# Patient Record
Sex: FEMALE | Race: WHITE | Employment: OTHER | URBAN - METROPOLITAN AREA
[De-identification: names, ages, dates, MRNs, and addresses within clinical notes are randomized per-mention and may not be internally consistent; named-entity substitution may affect disease eponyms.]

---

## 2017-01-01 ENCOUNTER — INFUSION THERAPY VISIT (OUTPATIENT)
Dept: ONCOLOGY | Facility: CLINIC | Age: 77
End: 2017-01-01
Attending: INTERNAL MEDICINE
Payer: MEDICARE

## 2017-01-01 ENCOUNTER — OFFICE VISIT (OUTPATIENT)
Dept: INTERNAL MEDICINE | Facility: CLINIC | Age: 77
End: 2017-01-01
Payer: COMMERCIAL

## 2017-01-01 ENCOUNTER — TRANSFERRED RECORDS (OUTPATIENT)
Dept: HEALTH INFORMATION MANAGEMENT | Facility: CLINIC | Age: 77
End: 2017-01-01

## 2017-01-01 ENCOUNTER — CARE COORDINATION (OUTPATIENT)
Dept: ONCOLOGY | Facility: CLINIC | Age: 77
End: 2017-01-01

## 2017-01-01 ENCOUNTER — APPOINTMENT (OUTPATIENT)
Dept: LAB | Facility: CLINIC | Age: 77
End: 2017-01-01
Attending: INTERNAL MEDICINE
Payer: MEDICARE

## 2017-01-01 ENCOUNTER — TELEPHONE (OUTPATIENT)
Dept: GASTROENTEROLOGY | Facility: CLINIC | Age: 77
End: 2017-01-01

## 2017-01-01 VITALS
HEART RATE: 69 BPM | BODY MASS INDEX: 20.06 KG/M2 | DIASTOLIC BLOOD PRESSURE: 73 MMHG | WEIGHT: 109.69 LBS | SYSTOLIC BLOOD PRESSURE: 116 MMHG

## 2017-01-01 VITALS
WEIGHT: 109.3 LBS | TEMPERATURE: 98 F | RESPIRATION RATE: 16 BRPM | BODY MASS INDEX: 19.99 KG/M2 | DIASTOLIC BLOOD PRESSURE: 72 MMHG | SYSTOLIC BLOOD PRESSURE: 136 MMHG | OXYGEN SATURATION: 97 % | HEART RATE: 80 BPM

## 2017-01-01 VITALS
TEMPERATURE: 97.5 F | HEART RATE: 69 BPM | DIASTOLIC BLOOD PRESSURE: 73 MMHG | BODY MASS INDEX: 20.06 KG/M2 | RESPIRATION RATE: 18 BRPM | WEIGHT: 109.7 LBS | OXYGEN SATURATION: 96 % | SYSTOLIC BLOOD PRESSURE: 116 MMHG

## 2017-01-01 DIAGNOSIS — T45.1X5A ALOPECIA DUE TO CYTOTOXIC DRUG: ICD-10-CM

## 2017-01-01 DIAGNOSIS — K86.89 PANCREATIC MASS: ICD-10-CM

## 2017-01-01 DIAGNOSIS — I26.99 OTHER ACUTE PULMONARY EMBOLISM WITHOUT ACUTE COR PULMONALE (H): ICD-10-CM

## 2017-01-01 DIAGNOSIS — K26.9 DUODENAL ULCERATION: ICD-10-CM

## 2017-01-01 DIAGNOSIS — C25.0 MALIGNANT NEOPLASM OF HEAD OF PANCREAS (H): Primary | ICD-10-CM

## 2017-01-01 DIAGNOSIS — E03.9 HYPOTHYROIDISM, UNSPECIFIED TYPE: ICD-10-CM

## 2017-01-01 DIAGNOSIS — R94.6 ABNORMAL FINDING ON THYROID FUNCTION TEST: Primary | ICD-10-CM

## 2017-01-01 DIAGNOSIS — C25.0 MALIGNANT NEOPLASM OF HEAD OF PANCREAS (H): ICD-10-CM

## 2017-01-01 DIAGNOSIS — R10.84 ABDOMINAL PAIN, GENERALIZED: ICD-10-CM

## 2017-01-01 DIAGNOSIS — L65.8 ALOPECIA DUE TO CYTOTOXIC DRUG: ICD-10-CM

## 2017-01-01 LAB
ALBUMIN SERPL-MCNC: 2.9 G/DL (ref 3.4–5)
ALP SERPL-CCNC: 137 U/L (ref 40–150)
ALT SERPL W P-5'-P-CCNC: 28 U/L (ref 0–50)
ANION GAP SERPL CALCULATED.3IONS-SCNC: 8 MMOL/L (ref 3–14)
AST SERPL W P-5'-P-CCNC: 44 U/L (ref 0–45)
BASOPHILS # BLD AUTO: 0 10E9/L (ref 0–0.2)
BASOPHILS # BLD AUTO: 0.1 10E9/L (ref 0–0.2)
BASOPHILS NFR BLD AUTO: 0.3 %
BASOPHILS NFR BLD AUTO: 1.1 %
BILIRUB SERPL-MCNC: 0.4 MG/DL (ref 0.2–1.3)
BUN SERPL-MCNC: 14 MG/DL (ref 7–30)
CALCIUM SERPL-MCNC: 8.6 MG/DL (ref 8.5–10.1)
CHLORIDE SERPL-SCNC: 102 MMOL/L (ref 94–109)
CO2 SERPL-SCNC: 28 MMOL/L (ref 20–32)
CREAT SERPL-MCNC: 0.64 MG/DL (ref 0.52–1.04)
DIFFERENTIAL METHOD BLD: ABNORMAL
DIFFERENTIAL METHOD BLD: ABNORMAL
EOSINOPHIL # BLD AUTO: 0.1 10E9/L (ref 0–0.7)
EOSINOPHIL # BLD AUTO: 0.3 10E9/L (ref 0–0.7)
EOSINOPHIL NFR BLD AUTO: 1.5 %
EOSINOPHIL NFR BLD AUTO: 3.1 %
ERYTHROCYTE [DISTWIDTH] IN BLOOD BY AUTOMATED COUNT: 12.6 % (ref 10–15)
ERYTHROCYTE [DISTWIDTH] IN BLOOD BY AUTOMATED COUNT: 14.2 % (ref 10–15)
GFR SERPL CREATININE-BSD FRML MDRD: 90 ML/MIN/1.7M2
GLUCOSE SERPL-MCNC: 97 MG/DL (ref 70–99)
HCT VFR BLD AUTO: 33 % (ref 35–47)
HCT VFR BLD AUTO: 33.7 % (ref 35–47)
HGB BLD-MCNC: 10.5 G/DL (ref 11.7–15.7)
HGB BLD-MCNC: 11 G/DL (ref 11.7–15.7)
IMM GRANULOCYTES # BLD: 0 10E9/L (ref 0–0.4)
IMM GRANULOCYTES # BLD: 0.3 10E9/L (ref 0–0.4)
IMM GRANULOCYTES NFR BLD: 0.3 %
IMM GRANULOCYTES NFR BLD: 3.2 %
LYMPHOCYTES # BLD AUTO: 1.1 10E9/L (ref 0.8–5.3)
LYMPHOCYTES # BLD AUTO: 2.9 10E9/L (ref 0.8–5.3)
LYMPHOCYTES NFR BLD AUTO: 27.3 %
LYMPHOCYTES NFR BLD AUTO: 33.2 %
MCH RBC QN AUTO: 30.3 PG (ref 26.5–33)
MCH RBC QN AUTO: 31.2 PG (ref 26.5–33)
MCHC RBC AUTO-ENTMCNC: 31.8 G/DL (ref 31.5–36.5)
MCHC RBC AUTO-ENTMCNC: 32.6 G/DL (ref 31.5–36.5)
MCV RBC AUTO: 95 FL (ref 78–100)
MCV RBC AUTO: 96 FL (ref 78–100)
MONOCYTES # BLD AUTO: 0.3 10E9/L (ref 0–1.3)
MONOCYTES # BLD AUTO: 2.8 10E9/L (ref 0–1.3)
MONOCYTES NFR BLD AUTO: 26.3 %
MONOCYTES NFR BLD AUTO: 9.8 %
NEUTROPHILS # BLD AUTO: 1.9 10E9/L (ref 1.6–8.3)
NEUTROPHILS # BLD AUTO: 4.1 10E9/L (ref 1.6–8.3)
NEUTROPHILS NFR BLD AUTO: 39 %
NEUTROPHILS NFR BLD AUTO: 54.9 %
NRBC # BLD AUTO: 0 10*3/UL
NRBC # BLD AUTO: 0 10*3/UL
NRBC BLD AUTO-RTO: 0 /100
NRBC BLD AUTO-RTO: 0 /100
PLATELET # BLD AUTO: 119 10E9/L (ref 150–450)
PLATELET # BLD AUTO: 705 10E9/L (ref 150–450)
PLATELET # BLD EST: ABNORMAL 10*3/UL
PLATELET # BLD EST: ABNORMAL 10*3/UL
POTASSIUM SERPL-SCNC: 4.5 MMOL/L (ref 3.4–5.3)
PROT SERPL-MCNC: 7.3 G/DL (ref 6.8–8.8)
RBC # BLD AUTO: 3.46 10E12/L (ref 3.8–5.2)
RBC # BLD AUTO: 3.53 10E12/L (ref 3.8–5.2)
SODIUM SERPL-SCNC: 138 MMOL/L (ref 133–144)
WBC # BLD AUTO: 10.5 10E9/L (ref 4–11)
WBC # BLD AUTO: 3.4 10E9/L (ref 4–11)

## 2017-01-01 PROCEDURE — 96417 CHEMO IV INFUS EACH ADDL SEQ: CPT

## 2017-01-01 PROCEDURE — 25000128 H RX IP 250 OP 636: Mod: ZF | Performed by: NURSE PRACTITIONER

## 2017-01-01 PROCEDURE — 96413 CHEMO IV INFUSION 1 HR: CPT

## 2017-01-01 PROCEDURE — 99213 OFFICE O/P EST LOW 20 MIN: CPT | Mod: ZF

## 2017-01-01 PROCEDURE — 80053 COMPREHEN METABOLIC PANEL: CPT | Performed by: INTERNAL MEDICINE

## 2017-01-01 PROCEDURE — 99212 OFFICE O/P EST SF 10 MIN: CPT | Mod: 25

## 2017-01-01 PROCEDURE — 85025 COMPLETE CBC W/AUTO DIFF WBC: CPT | Performed by: INTERNAL MEDICINE

## 2017-01-01 PROCEDURE — 99215 OFFICE O/P EST HI 40 MIN: CPT | Mod: ZP | Performed by: NURSE PRACTITIONER

## 2017-01-01 PROCEDURE — 96375 TX/PRO/DX INJ NEW DRUG ADDON: CPT

## 2017-01-01 PROCEDURE — 25000128 H RX IP 250 OP 636: Mod: ZF | Performed by: INTERNAL MEDICINE

## 2017-01-01 RX ORDER — EPINEPHRINE 1 MG/ML
0.3 INJECTION, SOLUTION, CONCENTRATE INTRAVENOUS EVERY 5 MIN PRN
Status: CANCELLED | OUTPATIENT
Start: 2018-01-01

## 2017-01-01 RX ORDER — ALBUTEROL SULFATE 90 UG/1
1-2 AEROSOL, METERED RESPIRATORY (INHALATION)
Status: CANCELLED
Start: 2017-01-01

## 2017-01-01 RX ORDER — METHYLPREDNISOLONE SODIUM SUCCINATE 125 MG/2ML
125 INJECTION, POWDER, LYOPHILIZED, FOR SOLUTION INTRAMUSCULAR; INTRAVENOUS
Status: CANCELLED
Start: 2018-01-01

## 2017-01-01 RX ORDER — PROCHLORPERAZINE MALEATE 10 MG
5 TABLET ORAL EVERY 6 HOURS PRN
Qty: 180 TABLET | Refills: 0 | Status: ON HOLD | OUTPATIENT
Start: 2017-01-01 | End: 2018-01-01

## 2017-01-01 RX ORDER — MEPERIDINE HYDROCHLORIDE 25 MG/ML
25 INJECTION INTRAMUSCULAR; INTRAVENOUS; SUBCUTANEOUS EVERY 30 MIN PRN
Status: CANCELLED | OUTPATIENT
Start: 2018-01-01

## 2017-01-01 RX ORDER — SODIUM CHLORIDE 9 MG/ML
1000 INJECTION, SOLUTION INTRAVENOUS CONTINUOUS PRN
Status: CANCELLED
Start: 2018-01-01

## 2017-01-01 RX ORDER — EPINEPHRINE 0.3 MG/.3ML
0.3 INJECTION SUBCUTANEOUS EVERY 5 MIN PRN
Status: CANCELLED | OUTPATIENT
Start: 2018-01-01

## 2017-01-01 RX ORDER — PACLITAXEL 100 MG/20ML
125 INJECTION, POWDER, LYOPHILIZED, FOR SUSPENSION INTRAVENOUS ONCE
Status: COMPLETED | OUTPATIENT
Start: 2017-01-01 | End: 2017-01-01

## 2017-01-01 RX ORDER — PACLITAXEL 100 MG/20ML
125 INJECTION, POWDER, LYOPHILIZED, FOR SUSPENSION INTRAVENOUS ONCE
Status: CANCELLED | OUTPATIENT
Start: 2018-01-01

## 2017-01-01 RX ORDER — TRAMADOL HYDROCHLORIDE 50 MG/1
25-50 TABLET ORAL EVERY 6 HOURS PRN
Qty: 100 TABLET | Refills: 1 | Status: SHIPPED | OUTPATIENT
Start: 2017-01-01 | End: 2018-01-01

## 2017-01-01 RX ORDER — ALBUTEROL SULFATE 0.83 MG/ML
2.5 SOLUTION RESPIRATORY (INHALATION)
Status: CANCELLED | OUTPATIENT
Start: 2018-01-01

## 2017-01-01 RX ORDER — PACLITAXEL 100 MG/20ML
125 INJECTION, POWDER, LYOPHILIZED, FOR SUSPENSION INTRAVENOUS ONCE
Status: CANCELLED | OUTPATIENT
Start: 2017-01-01

## 2017-01-01 RX ORDER — MEPERIDINE HYDROCHLORIDE 25 MG/ML
25 INJECTION INTRAMUSCULAR; INTRAVENOUS; SUBCUTANEOUS EVERY 30 MIN PRN
Status: CANCELLED | OUTPATIENT
Start: 2017-01-01

## 2017-01-01 RX ORDER — ALBUTEROL SULFATE 90 UG/1
1-2 AEROSOL, METERED RESPIRATORY (INHALATION)
Status: CANCELLED
Start: 2018-01-01

## 2017-01-01 RX ORDER — OMEPRAZOLE 40 MG/1
40 CAPSULE, DELAYED RELEASE ORAL 2 TIMES DAILY
Qty: 180 CAPSULE | Refills: 0 | Status: ON HOLD | OUTPATIENT
Start: 2017-01-01 | End: 2018-01-01

## 2017-01-01 RX ORDER — EPINEPHRINE 1 MG/ML
0.3 INJECTION, SOLUTION, CONCENTRATE INTRAVENOUS EVERY 5 MIN PRN
Status: CANCELLED | OUTPATIENT
Start: 2017-01-01

## 2017-01-01 RX ORDER — SODIUM CHLORIDE 9 MG/ML
1000 INJECTION, SOLUTION INTRAVENOUS CONTINUOUS PRN
Status: CANCELLED
Start: 2017-01-01

## 2017-01-01 RX ORDER — ONDANSETRON 4 MG/1
4 TABLET, FILM COATED ORAL EVERY 6 HOURS PRN
Qty: 60 TABLET | Refills: 0 | Status: ON HOLD | OUTPATIENT
Start: 2017-01-01 | End: 2018-01-01

## 2017-01-01 RX ORDER — LORAZEPAM 2 MG/ML
0.5 INJECTION INTRAMUSCULAR EVERY 4 HOURS PRN
Status: CANCELLED
Start: 2017-01-01

## 2017-01-01 RX ORDER — DIPHENHYDRAMINE HYDROCHLORIDE 50 MG/ML
50 INJECTION INTRAMUSCULAR; INTRAVENOUS
Status: CANCELLED
Start: 2018-01-01

## 2017-01-01 RX ORDER — DIPHENHYDRAMINE HYDROCHLORIDE 50 MG/ML
50 INJECTION INTRAMUSCULAR; INTRAVENOUS
Status: CANCELLED
Start: 2017-01-01

## 2017-01-01 RX ORDER — ALBUTEROL SULFATE 0.83 MG/ML
2.5 SOLUTION RESPIRATORY (INHALATION)
Status: CANCELLED | OUTPATIENT
Start: 2017-01-01

## 2017-01-01 RX ORDER — METHYLPREDNISOLONE SODIUM SUCCINATE 125 MG/2ML
125 INJECTION, POWDER, LYOPHILIZED, FOR SOLUTION INTRAMUSCULAR; INTRAVENOUS
Status: CANCELLED
Start: 2017-01-01

## 2017-01-01 RX ORDER — EPINEPHRINE 0.3 MG/.3ML
0.3 INJECTION SUBCUTANEOUS EVERY 5 MIN PRN
Status: CANCELLED | OUTPATIENT
Start: 2017-01-01

## 2017-01-01 RX ORDER — LORAZEPAM 2 MG/ML
0.5 INJECTION INTRAMUSCULAR EVERY 4 HOURS PRN
Status: CANCELLED
Start: 2018-01-01

## 2017-01-01 RX ADMIN — SODIUM CHLORIDE 250 ML: 9 INJECTION, SOLUTION INTRAVENOUS at 09:50

## 2017-01-01 RX ADMIN — DEXAMETHASONE SODIUM PHOSPHATE 12 MG: 10 INJECTION, SOLUTION INTRAMUSCULAR; INTRAVENOUS at 09:50

## 2017-01-01 RX ADMIN — PACLITAXEL 185 MG: 100 INJECTION, POWDER, LYOPHILIZED, FOR SUSPENSION INTRAVENOUS at 10:25

## 2017-01-01 RX ADMIN — DEXAMETHASONE SODIUM PHOSPHATE 12 MG: 10 INJECTION, SOLUTION INTRAMUSCULAR; INTRAVENOUS at 10:13

## 2017-01-01 RX ADMIN — SODIUM CHLORIDE 250 ML: 9 INJECTION, SOLUTION INTRAVENOUS at 10:13

## 2017-01-01 RX ADMIN — GEMCITABINE 1480 MG: 38 INJECTION, SOLUTION INTRAVENOUS at 11:14

## 2017-01-01 RX ADMIN — GEMCITABINE 1400 MG: 38 INJECTION, SOLUTION INTRAVENOUS at 11:24

## 2017-01-01 RX ADMIN — PACLITAXEL 185 MG: 100 INJECTION, POWDER, LYOPHILIZED, FOR SUSPENSION INTRAVENOUS at 10:36

## 2017-01-01 ASSESSMENT — PAIN SCALES - GENERAL
PAINLEVEL: NO PAIN (0)

## 2017-01-05 ENCOUNTER — DOCUMENTATION ONLY (OUTPATIENT)
Dept: OTHER | Facility: CLINIC | Age: 77
End: 2017-01-05

## 2017-01-05 ENCOUNTER — THERAPY VISIT (OUTPATIENT)
Dept: PHYSICAL THERAPY | Facility: CLINIC | Age: 77
End: 2017-01-05
Payer: MEDICARE

## 2017-01-05 DIAGNOSIS — M79.674 PAIN OF TOE OF RIGHT FOOT: Primary | ICD-10-CM

## 2017-01-05 DIAGNOSIS — Z71.89 ADVANCE CARE PLANNING: Primary | Chronic | ICD-10-CM

## 2017-01-05 PROCEDURE — 97110 THERAPEUTIC EXERCISES: CPT | Mod: GP | Performed by: PHYSICAL THERAPIST

## 2017-01-13 ENCOUNTER — THERAPY VISIT (OUTPATIENT)
Dept: PHYSICAL THERAPY | Facility: CLINIC | Age: 77
End: 2017-01-13
Payer: MEDICARE

## 2017-01-13 DIAGNOSIS — M79.674 PAIN OF TOE OF RIGHT FOOT: Primary | ICD-10-CM

## 2017-01-13 PROCEDURE — 97110 THERAPEUTIC EXERCISES: CPT | Mod: GP | Performed by: PHYSICAL THERAPIST

## 2017-01-13 PROCEDURE — 97140 MANUAL THERAPY 1/> REGIONS: CPT | Mod: GP | Performed by: PHYSICAL THERAPIST

## 2017-01-20 ENCOUNTER — THERAPY VISIT (OUTPATIENT)
Dept: PHYSICAL THERAPY | Facility: CLINIC | Age: 77
End: 2017-01-20
Payer: MEDICARE

## 2017-01-20 DIAGNOSIS — M79.674 PAIN OF TOE OF RIGHT FOOT: Primary | ICD-10-CM

## 2017-01-20 PROCEDURE — 97110 THERAPEUTIC EXERCISES: CPT | Mod: GP | Performed by: PHYSICAL THERAPIST

## 2017-01-20 PROCEDURE — 97530 THERAPEUTIC ACTIVITIES: CPT | Mod: GP | Performed by: PHYSICAL THERAPIST

## 2017-01-20 PROCEDURE — 97140 MANUAL THERAPY 1/> REGIONS: CPT | Mod: GP | Performed by: PHYSICAL THERAPIST

## 2017-01-20 NOTE — PROGRESS NOTES
"PROGRESS REPORT  1/20/17    Iris has been in therapy from 12/21/16 to Jan 20, 2017 for treatment of The encounter diagnosis was Pain of toe of right foot..    Procedure:  Status post right first MTP joint arthrodesis with pinning of the second toe for hammertoe deformity performed on 11/09/2016.    Therapist Impression:   Since starting physical therapy, Iris has demonstrated self-report and objective measure findings of improved 2nd MTP ROM and motor control of foot intrinsics. Her FAAM score is 70.6% subjective report of 85% function. Her pain levels continue to reduce, but she does have some symptoms within the plantar plate region of her 2nd MTP joint. Her 2nd toe ROM is WFL, but presents with a dorsal dislocation of the phalanx, which causes a toe extension bias during static posture and she is unable to achieve flexion ROM due to this. Her gait is abnormal at this point as she is not engaging the forefoot during mid to terminal stance phase on the L.  Based of these finding, PT feels it is appropriate to continue PT with a focus on restoring gait/functional strength of the foot complex per Dr. Mcelroy's direction.    Subjective:  Subjective changes noted by patient: Pt reports being in her CAM boot until follow-up with Dr. Mcelroy, but she has been taking it off for short walks within the home and had it off while standing at her work. She feels her R foot gets \"tired\" throughout the day and her hip/back have been sore from walking within the boot. She reports having some pain within the posterior surface of her 2nd MTP, but this goes away throughout the day. She has been taping her 2nd toe into PF at night and putting a spacer b/w her toes.   OUTCOME MEASURE:  FAAM:  65/92 (70.6%) Subjective report on FAAM at 85%  Objective:    L 2nd MTP ROM: 45* DF, 0* PF    Ankle mobility: FROM BILAT    Gait: antalgic L stance phase with forefoot load avoidance     Foot intrinsic: 4-/5  (4-/5 toes 2-4 " gross)       ASSESSMENT/PLAN  Updated problem list and treatment plan: The encounter diagnosis was Pain of toe of right foot. Pain - HEP  Decreased ROM/flexibility - HEP  Decreased function - HEP  Decreased strength - HEP  Inflammation - HEP  Impaired muscle performance - HEP  Impaired gait - HEP  STG/LTGs have been met or progress has been made towards goals:  Yes (See Goal flow sheet completed today.)  Assessment of Progress: The patient's condition is improving.  The patient's condition has potential to improve.  Patient is meeting short term goals and is progressing towards long term goals.  Self Management Plans:  Patient has been instructed in a home treatment program.  Patient is independent in a home treatment program.  Patient  has been instructed in self management of symptoms.  Patient is independent in self management of symptoms.  I have re-evaluated this patient and find that the nature, scope, duration and intensity of the therapy is appropriate for the medical condition of the patient.  Iris continues to require the following intervention to meet STG and LTG's:  PT    Recommendations:  This patient would benefit from continued therapy.     Frequency:  1 X week, once daily Per Dr. Mcelroy's orders going forth   Duration:  As requested per. Follow-up with Dr. Mcelroy    Please refer to the daily flowsheet for treatment today, total treatment time and time spent performing 1:1 timed codes.

## 2017-01-23 DIAGNOSIS — M25.571 PAIN IN JOINT, ANKLE AND FOOT, RIGHT: Primary | ICD-10-CM

## 2017-01-24 ENCOUNTER — OFFICE VISIT (OUTPATIENT)
Dept: ORTHOPEDICS | Facility: CLINIC | Age: 77
End: 2017-01-24

## 2017-01-24 DIAGNOSIS — M25.571 PAIN IN JOINT, ANKLE AND FOOT, RIGHT: Primary | ICD-10-CM

## 2017-01-24 NOTE — NURSING NOTE
Reason For Visit:   Chief Complaint   Patient presents with     RECHECK     Right 1st Metatarsal phalangel Joint fusion and hammer toe correction. DOS: 11/09/2016.           Pain Assessment  Patient Currently in Pain: No

## 2017-01-24 NOTE — Clinical Note
1/24/2017       RE: Iris Lewis  [address]  [place] [address]     Dear Colleague,    Thank you for referring your patient, Iris Lewis, to the [facility] at [facility]. Please see a copy of my visit note below.    CHIEF COMPLAINT:  Status post right first metatarsophalangeal joint fusion with hammertoe corrective surgery performed on 11/09/2016.      HISTORY OF PRESENT ILLNESS:  Ms. Lewis presents today for further followup.  Denies to have any problems or complaints.  Reports to be compliant.  Continues using the CAM Walker per our recommendations.      PHYSICAL EXAMINATION:  On today's visit, she presents with excellent alignment of the right first MTP joint.  There is no gross motion across the joint itself.  The second toe presents a slight high riding position as is quite difficult and stiff to bring it down into plantar flexion secondary to the postoperative scar tissue.  Overall alignment of the second toe is excellent with still some residual swelling.      RADIOGRAPHIC EVALUATION:  Three views of the right foot were reviewed today which were significant for showing what seems to be an excellent solid fusion across the first MTP joint with hardware intact and in place.  Alignment is excellent.      ASSESSMENT:  Status post right first MTP joint arthrodesis with second hammertoe corrective surgery.      PLAN:  I discussed with the patient she can proceed with activities as tolerated.  I emphasized the importance of working with physical therapy on scar tissue in order to plantar flex the second toe.      The patient will follow up on a p.r.n. basis or in 2-3 months from today if she is not happy with the function of her right foot.      All questions were answered.      TT 15 minutes, CT 10 minutes.           Again, thank you for allowing me to participate in the care of your patient.      Sincerely,    Janes Mcelroy,  MD

## 2017-01-24 NOTE — PROGRESS NOTES
CHIEF COMPLAINT:  Status post right first metatarsophalangeal joint fusion with hammertoe corrective surgery performed on 11/09/2016.      HISTORY OF PRESENT ILLNESS:  Ms. Lewis presents today for further followup.  Denies to have any problems or complaints.  Reports to be compliant.  Continues using the CAM Walker per our recommendations.      PHYSICAL EXAMINATION:  On today's visit, she presents with excellent alignment of the right first MTP joint.  There is no gross motion across the joint itself.  The second toe presents a slight high riding position as is quite difficult and stiff to bring it down into plantar flexion secondary to the postoperative scar tissue.  Overall alignment of the second toe is excellent with still some residual swelling.      RADIOGRAPHIC EVALUATION:  Three views of the right foot were reviewed today which were significant for showing what seems to be an excellent solid fusion across the first MTP joint with hardware intact and in place.  Alignment is excellent.      ASSESSMENT:  Status post right first MTP joint arthrodesis with second hammertoe corrective surgery.      PLAN:  I discussed with the patient she can proceed with activities as tolerated.  I emphasized the importance of working with physical therapy on scar tissue in order to plantar flex the second toe.      The patient will follow up on a p.r.n. basis or in 2-3 months from today if she is not happy with the function of her right foot.      All questions were answered.      TT 15 minutes, CT 10 minutes.

## 2017-01-30 ENCOUNTER — THERAPY VISIT (OUTPATIENT)
Dept: PHYSICAL THERAPY | Facility: CLINIC | Age: 77
End: 2017-01-30
Payer: MEDICARE

## 2017-01-30 DIAGNOSIS — M79.674 PAIN OF TOE OF RIGHT FOOT: Primary | ICD-10-CM

## 2017-01-30 PROCEDURE — 97110 THERAPEUTIC EXERCISES: CPT | Mod: GP | Performed by: PHYSICAL THERAPIST

## 2017-01-30 PROCEDURE — 97112 NEUROMUSCULAR REEDUCATION: CPT | Mod: GP | Performed by: PHYSICAL THERAPIST

## 2017-02-08 ENCOUNTER — THERAPY VISIT (OUTPATIENT)
Dept: PHYSICAL THERAPY | Facility: CLINIC | Age: 77
End: 2017-02-08
Payer: MEDICARE

## 2017-02-08 DIAGNOSIS — M79.674 PAIN OF TOE OF RIGHT FOOT: Primary | ICD-10-CM

## 2017-02-08 PROCEDURE — 97140 MANUAL THERAPY 1/> REGIONS: CPT | Mod: GP | Performed by: PHYSICAL THERAPIST

## 2017-02-08 PROCEDURE — 97110 THERAPEUTIC EXERCISES: CPT | Mod: GP | Performed by: PHYSICAL THERAPIST

## 2017-03-15 ENCOUNTER — RADIANT APPOINTMENT (OUTPATIENT)
Dept: MAMMOGRAPHY | Facility: CLINIC | Age: 77
End: 2017-03-15
Attending: FAMILY MEDICINE
Payer: MEDICARE

## 2017-03-15 DIAGNOSIS — Z12.31 VISIT FOR SCREENING MAMMOGRAM: ICD-10-CM

## 2017-03-15 PROCEDURE — G0202 SCR MAMMO BI INCL CAD: HCPCS

## 2017-05-15 DIAGNOSIS — I10 ESSENTIAL HYPERTENSION, BENIGN: ICD-10-CM

## 2017-05-15 NOTE — LETTER
May 17, 2017      TO: Iris VALLADARES Joshua  [address]  [place] [address]         Dear MsMikaela Iris VALLADARES Joshua,    This letter is a reminder that you are overdue to see your Primary Care Provider for an Annual Visit and needed labs. You must be seen by your Primary Care Provider on a yearly basis and have appropriate labs drawn for continued care and prescription refills. Please call [phone] to schedule an appointment for an Annual Visit with VEDA STEELE MD.   LAST VISIT 4/13/16    You have been given a 30 day supply/refill of your  RIAMTERENE/HYDROCHLOROTHIAZIDE 37.5-25 MG Cap while you get your clinic visit/labs completed.    Regards,  [facility]

## 2017-05-17 RX ORDER — TRIAMTERENE AND HYDROCHLOROTHIAZIDE 37.5; 25 MG/1; MG/1
1 CAPSULE ORAL EVERY MORNING
Qty: 30 CAPSULE | Refills: 0 | Status: SHIPPED | OUTPATIENT
Start: 2017-05-17 | End: 2017-06-21

## 2017-05-18 NOTE — TELEPHONE ENCOUNTER
DYAZIDE   Last Written Prescription Date: 11/29/16  Last Fill Quantity: 90, # refills: 1  Last Office Visit : 4/13/16  Next Clinic Visit: NONE     Results for JONA SHETTY (MRN [id number]) as of 5/17/2017 21:57   Ref. Range 10/24/2016 16:04   Sodium Latest Ref Range: 133 - 144 mmol/L 141       Potassium   Date Value Ref Range Status   11/09/2016 3.5 3.4 - 5.3 mmol/L Final     Creatinine   Date Value Ref Range Status   10/24/2016 0.73 0.52 - 1.04 mg/dL Final     BP Readings from Last 3 Encounters:   11/09/16 131/68   10/24/16 143/83   08/17/16 149/72     OVER DUE MD APPT.  LETTER SENT + 30 DAY RF

## 2017-05-26 ENCOUNTER — TELEPHONE (OUTPATIENT)
Dept: INTERNAL MEDICINE | Facility: CLINIC | Age: 77
End: 2017-05-26

## 2017-05-26 DIAGNOSIS — E78.00 HIGH BLOOD CHOLESTEROL: ICD-10-CM

## 2017-05-26 DIAGNOSIS — I10 BENIGN ESSENTIAL HYPERTENSION: Primary | ICD-10-CM

## 2017-06-19 ENCOUNTER — OFFICE VISIT (OUTPATIENT)
Dept: INTERNAL MEDICINE | Facility: CLINIC | Age: 77
End: 2017-06-19

## 2017-06-19 VITALS
SYSTOLIC BLOOD PRESSURE: 120 MMHG | OXYGEN SATURATION: 98 % | WEIGHT: 120.5 LBS | HEART RATE: 95 BPM | DIASTOLIC BLOOD PRESSURE: 81 MMHG | BODY MASS INDEX: 22.04 KG/M2 | RESPIRATION RATE: 16 BRPM

## 2017-06-19 DIAGNOSIS — R94.6 ABNORMAL FINDING ON THYROID FUNCTION TEST: ICD-10-CM

## 2017-06-19 DIAGNOSIS — E78.00 HIGH BLOOD CHOLESTEROL: ICD-10-CM

## 2017-06-19 DIAGNOSIS — R10.84 ABDOMINAL PAIN, GENERALIZED: ICD-10-CM

## 2017-06-19 DIAGNOSIS — I10 BENIGN ESSENTIAL HYPERTENSION: Primary | ICD-10-CM

## 2017-06-19 DIAGNOSIS — I10 BENIGN ESSENTIAL HYPERTENSION: ICD-10-CM

## 2017-06-19 LAB
ALBUMIN SERPL-MCNC: 3.9 G/DL (ref 3.4–5)
ALP SERPL-CCNC: 71 U/L (ref 40–150)
ALT SERPL W P-5'-P-CCNC: 18 U/L (ref 0–50)
ANION GAP SERPL CALCULATED.3IONS-SCNC: 7 MMOL/L (ref 3–14)
AST SERPL W P-5'-P-CCNC: 18 U/L (ref 0–45)
BASOPHILS # BLD AUTO: 0.1 10E9/L (ref 0–0.2)
BASOPHILS NFR BLD AUTO: 0.7 %
BILIRUB SERPL-MCNC: 1 MG/DL (ref 0.2–1.3)
BUN SERPL-MCNC: 16 MG/DL (ref 7–30)
CALCIUM SERPL-MCNC: 9.1 MG/DL (ref 8.5–10.1)
CHLORIDE SERPL-SCNC: 105 MMOL/L (ref 94–109)
CHOLEST SERPL-MCNC: 250 MG/DL
CO2 SERPL-SCNC: 29 MMOL/L (ref 20–32)
CREAT SERPL-MCNC: 0.68 MG/DL (ref 0.52–1.04)
DIFFERENTIAL METHOD BLD: NORMAL
EOSINOPHIL # BLD AUTO: 0.7 10E9/L (ref 0–0.7)
EOSINOPHIL NFR BLD AUTO: 7.7 %
ERYTHROCYTE [DISTWIDTH] IN BLOOD BY AUTOMATED COUNT: 13.5 % (ref 10–15)
GFR SERPL CREATININE-BSD FRML MDRD: 84 ML/MIN/1.7M2
GLUCOSE SERPL-MCNC: 79 MG/DL (ref 70–99)
HCT VFR BLD AUTO: 43.6 % (ref 35–47)
HDLC SERPL-MCNC: 168 MG/DL
HGB BLD-MCNC: 13.9 G/DL (ref 11.7–15.7)
IMM GRANULOCYTES # BLD: 0 10E9/L (ref 0–0.4)
IMM GRANULOCYTES NFR BLD: 0.2 %
LDLC SERPL CALC-MCNC: 69 MG/DL
LYMPHOCYTES # BLD AUTO: 1.5 10E9/L (ref 0.8–5.3)
LYMPHOCYTES NFR BLD AUTO: 15.6 %
MCH RBC QN AUTO: 31.3 PG (ref 26.5–33)
MCHC RBC AUTO-ENTMCNC: 31.9 G/DL (ref 31.5–36.5)
MCV RBC AUTO: 98 FL (ref 78–100)
MONOCYTES # BLD AUTO: 1.1 10E9/L (ref 0–1.3)
MONOCYTES NFR BLD AUTO: 11.9 %
NEUTROPHILS # BLD AUTO: 6.1 10E9/L (ref 1.6–8.3)
NEUTROPHILS NFR BLD AUTO: 63.9 %
NONHDLC SERPL-MCNC: 82 MG/DL
NRBC # BLD AUTO: 0 10*3/UL
NRBC BLD AUTO-RTO: 0 /100
PLATELET # BLD AUTO: 239 10E9/L (ref 150–450)
POTASSIUM SERPL-SCNC: 3.6 MMOL/L (ref 3.4–5.3)
PROT SERPL-MCNC: 7.8 G/DL (ref 6.8–8.8)
RBC # BLD AUTO: 4.44 10E12/L (ref 3.8–5.2)
SODIUM SERPL-SCNC: 141 MMOL/L (ref 133–144)
T4 FREE SERPL-MCNC: 1.61 NG/DL (ref 0.76–1.46)
TRIGL SERPL-MCNC: 68 MG/DL
TSH SERPL DL<=0.005 MIU/L-ACNC: 0.04 MU/L (ref 0.4–4)
WBC # BLD AUTO: 9.5 10E9/L (ref 4–11)

## 2017-06-19 ASSESSMENT — PAIN SCALES - GENERAL: PAINLEVEL: NO PAIN (0)

## 2017-06-19 NOTE — PROGRESS NOTES
HPI:    Pt. Comes in follow up today.   She o/w denies additional HEENT, cardiopulmonary, abdominal, , GYN, neurological, systemic complaints. She had colonoscopy 3/15. She had mammogram 3/17 and saw Dr. Orourke for annual exam 4/4/16. She was seen by Edwin Lauren 4/15 for thyroid CA.  She was seen in Dermatology  12/16.  She denies excessive ASA/NSAID use. No other similar skin complaints elsewhere. She follows with Dr. Mcelroy for surgery and last seen 1/17.          PE:    Vitals noted gen nad cooperative alert, HEENT, ears normal oropharynx clear no exudate, neck supple nl rom no adenopathy, surgical scar at thyroid, LCTA, B, RRR, S1, S2, no MRG, no paraspinous tenderness to palpation. Abdomen is non tender    Neurological exam B UE/LE/proximal/distal is normal and symmetric for sensation, reflexes, and strength.Normal gait. L knee without tenderness, no joint effusion.           A/P:    1. Mammogram 3/17; colonoscopy  3/15 repeat in 10 years. She is to see Dr. Orourke 6/17 for annual exam  2. See in Dermatology 12/16  3. She saw Dr. Orourke for low bone density 6/16  4. Check labs today for cholesterol  5. Check labs for HTN stable  6. Check labs for thyroid medication.  7. Immunizations up to date  8. Refer back to Dr. Washburn, CVTS h/o Saul (around 2006) for abdominal complaints. If worse repeat CT abdomen/pelvis (last 2014). She will also take Probiotics    I will see her back 8/2017        Total time spent 25 minutes.  More than 50% of the time spent with Ms. Lewis on counseling / coordinating her care

## 2017-06-19 NOTE — NURSING NOTE
Chief Complaint   Patient presents with     Physical     Patient is here for annual physical.      Tanna Pena LPN at 10:29 AM on 6/19/2017.

## 2017-06-19 NOTE — MR AVS SNAPSHOT
After Visit Summary   6/19/2017    Iris Lewis    MRN: [id number]           Patient Information     Date Of Birth          1940        Visit Information        Provider Department      6/19/2017 10:35 AM Neri Gipson MD [facility]        Today's Diagnoses     Benign essential hypertension    -  1    Abnormal finding on thyroid function test        Abdominal pain, generalized          Care Instructions    [facility] Medication Refill Request Information:  * Please contact your pharmacy regarding ANY request for medication refills.  ** [facility] Prescription Fax = [phone]  * Please allow 3 business days for routine medication refills.  * Please allow 5 business days for controlled substance medication refills.     [facility] Test Result notification information:  *You will be notified with in 7-10 days of your appointment day regarding the results of your test.  If you are on MyChart you will be notified as soon as the provider has reviewed the results and signed off on them.    [facility] [phone]             Follow-ups after your visit        Additional Services     GENERAL SURG ADULT REFERRAL       Dr. Washburn, Rafat Saul                  Your next 10 appointments already scheduled     Jun 26, 2017  3:30 PM CDT   Return Visit with Neha Orourke MD PhD   [facility] ([facility])    [facility]  [address]  [address]  [address] [address]   [phone]            Aug 08, 2017 10:35 AM CDT   (Arrive by 10:20 AM)   Return Visit with Neri Gipson MD   [facility] ([facility])    [address]  4th Floor  [facility] [address] [phone]              Future tests that were ordered for you today     Open Future Orders        Priority Expected Expires Ordered    TSH with free T4 reflex Routine 6/19/2017 6/19/2018  6/19/2017    Lipid panel reflex to direct LDL Routine  6/19/2018 6/19/2017    Comprehensive metabolic panel Routine 6/19/2017 6/19/2018 6/19/2017    CBC with platelets differential Routine 6/19/2017 7/3/2017 6/19/2017            Who to contact     Please call your clinic at [phone] to:    Ask questions about your health    Make or cancel appointments    Discuss your medicines    Learn about your test results    Speak to your doctor   If you have compliments or concerns about an experience at your clinic, or if you wish to file a complaint, please contact [facility] Patient Relations at [phone] or email us at [email or web address]         Additional Information About Your Visit        [facility] Touch Information     [employer] gives you secure access to your electronic health record. If you see a primary care provider, you can also send messages to your care team and make appointments. If you have questions, please call your primary care clinic.  If you do not have a primary care provider, please call [phone] and they will assist you.      [employer] is an electronic gateway that provides easy, online access to your medical records. With [employer], you can request a clinic appointment, read your test results, renew a prescription or communicate with your care team.     To access your existing account, please contact your [facility] or call [phone] for assistance.        Care EveryWhere ID     This is your Care EveryWhere ID. This could be used by other organizations to access your [place] medical records  [id number]        Your Vitals Were     Pulse Respirations Pulse Oximetry Breastfeeding? BMI (Body Mass Index)       95 16 98% No 22.04 kg/m2        Blood Pressure from Last 3 Encounters:   06/19/17 120/81   11/09/16 131/68   10/24/16 143/83    Weight from Last 3 Encounters:   06/19/17 54.7 kg (120 lb 8 oz)   12/20/16 52.4 kg (115 lb 8 oz)    11/09/16 53 kg (116 lb 12.8 oz)              We Performed the Following     GENERAL SURG ADULT REFERRAL        Primary Care Provider Office Phone # Fax #    Neri Gipson [phone]       [facility] [address] [address]        Thank you!     Thank you for choosing [facility]  for your care. Our goal is always to provide you with excellent care. Hearing back from our patients is one way we can continue to improve our services. Please take a few minutes to complete the written survey that you may receive in the mail after your visit with us. Thank you!             Your Updated Medication List - Protect others around you: Learn how to safely use, store and throw away your medicines at www.disposemymeds.org.          This list is accurate as of: 6/19/17 11:24 AM.  Always use your most recent med list.                   Brand Name Dispense Instructions for use    BIFIDO-GENIC GROWTH FACTORS PO          CALCIUM 500 PO      Take 700 mg by mouth       cholecalciferol 1000 UNIT tablet    vitamin D     Take 1 tablet by mouth daily       cyanocolbalamin 500 MCG tablet    vitamin  B-12     Take 500 mcg by mouth daily.       denosumab 60 MG/ML Soln injection    PROLIA    1 mL    Inject 1 mL (60 mg) Subcutaneous every 6 months Inject subcutaneously in upper arm, upper thigh or abdomen       folic acid 400 MCG tablet    FOLVITE     Take 1 tablet by mouth daily.       lactobacillus acidophilus Tabs      Take 1 tablet by mouth 3 times daily (before meals)       levothyroxine 137 MCG tablet    SYNTHROID/LEVOTHROID    90 tablet    Take 1 tablet (137 mcg) by mouth daily       magnesium 250 MG tablet      Take 1 tablet by mouth daily.       triamterene-hydrochlorothiazide 37.5-25 MG per capsule    DYAZIDE    30 capsule    Take 1 capsule by mouth every morning LETTER SENT/ MD APPT.FOR REFILLS

## 2017-06-19 NOTE — PATIENT INSTRUCTIONS
[facility] Medication Refill Request Information:  * Please contact your pharmacy regarding ANY request for medication refills.  ** [facility] Prescription Fax = [phone]  * Please allow 3 business days for routine medication refills.  * Please allow 5 business days for controlled substance medication refills.     [facility] Test Result notification information:  *You will be notified with in 7-10 days of your appointment day regarding the results of your test.  If you are on MyChart you will be notified as soon as the provider has reviewed the results and signed off on them.    [facility] [phone]

## 2017-06-20 DIAGNOSIS — I10 ESSENTIAL HYPERTENSION, BENIGN: ICD-10-CM

## 2017-06-20 DIAGNOSIS — K21.9 GASTROESOPHAGEAL REFLUX DISEASE WITHOUT ESOPHAGITIS: Primary | ICD-10-CM

## 2017-06-20 NOTE — TELEPHONE ENCOUNTER
1.  Date/reason for appt: 7/19/17 - GERD   2.  Referring provider: Dr. Gipson   3.  Call to patient (Yes / No - short description): Yes  4.  Previous care at / records requested from:  - records in Epic  5.  Recent Imaging: 10/08/14 NM Gastric Emptying, 10/7/14 XR Esophagram - in Epic    Last seen by Dr Washburn in 2014    Previous Dr Ogden patient - Nissen without Luis E for small hiatal hernia ([place], 2/2006), EGD dilation 16 mm (Alin, 2/2012) for dysphagia    *Pt scheduled for UGI on 7/19/17 @ 1030, prior to seeing Dr Washburn*

## 2017-06-20 NOTE — TELEPHONE ENCOUNTER
Please call her she forgot to ask Brandan Leach to refill her meds yesterday   Left message for pt to call back.  Lexi Lea RN 4:59 PM on 6/20/2017.

## 2017-06-21 RX ORDER — TRIAMTERENE AND HYDROCHLOROTHIAZIDE 37.5; 25 MG/1; MG/1
CAPSULE ORAL
Qty: 30 CAPSULE | Refills: 1 | Status: SHIPPED | OUTPATIENT
Start: 2017-06-21 | End: 2017-08-08

## 2017-06-23 ENCOUNTER — TELEPHONE (OUTPATIENT)
Dept: INTERNAL MEDICINE | Facility: CLINIC | Age: 77
End: 2017-06-23

## 2017-06-23 DIAGNOSIS — R94.6 ABNORMAL FINDING ON THYROID FUNCTION TEST: Primary | ICD-10-CM

## 2017-06-23 RX ORDER — LEVOTHYROXINE SODIUM 125 UG/1
125 TABLET ORAL DAILY
Qty: 90 TABLET | Refills: 0 | Status: SHIPPED | OUTPATIENT
Start: 2017-06-23 | End: 2017-06-26

## 2017-06-23 RX ORDER — LEVOTHYROXINE SODIUM 125 UG/1
125 TABLET ORAL DAILY
Qty: 30 TABLET | Refills: 3 | COMMUNITY
Start: 2017-06-23 | End: 2017-06-23

## 2017-06-23 NOTE — TELEPHONE ENCOUNTER
Dear Lexi;    See results note I sent to Iris and please give her a call    (1) placed historical reduced Rx. For synthroid to 125 mcg this encounter    (2) recheck thyroid labs in a few months and future order placed historical    MONAE Wong        Dear Iris;    Your labs are stable, except for over-replacement of your thyroid medication. I recommend we reduce your dose down to 125 micrograms and then recheck in about 3 months. I will have my nurse Lexi give you a follow up call.     MONAE Gipson MD      06/23/2017 2:25 PM  Patient informed of PCP's note as above. Rx sent to [facility].  Jeremy GRIDER

## 2017-06-26 ENCOUNTER — OFFICE VISIT (OUTPATIENT)
Dept: FAMILY MEDICINE | Facility: CLINIC | Age: 77
End: 2017-06-26
Attending: FAMILY MEDICINE
Payer: MEDICARE

## 2017-06-26 ENCOUNTER — TELEPHONE (OUTPATIENT)
Dept: INTERNAL MEDICINE | Facility: CLINIC | Age: 77
End: 2017-06-26

## 2017-06-26 VITALS
WEIGHT: 118.9 LBS | BODY MASS INDEX: 21.88 KG/M2 | HEIGHT: 62 IN | HEART RATE: 76 BPM | DIASTOLIC BLOOD PRESSURE: 80 MMHG | SYSTOLIC BLOOD PRESSURE: 131 MMHG

## 2017-06-26 DIAGNOSIS — R94.6 ABNORMAL FINDING ON THYROID FUNCTION TEST: ICD-10-CM

## 2017-06-26 DIAGNOSIS — M81.0 SENILE OSTEOPOROSIS: Primary | ICD-10-CM

## 2017-06-26 PROCEDURE — 96372 THER/PROPH/DIAG INJ SC/IM: CPT | Mod: ZF

## 2017-06-26 PROCEDURE — 25000128 H RX IP 250 OP 636: Mod: ZF

## 2017-06-26 PROCEDURE — 99213 OFFICE O/P EST LOW 20 MIN: CPT | Mod: 25

## 2017-06-26 RX ORDER — GINSENG 100 MG
1 CAPSULE ORAL EVERY MORNING
Status: ON HOLD | COMMUNITY
Start: 2017-01-01 | End: 2018-01-01

## 2017-06-26 RX ORDER — LEVOTHYROXINE SODIUM 125 UG/1
125 TABLET ORAL DAILY
Qty: 30 TABLET | Refills: 0 | Status: SHIPPED | OUTPATIENT
Start: 2017-06-26 | End: 2017-07-22

## 2017-06-26 RX ORDER — TIMOLOL MALEATE 6.8 MG/ML
1 SOLUTION/ DROPS OPHTHALMIC EVERY MORNING
Status: ON HOLD | COMMUNITY
Start: 2014-04-01 | End: 2018-01-01

## 2017-06-26 ASSESSMENT — PAIN SCALES - GENERAL: PAINLEVEL: NO PAIN (0)

## 2017-06-26 NOTE — LETTER
"6/26/2017       RE: Iris Lewis  [address]  [place] [address]     Dear Colleague,    Thank you for referring your patient, Iris Lewis, to the [facility] at [facility]. Please see a copy of my visit note below.    Iris is a  77 year old female post menopausal] [GR5, ] that presents today for osteoporosis follow up, patient was last seen 6/2016.  On prolia for about 3 yrs - no problems - due today . Has compression fx T8 and T10  - last DEXA 6/2016 showed bone loss at the hip - and  Dr Wu reviewed the imaging. The \"bone loss\" at the hip very likely could be related to the difference in positioning, so I won't consider that to be significant. The \"bone loss\" at the wrist is not significant given the LSC.  preference would be for her to continue with the Prolia.  Referring Physician: Referred Neri Gipson MD     HPI     Have you ever had a bone density test? Yes  Where = [facility]  When = 6/2016  Spine Tscore = -1.5 not reliable - has curvature and DDD  Left neck Tscore = -2.1  Total left hip Tscore = -2.4  Right neck Tscore = -2.0  Total Right hip Tscore = -2.2  Loss of -2.4%    Radius 33% loss of -4.06%    Have you received any x-ray dye or contrast in the last ten days? No  How many servings of dairy products do you consume per day? 1-2 Type: milk, cottage cheese, yogurt   Do you take a multi-vitamin daily? No  Do you take a vitamin D supplement? Yes 1000/day  Do you take a calcium supplement daily?  Hair skin and nails - unsure if has calcium)  - tried upcal D and helped (500mg/packet)   Do you take a supplement containing strontium? No  Are you exposed to natural sunlight at least 20 minutes three times a week? Yes    Social History   reports that she quit smoking about 34 years ago. Her smoking use included Cigarettes. She started smoking about 57 years ago. She quit after 5.00 years of use. She has never used " smokeless tobacco. She reports that she drinks alcohol. She reports that she does not use illicit drugs.  Do you smoke cigarettes? Reformed smoker  Do you exercise? Yes. Details: video tapes - couple times/wk   Do you drink alcohol? Yes. Details: few glasses wine/day     Medication History  Have you used any of the following medications?   Actonel (Risedronate): No   Aredia (Pamidronate): No   Boniva (Ibindronate): No   Didronil (Etidronate): No   Evista (Raloxifene): No   Fosamax (Alendronate): No   Forteo (Parathyroid hormone) injections: has been on this in the past   HCTZ (Thiazide): No   Calcitonin nasal spray: No   Reclast or Zometa (Zolendronate): No   Prolia (Denosumab): since 12/2015              HT has been on this in the past for 10 yrs but not since 2010    Current Outpatient Prescriptions   Medication Sig Dispense Refill     levothyroxine (SYNTHROID/LEVOTHROID) 125 MCG tablet Take 1 tablet (125 mcg) by mouth daily 30 tablet 0     Multiple Vitamins-Minerals (HAIR/SKIN/NAILS/BIOTIN PO)        potassium 99 MG TABS        timolol maleate (ISTALOL) 0.5 % opthalmic solution        zinc 50 MG TABS        triamterene-hydrochlorothiazide (DYAZIDE) 37.5-25 MG per capsule Take one tablet a day. 30 capsule 1     Calcium-Magnesium-Vitamin D (CALCIUM 500 PO) Take 700 mg by mouth       denosumab (PROLIA) 60 MG/ML SOLN Inject 1 mL (60 mg) Subcutaneous every 6 months Inject subcutaneously in upper arm, upper thigh or abdomen 1 mL 1     Nutritional Supplements (BIFIDO-GENIC GROWTH FACTORS PO)        Lactobacillus Acid-Pectin (LACTOBACILLUS ACIDOPHILUS) TABS Take 1 tablet by mouth 3 times daily (before meals)       cyanocolbalamin (VITAMIN B-12) 500 MCG tablet Take 500 mcg by mouth daily.       folic acid (FOLVITE) 400 MCG tablet Take 1 tablet by mouth daily.       Magnesium 250 MG tablet Take 1 tablet by mouth daily.       cholecalciferol (VITAMIN D) 1000 UNIT tablet Take 1 tablet by mouth daily        [DISCONTINUED]  "denosumab (PROLIA) 60 MG/ML SOLN injection Inject 1 mL (60 mg) Subcutaneous every 6 months Inject subcutaneously in upper arm, upper thigh or abdomen 1 mL 1     [DISCONTINUED] levothyroxine (SYNTHROID/LEVOTHROID) 125 MCG tablet Take 1 tablet (125 mcg) by mouth daily 90 tablet 0          Allergies   Allergen Reactions     Nkda [No Known Drug Allergies]        Past Medical History    Family History   Problem Relation Age of Onset     C.A.D. Father      MI at age 65     Alzheimer Disease Mother      Depression Sister 80     Skin Cancer No family hx of      no skin cancer     Prostate Cancer Brother      Asthma Father      Asthma Sister      Depression Son      Depression Sister      OSTEOPOROSIS Mother      Coronary Artery Disease Father      Coronary Artery Disease Brother 67     Depression Son      Depression Sister        ROS:  General: none  Head/Eyes: none  Ears/Nose/Throat: none  Cardiovascular: none  Respiratory: none  Gastrointestinal: swallowing problems   Breast: none  Genitourinary: none  Sexual Function: none  Musculoskeletal: back pain  Skin: none  Neurological: none  Mental Health: none  Endocrine: none    Clinic Measurements  Vitals: /80  Pulse 76  Ht 1.575 m (5' 2\")  Wt 53.9 kg (118 lb 14.4 oz)  BMI 21.75 kg/m2  BMI= Body mass index is 21.75 kg/(m^2).    Physical exam  Constitutional: Well appearing woman in no acute distress.   Psychological: appropriate .  Neck: No thyroidmegaly.  no carotid bruits.  Cardiovascular: regular rate and rhythm, normal S1 and S2, no murmurs, rubs or gallops, peripheral pulses full and symmetric   Respiratory: clear to auscultation, no wheezes or crackles, normal breath sounds.  Musculoskeletal: limited  range of motion, no edema and motor strength is equal in the upper and lower extremities    Spine: [curvature with right thoracic elevation, mildly tender and very limited range of motion (Flexion, extension,lateral movement, rotational movement].   Skin: no " concerning lesions, no jaundice.  Neurological: cranial nerves intact, normal strength, reflexes at patella and biceps normal, normal gait, no tremor.     LAB  Vertebra; Fracture Assessment: has compression fractures T8 and T10   Dexa Scan: 6/2016    FRAX Assessment Tool: [N/A,on meds  Risk Factors: age PM T scores, has compression fractures, +FHx, Hx of thyroid cancer     ASSESSMENT:  PM female with OP by T scores and hx of compression fractures who has been on prolia for a few years and  Last DEXA showed bone loss at the hip but when reviewed it was positional and loss at wrist was felt to be nonsignificant and recommendation was to stay on prolia -- will continue with this.  Discussed calcium, vit D and exercise  - suggesting the powdered calcium as she can't take pills.      PLAN:  Dexa next summer  Prolia today and in 6 months  Get blood work 2 wks AFTER the prolia shot  Continue to exercise  Patient should take 1200mg of calcium/day in divided doses supplements and diet  and vitamin D3 2000IU/day.     I spent  25 minutes with this patient.  > 50% of time spent in counseling and coordination of care    Thank you for allowing me to participate in the care of your patient.  Please do not hesitate to call with questions or concerns.    Sincerely,    Neha Orourke MD, PhD  Neri Gipson MD

## 2017-06-26 NOTE — PROGRESS NOTES
"Iris is a  77 year old female post menopausal] [GR5, ] that presents today for osteoporosis follow up, patient was last seen 6/2016.  On prolia for about 3 yrs - no problems - due today . Has compression fx T8 and T10  - last DEXA 6/2016 showed bone loss at the hip - and  Dr Wu reviewed the imaging. The \"bone loss\" at the hip very likely could be related to the difference in positioning, so I won't consider that to be significant. The \"bone loss\" at the wrist is not significant given the LSC.  preference would be for her to continue with the Prolia.  Referring Physician: Referred Neri Gipson MD     HPI     Have you ever had a bone density test? Yes  Where = [facility]  When = 6/2016  Spine Tscore = -1.5 not reliable - has curvature and DDD  Left neck Tscore = -2.1  Total left hip Tscore = -2.4  Right neck Tscore = -2.0  Total Right hip Tscore = -2.2  Loss of -2.4%    Radius 33% loss of -4.06%    Have you received any x-ray dye or contrast in the last ten days? No  How many servings of dairy products do you consume per day? 1-2 Type: milk, cottage cheese, yogurt   Do you take a multi-vitamin daily? No  Do you take a vitamin D supplement? Yes 1000/day  Do you take a calcium supplement daily?  Hair skin and nails - unsure if has calcium)  - tried upcal D and helped (500mg/packet)   Do you take a supplement containing strontium? No  Are you exposed to natural sunlight at least 20 minutes three times a week? Yes    Social History   reports that she quit smoking about 34 years ago. Her smoking use included Cigarettes. She started smoking about 57 years ago. She quit after 5.00 years of use. She has never used smokeless tobacco. She reports that she drinks alcohol. She reports that she does not use illicit drugs.  Do you smoke cigarettes? Reformed smoker  Do you exercise? Yes. Details: video tapes - couple times/wk   Do you drink alcohol? Yes. Details: few glasses wine/day     Medication History  Have you used " any of the following medications?   Actonel (Risedronate): No   Aredia (Pamidronate): No   Boniva (Ibindronate): No   Didronil (Etidronate): No   Evista (Raloxifene): No   Fosamax (Alendronate): No   Forteo (Parathyroid hormone) injections: has been on this in the past   HCTZ (Thiazide): No   Calcitonin nasal spray: No   Reclast or Zometa (Zolendronate): No   Prolia (Denosumab): since 12/2015              HT has been on this in the past for 10 yrs but not since 2010    Current Outpatient Prescriptions   Medication Sig Dispense Refill     levothyroxine (SYNTHROID/LEVOTHROID) 125 MCG tablet Take 1 tablet (125 mcg) by mouth daily 30 tablet 0     Multiple Vitamins-Minerals (HAIR/SKIN/NAILS/BIOTIN PO)        potassium 99 MG TABS        timolol maleate (ISTALOL) 0.5 % opthalmic solution        zinc 50 MG TABS        triamterene-hydrochlorothiazide (DYAZIDE) 37.5-25 MG per capsule Take one tablet a day. 30 capsule 1     Calcium-Magnesium-Vitamin D (CALCIUM 500 PO) Take 700 mg by mouth       denosumab (PROLIA) 60 MG/ML SOLN Inject 1 mL (60 mg) Subcutaneous every 6 months Inject subcutaneously in upper arm, upper thigh or abdomen 1 mL 1     Nutritional Supplements (BIFIDO-GENIC GROWTH FACTORS PO)        Lactobacillus Acid-Pectin (LACTOBACILLUS ACIDOPHILUS) TABS Take 1 tablet by mouth 3 times daily (before meals)       cyanocolbalamin (VITAMIN B-12) 500 MCG tablet Take 500 mcg by mouth daily.       folic acid (FOLVITE) 400 MCG tablet Take 1 tablet by mouth daily.       Magnesium 250 MG tablet Take 1 tablet by mouth daily.       cholecalciferol (VITAMIN D) 1000 UNIT tablet Take 1 tablet by mouth daily        [DISCONTINUED] denosumab (PROLIA) 60 MG/ML SOLN injection Inject 1 mL (60 mg) Subcutaneous every 6 months Inject subcutaneously in upper arm, upper thigh or abdomen 1 mL 1     [DISCONTINUED] levothyroxine (SYNTHROID/LEVOTHROID) 125 MCG tablet Take 1 tablet (125 mcg) by mouth daily 90 tablet 0          Allergies   Allergen  "Reactions     Nkda [No Known Drug Allergies]        Past Medical History    Family History   Problem Relation Age of Onset     C.A.D. Father      MI at age 65     Alzheimer Disease Mother      Depression Sister 80     Skin Cancer No family hx of      no skin cancer     Prostate Cancer Brother      Asthma Father      Asthma Sister      Depression Son      Depression Sister      OSTEOPOROSIS Mother      Coronary Artery Disease Father      Coronary Artery Disease Brother 67     Depression Son      Depression Sister        ROS:  General: none  Head/Eyes: none  Ears/Nose/Throat: none  Cardiovascular: none  Respiratory: none  Gastrointestinal: swallowing problems   Breast: none  Genitourinary: none  Sexual Function: none  Musculoskeletal: back pain  Skin: none  Neurological: none  Mental Health: none  Endocrine: none    Clinic Measurements  Vitals: /80  Pulse 76  Ht 1.575 m (5' 2\")  Wt 53.9 kg (118 lb 14.4 oz)  BMI 21.75 kg/m2  BMI= Body mass index is 21.75 kg/(m^2).    Physical exam  Constitutional: Well appearing woman in no acute distress.   Psychological: appropriate .  Neck: No thyroidmegaly.  no carotid bruits.  Cardiovascular: regular rate and rhythm, normal S1 and S2, no murmurs, rubs or gallops, peripheral pulses full and symmetric   Respiratory: clear to auscultation, no wheezes or crackles, normal breath sounds.  Musculoskeletal: limited  range of motion, no edema and motor strength is equal in the upper and lower extremities    Spine: [curvature with right thoracic elevation, mildly tender and very limited range of motion (Flexion, extension,lateral movement, rotational movement].   Skin: no concerning lesions, no jaundice.  Neurological: cranial nerves intact, normal strength, reflexes at patella and biceps normal, normal gait, no tremor.     LAB  Vertebra; Fracture Assessment: has compression fractures T8 and T10   Dexa Scan: 6/2016    FRAX Assessment Tool: [N/A,on meds  Risk Factors: age PM T " scores, has compression fractures, +FHx, Hx of thyroid cancer     ASSESSMENT:  PM female with OP by T scores and hx of compression fractures who has been on prolia for a few years and  Last DEXA showed bone loss at the hip but when reviewed it was positional and loss at wrist was felt to be nonsignificant and recommendation was to stay on prolia -- will continue with this.  Discussed calcium, vit D and exercise  - suggesting the powdered calcium as she can't take pills.      PLAN:  Dexa next summer  Prolia today and in 6 months  Get blood work 2 wks AFTER the prolia shot  Continue to exercise  Patient should take 1200mg of calcium/day in divided doses supplements and diet  and vitamin D3 2000IU/day.     I spent  25 minutes with this patient.  > 50% of time spent in counseling and coordination of care    Thank you for allowing me to participate in the care of your patient.  Please do not hesitate to call with questions or concerns.    Sincerely,    Neha Orourke MD, PhD  Neri Gipson MD

## 2017-06-26 NOTE — PATIENT INSTRUCTIONS
Dexa next summer  Prolia today and in 6 months  Get blood work 2 wks AFTER the prolia shot  Continue to exercise  Patient should take 1200mg of calcium/day in divided doses supplements and diet  and vitamin D3 2000IU/day.

## 2017-07-17 NOTE — PROGRESS NOTES
THORACIC SURGERY - NEW PATIENT OFFICE VISIT     I saw Ms. Lewis at Dr. Gipson s request in consultation for the evaluation and treatment of dysphagia after a Nissen/Luis E procedure and occasional abdominal pain .     HPI  Ms. Iris Lewis is a 77 year old year-old female with history of GERD s/p Nissen fundoplication without Luis E gastroplasty in 2006 with Dr. Mcclain. She underwent EGD with dilatation to 16mm in February of 2012 due to dysphagia.  Patient had overall done well for 6 months and then she began experiencing recurrence of regurgitation symptoms associated with a sensation that foods and pills are getting stuck in her esophagus. Large pills are especially a problem and dinner seems to be a more problematic meal. This happens weekly and once the offending agent is regurgitated, she can go back to eating.    Previsit Tests   Gastric Emptying Study 10/8/17: normal  XR Esophagram 10/7/17  Postsurgical changes of Nissen fundoplication with tapered narrowing of the esophagus through the Nissen    PMH  Past Medical History:   Diagnosis Date     Arthritis      Colon polyp 2009,2015    no polyps - f/u in 5 yrs      Esophageal reflux      Glaucoma      Hypertension      Osteoporosis      Postsurgical hypothyroidism      Scoliosis (and kyphoscoliosis), idiopathic      Thyroid cancer (H)     papillary carcinoma age 33     Uncomplicated asthma         PSH  Past Surgical History:   Procedure Laterality Date     ARTHRODESIS FOOT Right 11/9/2016    Procedure: ARTHRODESIS FOOT;  Surgeon: Janes Mcelroy MD;  Location:  OR      STOMACH SURGERY PROCEDURE UNLISTED       COLONOSCOPY   2009, 3/2015    no polyps in 2015 told to return 10 yrs.      ESOPHAGOSCOPY, GASTROSCOPY, DUODENOSCOPY (EGD), COMBINED  2/17/2012    Procedure:COMBINED ESOPHAGOSCOPY, GASTROSCOPY, DUODENOSCOPY (EGD); COMBINED ESOPHAGOSCOPY, GASTROSCOPY, DUODENOSCOPY POSSIBLE DILATION; Surgeon:NICHOLE MCCLAIN; Location: OR      FOOT SURGERY  2008    hammertoe left     LAPAROSCOPIC CHOLECYSTECTOMY N/A 1/5/2015    Procedure: LAPAROSCOPIC CHOLECYSTECTOMY;  Surgeon: Cruz Pearson MD;  Location: [facility] OR     NISSEN FUNDOPLICATION       ORTHOPEDIC SURGERY  2009    left hammertoe      REPAIR HAMMER TOE Right 11/9/2016    Procedure: REPAIR HAMMER TOE;  Surgeon: Janes Mcelroy MD;  Location: [facility] OR     SALPINGO OOPHORECTOMY,R/L/SERENA  1974    left, for tubal pregnancy     THYROIDECTOMY      for thyroid cancer        ETOH: Drinks 1-2 drinks per day  TOB: Former smoker, quit in the 1980's    Physical examination  BMI 21  Non-contributory    From a personal perspective, she is here alone today. She does cooking demos part-time.    IMPRESSION (R13.14) Esophageal dysphagia  (primary encounter diagnosis)  (R10.84) Abdominal pain, generalized    77 year old year-old female with dysphagia 11 years post Nissen/Luis E  Occasional abdominal pain    PLAN  I spent a total of 20 minutes with Ms. Lewis, more than 50% of which were spent in counseling, coordination of care, and face-to-face time. I reviewed the plan as follows:  Procedure planned: EGD with dilation. Ms. Lewis wants to think about whether her symptoms warrant trying another dilation, and she will contact us.    All questions were answered and Iris Lewis and present family were in agreement with the plan.  I appreciate the opportunity to participate in the care of your patient and will keep you updated.  Sincerely,

## 2017-07-19 ENCOUNTER — PRE VISIT (OUTPATIENT)
Dept: SURGERY | Facility: CLINIC | Age: 77
End: 2017-07-19

## 2017-07-19 ENCOUNTER — OFFICE VISIT (OUTPATIENT)
Dept: SURGERY | Facility: CLINIC | Age: 77
End: 2017-07-19
Attending: THORACIC SURGERY (CARDIOTHORACIC VASCULAR SURGERY)
Payer: MEDICARE

## 2017-07-19 VITALS
HEART RATE: 67 BPM | OXYGEN SATURATION: 94 % | HEIGHT: 62 IN | SYSTOLIC BLOOD PRESSURE: 119 MMHG | DIASTOLIC BLOOD PRESSURE: 75 MMHG | WEIGHT: 118.2 LBS | TEMPERATURE: 97.1 F | BODY MASS INDEX: 21.75 KG/M2 | RESPIRATION RATE: 16 BRPM

## 2017-07-19 DIAGNOSIS — R13.19 ESOPHAGEAL DYSPHAGIA: Primary | ICD-10-CM

## 2017-07-19 DIAGNOSIS — R10.84 ABDOMINAL PAIN, GENERALIZED: ICD-10-CM

## 2017-07-19 PROCEDURE — 99212 OFFICE O/P EST SF 10 MIN: CPT | Mod: ZF

## 2017-07-19 PROCEDURE — 99213 OFFICE O/P EST LOW 20 MIN: CPT

## 2017-07-19 ASSESSMENT — PAIN SCALES - GENERAL: PAINLEVEL: NO PAIN (0)

## 2017-07-19 NOTE — MR AVS SNAPSHOT
After Visit Summary   7/19/2017    Iris Lewis    MRN: [id number]           Patient Information     Date Of Birth          1940        Visit Information        Provider Department      7/19/2017 12:00 PM Trent Washburn MD [facility]        Today's Diagnoses     Esophageal dysphagia    -  1    Abdominal pain, generalized           Follow-ups after your visit        Your next 10 appointments already scheduled     Aug 08, 2017 10:35 AM CDT   (Arrive by 10:20 AM)   Return Visit with Neri Gipson MD   [facility] ([facility])    [address] [place] [address]  [address] [facility] [address] [phone]              Who to contact     If you have questions or need follow up information about today's clinic visit or your schedule please contact [facility] directly at [phone].  Normal or non-critical lab and imaging results will be communicated to you by MyChart, letter or phone within 4 business days after the clinic has received the results. If you do not hear from us within 7 days, please contact the clinic through MyChart or phone. If you have a critical or abnormal lab result, we will notify you by phone as soon as possible.  Submit refill requests through [employer] or call your pharmacy and they will forward the refill request to us. Please allow 3 business days for your refill to be completed.          Additional Information About Your Visit        MyChart Information     [employer] gives you secure access to your electronic health record. If you see a primary care provider, you can also send messages to your care team and make appointments. If you have questions, please call your primary care clinic.  If you do not have a primary care provider, please call [phone] and they will assist you.        Care EveryWhere ID     This is your Care EveryWhere ID. This could be used by other organizations  "to access your [facility] medical records  [id number]        Your Vitals Were     Pulse Temperature Respirations Height Pulse Oximetry BMI (Body Mass Index)    67 97.1  F (36.2  C) (Oral) 16 1.575 m (5' 2.01\") 94% 21.61 kg/m2       Blood Pressure from Last 3 Encounters:   07/19/17 119/75   06/26/17 131/80   06/19/17 120/81    Weight from Last 3 Encounters:   07/19/17 53.6 kg (118 lb 3.2 oz)   06/26/17 53.9 kg (118 lb 14.4 oz)   06/19/17 54.7 kg (120 lb 8 oz)              Today, you had the following     No orders found for display       Primary Care Provider Office Phone # Fax #    Neri Gipson [phone]       [facility] [address] [address]        Equal Access to Services     NESSA NICHOLS : Hadii mary ku hadasho Soomaali, waaxda luqadaha, qaybta kaalmada adeegyada, waxay skylarin wilmer adams . So [facility] [phone].    ATENCIÓN: Si shahbaz alfaro, tiene a nava disposición servicios gratuitos de asistencia lingüística. Llame al [phone].    We comply with applicable federal civil rights laws and [place] laws. We do not discriminate on the basis of race, color, national origin, age, disability sex, sexual orientation or gender identity.            Thank you!     Thank you for choosing [facility]  for your care. Our goal is always to provide you with excellent care. Hearing back from our patients is one way we can continue to improve our services. Please take a few minutes to complete the written survey that you may receive in the mail after your visit with us. Thank you!             Your Updated Medication List - Protect others around you: Learn how to safely use, store and throw away your medicines at www.disposemymeds.org.          This list is accurate as of: 7/19/17  3:10 PM.  Always use your most recent med list.                   Brand Name Dispense Instructions for use Diagnosis    BIFIDO-GENIC GROWTH FACTORS PO           " cholecalciferol 1000 UNIT tablet    vitamin D     Take 1 tablet by mouth daily        cyanocolbalamin 500 MCG tablet    vitamin  B-12     Take 500 mcg by mouth daily.        * denosumab 60 MG/ML Soln injection    PROLIA    1 mL    Inject 1 mL (60 mg) Subcutaneous every 6 months Inject subcutaneously in upper arm, upper thigh or abdomen    Senile osteoporosis, Personal history of other drug therapy       * denosumab 60 MG/ML Soln injection    PROLIA    1 mL    Inject 1 mL (60 mg) Subcutaneous every 6 months Inject subcutaneously in upper arm, upper thigh or abdomen    Senile osteoporosis       folic acid 400 MCG tablet    FOLVITE     Take 1 tablet by mouth daily.        HAIR/SKIN/NAILS/BIOTIN PO           lactobacillus acidophilus Tabs      Take 1 tablet by mouth 3 times daily (before meals)        levothyroxine 125 MCG tablet    SYNTHROID/LEVOTHROID    30 tablet    Take 1 tablet (125 mcg) by mouth daily    Abnormal finding on thyroid function test       magnesium 250 MG tablet      Take 1 tablet by mouth daily.        potassium 99 MG Tabs           timolol maleate 0.5 % opthalmic solution    ISTALOL          triamterene-hydrochlorothiazide 37.5-25 MG per capsule    DYAZIDE    30 capsule    Take one tablet a day.    Essential hypertension, benign       zinc 50 MG Tabs           * Notice:  This list has 2 medication(s) that are the same as other medications prescribed for you. Read the directions carefully, and ask your doctor or other care provider to review them with you.

## 2017-07-19 NOTE — LETTER
7/19/2017      RE: Iris Lewis  [address]  [place] [address]       THORACIC SURGERY - NEW PATIENT OFFICE VISIT     I saw Ms. Lewis at Dr. Gipson s request in consultation for the evaluation and treatment of dysphagia after a Nissen/Luis E procedure and occasional abdominal pain .     HPI  Ms. Iris Lewis is a 77 year old year-old female with history of GERD s/p Nissen fundoplication without Luis E gastroplasty in 2006 with Dr. Ogden. She underwent EGD with dilatation to 16mm in February of 2012 due to dysphagia.  Patient had overall done well for 6 months and then she began experiencing recurrence of regurgitation symptoms associated with a sensation that foods and pills are getting stuck in her esophagus. Large pills are especially a problem and dinner seems to be a more problematic meal. This happens weekly and once the offending agent is regurgitated, she can go back to eating.    Previsit Tests   Gastric Emptying Study 10/8/17: normal  XR Esophagram 10/7/17  Postsurgical changes of Nissen fundoplication with tapered narrowing of the esophagus through the Nissen    PMH  Past Medical History:   Diagnosis Date     Arthritis      Colon polyp 2009,2015    no polyps - f/u in 5 yrs      Esophageal reflux      Glaucoma      Hypertension      Osteoporosis      Postsurgical hypothyroidism      Scoliosis (and kyphoscoliosis), idiopathic      Thyroid cancer (H)     papillary carcinoma age 33     Uncomplicated asthma         PSH  Past Surgical History:   Procedure Laterality Date     ARTHRODESIS FOOT Right 11/9/2016    Procedure: ARTHRODESIS FOOT;  Surgeon: Janes Mcelroy MD;  Location: [facility]     C STOMACH SURGERY PROCEDURE UNLISTED       COLONOSCOPY   2009, 3/2015    no polyps in 2015 told to return 10 yrs.      ESOPHAGOSCOPY, GASTROSCOPY, DUODENOSCOPY (EGD), COMBINED  2/17/2012    Procedure:COMBINED ESOPHAGOSCOPY, GASTROSCOPY, DUODENOSCOPY (EGD); COMBINED ESOPHAGOSCOPY,  GASTROSCOPY, DUODENOSCOPY POSSIBLE DILATION; Surgeon:NICHOLE MCCLAIN; Location:[facility] OR     FOOT SURGERY  2008    hammertoe left     LAPAROSCOPIC CHOLECYSTECTOMY N/A 1/5/2015    Procedure: LAPAROSCOPIC CHOLECYSTECTOMY;  Surgeon: Cruz Pearson MD;  Location: [facility] OR     NISSEN FUNDOPLICATION       ORTHOPEDIC SURGERY  2009    left hammertoe      REPAIR HAMMER TOE Right 11/9/2016    Procedure: REPAIR HAMMER TOE;  Surgeon: Janes Mcelroy MD;  Location: [facility] OR     SALPINGO OOPHORECTOMY,R/L/SERENA  1974    left, for tubal pregnancy     THYROIDECTOMY      for thyroid cancer        ETOH: Drinks 1-2 drinks per day  TOB: Former smoker, quit in the 1980's    Physical examination  BMI 21  Non-contributory    From a personal perspective, she is here alone today. She does cooking demos part-time.    IMPRESSION (R13.14) Esophageal dysphagia  (primary encounter diagnosis)  (R10.84) Abdominal pain, generalized    77 year old year-old female with dysphagia 11 years post Nissen/Luis E  Occasional abdominal pain    PLAN  I spent a total of 20 minutes with Ms. Lewis, more than 50% of which were spent in counseling, coordination of care, and face-to-face time. I reviewed the plan as follows:  Procedure planned: EGD with dilation. Ms. Lewis wants to think about whether her symptoms warrant trying another dilation, and she will contact us.    All questions were answered and Iris Lewis and present family were in agreement with the plan.  I appreciate the opportunity to participate in the care of your patient and will keep you updated.  Sincerely,      Trent Washburn MD

## 2017-07-19 NOTE — NURSING NOTE
"Oncology Rooming Note    July 19, 2017 11:59 AM   Iris Lewis is a 77 year old female who presents for:    Chief Complaint   Patient presents with     Oncology Clinic Visit     Return visit related to neoplasm of thyroid gland     Initial Vitals: /75 (BP Location: Left arm, Patient Position: Sitting, Cuff Size: Adult Small)  Pulse 67  Temp 97.1  F (36.2  C) (Oral)  Resp 16  Ht 1.575 m (5' 2.01\")  Wt 53.6 kg (118 lb 3.2 oz)  SpO2 94%  BMI 21.61 kg/m2 Estimated body mass index is 21.61 kg/(m^2) as calculated from the following:    Height as of this encounter: 1.575 m (5' 2.01\").    Weight as of this encounter: 53.6 kg (118 lb 3.2 oz). Body surface area is 1.53 meters squared.  No Pain (0) Comment: Data Unavailable   No LMP recorded. Patient is postmenopausal.  Allergies reviewed: Yes  Medications reviewed: Yes    Medications: MEDICATION REFILLS NEEDED TODAY. Provider was notified.  Pharmacy name entered into [employer]: [facility] - [facility] [address]    Clinical concerns: Refill needed for triamterene-hydrochlorothiazide (DYAZIDE) 37.5-25 MG Provider was notified.    10 minutes for nursing intake (face to face time)     Donna Munguia LPN            "

## 2017-07-22 DIAGNOSIS — R94.6 ABNORMAL FINDING ON THYROID FUNCTION TEST: ICD-10-CM

## 2017-07-24 RX ORDER — LEVOTHYROXINE SODIUM 125 UG/1
125 TABLET ORAL DAILY
Qty: 90 TABLET | Refills: 2 | Status: SHIPPED | OUTPATIENT
Start: 2017-07-24 | End: 2017-09-22

## 2017-08-08 ENCOUNTER — OFFICE VISIT (OUTPATIENT)
Dept: INTERNAL MEDICINE | Facility: CLINIC | Age: 77
End: 2017-08-08

## 2017-08-08 VITALS
HEART RATE: 76 BPM | DIASTOLIC BLOOD PRESSURE: 84 MMHG | SYSTOLIC BLOOD PRESSURE: 146 MMHG | WEIGHT: 120.2 LBS | BODY MASS INDEX: 21.98 KG/M2

## 2017-08-08 DIAGNOSIS — R94.6 ABNORMAL FINDING ON THYROID FUNCTION TEST: Primary | ICD-10-CM

## 2017-08-08 DIAGNOSIS — M81.0 SENILE OSTEOPOROSIS: ICD-10-CM

## 2017-08-08 DIAGNOSIS — R00.2 PALPITATIONS: ICD-10-CM

## 2017-08-08 DIAGNOSIS — I10 ESSENTIAL HYPERTENSION, BENIGN: ICD-10-CM

## 2017-08-08 DIAGNOSIS — R19.5 LOOSE STOOLS: ICD-10-CM

## 2017-08-08 DIAGNOSIS — R94.6 ABNORMAL FINDING ON THYROID FUNCTION TEST: ICD-10-CM

## 2017-08-08 LAB
ALBUMIN SERPL-MCNC: 3.7 G/DL (ref 3.4–5)
ALP SERPL-CCNC: 62 U/L (ref 40–150)
ALT SERPL W P-5'-P-CCNC: 22 U/L (ref 0–50)
ANION GAP SERPL CALCULATED.3IONS-SCNC: 8 MMOL/L (ref 3–14)
AST SERPL W P-5'-P-CCNC: 16 U/L (ref 0–45)
BASOPHILS # BLD AUTO: 0 10E9/L (ref 0–0.2)
BASOPHILS NFR BLD AUTO: 0.6 %
BILIRUB SERPL-MCNC: 1.1 MG/DL (ref 0.2–1.3)
BUN SERPL-MCNC: 11 MG/DL (ref 7–30)
CALCIUM SERPL-MCNC: 8.8 MG/DL (ref 8.5–10.1)
CHLORIDE SERPL-SCNC: 104 MMOL/L (ref 94–109)
CO2 SERPL-SCNC: 28 MMOL/L (ref 20–32)
CREAT SERPL-MCNC: 0.68 MG/DL (ref 0.52–1.04)
DIFFERENTIAL METHOD BLD: NORMAL
EOSINOPHIL # BLD AUTO: 0.3 10E9/L (ref 0–0.7)
EOSINOPHIL NFR BLD AUTO: 5 %
ERYTHROCYTE [DISTWIDTH] IN BLOOD BY AUTOMATED COUNT: 14 % (ref 10–15)
GFR SERPL CREATININE-BSD FRML MDRD: 83 ML/MIN/1.7M2
GLUCOSE SERPL-MCNC: 88 MG/DL (ref 70–99)
HCT VFR BLD AUTO: 41.7 % (ref 35–47)
HGB BLD-MCNC: 13.3 G/DL (ref 11.7–15.7)
IMM GRANULOCYTES # BLD: 0 10E9/L (ref 0–0.4)
IMM GRANULOCYTES NFR BLD: 0.2 %
INTERPRETATION ECG - MUSE: NORMAL
LYMPHOCYTES # BLD AUTO: 1.6 10E9/L (ref 0.8–5.3)
LYMPHOCYTES NFR BLD AUTO: 25.5 %
MAGNESIUM SERPL-MCNC: 2 MG/DL (ref 1.6–2.3)
MCH RBC QN AUTO: 31.2 PG (ref 26.5–33)
MCHC RBC AUTO-ENTMCNC: 31.9 G/DL (ref 31.5–36.5)
MCV RBC AUTO: 98 FL (ref 78–100)
MONOCYTES # BLD AUTO: 1.1 10E9/L (ref 0–1.3)
MONOCYTES NFR BLD AUTO: 17.4 %
NEUTROPHILS # BLD AUTO: 3.3 10E9/L (ref 1.6–8.3)
NEUTROPHILS NFR BLD AUTO: 51.3 %
NRBC # BLD AUTO: 0 10*3/UL
NRBC BLD AUTO-RTO: 0 /100
PHOSPHATE SERPL-MCNC: 2.8 MG/DL (ref 2.5–4.5)
PLATELET # BLD AUTO: 229 10E9/L (ref 150–450)
POTASSIUM SERPL-SCNC: 3.6 MMOL/L (ref 3.4–5.3)
PROT SERPL-MCNC: 7.7 G/DL (ref 6.8–8.8)
RBC # BLD AUTO: 4.26 10E12/L (ref 3.8–5.2)
SODIUM SERPL-SCNC: 140 MMOL/L (ref 133–144)
T4 FREE SERPL-MCNC: 1.38 NG/DL (ref 0.76–1.46)
TROPONIN I SERPL-MCNC: NORMAL UG/L (ref 0–0.04)
TSH SERPL DL<=0.005 MIU/L-ACNC: 0.13 MU/L (ref 0.4–4)
WBC # BLD AUTO: 6.4 10E9/L (ref 4–11)

## 2017-08-08 RX ORDER — TRIAMTERENE AND HYDROCHLOROTHIAZIDE 37.5; 25 MG/1; MG/1
CAPSULE ORAL
Qty: 90 CAPSULE | Refills: 3 | Status: SHIPPED | OUTPATIENT
Start: 2017-08-08 | End: 2017-10-31

## 2017-08-08 ASSESSMENT — PAIN SCALES - GENERAL: PAINLEVEL: NO PAIN (0)

## 2017-08-08 NOTE — PATIENT INSTRUCTIONS
Primary Care Center Medication Refill Request Information:  * Please contact your pharmacy regarding ANY request for medication refills.  ** [facility] Prescription Fax = [phone]  * Please allow 3 business days for routine medication refills.  * Please allow 5 business days for controlled substance medication refills.     [facility] Center Test Result notification information:  *You will be notified with in 7-10 days of your appointment day regarding the results of your test.  If you are on MyChart you will be notified as soon as the provider has reviewed the results and signed off on them.    ECHO DEPARTMENT  (DOBUTAMINE)    Name: Iris Lewis  YOB: 1940  MRN: [id number]  DATE:     TIME:     DOES PATIENT HAVE A DEFIBRILLATOR?  No  DOES PATIENT HAVE A PACEMAKER? No  IS PATIENT TAKING ANY BETA-BLOCKERS? Yes    PREP INFO:        NOTHING TO EAT OR DRINK 3 HOURS PRIOR.    DO NOT TAKE NITRATES ON THE DAY OF YOUR TEST. DO NOT WEAR YOUR NITRO-PATCH.    NO ALCOHOL, SMOKING, OR OTHER TOBACCO FOR 12 HOURS BEFORE THE TEST    NO CAFFEINE 24 HOURS PRIOR    WEAR COMFORTABLE CLOTHES AND COMFORTABLE SHOES    STOP BETA-BLOCKERS THE DAY BEFORE AND THE MORNING OF PROCEDURE.    LOCATION:       REPORT TO THE [facility] WAITING ROOM ON THE 2ND FLOOR, (STREET LEVEL) OF THE [facility], AT THE [facility].    QUESTIONS OR NEED TO RESCHEDULE: CALL [phone]    Primary Care Center [phone]   ECHO [phone]  Cardiovascular [phone]  Gastroenterology [phone]

## 2017-08-08 NOTE — PROGRESS NOTES
HPI:    Pt. Comes in follow up today.   She o/w denies additional HEENT, cardiopulmonary, abdominal, , GYN, neurological, systemic complaints. She had colonoscopy 3/15. She had mammogram 3/17 and saw Dr. Orourke for annual exam 4/4/16. She was seen by Edwin Lauren 4/15 for thyroid CA.  She was seen in Dermatology  12/16.  She denies excessive ASA/NSAID use. No other similar skin complaints elsewhere. She follows with Dr. Mcelroy for surgery and last seen 1/17.          PE:    Vitals noted gen nad cooperative alert, HEENT, ears normal oropharynx clear no exudate, neck supple nl rom no adenopathy, surgical scar at thyroid, LCTA, B, RRR, S1, S2, no MRG, no paraspinous tenderness to palpation. Abdomen is non tender  Neurological exam B UE/LE/proximal/distal is normal and symmetric for sensation, reflexes, and strength.Normal gait.    EKG; SR at 60; no acute finding and no significant change from 10/16/14    A/P:    1. Mammogram 3/17; colonoscopy  3/15 repeat in 10 years. She saw Dr. Orourke 6/17 for annual exam and bone density  2. See in Dermatology 12/16  3. Loose stools refer to GI  4. Check labs today for cholesterol  5. Check labs for HTN stable  6. Check labs for thyroid medication and recheck today  7. Immunizations up to date  8. Refer back to Dr. Washburn, CVEMILIANO h/o Saul (around 2006) for abdominal complaints and was seen 7/19/17  9. Palpitations; no chest pain. EKG, resting echo, and Zio patch and labs today    I will see her back in one month          Total time spent 25 minutes.  More than 50% of the time spent with Ms. Lewis on counseling / coordinating her care

## 2017-08-08 NOTE — NURSING NOTE
Chief Complaint   Patient presents with     Thyroid Problem     Patient here for thyroid check.     Felicity Aparicio LPN at 10:05 AM on 8/8/2017.

## 2017-08-11 ENCOUNTER — TELEPHONE (OUTPATIENT)
Dept: INTERNAL MEDICINE | Facility: CLINIC | Age: 77
End: 2017-08-11

## 2017-08-11 DIAGNOSIS — R94.6 ABNORMAL FINDING ON THYROID FUNCTION TEST: Primary | ICD-10-CM

## 2017-08-11 NOTE — TELEPHONE ENCOUNTER
Sent result letter, recheck thyroid in about 2 months; future orders placed this encounter    MONAE Gipson        Dear Iris;    Your thyroid tests are improving towards normal. You are still over-replaced and I recommend we recheck you thyroid in about 2 months. I have placed a future lab order for this and you can call [phone] to schedule this laboratory appointment.    MONAE Gipson MD

## 2017-08-14 ENCOUNTER — ALLIED HEALTH/NURSE VISIT (OUTPATIENT)
Dept: CARDIOLOGY | Facility: CLINIC | Age: 77
End: 2017-08-14
Attending: NURSE PRACTITIONER
Payer: MEDICARE

## 2017-08-14 ENCOUNTER — RADIANT APPOINTMENT (OUTPATIENT)
Dept: CARDIOLOGY | Facility: CLINIC | Age: 77
End: 2017-08-14
Attending: INTERNAL MEDICINE

## 2017-08-14 DIAGNOSIS — R19.5 LOOSE STOOLS: ICD-10-CM

## 2017-08-14 DIAGNOSIS — R94.6 ABNORMAL FINDING ON THYROID FUNCTION TEST: ICD-10-CM

## 2017-08-14 DIAGNOSIS — I10 ESSENTIAL HYPERTENSION, BENIGN: ICD-10-CM

## 2017-08-14 DIAGNOSIS — R00.2 PALPITATIONS: ICD-10-CM

## 2017-08-14 PROCEDURE — 0298T ZZC EXT ECG > 48HR TO 21 DAY REVIEW AND INTERPRETATN: CPT | Performed by: INTERNAL MEDICINE

## 2017-08-14 PROCEDURE — 93225 XTRNL ECG REC<48 HRS REC: CPT | Mod: ZF

## 2017-08-14 NOTE — MR AVS SNAPSHOT
After Visit Summary   8/14/2017    Iris Lewis    MRN: [id number]           Patient Information     Date Of Birth          1940        Visit Information        Provider Department      8/14/2017 11:00 AM Tech, [facility] Cvc Monitor, [facility]        Today's Diagnoses     Abnormal finding on thyroid function test        Essential hypertension, benign        Palpitations        Loose stools           Follow-ups after your visit        Your next 10 appointments already scheduled     Aug 14, 2017 11:00 AM CDT   (Arrive by 10:45 AM)   HOLTER MONITOR VISIT with Felipe Cvc Monitor Tech, [facility] ([facility])    [address] [place] [address]  [address] [facility] [address]   [phone]            Sep 12, 2017 11:05 AM CDT   (Arrive by 10:50 AM)   Return Visit with Neri Gipson MD   [facility] ([facility])    [address] [place] [address]  [address] [facility] [address] [phone]            Dec 13, 2017  2:30 PM CST   (Arrive by 2:15 PM)   New Patient Visit with Jacob Noel PA-C   [facility] Gastroenterology and IBD ([facility])    [address] [place] [address]  [address] [facility] [address] [phone]              Who to contact     If you have questions or need follow up information about today's clinic visit or your schedule please contact [facility] directly at [phone].  Normal or non-critical lab and imaging results will be communicated to you by MyChart, letter or phone within 4 business days after the clinic has received the results. If you do not hear from us within 7 days, please contact the clinic through MyChart or phone. If you have a critical or abnormal lab result, we will notify you by phone as soon as possible.  Submit refill requests through [employer] or call your pharmacy and they will forward the refill request to us. Please allow 3 business days for  your refill to be completed.          Additional Information About Your Visit        MyChart Information     [employer] gives you secure access to your electronic health record. If you see a primary care provider, you can also send messages to your care team and make appointments. If you have questions, please call your primary care clinic.  If you do not have a primary care provider, please call [phone] and they will assist you.        Care EveryWhere ID     This is your Care EveryWhere ID. This could be used by other organizations to access your [place] medical records  [id number]         Blood Pressure from Last 3 Encounters:   08/08/17 146/84   07/19/17 119/75   06/26/17 131/80    Weight from Last 3 Encounters:   08/08/17 54.5 kg (120 lb 3.2 oz)   07/19/17 53.6 kg (118 lb 3.2 oz)   06/26/17 53.9 kg (118 lb 14.4 oz)              We Performed the Following     Zio Patch 48 Hours        Primary Care Provider Office Phone # Fax #    Neri Gipson [phone] [address] [address]        Equal Access to Services     [facility]: Hadii aad ku hadasho Sothom, waaxda luqadaha, qaybta kaalmada adeegyada, lisa lopezn rosey adams . So [facility] [phone].    ATENCIÓN: Si habla español, tiene a nava disposición servicios gratuitos de asistencia lingüística. Llame al [phone].    We comply with applicable federal civil rights laws and [place] laws. We do not discriminate on the basis of race, color, national origin, age, disability sex, sexual orientation or gender identity.            Thank you!     Thank you for choosing [facility]  for your care. Our goal is always to provide you with excellent care. Hearing back from our patients is one way we can continue to improve our services. Please take a few minutes to complete the written survey that you may receive in the mail after your visit with us. Thank you!             Your Updated  Medication List - Protect others around you: Learn how to safely use, store and throw away your medicines at www.disposemymeds.org.          This list is accurate as of: 8/14/17 10:56 AM.  Always use your most recent med list.                   Brand Name Dispense Instructions for use Diagnosis    BIFIDO-GENIC GROWTH FACTORS PO           cholecalciferol 1000 UNIT tablet    vitamin D     Take 1 tablet by mouth daily        cyanocolbalamin 500 MCG tablet    vitamin  B-12     Take 500 mcg by mouth daily.        * denosumab 60 MG/ML Soln injection    PROLIA    1 mL    Inject 1 mL (60 mg) Subcutaneous every 6 months Inject subcutaneously in upper arm, upper thigh or abdomen    Senile osteoporosis, Personal history of other drug therapy       * denosumab 60 MG/ML Soln injection    PROLIA    1 mL    Inject 1 mL (60 mg) Subcutaneous every 6 months Inject subcutaneously in upper arm, upper thigh or abdomen    Senile osteoporosis       folic acid 400 MCG tablet    FOLVITE     Take 1 tablet by mouth daily.        HAIR/SKIN/NAILS/BIOTIN PO           lactobacillus acidophilus Tabs      Take 1 tablet by mouth 3 times daily (before meals)        levothyroxine 125 MCG tablet    SYNTHROID/LEVOTHROID    90 tablet    Take 1 tablet (125 mcg) by mouth daily    Abnormal finding on thyroid function test       magnesium 250 MG tablet      Take 1 tablet by mouth daily.        potassium 99 MG Tabs           timolol maleate 0.5 % opthalmic solution    ISTALOL          triamterene-hydrochlorothiazide 37.5-25 MG per capsule    DYAZIDE    90 capsule    Take one tablet a day.    Essential hypertension, benign, Abnormal finding on thyroid function test, Palpitations, Loose stools       zinc 50 MG Tabs           * Notice:  This list has 2 medication(s) that are the same as other medications prescribed for you. Read the directions carefully, and ask your doctor or other care provider to review them with you.

## 2017-08-14 NOTE — LETTER
"Patient:  Iris Lewis  :   1940  MRN:     [id number]        Ms. Iris Lewis  [address] [facility] [address]        2017    Dear Ms. Lewis,    Thank you for choosing the [facility] for your healthcare needs.  We appreciate the opportunity to serve you.    The following are your recent test results.     The results of your heart monitor is attached. As we discussed, if you continue to have palpitations or other \"cardiac\" complaints, I recommend you see the Cardiology doctors. I placed a referral for this at our last visit and you can call [phone] to schedule this appointment.     Results for orders placed or performed in visit on 17   Zio Patch 48 Hours    [address] [id number] [phone]  2017      Patient:  Iris Lewis  Chart: [id number]  :  1940  Age:  77 year old  Sex:  female       Procedure:  ZioPatch Monitor.        Technician performing hook-up:  Camille Delgado       Please contact your provider if you have any questions or concerns.  We look forward to serving your needs in the future.      Sincerely,    MONAE Gipson MD      "

## 2017-08-14 NOTE — NURSING NOTE
.  Chief Complaint   Patient presents with     Cardiac Monitor     14 days Zio Monitor     Per Neri Whitaker, patient to have 14 days Zio monitor placed.  Diagnosis: palpitation  Monitor placed: Yes  Patient Instructed: Yes  Patient verbalized understanding: Yes  Holter # [id number]    Placed by Camille Delgado CMA. 10:54 AM

## 2017-09-12 ENCOUNTER — OFFICE VISIT (OUTPATIENT)
Dept: INTERNAL MEDICINE | Facility: CLINIC | Age: 77
End: 2017-09-12

## 2017-09-12 VITALS
WEIGHT: 118.9 LBS | DIASTOLIC BLOOD PRESSURE: 71 MMHG | BODY MASS INDEX: 21.74 KG/M2 | SYSTOLIC BLOOD PRESSURE: 113 MMHG | HEART RATE: 75 BPM

## 2017-09-12 DIAGNOSIS — R00.2 PALPITATIONS: Primary | ICD-10-CM

## 2017-09-12 ASSESSMENT — PAIN SCALES - GENERAL: PAINLEVEL: NO PAIN (0)

## 2017-09-12 NOTE — PROGRESS NOTES
HPI:    Pt. Comes in follow up today.   She o/w denies additional HEENT, cardiopulmonary, abdominal, , GYN, neurological, systemic complaints. She had colonoscopy 3/15. She had mammogram 3/17 and saw Dr. Orourke for annual exam 4/4/16. She was seen by Edwin Lauren 4/15 for thyroid CA.  She was seen in Dermatology  12/16.  She denies excessive ASA/NSAID use. No other similar skin complaints elsewhere. She follows with Dr. Mcelroy for surgery and last seen 1/17.          PE:    Vitals noted gen nad cooperative alert, HEENT, ears normal oropharynx clear no exudate, neck supple nl rom no adenopathy, surgical scar at thyroid, LCTA, B, RRR, S1, S2, no MRG, no paraspinous tenderness to palpation. Abdomen is non tender  Neurological exam B UE/LE/proximal/distal is normal and symmetric for sensation, reflexes, and strength.Normal gait.        A/P:    1. Mammogram 3/17; colonoscopy  3/15 repeat in 10 years. She saw Dr. Orourke 6/17 for annual exam and bone density  2. See in Dermatology 12/16  3. Loose stools refer to GI; she has appt. 12/13/17.  4. Check labs today for cholesterol  5. Check labs for HTN stable  6. Check labs for thyroid medication and recheck last visit TSH somewhat low 0.13. I recommend she get TSH rechecked 10/17  7. Immunizations up to date  8. Refer back to ORESTES Booth h/o Saul (around 2006) for abdominal complaints and was seen 7/19/17  9. Palpitations; no chest pain. EKG, resting echo, and Zio patch done. Echo 8/14/17 unremarkable. She still has some minor sxs. Placed cardiology referral if she wants.              Total time spent 25 minutes.  More than 50% of the time spent with Ms. Lewis on counseling / coordinating her care

## 2017-09-12 NOTE — PATIENT INSTRUCTIONS
[facility] Medication Refill Request Information:  * Please contact your pharmacy regarding ANY request for medication refills.  ** [facility] Prescription Fax = [phone]  * Please allow 3 business days for routine medication refills.  * Please allow 5 business days for controlled substance medication refills.     [facility] Test Result notification information:  *You will be notified with in 7-10 days of your appointment day regarding the results of your test.  If you are on MyChart you will be notified as soon as the provider has reviewed the results and signed off on them.    [facility] [phone]   Cardiovascular [phone]

## 2017-09-12 NOTE — NURSING NOTE
Chief Complaint   Patient presents with     Results     Patient here to go over results.     Felicity Aparicio LPN at 10:17 AM on 9/12/2017.

## 2017-09-12 NOTE — MR AVS SNAPSHOT
After Visit Summary   9/12/2017    Iris Lewis    MRN: [id number]           Patient Information     Date Of Birth          1940        Visit Information        Provider Department      9/12/2017 11:05 AM Neri Gipson MD [facility]        Today's Diagnoses     Palpitations    -  1      Care Instructions    Primary Care Center Medication Refill Request Information:  * Please contact your pharmacy regarding ANY request for medication refills.  ** [facility] Prescription Fax = [phone]  * Please allow 3 business days for routine medication refills.  * Please allow 5 business days for controlled substance medication refills.     Primary Care Center Test Result notification information:  *You will be notified with in 7-10 days of your appointment day regarding the results of your test.  If you are on MyChart you will be notified as soon as the provider has reviewed the results and signed off on them.    Primary Care Center [phone]   Cardiovascular [phone]            Follow-ups after your visit        Additional Services     CARDIOLOGY EVAL ADULT REFERRAL       Palpitations                  Your next 10 appointments already scheduled     Sep 12, 2017 11:05 AM CDT   (Arrive by 10:50 AM)   Return Visit with Neri Gipson MD   [facility] ([facility])    [address] [place] [address] [phone]            Dec 13, 2017  2:30 PM CST   (Arrive by 2:15 PM)   New Patient Visit with Jacob Noel PA-C   [facility] ([facility])    [address] [place] [address] [phone]              Who to contact     Please call your clinic at [phone] to:    Ask questions about your health    Make or cancel appointments    Discuss your medicines    Learn about your test results    Speak to your doctor   If you have  compliments or concerns about an experience at your clinic, or if you wish to file a complaint, please contact [facility] Patient Relations at [phone] or email us at [email or web address]         Additional Information About Your Visit        MyChart Information     [employer] gives you secure access to your electronic health record. If you see a primary care provider, you can also send messages to your care team and make appointments. If you have questions, please call your primary care clinic.  If you do not have a primary care provider, please call [phone] and they will assist you.      [employer] is an electronic gateway that provides easy, online access to your medical records. With [employer], you can request a clinic appointment, read your test results, renew a prescription or communicate with your care team.     To access your existing account, please contact your [facility] Clinic or call [phone] for assistance.        Care EveryWhere ID     This is your Care EveryWhere ID. This could be used by other organizations to access your [place] medical records  [id number]        Your Vitals Were     Pulse Breastfeeding? BMI (Body Mass Index)             75 No 21.74 kg/m2          Blood Pressure from Last 3 Encounters:   09/12/17 113/71   08/08/17 146/84   07/19/17 119/75    Weight from Last 3 Encounters:   09/12/17 53.9 kg (118 lb 14.4 oz)   08/08/17 54.5 kg (120 lb 3.2 oz)   07/19/17 53.6 kg (118 lb 3.2 oz)              We Performed the Following     CARDIOLOGY EVAL ADULT REFERRAL        Primary Care Provider Office Phone # Fax #    Neri Gipson [phone] [address] [address]        Equal Access to Services     NESSA NICHOLS : Shaun Martinez, merle gar, lisa bradford. So [facility] [phone].    ATENCIÓN: Si reddla  español, tiene a nava disposición servicios gratuitos de asistencia lingüística. Indu humphries [phone].    We comply with applicable federal civil rights laws and [place] laws. We do not discriminate on the basis of race, color, national origin, age, disability sex, sexual orientation or gender identity.            Thank you!     Thank you for choosing [facility]  for your care. Our goal is always to provide you with excellent care. Hearing back from our patients is one way we can continue to improve our services. Please take a few minutes to complete the written survey that you may receive in the mail after your visit with us. Thank you!             Your Updated Medication List - Protect others around you: Learn how to safely use, store and throw away your medicines at www.disposemymeds.org.          This list is accurate as of: 9/12/17 11:03 AM.  Always use your most recent med list.                   Brand Name Dispense Instructions for use Diagnosis    BIFIDO-GENIC GROWTH FACTORS PO           cholecalciferol 1000 UNIT tablet    vitamin D     Take 1 tablet by mouth daily        cyanocolbalamin 500 MCG tablet    vitamin  B-12     Take 500 mcg by mouth daily.        * denosumab 60 MG/ML Soln injection    PROLIA    1 mL    Inject 1 mL (60 mg) Subcutaneous every 6 months Inject subcutaneously in upper arm, upper thigh or abdomen    Senile osteoporosis, Personal history of other drug therapy       * denosumab 60 MG/ML Soln injection    PROLIA    1 mL    Inject 1 mL (60 mg) Subcutaneous every 6 months Inject subcutaneously in upper arm, upper thigh or abdomen    Senile osteoporosis       folic acid 400 MCG tablet    FOLVITE     Take 1 tablet by mouth daily.        HAIR/SKIN/NAILS/BIOTIN PO           lactobacillus acidophilus Tabs      Take 1 tablet by mouth 3 times daily (before meals)        levothyroxine 125 MCG tablet    SYNTHROID/LEVOTHROID    90 tablet    Take 1 tablet (125 mcg) by mouth daily     Abnormal finding on thyroid function test       magnesium 250 MG tablet      Take 1 tablet by mouth daily.        potassium 99 MG Tabs           timolol maleate 0.5 % opthalmic solution    ISTALOL          triamterene-hydrochlorothiazide 37.5-25 MG per capsule    DYAZIDE    90 capsule    Take one tablet a day.    Essential hypertension, benign, Abnormal finding on thyroid function test, Palpitations, Loose stools       zinc 50 MG Tabs           * Notice:  This list has 2 medication(s) that are the same as other medications prescribed for you. Read the directions carefully, and ask your doctor or other care provider to review them with you.

## 2017-09-22 ENCOUNTER — MYC MEDICAL ADVICE (OUTPATIENT)
Dept: INTERNAL MEDICINE | Facility: CLINIC | Age: 77
End: 2017-09-22

## 2017-09-22 DIAGNOSIS — R94.6 ABNORMAL FINDING ON THYROID FUNCTION TEST: ICD-10-CM

## 2017-09-22 RX ORDER — LEVOTHYROXINE SODIUM 125 UG/1
125 TABLET ORAL DAILY
Qty: 90 TABLET | Refills: 1 | Status: SHIPPED | OUTPATIENT
Start: 2017-09-22 | End: 2017-10-31

## 2017-10-11 ENCOUNTER — ALLIED HEALTH/NURSE VISIT (OUTPATIENT)
Dept: INTERNAL MEDICINE | Facility: CLINIC | Age: 77
End: 2017-10-11

## 2017-10-11 DIAGNOSIS — R94.6 ABNORMAL FINDING ON THYROID FUNCTION TEST: ICD-10-CM

## 2017-10-11 DIAGNOSIS — Z23 NEED FOR PROPHYLACTIC VACCINATION AND INOCULATION AGAINST INFLUENZA: Primary | ICD-10-CM

## 2017-10-11 LAB — TSH SERPL DL<=0.005 MIU/L-ACNC: 1.88 MU/L (ref 0.4–4)

## 2017-10-11 NOTE — NURSING NOTE
"Injectable Influenza Immunization Documentation    1.  Has the patient received the information for the injectable influenza vaccine? YES     2. Is the patient 6 months of age or older? YES     3. Does the patient have any of the following contraindications?         Severe allergy to eggs? No     Severe allergic reaction to previous influenza vaccines? No   Severe allergy to latex? No       History of Guillain-Rio syndrome? No     Currently have a temperature greater than 100.4F? No        4.  Severely egg allergic patients should have flu vaccine eligibility assessed by an MD, RN, or pharmacist, and those who received flu vaccine should be observed for 15 min by an MD, RN, Pharmacist, Medical Technician, or member of clinic staff.\": YES    5. Latex-allergic patients should be given latex-free influenza vaccine N/A. Please reference the Vaccine latex table to determine if your clinic s product is latex-containing.       Vaccination given by Felicity Aparicio LPN at 12:11 PM on 10/11/2017.      Iris Lewis comes into clinic today at the request of Dr. Gipson Ordering Provider for Flu shot.    Immunization tolerated well. See immunizations for more details.    This service provided today was under the supervising provider of the day Dr. Gipson, who was available if needed.  Felicity Aparicio LPN at 12:12 PM on 10/11/2017.            "

## 2017-10-11 NOTE — MR AVS SNAPSHOT
After Visit Summary   10/11/2017    Iris Lewis    MRN: [id number]           Patient Information     Date Of Birth          1940        Visit Information        Provider Department      10/11/2017 10:00 AM Nurse, [facility]        Today's Diagnoses     Need for prophylactic vaccination and inoculation against influenza    -  1       Follow-ups after your visit        Your next 10 appointments already scheduled     Dec 13, 2017  2:30 PM CST   (Arrive by 2:15 PM)   New Patient Visit with Jacob Noel PA-C   [facility] ([facility])    [address] [place] [address] [phone]              Who to contact     Please call your clinic at [phone] to:    Ask questions about your health    Make or cancel appointments    Discuss your medicines    Learn about your test results    Speak to your doctor   If you have compliments or concerns about an experience at your clinic, or if you wish to file a complaint, please contact [facility] Patient Relations at [phone] or email us at [email or web address]         Additional Information About Your Visit        MyChart Information     [employer] gives you secure access to your electronic health record. If you see a primary care provider, you can also send messages to your care team and make appointments. If you have questions, please call your primary care clinic.  If you do not have a primary care provider, please call [phone] and they will assist you.      [employer] is an electronic gateway that provides easy, online access to your medical records. With [employer], you can request a clinic appointment, read your test results, renew a prescription or communicate with your care team.     To access your existing account, please contact your [facility] Clinic or call [phone] for  assistance.        Care EveryWhere ID     This is your Care EveryWhere ID. This could be used by other organizations to access your [place] medical records  [id number]         Blood Pressure from Last 3 Encounters:   09/12/17 113/71   08/08/17 146/84   07/19/17 119/75    Weight from Last 3 Encounters:   09/12/17 53.9 kg (118 lb 14.4 oz)   08/08/17 54.5 kg (120 lb 3.2 oz)   07/19/17 53.6 kg (118 lb 3.2 oz)              We Performed the Following     FLU VACCINE, INCREASED ANTIGEN, PRESV FREE, AGE 65+ [[address]]        Primary Care Provider Office Phone # Fax #    Neri Gipson [phone]       [address] [address]        Equal Access to Services     NESSA NICHOLS : Hadii mary simms hadasho Sothom, waaxda luqadaha, qaybta kaalmada adetobiyameera, lisa adams . So [facility] [phone].    ATENCIÓN: Si habla español, tiene a nava disposición servicios gratuitos de asistencia lingüística. [facility] [phone].    We comply with applicable federal civil rights laws and [place] laws. We do not discriminate on the basis of race, color, national origin, age, disability, sex, sexual orientation, or gender identity.            Thank you!     Thank you for choosing [facility]  for your care. Our goal is always to provide you with excellent care. Hearing back from our patients is one way we can continue to improve our services. Please take a few minutes to complete the written survey that you may receive in the mail after your visit with us. Thank you!             Your Updated Medication List - Protect others around you: Learn how to safely use, store and throw away your medicines at www.disposemymeds.org.          This list is accurate as of: 10/11/17 12:14 PM.  Always use your most recent med list.                   Brand Name Dispense Instructions for use Diagnosis    BIFIDO-GENIC GROWTH FACTORS PO           cholecalciferol 1000 UNIT tablet     vitamin D     Take 1 tablet by mouth daily        cyanocolbalamin 500 MCG tablet    vitamin  B-12     Take 500 mcg by mouth daily.        * denosumab 60 MG/ML Soln injection    PROLIA    1 mL    Inject 1 mL (60 mg) Subcutaneous every 6 months Inject subcutaneously in upper arm, upper thigh or abdomen    Senile osteoporosis, Personal history of other drug therapy       * denosumab 60 MG/ML Soln injection    PROLIA    1 mL    Inject 1 mL (60 mg) Subcutaneous every 6 months Inject subcutaneously in upper arm, upper thigh or abdomen    Senile osteoporosis       folic acid 400 MCG tablet    FOLVITE     Take 1 tablet by mouth daily.        HAIR/SKIN/NAILS/BIOTIN PO           lactobacillus acidophilus Tabs      Take 1 tablet by mouth 3 times daily (before meals)        levothyroxine 125 MCG tablet    SYNTHROID/LEVOTHROID    90 tablet    Take 1 tablet (125 mcg) by mouth daily    Abnormal finding on thyroid function test       magnesium 250 MG tablet      Take 1 tablet by mouth daily.        potassium 99 MG Tabs           timolol maleate 0.5 % opthalmic solution    ISTALOL          triamterene-hydrochlorothiazide 37.5-25 MG per capsule    DYAZIDE    90 capsule    Take one tablet a day.    Essential hypertension, benign, Abnormal finding on thyroid function test, Palpitations, Loose stools       zinc 50 MG Tabs           * Notice:  This list has 2 medication(s) that are the same as other medications prescribed for you. Read the directions carefully, and ask your doctor or other care provider to review them with you.

## 2017-10-31 ENCOUNTER — OFFICE VISIT (OUTPATIENT)
Dept: FAMILY MEDICINE | Facility: CLINIC | Age: 77
End: 2017-10-31

## 2017-10-31 ENCOUNTER — TELEPHONE (OUTPATIENT)
Dept: INTERNAL MEDICINE | Facility: CLINIC | Age: 77
End: 2017-10-31

## 2017-10-31 ENCOUNTER — HOSPITAL ENCOUNTER (INPATIENT)
Facility: CLINIC | Age: 77
LOS: 3 days | Discharge: HOME | DRG: 435 | End: 2017-11-03
Attending: FAMILY MEDICINE | Admitting: PEDIATRICS
Payer: MEDICARE

## 2017-10-31 VITALS
WEIGHT: 115 LBS | DIASTOLIC BLOOD PRESSURE: 77 MMHG | HEART RATE: 71 BPM | BODY MASS INDEX: 21.03 KG/M2 | SYSTOLIC BLOOD PRESSURE: 128 MMHG

## 2017-10-31 DIAGNOSIS — R68.89 COLD INTOLERANCE: ICD-10-CM

## 2017-10-31 DIAGNOSIS — K85.90 PANCREATITIS DUE TO OBSTRUCTION OF PANCREATIC DUCT: ICD-10-CM

## 2017-10-31 DIAGNOSIS — R17 JAUNDICE: Primary | ICD-10-CM

## 2017-10-31 DIAGNOSIS — R10.10 UPPER ABDOMINAL PAIN: ICD-10-CM

## 2017-10-31 DIAGNOSIS — K86.89 PANCREATIC MASS: ICD-10-CM

## 2017-10-31 DIAGNOSIS — R10.84 ABDOMINAL PAIN, GENERALIZED: ICD-10-CM

## 2017-10-31 DIAGNOSIS — R94.6 ABNORMAL FINDING ON THYROID FUNCTION TEST: ICD-10-CM

## 2017-10-31 DIAGNOSIS — K83.1 OBSTRUCTIVE JAUNDICE (H): ICD-10-CM

## 2017-10-31 DIAGNOSIS — R17 JAUNDICE: ICD-10-CM

## 2017-10-31 DIAGNOSIS — K86.89 PANCREATITIS DUE TO OBSTRUCTION OF PANCREATIC DUCT: ICD-10-CM

## 2017-10-31 PROBLEM — R10.9 ABDOMINAL PAIN: Status: ACTIVE | Noted: 2017-10-31

## 2017-10-31 LAB
ALBUMIN SERPL-MCNC: 3 G/DL (ref 3.4–5)
ALBUMIN SERPL-MCNC: 3.1 G/DL (ref 3.4–5)
ALBUMIN UR-MCNC: 30 MG/DL
ALBUMIN UR-MCNC: NEGATIVE MG/DL
ALP SERPL-CCNC: 1005 U/L (ref 40–150)
ALP SERPL-CCNC: 975 U/L (ref 40–150)
ALT SERPL W P-5'-P-CCNC: 327 U/L (ref 0–50)
ALT SERPL W P-5'-P-CCNC: 331 U/L (ref 0–50)
AMMONIA PLAS-SCNC: 12 UMOL/L (ref 10–50)
ANION GAP SERPL CALCULATED.3IONS-SCNC: 10 MMOL/L (ref 3–14)
ANION GAP SERPL CALCULATED.3IONS-SCNC: 9 MMOL/L (ref 3–14)
APPEARANCE UR: ABNORMAL
APPEARANCE UR: CLEAR
APTT PPP: 26 SEC (ref 22–37)
AST SERPL W P-5'-P-CCNC: 261 U/L (ref 0–45)
AST SERPL W P-5'-P-CCNC: 284 U/L (ref 0–45)
BASOPHILS # BLD AUTO: 0.1 10E9/L (ref 0–0.2)
BASOPHILS # BLD AUTO: 0.1 10E9/L (ref 0–0.2)
BASOPHILS NFR BLD AUTO: 0.6 %
BASOPHILS NFR BLD AUTO: 1 %
BILIRUB SERPL-MCNC: 12.9 MG/DL (ref 0.2–1.3)
BILIRUB SERPL-MCNC: 13.2 MG/DL (ref 0.2–1.3)
BILIRUB UR QL STRIP: ABNORMAL
BILIRUB UR QL STRIP: ABNORMAL
BUN SERPL-MCNC: 11 MG/DL (ref 7–30)
BUN SERPL-MCNC: 12 MG/DL (ref 7–30)
CALCIUM SERPL-MCNC: 8.9 MG/DL (ref 8.5–10.1)
CALCIUM SERPL-MCNC: 9.3 MG/DL (ref 8.5–10.1)
CHLORIDE SERPL-SCNC: 98 MMOL/L (ref 94–109)
CHLORIDE SERPL-SCNC: 99 MMOL/L (ref 94–109)
CO2 SERPL-SCNC: 26 MMOL/L (ref 20–32)
CO2 SERPL-SCNC: 27 MMOL/L (ref 20–32)
COLOR UR AUTO: ABNORMAL
COLOR UR AUTO: YELLOW
CREAT SERPL-MCNC: 0.62 MG/DL (ref 0.52–1.04)
CREAT SERPL-MCNC: 0.67 MG/DL (ref 0.52–1.04)
DIFFERENTIAL METHOD BLD: ABNORMAL
DIFFERENTIAL METHOD BLD: ABNORMAL
EOSINOPHIL # BLD AUTO: 0.4 10E9/L (ref 0–0.7)
EOSINOPHIL # BLD AUTO: 0.6 10E9/L (ref 0–0.7)
EOSINOPHIL NFR BLD AUTO: 4.7 %
EOSINOPHIL NFR BLD AUTO: 5.1 %
ERYTHROCYTE [DISTWIDTH] IN BLOOD BY AUTOMATED COUNT: 15.5 % (ref 10–15)
ERYTHROCYTE [DISTWIDTH] IN BLOOD BY AUTOMATED COUNT: 16.5 % (ref 10–15)
GFR SERPL CREATININE-BSD FRML MDRD: 86 ML/MIN/1.7M2
GFR SERPL CREATININE-BSD FRML MDRD: >90 ML/MIN/1.7M2
GLUCOSE SERPL-MCNC: 114 MG/DL (ref 70–99)
GLUCOSE SERPL-MCNC: 120 MG/DL (ref 70–99)
GLUCOSE UR STRIP-MCNC: 50 MG/DL
GLUCOSE UR STRIP-MCNC: NEGATIVE MG/DL
HCT VFR BLD AUTO: 34.1 % (ref 35–47)
HCT VFR BLD AUTO: 34.8 % (ref 35–47)
HGB BLD-MCNC: 11.8 G/DL (ref 11.7–15.7)
HGB BLD-MCNC: 11.9 G/DL (ref 11.7–15.7)
HGB UR QL STRIP: NEGATIVE
HGB UR QL STRIP: NEGATIVE
HYALINE CASTS #/AREA URNS LPF: 11 /LPF (ref 0–2)
IMM GRANULOCYTES # BLD: 0.1 10E9/L (ref 0–0.4)
IMM GRANULOCYTES # BLD: 0.1 10E9/L (ref 0–0.4)
IMM GRANULOCYTES NFR BLD: 0.6 %
IMM GRANULOCYTES NFR BLD: 0.8 %
INR PPP: 0.93 (ref 0.86–1.14)
KETONES UR STRIP-MCNC: NEGATIVE MG/DL
KETONES UR STRIP-MCNC: NEGATIVE MG/DL
LACTATE BLD-SCNC: 1.1 MMOL/L (ref 0.7–2)
LEUKOCYTE ESTERASE UR QL STRIP: ABNORMAL
LEUKOCYTE ESTERASE UR QL STRIP: NEGATIVE
LIPASE SERPL-CCNC: ABNORMAL U/L (ref 73–393)
LIPASE SERPL-CCNC: ABNORMAL U/L (ref 73–393)
LYMPHOCYTES # BLD AUTO: 1 10E9/L (ref 0.8–5.3)
LYMPHOCYTES # BLD AUTO: 1.7 10E9/L (ref 0.8–5.3)
LYMPHOCYTES NFR BLD AUTO: 18.7 %
LYMPHOCYTES NFR BLD AUTO: 9.6 %
MCH RBC QN AUTO: 31.7 PG (ref 26.5–33)
MCH RBC QN AUTO: 32.2 PG (ref 26.5–33)
MCHC RBC AUTO-ENTMCNC: 34.2 G/DL (ref 31.5–36.5)
MCHC RBC AUTO-ENTMCNC: 34.6 G/DL (ref 31.5–36.5)
MCV RBC AUTO: 93 FL (ref 78–100)
MCV RBC AUTO: 93 FL (ref 78–100)
MIXED CELL CASTS #/AREA URNS LPF: 1 /LPF
MONOCYTES # BLD AUTO: 0.6 10E9/L (ref 0–1.3)
MONOCYTES # BLD AUTO: 1.7 10E9/L (ref 0–1.3)
MONOCYTES NFR BLD AUTO: 16 %
MONOCYTES NFR BLD AUTO: 7.1 %
MUCOUS THREADS #/AREA URNS LPF: PRESENT /LPF
NEUTROPHILS # BLD AUTO: 6.2 10E9/L (ref 1.6–8.3)
NEUTROPHILS # BLD AUTO: 7.3 10E9/L (ref 1.6–8.3)
NEUTROPHILS NFR BLD AUTO: 67.5 %
NEUTROPHILS NFR BLD AUTO: 68.3 %
NITRATE UR QL: NEGATIVE
NITRATE UR QL: NEGATIVE
NRBC # BLD AUTO: 0 10*3/UL
NRBC # BLD AUTO: 0 10*3/UL
NRBC BLD AUTO-RTO: 0 /100
NRBC BLD AUTO-RTO: 0 /100
PH UR STRIP: 6 PH (ref 5–7)
PH UR STRIP: 6.5 PH (ref 5–7)
PLATELET # BLD AUTO: 365 10E9/L (ref 150–450)
PLATELET # BLD AUTO: 396 10E9/L (ref 150–450)
POTASSIUM SERPL-SCNC: 3 MMOL/L (ref 3.4–5.3)
POTASSIUM SERPL-SCNC: 3.2 MMOL/L (ref 3.4–5.3)
PROT SERPL-MCNC: 7.5 G/DL (ref 6.8–8.8)
PROT SERPL-MCNC: 7.5 G/DL (ref 6.8–8.8)
RBC # BLD AUTO: 3.66 10E12/L (ref 3.8–5.2)
RBC # BLD AUTO: 3.75 10E12/L (ref 3.8–5.2)
RBC #/AREA URNS AUTO: 2 /HPF (ref 0–2)
RBC #/AREA URNS AUTO: <1 /HPF (ref 0–2)
SODIUM SERPL-SCNC: 134 MMOL/L (ref 133–144)
SODIUM SERPL-SCNC: 135 MMOL/L (ref 133–144)
SOURCE: ABNORMAL
SOURCE: ABNORMAL
SP GR UR STRIP: 1.01 (ref 1–1.03)
SP GR UR STRIP: 1.02 (ref 1–1.03)
SQUAMOUS #/AREA URNS AUTO: 4 /HPF (ref 0–1)
TSH SERPL DL<=0.005 MIU/L-ACNC: 1.7 MU/L (ref 0.4–4)
UROBILINOGEN UR STRIP-MCNC: 4 MG/DL (ref 0–2)
UROBILINOGEN UR STRIP-MCNC: NORMAL MG/DL (ref 0–2)
WBC # BLD AUTO: 10.8 10E9/L (ref 4–11)
WBC # BLD AUTO: 9.1 10E9/L (ref 4–11)
WBC #/AREA URNS AUTO: 10 /HPF (ref 0–2)
WBC #/AREA URNS AUTO: <1 /HPF (ref 0–2)

## 2017-10-31 PROCEDURE — 25000128 H RX IP 250 OP 636: Performed by: FAMILY MEDICINE

## 2017-10-31 PROCEDURE — A9270 NON-COVERED ITEM OR SERVICE: HCPCS | Mod: GY | Performed by: STUDENT IN AN ORGANIZED HEALTH CARE EDUCATION/TRAINING PROGRAM

## 2017-10-31 PROCEDURE — 96374 THER/PROPH/DIAG INJ IV PUSH: CPT | Performed by: FAMILY MEDICINE

## 2017-10-31 PROCEDURE — 99285 EMERGENCY DEPT VISIT HI MDM: CPT | Mod: 25 | Performed by: FAMILY MEDICINE

## 2017-10-31 PROCEDURE — 82140 ASSAY OF AMMONIA: CPT | Performed by: FAMILY MEDICINE

## 2017-10-31 PROCEDURE — 85610 PROTHROMBIN TIME: CPT | Performed by: FAMILY MEDICINE

## 2017-10-31 PROCEDURE — 25000132 ZZH RX MED GY IP 250 OP 250 PS 637: Mod: GY | Performed by: STUDENT IN AN ORGANIZED HEALTH CARE EDUCATION/TRAINING PROGRAM

## 2017-10-31 PROCEDURE — 99285 EMERGENCY DEPT VISIT HI MDM: CPT | Mod: Z6 | Performed by: FAMILY MEDICINE

## 2017-10-31 PROCEDURE — 85730 THROMBOPLASTIN TIME PARTIAL: CPT | Performed by: FAMILY MEDICINE

## 2017-10-31 PROCEDURE — 12000008 ZZH R&B INTERMEDIATE UMMC

## 2017-10-31 PROCEDURE — 25000128 H RX IP 250 OP 636: Performed by: STUDENT IN AN ORGANIZED HEALTH CARE EDUCATION/TRAINING PROGRAM

## 2017-10-31 PROCEDURE — 81001 URINALYSIS AUTO W/SCOPE: CPT | Performed by: FAMILY MEDICINE

## 2017-10-31 PROCEDURE — 99223 1ST HOSP IP/OBS HIGH 75: CPT | Mod: AI | Performed by: PEDIATRICS

## 2017-10-31 PROCEDURE — 96361 HYDRATE IV INFUSION ADD-ON: CPT | Performed by: FAMILY MEDICINE

## 2017-10-31 PROCEDURE — 83605 ASSAY OF LACTIC ACID: CPT | Performed by: FAMILY MEDICINE

## 2017-10-31 PROCEDURE — 80053 COMPREHEN METABOLIC PANEL: CPT | Performed by: FAMILY MEDICINE

## 2017-10-31 PROCEDURE — 85025 COMPLETE CBC W/AUTO DIFF WBC: CPT | Performed by: FAMILY MEDICINE

## 2017-10-31 PROCEDURE — 83690 ASSAY OF LIPASE: CPT | Performed by: FAMILY MEDICINE

## 2017-10-31 RX ORDER — LANOLIN ALCOHOL/MO/W.PET/CERES
400 CREAM (GRAM) TOPICAL DAILY
Status: DISCONTINUED | OUTPATIENT
Start: 2017-11-01 | End: 2017-11-03 | Stop reason: HOSPADM

## 2017-10-31 RX ORDER — TRIAMTERENE/HYDROCHLOROTHIAZID 37.5-25 MG
1 TABLET ORAL DAILY
Status: DISCONTINUED | OUTPATIENT
Start: 2017-11-01 | End: 2017-11-03 | Stop reason: HOSPADM

## 2017-10-31 RX ORDER — SODIUM CHLORIDE, SODIUM LACTATE, POTASSIUM CHLORIDE, CALCIUM CHLORIDE 600; 310; 30; 20 MG/100ML; MG/100ML; MG/100ML; MG/100ML
1000 INJECTION, SOLUTION INTRAVENOUS CONTINUOUS
Status: DISCONTINUED | OUTPATIENT
Start: 2017-10-31 | End: 2017-10-31

## 2017-10-31 RX ORDER — BISACODYL 5 MG
10 TABLET, DELAYED RELEASE (ENTERIC COATED) ORAL DAILY PRN
Status: DISCONTINUED | OUTPATIENT
Start: 2017-10-31 | End: 2017-11-03 | Stop reason: HOSPADM

## 2017-10-31 RX ORDER — SODIUM CHLORIDE, SODIUM LACTATE, POTASSIUM CHLORIDE, CALCIUM CHLORIDE 600; 310; 30; 20 MG/100ML; MG/100ML; MG/100ML; MG/100ML
1000 INJECTION, SOLUTION INTRAVENOUS CONTINUOUS
Status: DISCONTINUED | OUTPATIENT
Start: 2017-10-31 | End: 2017-11-02

## 2017-10-31 RX ORDER — ZINC SULFATE 50(220)MG
220 CAPSULE ORAL DAILY
Status: DISCONTINUED | OUTPATIENT
Start: 2017-11-01 | End: 2017-11-03 | Stop reason: HOSPADM

## 2017-10-31 RX ORDER — TRIAMTERENE/HYDROCHLOROTHIAZID 37.5-25 MG
1 TABLET ORAL EVERY MORNING
COMMUNITY
End: 2018-01-01

## 2017-10-31 RX ORDER — ACETAMINOPHEN 325 MG/1
650 TABLET ORAL EVERY 6 HOURS PRN
Status: DISCONTINUED | OUTPATIENT
Start: 2017-10-31 | End: 2017-11-03 | Stop reason: HOSPADM

## 2017-10-31 RX ORDER — POTASSIUM CHLORIDE 7.45 MG/ML
10 INJECTION INTRAVENOUS
Status: DISCONTINUED | OUTPATIENT
Start: 2017-10-31 | End: 2017-11-03 | Stop reason: HOSPADM

## 2017-10-31 RX ORDER — MAGNESIUM SULFATE HEPTAHYDRATE 40 MG/ML
4 INJECTION, SOLUTION INTRAVENOUS EVERY 4 HOURS PRN
Status: DISCONTINUED | OUTPATIENT
Start: 2017-10-31 | End: 2017-11-03 | Stop reason: HOSPADM

## 2017-10-31 RX ORDER — POTASSIUM CHLORIDE 750 MG/1
20-40 TABLET, EXTENDED RELEASE ORAL
Status: DISCONTINUED | OUTPATIENT
Start: 2017-10-31 | End: 2017-11-03 | Stop reason: HOSPADM

## 2017-10-31 RX ORDER — UREA 10 %
500 LOTION (ML) TOPICAL DAILY
Status: DISCONTINUED | OUTPATIENT
Start: 2017-11-01 | End: 2017-11-03 | Stop reason: HOSPADM

## 2017-10-31 RX ORDER — NALOXONE HYDROCHLORIDE 0.4 MG/ML
.1-.4 INJECTION, SOLUTION INTRAMUSCULAR; INTRAVENOUS; SUBCUTANEOUS
Status: DISCONTINUED | OUTPATIENT
Start: 2017-10-31 | End: 2017-11-03 | Stop reason: HOSPADM

## 2017-10-31 RX ORDER — TIMOLOL MALEATE 6.8 MG/ML
1 SOLUTION/ DROPS OPHTHALMIC EVERY MORNING
Status: DISCONTINUED | OUTPATIENT
Start: 2017-11-01 | End: 2017-11-03 | Stop reason: HOSPADM

## 2017-10-31 RX ORDER — BISACODYL 5 MG
15 TABLET, DELAYED RELEASE (ENTERIC COATED) ORAL DAILY PRN
Status: DISCONTINUED | OUTPATIENT
Start: 2017-10-31 | End: 2017-11-03 | Stop reason: HOSPADM

## 2017-10-31 RX ORDER — LIDOCAINE 40 MG/G
CREAM TOPICAL
Status: DISCONTINUED | OUTPATIENT
Start: 2017-10-31 | End: 2017-11-03 | Stop reason: HOSPADM

## 2017-10-31 RX ORDER — HYDROMORPHONE HCL/0.9% NACL/PF 0.2MG/0.2
0.2 SYRINGE (ML) INTRAVENOUS
Status: DISCONTINUED | OUTPATIENT
Start: 2017-10-31 | End: 2017-10-31

## 2017-10-31 RX ORDER — HYDROMORPHONE HCL/0.9% NACL/PF 0.2MG/0.2
0.2 SYRINGE (ML) INTRAVENOUS EVERY 4 HOURS PRN
Status: DISCONTINUED | OUTPATIENT
Start: 2017-10-31 | End: 2017-11-03 | Stop reason: HOSPADM

## 2017-10-31 RX ORDER — POTASSIUM CHLORIDE 1.5 G/1.58G
20-40 POWDER, FOR SOLUTION ORAL
Status: DISCONTINUED | OUTPATIENT
Start: 2017-10-31 | End: 2017-11-03 | Stop reason: HOSPADM

## 2017-10-31 RX ORDER — BISACODYL 5 MG
5 TABLET, DELAYED RELEASE (ENTERIC COATED) ORAL DAILY PRN
Status: DISCONTINUED | OUTPATIENT
Start: 2017-10-31 | End: 2017-11-03 | Stop reason: HOSPADM

## 2017-10-31 RX ORDER — POTASSIUM CHLORIDE 29.8 MG/ML
20 INJECTION INTRAVENOUS
Status: DISCONTINUED | OUTPATIENT
Start: 2017-10-31 | End: 2017-11-03 | Stop reason: HOSPADM

## 2017-10-31 RX ORDER — POTASSIUM CL/LIDO/0.9 % NACL 10MEQ/0.1L
10 INTRAVENOUS SOLUTION, PIGGYBACK (ML) INTRAVENOUS
Status: DISCONTINUED | OUTPATIENT
Start: 2017-10-31 | End: 2017-11-03 | Stop reason: HOSPADM

## 2017-10-31 RX ORDER — ONDANSETRON 2 MG/ML
4 INJECTION INTRAMUSCULAR; INTRAVENOUS EVERY 30 MIN PRN
Status: DISCONTINUED | OUTPATIENT
Start: 2017-10-31 | End: 2017-11-03 | Stop reason: HOSPADM

## 2017-10-31 RX ORDER — DOCUSATE SODIUM 100 MG/1
100 CAPSULE, LIQUID FILLED ORAL 2 TIMES DAILY
Status: DISCONTINUED | OUTPATIENT
Start: 2017-10-31 | End: 2017-11-03 | Stop reason: HOSPADM

## 2017-10-31 RX ADMIN — SODIUM CHLORIDE, POTASSIUM CHLORIDE, SODIUM LACTATE AND CALCIUM CHLORIDE 1000 ML: 600; 310; 30; 20 INJECTION, SOLUTION INTRAVENOUS at 19:57

## 2017-10-31 RX ADMIN — Medication 0.2 MG: at 17:28

## 2017-10-31 RX ADMIN — DOCUSATE SODIUM 100 MG: 100 CAPSULE, LIQUID FILLED ORAL at 20:01

## 2017-10-31 RX ADMIN — SODIUM CHLORIDE, POTASSIUM CHLORIDE, SODIUM LACTATE AND CALCIUM CHLORIDE 1000 ML: 600; 310; 30; 20 INJECTION, SOLUTION INTRAVENOUS at 17:28

## 2017-10-31 RX ADMIN — SODIUM CHLORIDE, POTASSIUM CHLORIDE, SODIUM LACTATE AND CALCIUM CHLORIDE 1000 ML: 600; 310; 30; 20 INJECTION, SOLUTION INTRAVENOUS at 22:58

## 2017-10-31 RX ADMIN — SODIUM CHLORIDE, POTASSIUM CHLORIDE, SODIUM LACTATE AND CALCIUM CHLORIDE 1000 ML: 600; 310; 30; 20 INJECTION, SOLUTION INTRAVENOUS at 18:42

## 2017-10-31 ASSESSMENT — ENCOUNTER SYMPTOMS
SHORTNESS OF BREATH: 0
FEVER: 0
ABDOMINAL PAIN: 1
CONSTIPATION: 1

## 2017-10-31 ASSESSMENT — PAIN SCALES - GENERAL: PAINLEVEL: EXTREME PAIN (8)

## 2017-10-31 ASSESSMENT — PAIN DESCRIPTION - DESCRIPTORS: DESCRIPTORS: SORE

## 2017-10-31 NOTE — IP AVS SNAPSHOT
[address] [address]    Phone:  [phone]                                       After Visit Summary   10/31/2017    Iris Lewis    MRN: [id number]           After Visit Summary Signature Page     I have received my discharge instructions, and my questions have been answered. I have discussed any challenges I see with this plan with the nurse or doctor.    ..........................................................................................................................................  Patient/Patient Representative Signature      ..........................................................................................................................................  Patient Representative Print Name and Relationship to Patient    ..................................................               ................................................  Date                                            Time    ..........................................................................................................................................  Reviewed by Signature/Title    ...................................................              ..............................................  Date                                                            Time

## 2017-10-31 NOTE — LETTER
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 New pancreatic cancer diagnosis. Will follow up with oncology for outpatient plan       Concern for non-adherence with plan of care:   Y/N n  Discharge Needs Assessment:      Already enrolled in Tele-monitoring program and name of program:    Follow-up specialty is recommended: Yes    Follow-up plan:  Future Appointments  Date Time Provider Department Center   11/10/2017 1:35 PM Brenton Rankin MD [facility]   12/13/2017 2:30 PM Jacob Noel PA-C [facility]       Any outstanding tests or procedures:              Key Recommendations:      Jennifer Amin    AVS/Discharge Summary is the source of truth; this is a helpful guide for improved communication of patient story

## 2017-10-31 NOTE — TELEPHONE ENCOUNTER
Pt calling with stomach cramps-double over with pain. Pt states she is very gassy. Had BM 10/30/2017. Pt INSISTS on being seen.    Appt 1:30 PM with Dr Kimble

## 2017-10-31 NOTE — PHARMACY-ADMISSION MEDICATION HISTORY
"Admission medication history interview status for the 10/31/2017 admission is complete. See Epic admission navigator for allergy information, pharmacy, prior to admission medications and immunization status.     Medication history interview sources:  Iris (\"Car-en\", patient), [facility] ([phone]) and [facility] ([phone])    Changes made to PTA medication list (reason)  Added: Calcium PO - powder formulation  Deleted: Lactobacillus - no longer taking      Magnesium 250 mg - no longer taking      Multivitamins - not taking on a regular basis, doesn't remember last time she took      Nutritional supplements - no longer taking  Changed: Cholecalciferon (vitamin D) 1000 Unit tablet - Take 1 tablet by mouth twice daily (not once daily)       Levothyroxine 37.5 mcg (not 125 mcg) tablet    Additional medication history information (including reliability of information, actions taken by pharmacist):    Iris (patient) was very pleasant to speak with and was a fair historian of her medication use. She was able to name medications, but was unsure about strengths (although she knew her dosing regimen). Strengths for triamterene-HCTZ were verified with [facility] ([phone]) and [facility] ([phone]).  Per Iris (patient), her levothyroxine dose was recently lowered from 125 mcg to 37.5 mcg daily. This was verified per notes from her provider Dr. Rankin on 10/31/17.    Allergies and flu shot status were verified with the patient.    Home pharmacies:  [facility] ([phone]) for long-term medication use  [facility] ([phone]) for short-term medication use    Prior to Admission medications    Medication Sig Last Dose Taking? Auth Provider   CALCIUM PO Form/strength unknown. Powder formulation. Add to water and fruits/vegetables daily to promote bone health. Past Week at Unknown time Yes Unknown, Entered By History   levothyroxine (SYNTHROID/LEVOTHROID) 37.5 mcg TABS half-tab Take 37.5 " mcg by mouth every morning (before breakfast) 10/31/2017 at AM Yes Unknown, Entered By History   triamterene-hydrochlorothiazide (MAXZIDE-25) 37.5-25 MG per tablet Take 1 tablet by mouth daily 10/31/2017 at AM Yes Unknown, Entered By History   potassium 99 MG TABS Take 1 tablet by mouth 2 times daily  10/31/2017 at AM Yes Reported, Patient   timolol maleate (ISTALOL) 0.5 % opthalmic solution Place 1 drop into both eyes every morning Before placing contact lenses 10/31/2017 at AM Yes Reported, Patient   cyanocolbalamin (VITAMIN B-12) 500 MCG tablet Take 500 mcg by mouth daily. Past Week at Unknown time Yes Reported, Patient   cholecalciferol (VITAMIN D) 1000 UNIT tablet Take 1 tablet by mouth 2 times daily  Past Week at Unknown time Yes Reported, Patient   zinc 50 MG TABS Take 1 tablet by mouth daily  More than a month at Unknown time  Reported, Patient   denosumab (PROLIA) 60 MG/ML SOLN injection Inject 1 mL (60 mg) Subcutaneous every 6 months Inject subcutaneously in upper arm, upper thigh or abdomen   Neha Orourke MD PhD   folic acid (FOLVITE) 400 MCG tablet Take 1 tablet by mouth daily. More than a month at Unknown time  Reported, Patient         Medication history completed by: Sharmila Frank, 2nd Year Student Pharmacist

## 2017-10-31 NOTE — ED PROVIDER NOTES
History     Chief Complaint   Patient presents with     Abnormal Labs     abnormal ct tumor on panc     Abdominal Pain     HPI  Iris Lewis is a 77 year old female with a history of HTN, thyroid cancer, and osteoporosis who presents to the Emergency Department for evaluation of abdominal pain. The patient reports one week of waxing and waning abdominal pain. She states she thought it may just be reflux, but the pain worsened this morning and she noticed she was jaundiced. In addition, she states she had two days of constipation and is now producing gray, gassy stools about once per day. She was seen by Dr. Rankin today in  and her work up was notable for increased bilirubin. She also had a CT Abdomen Pelvis performed (see below) and was told to come here for further testing. She has undergone Nissen Fundoplication in 2006.  Patient notes that she's been somewhat constipated.  Stools been lighter in color and floating.  She still has this abdominal pain denies back pain fevers no hematemesis.  Patient noted to be yellow in color also regarding skin.  This is all new over the last week.    CT Abdomen Pelvis (10/31/17):  IMPRESSION:   1. 3.6 cm poorly defined hypoattenuating mass centered in the uncinate  process of the pancreas most consistent with pancreatic  adenocarcinoma.   a.  Local invasion into the third portion of the duodenum.  b. No vascular occlusion or thrombosis. There is abutment of several  vessels including the aorta, the gastroduodenal artery, the superior  mesenteric artery and possibly slight abutment of the superior  mesenteric vein.  c. Relatively abrupt cut off the distal common bile duct, presumably  related to adjacent pancreatic head mass, with moderate upstream  intrahepatic and extrahepatic biliary dilatation.  d. Mild dilatation of the main pancreatic duct up to 4 mm with  tapering in the pancreatic head region of infiltrative mass.  2. Multiple mildly enlarged  peripancreatic/retroperitoneal lymph  nodes, including necrotic appearing 1.1 cm lymph node adjacent to the  pancreatic mass highly concerning for metastatic lymph node  involvement.  3. At least 3 small hypoattenuating liver lesions which were not  present on 9/10/2014, suspicious for metastatic disease.  4. Small indeterminate pulmonary nodules in the visualized lung bases,  some of which are new since 9/10/2014.      Past Medical History:   Diagnosis Date     Arthritis      Colon polyp 2009,2015    no polyps - f/u in 5 yrs      Esophageal reflux      Glaucoma      Hypertension      Osteoporosis      Postsurgical hypothyroidism      Scoliosis (and kyphoscoliosis), idiopathic      Thyroid cancer (H)     papillary carcinoma age 33     Uncomplicated asthma        Past Surgical History:   Procedure Laterality Date     ARTHRODESIS FOOT Right 11/9/2016    Procedure: ARTHRODESIS FOOT;  Surgeon: Janes Mcelroy MD;  Location:  OR     C STOMACH SURGERY PROCEDURE UNLISTED       COLONOSCOPY   2009, 3/2015    no polyps in 2015 told to return 10 yrs.      ESOPHAGOSCOPY, GASTROSCOPY, DUODENOSCOPY (EGD), COMBINED  2/17/2012    Procedure:COMBINED ESOPHAGOSCOPY, GASTROSCOPY, DUODENOSCOPY (EGD); COMBINED ESOPHAGOSCOPY, GASTROSCOPY, DUODENOSCOPY POSSIBLE DILATION; Surgeon:NICHOLE MCCLAIN; Location:[facility] OR     FOOT SURGERY  2008    hammertoe left     LAPAROSCOPIC CHOLECYSTECTOMY N/A 1/5/2015    Procedure: LAPAROSCOPIC CHOLECYSTECTOMY;  Surgeon: Cruz Pearson MD;  Location:  OR     NISSEN FUNDOPLICATION       ORTHOPEDIC SURGERY  2009    left hammertoe      REPAIR HAMMER TOE Right 11/9/2016    Procedure: REPAIR HAMMER TOE;  Surgeon: Janes Mcelroy MD;  Location:  OR     SALPINGO OOPHORECTOMY,R/L/SERENA  1974    left, for tubal pregnancy     THYROIDECTOMY      for thyroid cancer       Family History   Problem Relation Age of Onset     C.A.D. Father      MI at age 65     Alzheimer Disease Mother       Depression Sister 80     Skin Cancer No family hx of      no skin cancer     Prostate Cancer Brother      Asthma Father      Asthma Sister      Depression Son      Depression Sister      OSTEOPOROSIS Mother      Coronary Artery Disease Father      Coronary Artery Disease Brother 67     Depression Son      Depression Sister        Social History   Substance Use Topics     Smoking status: Former Smoker     Years: 5.00     Types: Cigarettes     Start date: 9/15/1959     Quit date: 1/28/1983     Smokeless tobacco: Never Used     Alcohol use Yes      Comment: wine 1-2 glasses/day       Current Facility-Administered Medications   Medication     lidocaine 1 % 1 mL     lidocaine (LMX4) kit     sodium chloride (PF) 0.9% PF flush 3 mL     sodium chloride (PF) 0.9% PF flush 3 mL     ondansetron (ZOFRAN) injection 4 mg     naloxone (NARCAN) injection 0.1-0.4 mg     acetaminophen (TYLENOL) tablet 650 mg     docusate sodium (COLACE) capsule 100 mg     bisacodyl (DULCOLAX) EC tablet 5 mg    Or     bisacodyl (DULCOLAX) EC tablet 10 mg    Or     bisacodyl (DULCOLAX) EC tablet 15 mg     cyanocolbalamin (vitamin  B-12) tablet 500 mcg     cholecalciferol (vitamin D3) tablet 1,000 Units     levothyroxine (SYNTHROID/LEVOTHROID) half-tab 37.5 mcg     folic acid (FOLVITE) tablet 400 mcg     triamterene-hydrochlorothiazide (MAXZIDE-25) 37.5-25 MG per tablet 1 tablet     zinc sulfate (ZINCATE) capsule 220 mg     timolol maleate (ISTALOL) 0.5 % SOLN 1 drop     lactated ringers infusion     HYDROmorphone (DILAUDID) injection 0.2 mg     potassium chloride SA (K-DUR/KLOR-CON M) CR tablet 20-40 mEq     potassium chloride (KLOR-CON) Packet 20-40 mEq     potassium chloride 10 mEq in 100 mL sterile water intermittent infusion (premix)     potassium chloride 10 mEq in 100 mL intermittent infusion with 10 mg lidocaine     potassium chloride 20 mEq in 50 mL intermittent infusion     magnesium sulfate 4 g in 100 mL sterile water (premade)       "  Allergies   Allergen Reactions     Nkda [No Known Drug Allergies]        I have reviewed the Medications, Allergies, Past Medical and Surgical History, and Social History in the Epic system.    Review of Systems   Constitutional: Positive for activity change and appetite change. Negative for fatigue and fever.   HENT: Negative for congestion and sinus pressure.    Eyes: Negative for redness and visual disturbance.        Eyes yellow   Respiratory: Negative for shortness of breath and wheezing.    Cardiovascular: Negative for chest pain.   Gastrointestinal: Positive for abdominal pain, constipation and nausea. Negative for blood in stool and vomiting.   Endocrine: Negative for polyuria.   Genitourinary: Negative for difficulty urinating and flank pain.        Urine darker   Musculoskeletal: Negative for arthralgias, back pain, gait problem and neck stiffness.   Skin: Positive for color change (skin yellow eyes yellow).   Allergic/Immunologic: Negative for immunocompromised state.   Neurological: Positive for weakness. Negative for seizures, syncope, numbness and headaches.   Hematological: Does not bruise/bleed easily.   Psychiatric/Behavioral: Negative for confusion, decreased concentration and dysphoric mood.   All other systems reviewed and are negative.      Physical Exam   BP: 132/67  Pulse: 84  Temp: 98  F (36.7  C)  Resp: 16  Height: 157.5 cm (5' 2\")  Weight: 53.5 kg (117 lb 14.4 oz)  SpO2: 98 %      Physical Exam   Constitutional: She is oriented to person, place, and time. She appears well-developed and well-nourished. She appears distressed.   Patient here with  patient is pleasant here not confused nontoxic but uncomfortable obvious jaundice seen   HENT:   Head: Normocephalic and atraumatic.   Eyes: Scleral icterus is present.   Neck: Normal range of motion. Neck supple.   Cardiovascular: Normal rate and regular rhythm.    Pulmonary/Chest: No stridor. No respiratory distress. She has no wheezes. "   Abdominal: She exhibits no distension. There is tenderness. There is no rebound and no guarding.   Abdomen is nonrigid here is soft with tenderness noted   Musculoskeletal: She exhibits no edema, tenderness or deformity.   Patient with kyphoscoliosis noted chronic   Neurological: She is alert and oriented to person, place, and time. She has normal reflexes. No cranial nerve deficit. Coordination normal.   Patient is not encephalopathic.  No focal findings   Skin: Skin is warm and dry. No rash noted. She is not diaphoretic. No erythema. No pallor.   Patient with jaundice noted   Psychiatric: She has a normal mood and affect. Her behavior is normal. Judgment and thought content normal.   Nursing note and vitals reviewed.      ED Course     ED Course     Procedures         Reviewed records in Epic.  Reviewed the CT scan noted.    Discussed case with GI also agreed patient will most likely need stenting for symptomatic treatment at least acutely.    Labs reviewed.  Lipase 10,630 test and potassium 3.2.  Total bilirubin 12.9.  Liver function tests elevated is noted.    White count 9.1 hemoglobin 0.9 urinalysis negative for infection.    Discussed case with internal medicine also.  Patient meantime was given lactated Ringer's IV in the ER with a bolus and continued infusion.    Patient given Dilaudid and Zofran IV for symptomatic control feeling better    Patient admitted to medicine service for further workup of pancreatic mass with obstructive pancreatitis/jaundice.    Patient and  have been aware and agreeable plan.  Study Result   EXAMINATION: CT ABDOMEN PELVIS W CONTRAST, 10/31/2017 12:52 PM     TECHNIQUE:  Helical CT images from the lung bases through the  symphysis pubis were obtained with IV contrast. Contrast dose: 70  mL  [id number]     COMPARISON: 9/10/2014     HISTORY: 1.5 weeks new upper abd pain, grey stool, and jaundice     FINDINGS:     Abdomen and pelvis:      Pancreas: Poorly defined  hypoattenuating mass centered on the uncinate  process of the pancreas (series 8 image 34, series 6 image 29)  measuring  up to 3.6 cm in greatest axial diameter, and up to 3.3 x  3.8 cm in the coronal plane. There is invasion into the third portion  of the duodenum, series 7, image 153. There is mild upstream  dilatation of the main pancreatic duct up to 4 mm in diameter, with  mild narrowing at the the major papilla. The common bile duct is  dilated up to 1.1 cm in diameter, with relatively abrupt termination  just upstream to the major papilla. There is moderate intrahepatic  biliary dilatation. The remainder of the pancreatic parenchyma is  grossly unremarkable.     Vascular involvement:     Aorta: Abutment of the aorta (series 7 image 139).  Inferior vena cava: Near above of the inferior vena cava,  by  a few millimeters.   Celiac axis: No evidence of involvement.  Hepatic artery: No evidence of involvement of the common, proper,  right or left hepatic arteries. There is abutment of the  gastroduodenal artery a few centimeters distal to its origin (series 5  image 125).  Superior mesenteric artery: Abutment of the superior mesenteric artery  several centimeters distal to its origin (series 5 image 134) without  evidence of narrowing.  Superior mesenteric vein: There is perhaps slight abutment of the  proximal superior mesenteric vein (series 7 image 119).  Portal vein: No evidence of involvement.  Splenic artery and vein: No evidence of involvement.     Vascular anatomy: There is no replaced hepatic artery.  No other  vascular anomalies.     Lymph node evaluation: Several mildly enlarged  peripancreatic/retroperitoneal lymph nodes worrisome for metastatic  disease including 1.1 cm short axis diameter necrotic appearing lymph  node immediately adjacent to the pancreatic mass (series 7 image 127),  1.0 cm short axis diameter periaortic lymph node (series 7 image 100),  0.9 cm short axis diameter right  para-aortic lymph node (series 7  image 68), 1.0 cm short axis diameter right para-aortic lymph node  (series 7 image 80)     Peritoneum: No ascites. Mild mesenteric edema without discrete  carcinomatosis/peritoneal nodules.     Liver: 0.8 x 0.7 cm hypoattenuating lesion in hepatic segment 4  (series 7 image 123) and 0.9 x 0.8 cm hypoattenuating lesion at the  junction of hepatic segments 8, 5 and 4 (series 7 image 96), and 1.0 x  0.7 cm lesion in hepatic segment 5/6 (series 7 image 129) are new  since 9/10/2014.     Remainder of the abdomen and pelvis: Cholecystectomy. The spleen,  adrenals and kidneys are within normal limits. Bladder is  unremarkable. Normal postmenopausal appearance of the reproductive  organs. The colon and small bowel are within normal limits.  Postoperative changes of Nissen fundoplication. Aorta is normal in  caliber.     Lung bases: 5 x 3 mm nodule in the left lower lobe (series 11 image 1)  and 4 mm right lower lobe nodule just above the right hemidiaphragm  (series 11 image 40) appear to be new since 9/10/2014. 2 mm subpleural  right middle lobe nodule is not significantly changed from 9/10/2014.  Mild bronchial wall thickening and atelectasis.     Bones and soft tissues: No suspicious lesion in the visualized  skeleton. Lucency within the L1 vertebral body (series 8 image 64) is  not significantly changed from 9/10/2014, probably a benign vertebral  body hemangioma. The bones appear demineralized with degenerative  change and scoliosis.         IMPRESSION:   1. 3.6 cm poorly defined hypoattenuating mass centered in the uncinate  process of the pancreas most consistent with pancreatic  adenocarcinoma.   a.  Local invasion into the third portion of the duodenum.  b. No vascular occlusion or thrombosis. There is abutment of several  vessels including the aorta, the gastroduodenal artery, the superior  mesenteric artery and possibly slight abutment of the superior  mesenteric vein.  c.  Relatively abrupt cut off the distal common bile duct, presumably  related to adjacent pancreatic head mass, with moderate upstream  intrahepatic and extrahepatic biliary dilatation.  d. Mild dilatation of the main pancreatic duct up to 4 mm with  tapering in the pancreatic head region of infiltrative mass.  2. Multiple mildly enlarged peripancreatic/retroperitoneal lymph  nodes, including necrotic appearing 1.1 cm lymph node adjacent to the  pancreatic mass highly concerning for metastatic lymph node  involvement.  3. At least 3 small hypoattenuating liver lesions which were not  present on 9/10/2014, suspicious for metastatic disease.  4. Small indeterminate pulmonary nodules in the visualized lung bases,  some of which are new since 9/10/2014.     I have personally reviewed the examination and initial interpretation  and I agree with the findings.     DEVIN FRANK MD         Critical Care time:  none             Labs Ordered and Resulted from Time of ED Arrival Up to the Time of Departure from the ED   CBC WITH PLATELETS DIFFERENTIAL - Abnormal; Notable for the following:        Result Value    RBC Count 3.75 (*)     Hematocrit 34.8 (*)     RDW 15.5 (*)     All other components within normal limits   COMPREHENSIVE METABOLIC PANEL - Abnormal; Notable for the following:     Potassium 3.2 (*)     Glucose 114 (*)     Bilirubin Total 12.9 (*)     Albumin 3.0 (*)     Alkaline Phosphatase 975 (*)      (*)      (*)     All other components within normal limits   LIPASE - Abnormal; Notable for the following:     Lipase 67694 (*)     All other components within normal limits   INR   PARTIAL THROMBOPLASTIN TIME   LACTIC ACID WHOLE BLOOD   AMMONIA   PERIPHERAL IV CATHETER   PULSE OXIMETRY NURSING     Results for orders placed or performed during the hospital encounter of 10/31/17   CBC with platelets differential   Result Value Ref Range    WBC 9.1 4.0 - 11.0 10e9/L    RBC Count 3.75 (L) 3.8 - 5.2 10e12/L     Hemoglobin 11.9 11.7 - 15.7 g/dL    Hematocrit 34.8 (L) 35.0 - 47.0 %    MCV 93 78 - 100 fl    MCH 31.7 26.5 - 33.0 pg    MCHC 34.2 31.5 - 36.5 g/dL    RDW 15.5 (H) 10.0 - 15.0 %    Platelet Count 396 150 - 450 10e9/L    Diff Method Automated Method     % Neutrophils 68.3 %    % Lymphocytes 18.7 %    % Monocytes 7.1 %    % Eosinophils 4.7 %    % Basophils 0.6 %    % Immature Granulocytes 0.6 %    Nucleated RBCs 0 0 /100    Absolute Neutrophil 6.2 1.6 - 8.3 10e9/L    Absolute Lymphocytes 1.7 0.8 - 5.3 10e9/L    Absolute Monocytes 0.6 0.0 - 1.3 10e9/L    Absolute Eosinophils 0.4 0.0 - 0.7 10e9/L    Absolute Basophils 0.1 0.0 - 0.2 10e9/L    Abs Immature Granulocytes 0.1 0 - 0.4 10e9/L    Absolute Nucleated RBC 0.0    INR   Result Value Ref Range    INR 0.93 0.86 - 1.14   Partial thromboplastin time   Result Value Ref Range    PTT 26 22 - 37 sec   Comprehensive metabolic panel   Result Value Ref Range    Sodium 135 133 - 144 mmol/L    Potassium 3.2 (L) 3.4 - 5.3 mmol/L    Chloride 99 94 - 109 mmol/L    Carbon Dioxide 27 20 - 32 mmol/L    Anion Gap 9 3 - 14 mmol/L    Glucose 114 (H) 70 - 99 mg/dL    Urea Nitrogen 12 7 - 30 mg/dL    Creatinine 0.67 0.52 - 1.04 mg/dL    GFR Estimate 86 >60 mL/min/1.7m2    GFR Estimate If Black >90 >60 mL/min/1.7m2    Calcium 9.3 8.5 - 10.1 mg/dL    Bilirubin Total 12.9 (H) 0.2 - 1.3 mg/dL    Albumin 3.0 (L) 3.4 - 5.0 g/dL    Protein Total 7.5 6.8 - 8.8 g/dL    Alkaline Phosphatase 975 (H) 40 - 150 U/L     (H) 0 - 50 U/L     (H) 0 - 45 U/L   Lipase   Result Value Ref Range    Lipase 63689 (H) 73 - 393 U/L   Lactic acid whole blood   Result Value Ref Range    Lactic Acid 1.1 0.7 - 2.0 mmol/L   Ammonia   Result Value Ref Range    Ammonia 12 10 - 50 umol/L   UA reflex to Microscopic and Culture   Result Value Ref Range    Color Urine Yellow     Appearance Urine Clear     Glucose Urine Negative NEG^Negative mg/dL    Bilirubin Urine Small (A) NEG^Negative    Ketones Urine Negative  NEG^Negative mg/dL    Specific Gravity Urine 1.015 1.003 - 1.035    Blood Urine Negative NEG^Negative    pH Urine 6.5 5.0 - 7.0 pH    Protein Albumin Urine Negative NEG^Negative mg/dL    Urobilinogen mg/dL Normal 0.0 - 2.0 mg/dL    Nitrite Urine Negative NEG^Negative    Leukocyte Esterase Urine Negative NEG^Negative    Source Unspecified Urine     RBC Urine <1 0 - 2 /HPF    WBC Urine <1 0 - 2 /HPF            Assessments & Plan (with Medical Decision Making)  78 yo female with greater than a week abdominal pain with change in stool habits no jaundice CT scan confirming concerning for pancreatic mass with obstructive jaundice and obstructive pancreatitis.  Patient is not toxic here given IV fluids discussed findings with GI along with medicine patient admitted to medicine service for further ongoing evaluation and diagnosis along with symptomatic improvement possible stenting in the morning.  Patient to be kept nothing by mouth per GI recommendations            I have reviewed the nursing notes.    I have reviewed the findings, diagnosis, plan and need for follow up with the patient.    Current Discharge Medication List          Final diagnoses:   Pancreatitis due to obstruction of pancreatic duct   Pancreatic mass   Obstructive jaundice   Abdominal pain, generalized   I, Vince Sanchez, am serving as a trained medical scribe to document services personally performed by Alfredo Rivera MD, based on the provider's statements to me.      IAlfredo MD, was physically present and have reviewed and verified the accuracy of this note documented by Vince Sanchez.       10/31/2017   [facility] EMERGENCY DEPARTMENT    This note was created at least in part by the use of dragon voice dictation system. Inadvertent typographical errors may still exist.  Alfredo Rivera MD.         Alfredo Rivera MD  11/01/17 0222

## 2017-10-31 NOTE — NURSING NOTE
Chief Complaint   Patient presents with     Abdominal Pain     Patient here for abdominal pain, abnormal stools.     Felicity Aparicio LPN at 11:23 AM on 10/31/2017.

## 2017-10-31 NOTE — MR AVS SNAPSHOT
After Visit Summary   10/31/2017    Iris Lewis    MRN: [id number]           Patient Information     Date Of Birth          1940        Visit Information        Provider Department      10/31/2017 11:30 AM Brenton Rankin MD [facility]        Today's Diagnoses     Jaundice    -  1    Upper abdominal pain        Cold intolerance           Follow-ups after your visit        Your next 10 appointments already scheduled     Oct 31, 2017 12:00 PM CDT   LAB with  LAB   [facility] ([facility])    [address] [place] [id number] [phone]           Please do not eat 10-12 hours before your appointment if you are coming in fasting for labs on lipids, cholesterol, or glucose (sugar). This does not apply to pregnant women. Water, hot tea and black coffee (with nothing added) are okay. Do not drink other fluids, diet soda or chew gum.            Oct 31, 2017  3:00 PM CDT   (Arrive by 2:45 PM)   CT ABDOMEN PELVIS W/O & W CONTRAST with UCCT1   [facility] ([facility])    [address] [place] [id number] [phone]           Please bring any scans or X-rays taken at other hospitals, if similar tests were done. Also bring a list of your medicines, including vitamins, minerals and over-the-counter drugs. It is safest to leave personal items at home.  Be sure to tell your doctor:   If you have any allergies.   If there s any chance you are pregnant.   If you are breastfeeding.   If you have any special needs.  You may have contrast for this exam. To prepare:   Do not eat or drink for 2 hours before your exam. If you need to take medicine, you may take it with small sips of water. (We may ask you to take liquid medicine as well.)   The day before your exam, drink extra fluids at least six 8-ounce glasses (unless your doctor tells you to restrict your fluids).   Patients over 70 or patients with diabetes or kidney problems:   If you haven t had a blood test (creatinine test) within the last 30 days, go to your clinic or Diagnostic Imaging Department for this test.  If you have diabetes:   If your kidney function is normal, continue taking your metformin (Avandamet, Glucophage, Glucovance, Metaglip) on the day of your exam.   If your kidney function is abnormal, wait 48 hours before restarting this medicine.  You will have oral contrast for this exam:   You will drink the contrast at home. Get this from your clinic or Diagnostic Imaging Department. Please follow the directions given.  Please wear loose clothing, such as a sweat suit or jogging clothes. Avoid snaps, zippers and other metal. We may ask you to undress and put on a hospital gown.  If you have any questions, please call the Imaging Department where you will have your exam.            Dec 13, 2017  2:30 PM CST   (Arrive by 2:15 PM)   New Patient Visit with Jacob Noel PA-C   [facility] ([facility])    [address] [place], [place] [id number] [phone]              Future tests that were ordered for you today     Open Future Orders        Priority Expected Expires Ordered    CT Abdomen/Pelvis w/o & w contrast STAT  10/31/2018 10/31/2017    CBC with platelets differential Routine 10/31/2017 11/14/2017 10/31/2017    Comprehensive metabolic panel Routine 10/31/2017 11/14/2017 10/31/2017    TSH with free T4 reflex Routine 10/31/2017 11/14/2017 10/31/2017    Lipase Routine 10/31/2017 11/14/2017 10/31/2017    UA with Micro reflex to Culture Routine 10/31/2017 10/31/2018 10/31/2017            Who to contact     Please call your clinic at [phone] to:    Ask questions about your health    Make or cancel appointments    Discuss your medicines    Learn about your test results    Speak to your doctor   If you have compliments or concerns  about an experience at your clinic, or if you wish to file a complaint, please contact [facility] Patient Relations at [phone] or email us at [email or web address]         Additional Information About Your Visit        [employer] Information     [employer] gives you secure access to your electronic health record. If you see a primary care provider, you can also send messages to your care team and make appointments. If you have questions, please call your primary care clinic.  If you do not have a primary care provider, please call [phone] and they will assist you.      [employer] is an electronic gateway that provides easy, online access to your medical records. With [employer], you can request a clinic appointment, read your test results, renew a prescription or communicate with your care team.     To access your existing account, please contact your [facility] or call [phone] for assistance.        Care EveryWhere ID     This is your Care EveryWhere ID. This could be used by other organizations to access your [place] medical records  [id number]        Your Vitals Were     Pulse Breastfeeding? BMI (Body Mass Index)             71 No 21.03 kg/m2          Blood Pressure from Last 3 Encounters:   10/31/17 128/77   09/12/17 113/71   08/08/17 146/84    Weight from Last 3 Encounters:   10/31/17 52.2 kg (115 lb)   09/12/17 53.9 kg (118 lb 14.4 oz)   08/08/17 54.5 kg (120 lb 3.2 oz)               Primary Care Provider Office Phone # Fax #    Neri Gipson [phone] [address] [address]        Equal Access to Services     [facility]SA [facility] : Hadii mary Martinez, waaxda luqadaha, qaybta kaalmalisa francois. So [facility] [phone].    ATENCIÓN: Si habla español, tiene a nvaa disposición servicios gratuitos de asistencia lingüística. Llame al  [phone].    We comply with applicable federal civil rights laws and [place] laws. We do not discriminate on the basis of race, color, national origin, age, disability, sex, sexual orientation, or gender identity.            Thank you!     Thank you for choosing [facility]  for your care. Our goal is always to provide you with excellent care. Hearing back from our patients is one way we can continue to improve our services. Please take a few minutes to complete the written survey that you may receive in the mail after your visit with us. Thank you!             Your Updated Medication List - Protect others around you: Learn how to safely use, store and throw away your medicines at www.disposemymeds.org.          This list is accurate as of: 10/31/17 11:58 AM.  Always use your most recent med list.                   Brand Name Dispense Instructions for use Diagnosis    BIFIDO-GENIC GROWTH FACTORS PO           cholecalciferol 1000 UNIT tablet    vitamin D3     Take 1 tablet by mouth daily        cyanocolbalamin 500 MCG tablet    vitamin  B-12     Take 500 mcg by mouth daily.        * denosumab 60 MG/ML Soln injection    PROLIA    1 mL    Inject 1 mL (60 mg) Subcutaneous every 6 months Inject subcutaneously in upper arm, upper thigh or abdomen    Senile osteoporosis, Personal history of other drug therapy       * denosumab 60 MG/ML Soln injection    PROLIA    1 mL    Inject 1 mL (60 mg) Subcutaneous every 6 months Inject subcutaneously in upper arm, upper thigh or abdomen    Senile osteoporosis       folic acid 400 MCG tablet    FOLVITE     Take 1 tablet by mouth daily.        HAIR/SKIN/NAILS/BIOTIN PO           lactobacillus acidophilus Tabs      Take 1 tablet by mouth 3 times daily (before meals)        levothyroxine 125 MCG tablet    SYNTHROID/LEVOTHROID    90 tablet    Take 1 tablet (125 mcg) by mouth daily    Abnormal finding on thyroid function test       magnesium 250 MG tablet      Take 1  tablet by mouth daily.        potassium 99 MG Tabs           timolol maleate 0.5 % opthalmic solution    ISTALOL          triamterene-hydrochlorothiazide 37.5-25 MG per capsule    DYAZIDE    90 capsule    Take one tablet a day.    Essential hypertension, benign, Abnormal finding on thyroid function test, Palpitations, Loose stools       zinc 50 MG Tabs           * Notice:  This list has 2 medication(s) that are the same as other medications prescribed for you. Read the directions carefully, and ask your doctor or other care provider to review them with you.

## 2017-10-31 NOTE — ED NOTES
"Triage Assessment & Note:    /67  Pulse 84  Temp 98  F (36.7  C) (Oral)  Resp 16  Ht 1.575 m (5' 2\")  Wt 53.5 kg (117 lb 14.4 oz)  SpO2 98%  Breastfeeding? No  BMI 21.56 kg/m2      Patient presents with: Pt comes to triage with  from PCP after having abnormal CT scan and abdominal pain.  Pt was told to come to ER for further evaluation, IV fluids, and pain medication.  No reports of fever, cough, or SOB.    Home Treatments/Remedies: Home medications    Febrile / Afebrile: afebrile    Duration of C/o: week    Jeannie Regan RN  October 31, 2017        "

## 2017-10-31 NOTE — ED NOTES
Bed: IN05  Expected date: 10/31/17  Expected time: 4:30 PM  Means of arrival: Car  Comments:  Iris Trinidad, being sent in from home after labs and CT today showed pancreatic [place] [address].

## 2017-10-31 NOTE — IP AVS SNAPSHOT
MRN:[id number]                      After Visit Summary   10/31/2017    Iris Lewis    MRN: [id number]           Thank you!     Thank you for choosing [facility] for your care. Our goal is always to provide you with excellent care. Hearing back from our patients is one way we can continue to improve our services. Please take a few minutes to complete the written survey that you may receive in the mail after you visit with us. Thank you!        Patient Information     Date Of Birth          1940        Designated Caregiver       Most Recent Value    Caregiver    Will someone help with your care after discharge? yes    Name of designated caregiver Neri    Phone number of caregiver [phone]    Caregiver address [facility] [place] [address]      About your hospital stay     You were admitted on:  October 31, 2017 You last received care in the:  [address] [place] [address]    You were discharged on:  November 3, 2017        Reason for your hospital stay       You were admitted after presenting with abdominal pain, abdominal fullness and yellowing of the skin (jaundice). CT imaging showed a mass on the head of the pancreas, along with associated compression of the liver biliary system. Your bilirubin was elevated due to this. You were admitted to the hospital. You were seen by GI and heme/onc. An ERCP was performed in which the GI doctors placed stents to relieve the biliary obstruction. A biopsy of the mass was taken; the final pathology was pending at the time of discharge. You tolerated the procedure with no issues and your diet was slowly advanced. You were discharged to home with close follow-ups with GI and heme/onc.                  Who to Call     For medical emergencies, please call [phone].  For non-urgent questions about your medical care, please call your primary care provider or clinic, [phone]  For questions related to your surgery, please call your surgery clinic         Attending Provider     Provider Specialty    Alfredo Rivera MD Emergency Medicine    Heraclio Duffy MD Internal Medicine    Yuki Hall DO Internal Medicine       Primary Care Provider Office Phone # Fax #    Neri JURADO MD Brandan [phone]       When to contact your care team       Worsening abdominal pain or intractable nausea/vomiting.   Worsening yellowing of the skin.                  After Care Instructions     Activity       Your activity upon discharge: As tolerated            Diet       Follow this diet upon discharge: Push foods high in protein. Otherwise you have no diet restrictions.            Discharge Instructions       When you go home:  -Continue using the hydroxyzine 1-2 tabs (25-50 mg) every 6 hours as needed for itching. Also use the Sarna ointment.  -For nausea, use 4 mg Zofran every 6 hours as needed.  -For pain, hold off on NSAIDs (Advil, ibuprofen, Naproxen, Aleve) for the next week (typically hold after ERCP). You may use up to 2 g of tylenol per day (1 tablet is typically 325 mg). Recommend 650 mg every 8 hours as needed. If you have breakthrough worsening pain, you may use tramadol 25-50 mg. This may make you sleepy, so do not drive while on this medication.                  Follow-up Appointments     Adult [facility] Follow-up and recommended labs and tests       -Follow up with heme/onc to discuss pathology results and next steps.  -Follow up with GI for further evaluation of your biliary system.  -Follow up with your PCP sometime early next week for hospital follow-up and to have repeat LFTs drawn.     Appointments on [place] and/or [facility] (with [facility] or [facility] provider or service). Call [phone] if you haven't heard regarding these appointments within 7 days of discharge.            Follow Up and recommended labs and tests       Repeat LFT's early next week.                  Your next 10 appointments already scheduled     Nov 10, 2017  1:35 PM  "CST   (Arrive by 1:20 PM)   Return Visit with Brenton Rankin MD   [facility] ([facility])    [address] [facility] [id number] [phone]            Dec 13, 2017  2:30 PM CST   (Arrive by 2:15 PM)   New Patient Visit with Jacob Noel PA-C   [facility] ([facility])    [address] [facility] [id number] [phone]              Pending Results     Date and Time Order Name Status Description    11/2/2017 0101 Cancer antigen 19-9 In process             Statement of Approval     Ordered          11/03/17 1046  I have reviewed and agree with all the recommendations and orders detailed in this document.  EFFECTIVE NOW     Approved and electronically signed by:  Yuik Hall DO             Admission Information     Date & Time Provider Department Dept. Phone    10/31/2017 Yuki Hall DO [address] [facility] [place] [phone]      Your Vitals Were     Blood Pressure Pulse Temperature Respirations Height Weight    132/69 (BP Location: Right arm) 89 97.9  F (36.6  C) (Oral) 18 1.6 m (5' 3\") 52 kg (114 lb 11.2 oz)    Pulse Oximetry BMI (Body Mass Index)                93% 20.32 kg/m2          MyChart Information     [employer] gives you secure access to your electronic health record. If you see a primary care provider, you can also send messages to your care team and make appointments. If you have questions, please call your primary care clinic.  If you do not have a primary care provider, please call [phone] and they will assist you.        Care EveryWhere ID     This is your Care EveryWhere ID. This could be used by other organizations to access your [place] medical records  [id number]        Equal Access to Services     NESSA NICHOLS AH: Shaun Martinez, merle gar, lisa bradford. So wajaniya " [phone].    ATENCIÓN: Si shahbaz alfaro, tiene a nava disposición servicios gratuitos de asistencia lingüística. Indu humphries [phone].    We comply with applicable federal civil rights laws and [place] laws. We do not discriminate on the basis of race, color, national origin, age, disability, sex, sexual orientation, or gender identity.               Review of your medicines      START taking        Dose / Directions    camphor-menthol 0.5-0.5 % Lotn   Commonly known as:  DERMASARRA        Dose:  1 mL   Apply 1 mL topically every 6 hours as needed for skin care   Quantity:  59 mL   Refills:  0       hydrOXYzine 25 MG tablet   Commonly known as:  ATARAX        Dose:  25-50 mg   Take 1-2 tablets (25-50 mg) by mouth every 6 hours as needed for itching (adjuvant pain)   Quantity:  90 tablet   Refills:  0       ondansetron 4 MG tablet   Commonly known as:  ZOFRAN        Dose:  4 mg   Take 1 tablet (4 mg) by mouth every 6 hours as needed for nausea   Quantity:  60 tablet   Refills:  0       traMADol 50 MG tablet   Commonly known as:  ULTRAM        Dose:  25-50 mg   Take 0.5-1 tablets (25-50 mg) by mouth every 6 hours as needed for breakthrough pain   Quantity:  20 tablet   Refills:  0         CONTINUE these medicines which have NOT CHANGED        Dose / Directions    CALCIUM PO        Form/strength unknown. Powder formulation. Add to water and fruits/vegetables daily to promote bone health.   Refills:  0       cholecalciferol 1000 UNIT tablet   Commonly known as:  vitamin D3        Dose:  1 tablet   Take 1 tablet by mouth 2 times daily   Refills:  0       cyanocolbalamin 500 MCG tablet   Commonly known as:  vitamin  B-12        Dose:  500 mcg   Take 500 mcg by mouth daily.   Refills:  0       denosumab 60 MG/ML Soln injection   Commonly known as:  PROLIA   Used for:  Senile osteoporosis        Dose:  60 mg   Inject 1 mL (60 mg) Subcutaneous every 6 months Inject subcutaneously in upper arm, upper thigh or abdomen    Quantity:  1 mL   Refills:  1       folic acid 400 MCG tablet   Commonly known as:  FOLVITE        Dose:  1 tablet   Take 1 tablet by mouth daily.   Refills:  0       levothyroxine 37.5 mcg Tabs half-tab   Commonly known as:  SYNTHROID/LEVOTHROID        Dose:  37.5 mcg   Take 37.5 mcg by mouth every morning (before breakfast)   Refills:  0       potassium 99 MG Tabs        Dose:  1 tablet   Take 1 tablet by mouth 2 times daily   Refills:  0       timolol maleate 0.5 % opthalmic solution   Commonly known as:  ISTALOL        Dose:  1 drop   Place 1 drop into both eyes every morning Before placing contact lenses   Refills:  0       triamterene-hydrochlorothiazide 37.5-25 MG per tablet   Commonly known as:  MAXZIDE-25        Dose:  1 tablet   Take 1 tablet by mouth daily   Refills:  0       zinc 50 MG Tabs        Dose:  1 tablet   Take 1 tablet by mouth daily   Refills:  0            Where to get your medicines      These medications were sent to [address] [address]     Phone:  [phone]     camphor-menthol 0.5-0.5 % Lotn    hydrOXYzine 25 MG tablet    ondansetron 4 MG tablet         Some of these will need a paper prescription and others can be bought over the counter. Ask your nurse if you have questions.     Bring a paper prescription for each of these medications     traMADol 50 MG tablet                Protect others around you: Learn how to safely use, store and throw away your medicines at www.disposemymeds.org.             Medication List: This is a list of all your medications and when to take them. Check marks below indicate your daily home schedule. Keep this list as a reference.      Medications           Morning Afternoon Evening Bedtime As Needed    CALCIUM PO   Form/strength unknown. Powder formulation. Add to water and fruits/vegetables daily to promote bone health.                                 camphor-menthol 0.5-0.5 % Lotn   Commonly known as:  DERMASARRA   Apply 1 mL topically every 6 hours as needed for skin care                                cholecalciferol 1000 UNIT tablet   Commonly known as:  vitamin D3   Take 1 tablet by mouth 2 times daily   Last time this was given:  1,000 Units on 11/3/2017  8:21 AM                                cyanocolbalamin 500 MCG tablet   Commonly known as:  vitamin  B-12   Take 500 mcg by mouth daily.   Last time this was given:  500 mcg on 11/3/2017  8:21 AM                                denosumab 60 MG/ML Soln injection   Commonly known as:  PROLIA   Inject 1 mL (60 mg) Subcutaneous every 6 months Inject subcutaneously in upper arm, upper thigh or abdomen                                folic acid 400 MCG tablet   Commonly known as:  FOLVITE   Take 1 tablet by mouth daily.   Last time this was given:  400 mcg on 11/3/2017  8:21 AM                                hydrOXYzine 25 MG tablet   Commonly known as:  ATARAX   Take 1-2 tablets (25-50 mg) by mouth every 6 hours as needed for itching (adjuvant pain)   Last time this was given:  25 mg on 11/3/2017  6:25 AM                                levothyroxine 37.5 mcg Tabs half-tab   Commonly known as:  SYNTHROID/LEVOTHROID   Take 37.5 mcg by mouth every morning (before breakfast)   Last time this was given:  37.5 mcg on 11/3/2017  8:21 AM                                ondansetron 4 MG tablet   Commonly known as:  ZOFRAN   Take 1 tablet (4 mg) by mouth every 6 hours as needed for nausea                                potassium 99 MG Tabs   Take 1 tablet by mouth 2 times daily                                timolol maleate 0.5 % opthalmic solution   Commonly known as:  ISTALOL   Place 1 drop into both eyes every morning Before placing contact lenses   Last time this was given:  1 drop on 11/3/2017  8:23 AM                                traMADol 50 MG tablet   Commonly known as:  ULTRAM   Take 0.5-1 tablets (25-50 mg) by  mouth every 6 hours as needed for breakthrough pain   Last time this was given:  25 mg on 11/3/2017 10:25 AM                                triamterene-hydrochlorothiazide 37.5-25 MG per tablet   Commonly known as:  MAXZIDE-25   Take 1 tablet by mouth daily   Last time this was given:  1 tablet on 11/3/2017  8:21 AM                                zinc 50 MG Tabs   Take 1 tablet by mouth daily

## 2017-11-01 ENCOUNTER — ANESTHESIA EVENT (OUTPATIENT)
Dept: SURGERY | Facility: CLINIC | Age: 77
DRG: 435 | End: 2017-11-01
Payer: MEDICARE

## 2017-11-01 LAB
MAGNESIUM SERPL-MCNC: 1.8 MG/DL (ref 1.6–2.3)
PHOSPHATE SERPL-MCNC: 2.3 MG/DL (ref 2.5–4.5)
POTASSIUM SERPL-SCNC: 3.7 MMOL/L (ref 3.4–5.3)

## 2017-11-01 PROCEDURE — 84100 ASSAY OF PHOSPHORUS: CPT | Performed by: PEDIATRICS

## 2017-11-01 PROCEDURE — 25000132 ZZH RX MED GY IP 250 OP 250 PS 637: Mod: GY | Performed by: STUDENT IN AN ORGANIZED HEALTH CARE EDUCATION/TRAINING PROGRAM

## 2017-11-01 PROCEDURE — 84132 ASSAY OF SERUM POTASSIUM: CPT | Performed by: PEDIATRICS

## 2017-11-01 PROCEDURE — 25000128 H RX IP 250 OP 636: Performed by: INTERNAL MEDICINE

## 2017-11-01 PROCEDURE — 25000125 ZZHC RX 250: Performed by: INTERNAL MEDICINE

## 2017-11-01 PROCEDURE — 25000128 H RX IP 250 OP 636: Performed by: STUDENT IN AN ORGANIZED HEALTH CARE EDUCATION/TRAINING PROGRAM

## 2017-11-01 PROCEDURE — A9270 NON-COVERED ITEM OR SERVICE: HCPCS | Mod: GY | Performed by: STUDENT IN AN ORGANIZED HEALTH CARE EDUCATION/TRAINING PROGRAM

## 2017-11-01 PROCEDURE — 12000008 ZZH R&B INTERMEDIATE UMMC

## 2017-11-01 PROCEDURE — 83735 ASSAY OF MAGNESIUM: CPT | Performed by: PEDIATRICS

## 2017-11-01 PROCEDURE — 99233 SBSQ HOSP IP/OBS HIGH 50: CPT | Mod: GC | Performed by: INTERNAL MEDICINE

## 2017-11-01 PROCEDURE — 36415 COLL VENOUS BLD VENIPUNCTURE: CPT | Performed by: PEDIATRICS

## 2017-11-01 RX ORDER — SODIUM CHLORIDE, SODIUM LACTATE, POTASSIUM CHLORIDE, CALCIUM CHLORIDE 600; 310; 30; 20 MG/100ML; MG/100ML; MG/100ML; MG/100ML
INJECTION, SOLUTION INTRAVENOUS CONTINUOUS
Status: DISCONTINUED | OUTPATIENT
Start: 2017-11-01 | End: 2017-11-02

## 2017-11-01 RX ADMIN — TIMOLOL MALEATE 1 DROP: 6.8 SOLUTION/ DROPS OPHTHALMIC at 09:27

## 2017-11-01 RX ADMIN — SODIUM CHLORIDE, POTASSIUM CHLORIDE, SODIUM LACTATE AND CALCIUM CHLORIDE 1000 ML: 600; 310; 30; 20 INJECTION, SOLUTION INTRAVENOUS at 06:53

## 2017-11-01 RX ADMIN — SODIUM CHLORIDE, POTASSIUM CHLORIDE, SODIUM LACTATE AND CALCIUM CHLORIDE 1000 ML: 600; 310; 30; 20 INJECTION, SOLUTION INTRAVENOUS at 12:37

## 2017-11-01 RX ADMIN — FOLIC ACID TAB 400 MCG 400 MCG: 400 TAB at 08:32

## 2017-11-01 RX ADMIN — Medication 10 MEQ: at 02:30

## 2017-11-01 RX ADMIN — Medication 10 MEQ: at 04:07

## 2017-11-01 RX ADMIN — Medication 10 MEQ: at 00:56

## 2017-11-01 RX ADMIN — ZINC SULFATE CAP 220 MG (50 MG ELEMENTAL ZN) 220 MG: 220 (50 ZN) CAP at 08:32

## 2017-11-01 RX ADMIN — TRIAMTERENE AND HYDROCHLOROTHIAZIDE 1 TABLET: 37.5; 25 TABLET ORAL at 08:32

## 2017-11-01 RX ADMIN — Medication 10 MEQ: at 05:19

## 2017-11-01 RX ADMIN — Medication 0.2 MG: at 13:33

## 2017-11-01 RX ADMIN — Medication 37.5 MCG: at 08:32

## 2017-11-01 RX ADMIN — Medication 0.2 MG: at 18:51

## 2017-11-01 RX ADMIN — Medication 500 MCG: at 08:32

## 2017-11-01 RX ADMIN — DOCUSATE SODIUM 100 MG: 100 CAPSULE, LIQUID FILLED ORAL at 08:32

## 2017-11-01 RX ADMIN — VITAMIN D, TAB 1000IU (100/BT) 1000 UNITS: 25 TAB at 08:32

## 2017-11-01 RX ADMIN — Medication 0.2 MG: at 02:35

## 2017-11-01 RX ADMIN — POTASSIUM PHOSPHATE, MONOBASIC AND POTASSIUM PHOSPHATE, DIBASIC 15 MMOL: 224; 236 INJECTION, SOLUTION INTRAVENOUS at 12:31

## 2017-11-01 RX ADMIN — DOCUSATE SODIUM 100 MG: 100 CAPSULE, LIQUID FILLED ORAL at 20:01

## 2017-11-01 ASSESSMENT — ENCOUNTER SYMPTOMS
NAUSEA: 1
EYE REDNESS: 0
BACK PAIN: 0
VOMITING: 0
COLOR CHANGE: 1
BRUISES/BLEEDS EASILY: 0
SINUS PRESSURE: 0
DYSPHORIC MOOD: 0
HEADACHES: 0
APPETITE CHANGE: 1
CONFUSION: 0
NECK STIFFNESS: 0
ACTIVITY CHANGE: 1
ARTHRALGIAS: 0
WEAKNESS: 1
FATIGUE: 0
SEIZURES: 0
DECREASED CONCENTRATION: 0
BLOOD IN STOOL: 0
FLANK PAIN: 0
WHEEZING: 0
NUMBNESS: 0
DIFFICULTY URINATING: 0

## 2017-11-01 ASSESSMENT — ACTIVITIES OF DAILY LIVING (ADL)
RETIRED_EATING: 0-->INDEPENDENT
DRESS: 0-->INDEPENDENT
SWALLOWING: 0-->SWALLOWS FOODS/LIQUIDS WITHOUT DIFFICULTY
RETIRED_COMMUNICATION: 0-->UNDERSTANDS/COMMUNICATES WITHOUT DIFFICULTY
TOILETING: 0-->INDEPENDENT
BATHING: 0-->INDEPENDENT
COGNITION: 0 - NO COGNITION ISSUES REPORTED
NUMBER_OF_TIMES_PATIENT_HAS_FALLEN_WITHIN_LAST_SIX_MONTHS: 1
AMBULATION: 0-->INDEPENDENT
TRANSFERRING: 0-->INDEPENDENT
FALL_HISTORY_WITHIN_LAST_SIX_MONTHS: YES

## 2017-11-01 ASSESSMENT — PAIN DESCRIPTION - DESCRIPTORS
DESCRIPTORS: ACHING;DISCOMFORT
DESCRIPTORS: ACHING
DESCRIPTORS: ACHING
DESCRIPTORS: DISCOMFORT
DESCRIPTORS: SORE

## 2017-11-01 NOTE — PLAN OF CARE
Problem: Patient Care Overview  Goal: Plan of Care/Patient Progress Review  Outcome: No Change  AVSS, on room air. Alert and oriented x4. Pt reports abdominal soreness/discomort,  IV Dilaudid x1. Regular diet, tolerating well, denies nausea. PIV infusing MIVF. Phos replacement completed, recheck with morning labs. Voiding spont, adequate UOP, Passing flatus, formed BM x1 today. UAL, steady on feet. Jaundiced. Emotional support provided regarding probable new diagnosis of pancreatic cancer. Pt's sons are on their way to [place] (one from [place] and one from [place]). PLAN: NPO at midnight for EUS/ERCP tomorrow morning for biliary stenting and biopsies, plan to meet with oncology team tomorrow. Continue with POC.

## 2017-11-01 NOTE — PROGRESS NOTES
[facility], [place]   Internal Medicine Daily Note     Interval History  Patient reported that she slept better last night than she had in a while. She is feeling physically well today. She was tearful when talking about her condition and what she has learned since coming to hospital. She first noticed mild abdominal pain a week and a half ago. She had a couple days of constipation and then had 1 chalky white BM and since has had fatty BMs that float. Her urine has turned brown in the last couple days. She has had no changes in urination frequency or urgency. Her  has memory trouble and she is the primary caregiver and takes care of the household. She works part-time teaching cooking and doing cooking demos.    Review of Systems  No chest pain. No SOB. No abdominal pain. No rashes or skin lesions.       Medical Student Note Resident Note   Assessment and Plan (Student)    Assessment:  Iris Lewis is a 77-year-old female with past medical history of thyroid cancer, Nisson fundoplication, HTN, and osteoporosis who presented with a week-long history of abdominal pain, new-onset jaundice, and a pancreatic mass concerning for malignancy with possible metastases.    Plan:  # pancreatic mass on uncinate process  # hyperbilirubinemia  # hepatic lesions  # pulmonary nodules  - GI has been consulted, scheduled for EUS/ERCP tomorrow for stent placement and mass biopsy    # hypothyroidism s/p thyroid cancer  - continue PTA levothyroxine     # htn  - continue PTA Maxzide   Assessment and Plan (Resident)    Assessment:  Iris Lewis is a pleasant 77-year-old F w/history of thyroid cancer, GERD w/Nissen fundoplication, HTN and osteoporosis who presented with 1-week of progressively worsening abdominal pain and jaundice, and was found on imaging to have a pancreatic mass with associated biliary obstruction.     Pancreatic mass w/biliary obstruction  Metastases to the liver and  "lungs  Presenting w/one week of progressively worsening post-prandial epigastric pain, abdominal fullness and jaundice. Initial evaluation with marked hyperbilirubinemia (12.9) and CT imaging showing 3.6 cm pancreatic mass w/duodenal invasion, liver mets and pulmonary nodules concerning for lung mets. Overall picture concerning for pancreatic adenocarcinoma. No signs/symptoms of associated biliary infection at this time, though given obstructive picture will require endoscopic stenting. Patient is aware of likely malignancy, tearful today on exam.  -GI consultation, appreciate recs  -NPO at midnight, likely EUS/ERCP in AM  -Trending LFTs,  pending  -Anti-emetics and pain control    Hypothyroidism, s/p thyroid cancer  TSH 1.7, T4 1.38. No signs/symptoms of hypothyroidism.  -Continue PTA levothyroxine.    Hypertension  BPs fine here.   -Continue PTA Maxzide    FEN: Regular diet, NPO at midnight  Ppx: PCDs, ambulate  Code status: Full    Physical Exam (Student)  /63 (BP Location: Right arm)  Pulse 81  Temp 98  F (36.7  C) (Oral)  Resp 16  Ht 1.6 m (5' 3\")  Wt 52 kg (114 lb 11.2 oz)  SpO2 95%  Breastfeeding? No  BMI 20.32 kg/m2    Gen: Pleasant. Awake, sitting up knitting.  Cardio: RRR. Normal S1 and S2. No murmurs. Bilateral radial pulses equal and strong.  Resp: Breathing comfortably on room air. Lungs fields clear to auscultation bilaterally.  GI: Nondistended, nontender.  Heme/lymph: No palpable cervical or supraclavicular nodes.    Physical Exam (Resident)  /60 (BP Location: Left arm)  Pulse 81  Temp 98.1  F (36.7  C) (Oral)  Resp 16  Ht 1.6 m (5' 3\")  Wt 52 kg (114 lb 11.2 oz)  SpO2 95%  Breastfeeding? No  BMI 20.32 kg/m2  Gen: Pleasant woman sitting upright comfortably in bed. NAD. Jaundiced.  at bedside.  Cardio: RRR. Normal S1 and S2. No murmurs. Bilateral radial pulses equal and strong.  Resp: Breathing comfortably on room air. Lungs fields clear to auscultation " bilaterally.  GI: Nondistended, nontender.     I have reviewed today's vital signs, medications, labs and imaging.     Geovanna Olivas, MS3  Lucretia 2  Pager: [phone] I have reviewed today's vital signs, medications, labs and imaging.     Lluvia Betancourt MD  Internal Medicine/Pediatrics PGY4  [phone]     Data:  Medications  Dilaudid 0.2mg IV q4h PRN  Levothyroxine 37.5 mcg qD  Maxzide 37.5-25 mg qD  Vitamin D 1000 Units qD  Vitamin B12 500 mcg qD  Zinc sulfate 220 mg qD    Lab  CBC: wbc 9.1 hgb 11.9 plt 396  BMP: Na 135 K 3.2 Cl 99 CO2 27 BUN 12 Cr 0.67  LFTs: T-bili 12.9   Alk phos 975  Lipase 75281  INR 0.93 PTT 26  UA: notable for bilirubin    Imaging  CT Abdomen and Pelvis:  IMPRESSION:   1. 3.6 cm poorly defined hypoattenuating mass centered in the uncinate  process of the pancreas most consistent with pancreatic  adenocarcinoma.   a.  Local invasion into the third portion of the duodenum.  b. No vascular occlusion or thrombosis. There is abutment of several  vessels including the aorta, the gastroduodenal artery, the superior  mesenteric artery and possibly slight abutment of the superior  mesenteric vein.  c. Relatively abrupt cut off the distal common bile duct, presumably  related to adjacent pancreatic head mass, with moderate upstream  intrahepatic and extrahepatic biliary dilatation.  d. Mild dilatation of the main pancreatic duct up to 4 mm with  tapering in the pancreatic head region of infiltrative mass.  2. Multiple mildly enlarged peripancreatic/retroperitoneal lymph  nodes, including necrotic appearing 1.1 cm lymph node adjacent to the  pancreatic mass highly concerning for metastatic lymph node  involvement.  3. At least 3 small hypoattenuating liver lesions which were not  present on 9/10/2014, suspicious for metastatic disease.  4. Small indeterminate pulmonary nodules in the visualized lung bases,  some of which are new since 9/10/2014.       Attestation:  The patient was seen  and discussed with the attending physician, Dr. Hall.

## 2017-11-01 NOTE — PLAN OF CARE
Problem: Patient Care Overview  Goal: Plan of Care/Patient Progress Review  Outcome: Improving  Pt arrived to 7C at 1850, admitted through ED. Admitted for abdominal pain and jaundice. Liver enzymes elevated, CT shows 2 liver lesions and a pancreatic mass. Pt oriented to room and to call light. Belongings at bedside.      AVSS, on room air. Alert and oriented. Pt reports abdominal soreness, declines need for pain medication at this time. NPO. Denies nausea. PIV infusing MIVF. Voiding spont, adequate UOP, UA/UC sent. Passing flatus, last BM yesterday. UAL, steady on feet. Emotional support provided. Continue with POC.

## 2017-11-01 NOTE — PLAN OF CARE
Problem: Patient Care Overview  Goal: Plan of Care/Patient Progress Review  Outcome: No Change  VSS.  UAL.  Pain relieved with IV dilaudid x1 tonight.  Passing flatus, stool 2 days ago.  Emotional over new diagnosis of probably stage 4 pancreatic cancer.  Should see oncology team today.  Worried about care of her  who as significant memory loss issues.  Provided emotional support.  Potassium replaced.  Redraw with am labs. Continue to provide support and information regarding new diagnosis and treatment plan as information becomes available.

## 2017-11-01 NOTE — ANESTHESIA PREPROCEDURE EVALUATION
Anesthesia Evaluation     . Pt has had prior anesthetic.     No history of anesthetic complications          ROS/MED HX    ENT/Pulmonary: Comment: Hx of thyroid CA s/p thyroidectomy. She denies hx of asthma or use of inhalers    (+)other ENT- , . .    Neurologic:  - neg neurologic ROS     Cardiovascular:     (+) hypertension----. : . . . :. . Previous cardiac testing Echodate:8/14/17results:Global and regional left ventricular function is normal with an EF of 55-60%.  Global right ventricular function is normal.  PASP is 34 mm Hg, assuming a RAP of 3 mm Hg.  The inferior vena cava was normal in size with preserved respiratory  variability.  No pericardial effusion is present.  Compared to study in 12/31/2014, there are no significant changes.date: results:ECG reviewed date:8/8/17 results:Sinus rhythm  Normal ECG  When compared with ECG of 24-OCT-2016 14:43,  No significant change was found   date: results:          METS/Exercise Tolerance:  >4 METS   Hematologic:  - neg hematologic  ROS       Musculoskeletal: Comment: Scoliosis        GI/Hepatic: Comment: S/p Nissen    (+) GERD Asymptomatic on medication,       Renal/Genitourinary:         Endo: Comment: Previous thyroid cancer and surgical  hypothyroidism     (+) thyroid problem  Thyroid disease - Other, .      Psychiatric:  - neg psychiatric ROS       Infectious Disease:         Malignancy:   (+) Malignancy History of Other  Other CA Remission status post Surgery         Other:    (+) no H/O Chronic Pain,                 Procedure: Procedure(s):  Upper Endoscopic Ultrasound, Endoscopic Retrograde Cholangiopancreatogram  - Wound Class:    - Wound Class:     HPI: Iris Lewis is a 77 year old female who presents with new onset jaundice and abdominal pain for the above procedure with Dr. Bailey.     PMHx/PSHx:  Past Medical History:   Diagnosis Date     Arthritis      Colon polyp 2009,2015    no polyps - f/u in 5 yrs      Esophageal reflux      Glaucoma       Hypertension      Osteoporosis      Postsurgical hypothyroidism      Scoliosis (and kyphoscoliosis), idiopathic      Thyroid cancer (H)     papillary carcinoma age 33     Uncomplicated asthma        Past Surgical History:   Procedure Laterality Date     ARTHRODESIS FOOT Right 11/9/2016    Procedure: ARTHRODESIS FOOT;  Surgeon: Janes Mcelroy MD;  Location:  OR     C STOMACH SURGERY PROCEDURE UNLISTED       COLONOSCOPY   2009, 3/2015    no polyps in 2015 told to return 10 yrs.      ESOPHAGOSCOPY, GASTROSCOPY, DUODENOSCOPY (EGD), COMBINED  2/17/2012    Procedure:COMBINED ESOPHAGOSCOPY, GASTROSCOPY, DUODENOSCOPY (EGD); COMBINED ESOPHAGOSCOPY, GASTROSCOPY, DUODENOSCOPY POSSIBLE DILATION; Surgeon:NICHOLE MCCLAIN; Location:[facility] OR     FOOT SURGERY  2008    hammertoe left     LAPAROSCOPIC CHOLECYSTECTOMY N/A 1/5/2015    Procedure: LAPAROSCOPIC CHOLECYSTECTOMY;  Surgeon: Cruz Pearson MD;  Location:  OR     NISSEN FUNDOPLICATION       ORTHOPEDIC SURGERY  2009    left hammertoe      REPAIR HAMMER TOE Right 11/9/2016    Procedure: REPAIR HAMMER TOE;  Surgeon: Janes Mcelroy MD;  Location:  OR     SALPINGO OOPHORECTOMY,R/L/SERENA  1974    left, for tubal pregnancy     THYROIDECTOMY      for thyroid cancer         No current facility-administered medications on file prior to encounter.   Current Outpatient Prescriptions on File Prior to Encounter:  potassium 99 MG TABS Take 1 tablet by mouth 2 times daily    timolol maleate (ISTALOL) 0.5 % opthalmic solution Place 1 drop into both eyes every morning Before placing contact lenses   cyanocolbalamin (VITAMIN B-12) 500 MCG tablet Take 500 mcg by mouth daily.   cholecalciferol (VITAMIN D) 1000 UNIT tablet Take 1 tablet by mouth 2 times daily    zinc 50 MG TABS Take 1 tablet by mouth daily    denosumab (PROLIA) 60 MG/ML SOLN injection Inject 1 mL (60 mg) Subcutaneous every 6 months Inject subcutaneously in upper arm, upper thigh or abdomen   folic  acid (FOLVITE) 400 MCG tablet Take 1 tablet by mouth daily.       Social Hx:   Social History   Substance Use Topics     Smoking status: Former Smoker     Years: 5.00     Types: Cigarettes     Start date: 9/15/1959     Quit date: 1/28/1983     Smokeless tobacco: Never Used     Alcohol use Yes      Comment: wine 1-2 glasses/day       Allergies:   Allergies   Allergen Reactions     Nkda [No Known Drug Allergies]          NPO Status: Per ASA Guidelines    Labs:    Blood Bank:  Lab Results   Component Value Date    ABO A 10/30/2006    RH  Neg 10/30/2006    AS Neg 10/30/2006     BMP:  Recent Labs   Lab Test  11/01/17   0727  10/31/17   1722   NA   --   135   POTASSIUM  3.7  3.2*   CHLORIDE   --   99   CO2   --   27   BUN   --   12   CR   --   0.67   GLC   --   114*   EMMY   --   9.3     CBC:   Recent Labs   Lab Test  10/31/17   1722   WBC  9.1   RBC  3.75*   HGB  11.9   HCT  34.8*   MCV  93   MCH  31.7   MCHC  34.2   RDW  15.5*   PLT  396     Coags:  Recent Labs   Lab Test  10/31/17   1722   INR  0.93   PTT  26           Physical Exam  Normal systems: pulmonary and dental    Airway   Mallampati: II  TM distance: >3 FB  Neck ROM: full    Dental     Cardiovascular   Rhythm and rate: regular and normal      Pulmonary                     Anesthesia Plan      History & Physical Review  History and physical reviewed and following examination; no interval change.    ASA Status:  3 .    NPO Status:  > 6 hours    Plan for General and ETT with Intravenous and Propofol induction. Maintenance will be Balanced.    PONV prophylaxis:  Ondansetron (or other 5HT-3) and Dexamethasone or Solumedrol       Postoperative Care  Postoperative pain management:  IV analgesics.      Consents  Anesthetic plan, risks, benefits and alternatives discussed with:  Patient..        - ASA 3  - GETA with standard ASA monitors, IV induction, balanced anesthetic  - PIV  - Antibiotics per surgery  - PONV prophylaxis  - Pain management with Fentanyl/dilaudid  miteshes    Kaya Stevens MD CA-1         History and physical assessed; Patient examined. I have reviewed and agree with this pre-op assessment and anesthetic plan with addendums as necessary.     Risks and alternatives presented and discussed. Patient and family agree. All questions answered.      Tank Hernandez MD  Staff Anesthesiologist  *[phone]

## 2017-11-01 NOTE — PROGRESS NOTES
SUBJECTIVE:    Pt is a 77 year old female with pmh of     Patient Active Problem List   Diagnosis     Postsurgical hypothyroidism     Malignant neoplasm of thyroid gland (H)     Essential hypertension, benign     Osteoporosis, post-menopausal     S/P Nissen fundoplication (without gastrostomy tube) procedure     HTN, goal below 150/90     Pain of toe of right foot     Advance care planning     Abdominal pain       who is here for evaluation of had concerns including Abdominal Pain.    Here w/ , both in room whole visit.  Pt reports normally somewhat loose stool long term, in last 1.5 weeks became constipated, and stool now very light, grey at least once, and now floats, didn't used to, with odd bubbly texture. This is accompanied by bilat upper abd pain, reduced appetite, and family has told her she looks yellow. She did have gall bladder out. Pain not into back. Not worse with  Eating, no n/v. Feels extra cold. No fever.     Past Medical History:   Diagnosis Date     Arthritis      Colon polyp 2009,2015    no polyps - f/u in 5 yrs      Esophageal reflux      Glaucoma      Hypertension      Osteoporosis      Postsurgical hypothyroidism      Scoliosis (and kyphoscoliosis), idiopathic      Thyroid cancer (H)     papillary carcinoma age 33     Uncomplicated asthma      Past Surgical History:   Procedure Laterality Date     ARTHRODESIS FOOT Right 11/9/2016    Procedure: ARTHRODESIS FOOT;  Surgeon: Janes Mcelroy MD;  Location: [facility] OR     C STOMACH SURGERY PROCEDURE UNLISTED       COLONOSCOPY   2009, 3/2015    no polyps in 2015 told to return 10 yrs.      ESOPHAGOSCOPY, GASTROSCOPY, DUODENOSCOPY (EGD), COMBINED  2/17/2012    Procedure:COMBINED ESOPHAGOSCOPY, GASTROSCOPY, DUODENOSCOPY (EGD); COMBINED ESOPHAGOSCOPY, GASTROSCOPY, DUODENOSCOPY POSSIBLE DILATION; Surgeon:NICHOLE MCCLAIN; Location[facility]     [facility]     LAPAROSCOPIC CHOLECYSTECTOMY N/A 1/5/2015    Procedure:  LAPAROSCOPIC CHOLECYSTECTOMY;  Surgeon: Cruz Pearson MD;  Location: [facility] OR     NISSEN FUNDOPLICATION       ORTHOPEDIC SURGERY  2009    left hammertoe      REPAIR HAMMER TOE Right 11/9/2016    Procedure: REPAIR HAMMER TOE;  Surgeon: Janes Mcelroy MD;  Location: [facility] OR     SALPINGO OOPHORECTOMY,R/L/SERENA  1974    left, for tubal pregnancy     THYROIDECTOMY      for thyroid cancer     Allergies   Allergen Reactions     Nkda [No Known Drug Allergies]            Current Outpatient Prescriptions   Medication Sig Dispense Refill     [DISCONTINUED] denosumab (PROLIA) 60 MG/ML SOLN Inject 1 mL (60 mg) Subcutaneous every 6 months Inject subcutaneously in upper arm, upper thigh or abdomen 1 mL 1       Social History   Substance Use Topics     Smoking status: Former Smoker     Years: 5.00     Types: Cigarettes     Start date: 9/15/1959     Quit date: 1/28/1983     Smokeless tobacco: Never Used     Alcohol use Yes      Comment: wine 1-2 glasses/day           OBJECTIVE:  /77  Pulse 71  Wt 52.2 kg (115 lb)  Breastfeeding? No  BMI 21.03 kg/m2  GENERAL APPEARANCE: Alert, no acute distress  EYES: PERRL, EOM normal, conjunctiva and lids normal  HENT: Ears and TMs normal, oral mucosa and posterior oropharynx normal  RESP: lungs clear to auscultation   CV: normal rate, regular rhythm, no murmur or gallop  ABDOMEN: soft, no organomegaly, masses or tenderness except mild upper abd diffuse tender, no rebound/guard, I could not palpate mass or HSM  SKIN: Skin and conjunctivae yellow  NEURO: Alert, oriented, speech and mentation normal  PSYCHE: mentation appears normal, affect and mood normal    ASSESSMENT/PLAN:    1.5 weeks new GI sx:  Reduced appetite  Upper abd pain  Change to light stool color and constipation        Labs, high bili, liver enzymes, lipase  CT: pancreas mass     I called her about 3:30 pm, told her results, and most likley cancer of pancreas. Given pain/lipase c/w pancreatitis, I asked her to go to  [facility], to get further eval and admission for this and GI consult for pancreas mass obstructing pancreas and liver ducts. I also called ER MD in advance of her arriving there.        TC PAREDES MD

## 2017-11-01 NOTE — H&P
[facility], [place]    Internal Medicine History and Physical - [facility] Service       Date of Admission:  10/31/2017    Chief Complaint   Abdominal pain    History is obtained from the patient    History of Present Illness   Iris Lewis is a 77 year old female with a PMH notable for thyroid cancer, Saul fundoplication, HTN and osteoporosis who presents from clinic for further evaluation of her abdominal pain. Patient states she was told by family she has become jaundiced over the past week. She also endorses a one week history of epigastric pain that she initially attributed to reflux until it continued to worsen and began to wake her from sleep. Pain is worse with food. One week ago she states she developed constipation for 2 days until she had a chalky white stool. She subsequently developed grey, gaseous stools which she continues to have. She denies any fevers, chills, chest pain, shortness of breath, syncopal episodes or urinary changes.     Upon arrival to the ED, the patient was found to have total bilirubin of 12.9, [place] [address], albumin 3.0, and potassium of 3.2. CT abdomen pelvis demonstrated 3.6 cm pancreatic mass concerning for malignancy with local invasion into the 3rd portion of the duodenum, 3 small liver lesions, small pulmonary nodulates on lung bases, and enlarged peripancreatic and retroperitoneal lymph nodes including necrotic appearing 1.1 cm LN near pancreatic mass, none of which had been seen on prior imaging.     Review of Systems   The 10 point Review of Systems is negative other than noted in the HPI or here.     Past Medical History    I have reviewed this patient's medical history and updated it with pertinent information if needed.   Past Medical History:   Diagnosis Date     Arthritis      Colon polyp 2009,2015    no polyps - f/u in 5 yrs      Esophageal reflux      Glaucoma      Hypertension      Osteoporosis      Postsurgical hypothyroidism       Scoliosis (and kyphoscoliosis), idiopathic      Thyroid cancer (H)     papillary carcinoma age 33     Uncomplicated asthma         Past Surgical History   I have reviewed this patient's surgical history and updated it with pertinent information if needed.  Past Surgical History:   Procedure Laterality Date     ARTHRODESIS FOOT Right 11/9/2016    Procedure: ARTHRODESIS FOOT;  Surgeon: Janes Mcelroy MD;  Location: [facility] OR     C STOMACH SURGERY PROCEDURE UNLISTED       COLONOSCOPY   2009, 3/2015    no polyps in 2015 told to return 10 yrs.      ESOPHAGOSCOPY, GASTROSCOPY, DUODENOSCOPY (EGD), COMBINED  2/17/2012    Procedure:COMBINED ESOPHAGOSCOPY, GASTROSCOPY, DUODENOSCOPY (EGD); COMBINED ESOPHAGOSCOPY, GASTROSCOPY, DUODENOSCOPY POSSIBLE DILATION; Surgeon:NICHOLE MCCLAIN; Location:[facility] OR     FOOT SURGERY  2008    hammertoe left     LAPAROSCOPIC CHOLECYSTECTOMY N/A 1/5/2015    Procedure: LAPAROSCOPIC CHOLECYSTECTOMY;  Surgeon: Cruz Pearson MD;  Location: [facility] OR     NISSEN FUNDOPLICATION       ORTHOPEDIC SURGERY  2009    left hammertoe      REPAIR HAMMER TOE Right 11/9/2016    Procedure: REPAIR HAMMER TOE;  Surgeon: Janes Mcelroy MD;  Location:  OR     SALPINGO OOPHORECTOMY,R/L/SERENA  1974    left, for tubal pregnancy     THYROIDECTOMY      for thyroid cancer        Social History   Social History   Substance Use Topics     Smoking status: Former Smoker     Years: 5.00     Types: Cigarettes     Start date: 9/15/1959     Quit date: 1/28/1983     Smokeless tobacco: Never Used     Alcohol use Yes      Comment: wine 1-2 glasses/day       Family History   I have reviewed this patient's family history and updated it with pertinent information if needed.   Family History   Problem Relation Age of Onset     C.A.D. Father      MI at age 65     Alzheimer Disease Mother      Depression Sister 80     Skin Cancer No family hx of      no skin cancer     Prostate Cancer Brother      Asthma Father       Asthma Sister      Depression Son      Depression Sister      OSTEOPOROSIS Mother      Coronary Artery Disease Father      Coronary Artery Disease Brother 67     Depression Son      Depression Sister        Prior to Admission Medications   Prior to Admission Medications   Prescriptions Last Dose Informant Patient Reported? Taking?   CALCIUM PO Past Week at Unknown time  Yes Yes   Sig: Form/strength unknown. Powder formulation. Add to water and fruits/vegetables daily to promote bone health.   cholecalciferol (VITAMIN D) 1000 UNIT tablet Past Week at Unknown time  Yes Yes   Sig: Take 1 tablet by mouth 2 times daily    cyanocolbalamin (VITAMIN B-12) 500 MCG tablet Past Week at Unknown time  Yes Yes   Sig: Take 500 mcg by mouth daily.   denosumab (PROLIA) 60 MG/ML SOLN injection   No No   Sig: Inject 1 mL (60 mg) Subcutaneous every 6 months Inject subcutaneously in upper arm, upper thigh or abdomen   folic acid (FOLVITE) 400 MCG tablet More than a month at Unknown time  Yes No   Sig: Take 1 tablet by mouth daily.   levothyroxine (SYNTHROID/LEVOTHROID) 37.5 mcg TABS half-tab 10/31/2017 at AM  Yes Yes   Sig: Take 37.5 mcg by mouth every morning (before breakfast)   potassium 99 MG TABS 10/31/2017 at AM  Yes Yes   Sig: Take 1 tablet by mouth 2 times daily    timolol maleate (ISTALOL) 0.5 % opthalmic solution 10/31/2017 at AM  Yes Yes   Sig: Place 1 drop into both eyes every morning Before placing contact lenses   triamterene-hydrochlorothiazide (MAXZIDE-25) 37.5-25 MG per tablet 10/31/2017 at AM  Yes Yes   Sig: Take 1 tablet by mouth daily   zinc 50 MG TABS More than a month at Unknown time  Yes No   Sig: Take 1 tablet by mouth daily       Facility-Administered Medications: None     Allergies   Allergies   Allergen Reactions     Nkda [No Known Drug Allergies]        Physical Exam   Vital Signs: Temp: 96.2  F (35.7  C) Temp src: Oral BP: 142/76 Pulse: 87   Resp: 16 SpO2: 95 % O2 Device: None (Room air)    Weight: 117 lbs  14.4 oz    General Appearance: NAD, resting comfortably  Eyes: scleral icterus, PERRL  HEENT: MMM, supple, no oral lesions  Respiratory: CTAB, no wheezes or rales  Cardiovascular: RRR, no gallops or rubs, no JVD appreciated  GI: Hypoactive bowel sounds, soft, nondistended, mild tenderness to palpation over epigastric region  Lymph/Hematologic: no cervical or supraclavicular LAD  Genitourinary: no bowser  Skin: Jaundiced, ecchymosis over medial left ankle  Musculoskeletal: No BLE edema  Neurologic: CN II-XII intact, no focal deficits  Psychiatric: mentation intact, a/o x4    Assessment & Plan   Iris Lewis is a 77 year old female with a PMH notable for thyroid cancer, Saul fundoplication, HTN and osteoporosis who presents with 1 week of abdominal pain, new onset jaundice, new pancreatic mass concerning for malignancy with possible metastases.     # 3.6 cm pancreatic mass of uncinate process  # Hyperbilirubinemia   # New onset jaundice  # New hepatic lesions x3   # New pulmonary nodules  One week history of jaundice and abdominal pain that originally presented as mild discomfort and has since progressed into frequent pain waking the patient from sleep. No prior history of jaundice, pancreatitis or hepatic dysfunction. Compared to prior 2014 CT, new findings are concerning for pancreatic malignancy with possible metastasis to LN, liver and lungs. Lipase 48212, ,  and Alk Phos 975. Spoke with GI fellow who will likely intervene tomorrow.   - GI consult placed  - Continue LR at 175cc/hr  - NPO at midnight  - CA 19-9    # Hypothyroidism, s/p thyroid cancer   TSH 1.7, T4 1.38  - Continue PTA  37.5 mcg levothyroxine    # HTN  - Continue PTA Maxzide 37.5-25mg    Diet: NPO for Medical/Clinical Reasons Except for: Meds, Ice Chips  Fluids: LR at 175 cc/hr   DVT Prophylaxis: Pneumatic Compression Devices  Code Status: DNI    Disposition Plan   Expected discharge: 4 - 7 days; recommended to prior living  arrangement once safe disposition plan/ TCU bed available.     Entered: Ferine Bridges 10/31/2017, 7:36 PM   Information in the above section will display in the discharge planner report.    The patient was discussed with Dr. Tati Bridges  [facility]   Pager: [phone]  Please see sticky note for cross cover information      Data   Data     Recent Labs  Lab 10/31/17  1722 10/31/17  1221   WBC 9.1 10.8   HGB 11.9 11.8   MCV 93 93    365   INR 0.93  --     134   POTASSIUM 3.2* 3.0*   CHLORIDE 99 98   CO2 27 26   BUN 12 11   CR 0.67 0.62   ANIONGAP 9 10   EMMY 9.3 8.9   * 120*   ALBUMIN 3.0* 3.1*   PROTTOTAL 7.5 7.5   BILITOTAL 12.9* 13.2*   ALKPHOS 975* 1005*   * 331*   * 284*   LIPASE 05239* 07809*     Recent Results (from the past 24 hour(s))   CT Abdomen Pelvis w Contrast    Narrative    EXAMINATION: CT ABDOMEN PELVIS W CONTRAST, 10/31/2017 12:52 PM    TECHNIQUE:  Helical CT images from the lung bases through the  symphysis pubis were obtained with IV contrast. Contrast dose: 70  mL  [id number]    COMPARISON: 9/10/2014    HISTORY: 1.5 weeks new upper abd pain, grey stool, and jaundice    FINDINGS:    Abdomen and pelvis:     Pancreas: Poorly defined hypoattenuating mass centered on the uncinate  process of the pancreas (series 8 image 34, series 6 image 29)  measuring  up to 3.6 cm in greatest axial diameter, and up to 3.3 x  3.8 cm in the coronal plane. There is invasion into the third portion  of the duodenum, series 7, image 153. There is mild upstream  dilatation of the main pancreatic duct up to 4 mm in diameter, with  mild narrowing at the the major papilla. The common bile duct is  dilated up to 1.1 cm in diameter, with relatively abrupt termination  just upstream to the major papilla. There is moderate intrahepatic  biliary dilatation. The remainder of the pancreatic parenchyma is  grossly unremarkable.    Vascular  involvement:    Aorta: Abutment of the aorta (series 7 image 139).  Inferior vena cava: Near above of the inferior vena cava,  by  a few millimeters.   Celiac axis: No evidence of involvement.  Hepatic artery: No evidence of involvement of the common, proper,  right or left hepatic arteries. There is abutment of the  gastroduodenal artery a few centimeters distal to its origin (series 5  image 125).  Superior mesenteric artery: Abutment of the superior mesenteric artery  several centimeters distal to its origin (series 5 image 134) without  evidence of narrowing.  Superior mesenteric vein: There is perhaps slight abutment of the  proximal superior mesenteric vein (series 7 image 119).  Portal vein: No evidence of involvement.  Splenic artery and vein: No evidence of involvement.    Vascular anatomy: There is no replaced hepatic artery.  No other  vascular anomalies.    Lymph node evaluation: Several mildly enlarged  peripancreatic/retroperitoneal lymph nodes worrisome for metastatic  disease including 1.1 cm short axis diameter necrotic appearing lymph  node immediately adjacent to the pancreatic mass (series 7 image 127),  1.0 cm short axis diameter periaortic lymph node (series 7 image 100),  0.9 cm short axis diameter right para-aortic lymph node (series 7  image 68), 1.0 cm short axis diameter right para-aortic lymph node  (series 7 image 80)    Peritoneum: No ascites. Mild mesenteric edema without discrete  carcinomatosis/peritoneal nodules.    Liver: 0.8 x 0.7 cm hypoattenuating lesion in hepatic segment 4  (series 7 image 123) and 0.9 x 0.8 cm hypoattenuating lesion at the  junction of hepatic segments 8, 5 and 4 (series 7 image 96), and 1.0 x  0.7 cm lesion in hepatic segment 5/6 (series 7 image 129) are new  since 9/10/2014.    Remainder of the abdomen and pelvis: Cholecystectomy. The spleen,  adrenals and kidneys are within normal limits. Bladder is  unremarkable. Normal postmenopausal appearance  of the reproductive  organs. The colon and small bowel are within normal limits.  Postoperative changes of Nissen fundoplication. Aorta is normal in  caliber.    Lung bases: 5 x 3 mm nodule in the left lower lobe (series 11 image 1)  and 4 mm right lower lobe nodule just above the right hemidiaphragm  (series 11 image 40) appear to be new since 9/10/2014. 2 mm subpleural  right middle lobe nodule is not significantly changed from 9/10/2014.  Mild bronchial wall thickening and atelectasis.    Bones and soft tissues: No suspicious lesion in the visualized  skeleton. Lucency within the L1 vertebral body (series 8 image 64) is  not significantly changed from 9/10/2014, probably a benign vertebral  body hemangioma. The bones appear demineralized with degenerative  change and scoliosis.      Impression    IMPRESSION:   1. 3.6 cm poorly defined hypoattenuating mass centered in the uncinate  process of the pancreas most consistent with pancreatic  adenocarcinoma.   a.  Local invasion into the third portion of the duodenum.  b. No vascular occlusion or thrombosis. There is abutment of several  vessels including the aorta, the gastroduodenal artery, the superior  mesenteric artery and possibly slight abutment of the superior  mesenteric vein.  c. Relatively abrupt cut off the distal common bile duct, presumably  related to adjacent pancreatic head mass, with moderate upstream  intrahepatic and extrahepatic biliary dilatation.  d. Mild dilatation of the main pancreatic duct up to 4 mm with  tapering in the pancreatic head region of infiltrative mass.  2. Multiple mildly enlarged peripancreatic/retroperitoneal lymph  nodes, including necrotic appearing 1.1 cm lymph node adjacent to the  pancreatic mass highly concerning for metastatic lymph node  involvement.  3. At least 3 small hypoattenuating liver lesions which were not  present on 9/10/2014, suspicious for metastatic disease.  4. Small indeterminate pulmonary nodules in  the visualized lung bases,  some of which are new since 9/10/2014.    I have personally reviewed the examination and initial interpretation  and I agree with the findings.    DEVIN FRANK MD

## 2017-11-01 NOTE — CONSULTS
"  GASTROENTEROLOGY CONSULTATION      Date of Admission:  10/31/2017  Reason for Admission: Jaundice, abdominal pain  Date of Consult  11/1/2017   Requesting Physician:  Heraclio Duffy MD           ASSESSMENT AND RECOMMENDATIONS:   Assessment:  77 year old female with a history of thyroid cancer s/p thyroidectomy in 1980s, cholelithiasis s/p CCY 2015, GERD s/p Saul fundoplication 2006 who is admitted with progressive abdominal pain and jaundice, found to have new 3.6cm pancreatic mass with invasion into D3 and biliary dilation on CT scan and elevated LFT and lipase (Tbili 12.9, Alk phos 975, , , lipase >10K) consistent with biliary obstruction. Certainly concerning for pancreatic adenocarcinoma with liver lesions and enlarged peripancreatic/RP lymph nodes including necrotic appearing LN adjacent to the mass concerning for metastatic disease. Does not appear to be infected at this point (no fever or leukocytosis) but likely will require biliary stenting for relief of obstruction along with EUS guided biopsies of mass. Plan for EUS/ERCP this admission, scheduled for tomorrow 11/2     Recommendations:  Plan for EUS/ERCP tomorrow AM  OK for diet today  NPO at midnight  No need for abx  Trend LFT  Analgesia/Antiemetics per primary team  Discussed with primary team    Gastroenterology follow up recommendations: TBD    Thank you for involving us in this patient's care. Please do not hesitate to contact the GI service with any questions or concerns.     Pt seen and care plan discussed with Dr. Mcneil, GI staff physician.    Zeny Sim PA-C  Advanced Endoscopy/Pancreaticobiliary [facility]  [facility]  Pager *[phone]  -------------------------------------------------------------------------------------------------------------------       Reason for Consultation:   \"pancreas mass\"           History of Present Illness:   Patient seen and examined at 1030. History is obtained from " the patient and her  at bedside.    Iris Lewis is a 77 year old female with a PMH significant for thyroid cancer s/p thyroidectomy in 1980s, cholelithiasis s/p CCY 2015, GERD s/p Saul fundoplication 2006 who is admitted with progressive abdominal pain and jaundice. She reports that she was having upper abdominal pain for a couple of weeks, was having trouble sleeping due to it. Family members noted yellowing of skin/eyes. She also notes loose stools that are kim colored and dark urine. She sought care from her PCP's office yesterday who ordered labs (found elevated LFT and lipase - Tbili 12.9, Alk phos 975, , , lipase >10K) and CT scan with new 3.6cm pancreatic mass with invasion into D3 and biliary dilation. Patient sent to ED for further workup.     She denies fevers or chills. No nausea or vomiting. No chest pain or SOB.            Past Medical History:   Reviewed and edited as appropriate  Past Medical History:   Diagnosis Date     Arthritis      Colon polyp 2009,2015    no polyps - f/u in 5 yrs      Esophageal reflux      Glaucoma      Hypertension      Osteoporosis      Postsurgical hypothyroidism      Scoliosis (and kyphoscoliosis), idiopathic      Thyroid cancer (H)     papillary carcinoma age 33     Uncomplicated asthma             Past Surgical History:   Reviewed and edited as appropriate   Past Surgical History:   Procedure Laterality Date     ARTHRODESIS FOOT Right 11/9/2016    Procedure: ARTHRODESIS FOOT;  Surgeon: Janes Mcelroy MD;  Location: [facility] OR     C STOMACH SURGERY PROCEDURE UNLISTED       COLONOSCOPY   2009, 3/2015    no polyps in 2015 told to return 10 yrs.      ESOPHAGOSCOPY, GASTROSCOPY, DUODENOSCOPY (EGD), COMBINED  2/17/2012    Procedure:COMBINED ESOPHAGOSCOPY, GASTROSCOPY, DUODENOSCOPY (EGD); COMBINED ESOPHAGOSCOPY, GASTROSCOPY, DUODENOSCOPY POSSIBLE DILATION; Surgeon:NICHOLE MCCLAIN; Location:[facility] OR     FOOT SURGERY  2008    hammertoe left      LAPAROSCOPIC CHOLECYSTECTOMY N/A 1/5/2015    Procedure: LAPAROSCOPIC CHOLECYSTECTOMY;  Surgeon: Cruz Pearson MD;  Location: [facility] OR     NISSEN FUNDOPLICATION       ORTHOPEDIC SURGERY  2009    left hammertoe      REPAIR HAMMER TOE Right 11/9/2016    Procedure: REPAIR HAMMER TOE;  Surgeon: Janes Mcelroy MD;  Location: [facility] OR     SALPINGO OOPHORECTOMY,R/L/SERENA  1974    left, for tubal pregnancy     THYROIDECTOMY      for thyroid cancer              Social History:   The patient lives in [place], [place] with her   Employer: She works part time doing food demos    Alcohol: 2-3 glasses of wine per night  Tobacco: Denies  Illicit drugs: Denies           Family History:   Reviewed and edited as appropriate  Family History   Problem Relation Age of Onset     C.A.D. Father      MI at age 65     Alzheimer Disease Mother      Depression Sister 80     Skin Cancer No family hx of      no skin cancer     Prostate Cancer Brother      Asthma Father      Asthma Sister      Depression Son      Depression Sister      OSTEOPOROSIS Mother      Coronary Artery Disease Father      Coronary Artery Disease Brother 67     Depression Son      Depression Sister              Allergies:   Reviewed and edited as appropriate     Allergies   Allergen Reactions     Nkda [No Known Drug Allergies]             Medications:     Current Facility-Administered Medications   Medication     potassium phosphate 15 mmol in D5W 250 mL intermittent infusion     potassium phosphate 20 mmol in D5W 500 mL intermittent infusion     potassium phosphate 20 mmol in D5W 250 mL intermittent infusion     potassium phosphate 25 mmol in D5W 500 mL intermittent infusion     lidocaine 1 % 1 mL     lidocaine (LMX4) kit     sodium chloride (PF) 0.9% PF flush 3 mL     sodium chloride (PF) 0.9% PF flush 3 mL     ondansetron (ZOFRAN) injection 4 mg     naloxone (NARCAN) injection 0.1-0.4 mg     acetaminophen (TYLENOL) tablet 650 mg     docusate sodium  (COLACE) capsule 100 mg     bisacodyl (DULCOLAX) EC tablet 5 mg    Or     bisacodyl (DULCOLAX) EC tablet 10 mg    Or     bisacodyl (DULCOLAX) EC tablet 15 mg     cyanocolbalamin (vitamin  B-12) tablet 500 mcg     cholecalciferol (vitamin D3) tablet 1,000 Units     levothyroxine (SYNTHROID/LEVOTHROID) half-tab 37.5 mcg     folic acid (FOLVITE) tablet 400 mcg     triamterene-hydrochlorothiazide (MAXZIDE-25) 37.5-25 MG per tablet 1 tablet     zinc sulfate (ZINCATE) capsule 220 mg     timolol maleate (ISTALOL) 0.5 % SOLN 1 drop     lactated ringers infusion     HYDROmorphone (DILAUDID) injection 0.2 mg     potassium chloride SA (K-DUR/KLOR-CON M) CR tablet 20-40 mEq     potassium chloride (KLOR-CON) Packet 20-40 mEq     potassium chloride 10 mEq in 100 mL sterile water intermittent infusion (premix)     potassium chloride 10 mEq in 100 mL intermittent infusion with 10 mg lidocaine     potassium chloride 20 mEq in 50 mL intermittent infusion     magnesium sulfate 4 g in 100 mL sterile water (premade)             Review of Systems:   A complete review of systems was performed and is negative except as noted in the HPI      Skin: positive for jaundice  Eyes: positive for scleral icteris  Ears/Nose/Throat: negative  Respiratory: No shortness of breath, dyspnea on exertion, cough, or hemoptysis  Cardiovascular: negative  Gastrointestinal: positive for abdominal pain and diarrhea  Genitourinary: negative  Musculoskeletal: negative  Neurologic: negative  Psychiatric: negative  Hematologic/Lymphatic/Immunologic: negative  Endocrine: negative         Physical Exam:   Temp: 98  F (36.7  C) Temp src: Oral BP: 115/63 Pulse: 81   Resp: 16 SpO2: 95 % O2 Device: None (Room air)    Wt:   Wt Readings from Last 2 Encounters:   10/31/17 52 kg (114 lb 11.2 oz)   10/31/17 52.2 kg (115 lb)        General: WDWN female in NAD.  Answers appropriately.    HEENT: Head is AT/NC. Sclera anicteric. No conjunctival injection.  Oropharynx is clear,  moist and w/o exudate or lesions.  Neck: No masses or thyromegaly.  Lungs: Clear to auscultation bilaterally.  No wheezes, rhonchi or crackles.    Heart: Regular rate and rhythm.  No murmurs, gallops or rubs.  Normal S1 and S2.  Abdomen: Soft, mild epigastric tenderness, non-distended.  BS +.  No hepatosplenomegaly. No rebound or peritoneal signs  Extremities: No pedal edema.  Heme/Lymph: No cervical or supraclavicular adenopathy.   Skin: No jaundice, rash  Neurologic: Grossly non-focal.  CN 2-12 grossly intact           Data:   Labs and imaging below were independently reviewed and interpreted    LAB WORK:    BMP  Recent Labs  Lab 11/01/17  0727 10/31/17  1722 10/31/17  1221   NA  --  135 134   POTASSIUM 3.7 3.2* 3.0*   CHLORIDE  --  99 98   EMMY  --  9.3 8.9   CO2  --  27 26   BUN  --  12 11   CR  --  0.67 0.62   GLC  --  114* 120*     CBC  Recent Labs  Lab 10/31/17  1722 10/31/17  1221   WBC 9.1 10.8   RBC 3.75* 3.66*   HGB 11.9 11.8   HCT 34.8* 34.1*   MCV 93 93   MCH 31.7 32.2   MCHC 34.2 34.6   RDW 15.5* 16.5*    365     INR  Recent Labs  Lab 10/31/17  1722   INR 0.93     LFTs  Recent Labs  Lab 10/31/17  1722 10/31/17  1221   ALKPHOS 975* 1005*   * 284*   * 331*   BILITOTAL 12.9* 13.2*   PROTTOTAL 7.5 7.5   ALBUMIN 3.0* 3.1*      PANC  Recent Labs  Lab 10/31/17  1722 10/31/17  1221   LIPASE 47390* 78421*       IMAGING:  EXAMINATION: CT ABDOMEN PELVIS W CONTRAST, 10/31/2017 12:52 PM     TECHNIQUE:  Helical CT images from the lung bases through the  symphysis pubis were obtained with IV contrast. Contrast dose: 70  mL  [id number]     COMPARISON: 9/10/2014     HISTORY: 1.5 weeks new upper abd pain, grey stool, and jaundice     FINDINGS:     Abdomen and pelvis:      Pancreas: Poorly defined hypoattenuating mass centered on the uncinate  process of the pancreas (series 8 image 34, series 6 image 29)  measuring  up to 3.6 cm in greatest axial diameter, and up to 3.3 x  3.8 cm in the coronal  plane. There is invasion into the third portion  of the duodenum, series 7, image 153. There is mild upstream  dilatation of the main pancreatic duct up to 4 mm in diameter, with  mild narrowing at the the major papilla. The common bile duct is  dilated up to 1.1 cm in diameter, with relatively abrupt termination  just upstream to the major papilla. There is moderate intrahepatic  biliary dilatation. The remainder of the pancreatic parenchyma is  grossly unremarkable.     Vascular involvement:     Aorta: Abutment of the aorta (series 7 image 139).  Inferior vena cava: Near above of the inferior vena cava,  by  a few millimeters.   Celiac axis: No evidence of involvement.  Hepatic artery: No evidence of involvement of the common, proper,  right or left hepatic arteries. There is abutment of the  gastroduodenal artery a few centimeters distal to its origin (series 5  image 125).  Superior mesenteric artery: Abutment of the superior mesenteric artery  several centimeters distal to its origin (series 5 image 134) without  evidence of narrowing.  Superior mesenteric vein: There is perhaps slight abutment of the  proximal superior mesenteric vein (series 7 image 119).  Portal vein: No evidence of involvement.  Splenic artery and vein: No evidence of involvement.     Vascular anatomy: There is no replaced hepatic artery.  No other  vascular anomalies.     Lymph node evaluation: Several mildly enlarged  peripancreatic/retroperitoneal lymph nodes worrisome for metastatic  disease including 1.1 cm short axis diameter necrotic appearing lymph  node immediately adjacent to the pancreatic mass (series 7 image 127),  1.0 cm short axis diameter periaortic lymph node (series 7 image 100),  0.9 cm short axis diameter right para-aortic lymph node (series 7  image 68), 1.0 cm short axis diameter right para-aortic lymph node  (series 7 image 80)     Peritoneum: No ascites. Mild mesenteric edema without  discrete  carcinomatosis/peritoneal nodules.     Liver: 0.8 x 0.7 cm hypoattenuating lesion in hepatic segment 4  (series 7 image 123) and 0.9 x 0.8 cm hypoattenuating lesion at the  junction of hepatic segments 8, 5 and 4 (series 7 image 96), and 1.0 x  0.7 cm lesion in hepatic segment 5/6 (series 7 image 129) are new  since 9/10/2014.     Remainder of the abdomen and pelvis: Cholecystectomy. The spleen,  adrenals and kidneys are within normal limits. Bladder is  unremarkable. Normal postmenopausal appearance of the reproductive  organs. The colon and small bowel are within normal limits.  Postoperative changes of Nissen fundoplication. Aorta is normal in  caliber.     Lung bases: 5 x 3 mm nodule in the left lower lobe (series 11 image 1)  and 4 mm right lower lobe nodule just above the right hemidiaphragm  (series 11 image 40) appear to be new since 9/10/2014. 2 mm subpleural  right middle lobe nodule is not significantly changed from 9/10/2014.  Mild bronchial wall thickening and atelectasis.     Bones and soft tissues: No suspicious lesion in the visualized  skeleton. Lucency within the L1 vertebral body (series 8 image 64) is  not significantly changed from 9/10/2014, probably a benign vertebral  body hemangioma. The bones appear demineralized with degenerative  change and scoliosis.         IMPRESSION:   1. 3.6 cm poorly defined hypoattenuating mass centered in the uncinate  process of the pancreas most consistent with pancreatic  adenocarcinoma.   a.  Local invasion into the third portion of the duodenum.  b. No vascular occlusion or thrombosis. There is abutment of several  vessels including the aorta, the gastroduodenal artery, the superior  mesenteric artery and possibly slight abutment of the superior  mesenteric vein.  c. Relatively abrupt cut off the distal common bile duct, presumably  related to adjacent pancreatic head mass, with moderate upstream  intrahepatic and extrahepatic biliary  dilatation.  d. Mild dilatation of the main pancreatic duct up to 4 mm with  tapering in the pancreatic head region of infiltrative mass.  2. Multiple mildly enlarged peripancreatic/retroperitoneal lymph  nodes, including necrotic appearing 1.1 cm lymph node adjacent to the  pancreatic mass highly concerning for metastatic lymph node  involvement.  3. At least 3 small hypoattenuating liver lesions which were not  present on 9/10/2014, suspicious for metastatic disease.  4. Small indeterminate pulmonary nodules in the visualized lung bases,  some of which are new since 9/10/2014.        =======================================================================

## 2017-11-01 NOTE — PLAN OF CARE
"Problem: Patient Care Overview  Goal: Plan of Care/Patient Progress Review  Outcome: Therapy, progress toward functional goals as expected  VSS. UAL. R PIV with LR infusing 175/hr. Dilaudid 0.2 given once at 1330 for abdominal pain. Passing flatus, stool 2 days ago but \"feels a BM coming\". Pts , Neri, is sitting at bedside. Pt waiting on definitive diagnosis, but appears to be pancreatic cancer stage 4. Pt apprehensive and nervous. Provided emotional support. Pt can eat today but NPO at midnight for EUS/ERCP tomorrow AM. Phos replaced, due to redraw tomorrow. Continue to provide emotional support while waiting for plan and diagnosis.       "

## 2017-11-02 ENCOUNTER — ANESTHESIA (OUTPATIENT)
Dept: SURGERY | Facility: CLINIC | Age: 77
DRG: 435 | End: 2017-11-02
Payer: MEDICARE

## 2017-11-02 ENCOUNTER — APPOINTMENT (OUTPATIENT)
Dept: GENERAL RADIOLOGY | Facility: CLINIC | Age: 77
DRG: 435 | End: 2017-11-02
Attending: INTERNAL MEDICINE
Payer: MEDICARE

## 2017-11-02 LAB
ALBUMIN SERPL-MCNC: 2.6 G/DL (ref 3.4–5)
ALP SERPL-CCNC: 832 U/L (ref 40–150)
ALT SERPL W P-5'-P-CCNC: 233 U/L (ref 0–50)
ANION GAP SERPL CALCULATED.3IONS-SCNC: 7 MMOL/L (ref 3–14)
AST SERPL W P-5'-P-CCNC: 188 U/L (ref 0–45)
BILIRUB DIRECT SERPL-MCNC: 10.2 MG/DL (ref 0–0.2)
BILIRUB SERPL-MCNC: 12.2 MG/DL (ref 0.2–1.3)
BUN SERPL-MCNC: 7 MG/DL (ref 7–30)
CALCIUM SERPL-MCNC: 8.6 MG/DL (ref 8.5–10.1)
CHLORIDE SERPL-SCNC: 104 MMOL/L (ref 94–109)
CO2 SERPL-SCNC: 27 MMOL/L (ref 20–32)
CREAT SERPL-MCNC: 0.52 MG/DL (ref 0.52–1.04)
ERYTHROCYTE [DISTWIDTH] IN BLOOD BY AUTOMATED COUNT: 16.5 % (ref 10–15)
GFR SERPL CREATININE-BSD FRML MDRD: >90 ML/MIN/1.7M2
GLUCOSE BLDC GLUCOMTR-MCNC: 109 MG/DL (ref 70–99)
GLUCOSE SERPL-MCNC: 127 MG/DL (ref 70–99)
HCT VFR BLD AUTO: 31.9 % (ref 35–47)
HGB BLD-MCNC: 10.8 G/DL (ref 11.7–15.7)
INR PPP: 0.93 (ref 0.86–1.14)
MCH RBC QN AUTO: 32 PG (ref 26.5–33)
MCHC RBC AUTO-ENTMCNC: 33.9 G/DL (ref 31.5–36.5)
MCV RBC AUTO: 95 FL (ref 78–100)
PHOSPHATE SERPL-MCNC: 3 MG/DL (ref 2.5–4.5)
PLATELET # BLD AUTO: 407 10E9/L (ref 150–450)
POTASSIUM SERPL-SCNC: 3.7 MMOL/L (ref 3.4–5.3)
PROT SERPL-MCNC: 6.6 G/DL (ref 6.8–8.8)
RBC # BLD AUTO: 3.37 10E12/L (ref 3.8–5.2)
SODIUM SERPL-SCNC: 137 MMOL/L (ref 133–144)
UPPER EUS: NORMAL
WBC # BLD AUTO: 10.8 10E9/L (ref 4–11)

## 2017-11-02 PROCEDURE — 99233 SBSQ HOSP IP/OBS HIGH 50: CPT | Mod: GC | Performed by: INTERNAL MEDICINE

## 2017-11-02 PROCEDURE — 37000009 ZZH ANESTHESIA TECHNICAL FEE, EACH ADDTL 15 MIN: Performed by: INTERNAL MEDICINE

## 2017-11-02 PROCEDURE — 25000128 H RX IP 250 OP 636: Performed by: INTERNAL MEDICINE

## 2017-11-02 PROCEDURE — A9270 NON-COVERED ITEM OR SERVICE: HCPCS | Mod: GY | Performed by: STUDENT IN AN ORGANIZED HEALTH CARE EDUCATION/TRAINING PROGRAM

## 2017-11-02 PROCEDURE — 40000170 ZZH STATISTIC PRE-PROCEDURE ASSESSMENT II: Performed by: INTERNAL MEDICINE

## 2017-11-02 PROCEDURE — 00000146 ZZHCL STATISTIC GLUCOSE BY METER IP

## 2017-11-02 PROCEDURE — 40000556 ZZH STATISTIC PERIPHERAL IV START W US GUIDANCE

## 2017-11-02 PROCEDURE — 85027 COMPLETE CBC AUTOMATED: CPT | Performed by: PEDIATRICS

## 2017-11-02 PROCEDURE — 25500064 ZZH RX 255 OP 636: Performed by: INTERNAL MEDICINE

## 2017-11-02 PROCEDURE — 84100 ASSAY OF PHOSPHORUS: CPT | Performed by: PEDIATRICS

## 2017-11-02 PROCEDURE — 25000125 ZZHC RX 250: Performed by: INTERNAL MEDICINE

## 2017-11-02 PROCEDURE — 88172 CYTP DX EVAL FNA 1ST EA SITE: CPT | Performed by: PEDIATRICS

## 2017-11-02 PROCEDURE — 12000008 ZZH R&B INTERMEDIATE UMMC

## 2017-11-02 PROCEDURE — 80053 COMPREHEN METABOLIC PANEL: CPT | Performed by: PEDIATRICS

## 2017-11-02 PROCEDURE — 00000155 ZZHCL STATISTIC H-CELL BLOCK W/STAIN: Performed by: PEDIATRICS

## 2017-11-02 PROCEDURE — 0DB98ZX EXCISION OF DUODENUM, VIA NATURAL OR ARTIFICIAL OPENING ENDOSCOPIC, DIAGNOSTIC: ICD-10-PCS | Performed by: INTERNAL MEDICINE

## 2017-11-02 PROCEDURE — 36415 COLL VENOUS BLD VENIPUNCTURE: CPT | Performed by: PEDIATRICS

## 2017-11-02 PROCEDURE — 88173 CYTOPATH EVAL FNA REPORT: CPT | Performed by: PEDIATRICS

## 2017-11-02 PROCEDURE — 88305 TISSUE EXAM BY PATHOLOGIST: CPT | Performed by: INTERNAL MEDICINE

## 2017-11-02 PROCEDURE — 71000014 ZZH RECOVERY PHASE 1 LEVEL 2 FIRST HR: Performed by: INTERNAL MEDICINE

## 2017-11-02 PROCEDURE — 40000277 XR SURGERY CARM FLUORO LESS THAN 5 MIN W STILLS: Mod: TC

## 2017-11-02 PROCEDURE — 25000128 H RX IP 250 OP 636: Performed by: STUDENT IN AN ORGANIZED HEALTH CARE EDUCATION/TRAINING PROGRAM

## 2017-11-02 PROCEDURE — 85610 PROTHROMBIN TIME: CPT | Performed by: PEDIATRICS

## 2017-11-02 PROCEDURE — C9399 UNCLASSIFIED DRUGS OR BIOLOG: HCPCS | Performed by: STUDENT IN AN ORGANIZED HEALTH CARE EDUCATION/TRAINING PROGRAM

## 2017-11-02 PROCEDURE — 88305 TISSUE EXAM BY PATHOLOGIST: CPT | Performed by: PEDIATRICS

## 2017-11-02 PROCEDURE — 2894A VOIDCORRECT: CPT | Mod: GC | Performed by: INTERNAL MEDICINE

## 2017-11-02 PROCEDURE — 82248 BILIRUBIN DIRECT: CPT | Performed by: PEDIATRICS

## 2017-11-02 PROCEDURE — 25000132 ZZH RX MED GY IP 250 OP 250 PS 637: Mod: GY | Performed by: INTERNAL MEDICINE

## 2017-11-02 PROCEDURE — 25000125 ZZHC RX 250: Performed by: STUDENT IN AN ORGANIZED HEALTH CARE EDUCATION/TRAINING PROGRAM

## 2017-11-02 PROCEDURE — C1725 CATH, TRANSLUMIN NON-LASER: HCPCS | Performed by: INTERNAL MEDICINE

## 2017-11-02 PROCEDURE — 25000132 ZZH RX MED GY IP 250 OP 250 PS 637: Mod: GY | Performed by: STUDENT IN AN ORGANIZED HEALTH CARE EDUCATION/TRAINING PROGRAM

## 2017-11-02 PROCEDURE — 36000061 ZZH SURGERY LEVEL 3 W FLUORO 1ST 30 MIN - UMMC: Performed by: INTERNAL MEDICINE

## 2017-11-02 PROCEDURE — 0F798DZ DILATION OF COMMON BILE DUCT WITH INTRALUMINAL DEVICE, VIA NATURAL OR ARTIFICIAL OPENING ENDOSCOPIC: ICD-10-PCS | Performed by: INTERNAL MEDICINE

## 2017-11-02 PROCEDURE — C1877 STENT, NON-COAT/COV W/O DEL: HCPCS | Performed by: INTERNAL MEDICINE

## 2017-11-02 PROCEDURE — A9270 NON-COVERED ITEM OR SERVICE: HCPCS | Performed by: INTERNAL MEDICINE

## 2017-11-02 PROCEDURE — 27211024 ZZHC OR SUPPLY OTHER OPNP: Performed by: INTERNAL MEDICINE

## 2017-11-02 PROCEDURE — C1769 GUIDE WIRE: HCPCS | Performed by: INTERNAL MEDICINE

## 2017-11-02 PROCEDURE — 86301 IMMUNOASSAY TUMOR CA 19-9: CPT | Performed by: PEDIATRICS

## 2017-11-02 PROCEDURE — 27210794 ZZH OR GENERAL SUPPLY STERILE: Performed by: INTERNAL MEDICINE

## 2017-11-02 PROCEDURE — A9270 NON-COVERED ITEM OR SERVICE: HCPCS | Mod: GY | Performed by: INTERNAL MEDICINE

## 2017-11-02 PROCEDURE — 25000566 ZZH SEVOFLURANE, EA 15 MIN: Performed by: INTERNAL MEDICINE

## 2017-11-02 PROCEDURE — 37000008 ZZH ANESTHESIA TECHNICAL FEE, 1ST 30 MIN: Performed by: INTERNAL MEDICINE

## 2017-11-02 PROCEDURE — 36000059 ZZH SURGERY LEVEL 3 EA 15 ADDTL MIN UMMC: Performed by: INTERNAL MEDICINE

## 2017-11-02 DEVICE — IMPLANTABLE DEVICE
Type: IMPLANTABLE DEVICE | Site: BILE DUCT | Status: NON-FUNCTIONAL
Removed: 2017-11-22

## 2017-11-02 RX ORDER — ONDANSETRON 4 MG/1
4 TABLET, ORALLY DISINTEGRATING ORAL EVERY 30 MIN PRN
Status: DISCONTINUED | OUTPATIENT
Start: 2017-11-02 | End: 2017-11-02 | Stop reason: HOSPADM

## 2017-11-02 RX ORDER — DEXAMETHASONE SODIUM PHOSPHATE 4 MG/ML
INJECTION, SOLUTION INTRA-ARTICULAR; INTRALESIONAL; INTRAMUSCULAR; INTRAVENOUS; SOFT TISSUE PRN
Status: DISCONTINUED | OUTPATIENT
Start: 2017-11-02 | End: 2017-11-02

## 2017-11-02 RX ORDER — FENTANYL CITRATE 50 UG/ML
INJECTION, SOLUTION INTRAMUSCULAR; INTRAVENOUS PRN
Status: DISCONTINUED | OUTPATIENT
Start: 2017-11-02 | End: 2017-11-02

## 2017-11-02 RX ORDER — HYDROXYZINE HYDROCHLORIDE 25 MG/1
25 TABLET, FILM COATED ORAL EVERY 6 HOURS PRN
Status: DISCONTINUED | OUTPATIENT
Start: 2017-11-02 | End: 2017-11-03 | Stop reason: HOSPADM

## 2017-11-02 RX ORDER — HYDRALAZINE HYDROCHLORIDE 20 MG/ML
2.5-5 INJECTION INTRAMUSCULAR; INTRAVENOUS EVERY 10 MIN PRN
Status: DISCONTINUED | OUTPATIENT
Start: 2017-11-02 | End: 2017-11-02 | Stop reason: HOSPADM

## 2017-11-02 RX ORDER — INDOMETHACIN 50 MG/1
100 SUPPOSITORY RECTAL
Status: DISCONTINUED | OUTPATIENT
Start: 2017-11-02 | End: 2017-11-02 | Stop reason: HOSPADM

## 2017-11-02 RX ORDER — ONDANSETRON 2 MG/ML
4 INJECTION INTRAMUSCULAR; INTRAVENOUS EVERY 30 MIN PRN
Status: DISCONTINUED | OUTPATIENT
Start: 2017-11-02 | End: 2017-11-02 | Stop reason: HOSPADM

## 2017-11-02 RX ORDER — SODIUM CHLORIDE, SODIUM LACTATE, POTASSIUM CHLORIDE, CALCIUM CHLORIDE 600; 310; 30; 20 MG/100ML; MG/100ML; MG/100ML; MG/100ML
INJECTION, SOLUTION INTRAVENOUS CONTINUOUS
Status: DISCONTINUED | OUTPATIENT
Start: 2017-11-02 | End: 2017-11-02 | Stop reason: HOSPADM

## 2017-11-02 RX ORDER — LIDOCAINE 40 MG/G
CREAM TOPICAL
Status: DISCONTINUED | OUTPATIENT
Start: 2017-11-02 | End: 2017-11-02 | Stop reason: HOSPADM

## 2017-11-02 RX ORDER — PROPOFOL 10 MG/ML
INJECTION, EMULSION INTRAVENOUS PRN
Status: DISCONTINUED | OUTPATIENT
Start: 2017-11-02 | End: 2017-11-02

## 2017-11-02 RX ORDER — ONDANSETRON 2 MG/ML
INJECTION INTRAMUSCULAR; INTRAVENOUS PRN
Status: DISCONTINUED | OUTPATIENT
Start: 2017-11-02 | End: 2017-11-02

## 2017-11-02 RX ORDER — LABETALOL HYDROCHLORIDE 5 MG/ML
10 INJECTION, SOLUTION INTRAVENOUS
Status: DISCONTINUED | OUTPATIENT
Start: 2017-11-02 | End: 2017-11-02 | Stop reason: HOSPADM

## 2017-11-02 RX ORDER — FENTANYL CITRATE 50 UG/ML
25-50 INJECTION, SOLUTION INTRAMUSCULAR; INTRAVENOUS
Status: DISCONTINUED | OUTPATIENT
Start: 2017-11-02 | End: 2017-11-02 | Stop reason: HOSPADM

## 2017-11-02 RX ORDER — LIDOCAINE HYDROCHLORIDE 20 MG/ML
INJECTION, SOLUTION INFILTRATION; PERINEURAL PRN
Status: DISCONTINUED | OUTPATIENT
Start: 2017-11-02 | End: 2017-11-02

## 2017-11-02 RX ORDER — EPHEDRINE SULFATE 50 MG/ML
INJECTION, SOLUTION INTRAMUSCULAR; INTRAVENOUS; SUBCUTANEOUS PRN
Status: DISCONTINUED | OUTPATIENT
Start: 2017-11-02 | End: 2017-11-02

## 2017-11-02 RX ORDER — IOPAMIDOL 510 MG/ML
INJECTION, SOLUTION INTRAVASCULAR PRN
Status: DISCONTINUED | OUTPATIENT
Start: 2017-11-02 | End: 2017-11-02 | Stop reason: HOSPADM

## 2017-11-02 RX ORDER — HYDROXYZINE HYDROCHLORIDE 25 MG/1
50 TABLET, FILM COATED ORAL EVERY 6 HOURS PRN
Status: DISCONTINUED | OUTPATIENT
Start: 2017-11-02 | End: 2017-11-03 | Stop reason: HOSPADM

## 2017-11-02 RX ORDER — SODIUM CHLORIDE, SODIUM LACTATE, POTASSIUM CHLORIDE, CALCIUM CHLORIDE 600; 310; 30; 20 MG/100ML; MG/100ML; MG/100ML; MG/100ML
INJECTION, SOLUTION INTRAVENOUS CONTINUOUS PRN
Status: DISCONTINUED | OUTPATIENT
Start: 2017-11-02 | End: 2017-11-02

## 2017-11-02 RX ORDER — LIDOCAINE 40 MG/G
CREAM TOPICAL
Status: DISCONTINUED | OUTPATIENT
Start: 2017-11-02 | End: 2017-11-02

## 2017-11-02 RX ADMIN — PROPOFOL 20 MG: 10 INJECTION, EMULSION INTRAVENOUS at 10:55

## 2017-11-02 RX ADMIN — PHENYLEPHRINE HYDROCHLORIDE 100 MCG: 10 INJECTION, SOLUTION INTRAMUSCULAR; INTRAVENOUS; SUBCUTANEOUS at 10:37

## 2017-11-02 RX ADMIN — Medication 5 MG: at 10:39

## 2017-11-02 RX ADMIN — PHENYLEPHRINE HYDROCHLORIDE 100 MCG: 10 INJECTION, SOLUTION INTRAMUSCULAR; INTRAVENOUS; SUBCUTANEOUS at 10:27

## 2017-11-02 RX ADMIN — PHENYLEPHRINE HYDROCHLORIDE 100 MCG: 10 INJECTION, SOLUTION INTRAMUSCULAR; INTRAVENOUS; SUBCUTANEOUS at 11:00

## 2017-11-02 RX ADMIN — Medication 37.5 MCG: at 12:48

## 2017-11-02 RX ADMIN — VITAMIN D, TAB 1000IU (100/BT) 1000 UNITS: 25 TAB at 12:48

## 2017-11-02 RX ADMIN — DOCUSATE SODIUM 100 MG: 100 CAPSULE, LIQUID FILLED ORAL at 19:26

## 2017-11-02 RX ADMIN — Medication 500 MCG: at 12:49

## 2017-11-02 RX ADMIN — LIDOCAINE HYDROCHLORIDE 100 MG: 20 INJECTION, SOLUTION INFILTRATION; PERINEURAL at 09:15

## 2017-11-02 RX ADMIN — PROPOFOL 20 MG: 10 INJECTION, EMULSION INTRAVENOUS at 09:41

## 2017-11-02 RX ADMIN — ZINC SULFATE CAP 220 MG (50 MG ELEMENTAL ZN) 220 MG: 220 (50 ZN) CAP at 12:48

## 2017-11-02 RX ADMIN — DEXAMETHASONE SODIUM PHOSPHATE 4 MG: 4 INJECTION, SOLUTION INTRA-ARTICULAR; INTRALESIONAL; INTRAMUSCULAR; INTRAVENOUS; SOFT TISSUE at 09:18

## 2017-11-02 RX ADMIN — ROCURONIUM BROMIDE 10 MG: 10 INJECTION INTRAVENOUS at 09:40

## 2017-11-02 RX ADMIN — FENTANYL CITRATE 75 MCG: 50 INJECTION, SOLUTION INTRAMUSCULAR; INTRAVENOUS at 09:15

## 2017-11-02 RX ADMIN — Medication 0.2 MG: at 19:26

## 2017-11-02 RX ADMIN — FOLIC ACID TAB 400 MCG 400 MCG: 400 TAB at 12:48

## 2017-11-02 RX ADMIN — PHENYLEPHRINE HYDROCHLORIDE 100 MCG: 10 INJECTION, SOLUTION INTRAMUSCULAR; INTRAVENOUS; SUBCUTANEOUS at 09:23

## 2017-11-02 RX ADMIN — PHENYLEPHRINE HYDROCHLORIDE 150 MCG: 10 INJECTION, SOLUTION INTRAMUSCULAR; INTRAVENOUS; SUBCUTANEOUS at 09:29

## 2017-11-02 RX ADMIN — PHENYLEPHRINE HYDROCHLORIDE 100 MCG: 10 INJECTION, SOLUTION INTRAMUSCULAR; INTRAVENOUS; SUBCUTANEOUS at 11:02

## 2017-11-02 RX ADMIN — PHENYLEPHRINE HYDROCHLORIDE 100 MCG: 10 INJECTION, SOLUTION INTRAMUSCULAR; INTRAVENOUS; SUBCUTANEOUS at 09:57

## 2017-11-02 RX ADMIN — PHENYLEPHRINE HYDROCHLORIDE 100 MCG: 10 INJECTION, SOLUTION INTRAMUSCULAR; INTRAVENOUS; SUBCUTANEOUS at 10:25

## 2017-11-02 RX ADMIN — SODIUM CHLORIDE, POTASSIUM CHLORIDE, SODIUM LACTATE AND CALCIUM CHLORIDE: 600; 310; 30; 20 INJECTION, SOLUTION INTRAVENOUS at 09:11

## 2017-11-02 RX ADMIN — SUGAMMADEX 110 MG: 100 INJECTION, SOLUTION INTRAVENOUS at 11:22

## 2017-11-02 RX ADMIN — Medication 5 MG: at 09:42

## 2017-11-02 RX ADMIN — PHENYLEPHRINE HYDROCHLORIDE 100 MCG: 10 INJECTION, SOLUTION INTRAMUSCULAR; INTRAVENOUS; SUBCUTANEOUS at 10:20

## 2017-11-02 RX ADMIN — SODIUM CHLORIDE, POTASSIUM CHLORIDE, SODIUM LACTATE AND CALCIUM CHLORIDE: 600; 310; 30; 20 INJECTION, SOLUTION INTRAVENOUS at 00:43

## 2017-11-02 RX ADMIN — PHENYLEPHRINE HYDROCHLORIDE 100 MCG: 10 INJECTION, SOLUTION INTRAMUSCULAR; INTRAVENOUS; SUBCUTANEOUS at 10:03

## 2017-11-02 RX ADMIN — ROCURONIUM BROMIDE 40 MG: 10 INJECTION INTRAVENOUS at 09:15

## 2017-11-02 RX ADMIN — PHENYLEPHRINE HYDROCHLORIDE 100 MCG: 10 INJECTION, SOLUTION INTRAMUSCULAR; INTRAVENOUS; SUBCUTANEOUS at 09:26

## 2017-11-02 RX ADMIN — PHENYLEPHRINE HYDROCHLORIDE 100 MCG: 10 INJECTION, SOLUTION INTRAMUSCULAR; INTRAVENOUS; SUBCUTANEOUS at 10:50

## 2017-11-02 RX ADMIN — PHENYLEPHRINE HYDROCHLORIDE 100 MCG: 10 INJECTION, SOLUTION INTRAMUSCULAR; INTRAVENOUS; SUBCUTANEOUS at 10:09

## 2017-11-02 RX ADMIN — ROCURONIUM BROMIDE 20 MG: 10 INJECTION INTRAVENOUS at 10:41

## 2017-11-02 RX ADMIN — PHENYLEPHRINE HYDROCHLORIDE 100 MCG: 10 INJECTION, SOLUTION INTRAMUSCULAR; INTRAVENOUS; SUBCUTANEOUS at 10:16

## 2017-11-02 RX ADMIN — FENTANYL CITRATE 25 MCG: 50 INJECTION, SOLUTION INTRAMUSCULAR; INTRAVENOUS at 09:41

## 2017-11-02 RX ADMIN — PHENYLEPHRINE HYDROCHLORIDE 100 MCG: 10 INJECTION, SOLUTION INTRAMUSCULAR; INTRAVENOUS; SUBCUTANEOUS at 10:57

## 2017-11-02 RX ADMIN — PROPOFOL 90 MG: 10 INJECTION, EMULSION INTRAVENOUS at 09:15

## 2017-11-02 RX ADMIN — Medication 0.2 MG: at 00:47

## 2017-11-02 RX ADMIN — DOCUSATE SODIUM 100 MG: 100 CAPSULE, LIQUID FILLED ORAL at 12:48

## 2017-11-02 RX ADMIN — PHENYLEPHRINE HYDROCHLORIDE 100 MCG: 10 INJECTION, SOLUTION INTRAMUSCULAR; INTRAVENOUS; SUBCUTANEOUS at 10:33

## 2017-11-02 RX ADMIN — HYDROXYZINE HYDROCHLORIDE 25 MG: 25 TABLET ORAL at 14:11

## 2017-11-02 RX ADMIN — PHENYLEPHRINE HYDROCHLORIDE 100 MCG: 10 INJECTION, SOLUTION INTRAMUSCULAR; INTRAVENOUS; SUBCUTANEOUS at 10:30

## 2017-11-02 RX ADMIN — Medication 0.2 MG: at 15:08

## 2017-11-02 RX ADMIN — ONDANSETRON 4 MG: 2 INJECTION INTRAMUSCULAR; INTRAVENOUS at 11:15

## 2017-11-02 RX ADMIN — PHENYLEPHRINE HYDROCHLORIDE 100 MCG: 10 INJECTION, SOLUTION INTRAMUSCULAR; INTRAVENOUS; SUBCUTANEOUS at 10:41

## 2017-11-02 RX ADMIN — TRIAMTERENE AND HYDROCHLOROTHIAZIDE 1 TABLET: 37.5; 25 TABLET ORAL at 12:48

## 2017-11-02 RX ADMIN — PHENYLEPHRINE HYDROCHLORIDE 150 MCG: 10 INJECTION, SOLUTION INTRAMUSCULAR; INTRAVENOUS; SUBCUTANEOUS at 11:08

## 2017-11-02 ASSESSMENT — PAIN DESCRIPTION - DESCRIPTORS
DESCRIPTORS: ACHING
DESCRIPTORS: ACHING

## 2017-11-02 NOTE — BRIEF OP NOTE
[facility], [place]    Brief Operative Note    Pre-operative diagnosis: Pancreas Mass   Post-operative diagnosis Same with duodenal invasion and liver metastases  Procedure: Procedure(s):  Upper Endoscopic Ultrasound with fine needle biopsy, upper endoscopy with biopsies, Endoscopic Retrograde Cholangiopancreatogram with sphincterotomy and stent placement - Wound Class: II-Clean Contaminated   - Wound Class: II-Clean Contaminated  Surgeon: Surgeon(s) and Role:  Panel 1:     * Hadley Bailey MD - Primary  See EUS report. Bx of duodenal mass, FNB of liver mass, dilation of Nissen fundoplication with 12-15mm balloon  Panel 2:     * Rito Mcneil MD - Primary  Anesthesia: General   Estimated blood loss: None  Drains: None  Specimens:   ID Type Source Tests Collected by Time Destination   A : Duodenal mass Tissue Small Intestine, Duodenum SURGICAL PATHOLOGY EXAM Hadley Bailey MD 11/2/2017  9:54 AM      Findings:   Ulcreated duodenal mass opposite ampulla. Liver metastases. Distal biliary stricture  8mm sphincterotomy and 10x 5 plastic bilairy stents  Complications: None.  Implants: Above  REC: clears this afternoon.   Follow pain and lipase; if improves then eventually switch to metallic stent  If pancreatitis persists > a few days, then repeat ERCP next week with pancreatic as well as biliary stent

## 2017-11-02 NOTE — PROGRESS NOTES
[facility], [place]   Internal Medicine Daily Note     Interval History  Patient reported that she slept well last night. She had one of her sons come into town from [place] yesterday afternoon and he was in the hospital with her for much of last evening. Another son is currently driving here from his home in [place] and she expects him later today. She complains this morning of itchiness and asked for something to relieve it. She had pain at her IV site yesterday and it was moved to the other arm which is causing less pain. Her abdominal pain is well-controlled. She ate yesterday afternoon and said that made her feel much better. She had no nausea or post-prandial pain which she was surprised by because she had post-prandial pain the week before hospitalization. She has been having BMs, and they are still fatty and float. Anticipating ERCP today and waiting for results from biopsy.    Review of Systems  No chest pain. No SOB. No abdominal pain. No rashes or skin lesions.       Medical Student Note Resident Note   Assessment and Plan (Student)    Assessment:  Iris Lewis is a 77-year-old female with past medical history of thyroid cancer, Nisson fundoplication, HTN, and osteoporosis who presented with a week-long history of abdominal pain, new-onset jaundice, and a pancreatic mass concerning for malignancy with possible metastases.    Plan:  # pancreatic mass on uncinate process  # hyperbilirubinemia  # hepatic lesions  # pulmonary nodules  Presented with one-week worsening abdominal pain and jaundice. Evaluation found hyperbilirubinemia (12.9) and CT showed 3.6 cm pancreatic mass w/duodenal invasion, liver lesions, and pulmonary nodules concerning for metastases. Today with diffuse itching likely due to hyperbilirubinemia.  - EUS/ERCP as planned this morning  - GI consultation for biliary obstruction management  - Heme/onc consult with biopsy results or outpatient appointment  "before discharge  - Trending LFTs,  pending  - Anti-emetics and pain control  - Hydroxyzine for itching    # hypothyroidism s/p thyroid cancer  TSH 1.7, T4 1.38. No signs/symptoms of hypothyroidism.  - Continue PTA levothyroxine     # hypertension  BPs good here.  - Continue PTA Maxzide   Assessment and Plan (Resident)    Assessment:  Iris Lewis is a pleasant 77-year-old F w/history of thyroid cancer, GERD w/Nissen fundoplication, HTN and osteoporosis who presented with 1-week of progressively worsening abdominal pain and jaundice, and was found on imaging to have a pancreatic mass with associated biliary obstruction.     Pancreatic mass w/biliary obstruction  Metastases to the liver and lungs  Presenting w/one week of progressively worsening post-prandial epigastric pain, abdominal fullness and jaundice. Initial evaluation with marked hyperbilirubinemia (12.9) and CT imaging showing 3.6 cm pancreatic mass w/duodenal invasion, liver mets and pulmonary nodules concerning for lung mets. Overall picture concerning for pancreatic adenocarcinoma. No signs/symptoms of associated biliary infection at this time, though given obstructive picture will require endoscopic stenting. Patient is aware of likely malignancy, family closely coming into town.  -GI consultation, appreciate recs  -Undergoing EUS/ERCP today   -Will discuss w/heme onc regarding need for additional labs/imaging studies and timeline of formal consultation  -Trending LFTs,  pending  -Zofran available PRN  -Hydroxyzine and Sarna for pruritis    Hypothyroidism, s/p thyroid cancer  TSH 1.7, T4 1.38. No signs/symptoms of hypothyroidism.  -Continue PTA levothyroxine.    Hypertension  BPs fine here.   -Continue PTA triamterene-HCTZ 37.5-25 mg qD    FEN: NPO for EUS/ERCP  Ppx: PCDs, ambulate  Code status: Full    Physical Exam (Student)  /71 (BP Location: Right arm)  Pulse 74  Temp 96.8  F (36  C) (Axillary)  Resp 17  Ht 1.6 m (5' 3\")  Wt " "52 kg (114 lb 11.2 oz)  SpO2 97%  Breastfeeding? No  BMI 20.32 kg/m2    Gen: Pleasant woman sitting up in bed. NAD. Jaundiced.  Cardio: RRR. Normal S1 and S2. No murmurs. Bilateral radial pulses equal and strong.  Resp: Breathing comfortably on room air. Lungs fields clear to auscultation bilaterally.  GI: Nondistended, nontender.  Heme/lymph: No palpable cervical or supraclavicular nodes.    Physical Exam (Resident)  /71 (BP Location: Right arm)  Pulse 74  Temp 96.8  F (36  C) (Axillary)  Resp 17  Ht 1.6 m (5' 3\")  Wt 52 kg (114 lb 11.2 oz)  SpO2 97%  Breastfeeding? No  BMI 20.32 kg/m2  Gen: Pleasant woman sitting upright comfortably in bed. NAD. Jaundiced.   Cardio: RRR. Normal S1 and S2. No murmurs.  Resp: Breathing comfortably on room air.   GI: Nondistended, nontender.  Ext: No lower extremity edema.     I have reviewed today's vital signs, medications, labs and imaging.     Geovanna Olivas, MS3  [facility] 2  [phone] I have reviewed today's vital signs, medications, labs and imaging.     Lluvia Betancourt MD  Internal Medicine/Pediatrics PGY4  [phone]     Data:  Medications  Dilaudid 0.2mg IV q4h PRN  Levothyroxine 37.5 mcg qD  Maxzide 37.5-25 mg qD  Vitamin D 1000 Units qD  Vitamin B12 500 mcg qD  Zinc sulfate 220 mg qD  Hydroxyzine 25 mg q6h PRN    Lab  CBC: wbc 9.1 hgb 11.9 plt 396  BMP: Na 135 K 3.2 Cl 99 CO2 27 BUN 12 Cr 0.67  -> repeat K 3.7  LFTs: T-bili 12.9   Alk phos 975  Lipase 15127  INR 0.93 PTT 26  UA: notable for bilirubin    Imaging  CT Abdomen and Pelvis:  IMPRESSION:   1. 3.6 cm poorly defined hypoattenuating mass centered in the uncinate  process of the pancreas most consistent with pancreatic  adenocarcinoma.   a.  Local invasion into the third portion of the duodenum.  b. No vascular occlusion or thrombosis. There is abutment of several  vessels including the aorta, the gastroduodenal artery, the superior  mesenteric artery and possibly slight abutment of the " superior  mesenteric vein.  c. Relatively abrupt cut off the distal common bile duct, presumably  related to adjacent pancreatic head mass, with moderate upstream  intrahepatic and extrahepatic biliary dilatation.  d. Mild dilatation of the main pancreatic duct up to 4 mm with  tapering in the pancreatic head region of infiltrative mass.  2. Multiple mildly enlarged peripancreatic/retroperitoneal lymph  nodes, including necrotic appearing 1.1 cm lymph node adjacent to the  pancreatic mass highly concerning for metastatic lymph node  involvement.  3. At least 3 small hypoattenuating liver lesions which were not  present on 9/10/2014, suspicious for metastatic disease.  4. Small indeterminate pulmonary nodules in the visualized lung bases,  some of which are new since 9/10/2014.       Attestation:  The patient was seen and discussed with the attending physician, Dr. Hall.

## 2017-11-02 NOTE — PLAN OF CARE
Problem: Patient Care Overview  Goal: Plan of Care/Patient Progress Review  Outcome: No Change  VSS.  Rested well overnight.  Plan for OR this am with departure to 3C around 0800.  Shower x2 has been completed.  OR checklist is done.  Still needs OR consent.  To be done by GI just prior to procedure.  Blank form taped to chart.  Voiding good amounts.  Passing flatus, stool yesterday.  No nausea.  Mild abd pain, medicated with IV dilaudid x1.  UAL.

## 2017-11-02 NOTE — PLAN OF CARE
Problem: Patient Care Overview  Goal: Plan of Care/Patient Progress Review  PT - Cancel - Evaluation orders received.  However, pt in OR today.

## 2017-11-02 NOTE — ANESTHESIA CARE TRANSFER NOTE
Patient: Iris Lewis    Procedure(s):  Upper Endoscopic Ultrasound with fine needle biopsy, upper endoscopy with biopsies, Endoscopic Retrograde Cholangiopancreatogram with sphincterotomy and stent placement - Wound Class: II-Clean Contaminated   - Wound Class: II-Clean Contaminated    Diagnosis: Pancreas Mass   Diagnosis Additional Information: No value filed.    Anesthesia Type:   General, ETT     Note:  Airway :Face Mask  Patient transferred to:PACU  Comments: Vss, sats 100% on facemask, No pain on extubation, No adverse anesthesia events.          Vitals: (Last set prior to Anesthesia Care Transfer)    CRNA VITALS  11/2/2017 1101 - 11/2/2017 1137      11/2/2017             Pulse: 86    SpO2: 100 %    Resp Rate (observed): (!)  1                Electronically Signed By: Kaya Stevens MD  November 2, 2017  11:37 AM

## 2017-11-02 NOTE — CONSULTS
"   [facility] Hematology-Oncology New Consult          Iris Lewis MRN# [id number]   Age: 77 year old YOB: 1940   Date of Admission: 10/31/2017  DOS: 11/2/2017    Reason for consult: Establish oncology care; suspected pancreatic cancer with liver metastasis.    HPI / COURSE  Iris Lewis is a 77 year old female with a previous medical history of thyroid cancer s/p thyroidectomy in 1980s, cholelithiasis s/p CCY 2015, GERD s/p Nissen fundoplication 2006 and hypertension, who presents with stomach pain, constipation and jaundice.     She was admitted on 10/31, and a CT scan showed a 3.6 cm pancreatic mass concerning for malignancy with local invasion into the duodenum, 3 small liver lesions, small pulmonary nodulates on lung bases and enlarged peripancreatic and retroperitoneal lymph nodes. An upper endoscopic ultrasound with fine needle biopsy and ERCP with sphinkterotomy and stent placement was thereafter preformed. Her symptoms have improved after the procedure. Hem/Onc was consulted for further management.    Today pt reports being able to eat, she does not feel nauseated and the constipation has resolved. States yellowish color of her skin has improved. No stomach ache except for \"some pressure\" after eating. Pt does not report recent infections, fevers, chills, other B-symptoms, dysphagia, chest pain or SOB.    ROS: A complete ROS was otherwise negative.    PERTINENT PAST MEDICAL HISTORY  Past Medical History:   Diagnosis Date     Arthritis      Colon polyp 2009,2015    no polyps - f/u in 5 yrs      Esophageal reflux      Glaucoma      Hypertension      Osteoporosis      Postsurgical hypothyroidism      Scoliosis (and kyphoscoliosis), idiopathic      Thyroid cancer (H)     papillary carcinoma age 33     Uncomplicated asthma         Past Surgical History:   Procedure Laterality Date     ARTHRODESIS FOOT Right 11/9/2016    Procedure: ARTHRODESIS FOOT;  Surgeon: Janes Mcelroy, " MD;  Location: [facility] OR     C STOMACH SURGERY PROCEDURE UNLISTED       COLONOSCOPY   2009, 3/2015    no polyps in 2015 told to return 10 yrs.      ESOPHAGOSCOPY, GASTROSCOPY, DUODENOSCOPY (EGD), COMBINED  2/17/2012    Procedure:COMBINED ESOPHAGOSCOPY, GASTROSCOPY, DUODENOSCOPY (EGD); COMBINED ESOPHAGOSCOPY, GASTROSCOPY, DUODENOSCOPY POSSIBLE DILATION; Surgeon:NICHOLE MCCLAIN; Location:[facility] OR     FOOT SURGERY  2008    hammertoe left     LAPAROSCOPIC CHOLECYSTECTOMY N/A 1/5/2015    Procedure: LAPAROSCOPIC CHOLECYSTECTOMY;  Surgeon: Cruz Pearson MD;  Location: [facility] OR     NISSEN FUNDOPLICATION       ORTHOPEDIC SURGERY  2009    left hammertoe      REPAIR HAMMER TOE Right 11/9/2016    Procedure: REPAIR HAMMER TOE;  Surgeon: Janes Mcleroy MD;  Location: [facility] OR     SALPINGO OOPHORECTOMY,R/L/SERENA  1974    left, for tubal pregnancy     THYROIDECTOMY      for thyroid cancer       SOCIAL / FAMILY HISTORY  Social History     Social History     Marital status:      Spouse name: N/A     Number of children: N/A     Years of education: N/A     Social History Main Topics     Smoking status: Former Smoker     Years: 5.00     Types: Cigarettes     Start date: 9/15/1959     Quit date: 1/28/1983     Smokeless tobacco: Never Used     Alcohol use Yes      Comment: wine 1-2 glasses/day     Drug use: No     Sexual activity: Yes     Partners: Male     Birth control/ protection: Post-menopausal     Other Topics Concern     Caffeine Concern Yes     1-2 cups/day     Sleep Concern No     Stress Concern No     Weight Concern No     Special Diet No     avoids gluten     Exercise Yes     few times/wk - treadmill and weights - 1 hour     Seat Belt Yes     Social History Narrative    How much exercise per week? Walking 2x/wk and weight 2-3x/wk    How much calcium per day? Supplement + dairy       How much caffeine per day? 1 cup coffee    How much vitamin D per day? Supplement 2000IU/d    Do you/your family wear seatbelts?   Yes    Do you/your family use safety helmets? n/a    Do you/your family use sunscreen? Yes    Do you/your family keep firearms in the home? No    Do you/your family have a smoke detector(s)? Yes        Do you feel safe in your home? Yes    Has anyone ever touched you in an unwanted manner? No     Explain     March 2, 2015 Lyle Tran LPN    Reviewed Neha Orourke MD, PhD    3/2/15    Neha Orourke MD, PhD     4/4/16                   Family History   Problem Relation Age of Onset     C.A.D. Father      MI at age 65     Asthma Father      Coronary Artery Disease Father      Alzheimer Disease Mother      OSTEOPOROSIS Mother      Depression Sister 80     Asthma Sister      Prostate Cancer Brother      Depression Son      Depression Sister      Coronary Artery Disease Brother 67     Skin Cancer No family hx of      no skin cancer       MEDICATIONS  No current outpatient prescriptions on file.     Prescriptions Prior to Admission   Medication Sig Dispense Refill Last Dose     CALCIUM PO Form/strength unknown. Powder formulation. Add to water and fruits/vegetables daily to promote bone health.   Past Week at Unknown time     levothyroxine (SYNTHROID/LEVOTHROID) 37.5 mcg TABS half-tab Take 37.5 mcg by mouth every morning (before breakfast)   10/31/2017 at AM     triamterene-hydrochlorothiazide (MAXZIDE-25) 37.5-25 MG per tablet Take 1 tablet by mouth daily   10/31/2017 at AM     potassium 99 MG TABS Take 1 tablet by mouth 2 times daily    10/31/2017 at AM     timolol maleate (ISTALOL) 0.5 % opthalmic solution Place 1 drop into both eyes every morning Before placing contact lenses   10/31/2017 at AM     cyanocolbalamin (VITAMIN B-12) 500 MCG tablet Take 500 mcg by mouth daily.   Past Week at Unknown time     cholecalciferol (VITAMIN D) 1000 UNIT tablet Take 1 tablet by mouth 2 times daily    Past Week at Unknown time     zinc 50 MG TABS Take 1 tablet by mouth daily    More than a month at Unknown time     denosumab  "(PROLIA) 60 MG/ML SOLN injection Inject 1 mL (60 mg) Subcutaneous every 6 months Inject subcutaneously in upper arm, upper thigh or abdomen 1 mL 1 Taking     folic acid (FOLVITE) 400 MCG tablet Take 1 tablet by mouth daily.   More than a month at Unknown time     No current outpatient prescriptions on file.     ALLERGIES  Allergies   Allergen Reactions     Nkda [No Known Drug Allergies]        PHYSICAL EXAMINATION:  /65 (BP Location: Right arm)  Pulse 90  Temp 97.6  F (36.4  C) (Oral)  Resp 18  Ht 1.6 m (5' 3\")  Wt 52 kg (114 lb 11.2 oz)  SpO2 96%  Breastfeeding? No  BMI 20.32 kg/m2  Constitutional: Alert, lying in bed, no acute distress.   HEENT: NCAT. Icteric sclera.  Neck: Neck supple. No jugular venous distention.  Lymph: No palpable cervical or supraclavicular lymphadnopathy  Respiratory: Non-labored breathing, good air exchange, lungs clear to auscultation bilaterally.  Cardiovascular: Regular rate and rhythm. No murmur or rub.   GI: Normoactive bowel sounds. Abdomen soft, non-distended, mild epigastric tenderness.  Extremities: Grossly normal, non-tender, no edema.  Neuro: AAOX3, no focal deficits noted    DATA:  Recent Labs   Lab Test  11/02/17 1337 11/01/17   0727  10/31/17   1722   08/08/17   1143   NA  137   --   135   < >  140   POTASSIUM  3.7  3.7  3.2*   < >  3.6   CHLORIDE  104   --   99   < >  104   CO2  27   --   27   < >  28   ANIONGAP  7   --   9   < >  8   BUN  7   --   12   < >  11   CR  0.52   --   0.67   < >  0.68   GLC  127*   --   114*   < >  88   EMMY  8.6   --   9.3   < >  8.8   MAG   --   1.8   --    --   2.0   PHOS  3.0  2.3*   --    --   2.8    < > = values in this interval not displayed.     Recent Labs   Lab Test  11/02/17   1337  10/31/17   1722  10/31/17   1221  08/08/17   1143   WBC  10.8  9.1  10.8  6.4   HGB  10.8*  11.9  11.8  13.3   PLT  407  396  365  229   MCV  95  93  93  98   NEUTROPHIL   --   68.3  67.5  51.3     Recent Labs   Lab Test  11/02/17   1337  " 10/31/17   1722  10/31/17   1221   BILITOTAL  12.2*  12.9*  13.2*   ALKPHOS  832*  975*  1005*   ALT  233*  327*  331*   AST  188*  261*  284*   ALBUMIN  2.6*  3.0*  3.1*     TSH   Date Value Ref Range Status   10/31/2017 1.70 0.40 - 4.00 mU/L Final       Results for orders placed or performed during the hospital encounter of 10/31/17   XR Surgery [place] L/T 5 Min Fluoro w Stills    Narrative    This exam was marked as non-reportable because it will not be read by a   radiologist or a [place] non-radiologist provider.               CA 19-9: pending    Pathology:  SPECIMEN(S):   Duodenal mass   FINAL DIAGNOSIS:   Small intestine, duodenum, biopsy of mass   - Poorly differentiated adenocarcinoma with ulceration    CYTOLOGIC INTERPRETATION:   Liver, Lesion, Endoscopic ultrasound guided fine needle aspiration:   Positive for malignancy.   Adenocarcinoma, metastatic.     IMPRESSION:  # Metastatic adenocarcinoma (liver, duodenum), likely pancreatic primary  On CT scan pancreatic mass of 3.6 cm with local invasion into the duodenum; also with several mildly enlarged peripancreatic/retroperitoneal lymph nodes. Biopsy proven liver metastasis. Pt has no significant family history of pancreatic cancer or other cancer types. We had a long disccusion (before official path report came back) regarding the possible diagnosis, further workup and possible regimen moving forward. Patient and son asked excellent questions which were answered to their satisfaction. Would make an appointment for her with GI oncologist either Dr. Barton or Dr. Yu. OK to discharge home from Hem/Onc stand point.    RECS:  - F/u CA 19-9  - Patient will be connected to the outpatient clinic where treatment will be initiated. I sent an message to [facility]  to request an appt for next week.  - No need for other inpatient interventions at this point.      Faustina Cuevas, rotating medical student, served as scribe for this encounter. I have personally seen  "and evaluated the patient today. I have reviewed the available data and medical records. I agree with the findings and plan of care as documented in the note by Faustina Cuevas, medical student.    Discussed w/ Dr. Edmondson.  Coco Ackerman MD, PhD  Hem/Onc fellow        Iris Shetty is a 77 year old female with a previous medical history of thyroid cancer s/p thyroidectomy in 1980s, cholelithiasis s/p CCY 2015, GERD s/p Nissen fundoplication 2006 and hypertension, who presents with stomach pain, constipation and jaundice.     She felt well until about 2 weeks ago and then had rapid development of right upper quadrant pain that was persistent and continuous.     ROS: Abdominal discomfort much improved after placement of the biliary stent yesterday.    /69 (BP Location: Right arm)  Pulse 89  Temp 97.9  F (36.6  C) (Oral)  Resp 18  Ht 1.6 m (5' 3\")  Wt 52 kg (114 lb 11.2 oz)  SpO2 93%  Breastfeeding? No  BMI 20.32 kg/m2  No icterus.  No lymphadenopathy.  Lungs clear to p+a  RRR without murmurs  Abdomen soft and non-tender  Extremities without edema  Mood and affect normal    LAB:    Results for IRIS SHETTY (MRN [id number]) as of 11/4/2017 15:56   Ref. Range 11/2/2017 13:37 11/3/2017 09:03   Albumin Latest Ref Range: 3.4 - 5.0 g/dL 2.6 (L) 2.6 (L)   Protein Total Latest Ref Range: 6.8 - 8.8 g/dL 6.6 (L) 7.0   Bilirubin Total Latest Ref Range: 0.2 - 1.3 mg/dL 12.2 (H) 7.0 (H)   Alkaline Phosphatase Latest Ref Range: 40 - 150 U/L 832 (H) 798 (H)   ALT Latest Ref Range: 0 - 50 U/L 233 (H) 201 (H)   AST Latest Ref Range: 0 - 45 U/L 188 (H) 112 (H)   Bilirubin Direct Latest Ref Range: 0.0 - 0.2 mg/dL 10.2 (H)    Cancer Antigen 19-9 Latest Ref Range: 0 - 37 U/mL 2719 (H)    Lipase Latest Ref Range: 73 - 393 U/L  6445 (H)     Patient Name: IRIS SHETTY   MR#: [id number]   Specimen #: [id number]   Collected: 11/2/2017   Received: 11/2/2017   Reported: 11/3/2017 11:12   Ordering Phy(s): LILY TOMPKINS "     For improved result formatting, select 'View Enhanced Report Format'   under Linked Documents section.     SPECIMEN(S):   Duodenal mass     FINAL DIAGNOSIS:   Small intestine, duodenum, biopsy of mass   - Poorly differentiated adenocarcinoma with ulceration     COMMENT:   Given the clinical history a pancreas mass, I cannot rule out the   possibility that this represents invasion of the duodenum from an   underlying pancreas tumor. There is no evidence of a pre-existing   neoplastic polyp in the associated duodenal mucosa.     I have personally reviewed all specimens  and/or slides, including the   listed special stains, and used them with my medical judgement to   determine or confirm the final diagnosis.     Electronically signed out by:   Anders Sykes M.D., [facility]     Patient Name: JONA SHETTY   MR#: [id number]   Specimen #: [id number]   Collected: 11/2/2017   Received: 11/2/2017   Reported: 11/3/2017 10:30   Ordering Phy(s): NICHOLE WHITE   Additional Phy(s): LILY TOMPKINS     For improved result formatting, select 'View Enhanced Report Format'   under Linked Documents section.     SPECIMEN/STAIN PROCESS:   FNA-EUS guided, Liver Lesion        Pap-Cyto x 2, Diff Quick Stain-cyto x 2, Cell Block w/ H&E-Cyto x   1, Save Ribbon, 1 x 1, Cell Block, Level 2 x 1, Save Ribbon, 2 x 1, Cell   Block, Level 3 x 1     ----------------------------------------------------------------     CYTOLOGIC INTERPRETATION:     Liver, Lesion, Endoscopic ultrasound guided fine needle aspiration:   Positive for malignancy.   Adenocarcinoma, metastatic.   Specimen Adequacy: Satisfactory for evaluation.     COMMENT:   Please correlate with [id number], a duodenal biopsy.     Electronically signed out by:     Delvis Manzano M.D., [facility]     Processed and screened at [facility]     CLINICAL HISTORY:   Pancreatic mass invading duodenum.     ASSESSMENT  AND PLAN:  1.  Pancreatic carcinoma, stage IV metastatic to the liver.  PS 0.  Treatment options include leucovorin eddie short-term infusional 5FU plus oxaliplatin and irinotecan (FOLFIRINOX) or dose-modified FOLFIRINOX.  Gemcitabine and nab-paclitaxel may also be an option.   2.  Bilirubin declining after stent placement.  She has minimal to no symptoms of hyperbilirubinemia at this time.   3.  Referral to Dr. Adal Yu.    4.  All of Ms. Lewis's questions and all of her son's questions were answered.       Thank you for allowing us to participate in this patient's care.  The patient was seen and evaluated by me.  I discussed the patient with the fellow and agree with the findings and plan in the note, which was edited by me.    Sincerely,     Neri Edmondson MD    [facility]  [phone].        I spent 50 minutes with the patient more than 50% of which was in counseling and coordination of care.

## 2017-11-02 NOTE — ANESTHESIA POSTPROCEDURE EVALUATION
Patient: Iris Lewis    Procedure(s):  Upper Endoscopic Ultrasound with fine needle biopsy, upper endoscopy with biopsies, Endoscopic Retrograde Cholangiopancreatogram with billary sphincterotomy and billary stent placement - Wound Class: II-Clean Contaminated  Endoscopic Retrograde Cholangiopancreatogram with billary sphincterotomy and billary stent placement - Wound Class: II-Clean Contaminated    Diagnosis:Pancreas Mass   Diagnosis Additional Information: No value filed.    Anesthesia Type:  General, ETT    Note:  Anesthesia Post Evaluation    Patient location during evaluation: PACU  Patient participation: Able to fully participate in evaluation  Level of consciousness: awake and alert  Pain management: adequate  Airway patency: patent  Cardiovascular status: acceptable and hemodynamically unstable  Respiratory status: acceptable  Hydration status: acceptable  PONV: none     Anesthetic complications: None          Last vitals:  Vitals:    11/02/17 1135 11/02/17 1141 11/02/17 1200   BP: 122/69 119/74 117/63   Pulse:      Resp: 16 16 16   Temp:  35.6  C (96.1  F) 35.9  C (96.6  F)   SpO2: 100% 97% 95%         Electronically Signed By: Tank Hernandez MD  November 2, 2017  12:10 PM

## 2017-11-02 NOTE — BRIEF OP NOTE
[facility] Brief Operative Note    Pre-operative diagnosis: Pancreas Mass    Post-operative diagnosis * Same *   Procedure: Procedure(s):  Upper Endoscopic Ultrasound with fine needle biopsy, upper endoscopy with biopsies, Endoscopic Retrograde Cholangiopancreatogram with sphincterotomy and stent placement - Wound Class: II-Clean Contaminated   - Wound Class: II-Clean Contaminated   Surgeon: Hadley Bailey MD   Assistants(s): None   Estimated blood loss: Less than 10 ml    Specimens: Forceps biopsies duodenal mass.  Needle biopsy of liver mass (processed by cytology)   Findings: Tight GE junction due to fundoplication. Difficult to traverse with endoscope. Dilated to 15 mm with Elation balloon to allow passage of echoendoscope.  Fungating ulcerated duodenal mass just distal to major papilla (which was seen and endoscopically normal. Multiple large cap forceps biopsies obtained.    Large pancreatic head mass seen with EUS. Obstructing bile duct at level of major papilla. Pancreatic duct prominent at 3.5 mm. No sonographic features of acute pancreatitis. No vascular invasion seen.    Single 5 x 8 mm isoechoic liver lesion seen near entry of portal vein into liver. Needle aspiration x 4 w 22 ga Acquire needle. Prelimin cytology = lesional tissue.    A few small subcentimeter but sonographically suspicious peritumoral lymph nodes seen. One equivocal lymph node adjacent to pancreatic body and splenic vein.    Normal left adrenal.    See separate ERCP report by Dr. Mcneil.    ELISABETH Bailey MD  Associate Professor of Medicine  Division of Gastroenterology, Hepatology and Nutrition  [facility]

## 2017-11-02 NOTE — OR NURSING
Dr. Hernandez came to bedside to assess pt . She denies pain and is feeling good. Pt had a strong gag reflex and is taking ice chips.

## 2017-11-02 NOTE — OR NURSING
Pre op orders received from Dr. Borges, labs order that were drawn at 1722 yesterday 11/1/17. No need to repeat labs today per Dr. Borges. Lab orders discontinued. Awaiting pt's arrival from .

## 2017-11-03 ENCOUNTER — TELEPHONE (OUTPATIENT)
Dept: GASTROENTEROLOGY | Facility: CLINIC | Age: 77
End: 2017-11-03

## 2017-11-03 VITALS
RESPIRATION RATE: 18 BRPM | HEART RATE: 89 BPM | TEMPERATURE: 97.9 F | HEIGHT: 63 IN | OXYGEN SATURATION: 93 % | WEIGHT: 114.7 LBS | BODY MASS INDEX: 20.32 KG/M2 | DIASTOLIC BLOOD PRESSURE: 69 MMHG | SYSTOLIC BLOOD PRESSURE: 132 MMHG

## 2017-11-03 LAB
ALBUMIN SERPL-MCNC: 2.6 G/DL (ref 3.4–5)
ALP SERPL-CCNC: 798 U/L (ref 40–150)
ALT SERPL W P-5'-P-CCNC: 201 U/L (ref 0–50)
ANION GAP SERPL CALCULATED.3IONS-SCNC: 8 MMOL/L (ref 3–14)
AST SERPL W P-5'-P-CCNC: 112 U/L (ref 0–45)
BILIRUB SERPL-MCNC: 7 MG/DL (ref 0.2–1.3)
BUN SERPL-MCNC: 7 MG/DL (ref 7–30)
CALCIUM SERPL-MCNC: 8.9 MG/DL (ref 8.5–10.1)
CANCER AG19-9 SERPL-ACNC: 2719 U/ML (ref 0–37)
CHLORIDE SERPL-SCNC: 102 MMOL/L (ref 94–109)
CO2 SERPL-SCNC: 28 MMOL/L (ref 20–32)
COPATH REPORT: NORMAL
COPATH REPORT: NORMAL
CREAT SERPL-MCNC: 0.87 MG/DL (ref 0.52–1.04)
ERYTHROCYTE [DISTWIDTH] IN BLOOD BY AUTOMATED COUNT: 16.8 % (ref 10–15)
GFR SERPL CREATININE-BSD FRML MDRD: 64 ML/MIN/1.7M2
GLUCOSE SERPL-MCNC: 135 MG/DL (ref 70–99)
HCT VFR BLD AUTO: 32 % (ref 35–47)
HGB BLD-MCNC: 10.6 G/DL (ref 11.7–15.7)
LIPASE SERPL-CCNC: 6445 U/L (ref 73–393)
MCH RBC QN AUTO: 32.1 PG (ref 26.5–33)
MCHC RBC AUTO-ENTMCNC: 33.1 G/DL (ref 31.5–36.5)
MCV RBC AUTO: 97 FL (ref 78–100)
PLATELET # BLD AUTO: 381 10E9/L (ref 150–450)
POTASSIUM SERPL-SCNC: 3.9 MMOL/L (ref 3.4–5.3)
PROT SERPL-MCNC: 7 G/DL (ref 6.8–8.8)
RBC # BLD AUTO: 3.3 10E12/L (ref 3.8–5.2)
SODIUM SERPL-SCNC: 138 MMOL/L (ref 133–144)
WBC # BLD AUTO: 14.1 10E9/L (ref 4–11)

## 2017-11-03 PROCEDURE — 25000132 ZZH RX MED GY IP 250 OP 250 PS 637: Mod: GY | Performed by: INTERNAL MEDICINE

## 2017-11-03 PROCEDURE — 83690 ASSAY OF LIPASE: CPT | Performed by: INTERNAL MEDICINE

## 2017-11-03 PROCEDURE — 85027 COMPLETE CBC AUTOMATED: CPT | Performed by: INTERNAL MEDICINE

## 2017-11-03 PROCEDURE — A9270 NON-COVERED ITEM OR SERVICE: HCPCS | Mod: GY | Performed by: INTERNAL MEDICINE

## 2017-11-03 PROCEDURE — 99239 HOSP IP/OBS DSCHRG MGMT >30: CPT | Mod: GC | Performed by: INTERNAL MEDICINE

## 2017-11-03 PROCEDURE — A9270 NON-COVERED ITEM OR SERVICE: HCPCS | Mod: GY | Performed by: STUDENT IN AN ORGANIZED HEALTH CARE EDUCATION/TRAINING PROGRAM

## 2017-11-03 PROCEDURE — 36415 COLL VENOUS BLD VENIPUNCTURE: CPT | Performed by: INTERNAL MEDICINE

## 2017-11-03 PROCEDURE — 25000128 H RX IP 250 OP 636: Performed by: STUDENT IN AN ORGANIZED HEALTH CARE EDUCATION/TRAINING PROGRAM

## 2017-11-03 PROCEDURE — 80053 COMPREHEN METABOLIC PANEL: CPT | Performed by: INTERNAL MEDICINE

## 2017-11-03 PROCEDURE — 25000132 ZZH RX MED GY IP 250 OP 250 PS 637: Mod: GY | Performed by: STUDENT IN AN ORGANIZED HEALTH CARE EDUCATION/TRAINING PROGRAM

## 2017-11-03 RX ORDER — TRAMADOL HYDROCHLORIDE 50 MG/1
25-50 TABLET ORAL EVERY 6 HOURS PRN
Qty: 20 TABLET | Refills: 0 | Status: SHIPPED | OUTPATIENT
Start: 2017-11-03 | End: 2017-11-11

## 2017-11-03 RX ORDER — HYDROXYZINE HYDROCHLORIDE 25 MG/1
25-50 TABLET, FILM COATED ORAL EVERY 6 HOURS PRN
Qty: 90 TABLET | Refills: 0 | Status: ON HOLD | OUTPATIENT
Start: 2017-11-03 | End: 2018-01-01

## 2017-11-03 RX ORDER — ONDANSETRON 4 MG/1
4 TABLET, FILM COATED ORAL EVERY 6 HOURS PRN
Qty: 60 TABLET | Refills: 0 | Status: SHIPPED | OUTPATIENT
Start: 2017-11-03 | End: 2017-01-01

## 2017-11-03 RX ADMIN — DOCUSATE SODIUM 100 MG: 100 CAPSULE, LIQUID FILLED ORAL at 08:21

## 2017-11-03 RX ADMIN — FOLIC ACID TAB 400 MCG 400 MCG: 400 TAB at 08:21

## 2017-11-03 RX ADMIN — ACETAMINOPHEN 650 MG: 325 TABLET, FILM COATED ORAL at 10:25

## 2017-11-03 RX ADMIN — TRIAMTERENE AND HYDROCHLOROTHIAZIDE 1 TABLET: 37.5; 25 TABLET ORAL at 08:21

## 2017-11-03 RX ADMIN — Medication 0.2 MG: at 06:12

## 2017-11-03 RX ADMIN — TIMOLOL MALEATE 1 DROP: 6.8 SOLUTION/ DROPS OPHTHALMIC at 08:23

## 2017-11-03 RX ADMIN — VITAMIN D, TAB 1000IU (100/BT) 1000 UNITS: 25 TAB at 08:21

## 2017-11-03 RX ADMIN — Medication 37.5 MCG: at 08:21

## 2017-11-03 RX ADMIN — TRAMADOL HYDROCHLORIDE 25 MG: 50 TABLET, FILM COATED ORAL at 10:25

## 2017-11-03 RX ADMIN — Medication 500 MCG: at 08:21

## 2017-11-03 RX ADMIN — Medication 0.2 MG: at 00:05

## 2017-11-03 RX ADMIN — ZINC SULFATE CAP 220 MG (50 MG ELEMENTAL ZN) 220 MG: 220 (50 ZN) CAP at 08:21

## 2017-11-03 RX ADMIN — HYDROXYZINE HYDROCHLORIDE 25 MG: 25 TABLET ORAL at 06:25

## 2017-11-03 ASSESSMENT — PAIN DESCRIPTION - DESCRIPTORS
DESCRIPTORS: ACHING;DULL
DESCRIPTORS: ACHING
DESCRIPTORS: ACHING

## 2017-11-03 NOTE — PLAN OF CARE
VSS. Gets up independently. Pain tolerable - IV dilaudid given x1. PIV saline locked. Diet advanced to regular, tolerating without difficulty. Voiding. Pt's son is planning on coming back at 0800 tomorrow. Continue with POC.

## 2017-11-03 NOTE — PLAN OF CARE
Problem: Patient Care Overview  Goal: Plan of Care/Patient Progress Review  Outcome: Improving  Tm-99.8 OVSS.  Positive flatus and stool since EUS & ERCP with biliary stents yesterday.  Less jaundice in appearence tonight.  Voiding, not saving.  Denies nausea.  Stated good relief with pain meds.  UAL.  Hoping to speak with oncology doctors today to start formulating a plan for treatment.  Family (sons & ) will be back around 0800 this am.  Discharge hopefully today or tomorrow.

## 2017-11-03 NOTE — TELEPHONE ENCOUNTER
Final cytology from liver and duodenal biopsy confirmed adenocarcinoma. Called and discussed with pt.    She was seen by oncology while inpt but unclear if any staff and she does not have a scheduled follow-up visit with them..    Please ensure pt has clinic visit with medical oncology within 7-10 days to discuss follow-up.    ELISABETH Bailey MD  Associate Professor of Medicine  Division of Gastroenterology, Hepatology and Nutrition  [facility]

## 2017-11-03 NOTE — DISCHARGE SUMMARY
[facility], [place]  Internal Medicine Discharge Summary - Lucretia     Date of Admission:  10/31/2017  Date of Discharge:  11/3/2017  Discharging Attending Provider: Yuki Hall MD  Discharge Team: Lucretia 2    Discharge Diagnoses   Pancreatic mass w/biliary obstruction  Metastases to the liver and lungs  Hypothyroidism  History of thyroid cancer  Hypertension    Follow-ups Needed After Discharge   1. Final surgical pathology from pancreatic mass was pending at the time of discharge. She will follow-up with heme/onc to discuss these results and the next steps.  2. Plastic biliary stents placed on ERCP. These will be followed by GI.  3. New medications at discharge: Hydroxyzine PRN, Zofran PRN, Tramadol PRN.    Events Leading to Hospitalization  Iris Lewis is a pleasant 77-year-old F w/hypothyroidism due to history of thyroid cancer, GERD w/Nissen fundoplication, HTN and osteoporosis who presented with 1-week of progressively worsening abdominal pain and jaundice, and was found on imaging to have a pancreatic mass with associated biliary obstruction.    Brief Summary of Hospitalization  Pancreatic mass w/biliary obstruction, s/p stenting  Metastases to the liver and lungs  The patient presented w/one week of progressively worsening post-prandial epigastric pain, abdominal fullness and jaundice. Initial evaluation showed marked hyperbilirubinemia to 13.2 and CT imaging revealed a 3.6 cm pancreatic mass w/duodenal invasion, liver mets and pulmonary nodules concerning for lung mets. GI was consulted and an ERCP/EUS was performed. Plastic biliary stents were placed and a biopsy was taken. Her bilirubin subsequently began downtrending. Heme/onc was consulted and saw the patient - the patient was notified of the high likelihood that this was metastatic pancreatic cancer. Final pathology report was pending at the time of discharge. A  was also pending. She will follow-up with heme/onc  for ongoing evaluation/management. She was discharged with hydroxyzine/Sarna for symptomatic itching from the hyperbilirubinemia, zofran and tramadol. She was tolerating a regular diet at discharge and her bilirubin had downtrended from 13.2 --> 7.0.      Hypothyroidism, s/p thyroid cancer She was continued on her PTA levothyroxine.     Hypertension She was continued on her PTA triamterene-HCTZ 37.5-25 mg qD.    Consultations This Hospital Stay   Gastroenterology  Hematology/Oncology    Code Status   Full Code     The patient was discussed with Dr. Yuki Hall.    Lluvia Betancourt MD  Internal Medicine/Pediatrics PGY4  [phone]  [facility]  Pager: [phone]  ______________________________________________________________________    Physical Exam   Vital Signs: Temp: 97.9  F (36.6  C) Temp src: Oral BP: 132/69 Pulse: 89 Heart Rate: 99 Resp: 18 SpO2: 93 % O2 Device: None (Room air) Oxygen Delivery: 6 LPM  Weight: 114 lbs 11.2 oz  Gen: Pleasant, thin woman sitting upright comfortably in bed. NAD. Jaundiced.   Cardio: RRR. Normal S1 and S2. No murmurs.  Resp: Breathing comfortably on room air.   GI: Nondistended, nontender.  Ext: No lower extremity edema.    Significant Results and Procedures   BUN/Cr normal  Total bili 13.2 -> 7  Alk phos 1005 -> 798   -> 201   -> 112  Lipase 55991 -> 6445    CT Abdomen and Pelvis:  IMPRESSION:   1. 3.6 cm poorly defined hypoattenuating mass centered in the uncinate  process of the pancreas most consistent with pancreatic  adenocarcinoma.   a.  Local invasion into the third portion of the duodenum.  b. No vascular occlusion or thrombosis. There is abutment of several  vessels including the aorta, the gastroduodenal artery, the superior  mesenteric artery and possibly slight abutment of the superior  mesenteric vein.  c. Relatively abrupt cut off the distal common bile duct, presumably  related to adjacent pancreatic head mass, with moderate  upstream  intrahepatic and extrahepatic biliary dilatation.  d. Mild dilatation of the main pancreatic duct up to 4 mm with  tapering in the pancreatic head region of infiltrative mass.  2. Multiple mildly enlarged peripancreatic/retroperitoneal lymph  nodes, including necrotic appearing 1.1 cm lymph node adjacent to the  pancreatic mass highly concerning for metastatic lymph node  involvement.  3. At least 3 small hypoattenuating liver lesions which were not  present on 9/10/2014, suspicious for metastatic disease.  4. Small indeterminate pulmonary nodules in the visualized lung bases,  some of which are new since 9/10/2014.    Pending Results   Unresulted Labs Ordered in the Past 30 Days of this Admission     Date and Time Order Name Status Description    11/3/2017 0834 Comprehensive metabolic panel In process     11/3/2017 0834 CBC with platelets In process     11/2/2017 1030 Fine needle aspiration In process     11/2/2017 0955 Surgical pathology exam In process     11/2/2017 0101 Cancer antigen 19-9 In process              Primary Care Physician   Neri Gipson    Discharge Disposition   Discharged to home  Condition at discharge: Good    Discharge Orders     Reason for your hospital stay   You were admitted after presenting with abdominal pain, abdominal fullness and yellowing of the skin (jaundice). CT imaging showed a mass on the head of the pancreas, along with associated compression of the liver biliary system. Your bilirubin was elevated due to this. You were admitted to the hospital. You were seen by GI and heme/onc. An ERCP was performed in which the GI doctors placed stents to relieve the biliary obstruction. A biopsy of the mass was taken; the final pathology was pending at the time of discharge. You tolerated the procedure with no issues and your diet was slowly advanced. You were discharged to home with close follow-ups with GI and heme/onc.     Adult [facility] Follow-up and recommended labs and  tests   -Follow up with heme/onc to discuss pathology results and next steps.  -Follow up with GI for further evaluation of your biliary system.  -Follow up with your PCP sometime early next week for hospital follow-up and to have repeat LFTs drawn.     Appointments on [place] and/or [facility] (with [facility] or [facility] provider or service). Call [phone] if you haven't heard regarding these appointments within 7 days of discharge.     Follow Up and recommended labs and tests   Repeat LFT's early next week.     Activity   Your activity upon discharge: As tolerated     When to contact your care team   Worsening abdominal pain or intractable nausea/vomiting.   Worsening yellowing of the skin.     Discharge Instructions   When you go home:  -Continue using the hydroxyzine 1-2 tabs (25-50 mg) every 6 hours as needed for itching. Also use the Sarna ointment.  -For nausea, use 4 mg Zofran every 6 hours as needed.  -For pain, hold off on NSAIDs (Advil, ibuprofen, Naproxen, Aleve) for the next week (typically hold after ERCP). You may use up to 2 g of tylenol per day (1 tablet is typically 325 mg). Recommend 650 mg every 8 hours as needed. If you have breakthrough worsening pain, you may use tramadol 25-50 mg. This may make you sleepy, so do not drive while on this medication.     Full Code     Diet   Follow this diet upon discharge: Push foods high in protein. Otherwise you have no diet restrictions.       Discharge Medications   Current Discharge Medication List      START taking these medications    Details   hydrOXYzine (ATARAX) 25 MG tablet Take 1-2 tablets (25-50 mg) by mouth every 6 hours as needed for itching (adjuvant pain)  Qty: 90 tablet, Refills: 0    Associated Diagnoses: Obstructive jaundice      camphor-menthol (DERMASARRA) 0.5-0.5 % LOTN Apply 1 mL topically every 6 hours as needed for skin care  Qty: 59 mL, Refills: 0    Associated Diagnoses: Obstructive jaundice      ondansetron (ZOFRAN) 4 MG tablet  Take 1 tablet (4 mg) by mouth every 6 hours as needed for nausea  Qty: 60 tablet, Refills: 0    Associated Diagnoses: Abdominal pain, generalized      traMADol (ULTRAM) 50 MG tablet Take 0.5-1 tablets (25-50 mg) by mouth every 6 hours as needed for breakthrough pain  Qty: 20 tablet, Refills: 0    Associated Diagnoses: Pancreatic mass         CONTINUE these medications which have NOT CHANGED    Details   CALCIUM PO Form/strength unknown. Powder formulation. Add to water and fruits/vegetables daily to promote bone health.      levothyroxine (SYNTHROID/LEVOTHROID) 37.5 mcg TABS half-tab Take 37.5 mcg by mouth every morning (before breakfast)      triamterene-hydrochlorothiazide (MAXZIDE-25) 37.5-25 MG per tablet Take 1 tablet by mouth daily      potassium 99 MG TABS Take 1 tablet by mouth 2 times daily       timolol maleate (ISTALOL) 0.5 % opthalmic solution Place 1 drop into both eyes every morning Before placing contact lenses      cyanocolbalamin (VITAMIN B-12) 500 MCG tablet Take 500 mcg by mouth daily.      cholecalciferol (VITAMIN D) 1000 UNIT tablet Take 1 tablet by mouth 2 times daily       zinc 50 MG TABS Take 1 tablet by mouth daily       denosumab (PROLIA) 60 MG/ML SOLN injection Inject 1 mL (60 mg) Subcutaneous every 6 months Inject subcutaneously in upper arm, upper thigh or abdomen  Qty: 1 mL, Refills: 1    Associated Diagnoses: Senile osteoporosis      folic acid (FOLVITE) 400 MCG tablet Take 1 tablet by mouth daily.           Allergies   Allergies   Allergen Reactions     Nkda [No Known Drug Allergies]

## 2017-11-03 NOTE — PLAN OF CARE
"Problem: Patient Care Overview  Goal: Plan of Care/Patient Progress Review  Outcome: Adequate for Discharge Date Met:  11/03/17  Pt alert, oriented and pleasant. Son in room with her and very supportive and helpful. Pt c/o of abdominal pain about 1030 and given Tylenol and Tramadol with relief. Jaundice receding, denies nausea, and states her urine is \"looking more normal and not brown.\"   Seen by doctors from the oncology service and GI service.  Will follow-up with both next week.  [facility]  Medicine service MDs here and discussed discharge plans with the patient and her son.  Patient tolerated regular diet and voiding spontaneously.  Discussed all discharge instructions and medications with patient and she verbalized understanding of all.  Pt discharged to home at 1200 noon.        "

## 2017-11-05 LAB — ERCP: NORMAL

## 2017-11-06 ENCOUNTER — ONCOLOGY VISIT (OUTPATIENT)
Dept: ONCOLOGY | Facility: CLINIC | Age: 77
End: 2017-11-06
Attending: INTERNAL MEDICINE
Payer: COMMERCIAL

## 2017-11-06 VITALS
BODY MASS INDEX: 20.5 KG/M2 | DIASTOLIC BLOOD PRESSURE: 69 MMHG | HEART RATE: 78 BPM | TEMPERATURE: 97.8 F | HEIGHT: 63 IN | RESPIRATION RATE: 16 BRPM | SYSTOLIC BLOOD PRESSURE: 113 MMHG | WEIGHT: 115.7 LBS | OXYGEN SATURATION: 100 %

## 2017-11-06 DIAGNOSIS — C25.0 MALIGNANT NEOPLASM OF HEAD OF PANCREAS (H): Primary | ICD-10-CM

## 2017-11-06 PROCEDURE — 99215 OFFICE O/P EST HI 40 MIN: CPT | Mod: ZP | Performed by: INTERNAL MEDICINE

## 2017-11-06 PROCEDURE — 99214 OFFICE O/P EST MOD 30 MIN: CPT | Mod: ZF

## 2017-11-06 ASSESSMENT — PAIN SCALES - GENERAL: PAINLEVEL: NO PAIN (0)

## 2017-11-06 NOTE — NURSING NOTE
"Oncology Rooming Note    November 6, 2017 10:03 AM   Iris Lewis is a 77 year old female who presents for:    Chief Complaint   Patient presents with     Oncology Clinic Visit     New- Poorly differentiated adenocarcinoma     Initial Vitals: /69  Pulse 78  Temp 97.8  F (36.6  C) (Oral)  Resp 16  Ht 1.6 m (5' 2.99\")  Wt 52.5 kg (115 lb 11.2 oz)  SpO2 100%  BMI 20.5 kg/m2 Estimated body mass index is 20.5 kg/(m^2) as calculated from the following:    Height as of this encounter: 1.6 m (5' 2.99\").    Weight as of this encounter: 52.5 kg (115 lb 11.2 oz). Body surface area is 1.53 meters squared.  No Pain (0) Comment: Data Unavailable   No LMP recorded. Patient is postmenopausal.  Allergies reviewed: Yes  Medications reviewed: Yes    Medications: Medication refills not needed today.  Pharmacy name entered into [employer]:    [facility] - [facility] [address] [facility] [address] [place] [address] 10 AT [facility] 10    Clinical concerns: .new patient being seen today. Patient already received flu injection elsewhere.     15 minutes for nursing intake (face to face time)     Caterina Kearns LPN            "

## 2017-11-06 NOTE — LETTER
11/6/2017      RE: Iris Lewis  [address] [place] [address]       Iris Lewis is a 77 year old female with a previous medical history of thyroid cancer s/p thyroidectomy in 1980s, cholelithiasis s/p CCY 2015, GERD s/p Nissen fundoplication 2006 and hypertension, who presented with stomach pain, dark urine, constipation with pale floating stools and jaundice.      She was admitted on 10/31, and a CT scan showed a 3.6 cm pancreatic mass concerning for malignancy with local invasion into the duodenum, 3 small liver lesions, small pulmonary nodulates on lung bases and enlarged peripancreatic and retroperitoneal lymph nodes. An upper endoscopic ultrasound with fine needle biopsy and ERCP with sphincterotomy and stent placement was thereafter preformed. Today she reports being able to eat, she does not feel nauseated and the constipation has resolved.Her jaundice has improved and her stools and urine have returned to a normal color.  Her stools are still greasy and float. She has no pain.     ROS: A complete ROS was otherwise negative.    Her brother had prostate cancer at an advanced age and there are no other significant malignancies of which she is aware in 1st, 2nd, or 3rd degree relatives.    She is still working part time as a  and lives in [place]. She is here today with a daughter [place].     PERTINENT PAST MEDICAL HISTORY   Past Medical History         Past Medical History:   Diagnosis Date     Arthritis       Colon polyp 2009,2015     no polyps - f/u in 5 yrs      Esophageal reflux       Glaucoma       Hypertension       Osteoporosis       Postsurgical hypothyroidism       Scoliosis (and kyphoscoliosis), idiopathic       Thyroid cancer (H)       papillary carcinoma age 33     Uncomplicated asthma               Past Surgical History          Past Surgical History:   Procedure Laterality Date     ARTHRODESIS FOOT Right 11/9/2016     Procedure: ARTHRODESIS FOOT;   Surgeon: Janes Mcelroy MD;  Location: [facility] OR     C STOMACH SURGERY PROCEDURE UNLISTED         COLONOSCOPY    2009, 3/2015     no polyps in 2015 told to return 10 yrs.      ESOPHAGOSCOPY, GASTROSCOPY, DUODENOSCOPY (EGD), COMBINED   2/17/2012     Procedure:COMBINED ESOPHAGOSCOPY, GASTROSCOPY, DUODENOSCOPY (EGD); COMBINED ESOPHAGOSCOPY, GASTROSCOPY, DUODENOSCOPY POSSIBLE DILATION; Surgeon:NICHOLE MCCLAIN; Location:[facility] OR     FOOT SURGERY   2008     hammertoe left     LAPAROSCOPIC CHOLECYSTECTOMY N/A 1/5/2015     Procedure: LAPAROSCOPIC CHOLECYSTECTOMY;  Surgeon: Cruz Pearson MD;  Location: [facility] OR     NISSEN FUNDOPLICATION         ORTHOPEDIC SURGERY   2009     left hammertoe      REPAIR HAMMER TOE Right 11/9/2016     Procedure: REPAIR HAMMER TOE;  Surgeon: Janes Mcelroy MD;  Location: [facility] OR     SALPINGO OOPHORECTOMY,R/L/SERENA   1974     left, for tubal pregnancy     THYROIDECTOMY         for thyroid cancer            SOCIAL / FAMILY HISTORY  Social History            Social History     Marital status:        Spouse name: N/A     Number of children: N/A     Years of education: N/A              Social History Main Topics     Smoking status: Former Smoker       Years: 5.00       Types: Cigarettes       Start date: 9/15/1959       Quit date: 1/28/1983     Smokeless tobacco: Never Used     Alcohol use Yes          Comment: wine 1-2 glasses/day     Drug use: No     Sexual activity: Yes       Partners: Male       Birth control/ protection: Post-menopausal            Other Topics Concern     Caffeine Concern Yes       1-2 cups/day     Sleep Concern No     Stress Concern No     Weight Concern No     Special Diet No       avoids gluten     Exercise Yes       few times/wk - treadmill and weights - 1 hour     Seat Belt Yes          Social History Narrative     How much exercise per week? Walking 2x/wk and weight 2-3x/wk     How much calcium per day? Supplement + dairy                                                                      How much caffeine per day? 1 cup coffee     How much vitamin D per day? Supplement 2000IU/d     Do you/your family wear seatbelts?  Yes     Do you/your family use safety helmets? n/a     Do you/your family use sunscreen? Yes     Do you/your family keep firearms in the home? No     Do you/your family have a smoke detector(s)? Yes           Do you feel safe in your home? Yes     Has anyone ever touched you in an unwanted manner? No                            Explain      March 2, 2015 Lyle Tran LPN     Reviewed Neha Orourke MD, PhD     3/2/15     Neha Orourke MD, PhD      4/4/16                            Family History           Family History   Problem Relation Age of Onset     C.A.D. Father         MI at age 65     Asthma Father       Coronary Artery Disease Father       Alzheimer Disease Mother       OSTEOPOROSIS Mother       Depression Sister 80     Asthma Sister       Prostate Cancer Brother       Depression Son       Depression Sister       Coronary Artery Disease Brother 67     Skin Cancer No family hx of         no skin cancer            MEDICATIONS   Current Outpatient Rx    No current outpatient prescriptions on file.         Prescriptions Prior to Admission           Prescriptions Prior to Admission   Medication Sig Dispense Refill Last Dose     CALCIUM PO Form/strength unknown. Powder formulation. Add to water and fruits/vegetables daily to promote bone health.     Past Week at Unknown time     levothyroxine (SYNTHROID/LEVOTHROID) 37.5 mcg TABS half-tab Take 37.5 mcg by mouth every morning (before breakfast)     10/31/2017 at AM     triamterene-hydrochlorothiazide (MAXZIDE-25) 37.5-25 MG per tablet Take 1 tablet by mouth daily     10/31/2017 at AM     potassium 99 MG TABS Take 1 tablet by mouth 2 times daily      10/31/2017 at AM     timolol maleate (ISTALOL) 0.5 % opthalmic solution Place 1 drop into both eyes every morning Before placing contact lenses      10/31/2017 at AM     cyanocolbalamin (VITAMIN B-12) 500 MCG tablet Take 500 mcg by mouth daily.     Past Week at Unknown time     cholecalciferol (VITAMIN D) 1000 UNIT tablet Take 1 tablet by mouth 2 times daily      Past Week at Unknown time     zinc 50 MG TABS Take 1 tablet by mouth daily      More than a month at Unknown time     denosumab (PROLIA) 60 MG/ML SOLN injection Inject 1 mL (60 mg) Subcutaneous every 6 months Inject subcutaneously in upper arm, upper thigh or abdomen 1 mL 1 Taking     folic acid (FOLVITE) 400 MCG tablet Take 1 tablet by mouth daily.     More than a month at Unknown time          Current Outpatient Prescriptions    No current outpatient prescriptions on file.         ALLERGIES       Allergies   Allergen Reactions     Nkda [No Known Drug Allergies]           PHYSICAL EXAMINATION:  She appears well and younger than her stated age, with mild scleral icterus.    She has a superficial abrasion on her ankle due to trauma with some mild associated edema. The is no significant edema in the rest of the leg, or tenderness in the calf/thigh to suggest DVT.     DATA:          Recent Labs   Lab Test  11/02/17 1337 11/01/17   0727  10/31/17   1722    08/08/17   1143   NA  137   --   135   < >  140   POTASSIUM  3.7  3.7  3.2*   < >  3.6   CHLORIDE  104   --   99   < >  104   CO2  27   --   27   < >  28   ANIONGAP  7   --   9   < >  8   BUN  7   --   12   < >  11   CR  0.52   --   0.67   < >  0.68   GLC  127*   --   114*   < >  88   EMMY  8.6   --   9.3   < >  8.8   MAG   --   1.8   --    --   2.0   PHOS  3.0  2.3*   --    --   2.8    < > = values in this interval not displayed.             Recent Labs   Lab Test  11/02/17   1337  10/31/17   1722  10/31/17   1221  08/08/17   1143   WBC  10.8  9.1  10.8  6.4   HGB  10.8*  11.9  11.8  13.3   PLT  407  396  365  229   MCV  95  93  93  98   NEUTROPHIL   --   68.3  67.5  51.3            Recent Labs   Lab Test  11/02/17   1337  10/31/17   1722  10/31/17    1221   BILITOTAL  12.2*  12.9*  13.2*   ALKPHOS  832*  975*  1005*   ALT  233*  327*  331*   AST  188*  261*  284*   ALBUMIN  2.6*  3.0*  3.1*            TSH   Date Value Ref Range Status   10/31/2017 1.70 0.40 - 4.00 mU/L Final                 (prior to stent) 2700    Pathology:  SPECIMEN(S):   Duodenal mass   FINAL DIAGNOSIS:   Small intestine, duodenum, biopsy of mass   - Poorly differentiated adenocarcinoma with ulceration     CYTOLOGIC INTERPRETATION:   Liver, Lesion, Endoscopic ultrasound guided fine needle aspiration:   Positive for malignancy.   Adenocarcinoma, metastatic.      Asssessment/Plan:  # Metastatic adenocarcinoma (liver, duodenum, regional nodes), pancreatic primary. Normal performance status.    I had a greater than 1 hour discussion with the patient and her daughter, reviewing the natural history of pancreatic cancer, the incurable stage of her disease. We discussed expected survival of measured in months without treatment, an improved survival by a month and better quality of life with single agent chemotherapy, and a survival benefit of about 6 months  with even better quality of life with combination chemotherapy in spite of the greater toxicity. We discussed the nature of clinical trials and in particular our current phase III trial of gem/abraxane +/- hyaluronidase.    After reviewing her desires, it was clear combination therapy would be in her best interest and I reviewed the  Standard choices of gem/abraxane or FOLFIRINOX. I explained that these two are roughly equivalent, and either would be an accepable first line regimen. I reviewed the expected toxicities of both and of the added issues if she were treated on the research trial. She expressed interest in the trial.. To be eligible her tumor must overexpress the target, HA, and I believe her current biopsy is sufficient to do that testing.     Regardless of the treatment choice she still needs a little more time for her liver  function to normalize and her stent should be changed over to a permanent metal one.    She clearly needs enzyme replacement and I gave a prescription today with instructions for use. She no other immediate symptomatic needs, and feels she has sufficient psychosocial support.    I will see her back in one week at which point we can finalize the treatment plan.          Vinny Yu MD

## 2017-11-06 NOTE — MR AVS SNAPSHOT
After Visit Summary   11/6/2017    Iris Lewis    MRN: [id number]           Patient Information     Date Of Birth          1940        Visit Information        Provider Department      11/6/2017 9:45 AM Vinny Yu MD [facility]        Today's Diagnoses     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)    -  1       Follow-ups after your visit        Follow-up notes from your care team     Return in about 1 week (around 11/13/2017) for MD visit with labs.      Your next 10 appointments already scheduled     Nov 10, 2017  1:35 PM CST   (Arrive by 1:20 PM)   Return Visit with Brenton Rankin MD   [facility] ([facility])    [address] [place] [address] [id number] [phone]            Nov 13, 2017  8:45 AM CST   [facility] Lab Draw with  MASONIC LAB DRAW   [facility] Lab Draw ([facility])    [address] [place] [address] [id number] [phone]            Nov 13, 2017  9:15 AM CST   (Arrive by 9:00 AM)   Return Visit with Vinny Yu MD   [facility] ([facility])    [address] [place] [id number] [phone]            Dec 13, 2017  2:30 PM CST   (Arrive by 2:15 PM)   New Patient Visit with Jacob Noel PA-C   [facility] ([facility])    [address] [place] [address] [id number] [phone]              Who to contact     If you have questions or need follow up information about today's clinic visit or your schedule please contact [facility] directly at [phone].  Normal or non-critical lab and imaging results will be communicated to you by MyChart, letter or phone within 4 business days after the clinic has received the results. If you do not hear from us within 7  "days, please contact the clinic through MEEP or phone. If you have a critical or abnormal lab result, we will notify you by phone as soon as possible.  Submit refill requests through MEEP or call your pharmacy and they will forward the refill request to us. Please allow 3 business days for your refill to be completed.          Additional Information About Your Visit        [facility] Information     MEEP gives you secure access to your electronic health record. If you see a primary care provider, you can also send messages to your care team and make appointments. If you have questions, please call your primary care clinic.  If you do not have a primary care provider, please call [phone] and they will assist you.        Care EveryWhere ID     This is your Care EveryWhere ID. This could be used by other organizations to access your [place] medical records  [id number]        Your Vitals Were     Pulse Temperature Respirations Height Pulse Oximetry BMI (Body Mass Index)    78 97.8  F (36.6  C) (Oral) 16 1.6 m (5' 2.99\") 100% 20.5 kg/m2       Blood Pressure from Last 3 Encounters:   11/06/17 113/69   11/03/17 132/69   10/31/17 128/77    Weight from Last 3 Encounters:   11/06/17 52.5 kg (115 lb 11.2 oz)   10/31/17 52 kg (114 lb 11.2 oz)   10/31/17 52.2 kg (115 lb)              Today, you had the following     No orders found for display         Today's Medication Changes          These changes are accurate as of: 11/6/17 11:34 AM.  If you have any questions, ask your nurse or doctor.               Start taking these medicines.        Dose/Directions    amylase-lipase-protease 52266 UNITS Cpep   Commonly known as:  CREON   Used for:  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)   Started by:  Vinny Yu MD        Dose:  2 capsule   Take 2 capsules (72,000 Units) by mouth 3 times daily (with meals)   Quantity:  180 capsule   Refills:  1            Where to get your medicines      These medications " were sent to [id number] - [place], [place] - [address] AT [facility] & [address]  [address], [place] [address]     Phone:  [phone]     amylase-lipase-protease 56394 UNITS Cpep                Primary Care Provider Office Phone # Fax #    Neri Gipson [phone]       [address] [address]        Equal Access to Services     NESSA NICHOLS : Hadii aad ku hadasho Soomaali, waaxda luqadaha, qaybta kaalmada adeegyada, waxay idiin hayaan adeeg kharash laelana . So [facility] [phone].    ATENCIÓN: Si habla español, tiene a nava disposición servicios gratuitos de asistencia lingüística. [facility] [phone].    We comply with applicable federal civil rights laws and [place] laws. We do not discriminate on the basis of race, color, national origin, age, disability, sex, sexual orientation, or gender identity.            Thank you!     Thank you for choosing [facility]  for your care. Our goal is always to provide you with excellent care. Hearing back from our patients is one way we can continue to improve our services. Please take a few minutes to complete the written survey that you may receive in the mail after your visit with us. Thank you!             Your Updated Medication List - Protect others around you: Learn how to safely use, store and throw away your medicines at www.disposemymeds.org.          This list is accurate as of: 11/6/17 11:34 AM.  Always use your most recent med list.                   Brand Name Dispense Instructions for use Diagnosis    amylase-lipase-protease 21683 UNITS Cpep    CREON    180 capsule    Take 2 capsules (72,000 Units) by mouth 3 times daily (with meals)    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)       CALCIUM PO      Form/strength unknown. Powder formulation. Add to water and fruits/vegetables daily to promote bone health.        camphor-menthol 0.5-0.5 % Lotn    DERMASARRA     59 mL    Apply 1 mL topically every 6 hours as needed for skin care        cholecalciferol 1000 UNIT tablet    vitamin D3     Take 1 tablet by mouth 2 times daily        cyanocolbalamin 500 MCG tablet    vitamin  B-12     Take 500 mcg by mouth daily.        denosumab 60 MG/ML Soln injection    PROLIA    1 mL    Inject 1 mL (60 mg) Subcutaneous every 6 months Inject subcutaneously in upper arm, upper thigh or abdomen    Senile osteoporosis       folic acid 400 MCG tablet    FOLVITE     Take 1 tablet by mouth daily.        hydrOXYzine 25 MG tablet    ATARAX    90 tablet    Take 1-2 tablets (25-50 mg) by mouth every 6 hours as needed for itching (adjuvant pain)        levothyroxine 37.5 mcg Tabs half-tab    SYNTHROID/LEVOTHROID     Take 37.5 mcg by mouth every morning (before breakfast)        ondansetron 4 MG tablet    ZOFRAN    60 tablet    Take 1 tablet (4 mg) by mouth every 6 hours as needed for nausea        potassium 99 MG Tabs      Take 1 tablet by mouth 2 times daily        timolol maleate 0.5 % opthalmic solution    ISTALOL     Place 1 drop into both eyes every morning Before placing contact lenses        traMADol 50 MG tablet    ULTRAM    20 tablet    Take 0.5-1 tablets (25-50 mg) by mouth every 6 hours as needed for breakthrough pain        triamterene-hydrochlorothiazide 37.5-25 MG per tablet    MAXZIDE-25     Take 1 tablet by mouth daily        zinc 50 MG Tabs      Take 1 tablet by mouth daily

## 2017-11-11 ENCOUNTER — TELEPHONE (OUTPATIENT)
Dept: OTHER | Facility: CLINIC | Age: 77
End: 2017-11-11

## 2017-11-11 ENCOUNTER — NURSE TRIAGE (OUTPATIENT)
Dept: NURSING | Facility: CLINIC | Age: 77
End: 2017-11-11

## 2017-11-11 DIAGNOSIS — K86.89 PANCREATIC MASS: ICD-10-CM

## 2017-11-11 RX ORDER — TRAMADOL HYDROCHLORIDE 50 MG/1
25-50 TABLET ORAL EVERY 6 HOURS PRN
Qty: 20 TABLET | Refills: 0 | Status: SHIPPED | OUTPATIENT
Start: 2017-11-11 | End: 2017-11-16

## 2017-11-11 NOTE — TELEPHONE ENCOUNTER
Pt called about running out of tramadol.    Recently diagnosed pancreatic cancer.     Refilled Tramadol by hand here at [facility] for patient or daughter to     Herb Mcfarland MD   Hematology / Oncology Fellow  P: [phone]

## 2017-11-13 ENCOUNTER — OFFICE VISIT (OUTPATIENT)
Dept: FAMILY MEDICINE | Facility: CLINIC | Age: 77
End: 2017-11-13

## 2017-11-13 ENCOUNTER — ONCOLOGY VISIT (OUTPATIENT)
Dept: ONCOLOGY | Facility: CLINIC | Age: 77
End: 2017-11-13
Attending: INTERNAL MEDICINE
Payer: MEDICARE

## 2017-11-13 ENCOUNTER — CARE COORDINATION (OUTPATIENT)
Dept: ONCOLOGY | Facility: CLINIC | Age: 77
End: 2017-11-13

## 2017-11-13 VITALS
RESPIRATION RATE: 18 BRPM | TEMPERATURE: 97.9 F | DIASTOLIC BLOOD PRESSURE: 58 MMHG | BODY MASS INDEX: 20.41 KG/M2 | SYSTOLIC BLOOD PRESSURE: 120 MMHG | OXYGEN SATURATION: 95 % | HEART RATE: 68 BPM | HEIGHT: 63 IN | WEIGHT: 115.2 LBS

## 2017-11-13 VITALS
DIASTOLIC BLOOD PRESSURE: 62 MMHG | OXYGEN SATURATION: 89 % | WEIGHT: 115 LBS | RESPIRATION RATE: 20 BRPM | BODY MASS INDEX: 20.38 KG/M2 | SYSTOLIC BLOOD PRESSURE: 118 MMHG | HEART RATE: 57 BPM

## 2017-11-13 DIAGNOSIS — C25.0 MALIGNANT NEOPLASM OF HEAD OF PANCREAS (H): Primary | ICD-10-CM

## 2017-11-13 DIAGNOSIS — Z91.81 AT RISK FOR FALLING: Primary | ICD-10-CM

## 2017-11-13 DIAGNOSIS — K85.00 IDIOPATHIC ACUTE PANCREATITIS, UNSPECIFIED COMPLICATION STATUS: ICD-10-CM

## 2017-11-13 LAB
ALBUMIN SERPL-MCNC: 3 G/DL (ref 3.4–5)
ALP SERPL-CCNC: 328 U/L (ref 40–150)
ALT SERPL W P-5'-P-CCNC: 57 U/L (ref 0–50)
ANION GAP SERPL CALCULATED.3IONS-SCNC: 8 MMOL/L (ref 3–14)
AST SERPL W P-5'-P-CCNC: 40 U/L (ref 0–45)
BASOPHILS # BLD AUTO: 0.1 10E9/L (ref 0–0.2)
BASOPHILS NFR BLD AUTO: 0.5 %
BILIRUB SERPL-MCNC: 2.2 MG/DL (ref 0.2–1.3)
BUN SERPL-MCNC: 9 MG/DL (ref 7–30)
CALCIUM SERPL-MCNC: 8.5 MG/DL (ref 8.5–10.1)
CHLORIDE SERPL-SCNC: 100 MMOL/L (ref 94–109)
CO2 SERPL-SCNC: 28 MMOL/L (ref 20–32)
CREAT SERPL-MCNC: 0.67 MG/DL (ref 0.52–1.04)
DIFFERENTIAL METHOD BLD: ABNORMAL
EOSINOPHIL # BLD AUTO: 0.3 10E9/L (ref 0–0.7)
EOSINOPHIL NFR BLD AUTO: 1.7 %
ERYTHROCYTE [DISTWIDTH] IN BLOOD BY AUTOMATED COUNT: 14.4 % (ref 10–15)
GFR SERPL CREATININE-BSD FRML MDRD: 86 ML/MIN/1.7M2
GLUCOSE SERPL-MCNC: 105 MG/DL (ref 70–99)
HCT VFR BLD AUTO: 33.2 % (ref 35–47)
HGB BLD-MCNC: 10.8 G/DL (ref 11.7–15.7)
IMM GRANULOCYTES # BLD: 0.1 10E9/L (ref 0–0.4)
IMM GRANULOCYTES NFR BLD: 0.5 %
LIPASE SERPL-CCNC: 3556 U/L (ref 73–393)
LYMPHOCYTES # BLD AUTO: 1 10E9/L (ref 0.8–5.3)
LYMPHOCYTES NFR BLD AUTO: 6.6 %
MCH RBC QN AUTO: 32.3 PG (ref 26.5–33)
MCHC RBC AUTO-ENTMCNC: 32.5 G/DL (ref 31.5–36.5)
MCV RBC AUTO: 99 FL (ref 78–100)
MONOCYTES # BLD AUTO: 1.4 10E9/L (ref 0–1.3)
MONOCYTES NFR BLD AUTO: 9.6 %
NEUTROPHILS # BLD AUTO: 12.1 10E9/L (ref 1.6–8.3)
NEUTROPHILS NFR BLD AUTO: 81.1 %
NRBC # BLD AUTO: 0 10*3/UL
NRBC BLD AUTO-RTO: 0 /100
PLATELET # BLD AUTO: 375 10E9/L (ref 150–450)
POTASSIUM SERPL-SCNC: 3.6 MMOL/L (ref 3.4–5.3)
PROT SERPL-MCNC: 7 G/DL (ref 6.8–8.8)
RBC # BLD AUTO: 3.34 10E12/L (ref 3.8–5.2)
SODIUM SERPL-SCNC: 136 MMOL/L (ref 133–144)
WBC # BLD AUTO: 14.9 10E9/L (ref 4–11)

## 2017-11-13 PROCEDURE — 99212 OFFICE O/P EST SF 10 MIN: CPT

## 2017-11-13 PROCEDURE — 80053 COMPREHEN METABOLIC PANEL: CPT | Performed by: FAMILY MEDICINE

## 2017-11-13 PROCEDURE — 85025 COMPLETE CBC W/AUTO DIFF WBC: CPT | Performed by: FAMILY MEDICINE

## 2017-11-13 PROCEDURE — 99204 OFFICE O/P NEW MOD 45 MIN: CPT | Mod: ZP | Performed by: INTERNAL MEDICINE

## 2017-11-13 PROCEDURE — 83690 ASSAY OF LIPASE: CPT | Performed by: FAMILY MEDICINE

## 2017-11-13 PROCEDURE — 99211 OFF/OP EST MAY X REQ PHY/QHP: CPT | Mod: ZF

## 2017-11-13 ASSESSMENT — PAIN SCALES - GENERAL
PAINLEVEL: NO PAIN (0)
PAINLEVEL: MILD PAIN (2)

## 2017-11-13 NOTE — NURSING NOTE
Chief Complaint   Patient presents with     Hospital F/U     hospital follow up (10/31/17-11/3/17) for pancreatic cancer   Chely Vergara LPN 8:17 AM on 11/13/2017

## 2017-11-13 NOTE — LETTER
11/13/2017      RE: Iris Lewis  [address]  [place] [address]       Gloria Lewis is here today in follow-up of newly diagnosed metastatic pancreatic cancer.      Since I saw her last week she says her urine has returned to normal color, her stools have normalized, and she is having only minor abdominal pain for which she uses 2-3 tramadol per day. She is having no fevers chills or sweats. She still feeling well enough that she went back to work yesterday for about 3 hours and didn't have any difficulty with that. She tells me today that discussing things more with her family, she believes her grandmother may have also had pancreatic cancer.    On exam today she appears quite well and no longer jaundiced. I did not otherwise examine her today.    Reviewed with her her lab work from today which shows her bilirubin is almost normal, now down to 2.2. Her albumin is improved now up to 3 and the rest of her lab work is unremarkable.    Assessment/plan:     Metastatic pancreatic cancer with a normal performance status. The patient's  was with her today and her daughter was with her last week participate at today's visit by phone. I reviewed our discussion last week about the palliative nature of her treatment, and the various options and expectations around. She would very much like to pursue our halo trial and I went over more of the details of that with her today in more detail. To qualify she must overexpress HA in her tumor, and I reviewed with gastroenterology that the biopsy obtained endoscopically of her liver lesion was indeed a core needle biopsy, so that should be adequate for HA testing. She will be meeting with the research nurse to go through the study in more detail, and we'll arrange for her tumor testing to get done this week. I don't anticipate we'll be ready to start treatment until just after the Thanksgiving holiday.    We went over her symptoms again today. She has  relatively little pain and is happy with her current pain regimen. She says psychologically she is doing great and doesn't require any additional support in that regard. She is back to eating well and feels like her bowel function is returning to normal now that she is on enzyme replacement.    Her bilirubin is nearly back to normal, and we're waiting for placement of her temporary biliary stent with a permanent metal stent.    I reviewed her family history again a little bit, and I think there is enough there that it would be worth pursuing a discussion with genetic counselor and we'll arrange that one of our future visits.    Total visit time today approximately 40 minutes spent on the above discussion.    Addendum: her tumor does not have sufficient HA expression to be eligible for the halozyme study, so we will proceed with standard gem/abraxane.    Vinny Yu MD

## 2017-11-13 NOTE — NURSING NOTE
Patient and spouse were oriented to the HALO 3 study, 2016 .  Questions were answered to the best of my abilities.  Patient verbalized understanding we may need to obtain a new biopsy pending the adequacy of the bx we have.  That block will be sent to the sponsor asap in tandem with completing the rest of the screening tests.  Patient also understands she will be eligible for the study if her tissue is found to have high HA levels.  Otherwise, she will begin standard of care treatment with abraxane and gemcitabine.      Patient understands she may withdraw from the study at any time.  She says she is anxious to get started.      Patient was given a copy of the consent form for her records.  She will be contacted for screening procedure appointments.  Patient also has my contact information.    Ligia Torrez RN  Clinical Research Coordinator

## 2017-11-13 NOTE — MR AVS SNAPSHOT
After Visit Summary   11/13/2017    Iris Lewis    MRN: [id number]           Patient Information     Date Of Birth          1940        Visit Information        Provider Department      11/13/2017 9:15 AM Vinny Yu MD [facility]        Today's Diagnoses     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)    -  1       Follow-ups after your visit        Follow-up notes from your care team     Return for TBD.      Your next 10 appointments already scheduled     Nov 28, 2017  8:00 AM CST   [facility] Lab Draw with  MASONIC LAB DRAW   [facility] Lab Draw ([facility])    [address] [place] [address]  2nd [facility] [phone] [phone]            Nov 28, 2017  8:30 AM CST   Infusion 120 with UC ONCOLOGY INFUSION, UC 16 ATC   [facility] ([facility])    [address] [place] [address] [phone] [phone]            Dec 01, 2017  7:05 AM CST   (Arrive by 6:50 AM)   Return Visit with Neri Gipson MD   [facility] ([facility])    [address] [place] [address]  4th [facility] [phone]              Who to contact     If you have questions or need follow up information about today's clinic visit or your schedule please contact [facility] directly at [phone].  Normal or non-critical lab and imaging results will be communicated to you by MyChart, letter or phone within 4 business days after the clinic has received the results. If you do not hear from us within 7 days, please contact the clinic through MyChart or phone. If you have a critical or abnormal lab result, we will notify you by phone as soon as possible.  Submit refill requests through Rippld or call your pharmacy and they will forward the refill request to us. Please allow 3 business days for your refill to be completed.     "      Additional Information About Your Visit        MyChart Information     [employer] gives you secure access to your electronic health record. If you see a primary care provider, you can also send messages to your care team and make appointments. If you have questions, please call your primary care clinic.  If you do not have a primary care provider, please call [phone] and they will assist you.        Care EveryWhere ID     This is your Care EveryWhere ID. This could be used by other organizations to access your [place] medical records  [id number]        Your Vitals Were     Pulse Temperature Respirations Height Pulse Oximetry BMI (Body Mass Index)    68 97.9  F (36.6  C) (Oral) 18 1.6 m (5' 2.99\") 95% 20.41 kg/m2       Blood Pressure from Last 3 Encounters:   11/22/17 118/66   11/15/17 115/64   11/13/17 120/58    Weight from Last 3 Encounters:   11/22/17 49.8 kg (109 lb 12.6 oz)   11/15/17 52 kg (114 lb 11.2 oz)   11/13/17 52.3 kg (115 lb 3.2 oz)              Today, you had the following     No orders found for display       Primary Care Provider Office Phone # Fax #    Neri Gipson [phone]       [address] [address]        Equal Access to Services     NESSA NICHOLS : Hadii aad ku hadasho Soomaali, waaxda luqadaha, qaybta kaalmada adeegyada, lisa michael. So [facility] [phone].    ATENCIÓN: Si habla español, tiene a nava disposición servicios gratuitos de asistencia lingüística. Llame al [phone].    We comply with applicable federal civil rights laws and [place] laws. We do not discriminate on the basis of race, color, national origin, age, disability, sex, sexual orientation, or gender identity.            Thank you!     Thank you for choosing [facility]  for your care. Our goal is always to provide you with excellent care. Hearing back from our patients is one way we can continue to improve our " services. Please take a few minutes to complete the written survey that you may receive in the mail after your visit with us. Thank you!             Your Updated Medication List - Protect others around you: Learn how to safely use, store and throw away your medicines at www.disposemymeds.org.          This list is accurate as of: 11/13/17 11:59 PM.  Always use your most recent med list.                   Brand Name Dispense Instructions for use Diagnosis    amylase-lipase-protease 64035 UNITS Cpep    CREON    180 capsule    Take 2 capsules (72,000 Units) by mouth 3 times daily (with meals)    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)       CALCIUM PO      Take by mouth every morning Form/strength unknown. Powder formulation. Add to water and fruits/vegetables daily to promote bone health.        camphor-menthol 0.5-0.5 % Lotn    DERMASARRA    59 mL    Apply 1 mL topically every 6 hours as needed for skin care        cholecalciferol 1000 UNIT tablet    vitamin D3     Take 1 tablet by mouth 2 times daily        cyanocolbalamin 500 MCG tablet    vitamin  B-12     Take 500 mcg by mouth every morning        denosumab 60 MG/ML Soln injection    PROLIA    1 mL    Inject 1 mL (60 mg) Subcutaneous every 6 months Inject subcutaneously in upper arm, upper thigh or abdomen    Senile osteoporosis       folic acid 400 MCG tablet    FOLVITE     Take 1 tablet by mouth every morning        hydrOXYzine 25 MG tablet    ATARAX    90 tablet    Take 1-2 tablets (25-50 mg) by mouth every 6 hours as needed for itching (adjuvant pain)        levothyroxine 37.5 mcg Tabs half-tab    SYNTHROID/LEVOTHROID     Take 37.5 mcg by mouth every morning (before breakfast)        ondansetron 4 MG tablet    ZOFRAN    60 tablet    Take 1 tablet (4 mg) by mouth every 6 hours as needed for nausea        potassium 99 MG Tabs      Take 1 tablet by mouth 2 times daily        timolol maleate 0.5 % opthalmic solution    ISTALOL     Place 1 drop into both eyes  every morning Before placing contact lenses        triamterene-hydrochlorothiazide 37.5-25 MG per tablet    MAXZIDE-25     Take 1 tablet by mouth every morning        zinc 50 MG Tabs      Take 1 tablet by mouth every morning

## 2017-11-13 NOTE — PATIENT INSTRUCTIONS
[facility]: [phone]     [facility] Medication Refill Request Information:  * Please contact your pharmacy regarding ANY request for medication refills.  ** [facility] Prescription Fax = [phone]  * Please allow 3 business days for routine medication refills.  * Please allow 5 business days for controlled substance medication refills.     [facility] Test Result notification information:  *You will be notified with in 7-10 days of your appointment day regarding the results of your test.  If you are on MyChart you will be notified as soon as the provider has reviewed the results and signed off on them.

## 2017-11-13 NOTE — NURSING NOTE
"Oncology Rooming Note    November 13, 2017 10:03 AM   Iris Lewis is a 77 year old female who presents for:    Chief Complaint   Patient presents with     Blood Draw     Labs drawn via  by RN. VS taken.     RECHECK     Malignant neoplasm of head of pancreas      Initial Vitals: /58 (BP Location: Right arm, Patient Position: Sitting, Cuff Size: Adult Regular)  Pulse 68  Temp 97.9  F (36.6  C) (Oral)  Resp 18  Ht 1.6 m (5' 2.99\")  Wt 52.3 kg (115 lb 3.2 oz)  SpO2 95%  BMI 20.41 kg/m2 Estimated body mass index is 20.41 kg/(m^2) as calculated from the following:    Height as of this encounter: 1.6 m (5' 2.99\").    Weight as of this encounter: 52.3 kg (115 lb 3.2 oz). Body surface area is 1.52 meters squared.  No Pain (0) Comment: Data Unavailable   No LMP recorded. Patient is postmenopausal.  Allergies reviewed: Yes  Medications reviewed: Yes    Medications: Medication refills not needed today.  Pharmacy name entered into [employer]:    [facility] - [facility] [address] [facility] - [address] AT [facility] OF [place] [address]    Clinical concerns:  No new concerns  Provider was notified.    5 minutes for nursing intake (face to face time)     Hilda Sutton MA              "

## 2017-11-13 NOTE — PROGRESS NOTES
Health records and scans sent to [facility]: Medical Oncology [address] [place] [address]

## 2017-11-13 NOTE — NURSING NOTE
Chief Complaint   Patient presents with     Blood Draw     Labs drawn via  by RN. VS taken.     RECHECK     Malignant neoplasm of head of pancreas      Lisa Mcknight RN

## 2017-11-13 NOTE — MR AVS SNAPSHOT
After Visit Summary   11/13/2017    Iris Lewis    MRN: [id number]           Patient Information     Date Of Birth          1940        Visit Information        Provider Department      11/13/2017 8:05 AM Brenton Rankin MD [facility]        Today's Diagnoses     At risk for falling    -  1    Idiopathic acute pancreatitis, unspecified complication status          Care Instructions    Primary Care Center: [phone]     Primary Care Center Medication Refill Request Information:  * Please contact your pharmacy regarding ANY request for medication refills.  ** [facility] Prescription Fax = [phone]  * Please allow 3 business days for routine medication refills.  * Please allow 5 business days for controlled substance medication refills.     Primary Care Center Test Result notification information:  *You will be notified with in 7-10 days of your appointment day regarding the results of your test.  If you are on MyChart you will be notified as soon as the provider has reviewed the results and signed off on them.          Follow-ups after your visit        Your next 10 appointments already scheduled     Nov 13, 2017  9:15 AM CST   (Arrive by 9:00 AM)   Return Visit with Vinny Yu MD   [facility] ([facility])    [address] [place] [address]  2nd [facility] [id number] [phone]            Dec 01, 2017  7:05 AM CST   (Arrive by 6:50 AM)   Return Visit with Neri Gipson MD   [facility] ([facility])    [address] [place] [address]  4th [facility] [id number] [phone]              Future tests that were ordered for you today     Open Future Orders        Priority Expected Expires Ordered    CBC with platelets differential Routine 11/13/2017 11/27/2017 11/13/2017    Comprehensive metabolic panel Routine 11/13/2017 11/27/2017 11/13/2017    Lipase Routine  11/13/2017 11/27/2017 11/13/2017            Who to contact     Please call your clinic at [phone] to:    Ask questions about your health    Make or cancel appointments    Discuss your medicines    Learn about your test results    Speak to your doctor   If you have compliments or concerns about an experience at your clinic, or if you wish to file a complaint, please contact [facility] Patient Relations at [phone] or email us at [email or web address]         Additional Information About Your Visit        Ad.IQhart Information     pluriSelect gives you secure access to your electronic health record. If you see a primary care provider, you can also send messages to your care team and make appointments. If you have questions, please call your primary care clinic.  If you do not have a primary care provider, please call [phone] and they will assist you.      pluriSelect is an electronic gateway that provides easy, online access to your medical records. With pluriSelect, you can request a clinic appointment, read your test results, renew a prescription or communicate with your care team.     To access your existing account, please contact your [facility] or call [phone] for assistance.        Care EveryWhere ID     This is your Care EveryWhere ID. This could be used by other organizations to access your [place] medical records  [id number]        Your Vitals Were     Pulse Respirations Pulse Oximetry BMI (Body Mass Index)          57 20 89% 20.38 kg/m2         Blood Pressure from Last 3 Encounters:   11/13/17 118/62   11/06/17 113/69   11/03/17 132/69    Weight from Last 3 Encounters:   11/13/17 52.2 kg (115 lb)   11/06/17 52.5 kg (115 lb 11.2 oz)   10/31/17 52 kg (114 lb 11.2 oz)               Primary Care Provider Office Phone # Fax #    Neri Gipson [phone]       [address] [address]         Equal Access to Services     [facility] : Hadii mary simms madison Martinez, waronnyda luqestelita, qayesy kaadelaidalisa francois. So [facility] [phone].    ATENCIÓN: Si habla mechelleañol, tiene a nava disposición servicios gratuitos de asistencia lingüística. Sergeyame al [phone].    We comply with applicable federal civil rights laws and [place] laws. We do not discriminate on the basis of race, color, national origin, age, disability, sex, sexual orientation, or gender identity.            Thank you!     Thank you for choosing [facility]  for your care. Our goal is always to provide you with excellent care. Hearing back from our patients is one way we can continue to improve our services. Please take a few minutes to complete the written survey that you may receive in the mail after your visit with us. Thank you!             Your Updated Medication List - Protect others around you: Learn how to safely use, store and throw away your medicines at www.disposemymeds.org.          This list is accurate as of: 11/13/17  8:58 AM.  Always use your most recent med list.                   Brand Name Dispense Instructions for use Diagnosis    amylase-lipase-protease 95550 UNITS Cpep    CREON    180 capsule    Take 2 capsules (72,000 Units) by mouth 3 times daily (with meals)    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)       CALCIUM PO      Form/strength unknown. Powder formulation. Add to water and fruits/vegetables daily to promote bone health.        camphor-menthol 0.5-0.5 % Lotn    DERMASARRA    59 mL    Apply 1 mL topically every 6 hours as needed for skin care        cholecalciferol 1000 UNIT tablet    vitamin D3     Take 1 tablet by mouth 2 times daily        cyanocolbalamin 500 MCG tablet    vitamin  B-12     Take 500 mcg by mouth daily.        denosumab 60 MG/ML Soln injection    PROLIA    1 mL    Inject 1 mL (60 mg) Subcutaneous every 6 months Inject subcutaneously  in upper arm, upper thigh or abdomen    Senile osteoporosis       folic acid 400 MCG tablet    FOLVITE     Take 1 tablet by mouth daily.        hydrOXYzine 25 MG tablet    ATARAX    90 tablet    Take 1-2 tablets (25-50 mg) by mouth every 6 hours as needed for itching (adjuvant pain)        levothyroxine 37.5 mcg Tabs half-tab    SYNTHROID/LEVOTHROID     Take 37.5 mcg by mouth every morning (before breakfast)        ondansetron 4 MG tablet    ZOFRAN    60 tablet    Take 1 tablet (4 mg) by mouth every 6 hours as needed for nausea        potassium 99 MG Tabs      Take 1 tablet by mouth 2 times daily        timolol maleate 0.5 % opthalmic solution    ISTALOL     Place 1 drop into both eyes every morning Before placing contact lenses        traMADol 50 MG tablet    ULTRAM    20 tablet    Take 0.5-1 tablets (25-50 mg) by mouth every 6 hours as needed for breakthrough pain    Pancreatic mass       triamterene-hydrochlorothiazide 37.5-25 MG per tablet    MAXZIDE-25     Take 1 tablet by mouth daily        zinc 50 MG Tabs      Take 1 tablet by mouth daily

## 2017-11-13 NOTE — Clinical Note
11/13/2017       RE: Iris Lewis  [address] [place] [address]     Dear Colleague,    Thank you for referring your patient, Iris Lewis, to the [facility]. Please see a copy of my visit note below.    Gloria Lewis is here today in follow-up of newly diagnosed metastatic pancreatic cancer.      Since I saw her last week she says her urine has returned to normal color, her stools have normalized, and she is having only minor abdominal pain for which she uses 2-3 tramadol per day. She is having no fevers chills or sweats. She still feeling well enough that she went back to work yesterday for about 3 hours and didn't have any difficulty with that. She tells me today that discussing things more with her family, she believes her grandmother may have also had pancreatic cancer.    On exam today she appears quite well and no longer jaundiced. I did not otherwise examine her today.    Reviewed with her her lab work from today which shows her bilirubin is almost normal, now down to 2.2. Her albumin is improved now up to 3 and the rest of her lab work is unremarkable.    Assessment/plan:     Metastatic pancreatic cancer with a normal performance status. The patient's  was with her today and her daughter was with her last week participate at today's visit by phone. I reviewed our discussion last week about the palliative nature of her treatment, and the various options and expectations around. She would very much like to pursue our halo trial and I went over more of the details of that with her today in more detail. To qualify she must overexpress HA in her tumor, and I reviewed with gastroenterology that the biopsy obtained endoscopically of her liver lesion was indeed a core needle biopsy, so that should be adequate for HA testing. She will be meeting with the research nurse to go through the study in more detail, and we'll arrange for her tumor testing to get done this  week. I don't anticipate we'll be ready to start treatment until just after the Thanksgiving holiday.    We went over her symptoms again today. She has relatively little pain and is happy with her current pain regimen. She says psychologically she is doing great and doesn't require any additional support in that regard. She is back to eating well and feels like her bowel function is returning to normal now that she is on enzyme replacement.    Her bilirubin is nearly back to normal, and we're waiting for placement of her temporary biliary stent with a permanent metal stent.    I reviewed her family history again a little bit, and I think there is enough there that it would be worth pursuing a discussion with genetic counselor and we'll arrange that one of our future visits.    Total visit time today approximately 40 minutes spent on the above discussion.    Again, thank you for allowing me to participate in the care of your patient.      Sincerely,    Vinny Yu MD

## 2017-11-13 NOTE — PROGRESS NOTES
SUBJECTIVE:    Pt is a 77 year old female with pmh of     Patient Active Problem List   Diagnosis     Postsurgical hypothyroidism     Malignant neoplasm of thyroid gland (H)     Essential hypertension, benign     Osteoporosis, post-menopausal     S/P Nissen fundoplication (without gastrostomy tube) procedure     HTN, goal below 150/90     Pain of toe of right foot     Advance care planning     Abdominal pain     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)       who is here for evaluation of had concerns including Hospital F/U.    Here in hospital f/u.  No pain. No n/v, diarrhea. 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 Kids supportive.     Sees oncology about an hour after me today; she's thinking she'll do chemo, not curative but could extend life according to initial onco visit.     Allergies   Allergen Reactions     Nkda [No Known Drug Allergies]            Current Outpatient Prescriptions   Medication Sig Dispense Refill     traMADol (ULTRAM) 50 MG tablet Take 0.5-1 tablets (25-50 mg) by mouth every 6 hours as needed for breakthrough pain 20 tablet 0     amylase-lipase-protease (CREON) 37535 UNITS CPEP Take 2 capsules (72,000 Units) by mouth 3 times daily (with meals) 180 capsule 1     hydrOXYzine (ATARAX) 25 MG tablet Take 1-2 tablets (25-50 mg) by mouth every 6 hours as needed for itching (adjuvant pain) 90 tablet 0     camphor-menthol (DERMASARRA) 0.5-0.5 % LOTN Apply 1 mL topically every 6 hours as needed for skin care 59 mL 0     ondansetron (ZOFRAN) 4 MG tablet Take 1 tablet (4 mg) by mouth every 6 hours as needed for nausea 60 tablet 0     CALCIUM PO Form/strength unknown. Powder formulation. Add to water and fruits/vegetables daily to promote bone health.       levothyroxine (SYNTHROID/LEVOTHROID) 37.5 mcg TABS half-tab Take 37.5 mcg by mouth every morning (before breakfast)       triamterene-hydrochlorothiazide (MAXZIDE-25) 37.5-25 MG per  tablet Take 1 tablet by mouth daily       potassium 99 MG TABS Take 1 tablet by mouth 2 times daily        timolol maleate (ISTALOL) 0.5 % opthalmic solution Place 1 drop into both eyes every morning Before placing contact lenses       zinc 50 MG TABS Take 1 tablet by mouth daily        denosumab (PROLIA) 60 MG/ML SOLN injection Inject 1 mL (60 mg) Subcutaneous every 6 months Inject subcutaneously in upper arm, upper thigh or abdomen 1 mL 1     cyanocolbalamin (VITAMIN B-12) 500 MCG tablet Take 500 mcg by mouth daily.       folic acid (FOLVITE) 400 MCG tablet Take 1 tablet by mouth daily.       cholecalciferol (VITAMIN D) 1000 UNIT tablet Take 1 tablet by mouth 2 times daily          Social History   Substance Use Topics     Smoking status: Former Smoker     Years: 5.00     Types: Cigarettes     Start date: 9/15/1959     Quit date: 1/28/1983     Smokeless tobacco: Never Used     Alcohol use Yes      Comment: wine 1-2 glasses/day           OBJECTIVE:  /62 (BP Location: Right arm, Patient Position: Sitting, Cuff Size: Adult Small)  Pulse 57  Resp 20  Wt 52.2 kg (115 lb)  SpO2 (!) 89%  BMI 20.38 kg/m2  GENERAL APPEARANCE: Alert, no acute distress  No jaundice  ABDOMEN: soft, no organomegaly, masses or tenderness  NEURO: Alert, oriented, speech and mentation normal  PSYCHE: mentation appears normal, affect and mood normal    ASSESSMENT/PLAN:    Hypothyroid: recent tsh normal  Metastatic pancreas ca: will order cbc, cmet, lipase. Sees onco right after me today further eval/treat per them. Currrently stable clinicallly. Took 2 tramadol all weekend.  She and Dr Gipson spoke today, she'll see him later this mo in [facility].    TC PAREDES MD

## 2017-11-14 ENCOUNTER — CARE COORDINATION (OUTPATIENT)
Dept: GASTROENTEROLOGY | Facility: CLINIC | Age: 77
End: 2017-11-14

## 2017-11-14 ENCOUNTER — TELEPHONE (OUTPATIENT)
Dept: GASTROENTEROLOGY | Facility: CLINIC | Age: 77
End: 2017-11-14

## 2017-11-14 DIAGNOSIS — Z00.6 RESEARCH STUDY PATIENT: ICD-10-CM

## 2017-11-14 DIAGNOSIS — C25.0 MALIGNANT NEOPLASM OF HEAD OF PANCREAS (H): Primary | ICD-10-CM

## 2017-11-14 DIAGNOSIS — C25.9 PANCREATIC CANCER (H): Primary | ICD-10-CM

## 2017-11-14 NOTE — PROGRESS NOTES
Message received from Dr. Yu's office asking for stent exchange in the next week. Any provider.     Ok per Dr. Izaguirre to add to his schedule.     Order placed for ERCP and sent to scheduling.   Message left for Iris to call with any questions/concerns about the procedure and advised she will be called with date and time for procedure next week by scheduling.       Lyn MARADIAGA RN Coordinator  Dr. Mcneil, Dr. Izaguirre & Dr. Holden   [facility]  [phone]

## 2017-11-14 NOTE — TELEPHONE ENCOUNTER
Iris is informed that she is scheduled for an ERCP with Dr. Izaguirre on 11/22/2017 at 8:15 AM with an arrival time of 6:15 AM.  Patient is scheduled for a PAC consult on 11/15/2017.  Her  will be driving her and also closely monitoring her for at least 24 hours post.   Direct line given to pt for any questions.     SR 11.14.17 @ 244p

## 2017-11-15 ENCOUNTER — ALLIED HEALTH/NURSE VISIT (OUTPATIENT)
Dept: SURGERY | Facility: CLINIC | Age: 77
End: 2017-11-15

## 2017-11-15 ENCOUNTER — ANESTHESIA EVENT (OUTPATIENT)
Dept: SURGERY | Facility: CLINIC | Age: 77
End: 2017-11-15
Payer: MEDICARE

## 2017-11-15 ENCOUNTER — OFFICE VISIT (OUTPATIENT)
Dept: SURGERY | Facility: CLINIC | Age: 77
End: 2017-11-15

## 2017-11-15 VITALS
RESPIRATION RATE: 16 BRPM | OXYGEN SATURATION: 94 % | HEIGHT: 62 IN | BODY MASS INDEX: 21.11 KG/M2 | TEMPERATURE: 98.1 F | WEIGHT: 114.7 LBS | HEART RATE: 73 BPM | SYSTOLIC BLOOD PRESSURE: 115 MMHG | DIASTOLIC BLOOD PRESSURE: 64 MMHG

## 2017-11-15 DIAGNOSIS — Z00.6 RESEARCH STUDY PATIENT: ICD-10-CM

## 2017-11-15 DIAGNOSIS — C25.0 MALIGNANT NEOPLASM OF HEAD OF PANCREAS (H): ICD-10-CM

## 2017-11-15 DIAGNOSIS — Z01.818 PREOP EXAMINATION: Primary | ICD-10-CM

## 2017-11-15 LAB
ALBUMIN SERPL-MCNC: 2.9 G/DL (ref 3.4–5)
ALP SERPL-CCNC: 282 U/L (ref 40–150)
ALT SERPL W P-5'-P-CCNC: 46 U/L (ref 0–50)
ANION GAP SERPL CALCULATED.3IONS-SCNC: 4 MMOL/L (ref 3–14)
APTT PPP: 29 SEC (ref 22–37)
AST SERPL W P-5'-P-CCNC: 31 U/L (ref 0–45)
BASOPHILS # BLD AUTO: 0.1 10E9/L (ref 0–0.2)
BASOPHILS NFR BLD AUTO: 0.5 %
BILIRUB SERPL-MCNC: 1.6 MG/DL (ref 0.2–1.3)
BUN SERPL-MCNC: 10 MG/DL (ref 7–30)
CALCIUM SERPL-MCNC: 8.2 MG/DL (ref 8.5–10.1)
CHLORIDE SERPL-SCNC: 101 MMOL/L (ref 94–109)
CO2 SERPL-SCNC: 29 MMOL/L (ref 20–32)
CREAT SERPL-MCNC: 0.75 MG/DL (ref 0.52–1.04)
CRP SERPL-MCNC: 25.1 MG/L (ref 0–8)
DIFFERENTIAL METHOD BLD: ABNORMAL
EOSINOPHIL # BLD AUTO: 0.4 10E9/L (ref 0–0.7)
EOSINOPHIL NFR BLD AUTO: 2.5 %
ERYTHROCYTE [DISTWIDTH] IN BLOOD BY AUTOMATED COUNT: 13.8 % (ref 10–15)
GFR SERPL CREATININE-BSD FRML MDRD: 75 ML/MIN/1.7M2
GLUCOSE SERPL-MCNC: 139 MG/DL (ref 70–99)
HCT VFR BLD AUTO: 34.4 % (ref 35–47)
HGB BLD-MCNC: 11.2 G/DL (ref 11.7–15.7)
IMM GRANULOCYTES # BLD: 0.1 10E9/L (ref 0–0.4)
IMM GRANULOCYTES NFR BLD: 0.7 %
INR PPP: 0.98 (ref 0.86–1.14)
LYMPHOCYTES # BLD AUTO: 1.6 10E9/L (ref 0.8–5.3)
LYMPHOCYTES NFR BLD AUTO: 11 %
MAGNESIUM SERPL-MCNC: 1.9 MG/DL (ref 1.6–2.3)
MCH RBC QN AUTO: 32.2 PG (ref 26.5–33)
MCHC RBC AUTO-ENTMCNC: 32.6 G/DL (ref 31.5–36.5)
MCV RBC AUTO: 99 FL (ref 78–100)
MONOCYTES # BLD AUTO: 1.4 10E9/L (ref 0–1.3)
MONOCYTES NFR BLD AUTO: 9.6 %
NEUTROPHILS # BLD AUTO: 10.9 10E9/L (ref 1.6–8.3)
NEUTROPHILS NFR BLD AUTO: 75.7 %
NRBC # BLD AUTO: 0 10*3/UL
NRBC BLD AUTO-RTO: 0 /100
PLATELET # BLD AUTO: 364 10E9/L (ref 150–450)
POTASSIUM SERPL-SCNC: 3.6 MMOL/L (ref 3.4–5.3)
PROT SERPL-MCNC: 7.2 G/DL (ref 6.8–8.8)
RBC # BLD AUTO: 3.48 10E12/L (ref 3.8–5.2)
SODIUM SERPL-SCNC: 134 MMOL/L (ref 133–144)
WBC # BLD AUTO: 14.4 10E9/L (ref 4–11)

## 2017-11-15 ASSESSMENT — LIFESTYLE VARIABLES: TOBACCO_USE: 1

## 2017-11-15 NOTE — NURSING NOTE
Chief Complaint   Patient presents with     Blood Draw     labs only     Vitals done, labs drawn by venipuncture.  See doc flow sheets for details.  Lynn Zarate CMA

## 2017-11-15 NOTE — MR AVS SNAPSHOT
After Visit Summary   11/15/2017    Iris Lewis    MRN: [id number]           Patient Information     Date Of Birth          1940        Visit Information        Provider Department      11/15/2017 4:00 PM Rn, [facility]        Care Instructions    Preparing for Your Surgery      Name:  Iris Lewis   MRN:  [id number]   :  1940   Today's Date:  11/15/2017     Arriving for surgery:  Surgery date:  17  Arrival time:  6:15 am    Please come to:     [facility], 3rd floor, [address]    [address] [id number]    -   parking is available in front of the hospital from 5:15 am to 8:00 pm    -  Stop at the Information Desk in the lobby    -   Inform the information person that you are here for surgery. An escort to 3c will be provided. If you would not like an escort, please proceed to 3C on the 3rd floor. [phone]     What can I eat or drink?  -  You may have solid food or milk products until midnight.  -  You may have clear liquids such as water, gatorade, tea, black coffee (no cream), apple juice or 7up/Sprite until scheduled arrival time.    Which medicines can I take?         -  Do not bring your own medications to the hospital, except for inhalers and eye drops.        -  Avoid vitamins/supplements until after surgery.    -  Do NOT take these medications in the morning, the day of surgery: Creon, triamterene-hydrochlorothiazide, potassium.    -  Please take these medications the day of surgery: Levothyroxine, Tramadol as needed.    How do I prepare myself?  -  Take two showers: one the night before surgery; and one the morning of surgery.         Use Scrubcare or Hibiclens to wash from neck down.  You may use your own shampoo and conditioner. No other hair products.   -  Do NOT use lotion, powder, deodorant, or antiperspirant the day of your surgery.  -  Do NOT wear any makeup,  fingernail polish or jewelry.  -  Bring your ID and insurance card.    Questions or Concerns:  If you have questions or concerns, please call the  Preoperative Assessment Center, Monday-Friday 7AM-7PM:  [phone]          Follow-ups after your visit        Your next 10 appointments already scheduled     Nov 15, 2017  4:00 PM CST   (Arrive by 3:45 PM)   PAC RN ASSESSMENT with  Pac Rn   [facility] ([facility]    [address] [place] [address] [id number] [phone]            Nov 15, 2017  4:40 PM CST   (Arrive by 4:25 PM)   PAC Anesthesia Consult with  Pac Anesthesiologist   [facility] Preoperative Assessment [place] ([facility])    [address] [place] [address] [id number] [phone]            Nov 15, 2017  5:00 PM CST   US LOWER EXTREMITY ARTERIAL DUPLEX BILATERAL with UCUSV1   [facility] US ([facility])    [address] [place] [address] [id number] [phone]           Please bring a list of your medicines (including vitamins, minerals and over-the-counter drugs). Also, tell your doctor about any allergies you may have. Wear comfortable clothes and leave your valuables at home.  You do not need to do anything special to prepare for your exam.  Please call the Imaging Department at your exam site with any questions.            Nov 15, 2017  6:00 PM CST   (Arrive by 5:45 PM)   CT CHEST ABDOMEN PELVIS W/O & W CONTRAST with UCCT2   [facility] CT ([facility])    [address] [place] [address] [id number] [phone]           Please bring any scans or X-rays taken at other hospitals, if similar tests were done. Also bring a list of your medicines, including vitamins, minerals and over-the-counter drugs. It is safest to leave personal items at home.  Be sure to tell your doctor:   If  you have any allergies.   If there s any chance you are pregnant.   If you are breastfeeding.   If you have any special needs.  You may have contrast for this exam. To prepare:   Do not eat or drink for 2 hours before your exam. If you need to take medicine, you may take it with small sips of water. (We may ask you to take liquid medicine as well.)   The day before your exam, drink extra fluids at least six 8-ounce glasses (unless your doctor tells you to restrict your fluids).  Patients over 70 or patients with diabetes or kidney problems:   If you haven t had a blood test (creatinine test) within the last 30 days, go to your clinic or Diagnostic Imaging Department for this test.  If you have diabetes:   If your kidney function is normal, continue taking your metformin (Avandamet, Glucophage, Glucovance, Metaglip) on the day of your exam.   If your kidney function is abnormal, wait 48 hours before restarting this medicine.  You will have oral contrast for this exam:   You will drink the contrast at home. Get this from your clinic or Diagnostic Imaging Department. Please follow the directions given.  Please wear loose clothing, such as a sweat suit or jogging clothes. Avoid snaps, zippers and other metal. We may ask you to undress and put on a hospital gown.  If you have any questions, please call the Imaging Department where you will have your exam.            Nov 15, 2017  6:30 PM CST   LAB with [facility] ([facility])    [address] [place] [id number] [phone]           Please do not eat 10-12 hours before your appointment if you are coming in fasting for labs on lipids, cholesterol, or glucose (sugar). This does not apply to pregnant women. Water, hot tea and black coffee (with nothing added) are okay. Do not drink other fluids, diet soda or chew gum.            Nov 22, 2017   Procedure with Felix Izaguirre MD   [facility] [place], Same Day  Surgery (--)    [address]  [place] [address]   [phone]            Dec 01, 2017  7:05 AM CST   (Arrive by 6:50 AM)   Return Visit with Neri Gipson MD   [facility] ([facility])    [address] [facility]  4th [facility] [id number] [phone]              Future tests that were ordered for you today     Open Future Orders        Priority Expected Expires Ordered    CT Chest/Abdomen/Pelvis w Contrast Routine  11/14/2018 11/14/2017    US Lower Extremity Venous Duplex Bilateral Routine  11/15/2018 11/14/2017    CBC with platelets differential Routine  12/4/2017 11/14/2017    Comprehensive metabolic panel Routine  12/4/2017 11/14/2017    INR Routine  12/4/2017 11/14/2017    Partial thromboplastin time Routine  12/4/2017 11/14/2017    CRP inflammation Routine  12/4/2017 11/14/2017    Magnesium Routine  12/4/2017 11/14/2017    Cancer antigen 19-9 Routine  12/4/2017 11/14/2017    UA with Microscopic Routine  12/4/2017 11/14/2017            Who to contact     Please call your clinic at [phone] to:    Ask questions about your health    Make or cancel appointments    Discuss your medicines    Learn about your test results    Speak to your doctor   If you have compliments or concerns about an experience at your clinic, or if you wish to file a complaint, please contact [facility] Patient Relations at [phone] or email us at [email or web address]         Additional Information About Your Visit        [facility] Information     [facility] ([facility]) gives you secure access to your electronic health record. If you see a primary care provider, you can also send messages to your care team and make appointments. If you have questions, please call your primary care clinic.  If you do not have a primary care provider, please call [phone] and they will assist you.      [facility] ([facility]) is an electronic gateway that provides easy, online access  to your medical records. With [employer], you can request a clinic appointment, read your test results, renew a prescription or communicate with your care team.     To access your existing account, please contact your [facility] or call [phone] for assistance.        Care EveryWhere ID     This is your Care EveryWhere ID. This could be used by other organizations to access your [place] medical records  [id number]         Blood Pressure from Last 3 Encounters:   11/15/17 115/64   11/13/17 120/58   11/13/17 118/62    Weight from Last 3 Encounters:   11/15/17 52 kg (114 lb 11.2 oz)   11/13/17 52.3 kg (115 lb 3.2 oz)   11/13/17 52.2 kg (115 lb)              Today, you had the following     No orders found for display       Primary Care Provider Office Phone # Fax #    Neri Gipson [phone] [address] [address]        Equal Access to Services     NESSA NICHOLS : Hadii aad ku hadasho Soomaali, waaxda luqadaha, qaybta kaalmada adeegyada, waxay idiin haylevyn rosey adams . So [facility] [phone].    ATENCIÓN: Si habla español, tiene a nava disposición servicios gratuitos de asistencia lingüística. [facility] [phone].    We comply with applicable federal civil rights laws and [place] laws. We do not discriminate on the basis of race, color, national origin, age, disability, sex, sexual orientation, or gender identity.            Thank you!     Thank you for choosing [facility]  for your care. Our goal is always to provide you with excellent care. Hearing back from our patients is one way we can continue to improve our services. Please take a few minutes to complete the written survey that you may receive in the mail after your visit with us. Thank you!             Your Updated Medication List - Protect others around you: Learn how to safely use, store and throw away your medicines at  www.disposemymeds.org.          This list is accurate as of: 11/15/17  3:54 PM.  Always use your most recent med list.                   Brand Name Dispense Instructions for use Diagnosis    amylase-lipase-protease 39756 UNITS Cpep    CREON    180 capsule    Take 2 capsules (72,000 Units) by mouth 3 times daily (with meals)    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)       CALCIUM PO      Take by mouth every morning Form/strength unknown. Powder formulation. Add to water and fruits/vegetables daily to promote bone health.        camphor-menthol 0.5-0.5 % Lotn    DERMASARRA    59 mL    Apply 1 mL topically every 6 hours as needed for skin care        cholecalciferol 1000 UNIT tablet    vitamin D3     Take 1 tablet by mouth 2 times daily        cyanocolbalamin 500 MCG tablet    vitamin  B-12     Take 500 mcg by mouth every morning        denosumab 60 MG/ML Soln injection    PROLIA    1 mL    Inject 1 mL (60 mg) Subcutaneous every 6 months Inject subcutaneously in upper arm, upper thigh or abdomen    Senile osteoporosis       folic acid 400 MCG tablet    FOLVITE     Take 1 tablet by mouth every morning        hydrOXYzine 25 MG tablet    ATARAX    90 tablet    Take 1-2 tablets (25-50 mg) by mouth every 6 hours as needed for itching (adjuvant pain)        levothyroxine 37.5 mcg Tabs half-tab    SYNTHROID/LEVOTHROID     Take 37.5 mcg by mouth every morning (before breakfast)        ondansetron 4 MG tablet    ZOFRAN    60 tablet    Take 1 tablet (4 mg) by mouth every 6 hours as needed for nausea        potassium 99 MG Tabs      Take 1 tablet by mouth 2 times daily        timolol maleate 0.5 % opthalmic solution    ISTALOL     Place 1 drop into both eyes every morning Before placing contact lenses        traMADol 50 MG tablet    ULTRAM    20 tablet    Take 0.5-1 tablets (25-50 mg) by mouth every 6 hours as needed for breakthrough pain    Pancreatic mass       triamterene-hydrochlorothiazide 37.5-25 MG per tablet     MAXZIDE-25     Take 1 tablet by mouth every morning        zinc 50 MG Tabs      Take 1 tablet by mouth every morning

## 2017-11-15 NOTE — H&P
Pre-Operative H & P     CC:  Preoperative exam to assess for increased cardiopulmonary risk while undergoing surgery and anesthesia.    Date of Encounter: 11/15/2017  Primary Care Physician:  Neri Gipson  Iris Lewis is a 77 year old female who presents for pre-operative H & P in preparation for ERCP with stent exchange with Dr. Izaguirre on 11/22/17 at [facility]. History is obtained from the patient.     Patient with newly diagnosed metastatic pancreas cancer, s/p ERCP with stent placement on 11/1/17. Her initial symptoms have improved but she continues to have some left upper quadrant discomfort along with back pain, easily managed with Tramadol. She has consulted with Oncology with additional work up. Above procedure now planned.     Her history is otherwise significant for HYPERTENSION, GERD, s/p Nissen and thyroid cancer, s/p thyroidectomy and radioactive iodine. She is followed by Dr. Gipson.    Past Medical History  Past Medical History:   Diagnosis Date     Arthritis      Colon polyp 2009,2015    no polyps - f/u in 5 yrs      Esophageal reflux      Glaucoma      Hypertension      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) 11/6/2017     Osteoporosis      Postsurgical hypothyroidism      Scoliosis (and kyphoscoliosis), idiopathic      Thyroid cancer (H)     papillary carcinoma age 33     Uncomplicated asthma        Past Surgical History  Past Surgical History:   Procedure Laterality Date     ARTHRODESIS FOOT Right 11/9/2016    Procedure: ARTHRODESIS FOOT;  Surgeon: Janes Mcelroy MD;  Location: [facility] OR     C STOMACH SURGERY PROCEDURE UNLISTED       COLONOSCOPY   2009, 3/2015    no polyps in 2015 told to return 10 yrs.      ENDOSCOPIC RETROGRADE CHOLANGIOPANCREATOGRAM N/A 11/2/2017    Procedure: COMBINED ENDOSCOPIC RETROGRADE CHOLANGIOPANCREATOGRAPHY, PLACE TUBE/STENT;  Endoscopic Retrograde Cholangiopancreatogram with billary  sphincterotomy and billary stent placement;  Surgeon: Rito Mcneil MD;  Location: [facility] OR     ESOPHAGOSCOPY, GASTROSCOPY, DUODENOSCOPY (EGD), COMBINED  2/17/2012    Procedure:COMBINED ESOPHAGOSCOPY, GASTROSCOPY, DUODENOSCOPY (EGD); COMBINED ESOPHAGOSCOPY, GASTROSCOPY, DUODENOSCOPY POSSIBLE DILATION; Surgeon:NICHOLE MCCLAIN; Location:[facility] OR     ESOPHAGOSCOPY, GASTROSCOPY, DUODENOSCOPY (EGD), COMBINED N/A 11/2/2017    Procedure: COMBINED ENDOSCOPIC ULTRASOUND, ESOPHAGOSCOPY, GASTROSCOPY, DUODENOSCOPY (EGD), FINE NEEDLE ASPIRATE/BIOPSY;  Upper Endoscopic Ultrasound with fine needle biopsy, upper endoscopy with biopsies, Endoscopic Retrograde Cholangiopancreatogram with billary sphincterotomy and billary stent placement;  Surgeon: Hadley Bialey MD;  Location: [facility] OR     FOOT SURGERY  2008    hammertoe left     LAPAROSCOPIC CHOLECYSTECTOMY N/A 1/5/2015    Procedure: LAPAROSCOPIC CHOLECYSTECTOMY;  Surgeon: Cruz Pearson MD;  Location: [facility] OR     NISSEN FUNDOPLICATION       ORTHOPEDIC SURGERY  2009    left hammertoe      REPAIR HAMMER TOE Right 11/9/2016    Procedure: REPAIR HAMMER TOE;  Surgeon: Janes Mcelroy MD;  Location: [facility] OR     SALPINGO OOPHORECTOMY,R/L/SERENA  1974    left, for tubal pregnancy     THYROIDECTOMY      for thyroid cancer       Hx of Blood transfusions/reactions: Denies.      Hx of abnormal bleeding or anti-platelet use: Denies.     Menstrual history: No LMP recorded. Patient is postmenopausal.    Steroid use in the last year: Denies.     Personal or FH with difficulty with Anesthesia:  Denies.     Prior to Admission Medications  Current Outpatient Prescriptions   Medication Sig Dispense Refill     traMADol (ULTRAM) 50 MG tablet Take 0.5-1 tablets (25-50 mg) by mouth every 6 hours as needed for breakthrough pain 20 tablet 0     amylase-lipase-protease (CREON) 57708 UNITS CPEP Take 2 capsules (72,000 Units) by mouth 3 times daily (with meals) 180 capsule 1      hydrOXYzine (ATARAX) 25 MG tablet Take 1-2 tablets (25-50 mg) by mouth every 6 hours as needed for itching (adjuvant pain) 90 tablet 0     camphor-menthol (DERMASARRA) 0.5-0.5 % LOTN Apply 1 mL topically every 6 hours as needed for skin care 59 mL 0     ondansetron (ZOFRAN) 4 MG tablet Take 1 tablet (4 mg) by mouth every 6 hours as needed for nausea 60 tablet 0     CALCIUM PO Take by mouth every morning Form/strength unknown. Powder formulation. Add to water and fruits/vegetables daily to promote bone health.        levothyroxine (SYNTHROID/LEVOTHROID) 37.5 mcg TABS half-tab Take 37.5 mcg by mouth every morning (before breakfast)       triamterene-hydrochlorothiazide (MAXZIDE-25) 37.5-25 MG per tablet Take 1 tablet by mouth every morning        potassium 99 MG TABS Take 1 tablet by mouth 2 times daily        timolol maleate (ISTALOL) 0.5 % opthalmic solution Place 1 drop into both eyes every morning Before placing contact lenses       zinc 50 MG TABS Take 1 tablet by mouth every morning        denosumab (PROLIA) 60 MG/ML SOLN injection Inject 1 mL (60 mg) Subcutaneous every 6 months Inject subcutaneously in upper arm, upper thigh or abdomen 1 mL 1     cyanocolbalamin (VITAMIN B-12) 500 MCG tablet Take 500 mcg by mouth every morning        folic acid (FOLVITE) 400 MCG tablet Take 1 tablet by mouth every morning        cholecalciferol (VITAMIN D) 1000 UNIT tablet Take 1 tablet by mouth 2 times daily          Allergies  Allergies   Allergen Reactions     Nkda [No Known Drug Allergies]        Social History  Social History     Social History     Marital status:      Spouse name: N/A     Number of children: N/A     Years of education: N/A     Occupational History     Not on file.     Social History Main Topics     Smoking status: Former Smoker     Years: 5.00     Types: Cigarettes     Start date: 9/15/1959     Quit date: 1/28/1983     Smokeless tobacco: Never Used     Alcohol use Yes      Comment: wine 1-2  glasses/day     Drug use: No     Sexual activity: Yes     Partners: Male     Birth control/ protection: Post-menopausal     Other Topics Concern     Caffeine Concern Yes     1-2 cups/day     Sleep Concern No     Stress Concern No     Weight Concern No     Special Diet No     avoids gluten     Exercise Yes     few times/wk - treadmill and weights - 1 hour     Seat Belt Yes     Social History Narrative    How much exercise per week? Walking 2x/wk and weight 2-3x/wk    How much calcium per day? Supplement + dairy       How much caffeine per day? 1 cup coffee    How much vitamin D per day? Supplement 2000IU/d    Do you/your family wear seatbelts?  Yes    Do you/your family use safety helmets? n/a    Do you/your family use sunscreen? Yes    Do you/your family keep firearms in the home? No    Do you/your family have a smoke detector(s)? Yes        Do you feel safe in your home? Yes    Has anyone ever touched you in an unwanted manner? No     Explain     March 2, 2015 Lyle Tran LPN    Reviewed Neha Orourke MD, PhD    3/2/15    Neha Orourke MD, PhD     4/4/16                   Family History  Family History   Problem Relation Age of Onset     C.A.D. Father      MI at age 65     Asthma Father      Coronary Artery Disease Father      Alzheimer Disease Mother      OSTEOPOROSIS Mother      Depression Sister 80     Asthma Sister      Prostate Cancer Brother      Depression Son      Depression Sister      Coronary Artery Disease Brother 67     Skin Cancer No family hx of      no skin cancer       Review of Systems  The complete review of systems is negative other than noted in the HPI or here.   Constitutional: Denies fever, chills, weight loss.  HEENT: Wears contacts for vision. No recent swallowing difficulty.  Respiratory: Denies cough or shortness of breath. Denies concern for TIFFANIE.  CV: Denies chest pain. Occasional palpitations. Good exercise tolerance.  GI: No bowel issues after stent placement. Occasional nausea.  "S/p Nissen.  : Denies dysuria.    Temp: 98.1  F (36.7  C) Temp src: Oral BP: 115/64 Pulse: 73   Resp: 16 SpO2: 94 %         114 lbs 11.2 oz  5' 2\"   Body mass index is 20.98 kg/(m^2).       Physical Exam  Constitutional: Awake, alert, cooperative, no apparent distress, and appears stated age. Accompanied by .  Eyes: Pupils equal, round and reactive to light, extra ocular muscles intact, sclera clear, conjunctiva normal. Contacts in.  HENT: Normocephalic, oral pharynx with moist mucus membranes, good dentition. Well healed thyroidectomy incision.  Respiratory: Clear to auscultation bilaterally, no crackles or wheezing. No cough or obvious dyspnea.  Cardiovascular: Regular rate and rhythm, normal S1 and S2, and no murmur noted. Carotids +2, no bruits. No edema. Palpable pulses to radial  DP and PT arteries.   GI: Normal bowel sounds, soft, non-distended, non-tender, no masses palpated. No deep palpation due to abdominal discomfort.  Lymph/Hematologic: No cervical lymphadenopathy and no supraclavicular lymphadenopathy.  Genitourinary:  Deferred.  Skin: Warm and dry.  No jaundice.  Musculoskeletal: Full ROM of neck. There is no redness, warmth, or swelling of the joints. Gross motor strength is normal.    Neurologic: Awake, alert, oriented to name, place and time. Cranial nerves II-XII are grossly intact. Gait is normal.   Neuropsychiatric: Calm, cooperative. Normal affect.     Labs: (personally reviewed)  Lab Results   Component Value Date    WBC 14.9 11/13/2017     Lab Results   Component Value Date    RBC 3.34 11/13/2017     Lab Results   Component Value Date    HGB 10.8 11/13/2017     Lab Results   Component Value Date    HCT 33.2 11/13/2017     Lab Results   Component Value Date    MCV 99 11/13/2017     Lab Results   Component Value Date    MCH 32.3 11/13/2017     Lab Results   Component Value Date    MCHC 32.5 11/13/2017     Lab Results   Component Value Date    RDW 14.4 11/13/2017     Lab Results "   Component Value Date     11/13/2017     Last Basic Metabolic Panel:  Lab Results   Component Value Date     11/13/2017      Lab Results   Component Value Date    POTASSIUM 3.6 11/13/2017     Lab Results   Component Value Date    CHLORIDE 100 11/13/2017     Lab Results   Component Value Date    EMMY 8.5 11/13/2017     Lab Results   Component Value Date    CO2 28 11/13/2017     Lab Results   Component Value Date    BUN 9 11/13/2017     Lab Results   Component Value Date    CR 0.67 11/13/2017     Lab Results   Component Value Date     11/13/2017     Lab Results   Component Value Date    AST 40 11/13/2017     Lab Results   Component Value Date    ALT 57 11/13/2017     Lab Results   Component Value Date    BILICONJ 0.0 12/08/2009      Lab Results   Component Value Date    BILITOTAL 2.2 11/13/2017     Lab Results   Component Value Date    ALBUMIN 3.0 11/13/2017     Lab Results   Component Value Date    PROTTOTAL 7.0 11/13/2017      Lab Results   Component Value Date    ALKPHOS 328 11/13/2017     EKG: Personally reviewed 8/8/17 Sinus rhythm  Cardiac echo: 8/24/17  Interpretation Summary  Global and regional left ventricular function is normal with an EF of 55-60%.  Global right ventricular function is normal. PASP is 34 mm Hg, assuming a RAP of 3 mm Hg.  The inferior vena cava was normal in size with preserved respiratory  Variability. No pericardial effusion is present.  Compared to study in 12/31/2014, there are no significant changes.    CT Abdomen/pelvis 10/3/17 (to be updated today)  IMPRESSION:   1. 3.6 cm poorly defined hypoattenuating mass centered in the uncinate  process of the pancreas most consistent with pancreatic  adenocarcinoma.   a.  Local invasion into the third portion of the duodenum.  b. No vascular occlusion or thrombosis. There is abutment of several  vessels including the aorta, the gastroduodenal artery, the superior  mesenteric artery and possibly slight abutment of the  superior  mesenteric vein.  c. Relatively abrupt cut off the distal common bile duct, presumably  related to adjacent pancreatic head mass, with moderate upstream  intrahepatic and extrahepatic biliary dilatation.  d. Mild dilatation of the main pancreatic duct up to 4 mm with  tapering in the pancreatic head region of infiltrative mass.  2. Multiple mildly enlarged peripancreatic/retroperitoneal lymph  nodes, including necrotic appearing 1.1 cm lymph node adjacent to the  pancreatic mass highly concerning for metastatic lymph node  involvement.  3. At least 3 small hypoattenuating liver lesions which were not  present on 9/10/2014, suspicious for metastatic disease.  4. Small indeterminate pulmonary nodules in the visualized lung bases,  some of which are new since 9/10/2014.    ASSESSMENT and PLAN  Iris Lewis is a 77 year old female scheduled to undergo ERCP with stent exchange on 11/22/17 by Dr. Izaguirre. She has the following specific operative considerations:   - RCRI : No serious cardiac risks.   - Anesthesia considerations:  Refer to PAC assessment in anesthesia records  - VTE risk: 3%  - TIFFANIE # of risks 2/8 = Low risk  - Risk of PONV score = 3.  If > 2, anti-emetic intervention recommended. If 3 or > anti emetic intervention recommended with two or more meds     --Newly diagnosed metastatic pancreas cancer, s/p ERCP with stent placement. Improved symptoms. Follows with Oncology. Above procedure now planned. May take Tramadol on DOS.   --HYPERTENSION. Will hold Maxzide on DOS. History of palpitations after treatment for thyroid cancer. Multiple cardiac tests over time, results above. Good exercise tolerance.   --Former social smoker. Denies pulmonary symptoms. Chart history of asthma but no recent issues or meds.   --GERD, s/p Nissen fundoplication. Is s/p esophageal dilatation after that but no recent symptoms.    --History of thyroid cancer, s/p thyroidectomy and radioactive iodine. Will take Synthroid  on DOS.    Arrival time, NPO, shower and medication instructions provided by nursing staff today. Preparing For Your Surgery handout given.      Patient was discussed with Dr Cast.    CAT Odonnell CNS  Preoperative Assessment Center  [facility]  [facility]  Phone: [phone]  Fax: [phone]

## 2017-11-15 NOTE — ANESTHESIA PREPROCEDURE EVALUATION
Anesthesia Evaluation     . Pt has had prior anesthetic. Type: General and MAC    No history of anesthetic complications          ROS/MED HX    ENT/Pulmonary: Comment: She denies hx of asthma or use of inhalers    (+)tobacco use, Past use , . .   (-) other ENT disease   Neurologic:  - neg neurologic ROS     Cardiovascular:     (+) hypertension----. : . . . :. . Previous cardiac testing Echodate:8/14/17results:Global and regional left ventricular function is normal with an EF of 55-60%. Global right ventricular function is normal. PASP is 34 mm Hg, assuming a RAP of 3 mm Hg. The inferior vena cava was normal in size with preserved respiratory variability. No pericardial effusion is present. Compared to study in 12/31/2014, there are no significant changes.date: results:ECG reviewed date:8/8/17 results:Sinus rhythm  Normal ECG  When compared with ECG of 24-OCT-2016 14:43,  No significant change was found   date: results:         (-) taking anticoagulants/antiplatelets   METS/Exercise Tolerance:  >4 METS   Hematologic:  - neg hematologic  ROS       Musculoskeletal: Comment: Scoliosis        GI/Hepatic: Comment: S/p Nissen    (+) GERD Asymptomatic on medication,       Renal/Genitourinary:  - ROS Renal section negative       Endo: Comment: Previous thyroid cancer and surgical  hypothyroidism     (+) thyroid problem  Thyroid disease - Other, .      Psychiatric:  - neg psychiatric ROS       Infectious Disease:  - neg infectious disease ROS       Malignancy:   (+) Malignancy History of Other  Other CA thyroid Remission status post Surgery Recent diagnosis of metastatic pancreas cancer        Other:    (+) C-spine cleared: N/A, H/O Chronic Pain,no other significant disability                    Physical Exam      Airway   Mallampati: I  TM distance: >3 FB  Neck ROM: full    Dental   (+) caps and missing    Cardiovascular   Rhythm and rate: regular and normal      Pulmonary    breath sounds clear to auscultation                PAC Discussion and Assessment    ASA Classification: 3  Case is suitable for: [facility]  Anesthetic techniques and relevant risks discussed: GA  Invasive monitoring and risk discussed: No  Types:   Possibility and Risk of blood transfusion discussed: No  NPO instructions given:   Additional anesthetic preparation and risks discussed:   Needs early admission to pre-op area:   Other:     PAC Resident/NP Anesthesia Assessment:  Iris Lewis is a 77 year old female scheduled to undergo ERCP with stent exchange on 11/22/17 by Dr. Izaguirre. She has the following specific operative considerations:   - RCRI : No serious cardiac risks.   - VTE risk: 3%  - TIFFAINE # of risks 2/8 = Low risk  - Risk of PONV score = 3.  If > 2, anti-emetic intervention recommended. If 3 or > anti emetic intervention recommended with two or more meds    Last GA for ERCP 11/1/17. Anesthesia record available noting easy airway, grade 1 view.      --Newly diagnosed metastatic pancreas cancer, s/p ERCP with stent placement. Improved symptoms. Follows with Oncology. Above procedure now planned. May take Tramadol on DOS.   --HYPERTENSION. Will hold Maxzide on DOS. History of palpitations after treatment for thyroid cancer. Multiple cardiac tests over time, results above. Good exercise tolerance.   --Former social smoker. Denies pulmonary symptoms. Chart history of asthma but no recent issues or meds.   --GERD, s/p Nissen fundoplication. Is s/p esophageal dilatation after that but no recent symptoms.    --History of thyroid cancer, s/p thyroidectomy and radioactive iodine. Will take Synthroid on DOS.      Patient was discussed with Dr Cast.      Reviewed and Signed by PAC Mid-Level Provider/Resident  Mid-Level Provider/Resident: CAT Narvaez, CNS  Date: 11/15/17  Time: 4:10pm    Attending Anesthesiologist Anesthesia Assessment:  STAFF:  Very pleasant and positive 77 y.o. woman with pancreatic CA disease for pancreatic STENT EXCHANGE  by  Dr. Izaguirre using general anesthesia.   History summarized above. No previous anesthetic complications or problems.  S/p total thyroidectomy on Synthroid  Instructions given and questions answered.   Final plans by anesthesiology team on DOS.   ---rcp      Reviewed and Signed by PAC Anesthesiologist  Anesthesiologist: [email or web address]  Date: 11/15/2017  Time:   Pass/Fail: Pass  Disposition:     PAC Pharmacist Assessment:        Pharmacist:   Date:   Time:      Anesthesia Plan      History & Physical Review      ASA Status:  3 .    NPO Status:  > 8 hours    Plan for General and ETT with Intravenous and Propofol induction. Maintenance will be Inhalation and Balanced.    PONV prophylaxis:  Ondansetron (or other 5HT-3) and Dexamethasone or Solumedrol       Postoperative Care  Postoperative pain management:  IV analgesics, Oral pain medications and Multi-modal analgesia.      Consents  Anesthetic plan, risks, benefits and alternatives discussed with:  Patient..        Procedure: Procedure(s):  Endoscopic Retrograde Cholangiopancreatogram with Stent Exchange  - Wound Class:     HPI: 77 year old female with pancreatic mass who requires anesthesia for Procedure(s):  Endoscopic Retrograde Cholangiopancreatogram with Stent Exchange  - Wound Class: .     PMHx significant for uncomplicated asthma, GERD s/p Nissen, HTN, thyroid cancer s/p resection, pancreatic cancer, arthritis, scoliosis, and osteoporosis.    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      ENDOSCOPIC RETROGRADE CHOLANGIOPANCREATOGRAM N/A 11/2/2017    Procedure: COMBINED ENDOSCOPIC RETROGRADE CHOLANGIOPANCREATOGRAPHY, PLACE TUBE/STENT;  Endoscopic Retrograde Cholangiopancreatogram with billary sphincterotomy and billary stent placement;  Surgeon: Rito Mcneil MD;  Location: [facility] OR     ESOPHAGOSCOPY, GASTROSCOPY, DUODENOSCOPY (EGD), COMBINED  2/17/2012    Procedure:COMBINED ESOPHAGOSCOPY, GASTROSCOPY, DUODENOSCOPY (EGD); COMBINED ESOPHAGOSCOPY, GASTROSCOPY, DUODENOSCOPY POSSIBLE DILATION; Surgeon:NICHOLE MCCLAIN; Location:[facility] OR     ESOPHAGOSCOPY, GASTROSCOPY, DUODENOSCOPY (EGD), COMBINED N/A 11/2/2017    Procedure: COMBINED ENDOSCOPIC ULTRASOUND, ESOPHAGOSCOPY, GASTROSCOPY, DUODENOSCOPY (EGD), FINE NEEDLE ASPIRATE/BIOPSY;  Upper Endoscopic Ultrasound with fine needle biopsy, upper endoscopy with biopsies, Endoscopic Retrograde Cholangiopancreatogram with billary sphincterotomy and billary stent placement;  Surgeon: Hadley Bailey MD;  Location: [facility] OR     FOOT SURGERY  2008    hammertoe left     LAPAROSCOPIC CHOLECYSTECTOMY N/A 1/5/2015    Procedure: LAPAROSCOPIC CHOLECYSTECTOMY;  Surgeon: Cruz Pearson MD;  Location: [facility] OR     NISSEN FUNDOPLICATION       ORTHOPEDIC SURGERY  2009    left hammertoe      REPAIR HAMMER TOE Right 11/9/2016    Procedure: REPAIR HAMMER TOE;  Surgeon: Janes Mcelroy MD;  Location: [facility] OR     SALPINGO OOPHORECTOMY,R/L/SERENA  1974    left, for tubal pregnancy     THYROIDECTOMY      for thyroid cancer         No current facility-administered medications on file prior to encounter.   Current Outpatient Prescriptions on File Prior to Encounter:  amylase-lipase-protease (CREON) 60043 UNITS CPEP Take 2 capsules (72,000 Units) by mouth 3 times daily (with meals)   hydrOXYzine  (ATARAX) 25 MG tablet Take 1-2 tablets (25-50 mg) by mouth every 6 hours as needed for itching (adjuvant pain)   camphor-menthol (DERMASARRA) 0.5-0.5 % LOTN Apply 1 mL topically every 6 hours as needed for skin care   ondansetron (ZOFRAN) 4 MG tablet Take 1 tablet (4 mg) by mouth every 6 hours as needed for nausea   CALCIUM PO Take by mouth every morning Form/strength unknown. Powder formulation. Add to water and fruits/vegetables daily to promote bone health.    levothyroxine (SYNTHROID/LEVOTHROID) 37.5 mcg TABS half-tab Take 37.5 mcg by mouth every morning (before breakfast)   triamterene-hydrochlorothiazide (MAXZIDE-25) 37.5-25 MG per tablet Take 1 tablet by mouth every morning    potassium 99 MG TABS Take 1 tablet by mouth 2 times daily    timolol maleate (ISTALOL) 0.5 % opthalmic solution Place 1 drop into both eyes every morning Before placing contact lenses   zinc 50 MG TABS Take 1 tablet by mouth every morning    denosumab (PROLIA) 60 MG/ML SOLN injection Inject 1 mL (60 mg) Subcutaneous every 6 months Inject subcutaneously in upper arm, upper thigh or abdomen   cyanocolbalamin (VITAMIN B-12) 500 MCG tablet Take 500 mcg by mouth every morning    folic acid (FOLVITE) 400 MCG tablet Take 1 tablet by mouth every morning    cholecalciferol (VITAMIN D) 1000 UNIT tablet Take 1 tablet by mouth 2 times daily        SH:   Social History   Substance Use Topics     Smoking status: Former Smoker     Years: 5.00     Types: Cigarettes     Start date: 9/15/1959     Quit date: 1/28/1983     Smokeless tobacco: Never Used     Alcohol use Yes      Comment: wine 1-2 glasses/day       Allergies:   Allergies   Allergen Reactions     Nkda [No Known Drug Allergies]        NPO Status: Per ASA Guidelines    Labs:    Blood Bank:  Lab Results   Component Value Date    ABO A 10/30/2006    RH  Neg 10/30/2006    AS Neg 10/30/2006     BMP:  Recent Labs   Lab Test  11/15/17   1621   NA  134   POTASSIUM  3.6   CHLORIDE  101   CO2  29   BUN   10   CR  0.75   GLC  139*   EMMY  8.2*     CBC:   Recent Labs   Lab Test  11/15/17   1621   WBC  14.4*   RBC  3.48*   HGB  11.2*   HCT  34.4*   MCV  99   MCH  32.2   MCHC  32.6   RDW  13.8   PLT  364     Coags:  Recent Labs   Lab Test  11/15/17   1621   INR  0.98   PTT  29     To be discussed with staff.   - ASA 3  - GETA with standard ASA monitors, IV induction, balanced anesthetic  - PIV  - Tylenol preoperatively for postoperative pain control  - Antibiotics per surgery  - PONV prophylaxis    Thang Marcano DO  [facility]   Department of Anesthesiology  P: [phone]    H&P reviewed, patient examined, I agree with assessment and plan.  Tonie David MD  Anesthesiology

## 2017-11-15 NOTE — PATIENT INSTRUCTIONS
Preparing for Your Surgery      Name:  Iris Lewis   MRN:  [id number]   :  1940   Today's Date:  11/15/2017     Arriving for surgery:  Surgery date:  17  Arrival time:  6:15 am    Please come to:     [facility], 3rd floor, [address]    [address] [place]  [address]    -   parking is available in front of the hospital from 5:15 am to 8:00 pm    -  Stop at the Information Desk in the lobby    -   Inform the information person that you are here for surgery. An escort to 3c will be provided. If you would not like an escort, please proceed to 3C on the 3rd floor. [phone]     What can I eat or drink?  -  You may have solid food or milk products until midnight.  -  You may have clear liquids such as water, gatorade, tea, black coffee (no cream), apple juice or 7up/Sprite until scheduled arrival time.    Which medicines can I take?         -  Do not bring your own medications to the hospital, except for inhalers and eye drops.        -  Avoid vitamins/supplements until after surgery.    -  Do NOT take these medications in the morning, the day of surgery: Creon, triamterene-hydrochlorothiazide, potassium.    -  Please take these medications the day of surgery: Levothyroxine, Tramadol as needed.    How do I prepare myself?  -  Take two showers: one the night before surgery; and one the morning of surgery.         Use Scrubcare or Hibiclens to wash from neck down.  You may use your own shampoo and conditioner. No other hair products.   -  Do NOT use lotion, powder, deodorant, or antiperspirant the day of your surgery.  -  Do NOT wear any makeup, fingernail polish or jewelry.  -  Bring your ID and insurance card.    Questions or Concerns:  If you have questions or concerns, please call the  Preoperative Assessment Center, Monday-Friday 7AM-7PM:  [phone]

## 2017-11-16 DIAGNOSIS — K86.89 PANCREATIC MASS: ICD-10-CM

## 2017-11-16 RX ORDER — TRAMADOL HYDROCHLORIDE 50 MG/1
25-50 TABLET ORAL EVERY 6 HOURS PRN
Qty: 100 TABLET | Refills: 1
Start: 2017-11-16 | End: 2017-01-01

## 2017-11-17 LAB — CANCER AG19-9 SERPL-ACNC: 2577 U/ML (ref 0–37)

## 2017-11-22 ENCOUNTER — APPOINTMENT (OUTPATIENT)
Dept: GENERAL RADIOLOGY | Facility: CLINIC | Age: 77
End: 2017-11-22
Attending: INTERNAL MEDICINE
Payer: MEDICARE

## 2017-11-22 ENCOUNTER — HOSPITAL ENCOUNTER (OUTPATIENT)
Facility: CLINIC | Age: 77
Discharge: HOME | End: 2017-11-22
Attending: INTERNAL MEDICINE | Admitting: INTERNAL MEDICINE
Payer: MEDICARE

## 2017-11-22 ENCOUNTER — ANESTHESIA (OUTPATIENT)
Dept: SURGERY | Facility: CLINIC | Age: 77
End: 2017-11-22
Payer: MEDICARE

## 2017-11-22 VITALS
DIASTOLIC BLOOD PRESSURE: 66 MMHG | HEART RATE: 87 BPM | SYSTOLIC BLOOD PRESSURE: 118 MMHG | WEIGHT: 109.79 LBS | HEIGHT: 62 IN | OXYGEN SATURATION: 94 % | TEMPERATURE: 99 F | RESPIRATION RATE: 16 BRPM | BODY MASS INDEX: 20.2 KG/M2

## 2017-11-22 DIAGNOSIS — K26.9 DUODENAL ULCERATION: Primary | ICD-10-CM

## 2017-11-22 DIAGNOSIS — C25.0 MALIGNANT NEOPLASM OF HEAD OF PANCREAS (H): Primary | ICD-10-CM

## 2017-11-22 LAB
AMYLASE SERPL-CCNC: 123 U/L (ref 30–110)
ERCP: NORMAL
GLUCOSE BLDC GLUCOMTR-MCNC: 113 MG/DL (ref 70–99)
LIPASE SERPL-CCNC: 2096 U/L (ref 73–393)

## 2017-11-22 PROCEDURE — 36000059 ZZH SURGERY LEVEL 3 EA 15 ADDTL MIN UMMC: Performed by: INTERNAL MEDICINE

## 2017-11-22 PROCEDURE — 36415 COLL VENOUS BLD VENIPUNCTURE: CPT | Performed by: INTERNAL MEDICINE

## 2017-11-22 PROCEDURE — 36000061 ZZH SURGERY LEVEL 3 W FLUORO 1ST 30 MIN - UMMC: Performed by: INTERNAL MEDICINE

## 2017-11-22 PROCEDURE — 83690 ASSAY OF LIPASE: CPT | Performed by: INTERNAL MEDICINE

## 2017-11-22 PROCEDURE — 25000132 ZZH RX MED GY IP 250 OP 250 PS 637: Mod: GY | Performed by: STUDENT IN AN ORGANIZED HEALTH CARE EDUCATION/TRAINING PROGRAM

## 2017-11-22 PROCEDURE — C1877 STENT, NON-COAT/COV W/O DEL: HCPCS | Performed by: INTERNAL MEDICINE

## 2017-11-22 PROCEDURE — 25000125 ZZHC RX 250: Performed by: STUDENT IN AN ORGANIZED HEALTH CARE EDUCATION/TRAINING PROGRAM

## 2017-11-22 PROCEDURE — 71000014 ZZH RECOVERY PHASE 1 LEVEL 2 FIRST HR: Performed by: INTERNAL MEDICINE

## 2017-11-22 PROCEDURE — 25000566 ZZH SEVOFLURANE, EA 15 MIN: Performed by: INTERNAL MEDICINE

## 2017-11-22 PROCEDURE — 40000277 XR SURGERY CARM FLUORO LESS THAN 5 MIN W STILLS: Mod: TC

## 2017-11-22 PROCEDURE — 25000128 H RX IP 250 OP 636: Performed by: STUDENT IN AN ORGANIZED HEALTH CARE EDUCATION/TRAINING PROGRAM

## 2017-11-22 PROCEDURE — 25500064 ZZH RX 255 OP 636: Performed by: INTERNAL MEDICINE

## 2017-11-22 PROCEDURE — 27210794 ZZH OR GENERAL SUPPLY STERILE: Performed by: INTERNAL MEDICINE

## 2017-11-22 PROCEDURE — C9399 UNCLASSIFIED DRUGS OR BIOLOG: HCPCS | Performed by: STUDENT IN AN ORGANIZED HEALTH CARE EDUCATION/TRAINING PROGRAM

## 2017-11-22 PROCEDURE — 37000009 ZZH ANESTHESIA TECHNICAL FEE, EACH ADDTL 15 MIN: Performed by: INTERNAL MEDICINE

## 2017-11-22 PROCEDURE — 36000053 ZZH SURGERY LEVEL 2 EA 15 ADDTL MIN - UMMC: Performed by: INTERNAL MEDICINE

## 2017-11-22 PROCEDURE — 82962 GLUCOSE BLOOD TEST: CPT

## 2017-11-22 PROCEDURE — 37000008 ZZH ANESTHESIA TECHNICAL FEE, 1ST 30 MIN: Performed by: INTERNAL MEDICINE

## 2017-11-22 PROCEDURE — 82150 ASSAY OF AMYLASE: CPT | Performed by: INTERNAL MEDICINE

## 2017-11-22 PROCEDURE — A9270 NON-COVERED ITEM OR SERVICE: HCPCS | Mod: GY | Performed by: STUDENT IN AN ORGANIZED HEALTH CARE EDUCATION/TRAINING PROGRAM

## 2017-11-22 PROCEDURE — C1876 STENT, NON-COA/NON-COV W/DEL: HCPCS | Performed by: INTERNAL MEDICINE

## 2017-11-22 PROCEDURE — 40000170 ZZH STATISTIC PRE-PROCEDURE ASSESSMENT II: Performed by: INTERNAL MEDICINE

## 2017-11-22 PROCEDURE — C1769 GUIDE WIRE: HCPCS | Performed by: INTERNAL MEDICINE

## 2017-11-22 PROCEDURE — 71000027 ZZH RECOVERY PHASE 2 EACH 15 MINS: Performed by: INTERNAL MEDICINE

## 2017-11-22 PROCEDURE — 36000055 ZZH SURGERY LEVEL 2 W FLUORO 1ST 30 MIN - UMMC: Performed by: INTERNAL MEDICINE

## 2017-11-22 DEVICE — STENT FREEMAN PANCREA FLEX 5FRX5CM SGL PIGTAIL: Type: IMPLANTABLE DEVICE | Site: PANCREATIC DUCT | Status: FUNCTIONAL

## 2017-11-22 DEVICE — STENT BILIARY EVOLUTION 10MMX4CM G23127: Type: IMPLANTABLE DEVICE | Site: BILE DUCT | Status: FUNCTIONAL

## 2017-11-22 RX ORDER — METHYLPREDNISOLONE SODIUM SUCCINATE 125 MG/2ML
125 INJECTION, POWDER, LYOPHILIZED, FOR SOLUTION INTRAMUSCULAR; INTRAVENOUS
Status: CANCELLED
Start: 2017-12-04

## 2017-11-22 RX ORDER — EPINEPHRINE 1 MG/ML
0.3 INJECTION, SOLUTION, CONCENTRATE INTRAVENOUS EVERY 5 MIN PRN
Status: CANCELLED | OUTPATIENT
Start: 2017-01-01

## 2017-11-22 RX ORDER — DIPHENHYDRAMINE HYDROCHLORIDE 50 MG/ML
50 INJECTION INTRAMUSCULAR; INTRAVENOUS
Status: CANCELLED
Start: 2017-01-01

## 2017-11-22 RX ORDER — SODIUM CHLORIDE, SODIUM LACTATE, POTASSIUM CHLORIDE, CALCIUM CHLORIDE 600; 310; 30; 20 MG/100ML; MG/100ML; MG/100ML; MG/100ML
INJECTION, SOLUTION INTRAVENOUS CONTINUOUS
Status: DISCONTINUED | OUTPATIENT
Start: 2017-11-22 | End: 2017-11-22 | Stop reason: HOSPADM

## 2017-11-22 RX ORDER — LORAZEPAM 2 MG/ML
0.5 INJECTION INTRAMUSCULAR EVERY 4 HOURS PRN
Status: CANCELLED
Start: 2017-11-27

## 2017-11-22 RX ORDER — DIPHENHYDRAMINE HYDROCHLORIDE 50 MG/ML
50 INJECTION INTRAMUSCULAR; INTRAVENOUS
Status: CANCELLED
Start: 2017-11-27

## 2017-11-22 RX ORDER — EPINEPHRINE 0.3 MG/.3ML
0.3 INJECTION SUBCUTANEOUS EVERY 5 MIN PRN
Status: CANCELLED | OUTPATIENT
Start: 2017-12-04

## 2017-11-22 RX ORDER — NALOXONE HYDROCHLORIDE 0.4 MG/ML
.1-.4 INJECTION, SOLUTION INTRAMUSCULAR; INTRAVENOUS; SUBCUTANEOUS
Status: CANCELLED | OUTPATIENT
Start: 2017-11-22 | End: 2017-11-23

## 2017-11-22 RX ORDER — NALOXONE HYDROCHLORIDE 0.4 MG/ML
.1-.4 INJECTION, SOLUTION INTRAMUSCULAR; INTRAVENOUS; SUBCUTANEOUS
Status: DISCONTINUED | OUTPATIENT
Start: 2017-11-22 | End: 2017-11-22 | Stop reason: HOSPADM

## 2017-11-22 RX ORDER — PACLITAXEL 100 MG/20ML
125 INJECTION, POWDER, LYOPHILIZED, FOR SUSPENSION INTRAVENOUS ONCE
Status: CANCELLED | OUTPATIENT
Start: 2017-11-27

## 2017-11-22 RX ORDER — ALBUTEROL SULFATE 90 UG/1
1-2 AEROSOL, METERED RESPIRATORY (INHALATION)
Status: CANCELLED
Start: 2017-01-01

## 2017-11-22 RX ORDER — LIDOCAINE HYDROCHLORIDE 20 MG/ML
INJECTION, SOLUTION INFILTRATION; PERINEURAL PRN
Status: DISCONTINUED | OUTPATIENT
Start: 2017-11-22 | End: 2017-11-22

## 2017-11-22 RX ORDER — ONDANSETRON 2 MG/ML
4 INJECTION INTRAMUSCULAR; INTRAVENOUS EVERY 30 MIN PRN
Status: DISCONTINUED | OUTPATIENT
Start: 2017-11-22 | End: 2017-11-22 | Stop reason: HOSPADM

## 2017-11-22 RX ORDER — LIDOCAINE 40 MG/G
CREAM TOPICAL
Status: DISCONTINUED | OUTPATIENT
Start: 2017-11-22 | End: 2017-11-22 | Stop reason: HOSPADM

## 2017-11-22 RX ORDER — IPRATROPIUM BROMIDE AND ALBUTEROL SULFATE 2.5; .5 MG/3ML; MG/3ML
3 SOLUTION RESPIRATORY (INHALATION) ONCE
Status: DISCONTINUED | OUTPATIENT
Start: 2017-11-22 | End: 2017-11-22 | Stop reason: HOSPADM

## 2017-11-22 RX ORDER — SODIUM CHLORIDE, SODIUM LACTATE, POTASSIUM CHLORIDE, CALCIUM CHLORIDE 600; 310; 30; 20 MG/100ML; MG/100ML; MG/100ML; MG/100ML
INJECTION, SOLUTION INTRAVENOUS CONTINUOUS PRN
Status: DISCONTINUED | OUTPATIENT
Start: 2017-11-22 | End: 2017-11-22

## 2017-11-22 RX ORDER — PACLITAXEL 100 MG/20ML
125 INJECTION, POWDER, LYOPHILIZED, FOR SUSPENSION INTRAVENOUS ONCE
Status: CANCELLED | OUTPATIENT
Start: 2017-01-01

## 2017-11-22 RX ORDER — ALBUTEROL SULFATE 90 UG/1
1-2 AEROSOL, METERED RESPIRATORY (INHALATION)
Status: CANCELLED
Start: 2017-12-04

## 2017-11-22 RX ORDER — METHYLPREDNISOLONE SODIUM SUCCINATE 125 MG/2ML
125 INJECTION, POWDER, LYOPHILIZED, FOR SOLUTION INTRAMUSCULAR; INTRAVENOUS
Status: CANCELLED
Start: 2017-01-01

## 2017-11-22 RX ORDER — MEPERIDINE HYDROCHLORIDE 25 MG/ML
12.5 INJECTION INTRAMUSCULAR; INTRAVENOUS; SUBCUTANEOUS
Status: DISCONTINUED | OUTPATIENT
Start: 2017-11-22 | End: 2017-11-22 | Stop reason: HOSPADM

## 2017-11-22 RX ORDER — DIPHENHYDRAMINE HYDROCHLORIDE 50 MG/ML
50 INJECTION INTRAMUSCULAR; INTRAVENOUS
Status: CANCELLED
Start: 2017-12-04

## 2017-11-22 RX ORDER — IOPAMIDOL 510 MG/ML
INJECTION, SOLUTION INTRAVASCULAR PRN
Status: DISCONTINUED | OUTPATIENT
Start: 2017-11-22 | End: 2017-11-22 | Stop reason: HOSPADM

## 2017-11-22 RX ORDER — METHYLPREDNISOLONE SODIUM SUCCINATE 125 MG/2ML
125 INJECTION, POWDER, LYOPHILIZED, FOR SOLUTION INTRAMUSCULAR; INTRAVENOUS
Status: CANCELLED
Start: 2017-11-27

## 2017-11-22 RX ORDER — FENTANYL CITRATE 50 UG/ML
INJECTION, SOLUTION INTRAMUSCULAR; INTRAVENOUS PRN
Status: DISCONTINUED | OUTPATIENT
Start: 2017-11-22 | End: 2017-11-22

## 2017-11-22 RX ORDER — SODIUM CHLORIDE 9 MG/ML
1000 INJECTION, SOLUTION INTRAVENOUS CONTINUOUS PRN
Status: CANCELLED
Start: 2017-01-01

## 2017-11-22 RX ORDER — ONDANSETRON 2 MG/ML
INJECTION INTRAMUSCULAR; INTRAVENOUS PRN
Status: DISCONTINUED | OUTPATIENT
Start: 2017-11-22 | End: 2017-11-22

## 2017-11-22 RX ORDER — ALBUTEROL SULFATE 0.83 MG/ML
2.5 SOLUTION RESPIRATORY (INHALATION)
Status: CANCELLED | OUTPATIENT
Start: 2017-01-01

## 2017-11-22 RX ORDER — FLUMAZENIL 0.1 MG/ML
0.2 INJECTION, SOLUTION INTRAVENOUS
Status: CANCELLED | OUTPATIENT
Start: 2017-11-22 | End: 2017-11-22

## 2017-11-22 RX ORDER — LORAZEPAM 2 MG/ML
0.5 INJECTION INTRAMUSCULAR EVERY 4 HOURS PRN
Status: CANCELLED
Start: 2017-12-04

## 2017-11-22 RX ORDER — OMEPRAZOLE 40 MG/1
40 CAPSULE, DELAYED RELEASE ORAL 2 TIMES DAILY
Qty: 90 CAPSULE | Refills: 3 | Status: SHIPPED | OUTPATIENT
Start: 2017-11-22 | End: 2017-01-01

## 2017-11-22 RX ORDER — ALBUTEROL SULFATE 0.83 MG/ML
2.5 SOLUTION RESPIRATORY (INHALATION)
Status: CANCELLED | OUTPATIENT
Start: 2017-11-27

## 2017-11-22 RX ORDER — PROPOFOL 10 MG/ML
INJECTION, EMULSION INTRAVENOUS PRN
Status: DISCONTINUED | OUTPATIENT
Start: 2017-11-22 | End: 2017-11-22

## 2017-11-22 RX ORDER — LABETALOL HYDROCHLORIDE 5 MG/ML
10 INJECTION, SOLUTION INTRAVENOUS
Status: DISCONTINUED | OUTPATIENT
Start: 2017-11-22 | End: 2017-11-22 | Stop reason: HOSPADM

## 2017-11-22 RX ORDER — INDOMETHACIN 50 MG/1
100 SUPPOSITORY RECTAL
Status: DISCONTINUED | OUTPATIENT
Start: 2017-11-22 | End: 2017-11-22 | Stop reason: HOSPADM

## 2017-11-22 RX ORDER — ALBUTEROL SULFATE 0.83 MG/ML
2.5 SOLUTION RESPIRATORY (INHALATION)
Status: CANCELLED | OUTPATIENT
Start: 2017-12-04

## 2017-11-22 RX ORDER — LORAZEPAM 2 MG/ML
0.5 INJECTION INTRAMUSCULAR EVERY 4 HOURS PRN
Status: CANCELLED
Start: 2017-01-01

## 2017-11-22 RX ORDER — ALBUTEROL SULFATE 90 UG/1
1-2 AEROSOL, METERED RESPIRATORY (INHALATION)
Status: CANCELLED
Start: 2017-11-27

## 2017-11-22 RX ORDER — ONDANSETRON 4 MG/1
4 TABLET, ORALLY DISINTEGRATING ORAL EVERY 30 MIN PRN
Status: DISCONTINUED | OUTPATIENT
Start: 2017-11-22 | End: 2017-11-22 | Stop reason: HOSPADM

## 2017-11-22 RX ORDER — EPINEPHRINE 1 MG/ML
0.3 INJECTION, SOLUTION, CONCENTRATE INTRAVENOUS EVERY 5 MIN PRN
Status: CANCELLED | OUTPATIENT
Start: 2017-11-27

## 2017-11-22 RX ORDER — MEPERIDINE HYDROCHLORIDE 25 MG/ML
25 INJECTION INTRAMUSCULAR; INTRAVENOUS; SUBCUTANEOUS EVERY 30 MIN PRN
Status: CANCELLED | OUTPATIENT
Start: 2017-11-27

## 2017-11-22 RX ORDER — MEPERIDINE HYDROCHLORIDE 25 MG/ML
25 INJECTION INTRAMUSCULAR; INTRAVENOUS; SUBCUTANEOUS EVERY 30 MIN PRN
Status: CANCELLED | OUTPATIENT
Start: 2017-12-04

## 2017-11-22 RX ORDER — PACLITAXEL 100 MG/20ML
125 INJECTION, POWDER, LYOPHILIZED, FOR SUSPENSION INTRAVENOUS ONCE
Status: CANCELLED | OUTPATIENT
Start: 2017-12-04

## 2017-11-22 RX ORDER — FENTANYL CITRATE 50 UG/ML
25-50 INJECTION, SOLUTION INTRAMUSCULAR; INTRAVENOUS
Status: DISCONTINUED | OUTPATIENT
Start: 2017-11-22 | End: 2017-11-22 | Stop reason: HOSPADM

## 2017-11-22 RX ORDER — EPINEPHRINE 1 MG/ML
0.3 INJECTION, SOLUTION, CONCENTRATE INTRAVENOUS EVERY 5 MIN PRN
Status: CANCELLED | OUTPATIENT
Start: 2017-12-04

## 2017-11-22 RX ORDER — HYDRALAZINE HYDROCHLORIDE 20 MG/ML
2.5-5 INJECTION INTRAMUSCULAR; INTRAVENOUS EVERY 10 MIN PRN
Status: DISCONTINUED | OUTPATIENT
Start: 2017-11-22 | End: 2017-11-22 | Stop reason: HOSPADM

## 2017-11-22 RX ORDER — EPINEPHRINE 0.3 MG/.3ML
0.3 INJECTION SUBCUTANEOUS EVERY 5 MIN PRN
Status: CANCELLED | OUTPATIENT
Start: 2017-01-01

## 2017-11-22 RX ORDER — SODIUM CHLORIDE 9 MG/ML
1000 INJECTION, SOLUTION INTRAVENOUS CONTINUOUS PRN
Status: CANCELLED
Start: 2017-12-04

## 2017-11-22 RX ORDER — ACETAMINOPHEN 325 MG/1
975 TABLET ORAL ONCE
Status: COMPLETED | OUTPATIENT
Start: 2017-11-22 | End: 2017-11-22

## 2017-11-22 RX ORDER — EPINEPHRINE 0.3 MG/.3ML
0.3 INJECTION SUBCUTANEOUS EVERY 5 MIN PRN
Status: CANCELLED | OUTPATIENT
Start: 2017-11-27

## 2017-11-22 RX ORDER — DEXAMETHASONE SODIUM PHOSPHATE 4 MG/ML
INJECTION, SOLUTION INTRA-ARTICULAR; INTRALESIONAL; INTRAMUSCULAR; INTRAVENOUS; SOFT TISSUE PRN
Status: DISCONTINUED | OUTPATIENT
Start: 2017-11-22 | End: 2017-11-22

## 2017-11-22 RX ORDER — SODIUM CHLORIDE 9 MG/ML
1000 INJECTION, SOLUTION INTRAVENOUS CONTINUOUS PRN
Status: CANCELLED
Start: 2017-11-27

## 2017-11-22 RX ORDER — MEPERIDINE HYDROCHLORIDE 25 MG/ML
25 INJECTION INTRAMUSCULAR; INTRAVENOUS; SUBCUTANEOUS EVERY 30 MIN PRN
Status: CANCELLED | OUTPATIENT
Start: 2017-01-01

## 2017-11-22 RX ADMIN — SODIUM CHLORIDE, POTASSIUM CHLORIDE, SODIUM LACTATE AND CALCIUM CHLORIDE: 600; 310; 30; 20 INJECTION, SOLUTION INTRAVENOUS at 08:00

## 2017-11-22 RX ADMIN — FENTANYL CITRATE 50 MCG: 50 INJECTION, SOLUTION INTRAMUSCULAR; INTRAVENOUS at 08:53

## 2017-11-22 RX ADMIN — DEXAMETHASONE SODIUM PHOSPHATE 4 MG: 4 INJECTION, SOLUTION INTRA-ARTICULAR; INTRALESIONAL; INTRAMUSCULAR; INTRAVENOUS; SOFT TISSUE at 08:37

## 2017-11-22 RX ADMIN — PROPOFOL 80 MG: 10 INJECTION, EMULSION INTRAVENOUS at 08:34

## 2017-11-22 RX ADMIN — ROCURONIUM BROMIDE 30 MG: 10 INJECTION INTRAVENOUS at 08:34

## 2017-11-22 RX ADMIN — ACETAMINOPHEN 975 MG: 325 TABLET, FILM COATED ORAL at 07:52

## 2017-11-22 RX ADMIN — SUGAMMADEX 100 MG: 100 INJECTION, SOLUTION INTRAVENOUS at 09:15

## 2017-11-22 RX ADMIN — PHENYLEPHRINE HYDROCHLORIDE 100 MCG: 10 INJECTION, SOLUTION INTRAMUSCULAR; INTRAVENOUS; SUBCUTANEOUS at 08:39

## 2017-11-22 RX ADMIN — LIDOCAINE HYDROCHLORIDE 60 MG: 20 INJECTION, SOLUTION INFILTRATION; PERINEURAL at 08:33

## 2017-11-22 RX ADMIN — ONDANSETRON 4 MG: 2 INJECTION INTRAMUSCULAR; INTRAVENOUS at 09:13

## 2017-11-22 RX ADMIN — FENTANYL CITRATE 50 MCG: 50 INJECTION, SOLUTION INTRAMUSCULAR; INTRAVENOUS at 08:33

## 2017-11-22 NOTE — IP AVS SNAPSHOT
Same Day Surgery [address] [address]    Phone:  [phone]                                       After Visit Summary   11/22/2017    Iris Lewis    MRN: [id number]           After Visit Summary Signature Page     I have received my discharge instructions, and my questions have been answered. I have discussed any challenges I see with this plan with the nurse or doctor.    ..........................................................................................................................................  Patient/Patient Representative Signature      ..........................................................................................................................................  Patient Representative Print Name and Relationship to Patient    ..................................................               ................................................  Date                                            Time    ..........................................................................................................................................  Reviewed by Signature/Title    ...................................................              ..............................................  Date                                                            Time

## 2017-11-22 NOTE — OR NURSING
Pt is stable,alert and oriented x 3. She  Was seen by Dr. Izaguirre and Dr. David. Dr. Izaguirre said pt cn go home once criteria is met,he doesn't have to see her before going home. Dr. David will sign pt out. Pt is taking ice chips well,she nausea and pain free.

## 2017-11-22 NOTE — ANESTHESIA POSTPROCEDURE EVALUATION
Patient: Iris Lewis    Procedure(s):  Endoscopic Retrograde Cholangiopancreatogram with Biliary Stent Exchange  - Wound Class: II-Clean Contaminated  Endoscopic  Retrograde Cholangiopancreatography with Pancreatic Stent Placement - Wound Class: II-Clean Contaminated    Diagnosis:Routine Stent Exchange   Diagnosis Additional Information: No value filed.    Anesthesia Type:  General, ETT    Note:  Anesthesia Post Evaluation    Patient location during evaluation: bedside  Patient participation: Able to fully participate in evaluation  Level of consciousness: awake and alert  Pain management: adequate  Airway patency: patent  Cardiovascular status: hemodynamically stable  Respiratory status: spontaneous ventilation  Hydration status: euvolemic  PONV: none     Anesthetic complications: None          Last vitals:  Vitals:    11/22/17 1056 11/22/17 1100 11/22/17 1121   BP: 120/62 112/70 118/66   Pulse:      Resp: 18 16 16   Temp: 37.2  C (99  F)     SpO2: 93% 93% 94%         Electronically Signed By: Tonie David MD  November 22, 2017  11:40 AM

## 2017-11-22 NOTE — ANESTHESIA CARE TRANSFER NOTE
Patient: Iris Lewis    Procedure(s):  Endoscopic Retrograde Cholangiopancreatogram with Biliary Stent Exchange  - Wound Class: II-Clean Contaminated  Endoscopic  Retrograde Cholangiopancreatography with Pancreatic Stent Placement - Wound Class: II-Clean Contaminated    Diagnosis: Routine Stent Exchange   Diagnosis Additional Information: No value filed.    Anesthesia Type:   General, ETT     Note:  Airway :Face Mask  Patient transferred to:PACU  Comments: VSS, patent airway, report to RN.Handoff Report: Identifed the Patient, Identified the Reponsible Provider, Reviewed the pertinent medical history, Discussed the surgical course, Reviewed Intra-OP anesthesia mangement and issues during anesthesia, Set expectations for post-procedure period and Allowed opportunity for questions and acknowledgement of understanding      Vitals: (Last set prior to Anesthesia Care Transfer)    CRNA VITALS  11/22/2017 0852 - 11/22/2017 0927      11/22/2017             Resp Rate (observed): (!)  1                Electronically Signed By: CAT Pace CRNA  November 22, 2017  9:27 AM

## 2017-11-22 NOTE — DISCHARGE INSTRUCTIONS
Same-Day Surgery   Adult Discharge Orders & Instructions     For 24 hours after surgery    1. Get plenty of rest.  A responsible adult must stay with you for at least 24 hours after you leave the hospital.   2. Do not drive or use heavy equipment.  If you have weakness or tingling, don't drive or use heavy equipment until this feeling goes away.  3. Do not drink alcohol.  4. Avoid strenuous or risky activities.  Ask for help when climbing stairs.   5. You may feel lightheaded.  IF so, sit for a few minutes before standing.  Have someone help you get up.   6. If you have nausea (feel sick to your stomach): Drink only clear liquids such as apple juice, ginger ale, broth or 7-Up.  Rest may also help.  Be sure to drink enough fluids.  Move to a regular diet as you feel able.  7. You may have a slight fever. Call the doctor if your fever is over 100 F (37.7 C) (taken under the tongue) or lasts longer than 24 hours.  8. You may have a dry mouth, a sore throat, muscle aches or trouble sleeping.  These should go away after 24 hours.  9. Do not make important or legal decisions.   Call your doctor for any of the followin.  Signs of infection (fever, growing tenderness at the surgery site, a large amount of drainage or bleeding, severe pain, foul-smelling drainage, redness, swelling).    2. It has been over 8 to 10 hours since surgery and you are still not able to urinate (pass water).    3.  Headache for over 24 hours.      To contact a doctor, call Dr Izaguirre's office at [phone] (clinic)or:    X   [phone] and ask for the resident on call for GI  (answered 24 hours a day)  X   Emergency Department:  X      [employer] [facility]: [phone]       (TTY for hearing impaired: [phone])

## 2017-11-22 NOTE — IP AVS SNAPSHOT
MRN:[id number]                      After Visit Summary   11/22/2017    Iris Lewis    MRN: [id number]           Thank you!     Thank you for choosing [facility] for your care. Our goal is always to provide you with excellent care. Hearing back from our patients is one way we can continue to improve our services. Please take a few minutes to complete the written survey that you may receive in the mail after you visit with us. Thank you!        Patient Information     Date Of Birth          1940        About your hospital stay     You were admitted on:  November 22, 2017 You last received care in the:  Same Day Surgery [facility]    You were discharged on:  November 22, 2017       Who to Call     For medical emergencies, please call 911.  For non-urgent questions about your medical care, please call your primary care provider or clinic, [phone]  For questions related to your surgery, please call your surgery clinic        Attending Provider     Provider Specialty    Rito Mcneil MD Gastroenterology       Primary Care Provider Office Phone # Fax #    Neri Gipson [phone]      After Care Instructions     Discharge Instructions       Resume pre procedure diet            Discharge Instructions       Restart home medications.                  Your next 10 appointments already scheduled     Dec 01, 2017  7:05 AM CST   (Arrive by 6:50 AM)   Return Visit with Neri Gipson MD   [facility] ([facility])    [address] [place] [id number] [phone]              Further instructions from your care team       Same-Day Surgery   Adult Discharge Orders & Instructions     For 24 hours after surgery    1. Get plenty of rest.  A responsible adult must stay with you for at least 24 hours after you leave the hospital.   2. Do not drive or use heavy equipment.  If you have weakness or  "tingling, don't drive or use heavy equipment until this feeling goes away.  3. Do not drink alcohol.  4. Avoid strenuous or risky activities.  Ask for help when climbing stairs.   5. You may feel lightheaded.  IF so, sit for a few minutes before standing.  Have someone help you get up.   6. If you have nausea (feel sick to your stomach): Drink only clear liquids such as apple juice, ginger ale, broth or 7-Up.  Rest may also help.  Be sure to drink enough fluids.  Move to a regular diet as you feel able.  7. You may have a slight fever. Call the doctor if your fever is over 100 F (37.7 C) (taken under the tongue) or lasts longer than 24 hours.  8. You may have a dry mouth, a sore throat, muscle aches or trouble sleeping.  These should go away after 24 hours.  9. Do not make important or legal decisions.   Call your doctor for any of the followin.  Signs of infection (fever, growing tenderness at the surgery site, a large amount of drainage or bleeding, severe pain, foul-smelling drainage, redness, swelling).    2. It has been over 8 to 10 hours since surgery and you are still not able to urinate (pass water).    3.  Headache for over 24 hours.      To contact a doctor, call Dr Izaguirre's office at [phone] (clinic)or:    X   [phone] and ask for the resident on call for GI  (answered 24 hours a day)  X   Emergency Department:  X      [facility]: [phone]       (TTY for hearing impaired: [phone])                        Pending Results     No orders found from 2017 to 2017.            Admission Information     Date & Time Provider Department Dept. Phone    2017 Rito Mcneil MD Same Day Surgery [facility] [place] [phone]      Your Vitals Were     Blood Pressure Pulse Temperature Respirations Height Weight    120/62 87 99  F (37.2  C) (Axillary) 18 1.575 m (5' 2.01\") 49.8 kg (109 lb 12.6 oz)    Pulse Oximetry BMI (Body Mass Index)                93% 20.08 kg/m2   "        [employer] Information     [employer] gives you secure access to your electronic health record. If you see a primary care provider, you can also send messages to your care team and make appointments. If you have questions, please call your primary care clinic.  If you do not have a primary care provider, please call [phone] and they will assist you.        Care EveryWhere ID     This is your Care EveryWhere ID. This could be used by other organizations to access your [facility] medical records  [id number]        Equal Access to Services     NESSA NICHOLS : Haddiana simms hadasho Soomaali, waaxda luqadaha, qaybta kaalmada adeegyada, lisa michael. So [facility] [phone].    ATENCIÓN: Si habla español, tiene a nava disposición servicios gratuitos de asistencia lingüística. Llame al [phone].    We comply with applicable federal civil rights laws and [place] laws. We do not discriminate on the basis of race, color, national origin, age, disability, sex, sexual orientation, or gender identity.               Review of your medicines      START taking        Dose / Directions    omeprazole 40 MG capsule   Commonly known as:  priLOSEC   Used for:  Duodenal ulceration        Dose:  40 mg   Take 1 capsule (40 mg) by mouth 2 times daily Take 30-60 minutes before a meal.   Quantity:  90 capsule   Refills:  3         CONTINUE these medicines which have NOT CHANGED        Dose / Directions    amylase-lipase-protease 34053 UNITS Cpep   Commonly known as:  CREON   Used for:  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)        Dose:  2 capsule   Take 2 capsules (72,000 Units) by mouth 3 times daily (with meals)   Quantity:  180 capsule   Refills:  1       CALCIUM PO        Take by mouth every morning Form/strength unknown. Powder formulation. Add to water and fruits/vegetables daily to promote bone health.   Refills:  0       camphor-menthol 0.5-0.5 % Lotn   Commonly known as:  DERMASARRA        Dose:   1 mL   Apply 1 mL topically every 6 hours as needed for skin care   Quantity:  59 mL   Refills:  0       cholecalciferol 1000 UNIT tablet   Commonly known as:  vitamin D3        Dose:  1 tablet   Take 1 tablet by mouth 2 times daily   Refills:  0       cyanocolbalamin 500 MCG tablet   Commonly known as:  vitamin  B-12        Dose:  500 mcg   Take 500 mcg by mouth every morning   Refills:  0       denosumab 60 MG/ML Soln injection   Commonly known as:  PROLIA   Used for:  Senile osteoporosis        Dose:  60 mg   Inject 1 mL (60 mg) Subcutaneous every 6 months Inject subcutaneously in upper arm, upper thigh or abdomen   Quantity:  1 mL   Refills:  1       folic acid 400 MCG tablet   Commonly known as:  FOLVITE        Dose:  1 tablet   Take 1 tablet by mouth every morning   Refills:  0       hydrOXYzine 25 MG tablet   Commonly known as:  ATARAX        Dose:  25-50 mg   Take 1-2 tablets (25-50 mg) by mouth every 6 hours as needed for itching (adjuvant pain)   Quantity:  90 tablet   Refills:  0       levothyroxine 37.5 mcg Tabs half-tab   Commonly known as:  SYNTHROID/LEVOTHROID        Dose:  37.5 mcg   Take 37.5 mcg by mouth every morning (before breakfast)   Refills:  0       ondansetron 4 MG tablet   Commonly known as:  ZOFRAN        Dose:  4 mg   Take 1 tablet (4 mg) by mouth every 6 hours as needed for nausea   Quantity:  60 tablet   Refills:  0       potassium 99 MG Tabs        Dose:  1 tablet   Take 1 tablet by mouth 2 times daily   Refills:  0       timolol maleate 0.5 % opthalmic solution   Commonly known as:  ISTALOL        Dose:  1 drop   Place 1 drop into both eyes every morning Before placing contact lenses   Refills:  0       traMADol 50 MG tablet   Commonly known as:  ULTRAM   Used for:  Pancreatic mass        Dose:  25-50 mg   Take 0.5-1 tablets (25-50 mg) by mouth every 6 hours as needed for breakthrough pain   Quantity:  100 tablet   Refills:  1       triamterene-hydrochlorothiazide 37.5-25 MG per  tablet   Commonly known as:  MAXZIDE-25        Dose:  1 tablet   Take 1 tablet by mouth every morning   Refills:  0       zinc 50 MG Tabs        Dose:  1 tablet   Take 1 tablet by mouth every morning   Refills:  0            Where to get your medicines      These medications were sent to [id number] - [place], [place] - [address] AT [facility] [address]  [address], [place] [address]     Phone:  [phone]     omeprazole 40 MG capsule                Protect others around you: Learn how to safely use, store and throw away your medicines at www.disposemymeds.org.             Medication List: This is a list of all your medications and when to take them. Check marks below indicate your daily home schedule. Keep this list as a reference.      Medications           Morning Afternoon Evening Bedtime As Needed    amylase-lipase-protease 58066 UNITS Cpep   Commonly known as:  CREON   Take 2 capsules (72,000 Units) by mouth 3 times daily (with meals)                                CALCIUM PO   Take by mouth every morning Form/strength unknown. Powder formulation. Add to water and fruits/vegetables daily to promote bone health.                                camphor-menthol 0.5-0.5 % Lotn   Commonly known as:  DERMASARRA   Apply 1 mL topically every 6 hours as needed for skin care                                cholecalciferol 1000 UNIT tablet   Commonly known as:  vitamin D3   Take 1 tablet by mouth 2 times daily                                cyanocolbalamin 500 MCG tablet   Commonly known as:  vitamin  B-12   Take 500 mcg by mouth every morning                                denosumab 60 MG/ML Soln injection   Commonly known as:  PROLIA   Inject 1 mL (60 mg) Subcutaneous every 6 months Inject subcutaneously in upper arm, upper thigh or abdomen                                folic acid 400 MCG tablet   Commonly known as:  FOLVITE   Take 1 tablet by mouth every morning                                 hydrOXYzine 25 MG tablet   Commonly known as:  ATARAX   Take 1-2 tablets (25-50 mg) by mouth every 6 hours as needed for itching (adjuvant pain)                                levothyroxine 37.5 mcg Tabs half-tab   Commonly known as:  SYNTHROID/LEVOTHROID   Take 37.5 mcg by mouth every morning (before breakfast)                                omeprazole 40 MG capsule   Commonly known as:  priLOSEC   Take 1 capsule (40 mg) by mouth 2 times daily Take 30-60 minutes before a meal.                                ondansetron 4 MG tablet   Commonly known as:  ZOFRAN   Take 1 tablet (4 mg) by mouth every 6 hours as needed for nausea                                potassium 99 MG Tabs   Take 1 tablet by mouth 2 times daily                                timolol maleate 0.5 % opthalmic solution   Commonly known as:  ISTALOL   Place 1 drop into both eyes every morning Before placing contact lenses                                traMADol 50 MG tablet   Commonly known as:  ULTRAM   Take 0.5-1 tablets (25-50 mg) by mouth every 6 hours as needed for breakthrough pain                                triamterene-hydrochlorothiazide 37.5-25 MG per tablet   Commonly known as:  MAXZIDE-25   Take 1 tablet by mouth every morning                                zinc 50 MG Tabs   Take 1 tablet by mouth every morning

## 2017-11-22 NOTE — OP NOTE
ERCP 11/22/2017  7:52 AM [facility], [address] [place] [address] [phone]     Endoscopy Department   _______________________________________________________________________________   Patient Name: Iris Lewis          Procedure Date: 11/22/2017 7:52 AM   MRN: [id number]                       Account Number: [id number]   YOB: 1940              Admit Type: Outpatient   Age: 77                                Gender: Female   Note Status: Held                     Attending MD: Felix Izaguirre MD   Total Sedation Time:                     _______________________________________________________________________________       Procedure:           ERCP   Indications:         For therapy of acute pancreatitis, Stent change, Bile                        duct stricture   Providers:           Felix Izaguirre MD   Patient Profile:     Ms Lewis is a delightful 78yo woman with metastatic                        pancreatic cancer complicated by biliary obstruction and                        what is throught to be smoldering pancreatitis with                        associated pain. She now returns for exchange to metal                        biliary stent and consideraton of a pancreatic stent by                        ERCP.   Referring MD:        Vinny Yu MD   Requesting Provider: Rito Mcneil MD   Medicines:           General Anesthesia, Indomethacin not given due to                        contraindication   Complications:       No immediate complications.   _______________________________________________________________________________   Procedure:           Pre-Anesthesia Assessment:                        - Prior to the procedure, a History and Physical was                        performed, and patient medications and allergies were                        reviewed. The patient is competent. The risks and                        benefits of the  procedure and the sedation options and                        risks were discussed with the patient. All questions                        were answered and informed consent was obtained. Patient                        identification and proposed procedure were verified by                        the nurse in the pre-procedure area. Mental Status                        Examination: alert and oriented. Airway Examination:                        Mallampati Class II (the uvula but not tonsillar pillars                        visualized). Respiratory Examination: clear to                        auscultation. CV Examination: normal. ASA Grade                        Assessment: III - A patient with severe systemic                        disease. After reviewing the risks and benefits, the                        patient was deemed in satisfactory condition to undergo                        the procedure. The anesthesia plan was to use general                        anesthesia. Immediately prior to administration of                        medications, the patient was re-assessed for adequacy to                        receive sedatives. The heart rate, respiratory rate,                        oxygen saturations, blood pressure, adequacy of                        pulmonary ventilation, and response to care were                        monitored throughout the procedure. The physical status                        of the patient was re-assessed after the procedure.                        After obtaining informed consent, the scope was passed                        under direct vision. Throughout the procedure, the                        patient's blood pressure, pulse, and oxygen saturations                        were monitored continuously. The Duodenoscope was                        introduced through the mouth, and used to inject                        contrast into and used to inject contrast into the bile                      "   duct and ventral pancreatic duct. The ERCP was                        accomplished without difficulty. The patient tolerated                        the procedure well.                                                                                     Findings:        A  film demonstrated a distorted spine, the biliary stent and        cholecystectomy clips. Limited white light views of the foregut were        notable for a mild distal esophageal stenosis with normal overlying        mucosa, a distorted duodenal sweep with extrinsic compression, malignant        ulceration of the duodenal wall beyond the ampulla and the biliary        stent. The ventral pancreatic duct was selectively deeply cannulated        with the short-nosed traction sphincterotome in concet with an 0.025\"        Visiglide wire. Contrast was injected and I personally interpreted the        pancreatic duct images. Ductal flow of contrast was adequate, image        quality was excellent and contrast extended to the downstream pancreatic        duct. There was an ultratight stenosis within the head with upstream        dilation within the genu to 5-6mm. A decision was made for a temporary 5        Fr by 5 cm HF stent with a 3/4 external pigtail and a single internal        flap to be placed 5 cm into the ventral pancreatic duct. Clear fluid        flowed through the stent and the stent was in good position. A wire was        then passed into the biliary tree along the biliary stents and the stent        then removed from the biliary tree using a snare. The bile duct was        deeply cannulated with the 12 mm balloon and ontrast was injected. The        intrahepatics were grossly decompressed and without stenosis, as was the        bifucation and common duct upstream of an ultratight malignant appearing        distal common duct stenosis. A 10 mm by 4 cm [facility] stent was placed        into the common bile duct across the stenosis and " internalized across a        biliary sphincerotomy. Bile flowed through the stent and the stent was        in good position with access to the pancreatic stent.                                                                                     Impression:          - Mild distal esophageal stenosis of uncertain clinical                        significance or etiology                        - Distorted second portion with mild malignant extrinsic                        compression and deep ulceration of the second portion                        with indication for endoscopic therapy at this juncture                        - Patent well positioned biliary stent, removed from an                        open biliary sphincrerotomy                        - Malignant obstruction of the distal common and main                        ducts upstream dilation of the latter                        - Successful placement of a temporary 5F plastic                        pancreatic stent across the stenosis                        - Successful placement of a [id number] stent across                        the biliary stenosis   Recommendation:      - Standard outpatient general anesthesia recovery with                        probable discharge home this morning                        - Follow up with established providers as scheduled                        - Start high dose twice daily PPI                        - Repeat ERCP in 8 weeks with decision for exchange to a                        metal pancreatic stent or for plastic stent removal                        based on course; a decision for duodenal stent may also                        occur                        - The findings and recommendations were discussed with                        the patient and their family

## 2017-11-27 ENCOUNTER — CARE COORDINATION (OUTPATIENT)
Dept: GASTROENTEROLOGY | Facility: CLINIC | Age: 77
End: 2017-11-27

## 2017-11-27 ENCOUNTER — CARE COORDINATION (OUTPATIENT)
Dept: ONCOLOGY | Facility: CLINIC | Age: 77
End: 2017-11-27

## 2017-11-27 DIAGNOSIS — K83.1 BILE DUCT STRICTURE (H): Primary | ICD-10-CM

## 2017-11-27 RX ORDER — PROCHLORPERAZINE MALEATE 10 MG
5 TABLET ORAL EVERY 6 HOURS PRN
Qty: 30 TABLET | Refills: 2 | Status: SHIPPED | OUTPATIENT
Start: 2017-11-27 | End: 2017-01-01

## 2017-11-27 RX ORDER — LORAZEPAM 0.5 MG/1
0.5 TABLET ORAL EVERY 4 HOURS PRN
Qty: 30 TABLET | Refills: 2 | Status: SHIPPED | OUTPATIENT
Start: 2017-11-27 | End: 2018-01-01

## 2017-11-27 NOTE — PROGRESS NOTES
Post ERCP (11/22/2017) with Dr. Izaguirre: Follow-up    Post procedure recommendations: Repeat ERCP in 8 weeks with decision for exchange to a metal pancreatic stent or for plastic stent removal based on course; a decision for duodenal stent may also occur     Patient states she is feeling great. Eating and drinking with no issues. Denies nausea, vomiting, fevers and pain.     Orders placed: ERCP and sent to scheduling.     Contact information verified for future questions/concerns.    Lyn MARADIAGA RN Coordinator  Dr. Mcneil, Dr. Izaguirre & Dr. Holden  [facility]  [phone]

## 2017-11-27 NOTE — PROGRESS NOTES
Called and [facility] for Iris to let her know that she is scheduled to start gem/abraxane tomorrow at 0830.  Asked for a callback to confirm.

## 2017-11-28 ENCOUNTER — APPOINTMENT (OUTPATIENT)
Dept: LAB | Facility: CLINIC | Age: 77
End: 2017-11-28
Attending: INTERNAL MEDICINE
Payer: MEDICARE

## 2017-11-28 ENCOUNTER — INFUSION THERAPY VISIT (OUTPATIENT)
Dept: ONCOLOGY | Facility: CLINIC | Age: 77
End: 2017-11-28
Attending: INTERNAL MEDICINE
Payer: MEDICARE

## 2017-11-28 VITALS
OXYGEN SATURATION: 97 % | RESPIRATION RATE: 16 BRPM | DIASTOLIC BLOOD PRESSURE: 85 MMHG | SYSTOLIC BLOOD PRESSURE: 97 MMHG | HEIGHT: 62 IN | WEIGHT: 110.3 LBS | TEMPERATURE: 98.9 F | BODY MASS INDEX: 20.3 KG/M2 | HEART RATE: 68 BPM

## 2017-11-28 DIAGNOSIS — C25.0 MALIGNANT NEOPLASM OF HEAD OF PANCREAS (H): Primary | ICD-10-CM

## 2017-11-28 LAB
ALBUMIN SERPL-MCNC: 3.1 G/DL (ref 3.4–5)
ALP SERPL-CCNC: 148 U/L (ref 40–150)
ALT SERPL W P-5'-P-CCNC: 23 U/L (ref 0–50)
ANION GAP SERPL CALCULATED.3IONS-SCNC: 10 MMOL/L (ref 3–14)
AST SERPL W P-5'-P-CCNC: 32 U/L (ref 0–45)
BASOPHILS # BLD AUTO: 0 10E9/L (ref 0–0.2)
BASOPHILS NFR BLD AUTO: 0 %
BILIRUB SERPL-MCNC: 1.1 MG/DL (ref 0.2–1.3)
BUN SERPL-MCNC: 14 MG/DL (ref 7–30)
CALCIUM SERPL-MCNC: 8.8 MG/DL (ref 8.5–10.1)
CHLORIDE SERPL-SCNC: 99 MMOL/L (ref 94–109)
CO2 SERPL-SCNC: 25 MMOL/L (ref 20–32)
CREAT SERPL-MCNC: 0.68 MG/DL (ref 0.52–1.04)
DIFFERENTIAL METHOD BLD: ABNORMAL
EOSINOPHIL # BLD AUTO: 0.1 10E9/L (ref 0–0.7)
EOSINOPHIL NFR BLD AUTO: 0.9 %
ERYTHROCYTE [DISTWIDTH] IN BLOOD BY AUTOMATED COUNT: 13.2 % (ref 10–15)
GFR SERPL CREATININE-BSD FRML MDRD: 84 ML/MIN/1.7M2
GLUCOSE SERPL-MCNC: 124 MG/DL (ref 70–99)
HCT VFR BLD AUTO: 37 % (ref 35–47)
HGB BLD-MCNC: 12.1 G/DL (ref 11.7–15.7)
LYMPHOCYTES # BLD AUTO: 0.8 10E9/L (ref 0.8–5.3)
LYMPHOCYTES NFR BLD AUTO: 5.2 %
MCH RBC QN AUTO: 31.3 PG (ref 26.5–33)
MCHC RBC AUTO-ENTMCNC: 32.7 G/DL (ref 31.5–36.5)
MCV RBC AUTO: 96 FL (ref 78–100)
MONOCYTES # BLD AUTO: 0.7 10E9/L (ref 0–1.3)
MONOCYTES NFR BLD AUTO: 4.3 %
NEUTROPHILS # BLD AUTO: 14.2 10E9/L (ref 1.6–8.3)
NEUTROPHILS NFR BLD AUTO: 89.6 %
PLATELET # BLD AUTO: 290 10E9/L (ref 150–450)
PLATELET # BLD EST: ABNORMAL 10*3/UL
POTASSIUM SERPL-SCNC: 3.6 MMOL/L (ref 3.4–5.3)
PROT SERPL-MCNC: 7.2 G/DL (ref 6.8–8.8)
RBC # BLD AUTO: 3.87 10E12/L (ref 3.8–5.2)
RBC MORPH BLD: NORMAL
SODIUM SERPL-SCNC: 134 MMOL/L (ref 133–144)
WBC # BLD AUTO: 15.8 10E9/L (ref 4–11)

## 2017-11-28 PROCEDURE — 85025 COMPLETE CBC W/AUTO DIFF WBC: CPT | Performed by: INTERNAL MEDICINE

## 2017-11-28 PROCEDURE — 25000128 H RX IP 250 OP 636: Mod: ZF | Performed by: INTERNAL MEDICINE

## 2017-11-28 PROCEDURE — 96417 CHEMO IV INFUS EACH ADDL SEQ: CPT

## 2017-11-28 PROCEDURE — 96413 CHEMO IV INFUSION 1 HR: CPT

## 2017-11-28 PROCEDURE — 40000141 ZZH STATISTIC PERIPHERAL IV START W/O US GUIDANCE: Mod: ZF

## 2017-11-28 PROCEDURE — 80053 COMPREHEN METABOLIC PANEL: CPT | Performed by: INTERNAL MEDICINE

## 2017-11-28 PROCEDURE — 96375 TX/PRO/DX INJ NEW DRUG ADDON: CPT

## 2017-11-28 RX ORDER — PACLITAXEL 100 MG/20ML
125 INJECTION, POWDER, LYOPHILIZED, FOR SUSPENSION INTRAVENOUS ONCE
Status: COMPLETED | OUTPATIENT
Start: 2017-11-28 | End: 2017-11-28

## 2017-11-28 RX ADMIN — DEXAMETHASONE SODIUM PHOSPHATE 12 MG: 10 INJECTION, SOLUTION INTRAMUSCULAR; INTRAVENOUS at 09:25

## 2017-11-28 RX ADMIN — GEMCITABINE 1480 MG: 38 INJECTION, SOLUTION INTRAVENOUS at 10:45

## 2017-11-28 RX ADMIN — PACLITAXEL 185 MG: 100 INJECTION, POWDER, LYOPHILIZED, FOR SUSPENSION INTRAVENOUS at 10:03

## 2017-11-28 RX ADMIN — SODIUM CHLORIDE 250 ML: 9 INJECTION, SOLUTION INTRAVENOUS at 09:25

## 2017-11-28 ASSESSMENT — PAIN SCALES - GENERAL: PAINLEVEL: NO PAIN (0)

## 2017-11-28 NOTE — PROGRESS NOTES
Infusion Nursing Note:  Iris Lewis presents today for Day 1 Cycle 1 Abraxane and Gemzar  Patient seen by provider today: No   present during visit today: Not Applicable.    Note: This is Iris's first infusion.  Tour of infusion area given and routines explained.  Pt received teaching regarding side effects of chemotherapy, how chemo is administered, and how to manage side effects at home.  Also reviewed when to call MD and clinic numbers where provided.  Written materials regarding each chemo and general chemo information also provided.     Intravenous Access:  Peripheral IV placed.    Treatment Conditions:  Lab Results   Component Value Date    HGB 12.1 11/28/2017     Lab Results   Component Value Date    WBC 15.8 11/28/2017      Lab Results   Component Value Date    ANEU 14.2 11/28/2017     Lab Results   Component Value Date     11/28/2017      Lab Results   Component Value Date     11/28/2017                   Lab Results   Component Value Date    POTASSIUM 3.6 11/28/2017           Lab Results   Component Value Date    MAG 1.9 11/15/2017            Lab Results   Component Value Date    CR 0.68 11/28/2017                   Lab Results   Component Value Date    EMMY 8.8 11/28/2017                Lab Results   Component Value Date    BILITOTAL 1.1 11/28/2017           Lab Results   Component Value Date    ALBUMIN 3.1 11/28/2017                    Lab Results   Component Value Date    ALT 23 11/28/2017           Lab Results   Component Value Date    AST 32 11/28/2017     Results reviewed, labs MET treatment parameters, ok to proceed with treatment.          Post Infusion Assessment:  Patient tolerated infusion without incident.  Blood return noted pre and post infusion.  Site patent and intact, free from redness, edema or discomfort.  No evidence of extravasations.  Access discontinued per protocol.    Discharge Plan:   Patient declined prescription refills.  Discharge instructions  reviewed with: Patient and Family.  Patient and/or family verbalized understanding of discharge instructions and all questions answered.  Copy of AVS reviewed with patient and/or family.  Patient will return 12/1 for next appointment.  Patient discharged in stable condition accompanied by: self and .  Departure Mode: Ambulatory.    Face to Face time = 20 minutes      Danya Marc RN

## 2017-11-28 NOTE — PATIENT INSTRUCTIONS
Contact Numbers    [facility] Main Line: [phone]  [facility] Triage:  [phone]    Call triage with chills and/or temperature greater than or equal to 100.4, uncontrolled nausea/vomiting, diarrhea, constipation, dizziness, shortness of breath, chest pain, bleeding, unexplained bruising, or any new/concerning symptoms, questions/concerns.       After Hours: [phone]    If after hours, weekends, or holidays, call main hospital  and ask for Oncology doctor on call. Discharge Instructions for Chemotherapy  Your healthcare provider prescribed a type of medicine therapy for you called chemotherapy. Healthcare providers prescribe chemotherapy for many different types of illnesses, including cancer. There are many types of chemotherapy. This sheet provides general guidelines on how you can take care of yourself after your chemotherapy.  Mouth care  Don t be discouraged if you get mouth sores, even if you are following all your healthcare provider s instructions. Many people get mouth sores as a side effect of chemotherapy. Here s what you can do to prevent mouth sores:    Keep your mouth clean. Brush your teeth with a soft-bristle toothbrush after every meal.    Ask if you should use a toothpaste with fluoride, or a mixture of 1 teaspoon of salt in 8-ounces of water to brush your teeth.     Use an oral swab or special soft toothbrush if your gums bleed during regular brushing.    Don't use dental floss if it causes your gums to bleed.    Use any mouthwashes given to you as directed.    If you can t tolerate regular methods, use salt and baking soda to clean your mouth. Mix 1 teaspoon of salt and 1 teaspoon of baking soda in 1 quart of warm water. Swish and spit.    If you wear dentures, you may be told to wear them only when you eat, ask your healthcare provider. Clean dentures twice a day and soak in antimicrobial solution when you aren't wearing them. Rinse your mouth after each meal.     Watch your mouth and  tongue for white patches. This may be a sign of a type of yeast infection (thrush), a common side effect of chemotherapy. Be sure to tell your healthcare provider about these patches. Medicine can be prescribed to treat it.  Other home care  Here's what else you can do:    Try to exercise. Exercise keeps you strong and keeps your heart and lungs active. Walking and yoga are good types of exercise.     Keep clean. During chemotherapy, your body can t fight infection very well. Take short baths or showers.    Wash your hands before you eat and after going to the bathroom.    Use moisturizing soap. Chemotherapy can make your skin dry.    Apply moisturizing lotion several times a day to help relieve dry skin.    Don t take very hot or very cold showers or baths.    Don t be surprised if your chemotherapy causes slight burns to your skin--usually on the hands and feet. Some medicines used in high doses cause this to happen. Ask for a special cream to help relieve the burn and protect your skin.    Avoid people who are sick with illnesses and diseases you could catch, such as colds, flu, measles, or chicken pox as well as people who have recently had vaccinations for these illnesses.     Let your healthcare provider know if your throat is sore. You may have an infection that needs treatment.    Remember, many patients feel sick and lose their appetites during treatment. Eat small meals several times a day to keep your strength up:    Choose bland foods with little taste or smell if you are reacting strongly to food.    Be sure to cook all food thoroughly. This kills bacteria and helps you avoid infection.    Eat foods that are soft. Soft foods are less likely to cause stomach irritation.    Try to eat a variety of foods for a well-balanced diet. Drink plenty of fluids and eat foods with fiber to avoid constipation.   When to call your healthcare provider  Call your healthcare provider right away if you have any of the  following:    Unexplained bleeding    Trouble concentrating    Ongoing fatigue    Shortness of breath, wheezing, trouble breathing, or bad cough    Rapid, irregular heartbeat, or chest pain    Dizziness, lightheadedness    Constant feeling of being cold    Hives or a cut or rash that swells, turns red, feels hot or painful, or begins to ooze    Burning when you urinate    Fever of 100.4 F (38 C) or higher, or as directed by your healthcare provider   Date Last Reviewed: 5/1/2016 2000-2017 [employer]. [address] [place] [id number]. All rights reserved. This information is not intended as a substitute for professional medical care. Always follow your healthcare professional's instructions.          November 2017 Sunday Monday Tuesday Wednesday Thursday Friday Saturday                  1     2     ENDOSCOPIC ULTRASOUND, ESOPHAGOSCOPY / UPPER GASTROINTESTINAL TRACT (GI)    9:40 AM   Hadley Bailey MD   [facility] OR     XR SURG [place] <5 MIN FL W STILL   10:45 AM   (30 min.)   [email or web address]   [facility] [place],  Radiology 3     4       5     6     P NEW    9:30 AM   (60 min.)   Vinny Yu MD   [facility] 7     8     9     10     11       12     13     [facility] RETURN    7:50 AM   (30 min.)   Brenton Rankin MD   [facility] Primary [facility] DRAW    8:45 AM   (15 min.)    [facility] DRAW   [facility] Lab Draw     [facility] RETURN    9:00 AM   (30 min.)   Vinny Yu MD   [facility] 14     15     [facility] DRAW    2:00 PM   (15 min.)   [facility]   [facility] Lab Draw     PAC EVAL    2:45 PM   (60 min.)   1,  Pac Harsh   [facility] Preoperative Assessment Center     PAC RN ASSESSMENT    3:45 PM   (30 min.)   Rn, [facility] Preoperative [facility] Center     PAC ANESTHESIA CONSULT    4:25 PM   (10 min.)   Anesthesiologist, [facility] Preoperative Assessment [place]     US LWR EXT ART  DUP BILAT    5:00 PM   (60 min.)   [id number]   [facility] US     CT CHEST ABDOMEN PELVIS WWO    5:45 PM   (20 min.)   [id number]   [facility] CT     LAB    6:30 PM   (15 min.)    LAB   [facility] Lab 16     17     18       19     20     21     22     Admission    6:04 AM   Rito Mcneil MD   [facility]   (Discharge: 11/22/2017)     ENDOSCOPIC RETROGRADE CHOLANGIOPANCREATOGRAPHY, EXCHANGE TUBE/STENT    8:15 AM   Felix Izaguirre MD   [facility] OR     XR SURG [place] <5 MIN FL W STILL    8:40 AM   (30 min.)   [email or web address]   [facility] [place],  Radiology 23     24     25       26     27     28     [facility] D[facility]    8:00 AM   (15 min.)    [facility]   [facility] Draw     [facility] ONC INFUSION 120    8:30 AM   (120 min.)    ONCOLOGY INFUSION   [facility] 29 30 December 2017 Sunday Monday Tuesday Wednesday Thursday Friday Saturday                            1     [facility] RETURN    6:50 AM   (30 min.)   Neri Gipson MD   [facility] 2       3     4     [facility] DRAW    7:30 AM   (15 min.)    [facility] DRAW   [facility] Lab Draw     [facility] ONC INFUSION 120    8:00 AM   (120 min.)    ONCOLOGY INFUSION   [facility] 5     6     7     8     9       10     11     [facility] DRAW    8:30 AM   (15 min.)    [facility] DRAW   [facility] Lab Draw     [facility] ONC INFUSION 120    9:00 AM   (120 min.)    ONCOLOGY INFUSION   [facility] 12     13     14     15     16       17     18     19     20     21     22     23       24     25     26     [facility] DRAW    7:30 AM   (15 min.)    [facility] DRAW   [facility] Lab Draw     [facility] RETURN    7:45 AM   (50 min.)   Quyen Mazariegos APRN CNP   [facility]     [facility] ONC INFUSION 120    9:00 AM   (120 min.)    ONCOLOGY INFUSION   [facility] 27      28     29     30       31                                                Lab Results:  Recent Results (from the past 12 hour(s))   CBC with platelets differential    Collection Time: 11/28/17  8:14 AM   Result Value Ref Range    WBC 15.8 (H) 4.0 - 11.0 10e9/L    RBC Count 3.87 3.8 - 5.2 10e12/L    Hemoglobin 12.1 11.7 - 15.7 g/dL    Hematocrit 37.0 35.0 - 47.0 %    MCV 96 78 - 100 fl    MCH 31.3 26.5 - 33.0 pg    MCHC 32.7 31.5 - 36.5 g/dL    RDW 13.2 10.0 - 15.0 %    Platelet Count 290 150 - 450 10e9/L    Diff Method Manual Differential     % Neutrophils 89.6 %    % Lymphocytes 5.2 %    % Monocytes 4.3 %    % Eosinophils 0.9 %    % Basophils 0.0 %    Absolute Neutrophil 14.2 (H) 1.6 - 8.3 10e9/L    Absolute Lymphocytes 0.8 0.8 - 5.3 10e9/L    Absolute Monocytes 0.7 0.0 - 1.3 10e9/L    Absolute Eosinophils 0.1 0.0 - 0.7 10e9/L    Absolute Basophils 0.0 0.0 - 0.2 10e9/L    RBC Morphology Normal     Platelet Estimate Confirming automated cell count    Comprehensive metabolic panel    Collection Time: 11/28/17  8:14 AM   Result Value Ref Range    Sodium 134 133 - 144 mmol/L    Potassium 3.6 3.4 - 5.3 mmol/L    Chloride 99 94 - 109 mmol/L    Carbon Dioxide 25 20 - 32 mmol/L    Anion Gap 10 3 - 14 mmol/L    Glucose 124 (H) 70 - 99 mg/dL    Urea Nitrogen 14 7 - 30 mg/dL    Creatinine 0.68 0.52 - 1.04 mg/dL    GFR Estimate 84 >60 mL/min/1.7m2    GFR Estimate If Black >90 >60 mL/min/1.7m2    Calcium 8.8 8.5 - 10.1 mg/dL    Bilirubin Total 1.1 0.2 - 1.3 mg/dL    Albumin 3.1 (L) 3.4 - 5.0 g/dL    Protein Total 7.2 6.8 - 8.8 g/dL    Alkaline Phosphatase 148 40 - 150 U/L    ALT 23 0 - 50 U/L    AST 32 0 - 45 U/L

## 2017-11-28 NOTE — NURSING NOTE
Chief Complaint   Patient presents with     Blood Draw     IV placed by vascular access     Vitals done and labs drawn off IV, see flow sheets.  JORGE BAPTISTE, CMA

## 2017-11-28 NOTE — MR AVS SNAPSHOT
After Visit Summary   11/28/2017    Iris Lewis    MRN: [id number]           Patient Information     Date Of Birth          1940        Visit Information        Provider Department      11/28/2017 8:30 AM  16 ATC;  ONCOLOGY [facility]        Today's Diagnoses     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)    -  1      Care Instructions      Contact Numbers    [facility] Main Line: [phone]  [facility] Triage:  [phone]    Call triage with chills and/or temperature greater than or equal to 100.4, uncontrolled nausea/vomiting, diarrhea, constipation, dizziness, shortness of breath, chest pain, bleeding, unexplained bruising, or any new/concerning symptoms, questions/concerns.       After Hours: [phone]    If after hours, weekends, or holidays, call main hospital  and ask for Oncology doctor on call. Discharge Instructions for Chemotherapy  Your healthcare provider prescribed a type of medicine therapy for you called chemotherapy. Healthcare providers prescribe chemotherapy for many different types of illnesses, including cancer. There are many types of chemotherapy. This sheet provides general guidelines on how you can take care of yourself after your chemotherapy.  Mouth care  Don t be discouraged if you get mouth sores, even if you are following all your healthcare provider s instructions. Many people get mouth sores as a side effect of chemotherapy. Here s what you can do to prevent mouth sores:    Keep your mouth clean. Brush your teeth with a soft-bristle toothbrush after every meal.    Ask if you should use a toothpaste with fluoride, or a mixture of 1 teaspoon of salt in 8-ounces of water to brush your teeth.     Use an oral swab or special soft toothbrush if your gums bleed during regular brushing.    Don't use dental floss if it causes your gums to bleed.    Use any mouthwashes given to you as directed.    If you can t tolerate regular methods,  use salt and baking soda to clean your mouth. Mix 1 teaspoon of salt and 1 teaspoon of baking soda in 1 quart of warm water. Swish and spit.    If you wear dentures, you may be told to wear them only when you eat, ask your healthcare provider. Clean dentures twice a day and soak in antimicrobial solution when you aren't wearing them. Rinse your mouth after each meal.     Watch your mouth and tongue for white patches. This may be a sign of a type of yeast infection (thrush), a common side effect of chemotherapy. Be sure to tell your healthcare provider about these patches. Medicine can be prescribed to treat it.  Other home care  Here's what else you can do:    Try to exercise. Exercise keeps you strong and keeps your heart and lungs active. Walking and yoga are good types of exercise.     Keep clean. During chemotherapy, your body can t fight infection very well. Take short baths or showers.    Wash your hands before you eat and after going to the bathroom.    Use moisturizing soap. Chemotherapy can make your skin dry.    Apply moisturizing lotion several times a day to help relieve dry skin.    Don t take very hot or very cold showers or baths.    Don t be surprised if your chemotherapy causes slight burns to your skin--usually on the hands and feet. Some medicines used in high doses cause this to happen. Ask for a special cream to help relieve the burn and protect your skin.    Avoid people who are sick with illnesses and diseases you could catch, such as colds, flu, measles, or chicken pox as well as people who have recently had vaccinations for these illnesses.     Let your healthcare provider know if your throat is sore. You may have an infection that needs treatment.    Remember, many patients feel sick and lose their appetites during treatment. Eat small meals several times a day to keep your strength up:    Choose bland foods with little taste or smell if you are reacting strongly to food.    Be sure to cook  all food thoroughly. This kills bacteria and helps you avoid infection.    Eat foods that are soft. Soft foods are less likely to cause stomach irritation.    Try to eat a variety of foods for a well-balanced diet. Drink plenty of fluids and eat foods with fiber to avoid constipation.   When to call your healthcare provider  Call your healthcare provider right away if you have any of the following:    Unexplained bleeding    Trouble concentrating    Ongoing fatigue    Shortness of breath, wheezing, trouble breathing, or bad cough    Rapid, irregular heartbeat, or chest pain    Dizziness, lightheadedness    Constant feeling of being cold    Hives or a cut or rash that swells, turns red, feels hot or painful, or begins to ooze    Burning when you urinate    Fever of 100.4 F (38 C) or higher, or as directed by your healthcare provider   Date Last Reviewed: 5/1/2016 2000-2017 [employer]. [address] [place] [address]. All rights reserved. This information is not intended as a substitute for professional medical care. Always follow your healthcare professional's instructions.          November 2017 Sunday Monday Tuesday Wednesday Thursday Friday Saturday                  1     2     ENDOSCOPIC ULTRASOUND, ESOPHAGOSCOPY / UPPER GASTROINTESTINAL TRACT (GI)    9:40 AM   Hadley Bailey MD   [facility] OR     XR SURG [place] <5 MIN FL W STILL   10:45 AM   (30 min.)   [email or web address]   [facility] [place],  Radiology 3     4       5     6     [facility] NEW    9:30 AM   (60 min.)   Vinny Yu MD   [facility] 7     8     9     10     11       12     13     [facility] RETURN    7:50 AM   (30 min.)   Brenton Rankin MD   [facility] LAB DRAW    8:45 AM   (15 min.)    [facility] LAB DRAW   [facility] Lab Draw     [facility] RETURN    9:00 AM   (30 min.)   Vinny Yu MD   [facility] 14     15     [facility]    2:00 PM    (15 min.)    [facility] LAB DRAW   [facility] Lab Draw     PAC EVAL    2:45 PM   (60 min.)   1,  Pac Harsh   [facility]     PAC RN ASSESSMENT    3:45 PM   (30 min.)   Rn,  Pac   [facility] [place]     PAC ANESTHESIA CONSULT    4:25 PM   (10 min.)   Anesthesiologist, [facility] [place]     US LWR EXT ART DUP BILAT    5:00 PM   (60 min.)   UCUSV1   [facility] [place] US     CT CHEST ABDOMEN PELVIS WWO    5:45 PM   (20 min.)   CT2   [facility] CT     LAB    6:30 PM   (15 min.)    LAB   [facility] Lab 16     17     18       19     20     21     22     Admission    6:04 AM   Rito Mcneil MD   [facility]   (Discharge: 11/22/2017)     ENDOSCOPIC RETROGRADE CHOLANGIOPANCREATOGRAPHY, EXCHANGE TUBE/STENT    8:15 AM   Felix Izaguirre MD   [facility] OR     XR SURG [place] <5 MIN FL W STILL    8:40 AM   (30 min.)   [email or web address]   [facility] [place],  Radiology 23     24     25       26     27     28     [facility] DRAW    8:00 AM   (15 min.)    [facility] LAB DRAW   [facility] Lab Draw     [facility] ONC INFUSION 120    8:30 AM   (120 min.)    ONCOLOGY INFUSION   [facility] 29 30 December 2017 Sunday Monday Tuesday Wednesday Thursday Friday Saturday                            1     [facility] RETURN    6:50 AM   (30 min.)   Neri Gipson MD   [facility] 2       3     4     [facility] DRAW    7:30 AM   (15 min.)    [facility] LAB DRAW   [facility] Draw     [facility] ONC INFUSION 120    8:00 AM   (120 min.)    ONCOLOGY INFUSION   [facility] 5     6     7     8     9       10     11     [facility] DRAW    8:30 AM   (15 min.)    [facility] LAB DRAW   [facility] Draw     [facility] ONC INFUSION 120    9:00 AM   (120 min.)    ONCOLOGY INFUSION   [facility] 12     13     14      15     16       17     18     19     20     21     22     23       24     25     26     [facility] LAB DRAW    7:30 AM   (15 min.)   [facility] LAB DRAW   [facility] Lab Draw     [facility] RETURN    7:45 AM   (50 min.)   Quyen Mazariegos APRN CNP   [facility] ONC INFUSION 120    9:00 AM   (120 min.)    ONCOLOGY INFUSION   [facility] 27     28     29     30       31                                                Lab Results:  Recent Results (from the past 12 hour(s))   CBC with platelets differential    Collection Time: 11/28/17  8:14 AM   Result Value Ref Range    WBC 15.8 (H) 4.0 - 11.0 10e9/L    RBC Count 3.87 3.8 - 5.2 10e12/L    Hemoglobin 12.1 11.7 - 15.7 g/dL    Hematocrit 37.0 35.0 - 47.0 %    MCV 96 78 - 100 fl    MCH 31.3 26.5 - 33.0 pg    MCHC 32.7 31.5 - 36.5 g/dL    RDW 13.2 10.0 - 15.0 %    Platelet Count 290 150 - 450 10e9/L    Diff Method Manual Differential     % Neutrophils 89.6 %    % Lymphocytes 5.2 %    % Monocytes 4.3 %    % Eosinophils 0.9 %    % Basophils 0.0 %    Absolute Neutrophil 14.2 (H) 1.6 - 8.3 10e9/L    Absolute Lymphocytes 0.8 0.8 - 5.3 10e9/L    Absolute Monocytes 0.7 0.0 - 1.3 10e9/L    Absolute Eosinophils 0.1 0.0 - 0.7 10e9/L    Absolute Basophils 0.0 0.0 - 0.2 10e9/L    RBC Morphology Normal     Platelet Estimate Confirming automated cell count    Comprehensive metabolic panel    Collection Time: 11/28/17  8:14 AM   Result Value Ref Range    Sodium 134 133 - 144 mmol/L    Potassium 3.6 3.4 - 5.3 mmol/L    Chloride 99 94 - 109 mmol/L    Carbon Dioxide 25 20 - 32 mmol/L    Anion Gap 10 3 - 14 mmol/L    Glucose 124 (H) 70 - 99 mg/dL    Urea Nitrogen 14 7 - 30 mg/dL    Creatinine 0.68 0.52 - 1.04 mg/dL    GFR Estimate 84 >60 mL/min/1.7m2    GFR Estimate If Black >90 >60 mL/min/1.7m2    Calcium 8.8 8.5 - 10.1 mg/dL    Bilirubin Total 1.1 0.2 - 1.3 mg/dL    Albumin 3.1 (L) 3.4 - 5.0 g/dL    Protein Total 7.2 6.8 - 8.8 g/dL    Alkaline  Phosphatase 148 40 - 150 U/L    ALT 23 0 - 50 U/L    AST 32 0 - 45 U/L                   Follow-ups after your visit        Your next 10 appointments already scheduled     Dec 01, 2017  7:05 AM CST   (Arrive by 6:50 AM)   Return Visit with Neri Gipson MD   [facility] ([facility])    [address] [place] [address]  4th [facility] [address]   [phone]            Dec 04, 2017  7:30 AM CST   [facility] Lab Draw with [facility] DRAW   [facility] ([facility])    [address] [place] [address]   [phone]            Dec 04, 2017  8:00 AM CST   Infusion 120 with UC ONCOLOGY INFUSION, UC 15 ATC   [facility] ([facility])    [address] [place] [address]   [phone]            Dec 11, 2017  8:30 AM CST   [facility] Lab Draw with [facility] LAB DRAW   [facility] ([facility])    [address] [place] [address]   [phone]            Dec 11, 2017  9:00 AM CST   Infusion 120 with UC ONCOLOGY INFUSION, UC 24 ATC   [facility] ([facility])    [address] [place] [address]   [phone]            Dec 26, 2017  7:30 AM CST   [facility] Lab Draw with UC [facility] LAB DRAW   [facility] ([facility])    [address] [place] [address]   [phone]            Dec 26, 2017  8:00 AM CST   (Arrive by 7:45 AM)   Return Visit with CAT Dang CNP   [facility] ([facility])    [address] [place] [address]   [phone]            Dec 26, 2017  9:00 AM CST   Infusion 120 with UC ONCOLOGY INFUSION   [facility] ([facility]  "[place])    [address] [facility]  2nd [facility] [address] [phone]              Who to contact     If you have questions or need follow up information about today's clinic visit or your schedule please contact [facility] directly at [phone].  Normal or non-critical lab and imaging results will be communicated to you by MyChart, letter or phone within 4 business days after the clinic has received the results. If you do not hear from us within 7 days, please contact the clinic through [employer] or phone. If you have a critical or abnormal lab result, we will notify you by phone as soon as possible.  Submit refill requests through [employer] or call your pharmacy and they will forward the refill request to us. Please allow 3 business days for your refill to be completed.          Additional Information About Your Visit        MyChart Information     [employer] gives you secure access to your electronic health record. If you see a primary care provider, you can also send messages to your care team and make appointments. If you have questions, please call your primary care clinic.  If you do not have a primary care provider, please call [phone] and they will assist you.        Care EveryWhere ID     This is your Care EveryWhere ID. This could be used by other organizations to access your [place] medical records  [id number]        Your Vitals Were     Pulse Temperature Respirations Height Pulse Oximetry BMI (Body Mass Index)    68 98.9  F (37.2  C) (Oral) 16 1.575 m (5' 2\") 97% 20.17 kg/m2       Blood Pressure from Last 3 Encounters:   11/28/17 97/85   11/22/17 118/66   11/15/17 115/64    Weight from Last 3 Encounters:   11/28/17 50 kg (110 lb 4.8 oz)   11/22/17 49.8 kg (109 lb 12.6 oz)   11/15/17 52 kg (114 lb 11.2 oz)              We Performed the Following     CBC with platelets differential     Comprehensive metabolic panel          Today's Medication Changes          These " changes are accurate as of: 11/28/17 10:54 AM.  If you have any questions, ask your nurse or doctor.               Start taking these medicines.        Dose/Directions    LORazepam 0.5 MG tablet   Commonly known as:  ATIVAN   Used for:  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)        Dose:  0.5 mg   Take 1 tablet (0.5 mg) by mouth every 4 hours as needed (Anxiety, Nausea/Vomiting or Sleep)   Quantity:  30 tablet   Refills:  2       prochlorperazine 10 MG tablet   Commonly known as:  COMPAZINE   Used for:  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)        Dose:  5 mg   Take 0.5 tablets (5 mg) by mouth every 6 hours as needed (Nausea/Vomiting)   Quantity:  30 tablet   Refills:  2            Where to get your medicines      These medications were sent to [facility] [address] [facility] [address]  [address] [place] [address] [address] [place] [address]    Hours:  TRANSPLANT PHONE NUMBER [phone] Phone:  [phone]     prochlorperazine 10 MG tablet         Some of these will need a paper prescription and others can be bought over the counter.  Ask your nurse if you have questions.     Bring a paper prescription for each of these medications     LORazepam 0.5 MG tablet                Primary Care Provider Office Phone # Fax #    Neri Gipson [phone]       [address] [place] [address]        Equal Access to Services     NESSA NICHOLS AH: Shaun wallace Sothom, waaxda luqadaha, qaybta kaalmada adesusana, lisa adams . So [facility] [phone].    ATENCIÓN: Si habla español, tiene a nava disposición servicios gratuitos de asistencia lingüística. Llame al [phone].    We comply with applicable federal civil rights laws and [place] laws. We do not discriminate on the basis of race, color, national origin, age, disability, sex, sexual orientation, or gender identity.            Thank you!     Thank you for  [facility]  for your care. Our goal is always to provide you with excellent care. Hearing back from our patients is one way we can continue to improve our services. Please take a few minutes to complete the written survey that you may receive in the mail after your visit with us. Thank you!             Your Updated Medication List - Protect others around you: Learn how to safely use, store and throw away your medicines at www.disposemymeds.org.          This list is accurate as of: 11/28/17 10:54 AM.  Always use your most recent med list.                   Brand Name Dispense Instructions for use Diagnosis    amylase-lipase-protease 29413 UNITS Cpep    CREON    180 capsule    Take 2 capsules (72,000 Units) by mouth 3 times daily (with meals)    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)       CALCIUM PO      Take by mouth every morning Form/strength unknown. Powder formulation. Add to water and fruits/vegetables daily to promote bone health.        camphor-menthol 0.5-0.5 % Lotn    DERMASARRA    59 mL    Apply 1 mL topically every 6 hours as needed for skin care        cholecalciferol 1000 UNIT tablet    vitamin D3     Take 1 tablet by mouth 2 times daily        cyanocolbalamin 500 MCG tablet    vitamin  B-12     Take 500 mcg by mouth every morning        denosumab 60 MG/ML Soln injection    PROLIA    1 mL    Inject 1 mL (60 mg) Subcutaneous every 6 months Inject subcutaneously in upper arm, upper thigh or abdomen    Senile osteoporosis       folic acid 400 MCG tablet    FOLVITE     Take 1 tablet by mouth every morning        hydrOXYzine 25 MG tablet    ATARAX    90 tablet    Take 1-2 tablets (25-50 mg) by mouth every 6 hours as needed for itching (adjuvant pain)        levothyroxine 37.5 mcg Tabs half-tab    SYNTHROID/LEVOTHROID     Take 37.5 mcg by mouth every morning (before breakfast)        LORazepam 0.5 MG tablet    ATIVAN    30 tablet    Take 1 tablet (0.5 mg) by mouth every 4  hours as needed (Anxiety, Nausea/Vomiting or Sleep)    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)       omeprazole 40 MG capsule    priLOSEC    90 capsule    Take 1 capsule (40 mg) by mouth 2 times daily Take 30-60 minutes before a meal.    Duodenal ulceration       ondansetron 4 MG tablet    ZOFRAN    60 tablet    Take 1 tablet (4 mg) by mouth every 6 hours as needed for nausea        potassium 99 MG Tabs      Take 1 tablet by mouth 2 times daily        prochlorperazine 10 MG tablet    COMPAZINE    30 tablet    Take 0.5 tablets (5 mg) by mouth every 6 hours as needed (Nausea/Vomiting)    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)       timolol maleate 0.5 % opthalmic solution    ISTALOL     Place 1 drop into both eyes every morning Before placing contact lenses        traMADol 50 MG tablet    ULTRAM    100 tablet    Take 0.5-1 tablets (25-50 mg) by mouth every 6 hours as needed for breakthrough pain    Pancreatic mass       triamterene-hydrochlorothiazide 37.5-25 MG per tablet    MAXZIDE-25     Take 1 tablet by mouth every morning        zinc 50 MG Tabs      Take 1 tablet by mouth every morning

## 2017-11-30 ENCOUNTER — DOCUMENTATION ONLY (OUTPATIENT)
Dept: ONCOLOGY | Facility: CLINIC | Age: 77
End: 2017-11-30

## 2017-11-30 NOTE — PROGRESS NOTES
Pt called to check where her compazine and lorazepam scripts were filled. She did not receive the paper script for lorazepam.  Checked with [facility] pharmacy and confirmed they did not have the lorazepam and the compazine was only put on profile so not filled.  checked and pt has not filled anywhere else. Pt requested call to [facility]. Spoke with pharmacist and verbal order given for both.

## 2017-12-01 ENCOUNTER — OFFICE VISIT (OUTPATIENT)
Dept: INTERNAL MEDICINE | Facility: CLINIC | Age: 77
End: 2017-12-01

## 2017-12-01 VITALS
HEART RATE: 66 BPM | WEIGHT: 111.1 LBS | BODY MASS INDEX: 20.32 KG/M2 | DIASTOLIC BLOOD PRESSURE: 65 MMHG | SYSTOLIC BLOOD PRESSURE: 116 MMHG

## 2017-12-01 DIAGNOSIS — K59.09 OTHER CONSTIPATION: Primary | ICD-10-CM

## 2017-12-01 NOTE — NURSING NOTE
Chief Complaint   Patient presents with     Cancer     Patient here for cancer follow up.       Shabbir Carrero CMA at 7:11 AM on 12/1/2017.

## 2017-12-01 NOTE — PROGRESS NOTES
HPI:    New Dx. Metastatic pancreatic cancer. She saw Dr. Yu 11/13/17 and is getting chemotherapy (11/28/17). She had ERCP for biliary stent removal/chage 11/22/17. Today stable and some mild constipation. She is eating and drinking OK. No other HEENT, cardiopulmonary, abdominal, , GYN, neurological, skin complaints.     PE:    Vitals noted; gen nad cooperative alert, HEENT, oropharynx clear, neck supple nl rom, LCTA, B, good air  Movment,  RRR, S1, S2, abdomen positive BS's, non-tender, grossly normal neurological exam    A/P:    1. Metastatic pancreatic cancer followed by Dr. Yu and seen 11/13/17. She also states she has appt. At [facility] in a few weeks.   2. Flu shot 10/11/17  3. She will have periodic ERCP for bilary stent exchange  4. Thyroid 10/31/17 checked  5. She remains on BP medication; electrolytes checked 11/28/17  6. Fluids and PRN Miralax for constipation    I will see her back 12/26/17 same day as ONC appt.     Total time spent 25 minutes.  More than 50% of the time spent with Ms. Lewis on counseling / coordinating her care

## 2017-12-01 NOTE — MR AVS SNAPSHOT
After Visit Summary   12/1/2017    Iris Lewis    MRN: [id number]           Patient Information     Date Of Birth          1940        Visit Information        Provider Department      12/1/2017 7:05 AM Neri Gipson MD [facility]        Care Instructions    [facility]: [phone]     [facility] Medication Refill Request Information:  * Please contact your pharmacy regarding ANY request for medication refills.  ** [facility] Prescription Fax = [phone]  * Please allow 3 business days for routine medication refills.  * Please allow 5 business days for controlled substance medication refills.     Primary Care Center Test Result notification information:  *You will be notified with in 7-10 days of your appointment day regarding the results of your test.  If you are on MyChart you will be notified as soon as the provider has reviewed the results and signed off on them.            Follow-ups after your visit        Your next 10 appointments already scheduled     Dec 04, 2017  7:30 AM CST   [facility] Draw with [facility] DRAW   [facility]    [address] [place] [address] [phone]            Dec 04, 2017  8:00 AM CST   Infusion 120 with UC ONCOLOGY INFUSION, UC 15 ATC   [facility] ([facility])    [address] [place] [address]   [phone]            Dec 11, 2017  8:30 AM CST   [facility] Lab Draw with [facility]ON[facility] LAB DRAW   [facility] Lab Draw ([facility])    [address] [place] [address] [phone]            Dec 11, 2017  9:00 AM CST   Infusion 120 with UC ONCOLOGY INFUSION, UC 24 ATC   [facility] ([facility])    [address] [place] [address] [phone]             Dec 26, 2017  7:30 AM CST   M[facility]ic Lab Draw with  MASONIC LAB DRAW   [facility] Draw ([facility])    [address]  [address]  [facility] [id number] [phone]            Dec 26, 2017  8:00 AM CST   (Arrive by 7:45 AM)   Return Visit with CAT Dang CNP   [facility] ([facility])    [address]  [address] [facility] [address] [phone]            Dec 26, 2017  9:00 AM CST   Infusion 120 with UC ONCOLOGY INFUSION, UC 24 ATC   [facility] ([facility])    [address]  [address]  [facility] [address] [phone]              Who to contact     Please call your clinic at [phone] to:    Ask questions about your health    Make or cancel appointments    Discuss your medicines    Learn about your test results    Speak to your doctor   If you have compliments or concerns about an experience at your clinic, or if you wish to file a complaint, please contact [facility] Patient Relations at [phone] or email us at [email or web address]         Additional Information About Your Visit        adflyerhart Information     [email or web address] gives you secure access to your electronic health record. If you see a primary care provider, you can also send messages to your care team and make appointments. If you have questions, please call your primary care clinic.  If you do not have a primary care provider, please call [phone] and they will assist you.      [email or web address] is an electronic gateway that provides easy, online access to your medical records. With [email or web address], you can request a clinic appointment, read your test results, renew a prescription or communicate with your care team.     To access your existing account, please contact your [facility] or call [phone] for  assistance.        Care EveryWhere ID     This is your Care EveryWhere ID. This could be used by other organizations to access your [place] medical records  [id number]        Your Vitals Were     Pulse Breastfeeding? BMI (Body Mass Index)             66 No 20.32 kg/m2          Blood Pressure from Last 3 Encounters:   12/01/17 116/65   11/28/17 97/85   11/22/17 118/66    Weight from Last 3 Encounters:   12/01/17 50.4 kg (111 lb 1.6 oz)   11/28/17 50 kg (110 lb 4.8 oz)   11/22/17 49.8 kg (109 lb 12.6 oz)              Today, you had the following     No orders found for display       Primary Care Provider Office Phone # Fax #    Neri Gipson [phone]       [address] [address]        Equal Access to Services     [facility] : Hadii mary simms hadasho Soomaali, waaxda luqadaha, qaybta kaalmada adeegyada, lisa chengin hayjeanne adams . So [facility] [phone].    ATENCIÓN: Si habla español, tiene a nava disposición servicios gratuitos de asistencia lingüística. Llame al [phone].    We comply with applicable federal civil rights laws and [place] laws. We do not discriminate on the basis of race, color, national origin, age, disability, sex, sexual orientation, or gender identity.            Thank you!     Thank you for choosing [facility]  for your care. Our goal is always to provide you with excellent care. Hearing back from our patients is one way we can continue to improve our services. Please take a few minutes to complete the written survey that you may receive in the mail after your visit with us. Thank you!             Your Updated Medication List - Protect others around you: Learn how to safely use, store and throw away your medicines at www.disposemymeds.org.          This list is accurate as of: 12/1/17  7:41 AM.  Always use your most recent med list.                   Brand Name Dispense Instructions for use Diagnosis     amylase-lipase-protease 81774 UNITS Cpep    CREON    180 capsule    Take 2 capsules (72,000 Units) by mouth 3 times daily (with meals)    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)       CALCIUM PO      Take by mouth every morning Form/strength unknown. Powder formulation. Add to water and fruits/vegetables daily to promote bone health.        camphor-menthol 0.5-0.5 % Lotn    DERMASARRA    59 mL    Apply 1 mL topically every 6 hours as needed for skin care        cholecalciferol 1000 UNIT tablet    vitamin D3     Take 1 tablet by mouth 2 times daily        cyanocolbalamin 500 MCG tablet    vitamin  B-12     Take 500 mcg by mouth every morning        denosumab 60 MG/ML Soln injection    PROLIA    1 mL    Inject 1 mL (60 mg) Subcutaneous every 6 months Inject subcutaneously in upper arm, upper thigh or abdomen    Senile osteoporosis       folic acid 400 MCG tablet    FOLVITE     Take 1 tablet by mouth every morning        hydrOXYzine 25 MG tablet    ATARAX    90 tablet    Take 1-2 tablets (25-50 mg) by mouth every 6 hours as needed for itching (adjuvant pain)        levothyroxine 37.5 mcg Tabs half-tab    SYNTHROID/LEVOTHROID     Take 37.5 mcg by mouth every morning (before breakfast)        LORazepam 0.5 MG tablet    ATIVAN    30 tablet    Take 1 tablet (0.5 mg) by mouth every 4 hours as needed (Anxiety, Nausea/Vomiting or Sleep)    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)       omeprazole 40 MG capsule    priLOSEC    90 capsule    Take 1 capsule (40 mg) by mouth 2 times daily Take 30-60 minutes before a meal.    Duodenal ulceration       ondansetron 4 MG tablet    ZOFRAN    60 tablet    Take 1 tablet (4 mg) by mouth every 6 hours as needed for nausea        potassium 99 MG Tabs      Take 1 tablet by mouth 2 times daily        prochlorperazine 10 MG tablet    COMPAZINE    30 tablet    Take 0.5 tablets (5 mg) by mouth every 6 hours as needed (Nausea/Vomiting)    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)        timolol maleate 0.5 % opthalmic solution    ISTALOL     Place 1 drop into both eyes every morning Before placing contact lenses        traMADol 50 MG tablet    ULTRAM    100 tablet    Take 0.5-1 tablets (25-50 mg) by mouth every 6 hours as needed for breakthrough pain    Pancreatic mass       triamterene-hydrochlorothiazide 37.5-25 MG per tablet    MAXZIDE-25     Take 1 tablet by mouth every morning        zinc 50 MG Tabs      Take 1 tablet by mouth every morning

## 2017-12-01 NOTE — PATIENT INSTRUCTIONS
[facility]: [phone]     [facility] Medication Refill Request Information:  * Please contact your pharmacy regarding ANY request for medication refills.  ** [facility] Prescription Fax = [phone]  * Please allow 3 business days for routine medication refills.  * Please allow 5 business days for controlled substance medication refills.     [facility] Test Result notification information:  *You will be notified with in 7-10 days of your appointment day regarding the results of your test.  If you are on MyChart you will be notified as soon as the provider has reviewed the results and signed off on them.

## 2017-12-04 ENCOUNTER — APPOINTMENT (OUTPATIENT)
Dept: LAB | Facility: CLINIC | Age: 77
End: 2017-12-04
Attending: INTERNAL MEDICINE
Payer: MEDICARE

## 2017-12-04 ENCOUNTER — INFUSION THERAPY VISIT (OUTPATIENT)
Dept: ONCOLOGY | Facility: CLINIC | Age: 77
End: 2017-12-04
Attending: INTERNAL MEDICINE
Payer: MEDICARE

## 2017-12-04 VITALS
OXYGEN SATURATION: 97 % | DIASTOLIC BLOOD PRESSURE: 71 MMHG | RESPIRATION RATE: 16 BRPM | HEART RATE: 69 BPM | TEMPERATURE: 97.9 F | WEIGHT: 113 LBS | BODY MASS INDEX: 20.67 KG/M2 | SYSTOLIC BLOOD PRESSURE: 128 MMHG

## 2017-12-04 DIAGNOSIS — C25.0 MALIGNANT NEOPLASM OF HEAD OF PANCREAS (H): Primary | ICD-10-CM

## 2017-12-04 LAB
BASOPHILS # BLD AUTO: 0.1 10E9/L (ref 0–0.2)
BASOPHILS NFR BLD AUTO: 0.8 %
DIFFERENTIAL METHOD BLD: ABNORMAL
EOSINOPHIL # BLD AUTO: 0.1 10E9/L (ref 0–0.7)
EOSINOPHIL NFR BLD AUTO: 2.1 %
ERYTHROCYTE [DISTWIDTH] IN BLOOD BY AUTOMATED COUNT: 12.7 % (ref 10–15)
HCT VFR BLD AUTO: 34.6 % (ref 35–47)
HGB BLD-MCNC: 11.2 G/DL (ref 11.7–15.7)
IMM GRANULOCYTES # BLD: 0 10E9/L (ref 0–0.4)
IMM GRANULOCYTES NFR BLD: 0.7 %
LYMPHOCYTES # BLD AUTO: 1.2 10E9/L (ref 0.8–5.3)
LYMPHOCYTES NFR BLD AUTO: 19.1 %
MCH RBC QN AUTO: 31.2 PG (ref 26.5–33)
MCHC RBC AUTO-ENTMCNC: 32.4 G/DL (ref 31.5–36.5)
MCV RBC AUTO: 96 FL (ref 78–100)
MONOCYTES # BLD AUTO: 0.5 10E9/L (ref 0–1.3)
MONOCYTES NFR BLD AUTO: 7.3 %
NEUTROPHILS # BLD AUTO: 4.3 10E9/L (ref 1.6–8.3)
NEUTROPHILS NFR BLD AUTO: 70 %
NRBC # BLD AUTO: 0 10*3/UL
NRBC BLD AUTO-RTO: 0 /100
PLATELET # BLD AUTO: 230 10E9/L (ref 150–450)
RBC # BLD AUTO: 3.59 10E12/L (ref 3.8–5.2)
WBC # BLD AUTO: 6.1 10E9/L (ref 4–11)

## 2017-12-04 PROCEDURE — 25000128 H RX IP 250 OP 636: Mod: ZF | Performed by: INTERNAL MEDICINE

## 2017-12-04 PROCEDURE — 96375 TX/PRO/DX INJ NEW DRUG ADDON: CPT

## 2017-12-04 PROCEDURE — 85025 COMPLETE CBC W/AUTO DIFF WBC: CPT | Performed by: INTERNAL MEDICINE

## 2017-12-04 PROCEDURE — 96413 CHEMO IV INFUSION 1 HR: CPT

## 2017-12-04 PROCEDURE — 96417 CHEMO IV INFUS EACH ADDL SEQ: CPT

## 2017-12-04 RX ORDER — PACLITAXEL 100 MG/20ML
125 INJECTION, POWDER, LYOPHILIZED, FOR SUSPENSION INTRAVENOUS ONCE
Status: COMPLETED | OUTPATIENT
Start: 2017-12-04 | End: 2017-12-04

## 2017-12-04 RX ADMIN — SODIUM CHLORIDE 250 ML: 9 INJECTION, SOLUTION INTRAVENOUS at 08:36

## 2017-12-04 RX ADMIN — PACLITAXEL 185 MG: 100 INJECTION, POWDER, LYOPHILIZED, FOR SUSPENSION INTRAVENOUS at 08:58

## 2017-12-04 RX ADMIN — GEMCITABINE 1480 MG: 38 INJECTION, SOLUTION INTRAVENOUS at 09:43

## 2017-12-04 RX ADMIN — DEXAMETHASONE SODIUM PHOSPHATE 12 MG: 10 INJECTION, SOLUTION INTRAMUSCULAR; INTRAVENOUS at 08:36

## 2017-12-04 ASSESSMENT — PAIN SCALES - GENERAL: PAINLEVEL: NO PAIN (0)

## 2017-12-04 NOTE — PROGRESS NOTES
Infusion Nursing Note:  Iris Lewis presents today for cycle 1 day 8 abraxane, gemzar  Patient seen by provider today: Yes: No   present during visit today: Not Applicable.    Note: Pt feeling well today.    Intravenous Access:  Peripheral IV placed.    Treatment Conditions:  Lab Results   Component Value Date    HGB 11.2 12/04/2017     Lab Results   Component Value Date    WBC 6.1 12/04/2017      Lab Results   Component Value Date    ANEU 4.3 12/04/2017     Lab Results   Component Value Date     12/04/2017      Lab Results   Component Value Date     11/28/2017                   Lab Results   Component Value Date    POTASSIUM 3.6 11/28/2017           Lab Results   Component Value Date    MAG 1.9 11/15/2017            Lab Results   Component Value Date    CR 0.68 11/28/2017                   Lab Results   Component Value Date    EMMY 8.8 11/28/2017                Lab Results   Component Value Date    BILITOTAL 1.1 11/28/2017           Lab Results   Component Value Date    ALBUMIN 3.1 11/28/2017                    Lab Results   Component Value Date    ALT 23 11/28/2017           Lab Results   Component Value Date    AST 32 11/28/2017     Results reviewed, labs MET treatment parameters, ok to proceed with treatment.      Post Infusion Assessment:  Patient tolerated infusion without incident.  Blood return noted pre and post infusion.  Site patent and intact, free from redness, edema or discomfort.  No evidence of extravasations.  Access discontinued per protocol.    Discharge Plan:   Patient declined prescription refills.  Discharge instructions reviewed with: Patient.  Patient and/or family verbalized understanding of discharge instructions and all questions answered.  Copy of AVS reviewed with patient and/or family.  Patient will return 12/11/17 for next appointment.  Patient discharged in stable condition accompanied by: self.  Departure Mode: Ambulatory.    Anna Butcher,  RN

## 2017-12-04 NOTE — PATIENT INSTRUCTIONS
Contact Numbers    [facility] Main Line: [phone]  [facility] Triage:  [phone]    Call triage with chills and/or temperature greater than or equal to 100.5, uncontrolled nausea/vomiting, diarrhea, constipation, dizziness, shortness of breath, chest pain, bleeding, unexplained bruising, or any new/concerning symptoms, questions/concerns.     If you are having any concerning symptoms or wish to speak to a provider before your next infusion visit, please call your care coordinator or triage to notify them so we can adequately serve you.       After Hours: [phone]    If after hours, weekends, or holidays, call main hospital  and ask for Oncology doctor on call.         December 2017 Sunday Monday Tuesday Wednesday Thursday Friday Saturday 1     [facility] RETURN    6:50 AM   (30 min.)   Neri Gipson MD   [facility] 2       3     4     [facility] LAB DRAW    7:30 AM   (15 min.)    MASONIC LAB DRAW   [facility] Lab Draw     [facility] ONC INFUSION 120    8:00 AM   (120 min.)   UC ONCOLOGY INFUSION   [facility] 5     6     7     8     9       10     11     [facility] LAB DRAW    8:30 AM   (15 min.)    MASONIC LAB DRAW   [facility] Lab Draw     [facility] ONC INFUSION 120    9:00 AM   (120 min.)    ONCOLOGY INFUSION   [facility] 12     13     14     15     16       17     18     19     20     21     22     23       24     25     26     [facility] LAB DRAW    7:30 AM   (15 min.)   [facility] LAB DRAW   [facility] Lab Draw     [facility] RETURN    7:45 AM   (50 min.)   Qyuen Mazariegos APRN CNP   [facility]     [facility] ONC INFUSION 120    9:00 AM   (120 min.)   UC ONCOLOGY INFUSION   [facility]     [facility] RETURN   12:15 PM   (30 min.)   Neri Gipson MD   [facility] 27     28     29     30       31                                              January 2018 Sunday Monday  Tuesday Wednesday Thursday Friday Saturday        1     2     [facility] LAB DRAW    9:00 AM   (15 min.)    MASONIC LAB DRAW   [facility] Mason[facility] Lab Draw     [facility] ONC INFUSION 120    9:30 AM   (120 min.)    ONCOLOGY INFUSION   [facility] 3     4     5     6       7     8     [facility] LAB DRAW    8:30 AM   (15 min.)    [facility]     [facility] ONC INFUSION 120    9:00 AM   (120 min.)    ONCOLOGY INFUSION   [facility] 9     10     11     12     13       14     15     16     17     18     19     20       21     22     [facility] LAB DRAW    9:00 AM   (15 min.)    [facility]   [facility]     [facility] ONC INFUSION 120    9:30 AM   (120 min.)    ONCOLOGY INFUSION   [facility] 23     24     25     26     27       28     29     LAB    5:00 PM   (15 min.)    LAB   [facility] Lab     UMP RETURN    5:15 PM   (30 min.)   Vinny Yu MD   [facility] 30     [facility] ONC INFUSION 120    9:00 AM   (120 min.)    ONCOLOGY INFUSION   [facility] 31                                 Lab Results:  Recent Results (from the past 12 hour(s))   CBC with platelets differential    Collection Time: 12/04/17  8:19 AM   Result Value Ref Range    WBC 6.1 4.0 - 11.0 10e9/L    RBC Count 3.59 (L) 3.8 - 5.2 10e12/L    Hemoglobin 11.2 (L) 11.7 - 15.7 g/dL    Hematocrit 34.6 (L) 35.0 - 47.0 %    MCV 96 78 - 100 fl    MCH 31.2 26.5 - 33.0 pg    MCHC 32.4 31.5 - 36.5 g/dL    RDW 12.7 10.0 - 15.0 %    Platelet Count 230 150 - 450 10e9/L    Diff Method Automated Method     % Neutrophils 70.0 %    % Lymphocytes 19.1 %    % Monocytes 7.3 %    % Eosinophils 2.1 %    % Basophils 0.8 %    % Immature Granulocytes 0.7 %    Nucleated RBCs 0 0 /100    Absolute Neutrophil 4.3 1.6 - 8.3 10e9/L    Absolute Lymphocytes 1.2 0.8 - 5.3 10e9/L    Absolute Monocytes 0.5 0.0 - 1.3 10e9/L    Absolute Eosinophils  0.1 0.0 - 0.7 10e9/L    Absolute Basophils 0.1 0.0 - 0.2 10e9/L    Abs Immature Granulocytes 0.0 0 - 0.4 10e9/L    Absolute Nucleated RBC 0.0

## 2017-12-04 NOTE — MR AVS SNAPSHOT
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     If you are having any concerning symptoms or wish to speak to a provider before your next infusion visit, please call your care coordinator or triage to notify them so we can adequately serve you.       After Hours: [phone]    If after hours, weekends, or holidays, call main hospital  and ask for Oncology doctor on call.         December 2017 Sunday Monday Tuesday Wednesday Thursday Friday Saturday                            1     UMP RETURN    6:50 AM   (30 min.)   Neri Gipson MD   [facility] 2       3     4     P [facility] LAB DRAW    7:30 AM   (15 min.)   [facility] DRAW   [facility] Lab Draw     [facility] ONC INFUSION 120    8:00 AM   (120 min.)    ONCOLOGY INFUSION   [facility] 5     6     7     8     9       10     11     [facility] DRAW    8:30 AM   (15 min.)    [facility]   [facility]     [facility] ONC INFUSION 120    9:00 AM   (120 min.)    ONCOLOGY INFUSION   [facility] 12     13     14     15     16       17     18     19     20     21     22     23       24     25     26     [facility] DRAW    7:30 AM   (15 min.)    [facility]RAW   [facility]     P  RETURN    7:45 AM   (50 min.)   Quyen Mazariegos APRN CNP   [facility]     UMP ONC INFUSION 120    9:00 AM   (120 min.)    ONCOLOGY INFUSION   [facility]     UMP RETURN   12:15 PM   (30 min.)   Neri Gipson MD   [facility] 27     28     29     30       31 January 2018 Sunday Monday Tuesday Wednesday Thursday Friday Saturday        1     2     P [facility] LAB DRAW    9:00 AM   (15 min.)    [facility] LAB DRAW   [facility] Lab Draw     UMP ONC INFUSION 120    9:30 AM   (120 min.)    ONCOLOGY INFUSION   [facility] 3     4     5     6       7     8     P [facility] LAB DRAW    8:30 AM   (15 min.)    [facility] LAB DRAW   [facility] Lab Draw     UMP ONC INFUSION 120    9:00 AM   (120 min.)    ONCOLOGY INFUSION   [facility] 9     10     11     12     13       14     15     16     17     18     19     20       21     22     [facility] DRAW    9:00 AM   (15 min.)    [facility] LAB DRAW   [facility] Lab Draw     UMP ONC INFUSION 120    9:30 AM   (120 min.)    ONCOLOGY INFUSION   [facility] 23     24     25     26     27       28     29     LAB    5:00 PM   (15 min.)    LAB   [facility] Lab     UMP RETURN    5:15 PM   (30 min.)   Vinny Yu MD   [facility] 30     UMP ONC INFUSION 120    9:00 AM   (120 min.)    ONCOLOGY INFUSION   [facility] 31                                 Lab Results:  Recent Results (from the past 12 hour(s))   CBC with platelets differential    Collection Time: 12/04/17  8:19 AM   Result Value Ref Range    WBC 6.1 4.0 - 11.0 10e9/L    RBC Count 3.59 (L) 3.8 - 5.2 10e12/L    Hemoglobin 11.2 (L) 11.7 - 15.7 g/dL    Hematocrit 34.6 (L) 35.0 - 47.0 %    MCV 96 78 - 100 fl    MCH 31.2 26.5 - 33.0 pg    MCHC 32.4 31.5 - 36.5 g/dL    RDW 12.7 10.0 - 15.0 %     Platelet Count 230 150 - 450 10e9/L    Diff Method Automated Method     % Neutrophils 70.0 %    % Lymphocytes 19.1 %    % Monocytes 7.3 %    % Eosinophils 2.1 %    % Basophils 0.8 %    % Immature Granulocytes 0.7 %    Nucleated RBCs 0 0 /100    Absolute Neutrophil 4.3 1.6 - 8.3 10e9/L    Absolute Lymphocytes 1.2 0.8 - 5.3 10e9/L    Absolute Monocytes 0.5 0.0 - 1.3 10e9/L    Absolute Eosinophils 0.1 0.0 - 0.7 10e9/L    Absolute Basophils 0.1 0.0 - 0.2 10e9/L    Abs Immature Granulocytes 0.0 0 - 0.4 10e9/L    Absolute Nucleated RBC 0.0                Follow-ups after your visit        Your next 10 appointments already scheduled     Dec 11, 2017  8:30 AM CST   [facility] Lab Draw with  [facility] DRAW   [facility])    [address] [place] [id number] [phone]            Dec 11, 2017  9:00 AM CST   Infusion 120 with UC ONCOLOGY INFUSION, UC 24 ATC   [facility] ([facility])    [address] [place] [address]   [phone]            Dec 26, 2017  7:30 AM CST   [facility] Lab Draw with  [facility] LAB DRAW   [facility] Lab Draw ([facility])    [address] [place] [address]   [phone]            Dec 26, 2017  8:00 AM CST   (Arrive by 7:45 AM)   Return Visit with CAT Dang CNP   [facility] ([facility])    [address] [place] [id number]   [phone]            Dec 26, 2017  9:00 AM CST   Infusion 120 with UC ONCOLOGY INFUSION, UC 24 ATC   [facility] ([facility] [place] [facility])    [address] [place] [address]   [phone]            Dec 26, 2017 12:30 PM CST   (Arrive by 12:15 PM)   Return Visit with Neri Gipson MD   [facility] ([facility]  [facility])    [address] [facility]  4th Floor  [facility] [address]   [phone]            Jan 02, 2018  9:00 AM CST   [facility] Lab Draw with  [facility] LAB DRAW   [facility] Lab Draw ([facility])    [address] [place] [address]  [address]nd [facility] [id number] [phone]            Jan 02, 2018  9:30 AM CST   Infusion 120 with UC ONCOLOGY INFUSION   [facility] ([facility])    [address] [place] [address]  2nd [facility] [id number] [phone]              Who to contact     If you have questions or need follow up information about today's clinic visit or your schedule please contact [facility] directly at [phone].  Normal or non-critical lab and imaging results will be communicated to you by [employer], letter or phone within 4 business days after the clinic has received the results. If you do not hear from us within 7 days, please contact the clinic through [employer] or phone. If you have a critical or abnormal lab result, we will notify you by phone as soon as possible.  Submit refill requests through [employer] or call your pharmacy and they will forward the refill request to us. Please allow 3 business days for your refill to be completed.          Additional Information About Your Visit        [employer] Information     [employer] gives you secure access to your electronic health record. If you see a primary care provider, you can also send messages to your care team and make appointments. If you have questions, please call your primary care clinic.  If you do not have a primary care provider, please call [phone] and they will assist you.        Care EveryWhere ID     This is your Care EveryWhere ID. This could be used by other organizations to access your [place] medical records  [id number]        Your Vitals Were     Pulse Temperature Respirations Pulse Oximetry BMI (Body Mass Index)        69 97.9  F (36.6  C) (Oral) 16 97% 20.67 kg/m2        Blood Pressure from Last 3 Encounters:   12/04/17 128/71   12/01/17 116/65   11/28/17 97/85    Weight from Last 3 Encounters:   12/04/17 51.3 kg (113 lb)   12/01/17 50.4 kg (111 lb 1.6 oz)   11/28/17 50 kg (110 lb 4.8 oz)              We Performed the Following     CBC with platelets differential        Primary Care Provider Office Phone # Fax #    Neri Gipson [phone]       [address] [address]        Equal Access to Services     NESSA NICHOLS : Shaun Martinez, warhett gar, qacheyta kaalmada masood, lisa adams . So [facility] [phone].    ATENCIÓN: Si habla español, tiene a nava disposición servicios gratuitos de asistencia lingüística. Llame al [phone].    We comply with applicable federal civil rights laws and [place] laws. We do not discriminate on the basis of race, color, national origin, age, disability, sex, sexual orientation, or gender identity.            Thank you!     Thank you for choosing [facility]  for your care. Our goal is always to provide you with excellent care. Hearing back from our patients is one way we can continue to improve our services. Please take a few minutes to complete the written survey that you may receive in the mail after your visit with us. Thank you!             Your Updated Medication List - Protect others around you: Learn how to safely use, store and throw away your medicines at www.disposemymeds.org.          This list is accurate as of: 12/4/17  9:47 AM.  Always use your most recent med list.                   Brand Name Dispense Instructions for use Diagnosis    amylase-lipase-protease 10377 UNITS Cpep    CREON    180 capsule    Take 2 capsules (72,000 Units) by mouth 3 times daily (with meals)    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)       CALCIUM PO      Take by mouth every morning  Form/strength unknown. Powder formulation. Add to water and fruits/vegetables daily to promote bone health.        camphor-menthol 0.5-0.5 % Lotn    DERMASARRA    59 mL    Apply 1 mL topically every 6 hours as needed for skin care        cholecalciferol 1000 UNIT tablet    vitamin D3     Take 1 tablet by mouth 2 times daily        cyanocolbalamin 500 MCG tablet    vitamin  B-12     Take 500 mcg by mouth every morning        denosumab 60 MG/ML Soln injection    PROLIA    1 mL    Inject 1 mL (60 mg) Subcutaneous every 6 months Inject subcutaneously in upper arm, upper thigh or abdomen    Senile osteoporosis       folic acid 400 MCG tablet    FOLVITE     Take 1 tablet by mouth every morning        hydrOXYzine 25 MG tablet    ATARAX    90 tablet    Take 1-2 tablets (25-50 mg) by mouth every 6 hours as needed for itching (adjuvant pain)        levothyroxine 37.5 mcg Tabs half-tab    SYNTHROID/LEVOTHROID     Take 37.5 mcg by mouth every morning (before breakfast)        LORazepam 0.5 MG tablet    ATIVAN    30 tablet    Take 1 tablet (0.5 mg) by mouth every 4 hours as needed (Anxiety, Nausea/Vomiting or Sleep)    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)       omeprazole 40 MG capsule    priLOSEC    90 capsule    Take 1 capsule (40 mg) by mouth 2 times daily Take 30-60 minutes before a meal.    Duodenal ulceration       ondansetron 4 MG tablet    ZOFRAN    60 tablet    Take 1 tablet (4 mg) by mouth every 6 hours as needed for nausea        potassium 99 MG Tabs      Take 1 tablet by mouth 2 times daily        prochlorperazine 10 MG tablet    COMPAZINE    30 tablet    Take 0.5 tablets (5 mg) by mouth every 6 hours as needed (Nausea/Vomiting)    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)       timolol maleate 0.5 % opthalmic solution    ISTALOL     Place 1 drop into both eyes every morning Before placing contact lenses        traMADol 50 MG tablet    ULTRAM    100 tablet    Take 0.5-1 tablets (25-50 mg) by mouth every 6  hours as needed for breakthrough pain    Pancreatic mass       triamterene-hydrochlorothiazide 37.5-25 MG per tablet    MAXZIDE-25     Take 1 tablet by mouth every morning        zinc 50 MG Tabs      Take 1 tablet by mouth every morning

## 2017-12-11 NOTE — PROGRESS NOTES
Infusion Nursing Note:  Iris Lewis presents today for cycle 1, day 15 gemzar and abraxane.    Patient seen by provider today: No   present during visit today: Not Applicable.    Note:   -Patient states that she has had some dried blood/scant bleeding from her nose the past couple of days.  She knows that her house is very dry and just starting using a humidifier yesterday. She was encouraged to use OTC saline rinses prn as well and to call if she is having luan bleeding.  -Patient also reports the development of some mild mouth sores since last week. They are not affecting her ability to eat or drink. She has been using ACT mouthwash.  Patient educated to stop using the OTC mouthwashes and start swishing with salt and baking soda rinses 4x a day and prn.  She was encouraged to call if the mouth sores are worsening or affecting her ability to eat or drink.  She verbalized understanding.  Dr Yu updated on the above symptoms with no changes to plan of care.       Intravenous Access:  Peripheral IV placed in lab    Treatment Conditions:  Lab Results   Component Value Date    HGB 11.0 12/11/2017     Lab Results   Component Value Date    WBC 3.4 12/11/2017      Lab Results   Component Value Date    ANEU 1.9 12/11/2017     Lab Results   Component Value Date     12/11/2017      Results reviewed, labs MET treatment parameters, ok to proceed with treatment.      Post Infusion Assessment:  Patient tolerated infusion without incident.  Blood return noted pre and post infusion.  Patient had some burning with gemzar administration. Infusion slowed to 45 minutes and hot packs applied to PIV site with relief   Site patent and intact, free from redness, edema or discomfort.  No evidence of extravasations.  Access discontinued per protocol.    Discharge Plan:   Prescription refills given for tramadol.  Discharge instructions reviewed with: Patient.  Patient and/or family verbalized understanding of  discharge instructions and all questions answered.  Copy of AVS reviewed with patient and/or family.  Patient will return 12/26 for next appointment.  Patient discharged in stable condition accompanied by: self.  Departure Mode: Ambulatory.  Face to Face time: 4 minutes.    Era Randall RN

## 2017-12-11 NOTE — PATIENT INSTRUCTIONS
-Mix up a pitcher of water with a tsp of salt and a tsp of baking soda.  Swish and spit the mixture at least 4x daily and as needed to help prevent and treat your mouth sores.  Call if your mouth sores are worsening or if they are affecting your ability to eat or drink.  Avoid using over the counter mouthwashes as they can dry your mouth and make your mouth sores worsen        Contact Numbers    [facility] Main Line: [phone]  [facility] Triage:  [phone]    Call triage with chills and/or temperature greater than or equal to 100.5, uncontrolled nausea/vomiting, diarrhea, constipation, dizziness, shortness of breath, chest pain, bleeding, unexplained bruising, or any new/concerning symptoms, questions/concerns.     If you are having any concerning symptoms or wish to speak to a provider before your next infusion visit, please call your care coordinator or triage to notify them so we can adequately serve you.       After Hours: [phone]    If after hours, weekends, or holidays, call main hospital  and ask for Oncology doctor on call.           December 2017 Sunday Monday Tuesday Wednesday Thursday Friday Saturday 1     UMP RETURN    6:50 AM   (30 min.)   Neri Gipson MD   [facility] 2       3     4     UMP [facility] LAB DRAW    7:30 AM   (15 min.)    [facility] LAB DRAW   [facility] Lab Draw     P ONC INFUSION 120    8:00 AM   (120 min.)    ONCOLOGY INFUSION   [facility] 5     6     7     8     9       10     11     UMP [facility] LAB DRAW    8:30 AM   (15 min.)    [facility] LAB DRAW   [facility] Lab Draw     UMP ONC INFUSION 120    9:00 AM   (120 min.)    ONCOLOGY INFUSION   [facility] 12     13     14     15     16       17     18     19     20     21     22     23       24     25     26     UMP [facility] LAB DRAW    7:30 AM   (15 min.)    [facility] LAB DRAW   [facility] Lab Draw     UMP RETURN    7:45  AM   (50 min.)   Quyen Mazariegos APRN CNP   [facility]     UMP ONC INFUSION 120    9:00 AM   (120 min.)    ONCOLOGY INFUSION   [facility]     UMP RETURN   12:15 PM   (30 min.)   Neri Gipson MD   [facility] 27     28     29     30       31 January 2018 Sunday Monday Tuesday Wednesday Thursday Friday Saturday        1     2     UMP [facility] LAB DRAW    9:00 AM   (15 min.)    [facility]     UMP ONC INFUSION 120    9:30 AM   (120 min.)    ONCOLOGY INFUSION   [facility] 3     4     5     6       7     8     UMP [facility] LAB DRAW    8:30 AM   (15 min.)    [facility] LAB DRAW   [facility] Lab Draw     UMP ONC INFUSION 120    9:00 AM   (120 min.)    ONCOLOGY INFUSION   [facility] 9     10     11     12     13       14     15     16     17     18     19     20       21     22     UMP [facility] LAB DRAW    9:00 AM   (15 min.)    [facility] LAB DRAW   [facility] Lab Draw     UMP ONC INFUSION 120    9:30 AM   (120 min.)    ONCOLOGY INFUSION   [facility] 23     24     25     26     27       28     29     LAB    5:00 PM   (15 min.)    LAB   [facility] Lab     UMP RETURN    5:15 PM   (30 min.)   Vinny Yu MD   [facility] 30     UMP ONC INFUSION 120    9:00 AM   (120 min.)    ONCOLOGY INFUSION   [facility] 31                                Recent Results (from the past 24 hour(s))   CBC with platelets differential    Collection Time: 12/11/17  9:14 AM   Result Value Ref Range    WBC 3.4 (L) 4.0 - 11.0 10e9/L    RBC Count 3.53 (L) 3.8 - 5.2 10e12/L    Hemoglobin 11.0 (L) 11.7 - 15.7 g/dL    Hematocrit 33.7 (L) 35.0 - 47.0 %    MCV 96 78 - 100 fl    MCH 31.2 26.5 - 33.0 pg    MCHC 32.6 31.5 - 36.5 g/dL    RDW 12.6 10.0 - 15.0 %    Platelet Count 119 (L) 150 -  450 10e9/L    Diff Method Automated Method     % Neutrophils 54.9 %    % Lymphocytes 33.2 %    % Monocytes 9.8 %    % Eosinophils 1.5 %    % Basophils 0.3 %    % Immature Granulocytes 0.3 %    Nucleated RBCs 0 0 /100    Absolute Neutrophil 1.9 1.6 - 8.3 10e9/L    Absolute Lymphocytes 1.1 0.8 - 5.3 10e9/L    Absolute Monocytes 0.3 0.0 - 1.3 10e9/L    Absolute Eosinophils 0.1 0.0 - 0.7 10e9/L    Absolute Basophils 0.0 0.0 - 0.2 10e9/L    Abs Immature Granulocytes 0.0 0 - 0.4 10e9/L    Absolute Nucleated RBC 0.0     Platelet Estimate Confirming automated cell count

## 2017-12-11 NOTE — MR AVS SNAPSHOT
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  Swish and spit the mixture at least 4x daily and as needed to help prevent and treat your mouth sores.  Call if your mouth sores are worsening or if they are affecting your ability to eat or drink.  Avoid using over the counter mouthwashes as they can dry your mouth and make your mouth sores worsen        Contact Numbers    [facility] [place]: [phone]  [facility] [place]:  [phone]    Call triage with chills and/or temperature greater than or equal to 100.5, uncontrolled nausea/vomiting, diarrhea, constipation, dizziness, shortness of breath, chest pain, bleeding, unexplained bruising, or any new/concerning symptoms, questions/concerns.     If you are having any concerning symptoms or wish to speak to a provider before your next infusion visit, please call your care coordinator or triage to notify them so we can adequately serve you.       After Hours: [phone]    If after hours, weekends, or holidays, call main hospital  and ask for Oncology doctor on call.           December 2017 Sunday Monday Tuesday Wednesday Thursday Friday Saturday                            1     UMP RETURN    6:50 AM   (30 min.)   Neri Gipson MD   [facility] 2       3     4     [facility] LAB DRAW    7:30 AM   (15 min.)   [facility] LAB DRAW   [facility] Lab Draw     [facility] ONC INFUSION 120    8:00 AM   (120 min.)    ONCOLOGY INFUSION   [facility] 5     6     7     8     9       10     11     [facility]  [facility] LAB DRAW    8:30 AM   (15 min.)    [facility] LAB DRAW   [facility] Lab Draw     [facility] ONC INFUSION 120    9:00 AM   (120 min.)    ONCOLOGY INFUSION   [facility] 12     13     14     15     16       17     18     19     20     21     22     23       24     25     26     [facility] LAB DRAW    7:30 AM   (15 min.)    [facility] LAB DRAW   [facility] Lab Draw     UMP RETURN    7:45 AM   (50 min.)   Quyen Mazariegos APRN CNP   [facility]     [facility] ONC INFUSION 120    9:00 AM   (120 min.)    ONCOLOGY INFUSION   [facility]     UMP RETURN   12:15 PM   (30 min.)   Neri Gipson MD   [facility] 27     28     29     30       31 January 2018 Sunday Monday Tuesday Wednesday Thursday Friday Saturday        1     2     [facility] LAB DRAW    9:00 AM   (15 min.)    [facility] LAB DRAW   [facility] Lab Draw     [facility] ONC INFUSION 120    9:30 AM   (120 min.)    ONCOLOGY INFUSION   [facility] 3     4     5     6       7     8     [facility] LAB DRAW    8:30 AM   (15 min.)    [facility] LAB DRAW   [facility] Lab Draw     [facility] ONC INFUSION 120    9:00 AM   (120 min.)    ONCOLOGY INFUSION   [facility] 9     10     11     12     13       14     15     16     17     18     19     20       21     22     [facility] LAB DRAW    9:00 AM   (15 min.)    [facility] LAB DRAW   [facility] Lab Draw     [facility] ONC INFUSION 120    9:30 AM   (120 min.)    ONCOLOGY INFUSION   [facility] 23     24     25     26     27       28     29     LAB    5:00 PM   (15 min.)    LAB   [facility] Lab     UMP RETURN    5:15 PM   (30 min.)   Vinny Yu MD   [facility] 30     [facility] ONC INFUSION 120    9:00 AM   (120 min.)    ONCOLOGY INFUSION   [facility] 31                                 Recent Results (from the past 24 hour(s))   CBC with platelets differential    Collection Time: 12/11/17  9:14 AM   Result Value Ref Range    WBC 3.4 (L) 4.0 - 11.0 10e9/L    RBC Count 3.53 (L) 3.8 - 5.2 10e12/L    Hemoglobin 11.0 (L) 11.7 - 15.7 g/dL    Hematocrit 33.7 (L) 35.0 - 47.0 %    MCV 96 78 - 100 fl    MCH 31.2 26.5 - 33.0 pg    MCHC 32.6 31.5 - 36.5 g/dL    RDW 12.6 10.0 - 15.0 %    Platelet Count 119 (L) 150 - 450 10e9/L    Diff Method Automated Method     % Neutrophils 54.9 %    % Lymphocytes 33.2 %    % Monocytes 9.8 %    % Eosinophils 1.5 %    % Basophils 0.3 %    % Immature Granulocytes 0.3 %    Nucleated RBCs 0 0 /100    Absolute Neutrophil 1.9 1.6 - 8.3 10e9/L    Absolute Lymphocytes 1.1 0.8 - 5.3 10e9/L    Absolute Monocytes 0.3 0.0 - 1.3 10e9/L    Absolute Eosinophils 0.1 0.0 - 0.7 10e9/L    Absolute Basophils 0.0 0.0 - 0.2 10e9/L    Abs Immature Granulocytes 0.0 0 - 0.4 10e9/L    Absolute Nucleated RBC 0.0     Platelet Estimate Confirming automated cell count                  Follow-ups after your visit        Your next 10 appointments already scheduled     Dec 26, 2017  7:30 AM CST   [facility] Lab Draw with [facility] LAB DRAW   [facility] Lab Draw ([facility])    [address] [place] [address] [phone]            Dec 26, 2017  8:00 AM CST   (Arrive by 7:45 AM)   Return Visit with CAT Dang CNP   [facility])    [address] [place] [address] [phone]            Dec 26, 2017  9:00 AM CST   Infusion 120 with  ONCOLOGY INFUSION,  24 ATC   [facility] ([facility])    [address] [place] [address] [phone]            Dec 26, 2017 12:30 PM CST   (Arrive by 12:15 PM)   Return Visit with Neri Gipson MD   [facility]  [facility])    [address] [place] [address]  4th [facility] [address]   [phone]            Jan 02, 2018  9:00 AM CST   [facility] Lab Draw with [facility] LAB DRAW   [facility] ([facility])    [address] [place] [address]   [phone]            Jan 02, 2018  9:30 AM CST   Infusion 120 with UC ONCOLOGY INFUSION, UC 21 ATC   [facility] ([facility])    [address] [place] [address]   [phone]            Jan 08, 2018  8:30 AM CST   [facility] Lab Draw with [facility] LAB DRAW   [facility])    [address] [place] [address]   [phone]            Jan 08, 2018  9:00 AM CST   Infusion 120 with UC ONCOLOGY INFUSION   [facility] ([facility])    [address] [place] [address]   [phone]              Who to contact     If you have questions or need follow up information about today's clinic visit or your schedule please contact [facility] directly at [phone].  Normal or non-critical lab and imaging results will be communicated to you by [employer], letter or phone within 4 business days after the clinic has received the results. If you do not hear from us within 7 days, please contact the clinic through MyChart or phone. If you have a critical or abnormal lab result, we will notify you by phone as soon as possible.  Submit refill requests through [employer] or call your pharmacy and they will forward the refill request to us. Please allow 3 business days for your refill to be completed.          Additional Information About Your Visit        [employer] Information     [employer] gives you secure access to your electronic health record. If you see a primary care provider, you can also send messages  to your care team and make appointments. If you have questions, please call your primary care clinic.  If you do not have a primary care provider, please call [phone] and they will assist you.        Care EveryWhere ID     This is your Care EveryWhere ID. This could be used by other organizations to access your [facility] medical records  [id number]        Your Vitals Were     Pulse Temperature Respirations Pulse Oximetry BMI (Body Mass Index)       80 98  F (36.7  C) (Oral) 16 97% 19.99 kg/m2        Blood Pressure from Last 3 Encounters:   12/11/17 136/72   12/04/17 128/71   12/01/17 116/65    Weight from Last 3 Encounters:   12/11/17 49.6 kg (109 lb 4.8 oz)   12/04/17 51.3 kg (113 lb)   12/01/17 50.4 kg (111 lb 1.6 oz)              We Performed the Following     CBC with platelets differential          Where to get your medicines      Some of these will need a paper prescription and others can be bought over the counter.  Ask your nurse if you have questions.     Bring a paper prescription for each of these medications     traMADol 50 MG tablet          Primary Care Provider Office Phone # Fax #    Neri Gipson [phone]       0 [address] [address]        Equal Access to Services     NESSA NICHOLS : Haddiana marsho Soomaali, waaxda luqadaha, qaybta kaalmada adeegyada, lisa michael. So [facility] [phone].    ATENCIÓN: Si habla español, tiene a nava disposición servicios gratuitos de asistencia lingüística. Llame al [phone].    We comply with applicable federal civil rights laws and [place] laws. We do not discriminate on the basis of race, color, national origin, age, disability, sex, sexual orientation, or gender identity.            Thank you!     Thank you for choosing [facility]  for your care. Our goal is always to provide you with excellent care. Hearing back from our patients is one way we can  continue to improve our services. Please take a few minutes to complete the written survey that you may receive in the mail after your visit with us. Thank you!             Your Updated Medication List - Protect others around you: Learn how to safely use, store and throw away your medicines at www.disposemymeds.org.          This list is accurate as of: 12/11/17 11:10 AM.  Always use your most recent med list.                   Brand Name Dispense Instructions for use Diagnosis    amylase-lipase-protease 76328 UNITS Cpep    CREON    180 capsule    Take 2 capsules (72,000 Units) by mouth 3 times daily (with meals)    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)       CALCIUM PO      Take by mouth every morning Form/strength unknown. Powder formulation. Add to water and fruits/vegetables daily to promote bone health.        camphor-menthol 0.5-0.5 % Lotn    DERMASARRA    59 mL    Apply 1 mL topically every 6 hours as needed for skin care        cholecalciferol 1000 UNIT tablet    vitamin D3     Take 1 tablet by mouth 2 times daily        cyanocolbalamin 500 MCG tablet    vitamin  B-12     Take 500 mcg by mouth every morning        denosumab 60 MG/ML Soln injection    PROLIA    1 mL    Inject 1 mL (60 mg) Subcutaneous every 6 months Inject subcutaneously in upper arm, upper thigh or abdomen    Senile osteoporosis       folic acid 400 MCG tablet    FOLVITE     Take 1 tablet by mouth every morning        hydrOXYzine 25 MG tablet    ATARAX    90 tablet    Take 1-2 tablets (25-50 mg) by mouth every 6 hours as needed for itching (adjuvant pain)        levothyroxine 37.5 mcg Tabs half-tab    SYNTHROID/LEVOTHROID     Take 37.5 mcg by mouth every morning (before breakfast)        LORazepam 0.5 MG tablet    ATIVAN    30 tablet    Take 1 tablet (0.5 mg) by mouth every 4 hours as needed (Anxiety, Nausea/Vomiting or Sleep)    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)       omeprazole 40 MG capsule    priLOSEC    90 capsule    Take  1 capsule (40 mg) by mouth 2 times daily Take 30-60 minutes before a meal.    Duodenal ulceration       ondansetron 4 MG tablet    ZOFRAN    60 tablet    Take 1 tablet (4 mg) by mouth every 6 hours as needed for nausea        potassium 99 MG Tabs      Take 1 tablet by mouth 2 times daily        prochlorperazine 10 MG tablet    COMPAZINE    30 tablet    Take 0.5 tablets (5 mg) by mouth every 6 hours as needed (Nausea/Vomiting)    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)       psyllium Packet    METAMUCIL/KONSYL     Take 1 packet by mouth daily as needed for constipation        timolol maleate 0.5 % opthalmic solution    ISTALOL     Place 1 drop into both eyes every morning Before placing contact lenses        traMADol 50 MG tablet    ULTRAM    100 tablet    Take 0.5-1 tablets (25-50 mg) by mouth every 6 hours as needed for breakthrough pain    Pancreatic mass       triamterene-hydrochlorothiazide 37.5-25 MG per tablet    MAXZIDE-25     Take 1 tablet by mouth every morning        TYLENOL PO      Take 325 mg by mouth every 4 hours as needed for mild pain or fever        zinc 50 MG Tabs      Take 1 tablet by mouth every morning

## 2017-12-11 NOTE — NURSING NOTE
Chief Complaint   Patient presents with     Blood Draw     Labs drawn from PIV placed by RN. Line flushed with saline. Vs taken and pt checked in for appt     Labs drawn from PIV placed by RN. Line flushed with saline. Vitals taken. Pt checked in for appointment(s).    Ana Winchester RN

## 2017-12-18 PROBLEM — L65.8 ALOPECIA DUE TO CYTOTOXIC DRUG: Status: ACTIVE | Noted: 2017-01-01

## 2017-12-18 PROBLEM — T45.1X5A ALOPECIA DUE TO CYTOTOXIC DRUG: Status: ACTIVE | Noted: 2017-01-01

## 2017-12-18 NOTE — PROGRESS NOTES
Spoke to Iris who asked for a prescription for a wig so her insurance can pay for it.  A prescription was written and will be mailed to her home address on file.  Also let her know that I would refer her to Rossana San the [facility] navigator.  She agreed with and verbalized understanding of the plan of care.

## 2017-12-21 NOTE — TELEPHONE ENCOUNTER
Fax request from [facility] for New Rx: omeprazole, ondansetron, timolol, levothyroxine, triamterene-hctz and tramadol.    Omeprazole 40 mg 2 times daily: filled 11/22/17 for #90  Ondansetron 4 mg every 6h prn: filled 11/317 for #60    Tramadol declined: written script on 12/11/17 with 1 refill, per  filled on 12/11/17.     Timolol, levothyroxine and triamterene-hctz declined: should go to originating prescriber [facility].

## 2017-12-26 NOTE — MR AVS SNAPSHOT
After Visit Summary   12/26/2017    Iris Lewis    MRN: [id number]           Patient Information     Date Of Birth          1940        Visit Information        Provider Department      12/26/2017 8:00 AM Quyen Mazariegos APRN CNP [facility]        Today's Diagnoses     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)    -  1    Other acute 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 cor pulmonale (H)        Hypothyroidism, unspecified type           Follow-ups after your visit        Your next 10 appointments already scheduled     Jan 05, 2018  9:30 AM CST   (Arrive by 9:15 AM)   PRE-OP with Argelia Olguin MD   [facility] ([facility])    [address]  4th Floor  [facility] [address] [phone]            Jan 08, 2018  8:30 AM CST   Masonic Lab Draw with  MASONIC LAB DRAW   [facility] Lab Draw ([facility])    [address]  [address]  [facility] [address] [phone]            Jan 08, 2018  9:00 AM CST   Infusion 120 with UC ONCOLOGY INFUSION, UC 24 ATC   [facility] ([facility])    [address]  [address]  [facility] [address] [phone]            Daniel 10, 2018   Procedure with Felix Izaguirre MD   [facility] [place], Same Day Surgery (--)    [address] [address] [phone]            Jan 18, 2018  9:20 AM CST   (Arrive by 9:05 AM)   CT CHEST/ABDOMEN/PELVIS W CONTRAST with UCCT2   [facility] [place] ([facility])    [address]  1st Floor  [facility] [address] [phone]           Please bring any scans or X-rays taken at other hospitals, if similar tests were done. Also bring a list of your medicines, including vitamins, minerals and over-the-counter drugs. It is safest to leave personal items at home.  Be sure to tell your doctor:   If  you have any allergies.   If there s any chance you are pregnant.   If you are breastfeeding.   If you have any special needs.  You may have contrast for this exam. To prepare:   Do not eat or drink for 2 hours before your exam. If you need to take medicine, you may take it with small sips of water. (We may ask you to take liquid medicine as well.)   The day before your exam, drink extra fluids at least six 8-ounce glasses (unless your doctor tells you to restrict your fluids).  Patients over 70 or patients with diabetes or kidney problems:   If you haven t had a blood test (creatinine test) within the last 30 days, go to your clinic or Diagnostic Imaging Department for this test.  If you have diabetes:   If your kidney function is normal, continue taking your metformin (Avandamet, Glucophage, Glucovance, Metaglip) on the day of your exam.   If your kidney function is abnormal, wait 48 hours before restarting this medicine.  You will have oral contrast for this exam:   You will drink the contrast at home. Get this from your clinic or Diagnostic Imaging Department. Please follow the directions given.  Please wear loose clothing, such as a sweat suit or jogging clothes. Avoid snaps, zippers and other metal. We may ask you to undress and put on a hospital gown.  If you have any questions, please call the Imaging Department where you will have your exam.            Jan 22, 2018  7:45 AM CST   Masonic Lab Draw with  MASONIC LAB DRAW   [facility] Lab Draw ([facility])    [address] [place] [address]  [address]  [facility] [address] [phone]            Jan 22, 2018  8:15 AM CST   (Arrive by 8:00 AM)   Return Visit with Vinny Yu MD   [facility] ([facility])    [address]  [address]  [facility] [address] [phone]            Jan 22, 2018  9:00 AM CST   Infusion 120 with  ONCOLOGY INFUSION,  25 ATC   [facility]  [facility] ([facility])    [address]  [address]  [facility] [address] [phone]              Who to contact     If you have questions or need follow up information about today's clinic visit or your schedule please contact [facility] directly at [phone].  Normal or non-critical lab and imaging results will be communicated to you by MyChart, letter or phone within 4 business days after the clinic has received the results. If you do not hear from us within 7 days, please contact the clinic through NXEhart or phone. If you have a critical or abnormal lab result, we will notify you by phone as soon as possible.  Submit refill requests through [employer] or call your pharmacy and they will forward the refill request to us. Please allow 3 business days for your refill to be completed.          Additional Information About Your Visit        NXEhart Information     [employer] gives you secure access to your electronic health record. If you see a primary care provider, you can also send messages to your care team and make appointments. If you have questions, please call your primary care clinic.  If you do not have a primary care provider, please call [phone] and they will assist you.        Care EveryWhere ID     This is your Care EveryWhere ID. This could be used by other organizations to access your [place] medical records  [id number]        Your Vitals Were     Pulse Temperature Respirations Pulse Oximetry BMI (Body Mass Index)       69 97.5  F (36.4  C) 18 96% 20.06 kg/m2        Blood Pressure from Last 3 Encounters:   01/02/18 105/62   12/26/17 116/73   12/26/17 116/73    Weight from Last 3 Encounters:   01/02/18 50.5 kg (111 lb 6.4 oz)   12/26/17 49.8 kg (109 lb 11 oz)   12/26/17 49.8 kg (109 lb 11.2 oz)               Primary Care Provider Office Phone # Fax #    Neri Gipson [phone]       [address]  [facility] [address]        Equal Access to Services     [facility] SIMONE : Hadii aad ku hadmarklon Blaneali, waronnyda russellibertyha, qacheyta kaalmalisa francois. So [facility] [phone].    ATENCIÓN: Si habla español, tiene a nava disposición servicios gratuitos de asistencia lingüística. Indu al [phone].    We comply with applicable federal civil rights laws and [place] laws. We do not discriminate on the basis of race, color, national origin, age, disability, sex, sexual orientation, or gender identity.            Thank you!     Thank you for choosing [facility]  for your care. Our goal is always to provide you with excellent care. Hearing back from our patients is one way we can continue to improve our services. Please take a few minutes to complete the written survey that you may receive in the mail after your visit with us. Thank you!             Your Updated Medication List - Protect others around you: Learn how to safely use, store and throw away your medicines at www.disposemymeds.org.          This list is accurate as of: 12/26/17 11:59 PM.  Always use your most recent med list.                   Brand Name Dispense Instructions for use Diagnosis    amylase-lipase-protease 09584 UNITS Cpep    CREON    180 capsule    Take 2 capsules (72,000 Units) by mouth 3 times daily (with meals)    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)       CALCIUM PO      Take by mouth every morning Form/strength unknown. Powder formulation. Add to water and fruits/vegetables daily to promote bone health.        camphor-menthol 0.5-0.5 % Lotn    DERMASARRA    59 mL    Apply 1 mL topically every 6 hours as needed for skin care        cholecalciferol 1000 UNIT tablet    vitamin D3     Take 1 tablet by mouth 2 times daily        cyanocolbalamin 500 MCG tablet    vitamin  B-12     Take 500 mcg by mouth every morning        denosumab 60 MG/ML Soln injection    PROLIA    1 mL     Inject 1 mL (60 mg) Subcutaneous every 6 months Inject subcutaneously in upper arm, upper thigh or abdomen    Senile osteoporosis       enoxaparin 60 MG/0.6ML injection    LOVENOX      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)       folic acid 400 MCG tablet    FOLVITE     Take 1 tablet by mouth every morning        hydrOXYzine 25 MG tablet    ATARAX    90 tablet    Take 1-2 tablets (25-50 mg) by mouth every 6 hours as needed for itching (adjuvant pain)        levothyroxine 37.5 mcg Tabs half-tab    SYNTHROID/LEVOTHROID     Take 125 mcg by mouth every morning (before breakfast)        LORazepam 0.5 MG tablet    ATIVAN    30 tablet    Take 1 tablet (0.5 mg) by mouth every 4 hours as needed (Anxiety, Nausea/Vomiting or Sleep)    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)       omeprazole 40 MG capsule    priLOSEC    180 capsule    Take 1 capsule (40 mg) by mouth 2 times daily Take 30-60 minutes before a meal.    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       potassium 99 MG Tabs      Take 1 tablet by mouth 2 times daily        psyllium Packet    METAMUCIL/KONSYL     Take 1 packet by mouth daily as needed for constipation        timolol maleate 0.5 % opthalmic solution    ISTALOL     Place 1 drop into both eyes every morning Before placing contact lenses        traMADol 50 MG tablet    ULTRAM    100 tablet    Take 0.5-1 tablets (25-50 mg) by mouth every 6 hours as needed for breakthrough pain    Pancreatic mass       triamterene-hydrochlorothiazide 37.5-25 MG per tablet    MAXZIDE-25     Take 1 tablet by mouth every morning        TYLENOL PO      Take 325 mg by mouth every 4 hours as needed for mild pain or fever        zinc 50 MG Tabs       Take 1 tablet by mouth every morning

## 2017-12-26 NOTE — NURSING NOTE
"Oncology Rooming Note    December 26, 2017 8:10 AM   Iris Lewis is a 77 year old female who presents for:    Chief Complaint   Patient presents with     Blood Draw     IV placed by RN in left forearm, pt tolerated well. VS taken and pt checked in for next appt.      Oncology Clinic Visit     Return for Adenocarcinoma      Initial Vitals: /73  Pulse 69  Temp 97.5  F (36.4  C)  Resp 18  Wt 49.8 kg (109 lb 11.2 oz)  SpO2 96%  BMI 20.06 kg/m2 Estimated body mass index is 20.06 kg/(m^2) as calculated from the following:    Height as of 11/28/17: 1.575 m (5' 2\").    Weight as of this encounter: 49.8 kg (109 lb 11.2 oz). Body surface area is 1.48 meters squared.  No Pain (0) Comment: but pain in back, abd last nite   No LMP recorded. Patient is postmenopausal.  Allergies reviewed: Yes  Medications reviewed: Yes    Medications: MEDICATION REFILLS NEEDED TODAY. Provider was notified.  Pharmacy name entered into Dreamitize:    [facility] - [facility] [address] [facility] DRUG STORE [facility] [address] [facility] [address] [place] [address]    Clinical concerns: Refills Ativan 0.5. mg manuel  was notified.    10  minutes for nursing intake (face to face time)     Bisi Ramsay MA              "

## 2017-12-26 NOTE — MR AVS SNAPSHOT
After Visit Summary   12/26/2017    Iris Lewis    MRN: [id number]           Patient Information     Date Of Birth          1940        Visit Information        Provider Department      12/26/2017 12:30 PM Neri Gipson MD [facility]        Today's Diagnoses     Abnormal finding on thyroid function test    -  1      Care Instructions    [facility]: [phone]     [facility] Medication Refill Request Information:  * Please contact your pharmacy regarding ANY request for medication refills.  ** [facility] Prescription Fax = [phone]  * Please allow 3 business days for routine medication refills.  * Please allow 5 business days for controlled substance medication refills.     Primary Care Center Test Result notification information:  *You will be notified with in 7-10 days of your appointment day regarding the results of your test.  If you are on MyChart you will be notified as soon as the provider has reviewed the results and signed off on them.            Follow-ups after your visit        Your next 10 appointments already scheduled     Jan 02, 2018  9:00 AM CST   Masonic Lab Draw with UC MASONIC LAB DRAW   [facility])    [address] [place] [address]   [phone]            Jan 02, 2018  9:30 AM CST   Infusion 120 with UC ONCOLOGY INFUSION, UC 21 ATC   [facility] ([facility])    [address] [place] [address]   [phone]            Jan 03, 2018  3:35 PM CST   (Arrive by 3:20 PM)   Return Visit with Neri Gipson MD   [facility] ([facility])    [address] [place] [address]  4th [facility] [address] [phone]            Jan 08, 2018  8:30 AM CST   Masonic Lab Draw with UC MASONIC LAB DRAW   [facility] Masonic Lab Draw ([facility])    [address]  2nd Floor  [facility] [address]   [phone]            Jan 08, 2018  9:00 AM CST   Infusion 120 with UC ONCOLOGY INFUSION, UC 24 ATC   [facility] ([facility])    [address] [place] [address]  2nd [facility] [address]   [phone]            Jan 18, 2018  9:20 AM CST   (Arrive by 9:05 AM)   CT CHEST/ABDOMEN/PELVIS W CONTRAST with UCCT2   [facility] ([facility])    [address] [place] [address]  1st Floor  [facility] [address]   [phone]           Please bring any scans or X-rays taken at other hospitals, if similar tests were done. Also bring a list of your medicines, including vitamins, minerals and over-the-counter drugs. It is safest to leave personal items at home.  Be sure to tell your doctor:   If you have any allergies.   If there s any chance you are pregnant.   If you are breastfeeding.   If you have any special needs.  You may have contrast for this exam. To prepare:   Do not eat or drink for 2 hours before your exam. If you need to take medicine, you may take it with small sips of water. (We may ask you to take liquid medicine as well.)   The day before your exam, drink extra fluids at least six 8-ounce glasses (unless your doctor tells you to restrict your fluids).  Patients over 70 or patients with diabetes or kidney problems:   If you haven t had a blood test (creatinine test) within the last 30 days, go to your clinic or Diagnostic Imaging Department for this test.  If you have diabetes:   If your kidney function is normal, continue taking your metformin (Avandamet, Glucophage, Glucovance, Metaglip) on the day of your exam.   If your kidney function is abnormal, wait 48 hours before restarting this medicine.  You will have oral contrast for this exam:   You will drink the contrast at home. Get this from your clinic or Diagnostic Imaging Department. Please follow  the directions given.  Please wear loose clothing, such as a sweat suit or jogging clothes. Avoid snaps, zippers and other metal. We may ask you to undress and put on a hospital gown.  If you have any questions, please call the Imaging Department where you will have your exam.            Jan 22, 2018  7:45 AM CST   Masonic Lab Draw with  MASONIC LAB DRAW   [facility] Lab Draw ([facility])    [address] [place] [address] [phone]            Jan 22, 2018  8:15 AM CST   (Arrive by 8:00 AM)   Return Visit with Vinny Yu MD   [facility] ([facility])    [address] [place] [address] [phone]              Future tests that were ordered for you today     Open Future Orders        Priority Expected Expires Ordered    TSH with free T4 reflex Routine 12/26/2017 12/26/2018 12/26/2017    CT Chest/Abdomen/Pelvis w Contrast Routine 1/18/2018 12/26/2018 12/26/2017    *CBC with platelets differential Routine 1/18/2018 12/26/2018 12/26/2017    Comprehensive metabolic panel Routine 1/18/2018 12/26/2018 12/26/2017    Cancer antigen 19-9 tumor marker Routine 1/18/2018 12/26/2018 12/26/2017            Who to contact     Please call your clinic at [phone] to:    Ask questions about your health    Make or cancel appointments    Discuss your medicines    Learn about your test results    Speak to your doctor   If you have compliments or concerns about an experience at your clinic, or if you wish to file a complaint, please contact [facility] Patient Relations at [phone] or email us at [email or web address]         Additional Information About Your Visit        MyChart Information     babberlyhart gives you secure access to your electronic health record. If you see a primary care provider, you can also send messages to your care team and make  appointments. If you have questions, please call your primary care clinic.  If you do not have a primary care provider, please call [phone] and they will assist you.      [employer] is an electronic gateway that provides easy, online access to your medical records. With Tongda, you can request a clinic appointment, read your test results, renew a prescription or communicate with your care team.     To access your existing account, please contact your [facility] or call [phone] for assistance.        Care EveryWhere ID     This is your Care EveryWhere ID. This could be used by other organizations to access your [place] medical records  [id number]        Your Vitals Were     Pulse BMI (Body Mass Index)                69 20.06 kg/m2           Blood Pressure from Last 3 Encounters:   12/26/17 116/73   12/26/17 116/73   12/11/17 136/72    Weight from Last 3 Encounters:   12/26/17 49.8 kg (109 lb 11 oz)   12/26/17 49.8 kg (109 lb 11.2 oz)   12/11/17 49.6 kg (109 lb 4.8 oz)               Primary Care Provider Office Phone # Fax #    Neri Gipson [phone]       [address] [address]        Equal Access to Services     NESSA NICHOLS : Hadii aad ku hadasho Soomaali, waaxda luqadaha, qaybta kaalmada adeegyada, waxay idiin haylevyn rosey michael. So [facility] [phone].    ATENCIÓN: Si habla español, tiene a nava disposición servicios gratuitos de asistencia lingüística. Llame al [phone].    We comply with applicable federal civil rights laws and [place] laws. We do not discriminate on the basis of race, color, national origin, age, disability, sex, sexual orientation, or gender identity.            Thank you!     Thank you for choosing [facility]  for your care. Our goal is always to provide you with excellent care. Hearing back from our patients is one way we can continue to improve our services. Please take a  few minutes to complete the written survey that you may receive in the mail after your visit with us. Thank you!             Your Updated Medication List - Protect others around you: Learn how to safely use, store and throw away your medicines at www.disposemymeds.org.          This list is accurate as of: 12/26/17 12:57 PM.  Always use your most recent med list.                   Brand Name Dispense Instructions for use Diagnosis    amylase-lipase-protease 50871 UNITS Cpep    CREON    180 capsule    Take 2 capsules (72,000 Units) by mouth 3 times daily (with meals)    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)       CALCIUM PO      Take by mouth every morning Form/strength unknown. Powder formulation. Add to water and fruits/vegetables daily to promote bone health.        camphor-menthol 0.5-0.5 % Lotn    DERMASARRA    59 mL    Apply 1 mL topically every 6 hours as needed for skin care        cholecalciferol 1000 UNIT tablet    vitamin D3     Take 1 tablet by mouth 2 times daily        cyanocolbalamin 500 MCG tablet    vitamin  B-12     Take 500 mcg by mouth every morning        denosumab 60 MG/ML Soln injection    PROLIA    1 mL    Inject 1 mL (60 mg) Subcutaneous every 6 months Inject subcutaneously in upper arm, upper thigh or abdomen    Senile osteoporosis       enoxaparin 60 MG/0.6ML injection    LOVENOX      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)       folic acid 400 MCG tablet    FOLVITE     Take 1 tablet by mouth every morning        hydrOXYzine 25 MG tablet    ATARAX    90 tablet    Take 1-2 tablets (25-50 mg) by mouth every 6 hours as needed for itching (adjuvant pain)        levothyroxine 37.5 mcg Tabs half-tab    SYNTHROID/LEVOTHROID     Take 125 mcg by mouth every morning (before breakfast)        LORazepam 0.5 MG tablet    ATIVAN    30 tablet    Take 1 tablet (0.5 mg) by mouth every 4 hours as needed (Anxiety, Nausea/Vomiting or Sleep)    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)       omeprazole  40 MG capsule    priLOSEC    180 capsule    Take 1 capsule (40 mg) by mouth 2 times daily Take 30-60 minutes before a meal.    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       potassium 99 MG Tabs      Take 1 tablet by mouth 2 times daily        prochlorperazine 10 MG tablet    COMPAZINE    30 tablet    Take 0.5 tablets (5 mg) by mouth every 6 hours as needed (Nausea/Vomiting)    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)       psyllium Packet    METAMUCIL/KONSYL     Take 1 packet by mouth daily as needed for constipation        timolol maleate 0.5 % opthalmic solution    ISTALOL     Place 1 drop into both eyes every morning Before placing contact lenses        traMADol 50 MG tablet    ULTRAM    100 tablet    Take 0.5-1 tablets (25-50 mg) by mouth every 6 hours as needed for breakthrough pain    Pancreatic mass       triamterene-hydrochlorothiazide 37.5-25 MG per tablet    MAXZIDE-25     Take 1 tablet by mouth every morning        TYLENOL PO      Take 325 mg by mouth every 4 hours as needed for mild pain or fever        zinc 50 MG Tabs      Take 1 tablet by mouth every morning

## 2017-12-26 NOTE — PROGRESS NOTES
Oncology/Hematology Visit Note    December 26, 2017    Reason for visit: Follow up pancreatic cancer    Oncology HPI: Iris Lewis is a 77 year old female with a previous medical history of thyroid cancer s/p thyroidectomy in 1980s, cholelithiasis s/p CCY 2015, GERD s/p Nissen fundoplication 2006 and hypertension, who presented with stomach pain, dark urine, constipation with pale floating stools and jaundice. She was admitted on 10/31/17 and a CT scan showed a 3.6 cm pancreatic mass concerning for malignancy with local invasion into the duodenum, 3 small liver lesions, small pulmonary nodulates on lung bases and enlarged peripancreatic and retroperitoneal lymph nodes. An upper endoscopic ultrasound with fine needle biopsy and ERCP with sphincterotomy and stent placement was thereafter preformed.     She met with Dr. Yu to discuss halozyme study, however she does not have sufficient HA expression to qualify.  It was discussed that she would start with Gemzar/Abraxane treatment.  Her first treatment was on 11/28/17 and she is here today for follow-up prior to cycle 2, day 1.    Interval History: Iris is here with her  today. She is doing well on the regimen of gemcitabine/abraxane.  She admits to occasional nausea and improved with Compazine and Zofran, however she develops headaches with Zofran.  She admits to increased fatigue, but tolerable.  She also has been taking Ativan to help with her symptoms.  She admits to occasional naps which helps her fatigue.  She has had a decreased appetite, but she has increased her protein intake with supplemental drinks and feels as though she is taking and 11-22 ounces of protein shake per day.  Since she had a Nissen procedure, she has had difficulty with certain types of foods when it comes to swallowing, however no worsening symptoms and no choking.  She denies fever chills, denies bleeding.  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  She states the cold weather has also made it worse.  She denies headaches or vision changes.  Denies rash.    She went on to the [facility] on 12/18/17 for a second opinion and images were done at that time.  She got a call she was driving back to the [place] and was told that she had bilateral pulmonary emboli.  She turned around and went to the emergency department and started on Lovenox injections.  She is taking it twice a day and denies any bleeding.  She admits constipation that she takes MiraLAX regularly and improved.  Denies vomiting or diarrhea denies any mouth sores.  She has no other concerns or complaints at this time.    Review of Systems: See interval hx. Denies fevers, chills, HA, dizziness, changes in vision, cough, sore throat, CP, SOB, abdominal pain, N/V, diarrhea, changes in urination, bleeding, bruising, rash.     PMHx and Social Hx reviewed per EPIC.      Medications:  Current Outpatient Prescriptions   Medication Sig Dispense Refill     ondansetron (ZOFRAN) 4 MG tablet Take 1 tablet (4 mg) by mouth every 6 hours as needed for nausea 60 tablet 0     omeprazole (PRILOSEC) 40 MG capsule Take 1 capsule (40 mg) by mouth 2 times daily Take 30-60 minutes before a meal. 180 capsule 0     order for DME 1unit being ordered: cranial prosthesis 1 Units 0     psyllium (METAMUCIL/KONSYL) Packet Take 1 packet by mouth daily as needed for constipation       Acetaminophen (TYLENOL PO) Take 325 mg by mouth every 4 hours as needed for mild pain or fever       traMADol (ULTRAM) 50 MG tablet Take 0.5-1 tablets (25-50 mg) by mouth every 6 hours as needed for breakthrough pain 100 tablet 1     LORazepam (ATIVAN) 0.5 MG tablet Take 1 tablet (0.5 mg) by mouth every 4 hours as needed (Anxiety, Nausea/Vomiting or Sleep) 30 tablet 2     prochlorperazine (COMPAZINE) 10 MG tablet Take 0.5 tablets (5 mg) by mouth every 6 hours as needed  (Nausea/Vomiting) 30 tablet 2     amylase-lipase-protease (CREON) 34807 UNITS CPEP Take 2 capsules (72,000 Units) by mouth 3 times daily (with meals) 180 capsule 1     hydrOXYzine (ATARAX) 25 MG tablet Take 1-2 tablets (25-50 mg) by mouth every 6 hours as needed for itching (adjuvant pain) 90 tablet 0     camphor-menthol (DERMASARRA) 0.5-0.5 % LOTN Apply 1 mL topically every 6 hours as needed for skin care 59 mL 0     CALCIUM PO Take by mouth every morning Form/strength unknown. Powder formulation. Add to water and fruits/vegetables daily to promote bone health.        levothyroxine (SYNTHROID/LEVOTHROID) 37.5 mcg TABS half-tab Take 37.5 mcg by mouth every morning (before breakfast)       triamterene-hydrochlorothiazide (MAXZIDE-25) 37.5-25 MG per tablet Take 1 tablet by mouth every morning        potassium 99 MG TABS Take 1 tablet by mouth 2 times daily        timolol maleate (ISTALOL) 0.5 % opthalmic solution Place 1 drop into both eyes every morning Before placing contact lenses       zinc 50 MG TABS Take 1 tablet by mouth every morning        denosumab (PROLIA) 60 MG/ML SOLN injection Inject 1 mL (60 mg) Subcutaneous every 6 months Inject subcutaneously in upper arm, upper thigh or abdomen 1 mL 1     cyanocolbalamin (VITAMIN B-12) 500 MCG tablet Take 500 mcg by mouth every morning        folic acid (FOLVITE) 400 MCG tablet Take 1 tablet by mouth every morning        cholecalciferol (VITAMIN D) 1000 UNIT tablet Take 1 tablet by mouth 2 times daily          Allergies   Allergen Reactions     Nkda [No Known Drug Allergies]        EXAM:    /73  Pulse 69  Temp 97.5  F (36.4  C)  Resp 18  Wt 49.8 kg (109 lb 11.2 oz)  SpO2 96%  BMI 20.06 kg/m2    GENERAL:  female, in no acute distress.  Alert and oriented x3.   HEENT:  Normocephalic, atraumatic.  PERRL, oropharynx clear with no sores or thrush.   LYMPH NODES:  No palpable pre/post-auricular, cervical, axillary lymphadenopathy appreciated.  CV:  RRR, No  murmurs, gallops, or rubs.   LUNGS:  Clear to auscultation bilaterally.   ABDOMEN:  Soft, nontender and nondistended.  Bowel sounds heard x4.  No apparent hepatosplenomegaly.   EXTREMITIES:  No clubbing, cyanosis, or edema. No edema.   SKIN: No rash  PSYCH: mood stable      Labs:   Results for JONA SHETTY (MRN [id number]) as of 12/26/2017 15:01   12/26/2017 07:48   Sodium 138   Potassium 4.5   Chloride 102   Carbon Dioxide 28   Urea Nitrogen 14   Creatinine 0.64   GFR Estimate 90   GFR Estimate If Black >90   Calcium 8.6   Anion Gap 8   Albumin 2.9 (L)   Protein Total 7.3   Bilirubin Total 0.4   Alkaline Phosphatase 137   ALT 28   AST 44   Glucose 97   WBC 10.5   Hemoglobin 10.5 (L)   Hematocrit 33.0 (L)   Platelet Count 705 (H)   RBC Count 3.46 (L)   MCV 95   MCH 30.3   MCHC 31.8   RDW 14.2   Diff Method Automated Method   % Neutrophils 39.0   % Lymphocytes 27.3   % Monocytes 26.3   % Eosinophils 3.1   % Basophils 1.1   % Immature Granulocytes 3.2   Nucleated RBCs 0   Absolute Neutrophil 4.1   Absolute Lymphocytes 2.9   Absolute Monocytes 2.8 (H)   Absolute Eosinophils 0.3   Absolute Basophils 0.1   Abs Immature Granulocytes 0.3   Absolute Nucleated RBC 0.0   Platelet Estimate Confirming automa...       Impression/Plan:   1. Pancreatic Cancer: Not a candidate for the halozyme study, therefore cycle 1, day 1 of gemcitabine/Abraxane started on 11/28/2017. She tolerated it well with mild nausea and fatigue, improved with medications.  Total bilirubin has slowly declined and down to 0.4 today.  Will proceed with cycle 2, day 1 today as patient's labs are within treatment parameters.  Cycle 2, day 8 scheduled for 1/2/18 and cycle 2, day 15 scheduled 1/8/18.  Kimo on 1/18 and Dr. Yu on 1/22.      2. PE: Recently diagnosed last week at the [facility].  Very minimal shortness of breath with stairs, otherwise none at rest.  Lovenox BID and continue as prescribed. Discussed increased bleeding risk while on  chemotherapy. Will continue to monitor. HOLD FOR PLT < 50K    3. Nausea: Controlled with compazine, ativan and zofran, however zofran gives her headaches, therefore recommended discontinuing it. She will continue with ativan and compazine for now. Also suggested sea bands as an alternative to medications.    4. FEN: Decreased appetite, but weight stable. Albumin 2.9 and discussed increasing protein intake. She will continue her protein supplements and also discussed dietary changes.  Electrolytes are stable.     5. Heme: Hgb 10.5 today and slowly declining. Likely from gem/abraxane, but she has increased bleeding risk while on Lovenox and receiving chemo. Currently no bleeding, but discussed warning signs. She will call clinic if any concerns for bleeding.     6. Hypothyroidism: conitnue levothyroxine 125 mcg. Last TSH 1.70 on 10/31/17. Will monitor.     7. Neuropathy: chronic, but worsening since starting chemo. Mild, but will continue to monitor.     8. Constipation: controlled with Miralax and will continue to monitor.     9. Follow up:  -[facility] 1/18/18  -Dr Yu 1/22/18      Kristie Phoenix PA-C    The patient was seen in conjunction with Kristie Phoenix PA-C. I have reviewed the note and agree with the above findings and plan.  TW

## 2017-12-26 NOTE — NURSING NOTE
Chief Complaint   Patient presents with     Cancer     patient states she is here for a cancer check     MARY COLORADO at 12:32 PM on 12/26/2017.

## 2017-12-26 NOTE — LETTER
12/26/2017       RE: Iris Lewis  [address]  [place] [address]     Dear Colleague,    Thank you for referring your patient, Iris Lewis, to the [facility]. Please see a copy of my visit note below.    Oncology/Hematology Visit Note    December 26, 2017    Reason for visit: Follow up pancreatic cancer    Oncology HPI: Iris Lewis is a 77 year old female with a previous medical history of thyroid cancer s/p thyroidectomy in 1980s, cholelithiasis s/p CCY 2015, GERD s/p Nissen fundoplication 2006 and hypertension, who presented with stomach pain, dark urine, constipation with pale floating stools and jaundice. She was admitted on 10/31/17 and a CT scan showed a 3.6 cm pancreatic mass concerning for malignancy with local invasion into the duodenum, 3 small liver lesions, small pulmonary nodulates on lung bases and enlarged peripancreatic and retroperitoneal lymph nodes. An upper endoscopic ultrasound with fine needle biopsy and ERCP with sphincterotomy and stent placement was thereafter preformed.     She met with Dr. Yu to discuss halozyme study, however she does not have sufficient HA expression to qualify.  It was discussed that she would start with Gemzar/Abraxane treatment.  Her first treatment was on 11/28/17 and she is here today for follow-up prior to cycle 2, day 1.    Interval History: Iris is here with her  today. She is doing well on the regimen of gemcitabine/abraxane.  She admits to occasional nausea and improved with Compazine and Zofran, however she develops headaches with Zofran.  She admits to increased fatigue, but tolerable.  She also has been taking Ativan to help with her symptoms.  She admits to occasional naps which helps her fatigue.  She has had a decreased appetite, but she has increased her protein intake with supplemental drinks and feels as though she is taking and 11-22 ounces of protein shake per day.  Since she had a Nissen  procedure, she has had difficulty with certain types of foods when it comes to swallowing, however no worsening symptoms and no choking.  She denies fever chills, denies bleeding.  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  She states the cold weather has also made it worse.  She denies headaches or vision changes.  Denies rash.    She went on to the [facility] on 12/18/17 for a second opinion and images were done at that time.  She got a call she was driving back to the [place] and was told that she had bilateral pulmonary emboli.  She turned around and went to the emergency department and started on Lovenox injections.  She is taking it twice a day and denies any bleeding.  She admits constipation that she takes MiraLAX regularly and improved.  Denies vomiting or diarrhea denies any mouth sores.  She has no other concerns or complaints at this time.    Review of Systems: See interval hx. Denies fevers, chills, HA, dizziness, changes in vision, cough, sore throat, CP, SOB, abdominal pain, N/V, diarrhea, changes in urination, bleeding, bruising, rash.     PMHx and Social Hx reviewed per EPIC.      Medications:  Current Outpatient Prescriptions   Medication Sig Dispense Refill     ondansetron (ZOFRAN) 4 MG tablet Take 1 tablet (4 mg) by mouth every 6 hours as needed for nausea 60 tablet 0     omeprazole (PRILOSEC) 40 MG capsule Take 1 capsule (40 mg) by mouth 2 times daily Take 30-60 minutes before a meal. 180 capsule 0     order for DME 1unit being ordered: cranial prosthesis 1 Units 0     psyllium (METAMUCIL/KONSYL) Packet Take 1 packet by mouth daily as needed for constipation       Acetaminophen (TYLENOL PO) Take 325 mg by mouth every 4 hours as needed for mild pain or fever       traMADol (ULTRAM) 50 MG tablet Take 0.5-1 tablets (25-50 mg) by mouth every 6 hours as needed for breakthrough pain 100 tablet 1      LORazepam (ATIVAN) 0.5 MG tablet Take 1 tablet (0.5 mg) by mouth every 4 hours as needed (Anxiety, Nausea/Vomiting or Sleep) 30 tablet 2     prochlorperazine (COMPAZINE) 10 MG tablet Take 0.5 tablets (5 mg) by mouth every 6 hours as needed (Nausea/Vomiting) 30 tablet 2     amylase-lipase-protease (CREON) 89571 UNITS CPEP Take 2 capsules (72,000 Units) by mouth 3 times daily (with meals) 180 capsule 1     hydrOXYzine (ATARAX) 25 MG tablet Take 1-2 tablets (25-50 mg) by mouth every 6 hours as needed for itching (adjuvant pain) 90 tablet 0     camphor-menthol (DERMASARRA) 0.5-0.5 % LOTN Apply 1 mL topically every 6 hours as needed for skin care 59 mL 0     CALCIUM PO Take by mouth every morning Form/strength unknown. Powder formulation. Add to water and fruits/vegetables daily to promote bone health.        levothyroxine (SYNTHROID/LEVOTHROID) 37.5 mcg TABS half-tab Take 37.5 mcg by mouth every morning (before breakfast)       triamterene-hydrochlorothiazide (MAXZIDE-25) 37.5-25 MG per tablet Take 1 tablet by mouth every morning        potassium 99 MG TABS Take 1 tablet by mouth 2 times daily        timolol maleate (ISTALOL) 0.5 % opthalmic solution Place 1 drop into both eyes every morning Before placing contact lenses       zinc 50 MG TABS Take 1 tablet by mouth every morning        denosumab (PROLIA) 60 MG/ML SOLN injection Inject 1 mL (60 mg) Subcutaneous every 6 months Inject subcutaneously in upper arm, upper thigh or abdomen 1 mL 1     cyanocolbalamin (VITAMIN B-12) 500 MCG tablet Take 500 mcg by mouth every morning        folic acid (FOLVITE) 400 MCG tablet Take 1 tablet by mouth every morning        cholecalciferol (VITAMIN D) 1000 UNIT tablet Take 1 tablet by mouth 2 times daily          Allergies   Allergen Reactions     Nkda [No Known Drug Allergies]        EXAM:    /73  Pulse 69  Temp 97.5  F (36.4  C)  Resp 18  Wt 49.8 kg (109 lb 11.2 oz)  SpO2 96%  BMI 20.06 kg/m2    GENERAL:   female, in no  acute distress.  Alert and oriented x3.   HEENT:  Normocephalic, atraumatic.  PERRL, oropharynx clear with no sores or thrush.   LYMPH NODES:  No palpable pre/post-auricular, cervical, axillary lymphadenopathy appreciated.  CV:  RRR, No murmurs, gallops, or rubs.   LUNGS:  Clear to auscultation bilaterally.   ABDOMEN:  Soft, nontender and nondistended.  Bowel sounds heard x4.  No apparent hepatosplenomegaly.   EXTREMITIES:  No clubbing, cyanosis, or edema. No edema.   SKIN: No rash  PSYCH: mood stable      Labs:   Results for JONA SHETTY (MRN [id number]) as of 12/26/2017 15:01   12/26/2017 07:48   Sodium 138   Potassium 4.5   Chloride 102   Carbon Dioxide 28   Urea Nitrogen 14   Creatinine 0.64   GFR Estimate 90   GFR Estimate If Black >90   Calcium 8.6   Anion Gap 8   Albumin 2.9 (L)   Protein Total 7.3   Bilirubin Total 0.4   Alkaline Phosphatase 137   ALT 28   AST 44   Glucose 97   WBC 10.5   Hemoglobin 10.5 (L)   Hematocrit 33.0 (L)   Platelet Count 705 (H)   RBC Count 3.46 (L)   MCV 95   MCH 30.3   MCHC 31.8   RDW 14.2   Diff Method Automated Method   % Neutrophils 39.0   % Lymphocytes 27.3   % Monocytes 26.3   % Eosinophils 3.1   % Basophils 1.1   % Immature Granulocytes 3.2   Nucleated RBCs 0   Absolute Neutrophil 4.1   Absolute Lymphocytes 2.9   Absolute Monocytes 2.8 (H)   Absolute Eosinophils 0.3   Absolute Basophils 0.1   Abs Immature Granulocytes 0.3   Absolute Nucleated RBC 0.0   Platelet Estimate Confirming automa...       Impression/Plan:   1. Pancreatic Cancer: Not a candidate for the halozyme study, therefore cycle 1, day 1 of gemcitabine/Abraxane started on 11/28/2017. She tolerated it well with mild nausea and fatigue, improved with medications.  Total bilirubin has slowly declined and down to 0.4 today.  Will proceed with cycle 2, day 1 today as patient's labs are within treatment parameters.  Cycle 2, day 8 scheduled for 1/2/18 and cycle 2, day 15 scheduled 1/8/18.  CTcap on 1/18 and   Gigi on 1/22.      2. PE: Recently diagnosed last week at the [facility].  Very minimal shortness of breath with stairs, otherwise none at rest.  Lovenox BID and continue as prescribed. Discussed increased bleeding risk while on chemotherapy. Will continue to monitor. HOLD FOR PLT < 50K    3. Nausea: Controlled with compazine, ativan and zofran, however zofran gives her headaches, therefore recommended discontinuing it. She will continue with ativan and compazine for now. Also suggested sea bands as an alternative to medications.    4. FEN: Decreased appetite, but weight stable. Albumin 2.9 and discussed increasing protein intake. She will continue her protein supplements and also discussed dietary changes.  Electrolytes are stable.     5. Heme: Hgb 10.5 today and slowly declining. Likely from gem/abraxane, but she has increased bleeding risk while on Lovenox and receiving chemo. Currently no bleeding, but discussed warning signs. She will call clinic if any concerns for bleeding.     6. Hypothyroidism: conitnue levothyroxine 125 mcg. Last TSH 1.70 on 10/31/17. Will monitor.     7. Neuropathy: chronic, but worsening since starting chemo. Mild, but will continue to monitor.     8. Constipation: controlled with Miralax and will continue to monitor.     9. Follow up:  -[facility] 1/18/18  -Dr Yu 1/22/18      Kristie Phoenix PA-C    The patient was seen in conjunction with Kristie Phoenix PA-C. I have reviewed the note and agree with the above findings and plan.  TW        Again, thank you for allowing me to participate in the care of your patient.      Sincerely,    CAT Block CNP

## 2017-12-26 NOTE — PATIENT INSTRUCTIONS
[facility]: [phone]     [facility] Medication Refill Request Information:  * Please contact your pharmacy regarding ANY request for medication refills.  ** [facility] Prescription Fax = [phone]  * Please allow 3 business days for routine medication refills.  * Please allow 5 business days for controlled substance medication refills.     [facility] Test Result notification information:  *You will be notified with in 7-10 days of your appointment day regarding the results of your test.  If you are on MyChart you will be notified as soon as the provider has reviewed the results and signed off on them.

## 2017-12-26 NOTE — MR AVS SNAPSHOT
After Visit Summary   12/26/2017    Iris Lewis    MRN: [id number]           Patient Information     Date Of Birth          1940        Visit Information        Provider Department      12/26/2017 9:00 AM  24 ATC;  ONCOLOGY INFUSION [facility]        Today's Diagnoses     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)    -  1      Care Instructions    Contact Numbers  [facility] Nurse Triage: [phone]  After Hours Nurse Line:  [phone]     Please call the [facility] Triage line if you experience a temperature greater than or equal to 100.5, shaking chills, have uncontrolled nausea, vomiting and/or diarrhea, dizziness, shortness of breath, chest pain, bleeding, unexplained bruising, or if you have any other new/concerning symptoms, questions or concerns.      If it is after hours, weekends, or holidays, please call either the after hours nurse line listed above.      If you are having any concerning symptoms or wish to speak to a provider before your next infusion visit, please call your care coordinator or triage to notify them so we can adequately serve you.      If you need a refill on a narcotic prescription or other medication, please call triage before your infusion appointment.         December 2017 Sunday Monday Tuesday Wednesday Thursday Friday Saturday                            1     UMP RETURN    6:50 AM   (30 min.)   Neri Gipson MD   [facility] 2       3     4     [facility] DRAW    7:30 AM   (15 min.)    [facility] LAB DRAW   [facility] Lab Draw     UMP ONC INFUSION 120    8:00 AM   (120 min.)    ONCOLOGY INFUSION   [facility] 5     6     7     8     9       10     11     [facility] DRAW    8:30 AM   (15 min.)    [facility]   [facility]     UMP ONC INFUSION 120    9:00 AM   (120 min.)    ONCOLOGY INFUSION   [facility] 12     13      14     15     16       17     18     19     20     21     22     23       24     25     26     [facility] DRAW    7:30 AM   (15 min.)   [facility] DRAW   [facility] Mason[facility] Lab Draw     UMP RETURN    7:45 AM   (50 min.)   Quyen Mazariegos APRN CNP   [facility]     UMP ONC INFUSION 120    9:00 AM   (120 min.)   UC ONCOLOGY INFUSION   [facility]     UMP RETURN   12:15 PM   (30 min.)   Neri Gipson MD   [facility] 27 28 29     30       31 January 2018 Sunday Monday Tuesday Wednesday Thursday Friday Saturday        1     2     [facility] DRAW    9:00 AM   (15 min.)   [facility] DRAW   [facility] Lab Draw     UMP ONC INFUSION 120    9:30 AM   (120 min.)   UC ONCOLOGY INFUSION   [facility] 3     UMP RETURN    3:20 PM   (30 min.)   Neri Gipson MD   [facility] 4     5     6       7     8     [facility] DRAW    8:30 AM   (15 min.)   [facility]   [facility]     [facility] ONC INFUSION 120    9:00 AM   (120 min.)   UC ONCOLOGY INFUSION   [facility] 9     10     11     12     13       14     15     16     17     18     CT CHEST/ABDOMEN/PELVIS W    9:05 AM   (20 min.)   CT2   [facility] CT 19     20       21     22     [facility] DRAW    7:45 AM   (15 min.)   [facility]     UMP RETURN    8:00 AM   (30 min.)   Vinny Yu MD   [facility]     UMP ONC INFUSION 120    9:00 AM   (120 min.)   UC ONCOLOGY INFUSION   [facility] 23     24     25     26     27       28     29     [facility] DRAW    9:00 AM   (15 min.)   [facility] DRAW   [facility] Lab Draw     UMP ONC INFUSION 120    9:30 AM   (120 min.)   UC ONCOLOGY INFUSION   [facility] 30     31                                  Lab Results:  Recent Results (from the past 12 hour(s))   CBC with platelets differential    Collection Time: 12/26/17  7:48 AM   Result Value Ref Range    WBC 10.5 4.0 - 11.0 10e9/L    RBC Count 3.46 (L) 3.8 - 5.2 10e12/L    Hemoglobin 10.5 (L) 11.7 - 15.7 g/dL    Hematocrit 33.0 (L) 35.0 - 47.0 %    MCV 95 78 - 100 fl    MCH 30.3 26.5 - 33.0 pg    MCHC 31.8 31.5 - 36.5 g/dL    RDW 14.2 10.0 - 15.0 %    Platelet Count 705 (H) 150 - 450 10e9/L    Diff Method Automated Method     % Neutrophils 39.0 %    % Lymphocytes 27.3 %    % Monocytes 26.3 %    % Eosinophils 3.1 %    % Basophils 1.1 %    % Immature Granulocytes 3.2 %    Nucleated RBCs 0 0 /100    Absolute Neutrophil 4.1 1.6 - 8.3 10e9/L    Absolute Lymphocytes 2.9 0.8 - 5.3 10e9/L    Absolute Monocytes 2.8 (H) 0.0 - 1.3 10e9/L    Absolute Eosinophils 0.3 0.0 - 0.7 10e9/L    Absolute Basophils 0.1 0.0 - 0.2 10e9/L    Abs Immature Granulocytes 0.3 0 - 0.4 10e9/L    Absolute Nucleated RBC 0.0     Platelet Estimate Confirming automated cell count    Comprehensive metabolic panel    Collection Time: 12/26/17  7:48 AM   Result Value Ref Range    Sodium 138 133 - 144 mmol/L    Potassium 4.5 3.4 - 5.3 mmol/L    Chloride 102 94 - 109 mmol/L    Carbon Dioxide 28 20 - 32 mmol/L    Anion Gap 8 3 - 14 mmol/L    Glucose 97 70 - 99 mg/dL    Urea Nitrogen 14 7 - 30 mg/dL    Creatinine 0.64 0.52 - 1.04 mg/dL    GFR Estimate 90 >60 mL/min/1.7m2    GFR Estimate If Black >90 >60 mL/min/1.7m2    Calcium 8.6 8.5 - 10.1 mg/dL    Bilirubin Total 0.4 0.2 - 1.3 mg/dL    Albumin 2.9 (L) 3.4 - 5.0 g/dL    Protein Total 7.3 6.8 - 8.8 g/dL    Alkaline Phosphatase 137 40 - 150 U/L    ALT 28 0 - 50 U/L    AST 44 0 - 45 U/L               Follow-ups after your visit        Your next 10 appointments already scheduled     Dec 26, 2017 12:30 PM CST   (Arrive by 12:15 PM)   Return Visit with Neri Gipson MD   [facility] ([facility])    [address] [address]   [phone]            Jan 02, 2018  9:00 AM CST   [facility] Lab Draw with [facility] DRAW   [facility]    [address] [place] [address]   [phone]            Jan 02, 2018  9:30 AM CST   Infusion 120 with UC ONCOLOGY INFUSION, UC 21 ATC   [facility] ([facility])    [address] [place] [address]   [phone]            Jan 03, 2018  3:35 PM CST   (Arrive by 3:20 PM)   Return Visit with Neri Gipson MD   [facility] ([facility])    [address] [place] [address]   [phone]            Jan 08, 2018  8:30 AM CST   [facility] Lab Draw with [facility] LAB DRAW   [facility] Draw ([facility])    [address] [place] [address]   [phone]            Jan 08, 2018  9:00 AM CST   Infusion 120 with UC ONCOLOGY INFUSION, UC 24 ATC   [facility] ([facility])    [address] [place] [address]   [phone]            Jan 18, 2018  9:20 AM CST   (Arrive by 9:05 AM)   CT CHEST/ABDOMEN/PELVIS W CONTRAST with UCCT2   [facility] ([facility])    [address] [place] [address]   [phone]           Please bring any scans or X-rays taken at other hospitals, if similar tests were done. Also bring a list of your medicines, including vitamins, minerals and over-the-counter drugs. It is safest to leave personal items at home.  Be sure to tell your doctor:   If you have any allergies.   If there s any chance you are pregnant.   If you are breastfeeding.   If you have any special needs.  You may have contrast for this exam. To prepare:   Do not  eat or drink for 2 hours before your exam. If you need to take medicine, you may take it with small sips of water. (We may ask you to take liquid medicine as well.)   The day before your exam, drink extra fluids at least six 8-ounce glasses (unless your doctor tells you to restrict your fluids).  Patients over 70 or patients with diabetes or kidney problems:   If you haven t had a blood test (creatinine test) within the last 30 days, go to your clinic or Diagnostic Imaging Department for this test.  If you have diabetes:   If your kidney function is normal, continue taking your metformin (Avandamet, Glucophage, Glucovance, Metaglip) on the day of your exam.   If your kidney function is abnormal, wait 48 hours before restarting this medicine.  You will have oral contrast for this exam:   You will drink the contrast at home. Get this from your clinic or Diagnostic Imaging Department. Please follow the directions given.  Please wear loose clothing, such as a sweat suit or jogging clothes. Avoid snaps, zippers and other metal. We may ask you to undress and put on a hospital gown.  If you have any questions, please call the Imaging Department where you will have your exam.            Jan 22, 2018  7:45 AM [facility]   [facility]ic Lab Draw with  MASONIC LAB DRAW   [facility] Lab Draw ([facility])    [address] [place] [address] [phone]              Future tests that were ordered for you today     Open Future Orders        Priority Expected Expires Ordered    CT Chest/Abdomen/Pelvis w Contrast Routine 1/18/2018 12/26/2018 12/26/2017    *CBC with platelets differential Routine 1/18/2018 12/26/2018 12/26/2017    Comprehensive metabolic panel Routine 1/18/2018 12/26/2018 12/26/2017    Cancer antigen 19-9 tumor marker Routine 1/18/2018 12/26/2018 12/26/2017            Who to contact     If you have questions or need follow up information about today's clinic visit or  your schedule please contact [facility] directly at [phone].  Normal or non-critical lab and imaging results will be communicated to you by MyChart, letter or phone within 4 business days after the clinic has received the results. If you do not hear from us within 7 days, please contact the clinic through MyChart or phone. If you have a critical or abnormal lab result, we will notify you by phone as soon as possible.  Submit refill requests through [employer] or call your pharmacy and they will forward the refill request to us. Please allow 3 business days for your refill to be completed.          Additional Information About Your Visit        [employer] Information     [employer] gives you secure access to your electronic health record. If you see a primary care provider, you can also send messages to your care team and make appointments. If you have questions, please call your primary care clinic.  If you do not have a primary care provider, please call [phone] and they will assist you.        Care EveryWhere ID     This is your Care EveryWhere ID. This could be used by other organizations to access your [facility] medical records  [id number]         Blood Pressure from Last 3 Encounters:   12/26/17 116/73   12/11/17 136/72   12/04/17 128/71    Weight from Last 3 Encounters:   12/26/17 49.8 kg (109 lb 11.2 oz)   12/11/17 49.6 kg (109 lb 4.8 oz)   12/04/17 51.3 kg (113 lb)              We Performed the Following     CBC with platelets differential     Comprehensive metabolic panel        Primary Care Provider Office Phone # Fax #    Neri Gipson [phone] [address] [address]        Equal Access to Services     NATASHA [facility] : Hadii mary Martinez, merle gar, lisa bradford. So [facility] [phone].    ATENCIÓN: Si habla español, tiene a nava disposición servicios gratuitos de  asistencia lingüística. Indu al [phone].    We comply with applicable federal civil rights laws and [place] laws. We do not discriminate on the basis of race, color, national origin, age, disability, sex, sexual orientation, or gender identity.            Thank you!     Thank you for choosing [facility]  for your care. Our goal is always to provide you with excellent care. Hearing back from our patients is one way we can continue to improve our services. Please take a few minutes to complete the written survey that you may receive in the mail after your visit with us. Thank you!             Your Updated Medication List - Protect others around you: Learn how to safely use, store and throw away your medicines at www.disposemymeds.org.          This list is accurate as of: 12/26/17 11:35 AM.  Always use your most recent med list.                   Brand Name Dispense Instructions for use Diagnosis    amylase-lipase-protease 62138 UNITS Cpep    CREON    180 capsule    Take 2 capsules (72,000 Units) by mouth 3 times daily (with meals)    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)       CALCIUM PO      Take by mouth every morning Form/strength unknown. Powder formulation. Add to water and fruits/vegetables daily to promote bone health.        camphor-menthol 0.5-0.5 % Lotn    DERMASARRA    59 mL    Apply 1 mL topically every 6 hours as needed for skin care        cholecalciferol 1000 UNIT tablet    vitamin D3     Take 1 tablet by mouth 2 times daily        cyanocolbalamin 500 MCG tablet    vitamin  B-12     Take 500 mcg by mouth every morning        denosumab 60 MG/ML Soln injection    PROLIA    1 mL    Inject 1 mL (60 mg) Subcutaneous every 6 months Inject subcutaneously in upper arm, upper thigh or abdomen    Senile osteoporosis       enoxaparin 60 MG/0.6ML injection    LOVENOX      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)       folic acid 400 MCG tablet    FOLVITE     Take 1 tablet by  mouth every morning        hydrOXYzine 25 MG tablet    ATARAX    90 tablet    Take 1-2 tablets (25-50 mg) by mouth every 6 hours as needed for itching (adjuvant pain)        levothyroxine 37.5 mcg Tabs half-tab    SYNTHROID/LEVOTHROID     Take 125 mcg by mouth every morning (before breakfast)        LORazepam 0.5 MG tablet    ATIVAN    30 tablet    Take 1 tablet (0.5 mg) by mouth every 4 hours as needed (Anxiety, Nausea/Vomiting or Sleep)    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)       omeprazole 40 MG capsule    priLOSEC    180 capsule    Take 1 capsule (40 mg) by mouth 2 times daily Take 30-60 minutes before a meal.    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       potassium 99 MG Tabs      Take 1 tablet by mouth 2 times daily        prochlorperazine 10 MG tablet    COMPAZINE    30 tablet    Take 0.5 tablets (5 mg) by mouth every 6 hours as needed (Nausea/Vomiting)    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)       psyllium Packet    METAMUCIL/KONSYL     Take 1 packet by mouth daily as needed for constipation        timolol maleate 0.5 % opthalmic solution    ISTALOL     Place 1 drop into both eyes every morning Before placing contact lenses        traMADol 50 MG tablet    ULTRAM    100 tablet    Take 0.5-1 tablets (25-50 mg) by mouth every 6 hours as needed for breakthrough pain    Pancreatic mass       triamterene-hydrochlorothiazide 37.5-25 MG per tablet    MAXZIDE-25     Take 1 tablet by mouth every morning        TYLENOL PO      Take 325 mg by mouth every 4 hours as needed for mild pain or fever        zinc 50 MG Tabs      Take 1 tablet by mouth every morning

## 2017-12-26 NOTE — PROGRESS NOTES
HPI:    New Dx. Metastatic pancreatic cancer. She saw Dr. Yu 11/13/17 and is getting chemotherapy . She had ERCP for biliary stent removal/chage 11/22/17. Today stable and eating and drinking OK. No other HEENT, cardiopulmonary, abdominal, , GYN, neurological, skin complaints.     PE:    Vitals noted; gen nad cooperative alert, HEENT, oropharynx clear, neck supple nl rom, LCTA, B, good air  Movment,  RRR, S1, S2, abdomen positive BS's, non-tender, grossly normal neurological exam    A/P:    1. Metastatic pancreatic cancer followed by Dr. Yu and seen 11/13/17 and again today 12/26/17 by Ms. Mazariegos. She had  appt. At [facility] last Monday   2. Flu shot 10/11/17  3. She will have periodic ERCP for bilary stent exchange  4. Thyroid 10/31/17 checked; ordered future TSH lab  5. She remains on BP medication; electrolytes checked today  6. She states while at [facility] Chest imaging showed PE and was started on Lovenox         Total time spent 25 minutes.  More than 50% of the time spent with Ms. Lewis on counseling / coordinating her care

## 2017-12-26 NOTE — PROGRESS NOTES
Infusion Nursing Note:  Iris Lewis presents for D1C2 Gemzar/ Abraxane  Met with Quyen Mazariegos NP before infusion.    Note: Patient offers no new complaints other than fatigue.     Treatment Conditions:  Lab Results   Component Value Date    HGB 10.5 12/26/2017     Lab Results   Component Value Date    WBC 10.5 12/26/2017      Lab Results   Component Value Date    ANEU 4.1 12/26/2017     Lab Results   Component Value Date     12/26/2017      Lab Results   Component Value Date     12/26/2017                   Lab Results   Component Value Date    POTASSIUM 4.5 12/26/2017           Lab Results   Component Value Date    MAG 1.9 11/15/2017            Lab Results   Component Value Date    CR 0.64 12/26/2017                   Lab Results   Component Value Date    EMMY 8.6 12/26/2017                Lab Results   Component Value Date    BILITOTAL 0.4 12/26/2017           Lab Results   Component Value Date    ALBUMIN 2.9 12/26/2017                    Lab Results   Component Value Date    ALT 28 12/26/2017           Lab Results   Component Value Date    AST 44 12/26/2017     Results reviewed, labs MET treatment parameters, ok to proceed with treatment.        Intravenous Access:  Peripheral IV placed.    Post Infusion Assessment:  Patient tolerated infusion without incident.  Blood return noted pre and post infusion.  Site patent and intact, free from redness, edema or discomfort.  No evidence of extravasations.  Access discontinued per protocol.    Discharge Plan:   Prescription refills given for Ativan.  Discharge instructions reviewed with: Patient.  Copy of AVS reviewed with patient and/or family.  Patient will return 1/2 for next appointment.  Patient discharged in stable condition accompanied by: self.    Karen Mcclellan RN

## 2017-12-26 NOTE — PATIENT INSTRUCTIONS
Contact Numbers  [facility] Nurse Triage: [phone]  After Hours Nurse Line:  [phone]     Please call the [facility] Triage line if you experience a temperature greater than or equal to 100.5, shaking chills, have uncontrolled nausea, vomiting and/or diarrhea, dizziness, shortness of breath, chest pain, bleeding, unexplained bruising, or if you have any other new/concerning symptoms, questions or concerns.      If it is after hours, weekends, or holidays, please call either the after hours nurse line listed above.      If you are having any concerning symptoms or wish to speak to a provider before your next infusion visit, please call your care coordinator or triage to notify them so we can adequately serve you.      If you need a refill on a narcotic prescription or other medication, please call triage before your infusion appointment.         December 2017 Sunday Monday Tuesday Wednesday Thursday Friday Saturday 1     UMP RETURN    6:50 AM   (30 min.)   Neri Gipson MD   [facility] 2       3     4     P [facility] LAB DRAW    7:30 AM   (15 min.)    [facility] LAB DRAW   [facility] Lab Draw     [facility] ONC INFUSION 120    8:00 AM   (120 min.)    ONCOLOGY INFUSION   [facility] 5     6     7     8     9       10     11     UMP [facility] LAB DRAW    8:30 AM   (15 min.)    [facility] LAB DRAW   [facility] Lab Draw     [facility] ONC INFUSION 120    9:00 AM   (120 min.)    ONCOLOGY INFUSION   [facility] 12     13     14     15     16       17     18     19     20     21     22     23       24     25     26     [facility] LAB DRAW    7:30 AM   (15 min.)    MASONIC LAB DRAW   [facility] Lab Draw     P RETURN    7:45 AM   (50 min.)   Quyen Mazariegos APRN CNP   [facility] ONC INFUSION 120    9:00 AM   (120 min.)    ONCOLOGY INFUSION   [facility]  RETURN   12:15 PM   (30 min.)   Neri Gipson MD   [facility] 27     28     29     30       31                                              January 2018 Sunday Monday Tuesday Wednesday Thursday Friday Saturday        1     2     UMP [facility] LAB DRAW    9:00 AM   (15 min.)   [facility] DRAW   [facility] Draw     UMP ONC INFUSION 120    9:30 AM   (120 min.)   UC ONCOLOGY INFUSION   [facility] 3     UMP RETURN    3:20 PM   (30 min.)   Neri Gipson MD   [facility] 4     5     6       7     8     UMP [facility] LAB DRAW    8:30 AM   (15 min.)    [facility] LAB DRAW   [facility] Draw     UMP ONC INFUSION 120    9:00 AM   (120 min.)    ONCOLOGY INFUSION   [facility] 9     10     11     12     13       14     15     16     17     18     CT CHEST/ABDOMEN/PELVIS W    9:05 AM   (20 min.)   UCCT2   [facility] CT 19     20       21     22     UMP [facility] LAB DRAW    7:45 AM   (15 min.)   [facility] DRAW   [facility] Draw     UMP RETURN    8:00 AM   (30 min.)   Vinny Yu MD   [facility]     UMP ONC INFUSION 120    9:00 AM   (120 min.)    ONCOLOGY INFUSION   [facility] 23     24     25     26     27       28     29     UMP [facility] LAB DRAW    9:00 AM   (15 min.)   [facility] DRAW   [facility] Draw     UMP ONC INFUSION 120    9:30 AM   (120 min.)    ONCOLOGY INFUSION   [facility] 30     31                                 Lab Results:  Recent Results (from the past 12 hour(s))   CBC with platelets differential    Collection Time: 12/26/17  7:48 AM   Result Value Ref Range    WBC 10.5 4.0 - 11.0 10e9/L    RBC Count 3.46 (L) 3.8 - 5.2 10e12/L    Hemoglobin 10.5 (L) 11.7 - 15.7 g/dL    Hematocrit 33.0 (L) 35.0 - 47.0 %    MCV 95 78 - 100 fl    MCH 30.3 26.5 - 33.0 pg    MCHC 31.8 31.5 - 36.5 g/dL    RDW 14.2  10.0 - 15.0 %    Platelet Count 705 (H) 150 - 450 10e9/L    Diff Method Automated Method     % Neutrophils 39.0 %    % Lymphocytes 27.3 %    % Monocytes 26.3 %    % Eosinophils 3.1 %    % Basophils 1.1 %    % Immature Granulocytes 3.2 %    Nucleated RBCs 0 0 /100    Absolute Neutrophil 4.1 1.6 - 8.3 10e9/L    Absolute Lymphocytes 2.9 0.8 - 5.3 10e9/L    Absolute Monocytes 2.8 (H) 0.0 - 1.3 10e9/L    Absolute Eosinophils 0.3 0.0 - 0.7 10e9/L    Absolute Basophils 0.1 0.0 - 0.2 10e9/L    Abs Immature Granulocytes 0.3 0 - 0.4 10e9/L    Absolute Nucleated RBC 0.0     Platelet Estimate Confirming automated cell count    Comprehensive metabolic panel    Collection Time: 12/26/17  7:48 AM   Result Value Ref Range    Sodium 138 133 - 144 mmol/L    Potassium 4.5 3.4 - 5.3 mmol/L    Chloride 102 94 - 109 mmol/L    Carbon Dioxide 28 20 - 32 mmol/L    Anion Gap 8 3 - 14 mmol/L    Glucose 97 70 - 99 mg/dL    Urea Nitrogen 14 7 - 30 mg/dL    Creatinine 0.64 0.52 - 1.04 mg/dL    GFR Estimate 90 >60 mL/min/1.7m2    GFR Estimate If Black >90 >60 mL/min/1.7m2    Calcium 8.6 8.5 - 10.1 mg/dL    Bilirubin Total 0.4 0.2 - 1.3 mg/dL    Albumin 2.9 (L) 3.4 - 5.0 g/dL    Protein Total 7.3 6.8 - 8.8 g/dL    Alkaline Phosphatase 137 40 - 150 U/L    ALT 28 0 - 50 U/L    AST 44 0 - 45 U/L

## 2017-12-26 NOTE — NURSING NOTE
Chief Complaint   Patient presents with     Blood Draw     IV placed by RN in left forearm, pt tolerated well. VS taken and pt checked in for next appt.      Danya Stevens

## 2017-12-28 NOTE — TELEPHONE ENCOUNTER
prochlorperazine     Last Written Prescription Date: 11/27/17  Last Fill Quantity: 30,  # refills: 2   Last Office Visit with [facility] or [facility] prescribing provider: 12/26/17 with Quyen SHELTON CNP  Next office visit: 1/22/18 with Dr. Yu

## 2017-12-29 NOTE — TELEPHONE ENCOUNTER
Pt had not read Everest[employer], I called to confirm she knows that she is scheduled for a procedure.   Patient informed me that she did see the Everesthart but has not read it as yet since she is busy with family around this holiday season.   We discussed the need for a pre-op, patient will get one done locally and she knows the form was mailed on 12/27/17. She will call if she does not receive the packet by the end of day today.      12.29.17 @ 814 A

## 2018-01-01 ENCOUNTER — INFUSION THERAPY VISIT (OUTPATIENT)
Dept: ONCOLOGY | Facility: CLINIC | Age: 78
End: 2018-01-01
Attending: INTERNAL MEDICINE
Payer: MEDICARE

## 2018-01-01 ENCOUNTER — APPOINTMENT (OUTPATIENT)
Dept: LAB | Facility: CLINIC | Age: 78
End: 2018-01-01
Attending: INTERNAL MEDICINE
Payer: MEDICARE

## 2018-01-01 ENCOUNTER — ONCOLOGY VISIT (OUTPATIENT)
Dept: ONCOLOGY | Facility: CLINIC | Age: 78
End: 2018-01-01
Attending: NURSE PRACTITIONER
Payer: MEDICARE

## 2018-01-01 ENCOUNTER — APPOINTMENT (OUTPATIENT)
Dept: CT IMAGING | Facility: CLINIC | Age: 78
DRG: 194 | End: 2018-01-01
Attending: INTERNAL MEDICINE
Payer: MEDICARE

## 2018-01-01 ENCOUNTER — OFFICE VISIT (OUTPATIENT)
Dept: PALLIATIVE CARE | Facility: CLINIC | Age: 78
End: 2018-01-01
Attending: INTERNAL MEDICINE
Payer: MEDICARE

## 2018-01-01 ENCOUNTER — ALLIED HEALTH/NURSE VISIT (OUTPATIENT)
Dept: ONCOLOGY | Facility: CLINIC | Age: 78
End: 2018-01-01

## 2018-01-01 ENCOUNTER — TELEPHONE (OUTPATIENT)
Dept: ONCOLOGY | Facility: CLINIC | Age: 78
End: 2018-01-01

## 2018-01-01 ENCOUNTER — HOSPITAL ENCOUNTER (OUTPATIENT)
Dept: PHYSICAL THERAPY | Facility: CLINIC | Age: 78
Setting detail: THERAPIES SERIES
End: 2018-04-03
Attending: NURSE PRACTITIONER
Payer: MEDICARE

## 2018-01-01 ENCOUNTER — ONCOLOGY VISIT (OUTPATIENT)
Dept: ONCOLOGY | Facility: CLINIC | Age: 78
End: 2018-01-01
Payer: COMMERCIAL

## 2018-01-01 ENCOUNTER — RADIANT APPOINTMENT (OUTPATIENT)
Dept: CT IMAGING | Facility: CLINIC | Age: 78
End: 2018-01-01
Attending: INTERNAL MEDICINE
Payer: COMMERCIAL

## 2018-01-01 ENCOUNTER — APPOINTMENT (OUTPATIENT)
Dept: GENERAL RADIOLOGY | Facility: CLINIC | Age: 78
End: 2018-01-01
Attending: INTERNAL MEDICINE
Payer: MEDICARE

## 2018-01-01 ENCOUNTER — CARE COORDINATION (OUTPATIENT)
Dept: ONCOLOGY | Facility: CLINIC | Age: 78
End: 2018-01-01

## 2018-01-01 ENCOUNTER — ONCOLOGY VISIT (OUTPATIENT)
Dept: ONCOLOGY | Facility: CLINIC | Age: 78
DRG: 194 | End: 2018-01-01
Attending: PHYSICIAN ASSISTANT
Payer: MEDICARE

## 2018-01-01 ENCOUNTER — ONCOLOGY VISIT (OUTPATIENT)
Dept: ONCOLOGY | Facility: CLINIC | Age: 78
End: 2018-01-01
Attending: PHYSICIAN ASSISTANT
Payer: MEDICARE

## 2018-01-01 ENCOUNTER — HOSPITAL ENCOUNTER (INPATIENT)
Facility: CLINIC | Age: 78
LOS: 5 days | Discharge: HOSPICE | DRG: 871 | End: 2018-12-04
Attending: INTERNAL MEDICINE
Payer: MEDICARE

## 2018-01-01 ENCOUNTER — ANESTHESIA EVENT (OUTPATIENT)
Dept: SURGERY | Facility: CLINIC | Age: 78
End: 2018-01-01
Payer: MEDICARE

## 2018-01-01 ENCOUNTER — HOSPITAL ENCOUNTER (EMERGENCY)
Facility: CLINIC | Age: 78
Discharge: HOME | DRG: 871 | End: 2018-11-28
Attending: EMERGENCY MEDICINE | Admitting: EMERGENCY MEDICINE
Payer: MEDICARE

## 2018-01-01 ENCOUNTER — OFFICE VISIT (OUTPATIENT)
Dept: PALLIATIVE CARE | Facility: CLINIC | Age: 78
End: 2018-01-01
Attending: INTERNAL MEDICINE
Payer: COMMERCIAL

## 2018-01-01 ENCOUNTER — RADIANT APPOINTMENT (OUTPATIENT)
Dept: GENERAL RADIOLOGY | Facility: CLINIC | Age: 78
End: 2018-01-01
Attending: PHYSICIAN ASSISTANT
Payer: COMMERCIAL

## 2018-01-01 ENCOUNTER — ANESTHESIA (OUTPATIENT)
Dept: SURGERY | Facility: CLINIC | Age: 78
End: 2018-01-01
Payer: MEDICARE

## 2018-01-01 ENCOUNTER — INFUSION THERAPY VISIT (OUTPATIENT)
Dept: INFUSION THERAPY | Facility: CLINIC | Age: 78
End: 2018-01-01
Attending: PHYSICIAN ASSISTANT
Payer: MEDICARE

## 2018-01-01 ENCOUNTER — HOME INFUSION (PRE-WILLOW HOME INFUSION) (OUTPATIENT)
Dept: PHARMACY | Facility: CLINIC | Age: 78
End: 2018-01-01

## 2018-01-01 ENCOUNTER — RADIANT APPOINTMENT (OUTPATIENT)
Dept: CT IMAGING | Facility: CLINIC | Age: 78
End: 2018-01-01
Payer: COMMERCIAL

## 2018-01-01 ENCOUNTER — INFUSION THERAPY VISIT (OUTPATIENT)
Dept: INFUSION THERAPY | Facility: CLINIC | Age: 78
End: 2018-01-01
Attending: INTERNAL MEDICINE
Payer: MEDICARE

## 2018-01-01 ENCOUNTER — HEALTH MAINTENANCE LETTER (OUTPATIENT)
Age: 78
End: 2018-01-01

## 2018-01-01 ENCOUNTER — OFFICE VISIT (OUTPATIENT)
Dept: INTERNAL MEDICINE | Facility: CLINIC | Age: 78
End: 2018-01-01
Payer: COMMERCIAL

## 2018-01-01 ENCOUNTER — HOSPITAL ENCOUNTER (OUTPATIENT)
Dept: PHYSICAL THERAPY | Facility: CLINIC | Age: 78
Setting detail: THERAPIES SERIES
End: 2018-04-17
Attending: NURSE PRACTITIONER
Payer: MEDICARE

## 2018-01-01 ENCOUNTER — DOCUMENTATION ONLY (OUTPATIENT)
Dept: OTHER | Facility: CLINIC | Age: 78
End: 2018-01-01

## 2018-01-01 ENCOUNTER — ANESTHESIA EVENT (OUTPATIENT)
Dept: SURGERY | Facility: AMBULATORY SURGERY CENTER | Age: 78
End: 2018-01-01

## 2018-01-01 ENCOUNTER — TELEPHONE (OUTPATIENT)
Dept: INTERNAL MEDICINE | Facility: CLINIC | Age: 78
End: 2018-01-01

## 2018-01-01 ENCOUNTER — APPOINTMENT (OUTPATIENT)
Dept: LAB | Facility: CLINIC | Age: 78
End: 2018-01-01
Attending: NURSE PRACTITIONER
Payer: MEDICARE

## 2018-01-01 ENCOUNTER — RADIANT APPOINTMENT (OUTPATIENT)
Dept: GENERAL RADIOLOGY | Facility: CLINIC | Age: 78
End: 2018-01-01
Attending: INTERNAL MEDICINE
Payer: COMMERCIAL

## 2018-01-01 ENCOUNTER — APPOINTMENT (OUTPATIENT)
Dept: GENERAL RADIOLOGY | Facility: CLINIC | Age: 78
DRG: 871 | End: 2018-01-01
Attending: INTERNAL MEDICINE
Payer: MEDICARE

## 2018-01-01 ENCOUNTER — APPOINTMENT (OUTPATIENT)
Dept: LAB | Facility: CLINIC | Age: 78
End: 2018-01-01
Payer: COMMERCIAL

## 2018-01-01 ENCOUNTER — ANESTHESIA EVENT (OUTPATIENT)
Dept: SURGERY | Facility: CLINIC | Age: 78
DRG: 871 | End: 2018-01-01
Payer: MEDICARE

## 2018-01-01 ENCOUNTER — TELEPHONE (OUTPATIENT)
Dept: FAMILY MEDICINE | Facility: CLINIC | Age: 78
End: 2018-01-01

## 2018-01-01 ENCOUNTER — TRANSFERRED RECORDS (OUTPATIENT)
Dept: HEALTH INFORMATION MANAGEMENT | Facility: CLINIC | Age: 78
End: 2018-01-01

## 2018-01-01 ENCOUNTER — HOSPITAL ENCOUNTER (OUTPATIENT)
Facility: CLINIC | Age: 78
Discharge: HOME | End: 2018-01-10
Attending: INTERNAL MEDICINE | Admitting: INTERNAL MEDICINE
Payer: MEDICARE

## 2018-01-01 ENCOUNTER — CARE COORDINATION (OUTPATIENT)
Dept: GASTROENTEROLOGY | Facility: CLINIC | Age: 78
End: 2018-01-01

## 2018-01-01 ENCOUNTER — SURGERY (OUTPATIENT)
Age: 78
End: 2018-01-01

## 2018-01-01 ENCOUNTER — ANESTHESIA (OUTPATIENT)
Dept: SURGERY | Facility: AMBULATORY SURGERY CENTER | Age: 78
End: 2018-01-01

## 2018-01-01 ENCOUNTER — HOSPITAL ENCOUNTER (OUTPATIENT)
Facility: AMBULATORY SURGERY CENTER | Age: 78
End: 2018-01-24
Attending: PHYSICIAN ASSISTANT
Payer: COMMERCIAL

## 2018-01-01 ENCOUNTER — ONCOLOGY VISIT (OUTPATIENT)
Dept: ONCOLOGY | Facility: CLINIC | Age: 78
End: 2018-01-01

## 2018-01-01 ENCOUNTER — APPOINTMENT (OUTPATIENT)
Dept: GENERAL RADIOLOGY | Facility: CLINIC | Age: 78
DRG: 194 | End: 2018-01-01
Attending: PHYSICIAN ASSISTANT
Payer: MEDICARE

## 2018-01-01 ENCOUNTER — OFFICE VISIT (OUTPATIENT)
Dept: PALLIATIVE CARE | Facility: CLINIC | Age: 78
DRG: 194 | End: 2018-01-01
Attending: INTERNAL MEDICINE
Payer: MEDICARE

## 2018-01-01 ENCOUNTER — APPOINTMENT (OUTPATIENT)
Dept: CARDIOLOGY | Facility: CLINIC | Age: 78
DRG: 194 | End: 2018-01-01
Attending: INTERNAL MEDICINE
Payer: MEDICARE

## 2018-01-01 ENCOUNTER — HOSPITAL ENCOUNTER (INPATIENT)
Facility: CLINIC | Age: 78
LOS: 4 days | Discharge: HOME | DRG: 194 | End: 2018-02-27
Attending: INTERNAL MEDICINE | Admitting: INTERNAL MEDICINE
Payer: MEDICARE

## 2018-01-01 ENCOUNTER — INFUSION THERAPY VISIT (OUTPATIENT)
Dept: ONCOLOGY | Facility: CLINIC | Age: 78
DRG: 194 | End: 2018-01-01
Attending: INTERNAL MEDICINE
Payer: MEDICARE

## 2018-01-01 ENCOUNTER — RADIANT APPOINTMENT (OUTPATIENT)
Dept: RADIOLOGY | Facility: AMBULATORY SURGERY CENTER | Age: 78
End: 2018-01-01
Attending: INTERNAL MEDICINE
Payer: COMMERCIAL

## 2018-01-01 ENCOUNTER — ANESTHESIA (OUTPATIENT)
Dept: SURGERY | Facility: CLINIC | Age: 78
DRG: 871 | End: 2018-01-01
Payer: MEDICARE

## 2018-01-01 ENCOUNTER — TELEPHONE (OUTPATIENT)
Dept: PHARMACY | Facility: OTHER | Age: 78
End: 2018-01-01

## 2018-01-01 VITALS
BODY MASS INDEX: 18.33 KG/M2 | DIASTOLIC BLOOD PRESSURE: 74 MMHG | OXYGEN SATURATION: 99 % | WEIGHT: 99.6 LBS | RESPIRATION RATE: 16 BRPM | HEART RATE: 76 BPM | SYSTOLIC BLOOD PRESSURE: 128 MMHG | HEIGHT: 62 IN | TEMPERATURE: 97.6 F

## 2018-01-01 VITALS
TEMPERATURE: 97.5 F | RESPIRATION RATE: 16 BRPM | BODY MASS INDEX: 17.92 KG/M2 | OXYGEN SATURATION: 98 % | DIASTOLIC BLOOD PRESSURE: 55 MMHG | WEIGHT: 98 LBS | HEART RATE: 61 BPM | SYSTOLIC BLOOD PRESSURE: 103 MMHG

## 2018-01-01 VITALS
WEIGHT: 100.3 LBS | HEART RATE: 95 BPM | SYSTOLIC BLOOD PRESSURE: 149 MMHG | DIASTOLIC BLOOD PRESSURE: 83 MMHG | BODY MASS INDEX: 18.35 KG/M2 | OXYGEN SATURATION: 95 % | TEMPERATURE: 98 F

## 2018-01-01 VITALS
RESPIRATION RATE: 16 BRPM | SYSTOLIC BLOOD PRESSURE: 97 MMHG | WEIGHT: 101.9 LBS | TEMPERATURE: 97.6 F | BODY MASS INDEX: 18.63 KG/M2 | DIASTOLIC BLOOD PRESSURE: 61 MMHG | HEART RATE: 69 BPM | OXYGEN SATURATION: 99 %

## 2018-01-01 VITALS
HEIGHT: 62 IN | HEART RATE: 78 BPM | OXYGEN SATURATION: 96 % | TEMPERATURE: 98.2 F | WEIGHT: 97 LBS | DIASTOLIC BLOOD PRESSURE: 72 MMHG | SYSTOLIC BLOOD PRESSURE: 115 MMHG | BODY MASS INDEX: 17.85 KG/M2 | RESPIRATION RATE: 16 BRPM

## 2018-01-01 VITALS
SYSTOLIC BLOOD PRESSURE: 110 MMHG | DIASTOLIC BLOOD PRESSURE: 61 MMHG | HEART RATE: 72 BPM | TEMPERATURE: 98 F | OXYGEN SATURATION: 100 % | RESPIRATION RATE: 18 BRPM

## 2018-01-01 VITALS
BODY MASS INDEX: 17.08 KG/M2 | DIASTOLIC BLOOD PRESSURE: 54 MMHG | WEIGHT: 93.4 LBS | OXYGEN SATURATION: 95 % | SYSTOLIC BLOOD PRESSURE: 93 MMHG | TEMPERATURE: 100.7 F | HEART RATE: 92 BPM | RESPIRATION RATE: 24 BRPM

## 2018-01-01 VITALS
HEART RATE: 77 BPM | DIASTOLIC BLOOD PRESSURE: 58 MMHG | BODY MASS INDEX: 17.68 KG/M2 | TEMPERATURE: 97.9 F | SYSTOLIC BLOOD PRESSURE: 104 MMHG | HEIGHT: 62 IN | WEIGHT: 96.1 LBS | OXYGEN SATURATION: 97 % | RESPIRATION RATE: 16 BRPM

## 2018-01-01 VITALS
HEART RATE: 75 BPM | SYSTOLIC BLOOD PRESSURE: 132 MMHG | DIASTOLIC BLOOD PRESSURE: 54 MMHG | TEMPERATURE: 97.8 F | RESPIRATION RATE: 16 BRPM | OXYGEN SATURATION: 99 %

## 2018-01-01 VITALS
WEIGHT: 99 LBS | BODY MASS INDEX: 18.22 KG/M2 | HEIGHT: 62 IN | SYSTOLIC BLOOD PRESSURE: 105 MMHG | OXYGEN SATURATION: 98 % | DIASTOLIC BLOOD PRESSURE: 60 MMHG | TEMPERATURE: 97 F | HEART RATE: 67 BPM | RESPIRATION RATE: 16 BRPM

## 2018-01-01 VITALS
SYSTOLIC BLOOD PRESSURE: 153 MMHG | RESPIRATION RATE: 20 BRPM | TEMPERATURE: 98.1 F | HEART RATE: 71 BPM | OXYGEN SATURATION: 97 % | DIASTOLIC BLOOD PRESSURE: 93 MMHG

## 2018-01-01 VITALS
TEMPERATURE: 98.1 F | WEIGHT: 98.9 LBS | BODY MASS INDEX: 18.08 KG/M2 | DIASTOLIC BLOOD PRESSURE: 63 MMHG | RESPIRATION RATE: 18 BRPM | HEART RATE: 68 BPM | SYSTOLIC BLOOD PRESSURE: 127 MMHG | OXYGEN SATURATION: 97 %

## 2018-01-01 VITALS
BODY MASS INDEX: 17.66 KG/M2 | WEIGHT: 96.6 LBS | DIASTOLIC BLOOD PRESSURE: 84 MMHG | TEMPERATURE: 98 F | OXYGEN SATURATION: 96 % | RESPIRATION RATE: 16 BRPM | SYSTOLIC BLOOD PRESSURE: 137 MMHG | HEART RATE: 109 BPM

## 2018-01-01 VITALS
RESPIRATION RATE: 16 BRPM | HEART RATE: 60 BPM | OXYGEN SATURATION: 98 % | WEIGHT: 97.6 LBS | SYSTOLIC BLOOD PRESSURE: 121 MMHG | BODY MASS INDEX: 17.85 KG/M2 | TEMPERATURE: 98.1 F | DIASTOLIC BLOOD PRESSURE: 74 MMHG

## 2018-01-01 VITALS
HEART RATE: 110 BPM | RESPIRATION RATE: 18 BRPM | TEMPERATURE: 98.1 F | OXYGEN SATURATION: 97 % | WEIGHT: 106.3 LBS | DIASTOLIC BLOOD PRESSURE: 72 MMHG | BODY MASS INDEX: 19.44 KG/M2 | SYSTOLIC BLOOD PRESSURE: 127 MMHG

## 2018-01-01 VITALS
RESPIRATION RATE: 16 BRPM | OXYGEN SATURATION: 92 % | WEIGHT: 103.3 LBS | TEMPERATURE: 96.7 F | DIASTOLIC BLOOD PRESSURE: 67 MMHG | HEART RATE: 98 BPM | BODY MASS INDEX: 18.89 KG/M2 | SYSTOLIC BLOOD PRESSURE: 120 MMHG

## 2018-01-01 VITALS
DIASTOLIC BLOOD PRESSURE: 68 MMHG | BODY MASS INDEX: 18.62 KG/M2 | WEIGHT: 101.19 LBS | HEIGHT: 62 IN | OXYGEN SATURATION: 98 % | HEART RATE: 86 BPM | TEMPERATURE: 95.7 F | SYSTOLIC BLOOD PRESSURE: 118 MMHG | RESPIRATION RATE: 16 BRPM

## 2018-01-01 VITALS
TEMPERATURE: 98 F | WEIGHT: 98.2 LBS | HEART RATE: 98 BPM | BODY MASS INDEX: 17.96 KG/M2 | RESPIRATION RATE: 16 BRPM | DIASTOLIC BLOOD PRESSURE: 72 MMHG | OXYGEN SATURATION: 96 % | SYSTOLIC BLOOD PRESSURE: 122 MMHG

## 2018-01-01 VITALS
OXYGEN SATURATION: 99 % | TEMPERATURE: 97.7 F | BODY MASS INDEX: 19.12 KG/M2 | WEIGHT: 103.9 LBS | RESPIRATION RATE: 16 BRPM | HEIGHT: 62 IN | SYSTOLIC BLOOD PRESSURE: 113 MMHG | HEART RATE: 80 BPM | DIASTOLIC BLOOD PRESSURE: 61 MMHG

## 2018-01-01 VITALS
OXYGEN SATURATION: 100 % | DIASTOLIC BLOOD PRESSURE: 70 MMHG | HEART RATE: 78 BPM | RESPIRATION RATE: 16 BRPM | SYSTOLIC BLOOD PRESSURE: 124 MMHG | TEMPERATURE: 98.1 F | BODY MASS INDEX: 19.44 KG/M2 | WEIGHT: 106.3 LBS

## 2018-01-01 VITALS
SYSTOLIC BLOOD PRESSURE: 117 MMHG | TEMPERATURE: 97.4 F | RESPIRATION RATE: 18 BRPM | OXYGEN SATURATION: 95 % | DIASTOLIC BLOOD PRESSURE: 66 MMHG | HEART RATE: 80 BPM

## 2018-01-01 VITALS
HEART RATE: 89 BPM | WEIGHT: 107.3 LBS | TEMPERATURE: 98.4 F | SYSTOLIC BLOOD PRESSURE: 134 MMHG | RESPIRATION RATE: 20 BRPM | SYSTOLIC BLOOD PRESSURE: 133 MMHG | BODY MASS INDEX: 19.5 KG/M2 | DIASTOLIC BLOOD PRESSURE: 78 MMHG | BODY MASS INDEX: 19.63 KG/M2 | DIASTOLIC BLOOD PRESSURE: 79 MMHG | OXYGEN SATURATION: 95 % | OXYGEN SATURATION: 99 % | HEART RATE: 77 BPM | WEIGHT: 106.6 LBS

## 2018-01-01 VITALS
TEMPERATURE: 98.3 F | WEIGHT: 104.3 LBS | HEART RATE: 78 BPM | SYSTOLIC BLOOD PRESSURE: 135 MMHG | OXYGEN SATURATION: 97 % | RESPIRATION RATE: 16 BRPM | DIASTOLIC BLOOD PRESSURE: 80 MMHG | BODY MASS INDEX: 19.08 KG/M2

## 2018-01-01 VITALS
SYSTOLIC BLOOD PRESSURE: 136 MMHG | HEART RATE: 82 BPM | TEMPERATURE: 98.6 F | RESPIRATION RATE: 18 BRPM | OXYGEN SATURATION: 97 % | DIASTOLIC BLOOD PRESSURE: 73 MMHG

## 2018-01-01 VITALS
RESPIRATION RATE: 16 BRPM | HEART RATE: 85 BPM | TEMPERATURE: 97.8 F | SYSTOLIC BLOOD PRESSURE: 141 MMHG | DIASTOLIC BLOOD PRESSURE: 76 MMHG | OXYGEN SATURATION: 98 %

## 2018-01-01 VITALS
DIASTOLIC BLOOD PRESSURE: 56 MMHG | BODY MASS INDEX: 18.47 KG/M2 | HEIGHT: 62 IN | TEMPERATURE: 96.5 F | SYSTOLIC BLOOD PRESSURE: 89 MMHG | RESPIRATION RATE: 16 BRPM | OXYGEN SATURATION: 80 % | HEART RATE: 85 BPM | WEIGHT: 100.38 LBS

## 2018-01-01 VITALS
TEMPERATURE: 96 F | BODY MASS INDEX: 19.79 KG/M2 | DIASTOLIC BLOOD PRESSURE: 81 MMHG | HEART RATE: 83 BPM | SYSTOLIC BLOOD PRESSURE: 154 MMHG | WEIGHT: 108.2 LBS | OXYGEN SATURATION: 96 % | RESPIRATION RATE: 16 BRPM

## 2018-01-01 VITALS
SYSTOLIC BLOOD PRESSURE: 139 MMHG | OXYGEN SATURATION: 95 % | WEIGHT: 98 LBS | BODY MASS INDEX: 18.03 KG/M2 | TEMPERATURE: 96.2 F | RESPIRATION RATE: 18 BRPM | DIASTOLIC BLOOD PRESSURE: 74 MMHG | HEART RATE: 88 BPM | HEIGHT: 62 IN

## 2018-01-01 VITALS
TEMPERATURE: 97.8 F | SYSTOLIC BLOOD PRESSURE: 117 MMHG | DIASTOLIC BLOOD PRESSURE: 62 MMHG | RESPIRATION RATE: 16 BRPM | OXYGEN SATURATION: 93 % | BODY MASS INDEX: 17.5 KG/M2 | HEART RATE: 62 BPM | WEIGHT: 95.7 LBS

## 2018-01-01 VITALS
WEIGHT: 100.7 LBS | BODY MASS INDEX: 18.53 KG/M2 | DIASTOLIC BLOOD PRESSURE: 70 MMHG | RESPIRATION RATE: 16 BRPM | SYSTOLIC BLOOD PRESSURE: 115 MMHG | HEIGHT: 62 IN | OXYGEN SATURATION: 96 % | TEMPERATURE: 98.5 F | HEART RATE: 68 BPM

## 2018-01-01 VITALS
RESPIRATION RATE: 18 BRPM | HEIGHT: 62 IN | HEART RATE: 78 BPM | SYSTOLIC BLOOD PRESSURE: 125 MMHG | WEIGHT: 103.5 LBS | OXYGEN SATURATION: 99 % | DIASTOLIC BLOOD PRESSURE: 72 MMHG | TEMPERATURE: 97.4 F | BODY MASS INDEX: 19.05 KG/M2

## 2018-01-01 VITALS
WEIGHT: 104.5 LBS | RESPIRATION RATE: 16 BRPM | DIASTOLIC BLOOD PRESSURE: 68 MMHG | SYSTOLIC BLOOD PRESSURE: 117 MMHG | BODY MASS INDEX: 19.23 KG/M2 | TEMPERATURE: 98.5 F | HEIGHT: 62 IN | OXYGEN SATURATION: 96 %

## 2018-01-01 VITALS
WEIGHT: 97.2 LBS | OXYGEN SATURATION: 95 % | DIASTOLIC BLOOD PRESSURE: 65 MMHG | RESPIRATION RATE: 18 BRPM | HEART RATE: 81 BPM | SYSTOLIC BLOOD PRESSURE: 100 MMHG | TEMPERATURE: 98.1 F | HEIGHT: 62 IN | BODY MASS INDEX: 17.89 KG/M2

## 2018-01-01 VITALS
WEIGHT: 111.4 LBS | BODY MASS INDEX: 20.38 KG/M2 | TEMPERATURE: 98.1 F | TEMPERATURE: 97.5 F | HEART RATE: 84 BPM | OXYGEN SATURATION: 96 % | SYSTOLIC BLOOD PRESSURE: 128 MMHG | DIASTOLIC BLOOD PRESSURE: 74 MMHG | OXYGEN SATURATION: 95 % | WEIGHT: 104 LBS | RESPIRATION RATE: 16 BRPM | RESPIRATION RATE: 16 BRPM | HEART RATE: 76 BPM | DIASTOLIC BLOOD PRESSURE: 62 MMHG | SYSTOLIC BLOOD PRESSURE: 105 MMHG | BODY MASS INDEX: 19.02 KG/M2

## 2018-01-01 VITALS
BODY MASS INDEX: 17.81 KG/M2 | TEMPERATURE: 99.4 F | WEIGHT: 97.4 LBS | HEART RATE: 85 BPM | DIASTOLIC BLOOD PRESSURE: 65 MMHG | OXYGEN SATURATION: 98 % | SYSTOLIC BLOOD PRESSURE: 107 MMHG | RESPIRATION RATE: 16 BRPM

## 2018-01-01 VITALS
SYSTOLIC BLOOD PRESSURE: 137 MMHG | HEART RATE: 109 BPM | DIASTOLIC BLOOD PRESSURE: 84 MMHG | RESPIRATION RATE: 16 BRPM | OXYGEN SATURATION: 96 % | TEMPERATURE: 98 F | WEIGHT: 96.6 LBS | BODY MASS INDEX: 17.66 KG/M2

## 2018-01-01 VITALS
RESPIRATION RATE: 16 BRPM | SYSTOLIC BLOOD PRESSURE: 128 MMHG | BODY MASS INDEX: 17.85 KG/M2 | HEART RATE: 75 BPM | WEIGHT: 97.6 LBS | TEMPERATURE: 97.4 F | OXYGEN SATURATION: 99 % | DIASTOLIC BLOOD PRESSURE: 81 MMHG

## 2018-01-01 VITALS
TEMPERATURE: 97.6 F | OXYGEN SATURATION: 99 % | WEIGHT: 94.7 LBS | BODY MASS INDEX: 17.32 KG/M2 | DIASTOLIC BLOOD PRESSURE: 70 MMHG | SYSTOLIC BLOOD PRESSURE: 140 MMHG | HEART RATE: 75 BPM | RESPIRATION RATE: 18 BRPM

## 2018-01-01 VITALS
RESPIRATION RATE: 18 BRPM | BODY MASS INDEX: 18.14 KG/M2 | DIASTOLIC BLOOD PRESSURE: 78 MMHG | HEART RATE: 94 BPM | WEIGHT: 99.21 LBS | SYSTOLIC BLOOD PRESSURE: 123 MMHG | TEMPERATURE: 97 F | OXYGEN SATURATION: 99 %

## 2018-01-01 VITALS
OXYGEN SATURATION: 95 % | HEART RATE: 68 BPM | HEIGHT: 62 IN | BODY MASS INDEX: 17.55 KG/M2 | RESPIRATION RATE: 16 BRPM | SYSTOLIC BLOOD PRESSURE: 102 MMHG | WEIGHT: 95.4 LBS | DIASTOLIC BLOOD PRESSURE: 53 MMHG | TEMPERATURE: 97.3 F

## 2018-01-01 VITALS
OXYGEN SATURATION: 95 % | DIASTOLIC BLOOD PRESSURE: 64 MMHG | TEMPERATURE: 98.6 F | RESPIRATION RATE: 16 BRPM | WEIGHT: 104 LBS | HEIGHT: 62 IN | BODY MASS INDEX: 19.14 KG/M2 | SYSTOLIC BLOOD PRESSURE: 123 MMHG

## 2018-01-01 VITALS
DIASTOLIC BLOOD PRESSURE: 63 MMHG | WEIGHT: 97.3 LBS | SYSTOLIC BLOOD PRESSURE: 106 MMHG | TEMPERATURE: 97.6 F | OXYGEN SATURATION: 98 % | BODY MASS INDEX: 17.8 KG/M2 | HEART RATE: 74 BPM | RESPIRATION RATE: 16 BRPM

## 2018-01-01 VITALS
DIASTOLIC BLOOD PRESSURE: 72 MMHG | BODY MASS INDEX: 17.85 KG/M2 | HEIGHT: 62 IN | WEIGHT: 97 LBS | HEART RATE: 93 BPM | TEMPERATURE: 98 F | OXYGEN SATURATION: 97 % | SYSTOLIC BLOOD PRESSURE: 135 MMHG

## 2018-01-01 VITALS
WEIGHT: 104 LBS | OXYGEN SATURATION: 96 % | BODY MASS INDEX: 19.02 KG/M2 | RESPIRATION RATE: 16 BRPM | DIASTOLIC BLOOD PRESSURE: 74 MMHG | SYSTOLIC BLOOD PRESSURE: 128 MMHG | HEART RATE: 76 BPM

## 2018-01-01 VITALS
DIASTOLIC BLOOD PRESSURE: 81 MMHG | HEART RATE: 70 BPM | SYSTOLIC BLOOD PRESSURE: 127 MMHG | RESPIRATION RATE: 16 BRPM | TEMPERATURE: 98 F | OXYGEN SATURATION: 97 %

## 2018-01-01 VITALS
HEART RATE: 70 BPM | TEMPERATURE: 98.3 F | WEIGHT: 102.6 LBS | DIASTOLIC BLOOD PRESSURE: 68 MMHG | RESPIRATION RATE: 18 BRPM | BODY MASS INDEX: 18.76 KG/M2 | SYSTOLIC BLOOD PRESSURE: 111 MMHG | OXYGEN SATURATION: 99 %

## 2018-01-01 VITALS
SYSTOLIC BLOOD PRESSURE: 134 MMHG | OXYGEN SATURATION: 100 % | RESPIRATION RATE: 16 BRPM | DIASTOLIC BLOOD PRESSURE: 57 MMHG | TEMPERATURE: 97.6 F | HEART RATE: 68 BPM

## 2018-01-01 VITALS
DIASTOLIC BLOOD PRESSURE: 84 MMHG | TEMPERATURE: 97.5 F | HEART RATE: 77 BPM | OXYGEN SATURATION: 97 % | SYSTOLIC BLOOD PRESSURE: 151 MMHG | WEIGHT: 94.14 LBS | RESPIRATION RATE: 16 BRPM | HEIGHT: 62 IN | BODY MASS INDEX: 17.32 KG/M2

## 2018-01-01 VITALS
TEMPERATURE: 98.5 F | DIASTOLIC BLOOD PRESSURE: 63 MMHG | OXYGEN SATURATION: 98 % | SYSTOLIC BLOOD PRESSURE: 125 MMHG | WEIGHT: 102.2 LBS | HEART RATE: 75 BPM | BODY MASS INDEX: 18.69 KG/M2

## 2018-01-01 VITALS
SYSTOLIC BLOOD PRESSURE: 112 MMHG | DIASTOLIC BLOOD PRESSURE: 67 MMHG | RESPIRATION RATE: 16 BRPM | TEMPERATURE: 97.8 F | OXYGEN SATURATION: 95 % | BODY MASS INDEX: 19.46 KG/M2 | WEIGHT: 106.4 LBS | HEART RATE: 84 BPM

## 2018-01-01 VITALS
SYSTOLIC BLOOD PRESSURE: 127 MMHG | TEMPERATURE: 97.6 F | OXYGEN SATURATION: 97 % | BODY MASS INDEX: 18.16 KG/M2 | HEART RATE: 63 BPM | RESPIRATION RATE: 18 BRPM | WEIGHT: 99.3 LBS | DIASTOLIC BLOOD PRESSURE: 69 MMHG

## 2018-01-01 VITALS
SYSTOLIC BLOOD PRESSURE: 124 MMHG | RESPIRATION RATE: 18 BRPM | DIASTOLIC BLOOD PRESSURE: 56 MMHG | TEMPERATURE: 97.9 F | OXYGEN SATURATION: 100 % | HEART RATE: 76 BPM

## 2018-01-01 VITALS
OXYGEN SATURATION: 96 % | TEMPERATURE: 98.3 F | HEART RATE: 60 BPM | SYSTOLIC BLOOD PRESSURE: 120 MMHG | RESPIRATION RATE: 16 BRPM | DIASTOLIC BLOOD PRESSURE: 62 MMHG

## 2018-01-01 VITALS
HEART RATE: 72 BPM | DIASTOLIC BLOOD PRESSURE: 61 MMHG | OXYGEN SATURATION: 100 % | TEMPERATURE: 98.2 F | RESPIRATION RATE: 16 BRPM | SYSTOLIC BLOOD PRESSURE: 124 MMHG

## 2018-01-01 VITALS — DIASTOLIC BLOOD PRESSURE: 92 MMHG | RESPIRATION RATE: 16 BRPM | HEART RATE: 78 BPM | SYSTOLIC BLOOD PRESSURE: 151 MMHG

## 2018-01-01 VITALS
DIASTOLIC BLOOD PRESSURE: 64 MMHG | OXYGEN SATURATION: 96 % | SYSTOLIC BLOOD PRESSURE: 110 MMHG | RESPIRATION RATE: 18 BRPM | HEART RATE: 75 BPM

## 2018-01-01 VITALS
DIASTOLIC BLOOD PRESSURE: 62 MMHG | RESPIRATION RATE: 18 BRPM | OXYGEN SATURATION: 96 % | SYSTOLIC BLOOD PRESSURE: 120 MMHG | HEART RATE: 72 BPM | TEMPERATURE: 98 F

## 2018-01-01 VITALS — DIASTOLIC BLOOD PRESSURE: 89 MMHG | HEART RATE: 80 BPM | SYSTOLIC BLOOD PRESSURE: 147 MMHG

## 2018-01-01 VITALS
SYSTOLIC BLOOD PRESSURE: 129 MMHG | DIASTOLIC BLOOD PRESSURE: 75 MMHG | HEART RATE: 92 BPM | RESPIRATION RATE: 16 BRPM | OXYGEN SATURATION: 98 % | TEMPERATURE: 98 F

## 2018-01-01 VITALS
TEMPERATURE: 97.3 F | OXYGEN SATURATION: 98 % | HEART RATE: 84 BPM | SYSTOLIC BLOOD PRESSURE: 105 MMHG | DIASTOLIC BLOOD PRESSURE: 57 MMHG | RESPIRATION RATE: 16 BRPM

## 2018-01-01 VITALS — RESPIRATION RATE: 18 BRPM

## 2018-01-01 DIAGNOSIS — K12.30 STOMATITIS AND MUCOSITIS: ICD-10-CM

## 2018-01-01 DIAGNOSIS — C25.0 MALIGNANT NEOPLASM OF HEAD OF PANCREAS (H): Primary | ICD-10-CM

## 2018-01-01 DIAGNOSIS — C25.0 MALIGNANT NEOPLASM OF HEAD OF PANCREAS (H): ICD-10-CM

## 2018-01-01 DIAGNOSIS — Z71.9 VISIT FOR COUNSELING: Primary | ICD-10-CM

## 2018-01-01 DIAGNOSIS — Z53.9 ERRONEOUS ENCOUNTER--DISREGARD: Primary | ICD-10-CM

## 2018-01-01 DIAGNOSIS — R17 JAUNDICE: ICD-10-CM

## 2018-01-01 DIAGNOSIS — H26.9 CATARACT, UNSPECIFIED CATARACT TYPE, UNSPECIFIED LATERALITY: ICD-10-CM

## 2018-01-01 DIAGNOSIS — Z86.711 HISTORY OF PULMONARY EMBOLISM: ICD-10-CM

## 2018-01-01 DIAGNOSIS — J10.1 INFLUENZA B: ICD-10-CM

## 2018-01-01 DIAGNOSIS — R10.12 ABDOMINAL PAIN, LEFT UPPER QUADRANT: Primary | ICD-10-CM

## 2018-01-01 DIAGNOSIS — I89.0 LYMPHEDEMA: ICD-10-CM

## 2018-01-01 DIAGNOSIS — C25.9 MALIGNANT NEOPLASM OF PANCREAS (H): ICD-10-CM

## 2018-01-01 DIAGNOSIS — C25.9 PANCREATIC CANCER METASTASIZED TO LIVER (H): ICD-10-CM

## 2018-01-01 DIAGNOSIS — M25.532 LEFT WRIST PAIN: ICD-10-CM

## 2018-01-01 DIAGNOSIS — K59.00 CONSTIPATION, UNSPECIFIED CONSTIPATION TYPE: ICD-10-CM

## 2018-01-01 DIAGNOSIS — R78.81 BACTEREMIA DUE TO GRAM-NEGATIVE BACTERIA: ICD-10-CM

## 2018-01-01 DIAGNOSIS — C25.9 MALIGNANT NEOPLASM OF PANCREAS, UNSPECIFIED LOCATION OF MALIGNANCY (H): ICD-10-CM

## 2018-01-01 DIAGNOSIS — R09.02 HYPOXIA: ICD-10-CM

## 2018-01-01 DIAGNOSIS — K12.1 STOMATITIS AND MUCOSITIS: ICD-10-CM

## 2018-01-01 DIAGNOSIS — E89.0 POSTABLATIVE HYPOTHYROIDISM: Primary | ICD-10-CM

## 2018-01-01 DIAGNOSIS — E03.9 HYPOTHYROIDISM, UNSPECIFIED TYPE: ICD-10-CM

## 2018-01-01 DIAGNOSIS — K29.00 OTHER ACUTE GASTRITIS WITHOUT HEMORRHAGE: ICD-10-CM

## 2018-01-01 DIAGNOSIS — I27.82 OTHER CHRONIC PULMONARY EMBOLISM WITHOUT ACUTE COR PULMONALE (H): Primary | ICD-10-CM

## 2018-01-01 DIAGNOSIS — I27.82 OTHER CHRONIC PULMONARY EMBOLISM WITHOUT ACUTE COR PULMONALE (H): ICD-10-CM

## 2018-01-01 DIAGNOSIS — J18.9 PNEUMONIA DUE TO INFECTIOUS ORGANISM, UNSPECIFIED LATERALITY, UNSPECIFIED PART OF LUNG: ICD-10-CM

## 2018-01-01 DIAGNOSIS — R10.12 ABDOMINAL PAIN, LEFT UPPER QUADRANT: ICD-10-CM

## 2018-01-01 DIAGNOSIS — Z79.01 ANTICOAGULATED ON HEPARIN: ICD-10-CM

## 2018-01-01 DIAGNOSIS — B37.0 THRUSH: ICD-10-CM

## 2018-01-01 DIAGNOSIS — K83.1 OBSTRUCTIVE JAUNDICE (H): ICD-10-CM

## 2018-01-01 DIAGNOSIS — Z86.711 HISTORY OF PULMONARY EMBOLISM: Primary | ICD-10-CM

## 2018-01-01 DIAGNOSIS — C25.9 MALIGNANT NEOPLASM OF PANCREAS (H): Primary | ICD-10-CM

## 2018-01-01 DIAGNOSIS — R06.02 SOB (SHORTNESS OF BREATH): ICD-10-CM

## 2018-01-01 DIAGNOSIS — J45.909 ASTHMA: ICD-10-CM

## 2018-01-01 DIAGNOSIS — J98.4 PNEUMONITIS: ICD-10-CM

## 2018-01-01 DIAGNOSIS — I95.9 HYPOTENSION, UNSPECIFIED HYPOTENSION TYPE: ICD-10-CM

## 2018-01-01 DIAGNOSIS — I10 BENIGN ESSENTIAL HYPERTENSION: Primary | ICD-10-CM

## 2018-01-01 DIAGNOSIS — M62.81 GENERALIZED MUSCLE WEAKNESS: ICD-10-CM

## 2018-01-01 DIAGNOSIS — R19.7 DIARRHEA, UNSPECIFIED TYPE: ICD-10-CM

## 2018-01-01 DIAGNOSIS — R19.7 DIARRHEA, UNSPECIFIED TYPE: Primary | ICD-10-CM

## 2018-01-01 DIAGNOSIS — C78.7 PANCREATIC CANCER METASTASIZED TO LIVER (H): ICD-10-CM

## 2018-01-01 DIAGNOSIS — R94.6 ABNORMAL FINDING ON THYROID FUNCTION TEST: Primary | ICD-10-CM

## 2018-01-01 DIAGNOSIS — G89.3 NEOPLASM RELATED PAIN (ACUTE) (CHRONIC): ICD-10-CM

## 2018-01-01 DIAGNOSIS — R06.02 SOB (SHORTNESS OF BREATH): Primary | ICD-10-CM

## 2018-01-01 DIAGNOSIS — J45.909 MILD ASTHMA, UNSPECIFIED WHETHER COMPLICATED, UNSPECIFIED WHETHER PERSISTENT: ICD-10-CM

## 2018-01-01 DIAGNOSIS — R05.9 COUGH: Primary | ICD-10-CM

## 2018-01-01 DIAGNOSIS — R94.6 ABNORMAL FINDING ON THYROID FUNCTION TEST: ICD-10-CM

## 2018-01-01 DIAGNOSIS — Z51.5 HOSPICE CARE PATIENT: Primary | ICD-10-CM

## 2018-01-01 DIAGNOSIS — K83.09 CHOLANGITIS (H): ICD-10-CM

## 2018-01-01 DIAGNOSIS — K26.9 DUODENAL ULCERATION: ICD-10-CM

## 2018-01-01 DIAGNOSIS — J45.20 MILD INTERMITTENT ASTHMA WITHOUT COMPLICATION: Primary | ICD-10-CM

## 2018-01-01 DIAGNOSIS — I10 BENIGN ESSENTIAL HYPERTENSION: ICD-10-CM

## 2018-01-01 DIAGNOSIS — Z01.818 PREOP GENERAL PHYSICAL EXAM: Primary | ICD-10-CM

## 2018-01-01 DIAGNOSIS — C73 MALIGNANT NEOPLASM OF THYROID GLAND (H): Primary | ICD-10-CM

## 2018-01-01 DIAGNOSIS — Z71.89 ADVANCE CARE PLANNING: ICD-10-CM

## 2018-01-01 DIAGNOSIS — R53.81 PHYSICAL DECONDITIONING: ICD-10-CM

## 2018-01-01 DIAGNOSIS — R05.9 COUGH: ICD-10-CM

## 2018-01-01 LAB
1,3 BETA GLUCAN SER-MCNC: >500 PG/ML
ABO + RH BLD: NORMAL
ABO + RH BLD: NORMAL
ALBUMIN SERPL-MCNC: 1.5 G/DL (ref 3.4–5)
ALBUMIN SERPL-MCNC: 1.6 G/DL (ref 3.4–5)
ALBUMIN SERPL-MCNC: 1.7 G/DL (ref 3.4–5)
ALBUMIN SERPL-MCNC: 1.7 G/DL (ref 3.4–5)
ALBUMIN SERPL-MCNC: 1.8 G/DL (ref 3.4–5)
ALBUMIN SERPL-MCNC: 1.8 G/DL (ref 3.4–5)
ALBUMIN SERPL-MCNC: 1.9 G/DL (ref 3.4–5)
ALBUMIN SERPL-MCNC: 2 G/DL (ref 3.4–5)
ALBUMIN SERPL-MCNC: 2.3 G/DL (ref 3.4–5)
ALBUMIN SERPL-MCNC: 2.4 G/DL (ref 3.4–5)
ALBUMIN SERPL-MCNC: 2.5 G/DL (ref 3.4–5)
ALBUMIN SERPL-MCNC: 2.6 G/DL (ref 3.4–5)
ALBUMIN SERPL-MCNC: 2.7 G/DL (ref 3.4–5)
ALBUMIN SERPL-MCNC: 2.8 G/DL (ref 3.4–5)
ALBUMIN SERPL-MCNC: 2.9 G/DL (ref 3.4–5)
ALBUMIN SERPL-MCNC: 3.1 G/DL (ref 3.4–5)
ALBUMIN SERPL-MCNC: 3.1 G/DL (ref 3.4–5)
ALP SERPL-CCNC: 106 U/L (ref 40–150)
ALP SERPL-CCNC: 118 U/L (ref 40–150)
ALP SERPL-CCNC: 134 U/L (ref 40–150)
ALP SERPL-CCNC: 237 U/L (ref 40–150)
ALP SERPL-CCNC: 238 U/L (ref 40–150)
ALP SERPL-CCNC: 241 U/L (ref 40–150)
ALP SERPL-CCNC: 252 U/L (ref 40–150)
ALP SERPL-CCNC: 269 U/L (ref 40–150)
ALP SERPL-CCNC: 277 U/L (ref 40–150)
ALP SERPL-CCNC: 340 U/L (ref 40–150)
ALP SERPL-CCNC: 344 U/L (ref 40–150)
ALP SERPL-CCNC: 344 U/L (ref 40–150)
ALP SERPL-CCNC: 350 U/L (ref 40–150)
ALP SERPL-CCNC: 351 U/L (ref 40–150)
ALP SERPL-CCNC: 356 U/L (ref 40–150)
ALP SERPL-CCNC: 401 U/L (ref 40–150)
ALP SERPL-CCNC: 405 U/L (ref 40–150)
ALP SERPL-CCNC: 420 U/L (ref 40–150)
ALP SERPL-CCNC: 422 U/L (ref 40–150)
ALP SERPL-CCNC: 431 U/L (ref 40–150)
ALP SERPL-CCNC: 446 U/L (ref 40–150)
ALP SERPL-CCNC: 449 U/L (ref 40–150)
ALP SERPL-CCNC: 480 U/L (ref 40–150)
ALP SERPL-CCNC: 518 U/L (ref 40–150)
ALP SERPL-CCNC: 626 U/L (ref 40–150)
ALP SERPL-CCNC: 77 U/L (ref 40–150)
ALP SERPL-CCNC: 87 U/L (ref 40–150)
ALP SERPL-CCNC: 89 U/L (ref 40–150)
ALP SERPL-CCNC: 90 U/L (ref 40–150)
ALP SERPL-CCNC: 92 U/L (ref 40–150)
ALP SERPL-CCNC: 94 U/L (ref 40–150)
ALP SERPL-CCNC: 96 U/L (ref 40–150)
ALT SERPL W P-5'-P-CCNC: 10 U/L (ref 0–50)
ALT SERPL W P-5'-P-CCNC: 106 U/L (ref 0–50)
ALT SERPL W P-5'-P-CCNC: 12 U/L (ref 0–50)
ALT SERPL W P-5'-P-CCNC: 13 U/L (ref 0–50)
ALT SERPL W P-5'-P-CCNC: 168 U/L (ref 0–50)
ALT SERPL W P-5'-P-CCNC: 21 U/L (ref 0–50)
ALT SERPL W P-5'-P-CCNC: 22 U/L (ref 0–50)
ALT SERPL W P-5'-P-CCNC: 22 U/L (ref 0–50)
ALT SERPL W P-5'-P-CCNC: 23 U/L (ref 0–50)
ALT SERPL W P-5'-P-CCNC: 25 U/L (ref 0–50)
ALT SERPL W P-5'-P-CCNC: 28 U/L (ref 0–50)
ALT SERPL W P-5'-P-CCNC: 29 U/L (ref 0–50)
ALT SERPL W P-5'-P-CCNC: 37 U/L (ref 0–50)
ALT SERPL W P-5'-P-CCNC: 374 U/L (ref 0–50)
ALT SERPL W P-5'-P-CCNC: 39 U/L (ref 0–50)
ALT SERPL W P-5'-P-CCNC: 43 U/L (ref 0–50)
ALT SERPL W P-5'-P-CCNC: 45 U/L (ref 0–50)
ALT SERPL W P-5'-P-CCNC: 52 U/L (ref 0–50)
ALT SERPL W P-5'-P-CCNC: 54 U/L (ref 0–50)
ALT SERPL W P-5'-P-CCNC: 60 U/L (ref 0–50)
ALT SERPL W P-5'-P-CCNC: 76 U/L (ref 0–50)
ALT SERPL W P-5'-P-CCNC: 76 U/L (ref 0–50)
ALT SERPL W P-5'-P-CCNC: 85 U/L (ref 0–50)
ALT SERPL W P-5'-P-CCNC: 87 U/L (ref 0–50)
ALT SERPL W P-5'-P-CCNC: 98 U/L (ref 0–50)
ALT SERPL W P-5'-P-CCNC: 99 U/L (ref 0–50)
AMMONIA PLAS-SCNC: <10 UMOL/L (ref 10–50)
AMYLASE SERPL-CCNC: 29 U/L (ref 30–110)
ANION GAP SERPL CALCULATED.3IONS-SCNC: 10 MMOL/L (ref 3–14)
ANION GAP SERPL CALCULATED.3IONS-SCNC: 10 MMOL/L (ref 3–14)
ANION GAP SERPL CALCULATED.3IONS-SCNC: 11 MMOL/L (ref 3–14)
ANION GAP SERPL CALCULATED.3IONS-SCNC: 11 MMOL/L (ref 3–14)
ANION GAP SERPL CALCULATED.3IONS-SCNC: 12 MMOL/L (ref 3–14)
ANION GAP SERPL CALCULATED.3IONS-SCNC: 5 MMOL/L (ref 3–14)
ANION GAP SERPL CALCULATED.3IONS-SCNC: 6 MMOL/L (ref 3–14)
ANION GAP SERPL CALCULATED.3IONS-SCNC: 7 MMOL/L (ref 3–14)
ANION GAP SERPL CALCULATED.3IONS-SCNC: 8 MMOL/L (ref 3–14)
ANION GAP SERPL CALCULATED.3IONS-SCNC: 9 MMOL/L (ref 3–14)
ANISOCYTOSIS BLD QL SMEAR: ABNORMAL
ANISOCYTOSIS BLD QL SMEAR: SLIGHT
APTT PPP: 33 SEC (ref 22–37)
APTT PPP: 43 SEC (ref 22–37)
AST SERPL W P-5'-P-CCNC: 119 U/L (ref 0–45)
AST SERPL W P-5'-P-CCNC: 12 U/L (ref 0–45)
AST SERPL W P-5'-P-CCNC: 14 U/L (ref 0–45)
AST SERPL W P-5'-P-CCNC: 17 U/L (ref 0–45)
AST SERPL W P-5'-P-CCNC: 17 U/L (ref 0–45)
AST SERPL W P-5'-P-CCNC: 18 U/L (ref 0–45)
AST SERPL W P-5'-P-CCNC: 18 U/L (ref 0–45)
AST SERPL W P-5'-P-CCNC: 20 U/L (ref 0–45)
AST SERPL W P-5'-P-CCNC: 21 U/L (ref 0–45)
AST SERPL W P-5'-P-CCNC: 24 U/L (ref 0–45)
AST SERPL W P-5'-P-CCNC: 26 U/L (ref 0–45)
AST SERPL W P-5'-P-CCNC: 28 U/L (ref 0–45)
AST SERPL W P-5'-P-CCNC: 30 U/L (ref 0–45)
AST SERPL W P-5'-P-CCNC: 30 U/L (ref 0–45)
AST SERPL W P-5'-P-CCNC: 31 U/L (ref 0–45)
AST SERPL W P-5'-P-CCNC: 34 U/L (ref 0–45)
AST SERPL W P-5'-P-CCNC: 35 U/L (ref 0–45)
AST SERPL W P-5'-P-CCNC: 36 U/L (ref 0–45)
AST SERPL W P-5'-P-CCNC: 38 U/L (ref 0–45)
AST SERPL W P-5'-P-CCNC: 39 U/L (ref 0–45)
AST SERPL W P-5'-P-CCNC: 40 U/L (ref 0–45)
AST SERPL W P-5'-P-CCNC: 40 U/L (ref 0–45)
AST SERPL W P-5'-P-CCNC: 43 U/L (ref 0–45)
AST SERPL W P-5'-P-CCNC: 47 U/L (ref 0–45)
AST SERPL W P-5'-P-CCNC: 60 U/L (ref 0–45)
AST SERPL W P-5'-P-CCNC: 60 U/L (ref 0–45)
AST SERPL W P-5'-P-CCNC: 726 U/L (ref 0–45)
AST SERPL W P-5'-P-CCNC: 79 U/L (ref 0–45)
AST SERPL W P-5'-P-CCNC: 81 U/L (ref 0–45)
AST SERPL W P-5'-P-CCNC: 81 U/L (ref 0–45)
AST SERPL W P-5'-P-CCNC: 82 U/L (ref 0–45)
AST SERPL W P-5'-P-CCNC: 88 U/L (ref 0–45)
B-D GLUCAN INTERPRETATION (1,3): POSITIVE
BACTERIA SPEC CULT: ABNORMAL
BACTERIA SPEC CULT: NO GROWTH
BACTERIA SPEC CULT: NORMAL
BASOPHILS # BLD AUTO: 0 10E9/L (ref 0–0.2)
BASOPHILS # BLD AUTO: 0.1 10E9/L (ref 0–0.2)
BASOPHILS # BLD AUTO: 0.2 10E9/L (ref 0–0.2)
BASOPHILS # BLD AUTO: 0.6 10E9/L (ref 0–0.2)
BASOPHILS NFR BLD AUTO: 0 %
BASOPHILS NFR BLD AUTO: 0.1 %
BASOPHILS NFR BLD AUTO: 0.2 %
BASOPHILS NFR BLD AUTO: 0.2 %
BASOPHILS NFR BLD AUTO: 0.4 %
BASOPHILS NFR BLD AUTO: 0.4 %
BASOPHILS NFR BLD AUTO: 0.5 %
BASOPHILS NFR BLD AUTO: 0.5 %
BASOPHILS NFR BLD AUTO: 0.6 %
BASOPHILS NFR BLD AUTO: 0.7 %
BASOPHILS NFR BLD AUTO: 0.8 %
BASOPHILS NFR BLD AUTO: 0.9 %
BASOPHILS NFR BLD AUTO: 1 %
BASOPHILS NFR BLD AUTO: 1 %
BASOPHILS NFR BLD AUTO: 1.1 %
BASOPHILS NFR BLD AUTO: 1.2 %
BASOPHILS NFR BLD AUTO: 1.9 %
BASOPHILS NFR BLD AUTO: 3.5 %
BILIRUB SERPL-MCNC: 0.2 MG/DL (ref 0.2–1.3)
BILIRUB SERPL-MCNC: 0.3 MG/DL (ref 0.2–1.3)
BILIRUB SERPL-MCNC: 0.4 MG/DL (ref 0.2–1.3)
BILIRUB SERPL-MCNC: 0.5 MG/DL (ref 0.2–1.3)
BILIRUB SERPL-MCNC: 0.6 MG/DL (ref 0.2–1.3)
BILIRUB SERPL-MCNC: 0.7 MG/DL (ref 0.2–1.3)
BILIRUB SERPL-MCNC: 0.7 MG/DL (ref 0.2–1.3)
BILIRUB SERPL-MCNC: 1.7 MG/DL (ref 0.2–1.3)
BILIRUB SERPL-MCNC: 1.8 MG/DL (ref 0.2–1.3)
BILIRUB SERPL-MCNC: 4.4 MG/DL (ref 0.2–1.3)
BILIRUB SERPL-MCNC: 5 MG/DL (ref 0.2–1.3)
BILIRUB SERPL-MCNC: 5 MG/DL (ref 0.2–1.3)
BILIRUB SERPL-MCNC: 5.7 MG/DL (ref 0.2–1.3)
BILIRUB SERPL-MCNC: 7.5 MG/DL (ref 0.2–1.3)
BILIRUB SERPL-MCNC: 7.8 MG/DL (ref 0.2–1.3)
BILIRUB SERPL-MCNC: 7.8 MG/DL (ref 0.2–1.3)
BLD GP AB SCN SERPL QL: NORMAL
BLD PROD TYP BPU: NORMAL
BLD UNIT ID BPU: 0
BLOOD BANK CMNT PATIENT-IMP: NORMAL
BLOOD PRODUCT CODE: NORMAL
BPU ID: NORMAL
BUN SERPL-MCNC: 10 MG/DL (ref 7–30)
BUN SERPL-MCNC: 11 MG/DL (ref 7–30)
BUN SERPL-MCNC: 11 MG/DL (ref 7–30)
BUN SERPL-MCNC: 12 MG/DL (ref 7–30)
BUN SERPL-MCNC: 13 MG/DL (ref 7–30)
BUN SERPL-MCNC: 13 MG/DL (ref 7–30)
BUN SERPL-MCNC: 14 MG/DL (ref 7–30)
BUN SERPL-MCNC: 19 MG/DL (ref 7–30)
BUN SERPL-MCNC: 20 MG/DL (ref 7–30)
BUN SERPL-MCNC: 5 MG/DL (ref 7–30)
BUN SERPL-MCNC: 5 MG/DL (ref 7–30)
BUN SERPL-MCNC: 6 MG/DL (ref 7–30)
BUN SERPL-MCNC: 7 MG/DL (ref 7–30)
BUN SERPL-MCNC: 8 MG/DL (ref 7–30)
BUN SERPL-MCNC: 9 MG/DL (ref 7–30)
BURR CELLS BLD QL SMEAR: ABNORMAL
BURR CELLS BLD QL SMEAR: ABNORMAL
BURR CELLS BLD QL SMEAR: SLIGHT
BURR CELLS BLD QL SMEAR: SLIGHT
C DIFF TOX B STL QL: NEGATIVE
CA-I BLD-MCNC: 3.9 MG/DL (ref 4.4–5.2)
CALCIUM SERPL-MCNC: 6.5 MG/DL (ref 8.5–10.1)
CALCIUM SERPL-MCNC: 6.6 MG/DL (ref 8.5–10.1)
CALCIUM SERPL-MCNC: 7.1 MG/DL (ref 8.5–10.1)
CALCIUM SERPL-MCNC: 7.2 MG/DL (ref 8.5–10.1)
CALCIUM SERPL-MCNC: 7.7 MG/DL (ref 8.5–10.1)
CALCIUM SERPL-MCNC: 7.9 MG/DL (ref 8.5–10.1)
CALCIUM SERPL-MCNC: 8.1 MG/DL (ref 8.5–10.1)
CALCIUM SERPL-MCNC: 8.2 MG/DL (ref 8.5–10.1)
CALCIUM SERPL-MCNC: 8.3 MG/DL (ref 8.5–10.1)
CALCIUM SERPL-MCNC: 8.4 MG/DL (ref 8.5–10.1)
CALCIUM SERPL-MCNC: 8.5 MG/DL (ref 8.5–10.1)
CALCIUM SERPL-MCNC: 8.5 MG/DL (ref 8.5–10.1)
CALCIUM SERPL-MCNC: 8.6 MG/DL (ref 8.5–10.1)
CALCIUM SERPL-MCNC: 9 MG/DL (ref 8.5–10.1)
CALCIUM SERPL-MCNC: 9 MG/DL (ref 8.5–10.1)
CANCER AG19-9 SERPL-ACNC: 112 U/ML (ref 0–37)
CANCER AG19-9 SERPL-ACNC: 1345 U/ML (ref 0–37)
CANCER AG19-9 SERPL-ACNC: 2434 U/ML (ref 0–37)
CANCER AG19-9 SERPL-ACNC: 44 U/ML (ref 0–37)
CANCER AG19-9 SERPL-ACNC: 638 U/ML (ref 0–37)
CHLORIDE SERPL-SCNC: 100 MMOL/L (ref 94–109)
CHLORIDE SERPL-SCNC: 100 MMOL/L (ref 94–109)
CHLORIDE SERPL-SCNC: 102 MMOL/L (ref 94–109)
CHLORIDE SERPL-SCNC: 103 MMOL/L (ref 94–109)
CHLORIDE SERPL-SCNC: 104 MMOL/L (ref 94–109)
CHLORIDE SERPL-SCNC: 105 MMOL/L (ref 94–109)
CHLORIDE SERPL-SCNC: 106 MMOL/L (ref 94–109)
CHLORIDE SERPL-SCNC: 107 MMOL/L (ref 94–109)
CHLORIDE SERPL-SCNC: 108 MMOL/L (ref 94–109)
CHLORIDE SERPL-SCNC: 108 MMOL/L (ref 94–109)
CHLORIDE SERPL-SCNC: 110 MMOL/L (ref 94–109)
CHLORIDE SERPL-SCNC: 111 MMOL/L (ref 94–109)
CHLORIDE SERPL-SCNC: 113 MMOL/L (ref 94–109)
CHLORIDE SERPL-SCNC: 113 MMOL/L (ref 94–109)
CHLORIDE SERPL-SCNC: 114 MMOL/L (ref 94–109)
CHLORIDE SERPL-SCNC: 97 MMOL/L (ref 94–109)
CHLORIDE SERPL-SCNC: 98 MMOL/L (ref 94–109)
CHLORIDE SERPL-SCNC: 98 MMOL/L (ref 94–109)
CHLORIDE SERPL-SCNC: 99 MMOL/L (ref 94–109)
CHLORIDE SERPL-SCNC: 99 MMOL/L (ref 94–109)
CO2 SERPL-SCNC: 19 MMOL/L (ref 20–32)
CO2 SERPL-SCNC: 20 MMOL/L (ref 20–32)
CO2 SERPL-SCNC: 21 MMOL/L (ref 20–32)
CO2 SERPL-SCNC: 21 MMOL/L (ref 20–32)
CO2 SERPL-SCNC: 22 MMOL/L (ref 20–32)
CO2 SERPL-SCNC: 23 MMOL/L (ref 20–32)
CO2 SERPL-SCNC: 24 MMOL/L (ref 20–32)
CO2 SERPL-SCNC: 25 MMOL/L (ref 20–32)
CO2 SERPL-SCNC: 26 MMOL/L (ref 20–32)
CO2 SERPL-SCNC: 27 MMOL/L (ref 20–32)
CO2 SERPL-SCNC: 28 MMOL/L (ref 20–32)
CO2 SERPL-SCNC: 28 MMOL/L (ref 20–32)
COPATH REPORT: NORMAL
CREAT SERPL-MCNC: 0.45 MG/DL (ref 0.52–1.04)
CREAT SERPL-MCNC: 0.49 MG/DL (ref 0.52–1.04)
CREAT SERPL-MCNC: 0.51 MG/DL (ref 0.52–1.04)
CREAT SERPL-MCNC: 0.52 MG/DL (ref 0.52–1.04)
CREAT SERPL-MCNC: 0.53 MG/DL (ref 0.52–1.04)
CREAT SERPL-MCNC: 0.54 MG/DL (ref 0.52–1.04)
CREAT SERPL-MCNC: 0.55 MG/DL (ref 0.52–1.04)
CREAT SERPL-MCNC: 0.56 MG/DL (ref 0.52–1.04)
CREAT SERPL-MCNC: 0.57 MG/DL (ref 0.52–1.04)
CREAT SERPL-MCNC: 0.57 MG/DL (ref 0.52–1.04)
CREAT SERPL-MCNC: 0.58 MG/DL (ref 0.52–1.04)
CREAT SERPL-MCNC: 0.6 MG/DL (ref 0.52–1.04)
CREAT SERPL-MCNC: 0.6 MG/DL (ref 0.52–1.04)
CREAT SERPL-MCNC: 0.63 MG/DL (ref 0.52–1.04)
CREAT SERPL-MCNC: 0.64 MG/DL (ref 0.52–1.04)
CREAT SERPL-MCNC: 0.64 MG/DL (ref 0.52–1.04)
CREAT SERPL-MCNC: 0.65 MG/DL (ref 0.52–1.04)
CREAT SERPL-MCNC: 0.66 MG/DL (ref 0.52–1.04)
CREAT SERPL-MCNC: 0.67 MG/DL (ref 0.52–1.04)
CREAT SERPL-MCNC: 0.68 MG/DL (ref 0.52–1.04)
CREAT SERPL-MCNC: 0.69 MG/DL (ref 0.52–1.04)
CREAT SERPL-MCNC: 0.69 MG/DL (ref 0.52–1.04)
CREAT SERPL-MCNC: 0.73 MG/DL (ref 0.52–1.04)
CREAT SERPL-MCNC: 0.73 MG/DL (ref 0.52–1.04)
CREAT SERPL-MCNC: 0.74 MG/DL (ref 0.52–1.04)
CREAT SERPL-MCNC: 0.76 MG/DL (ref 0.52–1.04)
CREAT SERPL-MCNC: 0.79 MG/DL (ref 0.52–1.04)
CREAT SERPL-MCNC: 0.84 MG/DL (ref 0.52–1.04)
CREAT SERPL-MCNC: 0.88 MG/DL (ref 0.52–1.04)
CREAT SERPL-MCNC: 0.92 MG/DL (ref 0.52–1.04)
CREAT SERPL-MCNC: 0.98 MG/DL (ref 0.52–1.04)
CREAT SERPL-MCNC: 1.02 MG/DL (ref 0.52–1.04)
CREAT SERPL-MCNC: 1.04 MG/DL (ref 0.52–1.04)
DIFFERENTIAL METHOD BLD: ABNORMAL
ELLIPTOCYTES BLD QL SMEAR: SLIGHT
EOSINOPHIL # BLD AUTO: 0 10E9/L (ref 0–0.7)
EOSINOPHIL # BLD AUTO: 0.1 10E9/L (ref 0–0.7)
EOSINOPHIL # BLD AUTO: 0.2 10E9/L (ref 0–0.7)
EOSINOPHIL # BLD AUTO: 0.3 10E9/L (ref 0–0.7)
EOSINOPHIL # BLD AUTO: 0.4 10E9/L (ref 0–0.7)
EOSINOPHIL # BLD AUTO: 0.6 10E9/L (ref 0–0.7)
EOSINOPHIL # BLD AUTO: 0.9 10E9/L (ref 0–0.7)
EOSINOPHIL # BLD AUTO: 1.4 10E9/L (ref 0–0.7)
EOSINOPHIL NFR BLD AUTO: 0 %
EOSINOPHIL NFR BLD AUTO: 0.2 %
EOSINOPHIL NFR BLD AUTO: 0.4 %
EOSINOPHIL NFR BLD AUTO: 0.4 %
EOSINOPHIL NFR BLD AUTO: 0.5 %
EOSINOPHIL NFR BLD AUTO: 0.9 %
EOSINOPHIL NFR BLD AUTO: 0.9 %
EOSINOPHIL NFR BLD AUTO: 1.3 %
EOSINOPHIL NFR BLD AUTO: 1.4 %
EOSINOPHIL NFR BLD AUTO: 1.4 %
EOSINOPHIL NFR BLD AUTO: 1.6 %
EOSINOPHIL NFR BLD AUTO: 1.8 %
EOSINOPHIL NFR BLD AUTO: 1.8 %
EOSINOPHIL NFR BLD AUTO: 10.3 %
EOSINOPHIL NFR BLD AUTO: 14 %
EOSINOPHIL NFR BLD AUTO: 2 %
EOSINOPHIL NFR BLD AUTO: 2.2 %
EOSINOPHIL NFR BLD AUTO: 2.5 %
EOSINOPHIL NFR BLD AUTO: 2.8 %
EOSINOPHIL NFR BLD AUTO: 2.9 %
EOSINOPHIL NFR BLD AUTO: 3 %
EOSINOPHIL NFR BLD AUTO: 3.2 %
EOSINOPHIL NFR BLD AUTO: 3.3 %
EOSINOPHIL NFR BLD AUTO: 3.5 %
EOSINOPHIL NFR BLD AUTO: 3.8 %
EOSINOPHIL NFR BLD AUTO: 3.9 %
EOSINOPHIL NFR BLD AUTO: 4.1 %
EOSINOPHIL NFR BLD AUTO: 4.2 %
EOSINOPHIL NFR BLD AUTO: 4.9 %
EOSINOPHIL NFR BLD AUTO: 5.7 %
EOSINOPHIL NFR BLD AUTO: 6 %
EOSINOPHIL NFR BLD AUTO: 7.3 %
EOSINOPHIL NFR BLD AUTO: 7.6 %
EOSINOPHIL NFR BLD AUTO: 8.8 %
ERCP: NORMAL
ERCP: NORMAL
ERYTHROCYTE [DISTWIDTH] IN BLOOD BY AUTOMATED COUNT: 14.1 % (ref 10–15)
ERYTHROCYTE [DISTWIDTH] IN BLOOD BY AUTOMATED COUNT: 14.4 % (ref 10–15)
ERYTHROCYTE [DISTWIDTH] IN BLOOD BY AUTOMATED COUNT: 14.8 % (ref 10–15)
ERYTHROCYTE [DISTWIDTH] IN BLOOD BY AUTOMATED COUNT: 15.1 % (ref 10–15)
ERYTHROCYTE [DISTWIDTH] IN BLOOD BY AUTOMATED COUNT: 15.1 % (ref 10–15)
ERYTHROCYTE [DISTWIDTH] IN BLOOD BY AUTOMATED COUNT: 15.5 % (ref 10–15)
ERYTHROCYTE [DISTWIDTH] IN BLOOD BY AUTOMATED COUNT: 15.8 % (ref 10–15)
ERYTHROCYTE [DISTWIDTH] IN BLOOD BY AUTOMATED COUNT: 15.9 % (ref 10–15)
ERYTHROCYTE [DISTWIDTH] IN BLOOD BY AUTOMATED COUNT: 16 % (ref 10–15)
ERYTHROCYTE [DISTWIDTH] IN BLOOD BY AUTOMATED COUNT: 16.1 % (ref 10–15)
ERYTHROCYTE [DISTWIDTH] IN BLOOD BY AUTOMATED COUNT: 16.4 % (ref 10–15)
ERYTHROCYTE [DISTWIDTH] IN BLOOD BY AUTOMATED COUNT: 16.5 % (ref 10–15)
ERYTHROCYTE [DISTWIDTH] IN BLOOD BY AUTOMATED COUNT: 16.9 % (ref 10–15)
ERYTHROCYTE [DISTWIDTH] IN BLOOD BY AUTOMATED COUNT: 17.3 % (ref 10–15)
ERYTHROCYTE [DISTWIDTH] IN BLOOD BY AUTOMATED COUNT: 17.4 % (ref 10–15)
ERYTHROCYTE [DISTWIDTH] IN BLOOD BY AUTOMATED COUNT: 17.6 % (ref 10–15)
ERYTHROCYTE [DISTWIDTH] IN BLOOD BY AUTOMATED COUNT: 17.7 % (ref 10–15)
ERYTHROCYTE [DISTWIDTH] IN BLOOD BY AUTOMATED COUNT: 17.7 % (ref 10–15)
ERYTHROCYTE [DISTWIDTH] IN BLOOD BY AUTOMATED COUNT: 17.9 % (ref 10–15)
ERYTHROCYTE [DISTWIDTH] IN BLOOD BY AUTOMATED COUNT: 18 % (ref 10–15)
ERYTHROCYTE [DISTWIDTH] IN BLOOD BY AUTOMATED COUNT: 18.3 % (ref 10–15)
ERYTHROCYTE [DISTWIDTH] IN BLOOD BY AUTOMATED COUNT: 18.3 % (ref 10–15)
ERYTHROCYTE [DISTWIDTH] IN BLOOD BY AUTOMATED COUNT: 18.5 % (ref 10–15)
ERYTHROCYTE [DISTWIDTH] IN BLOOD BY AUTOMATED COUNT: 18.6 % (ref 10–15)
ERYTHROCYTE [DISTWIDTH] IN BLOOD BY AUTOMATED COUNT: 18.7 % (ref 10–15)
ERYTHROCYTE [DISTWIDTH] IN BLOOD BY AUTOMATED COUNT: 18.8 % (ref 10–15)
ERYTHROCYTE [DISTWIDTH] IN BLOOD BY AUTOMATED COUNT: 19.1 % (ref 10–15)
ERYTHROCYTE [DISTWIDTH] IN BLOOD BY AUTOMATED COUNT: 19.4 % (ref 10–15)
ERYTHROCYTE [DISTWIDTH] IN BLOOD BY AUTOMATED COUNT: 19.9 % (ref 10–15)
ERYTHROCYTE [DISTWIDTH] IN BLOOD BY AUTOMATED COUNT: 20.2 % (ref 10–15)
ERYTHROCYTE [DISTWIDTH] IN BLOOD BY AUTOMATED COUNT: 20.2 % (ref 10–15)
ERYTHROCYTE [DISTWIDTH] IN BLOOD BY AUTOMATED COUNT: 20.3 % (ref 10–15)
ERYTHROCYTE [DISTWIDTH] IN BLOOD BY AUTOMATED COUNT: 20.5 % (ref 10–15)
ERYTHROCYTE [DISTWIDTH] IN BLOOD BY AUTOMATED COUNT: 20.6 % (ref 10–15)
ERYTHROCYTE [DISTWIDTH] IN BLOOD BY AUTOMATED COUNT: 20.8 % (ref 10–15)
ERYTHROCYTE [DISTWIDTH] IN BLOOD BY AUTOMATED COUNT: 21 % (ref 10–15)
ERYTHROCYTE [DISTWIDTH] IN BLOOD BY AUTOMATED COUNT: 21.1 % (ref 10–15)
ERYTHROCYTE [DISTWIDTH] IN BLOOD BY AUTOMATED COUNT: 21.5 % (ref 10–15)
ERYTHROCYTE [DISTWIDTH] IN BLOOD BY AUTOMATED COUNT: 21.6 % (ref 10–15)
ERYTHROCYTE [DISTWIDTH] IN BLOOD BY AUTOMATED COUNT: 21.6 % (ref 10–15)
ERYTHROCYTE [DISTWIDTH] IN BLOOD BY AUTOMATED COUNT: 21.9 % (ref 10–15)
FACT X ACT/NOR PPP CHRO: 62 % (ref 70–130)
FIBRINOGEN PPP-MCNC: 227 MG/DL (ref 200–420)
FIBRINOGEN PPP-MCNC: 285 MG/DL (ref 200–420)
FLUAV H1 2009 PAND RNA SPEC QL NAA+PROBE: ABNORMAL
FLUAV H1 2009 PAND RNA SPEC QL NAA+PROBE: NEGATIVE
FLUAV H1 RNA SPEC QL NAA+PROBE: ABNORMAL
FLUAV H1 RNA SPEC QL NAA+PROBE: NEGATIVE
FLUAV H3 RNA SPEC QL NAA+PROBE: ABNORMAL
FLUAV H3 RNA SPEC QL NAA+PROBE: NEGATIVE
FLUAV RNA SPEC QL NAA+PROBE: ABNORMAL
FLUAV RNA SPEC QL NAA+PROBE: NEGATIVE
FLUAV+FLUBV AG SPEC QL: NEGATIVE
FLUAV+FLUBV AG SPEC QL: POSITIVE
FLUBV RNA SPEC QL NAA+PROBE: ABNORMAL
FLUBV RNA SPEC QL NAA+PROBE: NEGATIVE
GFR SERPL CREATININE-BSD FRML MDRD: 51 ML/MIN/1.7M2
GFR SERPL CREATININE-BSD FRML MDRD: 52 ML/MIN/1.7M2
GFR SERPL CREATININE-BSD FRML MDRD: 55 ML/MIN/1.7M2
GFR SERPL CREATININE-BSD FRML MDRD: 59 ML/MIN/1.7M2
GFR SERPL CREATININE-BSD FRML MDRD: 63 ML/MIN/1.7M2
GFR SERPL CREATININE-BSD FRML MDRD: 66 ML/MIN/1.7M2
GFR SERPL CREATININE-BSD FRML MDRD: 71 ML/MIN/1.7M2
GFR SERPL CREATININE-BSD FRML MDRD: 74 ML/MIN/1.7M2
GFR SERPL CREATININE-BSD FRML MDRD: 76 ML/MIN/1.7M2
GFR SERPL CREATININE-BSD FRML MDRD: 77 ML/MIN/1.7M2
GFR SERPL CREATININE-BSD FRML MDRD: 78 ML/MIN/1.7M2
GFR SERPL CREATININE-BSD FRML MDRD: 83 ML/MIN/1.7M2
GFR SERPL CREATININE-BSD FRML MDRD: 85 ML/MIN/1.7M2
GFR SERPL CREATININE-BSD FRML MDRD: 87 ML/MIN/1.7M2
GFR SERPL CREATININE-BSD FRML MDRD: 88 ML/MIN/1.7M2
GFR SERPL CREATININE-BSD FRML MDRD: 89 ML/MIN/1.7M2
GFR SERPL CREATININE-BSD FRML MDRD: >90 ML/MIN/1.7M2
GLUCOSE BLDC GLUCOMTR-MCNC: 102 MG/DL (ref 70–99)
GLUCOSE BLDC GLUCOMTR-MCNC: 146 MG/DL (ref 70–99)
GLUCOSE BLDC GLUCOMTR-MCNC: 155 MG/DL (ref 70–99)
GLUCOSE BLDC GLUCOMTR-MCNC: 157 MG/DL (ref 70–99)
GLUCOSE BLDC GLUCOMTR-MCNC: 162 MG/DL (ref 70–99)
GLUCOSE BLDC GLUCOMTR-MCNC: 177 MG/DL (ref 70–99)
GLUCOSE BLDC GLUCOMTR-MCNC: 63 MG/DL (ref 70–99)
GLUCOSE BLDC GLUCOMTR-MCNC: 66 MG/DL (ref 70–99)
GLUCOSE BLDC GLUCOMTR-MCNC: 81 MG/DL (ref 70–99)
GLUCOSE BLDC GLUCOMTR-MCNC: 82 MG/DL (ref 70–99)
GLUCOSE BLDC GLUCOMTR-MCNC: 84 MG/DL (ref 70–99)
GLUCOSE BLDC GLUCOMTR-MCNC: 86 MG/DL (ref 70–99)
GLUCOSE BLDC GLUCOMTR-MCNC: 94 MG/DL (ref 70–99)
GLUCOSE BLDC GLUCOMTR-MCNC: 95 MG/DL (ref 70–99)
GLUCOSE SERPL-MCNC: 103 MG/DL (ref 70–99)
GLUCOSE SERPL-MCNC: 104 MG/DL (ref 70–99)
GLUCOSE SERPL-MCNC: 104 MG/DL (ref 70–99)
GLUCOSE SERPL-MCNC: 105 MG/DL (ref 70–99)
GLUCOSE SERPL-MCNC: 108 MG/DL (ref 70–99)
GLUCOSE SERPL-MCNC: 116 MG/DL (ref 70–99)
GLUCOSE SERPL-MCNC: 116 MG/DL (ref 70–99)
GLUCOSE SERPL-MCNC: 132 MG/DL (ref 70–99)
GLUCOSE SERPL-MCNC: 139 MG/DL (ref 70–99)
GLUCOSE SERPL-MCNC: 141 MG/DL (ref 70–99)
GLUCOSE SERPL-MCNC: 150 MG/DL (ref 70–99)
GLUCOSE SERPL-MCNC: 152 MG/DL (ref 70–99)
GLUCOSE SERPL-MCNC: 154 MG/DL (ref 70–99)
GLUCOSE SERPL-MCNC: 155 MG/DL (ref 70–99)
GLUCOSE SERPL-MCNC: 159 MG/DL (ref 70–99)
GLUCOSE SERPL-MCNC: 160 MG/DL (ref 70–99)
GLUCOSE SERPL-MCNC: 69 MG/DL (ref 70–99)
GLUCOSE SERPL-MCNC: 73 MG/DL (ref 70–99)
GLUCOSE SERPL-MCNC: 74 MG/DL (ref 70–99)
GLUCOSE SERPL-MCNC: 75 MG/DL (ref 70–99)
GLUCOSE SERPL-MCNC: 78 MG/DL (ref 70–99)
GLUCOSE SERPL-MCNC: 79 MG/DL (ref 70–99)
GLUCOSE SERPL-MCNC: 81 MG/DL (ref 70–99)
GLUCOSE SERPL-MCNC: 82 MG/DL (ref 70–99)
GLUCOSE SERPL-MCNC: 84 MG/DL (ref 70–99)
GLUCOSE SERPL-MCNC: 88 MG/DL (ref 70–99)
GLUCOSE SERPL-MCNC: 93 MG/DL (ref 70–99)
GLUCOSE SERPL-MCNC: 95 MG/DL (ref 70–99)
GLUCOSE SERPL-MCNC: 96 MG/DL (ref 70–99)
GLUCOSE SERPL-MCNC: 98 MG/DL (ref 70–99)
GLUCOSE SERPL-MCNC: 99 MG/DL (ref 70–99)
HADV DNA SPEC QL NAA+PROBE: ABNORMAL
HADV DNA SPEC QL NAA+PROBE: ABNORMAL
HADV DNA SPEC QL NAA+PROBE: NEGATIVE
HADV DNA SPEC QL NAA+PROBE: NEGATIVE
HCT VFR BLD AUTO: 14.6 % (ref 35–47)
HCT VFR BLD AUTO: 21 % (ref 35–47)
HCT VFR BLD AUTO: 21.2 % (ref 35–47)
HCT VFR BLD AUTO: 24.5 % (ref 35–47)
HCT VFR BLD AUTO: 25.1 % (ref 35–47)
HCT VFR BLD AUTO: 25.7 % (ref 35–47)
HCT VFR BLD AUTO: 26.6 % (ref 35–47)
HCT VFR BLD AUTO: 27.6 % (ref 35–47)
HCT VFR BLD AUTO: 27.7 % (ref 35–47)
HCT VFR BLD AUTO: 28.1 % (ref 35–47)
HCT VFR BLD AUTO: 28.6 % (ref 35–47)
HCT VFR BLD AUTO: 28.7 % (ref 35–47)
HCT VFR BLD AUTO: 28.8 % (ref 35–47)
HCT VFR BLD AUTO: 28.9 % (ref 35–47)
HCT VFR BLD AUTO: 29.1 % (ref 35–47)
HCT VFR BLD AUTO: 29.5 % (ref 35–47)
HCT VFR BLD AUTO: 29.6 % (ref 35–47)
HCT VFR BLD AUTO: 29.7 % (ref 35–47)
HCT VFR BLD AUTO: 29.8 % (ref 35–47)
HCT VFR BLD AUTO: 29.9 % (ref 35–47)
HCT VFR BLD AUTO: 30.1 % (ref 35–47)
HCT VFR BLD AUTO: 30.3 % (ref 35–47)
HCT VFR BLD AUTO: 30.5 % (ref 35–47)
HCT VFR BLD AUTO: 30.7 % (ref 35–47)
HCT VFR BLD AUTO: 30.8 % (ref 35–47)
HCT VFR BLD AUTO: 30.9 % (ref 35–47)
HCT VFR BLD AUTO: 31.1 % (ref 35–47)
HCT VFR BLD AUTO: 31.5 % (ref 35–47)
HCT VFR BLD AUTO: 31.7 % (ref 35–47)
HCT VFR BLD AUTO: 32 % (ref 35–47)
HCT VFR BLD AUTO: 32.1 % (ref 35–47)
HCT VFR BLD AUTO: 32.1 % (ref 35–47)
HCT VFR BLD AUTO: 32.7 % (ref 35–47)
HCT VFR BLD AUTO: 32.8 % (ref 35–47)
HCT VFR BLD AUTO: 33.5 % (ref 35–47)
HCT VFR BLD AUTO: 34.7 % (ref 35–47)
HGB BLD-MCNC: 10 G/DL (ref 11.7–15.7)
HGB BLD-MCNC: 10.1 G/DL (ref 11.7–15.7)
HGB BLD-MCNC: 10.2 G/DL (ref 11.7–15.7)
HGB BLD-MCNC: 10.5 G/DL (ref 11.7–15.7)
HGB BLD-MCNC: 10.8 G/DL (ref 11.7–15.7)
HGB BLD-MCNC: 11.2 G/DL (ref 11.7–15.7)
HGB BLD-MCNC: 4.8 G/DL (ref 11.7–15.7)
HGB BLD-MCNC: 5.3 G/DL (ref 11.7–15.7)
HGB BLD-MCNC: 7 G/DL (ref 11.7–15.7)
HGB BLD-MCNC: 7.1 G/DL (ref 11.7–15.7)
HGB BLD-MCNC: 7.6 G/DL (ref 11.7–15.7)
HGB BLD-MCNC: 8.3 G/DL (ref 11.7–15.7)
HGB BLD-MCNC: 8.4 G/DL (ref 11.7–15.7)
HGB BLD-MCNC: 8.4 G/DL (ref 11.7–15.7)
HGB BLD-MCNC: 8.5 G/DL (ref 11.7–15.7)
HGB BLD-MCNC: 8.5 G/DL (ref 11.7–15.7)
HGB BLD-MCNC: 8.7 G/DL (ref 11.7–15.7)
HGB BLD-MCNC: 8.7 G/DL (ref 11.7–15.7)
HGB BLD-MCNC: 8.9 G/DL (ref 11.7–15.7)
HGB BLD-MCNC: 9 G/DL (ref 11.7–15.7)
HGB BLD-MCNC: 9.1 G/DL (ref 11.7–15.7)
HGB BLD-MCNC: 9.3 G/DL (ref 11.7–15.7)
HGB BLD-MCNC: 9.4 G/DL (ref 11.7–15.7)
HGB BLD-MCNC: 9.4 G/DL (ref 11.7–15.7)
HGB BLD-MCNC: 9.5 G/DL (ref 11.7–15.7)
HGB BLD-MCNC: 9.6 G/DL (ref 11.7–15.7)
HGB BLD-MCNC: 9.6 G/DL (ref 11.7–15.7)
HGB BLD-MCNC: 9.7 G/DL (ref 11.7–15.7)
HGB BLD-MCNC: 9.8 G/DL (ref 11.7–15.7)
HGB BLD-MCNC: 9.8 G/DL (ref 11.7–15.7)
HGB BLD-MCNC: 9.9 G/DL (ref 11.7–15.7)
HMPV RNA SPEC QL NAA+PROBE: ABNORMAL
HMPV RNA SPEC QL NAA+PROBE: NEGATIVE
HPIV1 RNA SPEC QL NAA+PROBE: ABNORMAL
HPIV1 RNA SPEC QL NAA+PROBE: NEGATIVE
HPIV2 RNA SPEC QL NAA+PROBE: ABNORMAL
HPIV2 RNA SPEC QL NAA+PROBE: NEGATIVE
HPIV3 RNA SPEC QL NAA+PROBE: ABNORMAL
HPIV3 RNA SPEC QL NAA+PROBE: NEGATIVE
HYPOCHROMIA BLD QL: PRESENT
IMM GRANULOCYTES # BLD: 0 10E9/L (ref 0–0.4)
IMM GRANULOCYTES # BLD: 0.1 10E9/L (ref 0–0.4)
IMM GRANULOCYTES # BLD: 0.2 10E9/L (ref 0–0.4)
IMM GRANULOCYTES # BLD: 0.2 10E9/L (ref 0–0.4)
IMM GRANULOCYTES # BLD: 0.4 10E9/L (ref 0–0.4)
IMM GRANULOCYTES # BLD: 0.7 10E9/L (ref 0–0.4)
IMM GRANULOCYTES NFR BLD: 0.2 %
IMM GRANULOCYTES NFR BLD: 0.3 %
IMM GRANULOCYTES NFR BLD: 0.3 %
IMM GRANULOCYTES NFR BLD: 0.4 %
IMM GRANULOCYTES NFR BLD: 0.5 %
IMM GRANULOCYTES NFR BLD: 0.6 %
IMM GRANULOCYTES NFR BLD: 0.7 %
IMM GRANULOCYTES NFR BLD: 0.9 %
IMM GRANULOCYTES NFR BLD: 0.9 %
IMM GRANULOCYTES NFR BLD: 1.1 %
IMM GRANULOCYTES NFR BLD: 1.1 %
IMM GRANULOCYTES NFR BLD: 1.4 %
IMM GRANULOCYTES NFR BLD: 1.6 %
IMM GRANULOCYTES NFR BLD: 2.3 %
IMM GRANULOCYTES NFR BLD: 2.6 %
IMM GRANULOCYTES NFR BLD: 3 %
IMM GRANULOCYTES NFR BLD: 3.2 %
INR PPP: 0.94 (ref 0.86–1.14)
INR PPP: 0.96 (ref 0.86–1.14)
INR PPP: 2.64 (ref 0.86–1.14)
INR PPP: 2.71 (ref 0.86–1.14)
INR PPP: 3.43 (ref 0.86–1.14)
INR PPP: 3.73 (ref 0.86–1.14)
LACTATE BLD-SCNC: 0.8 MMOL/L (ref 0.7–2)
LACTATE BLD-SCNC: 0.8 MMOL/L (ref 0.7–2)
LACTATE BLD-SCNC: 0.9 MMOL/L (ref 0.4–1.9)
LACTATE BLD-SCNC: 0.9 MMOL/L (ref 0.7–2)
LACTATE BLD-SCNC: 1.2 MMOL/L (ref 0.7–2)
LACTATE BLD-SCNC: 2.5 MMOL/L (ref 0.4–1.9)
LACTATE BLD-SCNC: 2.8 MMOL/L (ref 0.7–2)
LACTATE BLD-SCNC: 2.8 MMOL/L (ref 0.7–2)
LACTATE BLD-SCNC: 3.7 MMOL/L (ref 0.4–1.9)
LACTATE BLD-SCNC: 3.9 MMOL/L (ref 0.4–1.9)
LDH SERPL L TO P-CCNC: 637 U/L (ref 81–234)
LIPASE SERPL-CCNC: 1022 U/L (ref 73–393)
LIPASE SERPL-CCNC: 277 U/L (ref 73–393)
LIPASE SERPL-CCNC: 330 U/L (ref 73–393)
LIPASE SERPL-CCNC: 371 U/L (ref 73–393)
LIPASE SERPL-CCNC: 390 U/L (ref 73–393)
LIPASE SERPL-CCNC: 91 U/L (ref 73–393)
LMWH PPP CHRO-ACNC: 0.45 IU/ML
LMWH PPP CHRO-ACNC: 0.69 IU/ML
LMWH PPP CHRO-ACNC: 1.03 IU/ML
LMWH PPP CHRO-ACNC: 1.4 IU/ML
LMWH PPP CHRO-ACNC: <0.1 IU/ML
LYMPHOCYTES # BLD AUTO: 0.2 10E9/L (ref 0.8–5.3)
LYMPHOCYTES # BLD AUTO: 0.4 10E9/L (ref 0.8–5.3)
LYMPHOCYTES # BLD AUTO: 0.6 10E9/L (ref 0.8–5.3)
LYMPHOCYTES # BLD AUTO: 0.7 10E9/L (ref 0.8–5.3)
LYMPHOCYTES # BLD AUTO: 0.8 10E9/L (ref 0.8–5.3)
LYMPHOCYTES # BLD AUTO: 0.9 10E9/L (ref 0.8–5.3)
LYMPHOCYTES # BLD AUTO: 1 10E9/L (ref 0.8–5.3)
LYMPHOCYTES # BLD AUTO: 1.1 10E9/L (ref 0.8–5.3)
LYMPHOCYTES # BLD AUTO: 1.2 10E9/L (ref 0.8–5.3)
LYMPHOCYTES # BLD AUTO: 1.2 10E9/L (ref 0.8–5.3)
LYMPHOCYTES # BLD AUTO: 1.3 10E9/L (ref 0.8–5.3)
LYMPHOCYTES # BLD AUTO: 1.4 10E9/L (ref 0.8–5.3)
LYMPHOCYTES # BLD AUTO: 1.5 10E9/L (ref 0.8–5.3)
LYMPHOCYTES # BLD AUTO: 1.6 10E9/L (ref 0.8–5.3)
LYMPHOCYTES # BLD AUTO: 1.7 10E9/L (ref 0.8–5.3)
LYMPHOCYTES # BLD AUTO: 1.8 10E9/L (ref 0.8–5.3)
LYMPHOCYTES # BLD AUTO: 1.9 10E9/L (ref 0.8–5.3)
LYMPHOCYTES # BLD AUTO: 2 10E9/L (ref 0.8–5.3)
LYMPHOCYTES # BLD AUTO: 2.2 10E9/L (ref 0.8–5.3)
LYMPHOCYTES # BLD AUTO: 2.2 10E9/L (ref 0.8–5.3)
LYMPHOCYTES # BLD AUTO: 2.3 10E9/L (ref 0.8–5.3)
LYMPHOCYTES # BLD AUTO: 3.7 10E9/L (ref 0.8–5.3)
LYMPHOCYTES NFR BLD AUTO: 1.6 %
LYMPHOCYTES NFR BLD AUTO: 1.8 %
LYMPHOCYTES NFR BLD AUTO: 10.4 %
LYMPHOCYTES NFR BLD AUTO: 11 %
LYMPHOCYTES NFR BLD AUTO: 12.3 %
LYMPHOCYTES NFR BLD AUTO: 12.3 %
LYMPHOCYTES NFR BLD AUTO: 12.4 %
LYMPHOCYTES NFR BLD AUTO: 12.7 %
LYMPHOCYTES NFR BLD AUTO: 13.2 %
LYMPHOCYTES NFR BLD AUTO: 14 %
LYMPHOCYTES NFR BLD AUTO: 14.3 %
LYMPHOCYTES NFR BLD AUTO: 14.5 %
LYMPHOCYTES NFR BLD AUTO: 15.5 %
LYMPHOCYTES NFR BLD AUTO: 16 %
LYMPHOCYTES NFR BLD AUTO: 16 %
LYMPHOCYTES NFR BLD AUTO: 16.6 %
LYMPHOCYTES NFR BLD AUTO: 17.9 %
LYMPHOCYTES NFR BLD AUTO: 18.2 %
LYMPHOCYTES NFR BLD AUTO: 18.5 %
LYMPHOCYTES NFR BLD AUTO: 19.4 %
LYMPHOCYTES NFR BLD AUTO: 2.5 %
LYMPHOCYTES NFR BLD AUTO: 2.9 %
LYMPHOCYTES NFR BLD AUTO: 20.2 %
LYMPHOCYTES NFR BLD AUTO: 20.6 %
LYMPHOCYTES NFR BLD AUTO: 21.1 %
LYMPHOCYTES NFR BLD AUTO: 22.1 %
LYMPHOCYTES NFR BLD AUTO: 24.2 %
LYMPHOCYTES NFR BLD AUTO: 28.3 %
LYMPHOCYTES NFR BLD AUTO: 3.5 %
LYMPHOCYTES NFR BLD AUTO: 30.4 %
LYMPHOCYTES NFR BLD AUTO: 31.2 %
LYMPHOCYTES NFR BLD AUTO: 32.1 %
LYMPHOCYTES NFR BLD AUTO: 32.7 %
LYMPHOCYTES NFR BLD AUTO: 37.6 %
LYMPHOCYTES NFR BLD AUTO: 5.2 %
LYMPHOCYTES NFR BLD AUTO: 5.5 %
LYMPHOCYTES NFR BLD AUTO: 7.1 %
LYMPHOCYTES NFR BLD AUTO: 8.4 %
LYMPHOCYTES NFR BLD AUTO: 8.5 %
LYMPHOCYTES NFR BLD AUTO: 9.3 %
LYMPHOCYTES NFR BLD AUTO: 9.9 %
Lab: ABNORMAL
Lab: ABNORMAL
Lab: NORMAL
MACROCYTES BLD QL SMEAR: PRESENT
MAGNESIUM SERPL-MCNC: 1.5 MG/DL (ref 1.6–2.3)
MAGNESIUM SERPL-MCNC: 1.5 MG/DL (ref 1.6–2.3)
MAGNESIUM SERPL-MCNC: 3.3 MG/DL (ref 1.6–2.3)
MCH RBC QN AUTO: 27.9 PG (ref 26.5–33)
MCH RBC QN AUTO: 28.4 PG (ref 26.5–33)
MCH RBC QN AUTO: 28.5 PG (ref 26.5–33)
MCH RBC QN AUTO: 28.7 PG (ref 26.5–33)
MCH RBC QN AUTO: 28.8 PG (ref 26.5–33)
MCH RBC QN AUTO: 28.9 PG (ref 26.5–33)
MCH RBC QN AUTO: 29.3 PG (ref 26.5–33)
MCH RBC QN AUTO: 29.4 PG (ref 26.5–33)
MCH RBC QN AUTO: 29.4 PG (ref 26.5–33)
MCH RBC QN AUTO: 29.7 PG (ref 26.5–33)
MCH RBC QN AUTO: 29.7 PG (ref 26.5–33)
MCH RBC QN AUTO: 29.8 PG (ref 26.5–33)
MCH RBC QN AUTO: 29.9 PG (ref 26.5–33)
MCH RBC QN AUTO: 30 PG (ref 26.5–33)
MCH RBC QN AUTO: 30.1 PG (ref 26.5–33)
MCH RBC QN AUTO: 30.3 PG (ref 26.5–33)
MCH RBC QN AUTO: 30.5 PG (ref 26.5–33)
MCH RBC QN AUTO: 30.8 PG (ref 26.5–33)
MCH RBC QN AUTO: 30.8 PG (ref 26.5–33)
MCH RBC QN AUTO: 30.9 PG (ref 26.5–33)
MCH RBC QN AUTO: 30.9 PG (ref 26.5–33)
MCH RBC QN AUTO: 31.1 PG (ref 26.5–33)
MCH RBC QN AUTO: 31.2 PG (ref 26.5–33)
MCH RBC QN AUTO: 31.2 PG (ref 26.5–33)
MCH RBC QN AUTO: 31.4 PG (ref 26.5–33)
MCH RBC QN AUTO: 31.5 PG (ref 26.5–33)
MCH RBC QN AUTO: 31.7 PG (ref 26.5–33)
MCH RBC QN AUTO: 31.8 PG (ref 26.5–33)
MCH RBC QN AUTO: 31.9 PG (ref 26.5–33)
MCH RBC QN AUTO: 32 PG (ref 26.5–33)
MCH RBC QN AUTO: 32 PG (ref 26.5–33)
MCH RBC QN AUTO: 32.1 PG (ref 26.5–33)
MCH RBC QN AUTO: 32.2 PG (ref 26.5–33)
MCH RBC QN AUTO: 32.2 PG (ref 26.5–33)
MCH RBC QN AUTO: 32.5 PG (ref 26.5–33)
MCH RBC QN AUTO: 32.6 PG (ref 26.5–33)
MCH RBC QN AUTO: 32.7 PG (ref 26.5–33)
MCH RBC QN AUTO: 33.1 PG (ref 26.5–33)
MCH RBC QN AUTO: 33.2 PG (ref 26.5–33)
MCH RBC QN AUTO: 33.4 PG (ref 26.5–33)
MCH RBC QN AUTO: 33.4 PG (ref 26.5–33)
MCH RBC QN AUTO: 33.8 PG (ref 26.5–33)
MCH RBC QN AUTO: 34.1 PG (ref 26.5–33)
MCHC RBC AUTO-ENTMCNC: 30.8 G/DL (ref 31.5–36.5)
MCHC RBC AUTO-ENTMCNC: 30.9 G/DL (ref 31.5–36.5)
MCHC RBC AUTO-ENTMCNC: 30.9 G/DL (ref 31.5–36.5)
MCHC RBC AUTO-ENTMCNC: 31 G/DL (ref 31.5–36.5)
MCHC RBC AUTO-ENTMCNC: 31.1 G/DL (ref 31.5–36.5)
MCHC RBC AUTO-ENTMCNC: 31.3 G/DL (ref 31.5–36.5)
MCHC RBC AUTO-ENTMCNC: 31.3 G/DL (ref 31.5–36.5)
MCHC RBC AUTO-ENTMCNC: 31.4 G/DL (ref 31.5–36.5)
MCHC RBC AUTO-ENTMCNC: 31.4 G/DL (ref 31.5–36.5)
MCHC RBC AUTO-ENTMCNC: 31.5 G/DL (ref 31.5–36.5)
MCHC RBC AUTO-ENTMCNC: 31.5 G/DL (ref 31.5–36.5)
MCHC RBC AUTO-ENTMCNC: 31.6 G/DL (ref 31.5–36.5)
MCHC RBC AUTO-ENTMCNC: 31.7 G/DL (ref 31.5–36.5)
MCHC RBC AUTO-ENTMCNC: 31.8 G/DL (ref 31.5–36.5)
MCHC RBC AUTO-ENTMCNC: 31.8 G/DL (ref 31.5–36.5)
MCHC RBC AUTO-ENTMCNC: 31.9 G/DL (ref 31.5–36.5)
MCHC RBC AUTO-ENTMCNC: 31.9 G/DL (ref 31.5–36.5)
MCHC RBC AUTO-ENTMCNC: 32 G/DL (ref 31.5–36.5)
MCHC RBC AUTO-ENTMCNC: 32 G/DL (ref 31.5–36.5)
MCHC RBC AUTO-ENTMCNC: 32.1 G/DL (ref 31.5–36.5)
MCHC RBC AUTO-ENTMCNC: 32.2 G/DL (ref 31.5–36.5)
MCHC RBC AUTO-ENTMCNC: 32.3 G/DL (ref 31.5–36.5)
MCHC RBC AUTO-ENTMCNC: 32.4 G/DL (ref 31.5–36.5)
MCHC RBC AUTO-ENTMCNC: 32.4 G/DL (ref 31.5–36.5)
MCHC RBC AUTO-ENTMCNC: 32.5 G/DL (ref 31.5–36.5)
MCHC RBC AUTO-ENTMCNC: 32.6 G/DL (ref 31.5–36.5)
MCHC RBC AUTO-ENTMCNC: 32.7 G/DL (ref 31.5–36.5)
MCHC RBC AUTO-ENTMCNC: 32.7 G/DL (ref 31.5–36.5)
MCHC RBC AUTO-ENTMCNC: 32.8 G/DL (ref 31.5–36.5)
MCHC RBC AUTO-ENTMCNC: 32.8 G/DL (ref 31.5–36.5)
MCHC RBC AUTO-ENTMCNC: 32.9 G/DL (ref 31.5–36.5)
MCHC RBC AUTO-ENTMCNC: 32.9 G/DL (ref 31.5–36.5)
MCHC RBC AUTO-ENTMCNC: 33.1 G/DL (ref 31.5–36.5)
MCHC RBC AUTO-ENTMCNC: 33.3 G/DL (ref 31.5–36.5)
MCHC RBC AUTO-ENTMCNC: 33.5 G/DL (ref 31.5–36.5)
MCHC RBC AUTO-ENTMCNC: 33.7 G/DL (ref 31.5–36.5)
MCHC RBC AUTO-ENTMCNC: 33.7 G/DL (ref 31.5–36.5)
MCV RBC AUTO: 100 FL (ref 78–100)
MCV RBC AUTO: 101 FL (ref 78–100)
MCV RBC AUTO: 102 FL (ref 78–100)
MCV RBC AUTO: 103 FL (ref 78–100)
MCV RBC AUTO: 104 FL (ref 78–100)
MCV RBC AUTO: 105 FL (ref 78–100)
MCV RBC AUTO: 106 FL (ref 78–100)
MCV RBC AUTO: 85 FL (ref 78–100)
MCV RBC AUTO: 85 FL (ref 78–100)
MCV RBC AUTO: 86 FL (ref 78–100)
MCV RBC AUTO: 86 FL (ref 78–100)
MCV RBC AUTO: 87 FL (ref 78–100)
MCV RBC AUTO: 87 FL (ref 78–100)
MCV RBC AUTO: 93 FL (ref 78–100)
MCV RBC AUTO: 93 FL (ref 78–100)
MCV RBC AUTO: 94 FL (ref 78–100)
MCV RBC AUTO: 94 FL (ref 78–100)
MCV RBC AUTO: 95 FL (ref 78–100)
MCV RBC AUTO: 96 FL (ref 78–100)
MCV RBC AUTO: 97 FL (ref 78–100)
MCV RBC AUTO: 98 FL (ref 78–100)
MCV RBC AUTO: 99 FL (ref 78–100)
METAMYELOCYTES # BLD: 0.1 10E9/L
METAMYELOCYTES # BLD: 0.1 10E9/L
METAMYELOCYTES # BLD: 0.2 10E9/L
METAMYELOCYTES # BLD: 0.2 10E9/L
METAMYELOCYTES # BLD: 0.4 10E9/L
METAMYELOCYTES # BLD: 0.5 10E9/L
METAMYELOCYTES NFR BLD MANUAL: 0.9 %
METAMYELOCYTES NFR BLD MANUAL: 1 %
METAMYELOCYTES NFR BLD MANUAL: 2.8 %
MICROBIOLOGIST REVIEW: ABNORMAL
MICROBIOLOGIST REVIEW: NORMAL
MONOCYTES # BLD AUTO: 0.3 10E9/L (ref 0–1.3)
MONOCYTES # BLD AUTO: 0.6 10E9/L (ref 0–1.3)
MONOCYTES # BLD AUTO: 0.6 10E9/L (ref 0–1.3)
MONOCYTES # BLD AUTO: 0.7 10E9/L (ref 0–1.3)
MONOCYTES # BLD AUTO: 0.8 10E9/L (ref 0–1.3)
MONOCYTES # BLD AUTO: 0.9 10E9/L (ref 0–1.3)
MONOCYTES # BLD AUTO: 1 10E9/L (ref 0–1.3)
MONOCYTES # BLD AUTO: 1.1 10E9/L (ref 0–1.3)
MONOCYTES # BLD AUTO: 1.1 10E9/L (ref 0–1.3)
MONOCYTES # BLD AUTO: 1.2 10E9/L (ref 0–1.3)
MONOCYTES # BLD AUTO: 1.2 10E9/L (ref 0–1.3)
MONOCYTES # BLD AUTO: 1.3 10E9/L (ref 0–1.3)
MONOCYTES # BLD AUTO: 1.4 10E9/L (ref 0–1.3)
MONOCYTES # BLD AUTO: 1.5 10E9/L (ref 0–1.3)
MONOCYTES # BLD AUTO: 1.6 10E9/L (ref 0–1.3)
MONOCYTES # BLD AUTO: 1.9 10E9/L (ref 0–1.3)
MONOCYTES # BLD AUTO: 2.1 10E9/L (ref 0–1.3)
MONOCYTES # BLD AUTO: 2.1 10E9/L (ref 0–1.3)
MONOCYTES # BLD AUTO: 2.3 10E9/L (ref 0–1.3)
MONOCYTES # BLD AUTO: 2.5 10E9/L (ref 0–1.3)
MONOCYTES # BLD AUTO: 2.6 10E9/L (ref 0–1.3)
MONOCYTES # BLD AUTO: 2.7 10E9/L (ref 0–1.3)
MONOCYTES # BLD AUTO: 2.7 10E9/L (ref 0–1.3)
MONOCYTES # BLD AUTO: 3 10E9/L (ref 0–1.3)
MONOCYTES NFR BLD AUTO: 1.9 %
MONOCYTES NFR BLD AUTO: 10.2 %
MONOCYTES NFR BLD AUTO: 11.3 %
MONOCYTES NFR BLD AUTO: 11.4 %
MONOCYTES NFR BLD AUTO: 12.7 %
MONOCYTES NFR BLD AUTO: 12.7 %
MONOCYTES NFR BLD AUTO: 12.9 %
MONOCYTES NFR BLD AUTO: 12.9 %
MONOCYTES NFR BLD AUTO: 13 %
MONOCYTES NFR BLD AUTO: 13.2 %
MONOCYTES NFR BLD AUTO: 13.2 %
MONOCYTES NFR BLD AUTO: 13.3 %
MONOCYTES NFR BLD AUTO: 13.3 %
MONOCYTES NFR BLD AUTO: 13.4 %
MONOCYTES NFR BLD AUTO: 13.6 %
MONOCYTES NFR BLD AUTO: 13.7 %
MONOCYTES NFR BLD AUTO: 13.9 %
MONOCYTES NFR BLD AUTO: 14 %
MONOCYTES NFR BLD AUTO: 14 %
MONOCYTES NFR BLD AUTO: 14.3 %
MONOCYTES NFR BLD AUTO: 14.4 %
MONOCYTES NFR BLD AUTO: 15.6 %
MONOCYTES NFR BLD AUTO: 15.8 %
MONOCYTES NFR BLD AUTO: 16.7 %
MONOCYTES NFR BLD AUTO: 17.9 %
MONOCYTES NFR BLD AUTO: 19 %
MONOCYTES NFR BLD AUTO: 21.2 %
MONOCYTES NFR BLD AUTO: 28.3 %
MONOCYTES NFR BLD AUTO: 3.3 %
MONOCYTES NFR BLD AUTO: 4.3 %
MONOCYTES NFR BLD AUTO: 4.5 %
MONOCYTES NFR BLD AUTO: 5.5 %
MONOCYTES NFR BLD AUTO: 5.5 %
MONOCYTES NFR BLD AUTO: 5.8 %
MONOCYTES NFR BLD AUTO: 6 %
MONOCYTES NFR BLD AUTO: 6.1 %
MONOCYTES NFR BLD AUTO: 6.1 %
MONOCYTES NFR BLD AUTO: 7.3 %
MONOCYTES NFR BLD AUTO: 8.2 %
MONOCYTES NFR BLD AUTO: 9.4 %
MONOCYTES NFR BLD AUTO: 9.7 %
MRSA DNA SPEC QL NAA+PROBE: NEGATIVE
MYELOCYTES # BLD: 0.1 10E9/L
MYELOCYTES # BLD: 0.3 10E9/L
MYELOCYTES # BLD: 0.4 10E9/L
MYELOCYTES # BLD: 0.5 10E9/L
MYELOCYTES # BLD: 0.7 10E9/L
MYELOCYTES # BLD: 0.8 10E9/L
MYELOCYTES NFR BLD MANUAL: 0.9 %
MYELOCYTES NFR BLD MANUAL: 1.8 %
MYELOCYTES NFR BLD MANUAL: 3 %
MYELOCYTES NFR BLD MANUAL: 3.4 %
MYELOCYTES NFR BLD MANUAL: 4.3 %
MYELOCYTES NFR BLD MANUAL: 4.6 %
NEUTROPHILS # BLD AUTO: 1.3 10E9/L (ref 1.6–8.3)
NEUTROPHILS # BLD AUTO: 10.6 10E9/L (ref 1.6–8.3)
NEUTROPHILS # BLD AUTO: 10.9 10E9/L (ref 1.6–8.3)
NEUTROPHILS # BLD AUTO: 12.3 10E9/L (ref 1.6–8.3)
NEUTROPHILS # BLD AUTO: 12.5 10E9/L (ref 1.6–8.3)
NEUTROPHILS # BLD AUTO: 12.8 10E9/L (ref 1.6–8.3)
NEUTROPHILS # BLD AUTO: 13.9 10E9/L (ref 1.6–8.3)
NEUTROPHILS # BLD AUTO: 14.1 10E9/L (ref 1.6–8.3)
NEUTROPHILS # BLD AUTO: 16.1 10E9/L (ref 1.6–8.3)
NEUTROPHILS # BLD AUTO: 2 10E9/L (ref 1.6–8.3)
NEUTROPHILS # BLD AUTO: 2.2 10E9/L (ref 1.6–8.3)
NEUTROPHILS # BLD AUTO: 2.5 10E9/L (ref 1.6–8.3)
NEUTROPHILS # BLD AUTO: 2.6 10E9/L (ref 1.6–8.3)
NEUTROPHILS # BLD AUTO: 2.8 10E9/L (ref 1.6–8.3)
NEUTROPHILS # BLD AUTO: 2.8 10E9/L (ref 1.6–8.3)
NEUTROPHILS # BLD AUTO: 21.4 10E9/L (ref 1.6–8.3)
NEUTROPHILS # BLD AUTO: 23.2 10E9/L (ref 1.6–8.3)
NEUTROPHILS # BLD AUTO: 27.4 10E9/L (ref 1.6–8.3)
NEUTROPHILS # BLD AUTO: 3.2 10E9/L (ref 1.6–8.3)
NEUTROPHILS # BLD AUTO: 3.3 10E9/L (ref 1.6–8.3)
NEUTROPHILS # BLD AUTO: 3.4 10E9/L (ref 1.6–8.3)
NEUTROPHILS # BLD AUTO: 3.5 10E9/L (ref 1.6–8.3)
NEUTROPHILS # BLD AUTO: 3.9 10E9/L (ref 1.6–8.3)
NEUTROPHILS # BLD AUTO: 31.4 10E9/L (ref 1.6–8.3)
NEUTROPHILS # BLD AUTO: 33.7 10E9/L (ref 1.6–8.3)
NEUTROPHILS # BLD AUTO: 38.6 10E9/L (ref 1.6–8.3)
NEUTROPHILS # BLD AUTO: 4 10E9/L (ref 1.6–8.3)
NEUTROPHILS # BLD AUTO: 4.1 10E9/L (ref 1.6–8.3)
NEUTROPHILS # BLD AUTO: 4.1 10E9/L (ref 1.6–8.3)
NEUTROPHILS # BLD AUTO: 4.3 10E9/L (ref 1.6–8.3)
NEUTROPHILS # BLD AUTO: 4.8 10E9/L (ref 1.6–8.3)
NEUTROPHILS # BLD AUTO: 5.6 10E9/L (ref 1.6–8.3)
NEUTROPHILS # BLD AUTO: 6 10E9/L (ref 1.6–8.3)
NEUTROPHILS # BLD AUTO: 6 10E9/L (ref 1.6–8.3)
NEUTROPHILS # BLD AUTO: 6.7 10E9/L (ref 1.6–8.3)
NEUTROPHILS # BLD AUTO: 7.5 10E9/L (ref 1.6–8.3)
NEUTROPHILS # BLD AUTO: 7.9 10E9/L (ref 1.6–8.3)
NEUTROPHILS # BLD AUTO: 8.1 10E9/L (ref 1.6–8.3)
NEUTROPHILS # BLD AUTO: 8.4 10E9/L (ref 1.6–8.3)
NEUTROPHILS # BLD AUTO: 8.5 10E9/L (ref 1.6–8.3)
NEUTROPHILS # BLD AUTO: 9.8 10E9/L (ref 1.6–8.3)
NEUTROPHILS NFR BLD AUTO: 30.7 %
NEUTROPHILS NFR BLD AUTO: 39.1 %
NEUTROPHILS NFR BLD AUTO: 41.6 %
NEUTROPHILS NFR BLD AUTO: 43.3 %
NEUTROPHILS NFR BLD AUTO: 50.3 %
NEUTROPHILS NFR BLD AUTO: 51.4 %
NEUTROPHILS NFR BLD AUTO: 52.4 %
NEUTROPHILS NFR BLD AUTO: 56.7 %
NEUTROPHILS NFR BLD AUTO: 58.1 %
NEUTROPHILS NFR BLD AUTO: 59.2 %
NEUTROPHILS NFR BLD AUTO: 61 %
NEUTROPHILS NFR BLD AUTO: 61.5 %
NEUTROPHILS NFR BLD AUTO: 62.1 %
NEUTROPHILS NFR BLD AUTO: 62.2 %
NEUTROPHILS NFR BLD AUTO: 63.1 %
NEUTROPHILS NFR BLD AUTO: 64.9 %
NEUTROPHILS NFR BLD AUTO: 66.7 %
NEUTROPHILS NFR BLD AUTO: 67 %
NEUTROPHILS NFR BLD AUTO: 67.2 %
NEUTROPHILS NFR BLD AUTO: 68 %
NEUTROPHILS NFR BLD AUTO: 69.2 %
NEUTROPHILS NFR BLD AUTO: 69.4 %
NEUTROPHILS NFR BLD AUTO: 69.7 %
NEUTROPHILS NFR BLD AUTO: 70.2 %
NEUTROPHILS NFR BLD AUTO: 70.3 %
NEUTROPHILS NFR BLD AUTO: 71.2 %
NEUTROPHILS NFR BLD AUTO: 72.1 %
NEUTROPHILS NFR BLD AUTO: 72.3 %
NEUTROPHILS NFR BLD AUTO: 74.5 %
NEUTROPHILS NFR BLD AUTO: 76.1 %
NEUTROPHILS NFR BLD AUTO: 78.8 %
NEUTROPHILS NFR BLD AUTO: 80.9 %
NEUTROPHILS NFR BLD AUTO: 82 %
NEUTROPHILS NFR BLD AUTO: 82.1 %
NEUTROPHILS NFR BLD AUTO: 83.2 %
NEUTROPHILS NFR BLD AUTO: 86.4 %
NEUTROPHILS NFR BLD AUTO: 88.6 %
NEUTROPHILS NFR BLD AUTO: 89.5 %
NEUTROPHILS NFR BLD AUTO: 90.1 %
NEUTROPHILS NFR BLD AUTO: 90.5 %
NEUTROPHILS NFR BLD AUTO: 94.9 %
NRBC # BLD AUTO: 0 10*3/UL
NRBC # BLD AUTO: 0.2 10*3/UL
NRBC BLD AUTO-RTO: 0 /100
NRBC BLD AUTO-RTO: 1 /100
NRBC BLD AUTO-RTO: 1 /100
NT-PROBNP SERPL-MCNC: 447 PG/ML (ref 0–450)
NT-PROBNP SERPL-MCNC: 589 PG/ML (ref 0–450)
NUM BPU REQUESTED: 2
OVALOCYTES BLD QL SMEAR: ABNORMAL
OVALOCYTES BLD QL SMEAR: ABNORMAL
OVALOCYTES BLD QL SMEAR: SLIGHT
PF4 HEPARIN CMPLX AB SER QL: NEGATIVE
PHOSPHATE SERPL-MCNC: 2.4 MG/DL (ref 2.5–4.5)
PHOSPHATE SERPL-MCNC: 2.5 MG/DL (ref 2.5–4.5)
PLATELET # BLD AUTO: 102 10E9/L (ref 150–450)
PLATELET # BLD AUTO: 135 10E9/L (ref 150–450)
PLATELET # BLD AUTO: 146 10E9/L (ref 150–450)
PLATELET # BLD AUTO: 158 10E9/L (ref 150–450)
PLATELET # BLD AUTO: 164 10E9/L (ref 150–450)
PLATELET # BLD AUTO: 176 10E9/L (ref 150–450)
PLATELET # BLD AUTO: 179 10E9/L (ref 150–450)
PLATELET # BLD AUTO: 221 10E9/L (ref 150–450)
PLATELET # BLD AUTO: 24 10E9/L (ref 150–450)
PLATELET # BLD AUTO: 243 10E9/L (ref 150–450)
PLATELET # BLD AUTO: 25 10E9/L (ref 150–450)
PLATELET # BLD AUTO: 250 10E9/L (ref 150–450)
PLATELET # BLD AUTO: 266 10E9/L (ref 150–450)
PLATELET # BLD AUTO: 271 10E9/L (ref 150–450)
PLATELET # BLD AUTO: 273 10E9/L (ref 150–450)
PLATELET # BLD AUTO: 275 10E9/L (ref 150–450)
PLATELET # BLD AUTO: 278 10E9/L (ref 150–450)
PLATELET # BLD AUTO: 300 10E9/L (ref 150–450)
PLATELET # BLD AUTO: 301 10E9/L (ref 150–450)
PLATELET # BLD AUTO: 316 10E9/L (ref 150–450)
PLATELET # BLD AUTO: 316 10E9/L (ref 150–450)
PLATELET # BLD AUTO: 319 10E9/L (ref 150–450)
PLATELET # BLD AUTO: 319 10E9/L (ref 150–450)
PLATELET # BLD AUTO: 322 10E9/L (ref 150–450)
PLATELET # BLD AUTO: 326 10E9/L (ref 150–450)
PLATELET # BLD AUTO: 333 10E9/L (ref 150–450)
PLATELET # BLD AUTO: 334 10E9/L (ref 150–450)
PLATELET # BLD AUTO: 336 10E9/L (ref 150–450)
PLATELET # BLD AUTO: 357 10E9/L (ref 150–450)
PLATELET # BLD AUTO: 377 10E9/L (ref 150–450)
PLATELET # BLD AUTO: 397 10E9/L (ref 150–450)
PLATELET # BLD AUTO: 405 10E9/L (ref 150–450)
PLATELET # BLD AUTO: 420 10E9/L (ref 150–450)
PLATELET # BLD AUTO: 426 10E9/L (ref 150–450)
PLATELET # BLD AUTO: 439 10E9/L (ref 150–450)
PLATELET # BLD AUTO: 467 10E9/L (ref 150–450)
PLATELET # BLD AUTO: 502 10E9/L (ref 150–450)
PLATELET # BLD AUTO: 542 10E9/L (ref 150–450)
PLATELET # BLD AUTO: 609 10E9/L (ref 150–450)
PLATELET # BLD AUTO: 617 10E9/L (ref 150–450)
PLATELET # BLD AUTO: 65 10E9/L (ref 150–450)
PLATELET # BLD EST: ABNORMAL 10*3/UL
POIKILOCYTOSIS BLD QL SMEAR: ABNORMAL
POIKILOCYTOSIS BLD QL SMEAR: SLIGHT
POLYCHROMASIA BLD QL SMEAR: SLIGHT
POTASSIUM SERPL-SCNC: 3.1 MMOL/L (ref 3.4–5.3)
POTASSIUM SERPL-SCNC: 3.1 MMOL/L (ref 3.4–5.3)
POTASSIUM SERPL-SCNC: 3.2 MMOL/L (ref 3.4–5.3)
POTASSIUM SERPL-SCNC: 3.3 MMOL/L (ref 3.4–5.3)
POTASSIUM SERPL-SCNC: 3.3 MMOL/L (ref 3.4–5.3)
POTASSIUM SERPL-SCNC: 3.5 MMOL/L (ref 3.4–5.3)
POTASSIUM SERPL-SCNC: 3.6 MMOL/L (ref 3.4–5.3)
POTASSIUM SERPL-SCNC: 3.7 MMOL/L (ref 3.4–5.3)
POTASSIUM SERPL-SCNC: 3.8 MMOL/L (ref 3.4–5.3)
POTASSIUM SERPL-SCNC: 3.9 MMOL/L (ref 3.4–5.3)
POTASSIUM SERPL-SCNC: 4 MMOL/L (ref 3.4–5.3)
POTASSIUM SERPL-SCNC: 4.1 MMOL/L (ref 3.4–5.3)
POTASSIUM SERPL-SCNC: 4.2 MMOL/L (ref 3.4–5.3)
POTASSIUM SERPL-SCNC: 4.3 MMOL/L (ref 3.4–5.3)
POTASSIUM SERPL-SCNC: 4.3 MMOL/L (ref 3.4–5.3)
POTASSIUM SERPL-SCNC: 4.4 MMOL/L (ref 3.4–5.3)
POTASSIUM SERPL-SCNC: 4.4 MMOL/L (ref 3.4–5.3)
POTASSIUM SERPL-SCNC: 4.5 MMOL/L (ref 3.4–5.3)
POTASSIUM SERPL-SCNC: 4.5 MMOL/L (ref 3.4–5.3)
POTASSIUM SERPL-SCNC: 4.6 MMOL/L (ref 3.4–5.3)
PROCALCITONIN SERPL-MCNC: 0.3 NG/ML
PROMYELOCYTES # BLD MANUAL: 0.1 10E9/L
PROMYELOCYTES NFR BLD MANUAL: 0.9 %
PROT SERPL-MCNC: 5.1 G/DL (ref 6.8–8.8)
PROT SERPL-MCNC: 5.2 G/DL (ref 6.8–8.8)
PROT SERPL-MCNC: 5.3 G/DL (ref 6.8–8.8)
PROT SERPL-MCNC: 5.9 G/DL (ref 6.8–8.8)
PROT SERPL-MCNC: 6 G/DL (ref 6.8–8.8)
PROT SERPL-MCNC: 6.1 G/DL (ref 6.8–8.8)
PROT SERPL-MCNC: 6.1 G/DL (ref 6.8–8.8)
PROT SERPL-MCNC: 6.2 G/DL (ref 6.8–8.8)
PROT SERPL-MCNC: 6.3 G/DL (ref 6.8–8.8)
PROT SERPL-MCNC: 6.4 G/DL (ref 6.8–8.8)
PROT SERPL-MCNC: 6.5 G/DL (ref 6.8–8.8)
PROT SERPL-MCNC: 6.6 G/DL (ref 6.8–8.8)
PROT SERPL-MCNC: 6.7 G/DL (ref 6.8–8.8)
PROT SERPL-MCNC: 6.7 G/DL (ref 6.8–8.8)
PROT SERPL-MCNC: 6.8 G/DL (ref 6.8–8.8)
PROT SERPL-MCNC: 7 G/DL (ref 6.8–8.8)
PROT SERPL-MCNC: 7 G/DL (ref 6.8–8.8)
PROT SERPL-MCNC: 7.1 G/DL (ref 6.8–8.8)
PROT SERPL-MCNC: 7.3 G/DL (ref 6.8–8.8)
PROT SERPL-MCNC: 7.4 G/DL (ref 6.8–8.8)
RADIOLOGIST FLAGS: NORMAL
RBC # BLD AUTO: 1.72 10E12/L (ref 3.8–5.2)
RBC # BLD AUTO: 2.44 10E12/L (ref 3.8–5.2)
RBC # BLD AUTO: 2.49 10E12/L (ref 3.8–5.2)
RBC # BLD AUTO: 2.54 10E12/L (ref 3.8–5.2)
RBC # BLD AUTO: 2.64 10E12/L (ref 3.8–5.2)
RBC # BLD AUTO: 2.73 10E12/L (ref 3.8–5.2)
RBC # BLD AUTO: 2.85 10E12/L (ref 3.8–5.2)
RBC # BLD AUTO: 2.86 10E12/L (ref 3.8–5.2)
RBC # BLD AUTO: 2.88 10E12/L (ref 3.8–5.2)
RBC # BLD AUTO: 2.88 10E12/L (ref 3.8–5.2)
RBC # BLD AUTO: 2.89 10E12/L (ref 3.8–5.2)
RBC # BLD AUTO: 2.9 10E12/L (ref 3.8–5.2)
RBC # BLD AUTO: 2.91 10E12/L (ref 3.8–5.2)
RBC # BLD AUTO: 2.92 10E12/L (ref 3.8–5.2)
RBC # BLD AUTO: 2.93 10E12/L (ref 3.8–5.2)
RBC # BLD AUTO: 2.96 10E12/L (ref 3.8–5.2)
RBC # BLD AUTO: 2.97 10E12/L (ref 3.8–5.2)
RBC # BLD AUTO: 2.98 10E12/L (ref 3.8–5.2)
RBC # BLD AUTO: 2.99 10E12/L (ref 3.8–5.2)
RBC # BLD AUTO: 3 10E12/L (ref 3.8–5.2)
RBC # BLD AUTO: 3.03 10E12/L (ref 3.8–5.2)
RBC # BLD AUTO: 3.03 10E12/L (ref 3.8–5.2)
RBC # BLD AUTO: 3.04 10E12/L (ref 3.8–5.2)
RBC # BLD AUTO: 3.04 10E12/L (ref 3.8–5.2)
RBC # BLD AUTO: 3.06 10E12/L (ref 3.8–5.2)
RBC # BLD AUTO: 3.06 10E12/L (ref 3.8–5.2)
RBC # BLD AUTO: 3.07 10E12/L (ref 3.8–5.2)
RBC # BLD AUTO: 3.08 10E12/L (ref 3.8–5.2)
RBC # BLD AUTO: 3.13 10E12/L (ref 3.8–5.2)
RBC # BLD AUTO: 3.14 10E12/L (ref 3.8–5.2)
RBC # BLD AUTO: 3.18 10E12/L (ref 3.8–5.2)
RBC # BLD AUTO: 3.19 10E12/L (ref 3.8–5.2)
RBC # BLD AUTO: 3.19 10E12/L (ref 3.8–5.2)
RBC # BLD AUTO: 3.2 10E12/L (ref 3.8–5.2)
RBC # BLD AUTO: 3.26 10E12/L (ref 3.8–5.2)
RBC # BLD AUTO: 3.27 10E12/L (ref 3.8–5.2)
RBC # BLD AUTO: 3.36 10E12/L (ref 3.8–5.2)
RBC # BLD AUTO: 3.41 10E12/L (ref 3.8–5.2)
RBC # BLD AUTO: 3.43 10E12/L (ref 3.8–5.2)
RBC # BLD AUTO: 3.44 10E12/L (ref 3.8–5.2)
RBC # BLD AUTO: 3.63 10E12/L (ref 3.8–5.2)
RBC INCLUSIONS BLD: SLIGHT
RETICS # AUTO: 38.5 10E9/L (ref 25–95)
RETICS/RBC NFR AUTO: 1.2 % (ref 0.5–2)
RHINOVIRUS RNA SPEC QL NAA+PROBE: ABNORMAL
RHINOVIRUS RNA SPEC QL NAA+PROBE: NEGATIVE
RSV RNA SPEC QL NAA+PROBE: ABNORMAL
RSV RNA SPEC QL NAA+PROBE: ABNORMAL
RSV RNA SPEC QL NAA+PROBE: NEGATIVE
RSV RNA SPEC QL NAA+PROBE: NEGATIVE
SODIUM SERPL-SCNC: 131 MMOL/L (ref 133–144)
SODIUM SERPL-SCNC: 132 MMOL/L (ref 133–144)
SODIUM SERPL-SCNC: 133 MMOL/L (ref 133–144)
SODIUM SERPL-SCNC: 134 MMOL/L (ref 133–144)
SODIUM SERPL-SCNC: 135 MMOL/L (ref 133–144)
SODIUM SERPL-SCNC: 136 MMOL/L (ref 133–144)
SODIUM SERPL-SCNC: 137 MMOL/L (ref 133–144)
SODIUM SERPL-SCNC: 138 MMOL/L (ref 133–144)
SODIUM SERPL-SCNC: 139 MMOL/L (ref 133–144)
SODIUM SERPL-SCNC: 139 MMOL/L (ref 133–144)
SODIUM SERPL-SCNC: 140 MMOL/L (ref 133–144)
SODIUM SERPL-SCNC: 141 MMOL/L (ref 133–144)
SODIUM SERPL-SCNC: 142 MMOL/L (ref 133–144)
SODIUM SERPL-SCNC: 143 MMOL/L (ref 133–144)
SPECIMEN EXP DATE BLD: NORMAL
SPECIMEN SOURCE: ABNORMAL
SPECIMEN SOURCE: NORMAL
T4 FREE SERPL-MCNC: 1.2 NG/DL (ref 0.76–1.46)
T4 FREE SERPL-MCNC: 1.27 NG/DL (ref 0.76–1.46)
TARGETS BLD QL SMEAR: ABNORMAL
TARGETS BLD QL SMEAR: SLIGHT
TRANSFUSION STATUS PATIENT QL: NORMAL
TSH SERPL DL<=0.005 MIU/L-ACNC: 12.86 MU/L (ref 0.4–4)
TSH SERPL DL<=0.005 MIU/L-ACNC: 15.49 MU/L (ref 0.4–4)
WBC # BLD AUTO: 10.6 10E9/L (ref 4–11)
WBC # BLD AUTO: 11.1 10E9/L (ref 4–11)
WBC # BLD AUTO: 11.2 10E9/L (ref 4–11)
WBC # BLD AUTO: 11.9 10E9/L (ref 4–11)
WBC # BLD AUTO: 13.6 10E9/L (ref 4–11)
WBC # BLD AUTO: 13.6 10E9/L (ref 4–11)
WBC # BLD AUTO: 14 10E9/L (ref 4–11)
WBC # BLD AUTO: 14 10E9/L (ref 4–11)
WBC # BLD AUTO: 15.2 10E9/L (ref 4–11)
WBC # BLD AUTO: 15.3 10E9/L (ref 4–11)
WBC # BLD AUTO: 15.6 10E9/L (ref 4–11)
WBC # BLD AUTO: 17.4 10E9/L (ref 4–11)
WBC # BLD AUTO: 17.5 10E9/L (ref 4–11)
WBC # BLD AUTO: 18.2 10E9/L (ref 4–11)
WBC # BLD AUTO: 22.3 10E9/L (ref 4–11)
WBC # BLD AUTO: 24.1 10E9/L (ref 4–11)
WBC # BLD AUTO: 27.9 10E9/L (ref 4–11)
WBC # BLD AUTO: 28.9 10E9/L (ref 4–11)
WBC # BLD AUTO: 3.3 10E9/L (ref 4–11)
WBC # BLD AUTO: 34.7 10E9/L (ref 4–11)
WBC # BLD AUTO: 39 10E9/L (ref 4–11)
WBC # BLD AUTO: 4.3 10E9/L (ref 4–11)
WBC # BLD AUTO: 4.4 10E9/L (ref 4–11)
WBC # BLD AUTO: 4.6 10E9/L (ref 4–11)
WBC # BLD AUTO: 4.7 10E9/L (ref 4–11)
WBC # BLD AUTO: 43.1 10E9/L (ref 4–11)
WBC # BLD AUTO: 5 10E9/L (ref 4–11)
WBC # BLD AUTO: 5.2 10E9/L (ref 4–11)
WBC # BLD AUTO: 5.3 10E9/L (ref 4–11)
WBC # BLD AUTO: 5.8 10E9/L (ref 4–11)
WBC # BLD AUTO: 6 10E9/L (ref 4–11)
WBC # BLD AUTO: 6.1 10E9/L (ref 4–11)
WBC # BLD AUTO: 6.5 10E9/L (ref 4–11)
WBC # BLD AUTO: 6.7 10E9/L (ref 4–11)
WBC # BLD AUTO: 7.2 10E9/L (ref 4–11)
WBC # BLD AUTO: 8.6 10E9/L (ref 4–11)
WBC # BLD AUTO: 8.7 10E9/L (ref 4–11)
WBC # BLD AUTO: 9 10E9/L (ref 4–11)
WBC # BLD AUTO: 9.5 10E9/L (ref 4–11)

## 2018-01-01 PROCEDURE — 25000128 H RX IP 250 OP 636: Mod: ZF | Performed by: INTERNAL MEDICINE

## 2018-01-01 PROCEDURE — 85025 COMPLETE CBC W/AUTO DIFF WBC: CPT | Performed by: INTERNAL MEDICINE

## 2018-01-01 PROCEDURE — 25000128 H RX IP 250 OP 636: Performed by: STUDENT IN AN ORGANIZED HEALTH CARE EDUCATION/TRAINING PROGRAM

## 2018-01-01 PROCEDURE — 85025 COMPLETE CBC W/AUTO DIFF WBC: CPT | Performed by: PHYSICIAN ASSISTANT

## 2018-01-01 PROCEDURE — 85025 COMPLETE CBC W/AUTO DIFF WBC: CPT

## 2018-01-01 PROCEDURE — 85260 CLOT FACTOR X STUART-POWER: CPT | Performed by: INTERNAL MEDICINE

## 2018-01-01 PROCEDURE — 99222 1ST HOSP IP/OBS MODERATE 55: CPT | Mod: AI | Performed by: INTERNAL MEDICINE

## 2018-01-01 PROCEDURE — 99284 EMERGENCY DEPT VISIT MOD MDM: CPT | Mod: Z6 | Performed by: EMERGENCY MEDICINE

## 2018-01-01 PROCEDURE — 83615 LACTATE (LD) (LDH) ENZYME: CPT | Performed by: INTERNAL MEDICINE

## 2018-01-01 PROCEDURE — 96367 TX/PROPH/DG ADDL SEQ IV INF: CPT

## 2018-01-01 PROCEDURE — 99233 SBSQ HOSP IP/OBS HIGH 50: CPT | Performed by: INTERNAL MEDICINE

## 2018-01-01 PROCEDURE — 80053 COMPREHEN METABOLIC PANEL: CPT | Performed by: NURSE PRACTITIONER

## 2018-01-01 PROCEDURE — 87077 CULTURE AEROBIC IDENTIFY: CPT | Performed by: EMERGENCY MEDICINE

## 2018-01-01 PROCEDURE — C1769 GUIDE WIRE: HCPCS | Performed by: INTERNAL MEDICINE

## 2018-01-01 PROCEDURE — 96413 CHEMO IV INFUSION 1 HR: CPT

## 2018-01-01 PROCEDURE — 80053 COMPREHEN METABOLIC PANEL: CPT | Performed by: PHYSICIAN ASSISTANT

## 2018-01-01 PROCEDURE — 27210794 ZZH OR GENERAL SUPPLY STERILE: Performed by: INTERNAL MEDICINE

## 2018-01-01 PROCEDURE — 83605 ASSAY OF LACTIC ACID: CPT | Performed by: EMERGENCY MEDICINE

## 2018-01-01 PROCEDURE — 37000009 ZZH ANESTHESIA TECHNICAL FEE, EACH ADDTL 15 MIN: Performed by: INTERNAL MEDICINE

## 2018-01-01 PROCEDURE — 25000131 ZZH RX MED GY IP 250 OP 636 PS 637: Mod: GY | Performed by: INTERNAL MEDICINE

## 2018-01-01 PROCEDURE — 83605 ASSAY OF LACTIC ACID: CPT | Performed by: INTERNAL MEDICINE

## 2018-01-01 PROCEDURE — 36591 DRAW BLOOD OFF VENOUS DEVICE: CPT

## 2018-01-01 PROCEDURE — 25000128 H RX IP 250 OP 636: Mod: ZF | Performed by: PHYSICIAN ASSISTANT

## 2018-01-01 PROCEDURE — 25000128 H RX IP 250 OP 636: Performed by: EMERGENCY MEDICINE

## 2018-01-01 PROCEDURE — 99214 OFFICE O/P EST MOD 30 MIN: CPT | Mod: ZP | Performed by: NURSE PRACTITIONER

## 2018-01-01 PROCEDURE — 36592 COLLECT BLOOD FROM PICC: CPT | Performed by: PHYSICIAN ASSISTANT

## 2018-01-01 PROCEDURE — 25000128 H RX IP 250 OP 636: Mod: ZF | Performed by: NURSE PRACTITIONER

## 2018-01-01 PROCEDURE — 86301 IMMUNOASSAY TUMOR CA 19-9: CPT | Performed by: INTERNAL MEDICINE

## 2018-01-01 PROCEDURE — 83605 ASSAY OF LACTIC ACID: CPT | Performed by: PHYSICIAN ASSISTANT

## 2018-01-01 PROCEDURE — 25000132 ZZH RX MED GY IP 250 OP 250 PS 637: Mod: GY | Performed by: PHYSICIAN ASSISTANT

## 2018-01-01 PROCEDURE — 87633 RESP VIRUS 12-25 TARGETS: CPT | Performed by: INTERNAL MEDICINE

## 2018-01-01 PROCEDURE — 94640 AIRWAY INHALATION TREATMENT: CPT | Mod: 76

## 2018-01-01 PROCEDURE — 85045 AUTOMATED RETICULOCYTE COUNT: CPT

## 2018-01-01 PROCEDURE — 80053 COMPREHEN METABOLIC PANEL: CPT | Performed by: INTERNAL MEDICINE

## 2018-01-01 PROCEDURE — 99214 OFFICE O/P EST MOD 30 MIN: CPT | Performed by: INTERNAL MEDICINE

## 2018-01-01 PROCEDURE — A9270 NON-COVERED ITEM OR SERVICE: HCPCS | Mod: GY | Performed by: PHYSICIAN ASSISTANT

## 2018-01-01 PROCEDURE — 0F798DZ DILATION OF COMMON BILE DUCT WITH INTRALUMINAL DEVICE, VIA NATURAL OR ARTIFICIAL OPENING ENDOSCOPIC: ICD-10-PCS | Performed by: INTERNAL MEDICINE

## 2018-01-01 PROCEDURE — 40000360 ZZHC STATISTIC PT CANCER REHAB VISIT: Performed by: PHYSICAL THERAPIST

## 2018-01-01 PROCEDURE — G0498 CHEMO EXTEND IV INFUS W/PUMP: HCPCS

## 2018-01-01 PROCEDURE — P9016 RBC LEUKOCYTES REDUCED: HCPCS | Performed by: INTERNAL MEDICINE

## 2018-01-01 PROCEDURE — 25000132 ZZH RX MED GY IP 250 OP 250 PS 637: Mod: GY | Performed by: STUDENT IN AN ORGANIZED HEALTH CARE EDUCATION/TRAINING PROGRAM

## 2018-01-01 PROCEDURE — G8979 MOBILITY GOAL STATUS: HCPCS | Mod: GP,CJ | Performed by: PHYSICAL THERAPIST

## 2018-01-01 PROCEDURE — 25000128 H RX IP 250 OP 636: Performed by: PHYSICIAN ASSISTANT

## 2018-01-01 PROCEDURE — G0463 HOSPITAL OUTPT CLINIC VISIT: HCPCS

## 2018-01-01 PROCEDURE — 86923 COMPATIBILITY TEST ELECTRIC: CPT | Performed by: INTERNAL MEDICINE

## 2018-01-01 PROCEDURE — 80048 BASIC METABOLIC PNL TOTAL CA: CPT | Performed by: PHYSICIAN ASSISTANT

## 2018-01-01 PROCEDURE — 87040 BLOOD CULTURE FOR BACTERIA: CPT | Performed by: EMERGENCY MEDICINE

## 2018-01-01 PROCEDURE — 83690 ASSAY OF LIPASE: CPT | Performed by: PHYSICIAN ASSISTANT

## 2018-01-01 PROCEDURE — 96417 CHEMO IV INFUS EACH ADDL SEQ: CPT

## 2018-01-01 PROCEDURE — 96523 IRRIG DRUG DELIVERY DEVICE: CPT

## 2018-01-01 PROCEDURE — 12000001 ZZH R&B MED SURG/OB UMMC

## 2018-01-01 PROCEDURE — 99233 SBSQ HOSP IP/OBS HIGH 50: CPT | Mod: GC

## 2018-01-01 PROCEDURE — 96361 HYDRATE IV INFUSION ADD-ON: CPT

## 2018-01-01 PROCEDURE — 96375 TX/PRO/DX INJ NEW DRUG ADDON: CPT

## 2018-01-01 PROCEDURE — 85610 PROTHROMBIN TIME: CPT | Performed by: PHYSICIAN ASSISTANT

## 2018-01-01 PROCEDURE — 25000128 H RX IP 250 OP 636: Performed by: INTERNAL MEDICINE

## 2018-01-01 PROCEDURE — 40000141 ZZH STATISTIC PERIPHERAL IV START W/O US GUIDANCE: Mod: ZF

## 2018-01-01 PROCEDURE — 99233 SBSQ HOSP IP/OBS HIGH 50: CPT | Mod: GC | Performed by: INTERNAL MEDICINE

## 2018-01-01 PROCEDURE — 99214 OFFICE O/P EST MOD 30 MIN: CPT | Mod: ZP | Performed by: INTERNAL MEDICINE

## 2018-01-01 PROCEDURE — 97112 NEUROMUSCULAR REEDUCATION: CPT | Mod: GP | Performed by: PHYSICAL THERAPIST

## 2018-01-01 PROCEDURE — A9270 NON-COVERED ITEM OR SERVICE: HCPCS | Mod: GY | Performed by: STUDENT IN AN ORGANIZED HEALTH CARE EDUCATION/TRAINING PROGRAM

## 2018-01-01 PROCEDURE — 27210995 ZZH RX 272: Performed by: INTERNAL MEDICINE

## 2018-01-01 PROCEDURE — 99215 OFFICE O/P EST HI 40 MIN: CPT | Mod: ZP | Performed by: INTERNAL MEDICINE

## 2018-01-01 PROCEDURE — A9270 NON-COVERED ITEM OR SERVICE: HCPCS | Performed by: INTERNAL MEDICINE

## 2018-01-01 PROCEDURE — 99214 OFFICE O/P EST MOD 30 MIN: CPT | Mod: ZP | Performed by: PHYSICIAN ASSISTANT

## 2018-01-01 PROCEDURE — 87077 CULTURE AEROBIC IDENTIFY: CPT | Performed by: PHYSICIAN ASSISTANT

## 2018-01-01 PROCEDURE — 86900 BLOOD TYPING SEROLOGIC ABO: CPT | Performed by: INTERNAL MEDICINE

## 2018-01-01 PROCEDURE — 96374 THER/PROPH/DIAG INJ IV PUSH: CPT

## 2018-01-01 PROCEDURE — 84100 ASSAY OF PHOSPHORUS: CPT | Performed by: PHYSICIAN ASSISTANT

## 2018-01-01 PROCEDURE — G0463 HOSPITAL OUTPT CLINIC VISIT: HCPCS | Mod: ZF

## 2018-01-01 PROCEDURE — 86022 PLATELET ANTIBODIES: CPT | Performed by: PHYSICIAN ASSISTANT

## 2018-01-01 PROCEDURE — 25000132 ZZH RX MED GY IP 250 OP 250 PS 637: Mod: GY | Performed by: INTERNAL MEDICINE

## 2018-01-01 PROCEDURE — 00000146 ZZHCL STATISTIC GLUCOSE BY METER IP

## 2018-01-01 PROCEDURE — 40000556 ZZH STATISTIC PERIPHERAL IV START W US GUIDANCE

## 2018-01-01 PROCEDURE — 96415 CHEMO IV INFUSION ADDL HR: CPT

## 2018-01-01 PROCEDURE — 36415 COLL VENOUS BLD VENIPUNCTURE: CPT

## 2018-01-01 PROCEDURE — 84443 ASSAY THYROID STIM HORMONE: CPT | Performed by: PHYSICIAN ASSISTANT

## 2018-01-01 PROCEDURE — 97110 THERAPEUTIC EXERCISES: CPT | Mod: GP | Performed by: PHYSICAL THERAPIST

## 2018-01-01 PROCEDURE — G0463 HOSPITAL OUTPT CLINIC VISIT: HCPCS | Mod: 25,27

## 2018-01-01 PROCEDURE — 37000008 ZZH ANESTHESIA TECHNICAL FEE, 1ST 30 MIN: Performed by: INTERNAL MEDICINE

## 2018-01-01 PROCEDURE — 97161 PT EVAL LOW COMPLEX 20 MIN: CPT | Mod: GP | Performed by: PHYSICAL THERAPIST

## 2018-01-01 PROCEDURE — 25000125 ZZHC RX 250: Performed by: INTERNAL MEDICINE

## 2018-01-01 PROCEDURE — 82330 ASSAY OF CALCIUM: CPT

## 2018-01-01 PROCEDURE — C1876 STENT, NON-COA/NON-COV W/DEL: HCPCS | Performed by: INTERNAL MEDICINE

## 2018-01-01 PROCEDURE — 40000170 ZZH STATISTIC PRE-PROCEDURE ASSESSMENT II: Performed by: INTERNAL MEDICINE

## 2018-01-01 PROCEDURE — 82247 BILIRUBIN TOTAL: CPT | Performed by: INTERNAL MEDICINE

## 2018-01-01 PROCEDURE — 36592 COLLECT BLOOD FROM PICC: CPT | Performed by: INTERNAL MEDICINE

## 2018-01-01 PROCEDURE — 85027 COMPLETE CBC AUTOMATED: CPT | Performed by: INTERNAL MEDICINE

## 2018-01-01 PROCEDURE — 96360 HYDRATION IV INFUSION INIT: CPT

## 2018-01-01 PROCEDURE — 71000015 ZZH RECOVERY PHASE 1 LEVEL 2 EA ADDTL HR: Performed by: INTERNAL MEDICINE

## 2018-01-01 PROCEDURE — 87186 SC STD MICRODIL/AGAR DIL: CPT | Performed by: EMERGENCY MEDICINE

## 2018-01-01 PROCEDURE — 36000061 ZZH SURGERY LEVEL 3 W FLUORO 1ST 30 MIN - UMMC: Performed by: INTERNAL MEDICINE

## 2018-01-01 PROCEDURE — 99223 1ST HOSP IP/OBS HIGH 75: CPT | Performed by: INTERNAL MEDICINE

## 2018-01-01 PROCEDURE — 83880 ASSAY OF NATRIURETIC PEPTIDE: CPT | Performed by: INTERNAL MEDICINE

## 2018-01-01 PROCEDURE — 80048 BASIC METABOLIC PNL TOTAL CA: CPT | Performed by: INTERNAL MEDICINE

## 2018-01-01 PROCEDURE — 99232 SBSQ HOSP IP/OBS MODERATE 35: CPT | Performed by: NURSE PRACTITIONER

## 2018-01-01 PROCEDURE — 25000125 ZZHC RX 250: Performed by: NURSE ANESTHETIST, CERTIFIED REGISTERED

## 2018-01-01 PROCEDURE — 71046 X-RAY EXAM CHEST 2 VIEWS: CPT

## 2018-01-01 PROCEDURE — 87040 BLOOD CULTURE FOR BACTERIA: CPT | Performed by: INTERNAL MEDICINE

## 2018-01-01 PROCEDURE — 80053 COMPREHEN METABOLIC PANEL: CPT | Performed by: EMERGENCY MEDICINE

## 2018-01-01 PROCEDURE — 86301 IMMUNOASSAY TUMOR CA 19-9: CPT | Performed by: PHYSICIAN ASSISTANT

## 2018-01-01 PROCEDURE — 85384 FIBRINOGEN ACTIVITY: CPT | Performed by: PHYSICIAN ASSISTANT

## 2018-01-01 PROCEDURE — 36415 COLL VENOUS BLD VENIPUNCTURE: CPT | Performed by: NURSE PRACTITIONER

## 2018-01-01 PROCEDURE — 82247 BILIRUBIN TOTAL: CPT | Mod: 91 | Performed by: INTERNAL MEDICINE

## 2018-01-01 PROCEDURE — 25500064 ZZH RX 255 OP 636: Performed by: INTERNAL MEDICINE

## 2018-01-01 PROCEDURE — 85025 COMPLETE CBC W/AUTO DIFF WBC: CPT | Performed by: NURSE PRACTITIONER

## 2018-01-01 PROCEDURE — 94640 AIRWAY INHALATION TREATMENT: CPT

## 2018-01-01 PROCEDURE — 25800025 ZZH RX 258: Performed by: INTERNAL MEDICINE

## 2018-01-01 PROCEDURE — 40000275 ZZH STATISTIC RCP TIME EA 10 MIN

## 2018-01-01 PROCEDURE — 85520 HEPARIN ASSAY: CPT | Performed by: INTERNAL MEDICINE

## 2018-01-01 PROCEDURE — 99213 OFFICE O/P EST LOW 20 MIN: CPT | Mod: ZP | Performed by: INTERNAL MEDICINE

## 2018-01-01 PROCEDURE — 83690 ASSAY OF LIPASE: CPT | Performed by: INTERNAL MEDICINE

## 2018-01-01 PROCEDURE — 25000566 ZZH SEVOFLURANE, EA 15 MIN: Performed by: INTERNAL MEDICINE

## 2018-01-01 PROCEDURE — C9399 UNCLASSIFIED DRUGS OR BIOLOG: HCPCS

## 2018-01-01 PROCEDURE — 83735 ASSAY OF MAGNESIUM: CPT | Performed by: PHYSICIAN ASSISTANT

## 2018-01-01 PROCEDURE — 40000279 XR SURGERY CARM FLUORO GREATER THAN 5 MIN W STILLS: Mod: TC

## 2018-01-01 PROCEDURE — 99232 SBSQ HOSP IP/OBS MODERATE 35: CPT | Performed by: INTERNAL MEDICINE

## 2018-01-01 PROCEDURE — 99239 HOSP IP/OBS DSCHRG MGMT >30: CPT | Performed by: INTERNAL MEDICINE

## 2018-01-01 PROCEDURE — G0463 HOSPITAL OUTPT CLINIC VISIT: HCPCS | Mod: 25

## 2018-01-01 PROCEDURE — 36415 COLL VENOUS BLD VENIPUNCTURE: CPT | Performed by: INTERNAL MEDICINE

## 2018-01-01 PROCEDURE — 85610 PROTHROMBIN TIME: CPT | Performed by: INTERNAL MEDICINE

## 2018-01-01 PROCEDURE — 87800 DETECT AGNT MULT DNA DIREC: CPT | Performed by: EMERGENCY MEDICINE

## 2018-01-01 PROCEDURE — A9270 NON-COVERED ITEM OR SERVICE: HCPCS | Mod: GY | Performed by: INTERNAL MEDICINE

## 2018-01-01 PROCEDURE — 93306 TTE W/DOPPLER COMPLETE: CPT

## 2018-01-01 PROCEDURE — 25000128 H RX IP 250 OP 636: Performed by: ANESTHESIOLOGY

## 2018-01-01 PROCEDURE — 99285 EMERGENCY DEPT VISIT HI MDM: CPT | Mod: 25

## 2018-01-01 PROCEDURE — 82962 GLUCOSE BLOOD TEST: CPT

## 2018-01-01 PROCEDURE — 85610 PROTHROMBIN TIME: CPT

## 2018-01-01 PROCEDURE — 20000004 ZZH R&B ICU UMMC

## 2018-01-01 PROCEDURE — 12000008 ZZH R&B INTERMEDIATE UMMC

## 2018-01-01 PROCEDURE — 86901 BLOOD TYPING SEROLOGIC RH(D): CPT | Performed by: INTERNAL MEDICINE

## 2018-01-01 PROCEDURE — 87040 BLOOD CULTURE FOR BACTERIA: CPT | Performed by: PHYSICIAN ASSISTANT

## 2018-01-01 PROCEDURE — 84439 ASSAY OF FREE THYROXINE: CPT | Performed by: PHYSICIAN ASSISTANT

## 2018-01-01 PROCEDURE — 87804 INFLUENZA ASSAY W/OPTIC: CPT | Performed by: INTERNAL MEDICINE

## 2018-01-01 PROCEDURE — 87493 C DIFF AMPLIFIED PROBE: CPT | Performed by: PHYSICIAN ASSISTANT

## 2018-01-01 PROCEDURE — 25000125 ZZHC RX 250: Performed by: STUDENT IN AN ORGANIZED HEALTH CARE EDUCATION/TRAINING PROGRAM

## 2018-01-01 PROCEDURE — 97602 WOUND(S) CARE NON-SELECTIVE: CPT

## 2018-01-01 PROCEDURE — 85520 HEPARIN ASSAY: CPT | Performed by: NURSE PRACTITIONER

## 2018-01-01 PROCEDURE — 84132 ASSAY OF SERUM POTASSIUM: CPT | Performed by: INTERNAL MEDICINE

## 2018-01-01 PROCEDURE — 25000128 H RX IP 250 OP 636: Performed by: NURSE ANESTHETIST, CERTIFIED REGISTERED

## 2018-01-01 PROCEDURE — 93306 TTE W/DOPPLER COMPLETE: CPT | Mod: 26 | Performed by: INTERNAL MEDICINE

## 2018-01-01 PROCEDURE — 86850 RBC ANTIBODY SCREEN: CPT | Performed by: INTERNAL MEDICINE

## 2018-01-01 PROCEDURE — A9270 NON-COVERED ITEM OR SERVICE: HCPCS | Performed by: EMERGENCY MEDICINE

## 2018-01-01 PROCEDURE — 40000141 ZZH STATISTIC PERIPHERAL IV START W/O US GUIDANCE

## 2018-01-01 PROCEDURE — 96411 CHEMO IV PUSH ADDL DRUG: CPT

## 2018-01-01 PROCEDURE — 99232 SBSQ HOSP IP/OBS MODERATE 35: CPT | Mod: GC | Performed by: INTERNAL MEDICINE

## 2018-01-01 PROCEDURE — 85018 HEMOGLOBIN: CPT | Performed by: STUDENT IN AN ORGANIZED HEALTH CARE EDUCATION/TRAINING PROGRAM

## 2018-01-01 PROCEDURE — 87040 BLOOD CULTURE FOR BACTERIA: CPT

## 2018-01-01 PROCEDURE — 84439 ASSAY OF FREE THYROXINE: CPT | Performed by: INTERNAL MEDICINE

## 2018-01-01 PROCEDURE — 25000128 H RX IP 250 OP 636

## 2018-01-01 PROCEDURE — 40000344 ZZHCL STATISTIC THAWING COMPONENT: Performed by: PHYSICIAN ASSISTANT

## 2018-01-01 PROCEDURE — 71000014 ZZH RECOVERY PHASE 1 LEVEL 2 FIRST HR: Performed by: INTERNAL MEDICINE

## 2018-01-01 PROCEDURE — 25000132 ZZH RX MED GY IP 250 OP 250 PS 637: Mod: GY

## 2018-01-01 PROCEDURE — 99223 1ST HOSP IP/OBS HIGH 75: CPT | Performed by: NURSE PRACTITIONER

## 2018-01-01 PROCEDURE — 25000125 ZZHC RX 250

## 2018-01-01 PROCEDURE — 80053 COMPREHEN METABOLIC PANEL: CPT

## 2018-01-01 PROCEDURE — 82140 ASSAY OF AMMONIA: CPT | Performed by: PHYSICIAN ASSISTANT

## 2018-01-01 PROCEDURE — 85730 THROMBOPLASTIN TIME PARTIAL: CPT | Performed by: PHYSICIAN ASSISTANT

## 2018-01-01 PROCEDURE — 40000611 ZZHCL STATISTIC MORPHOLOGY W/INTERP HEMEPATH TC 85060: Performed by: PHYSICIAN ASSISTANT

## 2018-01-01 PROCEDURE — 40000277 XR SURGERY CARM FLUORO LESS THAN 5 MIN W STILLS: Mod: TC

## 2018-01-01 PROCEDURE — 85025 COMPLETE CBC W/AUTO DIFF WBC: CPT | Performed by: EMERGENCY MEDICINE

## 2018-01-01 PROCEDURE — 87040 BLOOD CULTURE FOR BACTERIA: CPT | Performed by: NURSE PRACTITIONER

## 2018-01-01 PROCEDURE — P9047 ALBUMIN (HUMAN), 25%, 50ML: HCPCS | Performed by: INTERNAL MEDICINE

## 2018-01-01 PROCEDURE — 99215 OFFICE O/P EST HI 40 MIN: CPT | Performed by: INTERNAL MEDICINE

## 2018-01-01 PROCEDURE — 87641 MR-STAPH DNA AMP PROBE: CPT | Performed by: STUDENT IN AN ORGANIZED HEALTH CARE EDUCATION/TRAINING PROGRAM

## 2018-01-01 PROCEDURE — 25000128 H RX IP 250 OP 636: Mod: JW,ZF | Performed by: INTERNAL MEDICINE

## 2018-01-01 PROCEDURE — 84443 ASSAY THYROID STIM HORMONE: CPT | Performed by: INTERNAL MEDICINE

## 2018-01-01 PROCEDURE — 85018 HEMOGLOBIN: CPT | Performed by: PHYSICIAN ASSISTANT

## 2018-01-01 PROCEDURE — 87449 NOS EACH ORGANISM AG IA: CPT | Performed by: INTERNAL MEDICINE

## 2018-01-01 PROCEDURE — 82150 ASSAY OF AMYLASE: CPT | Performed by: INTERNAL MEDICINE

## 2018-01-01 PROCEDURE — 71000027 ZZH RECOVERY PHASE 2 EACH 15 MINS: Performed by: INTERNAL MEDICINE

## 2018-01-01 PROCEDURE — 71000012 ZZH RECOVERY PHASE 1 LEVEL 1 FIRST HR: Performed by: INTERNAL MEDICINE

## 2018-01-01 PROCEDURE — 83735 ASSAY OF MAGNESIUM: CPT | Performed by: INTERNAL MEDICINE

## 2018-01-01 PROCEDURE — 83690 ASSAY OF LIPASE: CPT | Performed by: EMERGENCY MEDICINE

## 2018-01-01 PROCEDURE — 40000114 ZZH STATISTIC NO CHARGE CLINIC VISIT

## 2018-01-01 PROCEDURE — 99205 OFFICE O/P NEW HI 60 MIN: CPT | Mod: ZP | Performed by: INTERNAL MEDICINE

## 2018-01-01 PROCEDURE — 84145 PROCALCITONIN (PCT): CPT | Performed by: INTERNAL MEDICINE

## 2018-01-01 PROCEDURE — 87640 STAPH A DNA AMP PROBE: CPT | Performed by: STUDENT IN AN ORGANIZED HEALTH CARE EDUCATION/TRAINING PROGRAM

## 2018-01-01 PROCEDURE — 25000132 ZZH RX MED GY IP 250 OP 250 PS 637: Performed by: EMERGENCY MEDICINE

## 2018-01-01 PROCEDURE — 25800025 ZZH RX 258: Mod: ZF | Performed by: PHYSICIAN ASSISTANT

## 2018-01-01 PROCEDURE — G8978 MOBILITY CURRENT STATUS: HCPCS | Mod: GP,CK | Performed by: PHYSICAL THERAPIST

## 2018-01-01 PROCEDURE — P9059 PLASMA, FRZ BETWEEN 8-24HOUR: HCPCS | Performed by: PHYSICIAN ASSISTANT

## 2018-01-01 PROCEDURE — G0008 ADMIN INFLUENZA VIRUS VAC: HCPCS

## 2018-01-01 PROCEDURE — 36000059 ZZH SURGERY LEVEL 3 EA 15 ADDTL MIN UMMC: Performed by: INTERNAL MEDICINE

## 2018-01-01 PROCEDURE — 70450 CT HEAD/BRAIN W/O DYE: CPT

## 2018-01-01 PROCEDURE — 71250 CT THORAX DX C-: CPT

## 2018-01-01 PROCEDURE — 25000125 ZZHC RX 250: Performed by: PHYSICIAN ASSISTANT

## 2018-01-01 PROCEDURE — 25000125 ZZHC RX 250: Mod: ZF | Performed by: PHYSICIAN ASSISTANT

## 2018-01-01 DEVICE — CATH PORT POWERPORT CLEARVUE SLIM 6FR 5616000: Type: IMPLANTABLE DEVICE | Status: FUNCTIONAL

## 2018-01-01 DEVICE — STENT BILIARY WALLFLEX UNCOVERED 10X40MM M00570890: Type: IMPLANTABLE DEVICE | Site: BILE DUCT | Status: FUNCTIONAL

## 2018-01-01 RX ORDER — LORAZEPAM 0.5 MG/1
.25-.5 TABLET ORAL EVERY 4 HOURS PRN
Qty: 30 TABLET | Refills: 5 | Status: SHIPPED | OUTPATIENT
Start: 2018-01-01

## 2018-01-01 RX ORDER — ONDANSETRON 2 MG/ML
8 INJECTION INTRAMUSCULAR; INTRAVENOUS ONCE
Status: CANCELLED
Start: 2018-01-01 | End: 2018-01-01

## 2018-01-01 RX ORDER — HEPARIN SODIUM (PORCINE) LOCK FLUSH IV SOLN 100 UNIT/ML 100 UNIT/ML
5 SOLUTION INTRAVENOUS EVERY 8 HOURS
Status: DISCONTINUED | OUTPATIENT
Start: 2018-01-01 | End: 2018-01-01 | Stop reason: HOSPADM

## 2018-01-01 RX ORDER — HYDROXYZINE HYDROCHLORIDE 25 MG/1
25-50 TABLET, FILM COATED ORAL EVERY 6 HOURS PRN
Qty: 10 TABLET | Refills: 0 | Status: SHIPPED | OUTPATIENT
Start: 2018-01-01 | End: 2018-01-01

## 2018-01-01 RX ORDER — LEVOTHYROXINE SODIUM 125 UG/1
125 TABLET ORAL DAILY
Qty: 10 TABLET | Refills: 0 | Status: SHIPPED | OUTPATIENT
Start: 2018-01-01 | End: 2018-01-01

## 2018-01-01 RX ORDER — HEPARIN SODIUM (PORCINE) LOCK FLUSH IV SOLN 100 UNIT/ML 100 UNIT/ML
5 SOLUTION INTRAVENOUS ONCE
Status: COMPLETED | OUTPATIENT
Start: 2018-01-01 | End: 2018-01-01

## 2018-01-01 RX ORDER — METHYLPREDNISOLONE SODIUM SUCCINATE 125 MG/2ML
125 INJECTION, POWDER, LYOPHILIZED, FOR SOLUTION INTRAMUSCULAR; INTRAVENOUS
Status: CANCELLED
Start: 2018-01-01

## 2018-01-01 RX ORDER — OMEPRAZOLE 40 MG/1
CAPSULE, DELAYED RELEASE ORAL
Status: ON HOLD | COMMUNITY
Start: 2017-01-01 | End: 2018-01-01

## 2018-01-01 RX ORDER — SODIUM CHLORIDE 9 MG/ML
INJECTION, SOLUTION INTRAVENOUS CONTINUOUS
Status: DISCONTINUED | OUTPATIENT
Start: 2018-01-01 | End: 2018-01-01

## 2018-01-01 RX ORDER — IOPAMIDOL 755 MG/ML
59 INJECTION, SOLUTION INTRAVASCULAR ONCE
Status: COMPLETED | OUTPATIENT
Start: 2018-01-01 | End: 2018-01-01

## 2018-01-01 RX ORDER — SODIUM CHLORIDE, SODIUM LACTATE, POTASSIUM CHLORIDE, CALCIUM CHLORIDE 600; 310; 30; 20 MG/100ML; MG/100ML; MG/100ML; MG/100ML
INJECTION, SOLUTION INTRAVENOUS CONTINUOUS
Status: DISCONTINUED | OUTPATIENT
Start: 2018-01-01 | End: 2018-01-01

## 2018-01-01 RX ORDER — FENTANYL CITRATE 50 UG/ML
25-50 INJECTION, SOLUTION INTRAMUSCULAR; INTRAVENOUS
Status: DISCONTINUED | OUTPATIENT
Start: 2018-01-01 | End: 2018-01-01 | Stop reason: HOSPADM

## 2018-01-01 RX ORDER — TRAMADOL HYDROCHLORIDE 50 MG/1
TABLET ORAL
Qty: 60 TABLET | Refills: 0
Start: 2018-01-01 | End: 2018-01-01

## 2018-01-01 RX ORDER — PROCHLORPERAZINE MALEATE 5 MG
TABLET ORAL
Status: ON HOLD | COMMUNITY
Start: 2018-01-01 | End: 2018-01-01

## 2018-01-01 RX ORDER — EPINEPHRINE 0.3 MG/.3ML
0.3 INJECTION SUBCUTANEOUS EVERY 5 MIN PRN
Status: CANCELLED | OUTPATIENT
Start: 2018-01-01

## 2018-01-01 RX ORDER — HYDROMORPHONE HYDROCHLORIDE 1 MG/ML
.5-1 INJECTION, SOLUTION INTRAMUSCULAR; INTRAVENOUS; SUBCUTANEOUS
Status: CANCELLED
Start: 2018-01-01

## 2018-01-01 RX ORDER — OXYCODONE HYDROCHLORIDE 5 MG/1
10-15 CAPSULE ORAL EVERY 6 HOURS PRN
Qty: 90 CAPSULE | Refills: 0 | Status: SHIPPED | OUTPATIENT
Start: 2018-01-01 | End: 2018-01-01

## 2018-01-01 RX ORDER — SODIUM CHLORIDE 9 MG/ML
1000 INJECTION, SOLUTION INTRAVENOUS CONTINUOUS PRN
Status: CANCELLED
Start: 2018-01-01

## 2018-01-01 RX ORDER — ALBUTEROL SULFATE 0.83 MG/ML
2.5 SOLUTION RESPIRATORY (INHALATION) EVERY 6 HOURS PRN
Status: DISCONTINUED | OUTPATIENT
Start: 2018-01-01 | End: 2018-01-01 | Stop reason: HOSPADM

## 2018-01-01 RX ORDER — LORAZEPAM 2 MG/ML
0.5 INJECTION INTRAMUSCULAR EVERY 4 HOURS PRN
Status: CANCELLED
Start: 2018-01-01

## 2018-01-01 RX ORDER — TRAMADOL HYDROCHLORIDE 50 MG/1
50-100 TABLET ORAL EVERY 4 HOURS PRN
Qty: 60 TABLET | Refills: 0 | Status: SHIPPED | OUTPATIENT
Start: 2018-01-01 | End: 2018-01-01

## 2018-01-01 RX ORDER — HEPARIN SODIUM (PORCINE) LOCK FLUSH IV SOLN 100 UNIT/ML 100 UNIT/ML
5 SOLUTION INTRAVENOUS
Status: DISCONTINUED | OUTPATIENT
Start: 2018-01-01 | End: 2018-01-01 | Stop reason: HOSPADM

## 2018-01-01 RX ORDER — ACETAMINOPHEN 325 MG/1
650 TABLET ORAL EVERY 4 HOURS PRN
Status: DISCONTINUED | OUTPATIENT
Start: 2018-01-01 | End: 2018-01-01 | Stop reason: HOSPADM

## 2018-01-01 RX ORDER — TRAMADOL HYDROCHLORIDE 50 MG/1
TABLET ORAL
Qty: 60 TABLET | Refills: 0 | Status: SHIPPED | OUTPATIENT
Start: 2018-01-01 | End: 2018-01-01

## 2018-01-01 RX ORDER — ONDANSETRON 8 MG/1
8 TABLET, FILM COATED ORAL EVERY 8 HOURS PRN
Qty: 10 TABLET | Refills: 2 | Status: SHIPPED | OUTPATIENT
Start: 2018-01-01 | End: 2018-01-01

## 2018-01-01 RX ORDER — MEPERIDINE HYDROCHLORIDE 25 MG/ML
25 INJECTION INTRAMUSCULAR; INTRAVENOUS; SUBCUTANEOUS EVERY 30 MIN PRN
Status: CANCELLED | OUTPATIENT
Start: 2018-01-01

## 2018-01-01 RX ORDER — HEPARIN SODIUM (PORCINE) LOCK FLUSH IV SOLN 100 UNIT/ML 100 UNIT/ML
5 SOLUTION INTRAVENOUS EVERY 8 HOURS PRN
Status: DISCONTINUED | OUTPATIENT
Start: 2018-01-01 | End: 2018-01-01 | Stop reason: HOSPADM

## 2018-01-01 RX ORDER — TIMOLOL MALEATE 5 MG/ML
1 SOLUTION/ DROPS OPHTHALMIC EVERY MORNING
Status: DISCONTINUED | OUTPATIENT
Start: 2018-01-01 | End: 2018-01-01 | Stop reason: HOSPADM

## 2018-01-01 RX ORDER — SODIUM CHLORIDE, SODIUM LACTATE, POTASSIUM CHLORIDE, CALCIUM CHLORIDE 600; 310; 30; 20 MG/100ML; MG/100ML; MG/100ML; MG/100ML
INJECTION, SOLUTION INTRAVENOUS CONTINUOUS
Status: DISCONTINUED | OUTPATIENT
Start: 2018-01-01 | End: 2018-01-01 | Stop reason: HOSPADM

## 2018-01-01 RX ORDER — GLYCOPYRROLATE 0.2 MG/ML
INJECTION, SOLUTION INTRAMUSCULAR; INTRAVENOUS PRN
Status: DISCONTINUED | OUTPATIENT
Start: 2018-01-01 | End: 2018-01-01

## 2018-01-01 RX ORDER — ACETAMINOPHEN 325 MG/1
975 TABLET ORAL ONCE
Status: COMPLETED | OUTPATIENT
Start: 2018-01-01 | End: 2018-01-01

## 2018-01-01 RX ORDER — FENTANYL CITRATE 50 UG/ML
INJECTION, SOLUTION INTRAMUSCULAR; INTRAVENOUS PRN
Status: DISCONTINUED | OUTPATIENT
Start: 2018-01-01 | End: 2018-01-01

## 2018-01-01 RX ORDER — DEXTROSE, SODIUM CHLORIDE, AND POTASSIUM CHLORIDE 5; .45; .15 G/100ML; G/100ML; G/100ML
2000 INJECTION INTRAVENOUS ONCE
Status: CANCELLED
Start: 2018-01-01 | End: 2018-01-01

## 2018-01-01 RX ORDER — ALBUTEROL SULFATE 90 UG/1
1-2 AEROSOL, METERED RESPIRATORY (INHALATION)
Status: CANCELLED
Start: 2018-01-01

## 2018-01-01 RX ORDER — ONDANSETRON 2 MG/ML
8 INJECTION INTRAMUSCULAR; INTRAVENOUS ONCE
Status: DISCONTINUED | OUTPATIENT
Start: 2018-01-01 | End: 2018-01-01

## 2018-01-01 RX ORDER — ALBUTEROL SULFATE 0.83 MG/ML
2.5 SOLUTION RESPIRATORY (INHALATION)
Status: CANCELLED | OUTPATIENT
Start: 2018-01-01

## 2018-01-01 RX ORDER — DIPHENHYDRAMINE HYDROCHLORIDE 50 MG/ML
50 INJECTION INTRAMUSCULAR; INTRAVENOUS
Status: CANCELLED
Start: 2018-01-01

## 2018-01-01 RX ORDER — ALBUTEROL SULFATE 90 UG/1
2 AEROSOL, METERED RESPIRATORY (INHALATION) 4 TIMES DAILY
Status: DISCONTINUED | OUTPATIENT
Start: 2018-01-01 | End: 2018-01-01

## 2018-01-01 RX ORDER — NALOXONE HYDROCHLORIDE 0.4 MG/ML
.1-.4 INJECTION, SOLUTION INTRAMUSCULAR; INTRAVENOUS; SUBCUTANEOUS
Status: DISCONTINUED | OUTPATIENT
Start: 2018-01-01 | End: 2018-01-01 | Stop reason: HOSPADM

## 2018-01-01 RX ORDER — LORAZEPAM 0.5 MG/1
0.5 TABLET ORAL EVERY 4 HOURS PRN
Qty: 15 TABLET | Refills: 0 | Status: SHIPPED | OUTPATIENT
Start: 2018-01-01 | End: 2018-01-01

## 2018-01-01 RX ORDER — LORAZEPAM 2 MG/ML
0.5 INJECTION INTRAMUSCULAR EVERY 6 HOURS PRN
Status: DISCONTINUED | OUTPATIENT
Start: 2018-01-01 | End: 2018-01-01

## 2018-01-01 RX ORDER — DEXAMETHASONE 2 MG/1
2 TABLET ORAL EVERY 12 HOURS
Qty: 20 TABLET | Refills: 0 | Status: SHIPPED | OUTPATIENT
Start: 2018-01-01 | End: 2018-01-01

## 2018-01-01 RX ORDER — TRAMADOL HYDROCHLORIDE 50 MG/1
TABLET ORAL
Qty: 60 TABLET | Refills: 0 | OUTPATIENT
Start: 2018-01-01

## 2018-01-01 RX ORDER — LEVOTHYROXINE SODIUM 125 UG/1
125 TABLET ORAL DAILY
Qty: 90 TABLET | Refills: 1 | Status: SHIPPED | OUTPATIENT
Start: 2018-01-01

## 2018-01-01 RX ORDER — HYDROMORPHONE HYDROCHLORIDE 1 MG/ML
.3-.5 INJECTION, SOLUTION INTRAMUSCULAR; INTRAVENOUS; SUBCUTANEOUS
Status: DISCONTINUED | OUTPATIENT
Start: 2018-01-01 | End: 2018-01-01

## 2018-01-01 RX ORDER — ONDANSETRON 8 MG/1
8 TABLET, FILM COATED ORAL EVERY 8 HOURS PRN
Qty: 30 TABLET | Refills: 2 | Status: SHIPPED | OUTPATIENT
Start: 2018-01-01 | End: 2018-01-01

## 2018-01-01 RX ORDER — DEXTROSE, SODIUM CHLORIDE, AND POTASSIUM CHLORIDE 5; .45; .15 G/100ML; G/100ML; G/100ML
1000 INJECTION INTRAVENOUS ONCE
Status: CANCELLED
Start: 2018-01-01 | End: 2018-01-01

## 2018-01-01 RX ORDER — EPINEPHRINE 1 MG/ML
0.3 INJECTION, SOLUTION INTRAMUSCULAR; SUBCUTANEOUS EVERY 5 MIN PRN
Status: CANCELLED | OUTPATIENT
Start: 2018-01-01

## 2018-01-01 RX ORDER — EPINEPHRINE 1 MG/ML
0.3 INJECTION, SOLUTION, CONCENTRATE INTRAVENOUS EVERY 5 MIN PRN
Status: CANCELLED | OUTPATIENT
Start: 2018-01-01

## 2018-01-01 RX ORDER — INDOMETHACIN 50 MG/1
100 SUPPOSITORY RECTAL
Status: DISCONTINUED | OUTPATIENT
Start: 2018-01-01 | End: 2018-01-01

## 2018-01-01 RX ORDER — IPRATROPIUM BROMIDE AND ALBUTEROL SULFATE 2.5; .5 MG/3ML; MG/3ML
3 SOLUTION RESPIRATORY (INHALATION)
Status: DISCONTINUED | OUTPATIENT
Start: 2018-01-01 | End: 2018-01-01 | Stop reason: HOSPADM

## 2018-01-01 RX ORDER — PACLITAXEL 100 MG/20ML
125 INJECTION, POWDER, LYOPHILIZED, FOR SUSPENSION INTRAVENOUS ONCE
Status: CANCELLED | OUTPATIENT
Start: 2018-01-01

## 2018-01-01 RX ORDER — NYSTATIN 100000/ML
500000 SUSPENSION, ORAL (FINAL DOSE FORM) ORAL 4 TIMES DAILY
Qty: 200 ML | Refills: 0 | Status: ON HOLD | OUTPATIENT
Start: 2018-01-01 | End: 2018-01-01

## 2018-01-01 RX ORDER — POTASSIUM CHLORIDE 1.5 G/1.58G
20-40 POWDER, FOR SOLUTION ORAL
Status: DISCONTINUED | OUTPATIENT
Start: 2018-01-01 | End: 2018-01-01

## 2018-01-01 RX ORDER — MEPERIDINE HYDROCHLORIDE 50 MG/ML
12.5 INJECTION INTRAMUSCULAR; INTRAVENOUS; SUBCUTANEOUS
Status: DISCONTINUED | OUTPATIENT
Start: 2018-01-01 | End: 2018-01-01 | Stop reason: HOSPADM

## 2018-01-01 RX ORDER — HEPARIN SODIUM (PORCINE) LOCK FLUSH IV SOLN 100 UNIT/ML 100 UNIT/ML
500 SOLUTION INTRAVENOUS EVERY 8 HOURS
Status: DISCONTINUED | OUTPATIENT
Start: 2018-01-01 | End: 2018-01-01 | Stop reason: HOSPADM

## 2018-01-01 RX ORDER — LORAZEPAM 1 MG/1
TABLET ORAL
Refills: 0 | Status: ON HOLD | COMMUNITY
Start: 2018-01-01 | End: 2018-01-01

## 2018-01-01 RX ORDER — PANTOPRAZOLE SODIUM 20 MG/1
20 TABLET, DELAYED RELEASE ORAL DAILY
Qty: 30 TABLET | Refills: 1 | Status: SHIPPED | OUTPATIENT
Start: 2018-01-01 | End: 2018-01-01

## 2018-01-01 RX ORDER — LEVOTHYROXINE SODIUM 125 UG/1
125 TABLET ORAL DAILY
Status: DISCONTINUED | OUTPATIENT
Start: 2018-01-01 | End: 2018-01-01 | Stop reason: HOSPADM

## 2018-01-01 RX ORDER — HYDROMORPHONE HYDROCHLORIDE 1 MG/ML
.3-.5 INJECTION, SOLUTION INTRAMUSCULAR; INTRAVENOUS; SUBCUTANEOUS EVERY 10 MIN PRN
Status: DISCONTINUED | OUTPATIENT
Start: 2018-01-01 | End: 2018-01-01 | Stop reason: HOSPADM

## 2018-01-01 RX ORDER — NICOTINE POLACRILEX 4 MG
15-30 LOZENGE BUCCAL
Status: DISCONTINUED | OUTPATIENT
Start: 2018-01-01 | End: 2018-01-01 | Stop reason: HOSPADM

## 2018-01-01 RX ORDER — POTASSIUM CHLORIDE 7.45 MG/ML
10 INJECTION INTRAVENOUS
Status: DISCONTINUED | OUTPATIENT
Start: 2018-01-01 | End: 2018-01-01

## 2018-01-01 RX ORDER — ALBUTEROL SULFATE 90 UG/1
2 AEROSOL, METERED RESPIRATORY (INHALATION) 4 TIMES DAILY
Qty: 1 INHALER | Refills: 1 | Status: ON HOLD | OUTPATIENT
Start: 2018-01-01 | End: 2018-01-01

## 2018-01-01 RX ORDER — HEPARIN SODIUM (PORCINE) LOCK FLUSH IV SOLN 100 UNIT/ML 100 UNIT/ML
500 SOLUTION INTRAVENOUS ONCE
Status: COMPLETED | OUTPATIENT
Start: 2018-01-01 | End: 2018-01-01

## 2018-01-01 RX ORDER — PANTOPRAZOLE SODIUM 20 MG/1
20 TABLET, DELAYED RELEASE ORAL DAILY
Qty: 30 TABLET | Refills: 2 | COMMUNITY
Start: 2018-01-01

## 2018-01-01 RX ORDER — ALBUMIN (HUMAN) 12.5 G/50ML
50 SOLUTION INTRAVENOUS ONCE
Status: COMPLETED | OUTPATIENT
Start: 2018-01-01 | End: 2018-01-01

## 2018-01-01 RX ORDER — CIPROFLOXACIN 500 MG/1
500 TABLET, FILM COATED ORAL EVERY 12 HOURS
Qty: 30 TABLET | Refills: 1 | Status: SHIPPED | OUTPATIENT
Start: 2018-01-01

## 2018-01-01 RX ORDER — PROCHLORPERAZINE MALEATE 5 MG
5 TABLET ORAL EVERY 6 HOURS PRN
Status: DISCONTINUED | OUTPATIENT
Start: 2018-01-01 | End: 2018-01-01 | Stop reason: HOSPADM

## 2018-01-01 RX ORDER — DILTIAZEM HYDROCHLORIDE 120 MG/1
CAPSULE, EXTENDED RELEASE ORAL
Refills: 0 | COMMUNITY
Start: 2018-01-01 | End: 2018-01-01

## 2018-01-01 RX ORDER — OSELTAMIVIR PHOSPHATE 75 MG/1
75 CAPSULE ORAL 2 TIMES DAILY
Qty: 2 CAPSULE | Refills: 0 | Status: SHIPPED | OUTPATIENT
Start: 2018-01-01 | End: 2018-01-01

## 2018-01-01 RX ORDER — PROCHLORPERAZINE MALEATE 10 MG
10 TABLET ORAL EVERY 6 HOURS PRN
Qty: 30 TABLET | Refills: 3 | Status: ON HOLD | OUTPATIENT
Start: 2018-01-01 | End: 2018-01-01

## 2018-01-01 RX ORDER — OXYCODONE HYDROCHLORIDE 5 MG/1
10-15 CAPSULE ORAL EVERY 6 HOURS PRN
Qty: 45 CAPSULE | Refills: 0 | Status: SHIPPED | OUTPATIENT
Start: 2018-01-01 | End: 2018-01-01

## 2018-01-01 RX ORDER — PANTOPRAZOLE SODIUM 20 MG/1
TABLET, DELAYED RELEASE ORAL
Qty: 14 TABLET | Refills: 0 | OUTPATIENT
Start: 2018-01-01

## 2018-01-01 RX ORDER — MAGNESIUM SULFATE HEPTAHYDRATE 40 MG/ML
4 INJECTION, SOLUTION INTRAVENOUS EVERY 4 HOURS PRN
Status: DISCONTINUED | OUTPATIENT
Start: 2018-01-01 | End: 2018-01-01 | Stop reason: HOSPADM

## 2018-01-01 RX ORDER — TRAMADOL HYDROCHLORIDE 50 MG/1
50-100 TABLET ORAL EVERY 4 HOURS PRN
Qty: 150 TABLET | Refills: 1 | Status: ON HOLD | OUTPATIENT
Start: 2018-01-01 | End: 2018-01-01

## 2018-01-01 RX ORDER — NICOTINE POLACRILEX 4 MG
LOZENGE BUCCAL
Status: DISCONTINUED
Start: 2018-01-01 | End: 2018-01-01 | Stop reason: HOSPADM

## 2018-01-01 RX ORDER — NALOXONE HYDROCHLORIDE 0.4 MG/ML
.1-.4 INJECTION, SOLUTION INTRAMUSCULAR; INTRAVENOUS; SUBCUTANEOUS
Status: DISCONTINUED | OUTPATIENT
Start: 2018-01-01 | End: 2018-01-01

## 2018-01-01 RX ORDER — DIPHENHYDRAMINE HYDROCHLORIDE AND LIDOCAINE HYDROCHLORIDE AND ALUMINUM HYDROXIDE AND MAGNESIUM HYDRO
5-10 KIT EVERY 6 HOURS PRN
Qty: 237 ML | Refills: 3 | Status: SHIPPED | OUTPATIENT
Start: 2018-01-01

## 2018-01-01 RX ORDER — ONDANSETRON 2 MG/ML
4 INJECTION INTRAMUSCULAR; INTRAVENOUS EVERY 30 MIN PRN
Status: DISCONTINUED | OUTPATIENT
Start: 2018-01-01 | End: 2018-01-01 | Stop reason: HOSPADM

## 2018-01-01 RX ORDER — INDOMETHACIN 50 MG/1
100 SUPPOSITORY RECTAL
Status: COMPLETED | OUTPATIENT
Start: 2018-01-01 | End: 2018-01-01

## 2018-01-01 RX ORDER — ACETAMINOPHEN 650 MG/1
650 SUPPOSITORY RECTAL EVERY 4 HOURS PRN
Qty: 2 SUPPOSITORY | Status: SHIPPED | OUTPATIENT
Start: 2018-01-01

## 2018-01-01 RX ORDER — ONDANSETRON 4 MG/1
4 TABLET, ORALLY DISINTEGRATING ORAL EVERY 30 MIN PRN
Status: DISCONTINUED | OUTPATIENT
Start: 2018-01-01 | End: 2018-01-01 | Stop reason: HOSPADM

## 2018-01-01 RX ORDER — PANTOPRAZOLE SODIUM 20 MG/1
20 TABLET, DELAYED RELEASE ORAL DAILY
Qty: 14 TABLET | Refills: 0 | Status: SHIPPED | OUTPATIENT
Start: 2018-01-01 | End: 2018-01-01

## 2018-01-01 RX ORDER — LIDOCAINE HYDROCHLORIDE 20 MG/ML
INJECTION, SOLUTION INFILTRATION; PERINEURAL PRN
Status: DISCONTINUED | OUTPATIENT
Start: 2018-01-01 | End: 2018-01-01

## 2018-01-01 RX ORDER — PREDNISONE 10 MG/1
10 TABLET ORAL DAILY
Status: DISCONTINUED | OUTPATIENT
Start: 2018-01-01 | End: 2018-01-01

## 2018-01-01 RX ORDER — PROCHLORPERAZINE MALEATE 10 MG
5-10 TABLET ORAL EVERY 6 HOURS PRN
Qty: 30 TABLET | Refills: 3 | COMMUNITY
Start: 2018-01-01

## 2018-01-01 RX ORDER — ONDANSETRON 8 MG/1
8 TABLET, FILM COATED ORAL EVERY 8 HOURS PRN
Status: DISCONTINUED | OUTPATIENT
Start: 2018-01-01 | End: 2018-01-01 | Stop reason: HOSPADM

## 2018-01-01 RX ORDER — MINERAL OIL/HYDROPHIL PETROLAT
OINTMENT (GRAM) TOPICAL
Status: DISCONTINUED | OUTPATIENT
Start: 2018-01-01 | End: 2018-01-01 | Stop reason: HOSPADM

## 2018-01-01 RX ORDER — LOPERAMIDE HCL 2 MG
CAPSULE ORAL
Qty: 60 CAPSULE | Refills: 3 | Status: ON HOLD | OUTPATIENT
Start: 2018-01-01 | End: 2018-01-01

## 2018-01-01 RX ORDER — OSELTAMIVIR PHOSPHATE 75 MG/1
75 CAPSULE ORAL 2 TIMES DAILY
Qty: 10 CAPSULE | Refills: 0 | Status: ON HOLD | OUTPATIENT
Start: 2018-01-01 | End: 2018-01-01

## 2018-01-01 RX ORDER — MULTIPLE VITAMINS W/ MINERALS TAB 9MG-400MCG
1 TAB ORAL DAILY
Status: DISCONTINUED | OUTPATIENT
Start: 2018-01-01 | End: 2018-01-01 | Stop reason: HOSPADM

## 2018-01-01 RX ORDER — ONDANSETRON 2 MG/ML
INJECTION INTRAMUSCULAR; INTRAVENOUS PRN
Status: DISCONTINUED | OUTPATIENT
Start: 2018-01-01 | End: 2018-01-01

## 2018-01-01 RX ORDER — POTASSIUM CL/LIDO/0.9 % NACL 10MEQ/0.1L
10 INTRAVENOUS SOLUTION, PIGGYBACK (ML) INTRAVENOUS
Status: DISCONTINUED | OUTPATIENT
Start: 2018-01-01 | End: 2018-01-01 | Stop reason: HOSPADM

## 2018-01-01 RX ORDER — POLYETHYLENE GLYCOL 3350 17 G/17G
17 POWDER, FOR SOLUTION ORAL DAILY PRN
Status: DISCONTINUED | OUTPATIENT
Start: 2018-01-01 | End: 2018-01-01 | Stop reason: HOSPADM

## 2018-01-01 RX ORDER — OXYCODONE HYDROCHLORIDE 10 MG/1
10 TABLET ORAL
Status: DISCONTINUED | OUTPATIENT
Start: 2018-01-01 | End: 2018-01-01 | Stop reason: HOSPADM

## 2018-01-01 RX ORDER — PACLITAXEL 100 MG/20ML
200 INJECTION, POWDER, LYOPHILIZED, FOR SUSPENSION INTRAVENOUS ONCE
Status: COMPLETED | OUTPATIENT
Start: 2018-01-01 | End: 2018-01-01

## 2018-01-01 RX ORDER — LANOLIN ALCOHOL/MO/W.PET/CERES
400 CREAM (GRAM) TOPICAL EVERY MORNING
Status: DISCONTINUED | OUTPATIENT
Start: 2018-01-01 | End: 2018-01-01 | Stop reason: HOSPADM

## 2018-01-01 RX ORDER — LOPERAMIDE HCL 2 MG
CAPSULE ORAL
Qty: 30 CAPSULE | Refills: 0 | Status: SHIPPED | OUTPATIENT
Start: 2018-01-01 | End: 2018-01-01

## 2018-01-01 RX ORDER — TRIAMTERENE AND HYDROCHLOROTHIAZIDE 37.5; 25 MG/1; MG/1
CAPSULE ORAL
COMMUNITY
Start: 2018-01-01 | End: 2018-01-01

## 2018-01-01 RX ORDER — OXYCODONE HYDROCHLORIDE 5 MG/1
5-10 TABLET ORAL EVERY 6 HOURS PRN
Status: DISCONTINUED | OUTPATIENT
Start: 2018-01-01 | End: 2018-01-01

## 2018-01-01 RX ORDER — IOPAMIDOL 755 MG/ML
61 INJECTION, SOLUTION INTRAVASCULAR ONCE
Status: COMPLETED | OUTPATIENT
Start: 2018-01-01 | End: 2018-01-01

## 2018-01-01 RX ORDER — HYDROXYZINE HYDROCHLORIDE 25 MG/1
25-50 TABLET, FILM COATED ORAL EVERY 6 HOURS PRN
Qty: 10 TABLET | Refills: 0 | OUTPATIENT
Start: 2018-01-01

## 2018-01-01 RX ORDER — HYDROMORPHONE HYDROCHLORIDE 1 MG/ML
.5-1 INJECTION, SOLUTION INTRAMUSCULAR; INTRAVENOUS; SUBCUTANEOUS
Status: DISCONTINUED | OUTPATIENT
Start: 2018-01-01 | End: 2018-01-01 | Stop reason: HOSPADM

## 2018-01-01 RX ORDER — OXYCODONE HYDROCHLORIDE 5 MG/1
5-10 CAPSULE ORAL EVERY 4 HOURS PRN
Qty: 45 CAPSULE | Refills: 0 | Status: SHIPPED | OUTPATIENT
Start: 2018-01-01 | End: 2018-01-01

## 2018-01-01 RX ORDER — DILTIAZEM HYDROCHLORIDE 120 MG/1
CAPSULE, EXTENDED RELEASE ORAL
Qty: 10 CAPSULE | Refills: 0 | Status: ON HOLD | OUTPATIENT
Start: 2018-01-01 | End: 2018-01-01

## 2018-01-01 RX ORDER — POTASSIUM CHLORIDE 29.8 MG/ML
20 INJECTION INTRAVENOUS
Status: DISCONTINUED | OUTPATIENT
Start: 2018-01-01 | End: 2018-01-01 | Stop reason: HOSPADM

## 2018-01-01 RX ORDER — IOPAMIDOL 510 MG/ML
INJECTION, SOLUTION INTRAVASCULAR PRN
Status: DISCONTINUED | OUTPATIENT
Start: 2018-01-01 | End: 2018-01-01 | Stop reason: HOSPADM

## 2018-01-01 RX ORDER — PANTOPRAZOLE SODIUM 20 MG/1
TABLET, DELAYED RELEASE ORAL
Qty: 90 TABLET | Refills: 1 | OUTPATIENT
Start: 2018-01-01

## 2018-01-01 RX ORDER — PROPOFOL 10 MG/ML
INJECTION, EMULSION INTRAVENOUS PRN
Status: DISCONTINUED | OUTPATIENT
Start: 2018-01-01 | End: 2018-01-01

## 2018-01-01 RX ORDER — HYDROMORPHONE HYDROCHLORIDE 1 MG/ML
.3-.5 INJECTION, SOLUTION INTRAMUSCULAR; INTRAVENOUS; SUBCUTANEOUS
Status: CANCELLED
Start: 2018-01-01

## 2018-01-01 RX ORDER — HYDROXYZINE HYDROCHLORIDE 25 MG/1
25-50 TABLET, FILM COATED ORAL EVERY 6 HOURS PRN
Qty: 60 TABLET | Refills: 3 | Status: SHIPPED | OUTPATIENT
Start: 2018-01-01

## 2018-01-01 RX ORDER — HEPARIN SODIUM,PORCINE 10 UNIT/ML
5 VIAL (ML) INTRAVENOUS EVERY 24 HOURS
Status: DISCONTINUED | OUTPATIENT
Start: 2018-01-01 | End: 2018-01-01 | Stop reason: HOSPADM

## 2018-01-01 RX ORDER — HYDROXYZINE HYDROCHLORIDE 25 MG/1
25-50 TABLET, FILM COATED ORAL EVERY 6 HOURS PRN
Status: DISCONTINUED | OUTPATIENT
Start: 2018-01-01 | End: 2018-01-01

## 2018-01-01 RX ORDER — OXYCODONE HYDROCHLORIDE 5 MG/1
10-15 CAPSULE ORAL
Qty: 30 CAPSULE | Refills: 0 | Status: SHIPPED | OUTPATIENT
Start: 2018-01-01 | End: 2018-01-01

## 2018-01-01 RX ORDER — TRAMADOL HYDROCHLORIDE 50 MG/1
50-100 TABLET ORAL EVERY 4 HOURS PRN
Qty: 60 TABLET | Refills: 0
Start: 2018-01-01 | End: 2018-01-01

## 2018-01-01 RX ORDER — ALBUTEROL SULFATE 90 UG/1
2 AEROSOL, METERED RESPIRATORY (INHALATION) EVERY 4 HOURS PRN
Qty: 1 INHALER | Refills: 1 | COMMUNITY
Start: 2018-01-01

## 2018-01-01 RX ORDER — HEPARIN SODIUM,PORCINE 10 UNIT/ML
5-10 VIAL (ML) INTRAVENOUS EVERY 24 HOURS
Status: DISCONTINUED | OUTPATIENT
Start: 2018-01-01 | End: 2018-01-01 | Stop reason: HOSPADM

## 2018-01-01 RX ORDER — POTASSIUM CHLORIDE 750 MG/1
20-40 TABLET, EXTENDED RELEASE ORAL
Status: DISCONTINUED | OUTPATIENT
Start: 2018-01-01 | End: 2018-01-01 | Stop reason: HOSPADM

## 2018-01-01 RX ORDER — ONDANSETRON 4 MG/1
TABLET, FILM COATED ORAL
Status: ON HOLD | COMMUNITY
Start: 2017-01-01 | End: 2018-01-01

## 2018-01-01 RX ORDER — SODIUM CHLORIDE, SODIUM LACTATE, POTASSIUM CHLORIDE, CALCIUM CHLORIDE 600; 310; 30; 20 MG/100ML; MG/100ML; MG/100ML; MG/100ML
INJECTION, SOLUTION INTRAVENOUS CONTINUOUS PRN
Status: DISCONTINUED | OUTPATIENT
Start: 2018-01-01 | End: 2018-01-01

## 2018-01-01 RX ORDER — IOPAMIDOL 755 MG/ML
64 INJECTION, SOLUTION INTRAVASCULAR ONCE
Status: COMPLETED | OUTPATIENT
Start: 2018-01-01 | End: 2018-01-01

## 2018-01-01 RX ORDER — ONDANSETRON 2 MG/ML
8 INJECTION INTRAMUSCULAR; INTRAVENOUS ONCE
Status: CANCELLED | OUTPATIENT
Start: 2018-01-01

## 2018-01-01 RX ORDER — OXYCODONE HYDROCHLORIDE 10 MG/1
10 TABLET ORAL EVERY 4 HOURS PRN
Status: DISCONTINUED | OUTPATIENT
Start: 2018-01-01 | End: 2018-01-01

## 2018-01-01 RX ORDER — ALBUTEROL SULFATE 90 UG/1
2 AEROSOL, METERED RESPIRATORY (INHALATION) 4 TIMES DAILY PRN
Status: DISCONTINUED | OUTPATIENT
Start: 2018-01-01 | End: 2018-01-01 | Stop reason: HOSPADM

## 2018-01-01 RX ORDER — AMOXICILLIN 250 MG
1-2 CAPSULE ORAL 2 TIMES DAILY PRN
Qty: 100 TABLET | Refills: 0 | Status: SHIPPED | OUTPATIENT
Start: 2018-01-01

## 2018-01-01 RX ORDER — OXYCODONE HYDROCHLORIDE 5 MG/1
15 TABLET ORAL EVERY 6 HOURS PRN
Qty: 30 TABLET | Refills: 0 | Status: ON HOLD | OUTPATIENT
Start: 2018-01-01 | End: 2018-01-01

## 2018-01-01 RX ORDER — PANTOPRAZOLE SODIUM 20 MG/1
TABLET, DELAYED RELEASE ORAL
Qty: 90 TABLET | Refills: 0 | OUTPATIENT
Start: 2018-01-01

## 2018-01-01 RX ORDER — LORAZEPAM 0.5 MG/1
0.5 TABLET ORAL EVERY 4 HOURS PRN
Qty: 30 TABLET | Refills: 2 | Status: ON HOLD | OUTPATIENT
Start: 2018-01-01 | End: 2018-01-01

## 2018-01-01 RX ORDER — FENTANYL CITRATE 50 UG/ML
25-50 INJECTION, SOLUTION INTRAMUSCULAR; INTRAVENOUS EVERY 5 MIN PRN
Status: DISCONTINUED | OUTPATIENT
Start: 2018-01-01 | End: 2018-01-01 | Stop reason: HOSPADM

## 2018-01-01 RX ORDER — ALBUTEROL SULFATE 0.83 MG/ML
2.5 SOLUTION RESPIRATORY (INHALATION)
Status: DISCONTINUED | OUTPATIENT
Start: 2018-01-01 | End: 2018-01-01 | Stop reason: HOSPADM

## 2018-01-01 RX ORDER — LEVOFLOXACIN 750 MG/1
TABLET, FILM COATED ORAL
Refills: 0 | Status: ON HOLD | COMMUNITY
Start: 2018-01-01 | End: 2018-01-01

## 2018-01-01 RX ORDER — ONDANSETRON 2 MG/ML
8 INJECTION INTRAMUSCULAR; INTRAVENOUS ONCE
Status: CANCELLED | OUTPATIENT
Start: 2018-01-01 | End: 2018-01-01

## 2018-01-01 RX ORDER — OXYCODONE HYDROCHLORIDE 5 MG/1
15 TABLET ORAL ONCE
Status: COMPLETED | OUTPATIENT
Start: 2018-01-01 | End: 2018-01-01

## 2018-01-01 RX ORDER — DEXTROSE MONOHYDRATE 25 G/50ML
25-50 INJECTION, SOLUTION INTRAVENOUS
Status: DISCONTINUED | OUTPATIENT
Start: 2018-01-01 | End: 2018-01-01 | Stop reason: HOSPADM

## 2018-01-01 RX ORDER — PACLITAXEL 100 MG/20ML
125 INJECTION, POWDER, LYOPHILIZED, FOR SUSPENSION INTRAVENOUS ONCE
Status: COMPLETED | OUTPATIENT
Start: 2018-01-01 | End: 2018-01-01

## 2018-01-01 RX ORDER — NYSTATIN 100000/ML
SUSPENSION, ORAL (FINAL DOSE FORM) ORAL
COMMUNITY
Start: 2018-01-01 | End: 2018-01-01

## 2018-01-01 RX ORDER — MEPERIDINE HYDROCHLORIDE 25 MG/ML
12.5 INJECTION INTRAMUSCULAR; INTRAVENOUS; SUBCUTANEOUS
Status: DISCONTINUED | OUTPATIENT
Start: 2018-01-01 | End: 2018-01-01 | Stop reason: HOSPADM

## 2018-01-01 RX ORDER — EPHEDRINE SULFATE 50 MG/ML
INJECTION, SOLUTION INTRAMUSCULAR; INTRAVENOUS; SUBCUTANEOUS PRN
Status: DISCONTINUED | OUTPATIENT
Start: 2018-01-01 | End: 2018-01-01

## 2018-01-01 RX ORDER — HEPARIN SODIUM (PORCINE) LOCK FLUSH IV SOLN 100 UNIT/ML 100 UNIT/ML
5 SOLUTION INTRAVENOUS DAILY PRN
Status: DISCONTINUED | OUTPATIENT
Start: 2018-01-01 | End: 2018-01-01 | Stop reason: HOSPADM

## 2018-01-01 RX ORDER — OMEPRAZOLE 40 MG/1
40 CAPSULE, DELAYED RELEASE ORAL 2 TIMES DAILY
Qty: 20 CAPSULE | Refills: 0 | Status: SHIPPED | OUTPATIENT
Start: 2018-01-01 | End: 2018-01-01

## 2018-01-01 RX ORDER — TIMOLOL MALEATE 5 MG/ML
SOLUTION/ DROPS OPHTHALMIC
Refills: 3 | Status: ON HOLD | COMMUNITY
Start: 2018-01-01 | End: 2018-01-01

## 2018-01-01 RX ORDER — ONDANSETRON 2 MG/ML
8 INJECTION INTRAMUSCULAR; INTRAVENOUS ONCE
Status: COMPLETED | OUTPATIENT
Start: 2018-01-01 | End: 2018-01-01

## 2018-01-01 RX ORDER — FLUMAZENIL 0.1 MG/ML
0.2 INJECTION, SOLUTION INTRAVENOUS
Status: DISCONTINUED | OUTPATIENT
Start: 2018-01-01 | End: 2018-01-01 | Stop reason: HOSPADM

## 2018-01-01 RX ORDER — PREDNISONE 10 MG/1
TABLET ORAL
Refills: 0 | Status: ON HOLD | COMMUNITY
Start: 2018-01-01 | End: 2018-01-01

## 2018-01-01 RX ORDER — LORAZEPAM 0.5 MG/1
0.5 TABLET ORAL EVERY 4 HOURS PRN
Qty: 30 TABLET | Refills: 3 | Status: ON HOLD | OUTPATIENT
Start: 2018-01-01 | End: 2018-01-01

## 2018-01-01 RX ORDER — POTASSIUM CHLORIDE 1.5 G/1.58G
20-40 POWDER, FOR SOLUTION ORAL
Status: DISCONTINUED | OUTPATIENT
Start: 2018-01-01 | End: 2018-01-01 | Stop reason: HOSPADM

## 2018-01-01 RX ORDER — AMOXICILLIN 250 MG
1 CAPSULE ORAL 2 TIMES DAILY PRN
Status: DISCONTINUED | OUTPATIENT
Start: 2018-01-01 | End: 2018-01-01 | Stop reason: HOSPADM

## 2018-01-01 RX ORDER — NYSTATIN 100000/ML
500000 SUSPENSION, ORAL (FINAL DOSE FORM) ORAL 4 TIMES DAILY
Status: DISCONTINUED | OUTPATIENT
Start: 2018-01-01 | End: 2018-01-01 | Stop reason: HOSPADM

## 2018-01-01 RX ORDER — ONDANSETRON 8 MG/1
8 TABLET, FILM COATED ORAL EVERY 8 HOURS PRN
Qty: 30 TABLET | Refills: 2 | Status: SHIPPED | OUTPATIENT
Start: 2018-01-01

## 2018-01-01 RX ORDER — DILTIAZEM HYDROCHLORIDE 120 MG/1
120 CAPSULE, COATED, EXTENDED RELEASE ORAL DAILY
Status: DISCONTINUED | OUTPATIENT
Start: 2018-01-01 | End: 2018-01-01 | Stop reason: HOSPADM

## 2018-01-01 RX ORDER — HEPARIN SODIUM (PORCINE) LOCK FLUSH IV SOLN 100 UNIT/ML 100 UNIT/ML
5 SOLUTION INTRAVENOUS
Status: COMPLETED | OUTPATIENT
Start: 2018-01-01 | End: 2018-01-01

## 2018-01-01 RX ORDER — ALBUTEROL SULFATE 90 UG/1
2 AEROSOL, METERED RESPIRATORY (INHALATION) 4 TIMES DAILY
Status: DISCONTINUED | OUTPATIENT
Start: 2018-01-01 | End: 2018-01-01 | Stop reason: HOSPADM

## 2018-01-01 RX ORDER — HYDROMORPHONE HYDROCHLORIDE 1 MG/ML
.3-.5 INJECTION, SOLUTION INTRAMUSCULAR; INTRAVENOUS; SUBCUTANEOUS EVERY 4 HOURS PRN
Status: DISCONTINUED | OUTPATIENT
Start: 2018-01-01 | End: 2018-01-01

## 2018-01-01 RX ORDER — LIDOCAINE 40 MG/G
CREAM TOPICAL
Status: DISCONTINUED | OUTPATIENT
Start: 2018-01-01 | End: 2018-01-01 | Stop reason: HOSPADM

## 2018-01-01 RX ORDER — PANTOPRAZOLE SODIUM 20 MG/1
20 TABLET, DELAYED RELEASE ORAL DAILY
Status: DISCONTINUED | OUTPATIENT
Start: 2018-01-01 | End: 2018-01-01 | Stop reason: HOSPADM

## 2018-01-01 RX ORDER — PROCHLORPERAZINE MALEATE 10 MG
TABLET ORAL
COMMUNITY
Start: 2017-01-01 | End: 2018-01-01

## 2018-01-01 RX ORDER — NYSTATIN 100000/ML
500000 SUSPENSION, ORAL (FINAL DOSE FORM) ORAL 4 TIMES DAILY
Qty: 473 ML | Refills: 3 | Status: SHIPPED | OUTPATIENT
Start: 2018-01-01 | End: 2018-01-01

## 2018-01-01 RX ORDER — BISACODYL 10 MG
10 SUPPOSITORY, RECTAL RECTAL DAILY PRN
Qty: 2 SUPPOSITORY | Status: SHIPPED | OUTPATIENT
Start: 2018-01-01

## 2018-01-01 RX ORDER — PROPOFOL 10 MG/ML
INJECTION, EMULSION INTRAVENOUS CONTINUOUS PRN
Status: DISCONTINUED | OUTPATIENT
Start: 2018-01-01 | End: 2018-01-01

## 2018-01-01 RX ORDER — ACETAMINOPHEN 325 MG/1
650 TABLET ORAL EVERY 6 HOURS PRN
Status: DISCONTINUED | OUTPATIENT
Start: 2018-01-01 | End: 2018-01-01 | Stop reason: HOSPADM

## 2018-01-01 RX ORDER — HYDROXYZINE HYDROCHLORIDE 25 MG/1
25 TABLET, FILM COATED ORAL EVERY 6 HOURS PRN
Status: DISCONTINUED | OUTPATIENT
Start: 2018-01-01 | End: 2018-01-01 | Stop reason: HOSPADM

## 2018-01-01 RX ORDER — LORAZEPAM 2 MG/ML
.5-1 INJECTION INTRAMUSCULAR EVERY 6 HOURS PRN
Status: DISCONTINUED | OUTPATIENT
Start: 2018-01-01 | End: 2018-01-01 | Stop reason: HOSPADM

## 2018-01-01 RX ORDER — DEXTROSE, SODIUM CHLORIDE, AND POTASSIUM CHLORIDE 5; .45; .15 G/100ML; G/100ML; G/100ML
2000 INJECTION INTRAVENOUS ONCE
Status: COMPLETED | OUTPATIENT
Start: 2018-01-01 | End: 2018-01-01

## 2018-01-01 RX ORDER — AZITHROMYCIN 250 MG/1
250 TABLET, FILM COATED ORAL DAILY
Qty: 2 TABLET | Refills: 0 | Status: SHIPPED | OUTPATIENT
Start: 2018-01-01 | End: 2018-01-01

## 2018-01-01 RX ORDER — DEXAMETHASONE 4 MG/1
4 TABLET ORAL EVERY 12 HOURS SCHEDULED
Status: DISCONTINUED | OUTPATIENT
Start: 2018-01-01 | End: 2018-01-01 | Stop reason: HOSPADM

## 2018-01-01 RX ORDER — OSELTAMIVIR PHOSPHATE 30 MG/1
30 CAPSULE ORAL 2 TIMES DAILY
Status: DISCONTINUED | OUTPATIENT
Start: 2018-01-01 | End: 2018-01-01 | Stop reason: HOSPADM

## 2018-01-01 RX ORDER — POTASSIUM CHLORIDE 1500 MG/1
20-40 TABLET, EXTENDED RELEASE ORAL
Status: DISCONTINUED | OUTPATIENT
Start: 2018-01-01 | End: 2018-01-01

## 2018-01-01 RX ORDER — FLUMAZENIL 0.1 MG/ML
0.2 INJECTION, SOLUTION INTRAVENOUS
Status: ACTIVE | OUTPATIENT
Start: 2018-01-01 | End: 2018-01-01

## 2018-01-01 RX ORDER — TRAMADOL HYDROCHLORIDE 50 MG/1
50-100 TABLET ORAL EVERY 4 HOURS PRN
Status: DISCONTINUED | OUTPATIENT
Start: 2018-01-01 | End: 2018-01-01 | Stop reason: HOSPADM

## 2018-01-01 RX ORDER — DEXAMETHASONE SODIUM PHOSPHATE 4 MG/ML
4 INJECTION, SOLUTION INTRA-ARTICULAR; INTRALESIONAL; INTRAMUSCULAR; INTRAVENOUS; SOFT TISSUE ONCE
Status: COMPLETED | OUTPATIENT
Start: 2018-01-01 | End: 2018-01-01

## 2018-01-01 RX ORDER — LORAZEPAM 0.5 MG/1
0.5 TABLET ORAL EVERY 6 HOURS PRN
Status: DISCONTINUED | OUTPATIENT
Start: 2018-01-01 | End: 2018-01-01

## 2018-01-01 RX ORDER — TIMOLOL MALEATE 6.8 MG/ML
1 SOLUTION/ DROPS OPHTHALMIC EVERY MORNING
Qty: 1 BOTTLE | Refills: 0 | Status: SHIPPED | OUTPATIENT
Start: 2018-01-01

## 2018-01-01 RX ORDER — CIPROFLOXACIN 500 MG/1
500 TABLET, FILM COATED ORAL EVERY 12 HOURS
Status: DISCONTINUED | OUTPATIENT
Start: 2018-01-01 | End: 2018-01-01 | Stop reason: HOSPADM

## 2018-01-01 RX ORDER — TIMOLOL MALEATE 5 MG/ML
SOLUTION/ DROPS OPHTHALMIC
Refills: 3 | COMMUNITY
Start: 2018-01-01

## 2018-01-01 RX ORDER — PROCHLORPERAZINE MALEATE 5 MG
5 TABLET ORAL EVERY 6 HOURS PRN
Qty: 30 TABLET | Refills: 0 | Status: ON HOLD | OUTPATIENT
Start: 2018-01-01 | End: 2018-01-01

## 2018-01-01 RX ORDER — LORAZEPAM 0.5 MG/1
.5-1 TABLET ORAL EVERY 6 HOURS PRN
Status: DISCONTINUED | OUTPATIENT
Start: 2018-01-01 | End: 2018-01-01 | Stop reason: HOSPADM

## 2018-01-01 RX ORDER — DILTIAZEM HYDROCHLORIDE 120 MG/1
CAPSULE, EXTENDED RELEASE ORAL
Qty: 90 CAPSULE | Refills: 1 | Status: ON HOLD | OUTPATIENT
Start: 2018-01-01 | End: 2018-01-01

## 2018-01-01 RX ORDER — OXYCODONE HYDROCHLORIDE 5 MG/1
10-15 CAPSULE ORAL EVERY 4 HOURS PRN
Qty: 30 CAPSULE | Refills: 0 | Status: ON HOLD | OUTPATIENT
Start: 2018-01-01 | End: 2018-01-01

## 2018-01-01 RX ORDER — HEPARIN SODIUM,PORCINE 10 UNIT/ML
5-10 VIAL (ML) INTRAVENOUS
Status: DISCONTINUED | OUTPATIENT
Start: 2018-01-01 | End: 2018-01-01 | Stop reason: HOSPADM

## 2018-01-01 RX ORDER — IPRATROPIUM BROMIDE AND ALBUTEROL SULFATE 2.5; .5 MG/3ML; MG/3ML
3 SOLUTION RESPIRATORY (INHALATION) EVERY 4 HOURS PRN
Status: DISCONTINUED | OUTPATIENT
Start: 2018-01-01 | End: 2018-01-01

## 2018-01-01 RX ORDER — POTASSIUM CHLORIDE 7.45 MG/ML
10 INJECTION INTRAVENOUS
Status: DISCONTINUED | OUTPATIENT
Start: 2018-01-01 | End: 2018-01-01 | Stop reason: HOSPADM

## 2018-01-01 RX ORDER — LORAZEPAM 0.5 MG/1
0.5 TABLET ORAL EVERY 4 HOURS PRN
Status: DISCONTINUED | OUTPATIENT
Start: 2018-01-01 | End: 2018-01-01 | Stop reason: HOSPADM

## 2018-01-01 RX ORDER — POTASSIUM CHLORIDE 29.8 MG/ML
20 INJECTION INTRAVENOUS
Status: DISCONTINUED | OUTPATIENT
Start: 2018-01-01 | End: 2018-01-01

## 2018-01-01 RX ORDER — MORPHINE SULFATE 20 MG/ML
5-10 SOLUTION ORAL
Qty: 30 ML | Refills: 0 | Status: SHIPPED | OUTPATIENT
Start: 2018-01-01

## 2018-01-01 RX ORDER — CIPROFLOXACIN 2 MG/ML
INJECTION, SOLUTION INTRAVENOUS PRN
Status: DISCONTINUED | OUTPATIENT
Start: 2018-01-01 | End: 2018-01-01

## 2018-01-01 RX ORDER — POLYETHYLENE GLYCOL 3350 17 G/17G
1 POWDER, FOR SOLUTION ORAL DAILY PRN
Qty: 500 G | Refills: 3 | COMMUNITY
Start: 2018-01-01

## 2018-01-01 RX ORDER — ONDANSETRON 2 MG/ML
4 INJECTION INTRAMUSCULAR; INTRAVENOUS EVERY 6 HOURS PRN
Status: DISCONTINUED | OUTPATIENT
Start: 2018-01-01 | End: 2018-01-01 | Stop reason: HOSPADM

## 2018-01-01 RX ORDER — NYSTATIN 100000/ML
500000 SUSPENSION, ORAL (FINAL DOSE FORM) ORAL 4 TIMES DAILY
Qty: 200 ML | Refills: 0 | Status: SHIPPED | OUTPATIENT
Start: 2018-01-01 | End: 2018-01-01

## 2018-01-01 RX ORDER — HYDROMORPHONE HYDROCHLORIDE 1 MG/ML
.3-.5 INJECTION, SOLUTION INTRAMUSCULAR; INTRAVENOUS; SUBCUTANEOUS
Status: DISCONTINUED | OUTPATIENT
Start: 2018-01-01 | End: 2018-01-01 | Stop reason: HOSPADM

## 2018-01-01 RX ORDER — IOPAMIDOL 755 MG/ML
77 INJECTION, SOLUTION INTRAVASCULAR ONCE
Status: COMPLETED | OUTPATIENT
Start: 2018-01-01 | End: 2018-01-01

## 2018-01-01 RX ORDER — ONDANSETRON 2 MG/ML
8 INJECTION INTRAMUSCULAR; INTRAVENOUS EVERY 8 HOURS PRN
Status: DISCONTINUED | OUTPATIENT
Start: 2018-01-01 | End: 2018-01-01 | Stop reason: HOSPADM

## 2018-01-01 RX ORDER — PIPERACILLIN SODIUM, TAZOBACTAM SODIUM 4; .5 G/20ML; G/20ML
4.5 INJECTION, POWDER, LYOPHILIZED, FOR SOLUTION INTRAVENOUS EVERY 6 HOURS
Status: DISCONTINUED | OUTPATIENT
Start: 2018-01-01 | End: 2018-01-01

## 2018-01-01 RX ORDER — LEVOTHYROXINE SODIUM 125 UG/1
TABLET ORAL
Status: ON HOLD | COMMUNITY
Start: 2018-01-01 | End: 2018-01-01

## 2018-01-01 RX ORDER — AMOXICILLIN 250 MG
2 CAPSULE ORAL 2 TIMES DAILY PRN
Status: DISCONTINUED | OUTPATIENT
Start: 2018-01-01 | End: 2018-01-01 | Stop reason: HOSPADM

## 2018-01-01 RX ORDER — HALOPERIDOL 0.5 MG/1
.5-1 TABLET ORAL EVERY 6 HOURS PRN
Qty: 15 TABLET | Status: SHIPPED | OUTPATIENT
Start: 2018-01-01

## 2018-01-01 RX ORDER — DEXAMETHASONE SODIUM PHOSPHATE 4 MG/ML
INJECTION, SOLUTION INTRA-ARTICULAR; INTRALESIONAL; INTRAMUSCULAR; INTRAVENOUS; SOFT TISSUE PRN
Status: DISCONTINUED | OUTPATIENT
Start: 2018-01-01 | End: 2018-01-01

## 2018-01-01 RX ORDER — LORAZEPAM 0.5 MG/1
0.5 TABLET ORAL EVERY 4 HOURS PRN
Qty: 30 TABLET | Refills: 3 | Status: SHIPPED | OUTPATIENT
Start: 2018-01-01 | End: 2018-01-01

## 2018-01-01 RX ORDER — PANTOPRAZOLE SODIUM 20 MG/1
20 TABLET, DELAYED RELEASE ORAL DAILY
Qty: 30 TABLET | Refills: 2 | Status: ON HOLD | OUTPATIENT
Start: 2018-01-01 | End: 2018-01-01

## 2018-01-01 RX ORDER — POTASSIUM CL/LIDO/0.9 % NACL 10MEQ/0.1L
10 INTRAVENOUS SOLUTION, PIGGYBACK (ML) INTRAVENOUS
Status: DISCONTINUED | OUTPATIENT
Start: 2018-01-01 | End: 2018-01-01

## 2018-01-01 RX ORDER — POLYETHYLENE GLYCOL 3350 17 G/17G
1 POWDER, FOR SOLUTION ORAL DAILY
Qty: 500 G | Refills: 3 | Status: ON HOLD | OUTPATIENT
Start: 2018-01-01 | End: 2018-01-01

## 2018-01-01 RX ORDER — LOPERAMIDE HCL 2 MG
2 CAPSULE ORAL 4 TIMES DAILY PRN
Qty: 10 CAPSULE | Refills: 0 | Status: SHIPPED | OUTPATIENT
Start: 2018-01-01 | End: 2018-01-01

## 2018-01-01 RX ORDER — ONDANSETRON 8 MG/1
8 TABLET, ORALLY DISINTEGRATING ORAL EVERY 8 HOURS PRN
Status: DISCONTINUED | OUTPATIENT
Start: 2018-01-01 | End: 2018-01-01 | Stop reason: HOSPADM

## 2018-01-01 RX ADMIN — DEXAMETHASONE SODIUM PHOSPHATE: 10 INJECTION, SOLUTION INTRAMUSCULAR; INTRAVENOUS at 13:51

## 2018-01-01 RX ADMIN — CIPROFLOXACIN HYDROCHLORIDE 500 MG: 500 TABLET, FILM COATED ORAL at 20:36

## 2018-01-01 RX ADMIN — SODIUM CHLORIDE, PRESERVATIVE FREE 5 ML: 5 INJECTION INTRAVENOUS at 14:59

## 2018-01-01 RX ADMIN — IRINOTECAN HYDROCHLORIDE 101 MG: 4.3 INJECTION, POWDER, FOR SOLUTION INTRAVENOUS at 12:34

## 2018-01-01 RX ADMIN — SODIUM CHLORIDE, PRESERVATIVE FREE 5 ML: 5 INJECTION INTRAVENOUS at 10:38

## 2018-01-01 RX ADMIN — Medication 5 ML: at 13:12

## 2018-01-01 RX ADMIN — PANCRELIPASE 72000 UNITS: 36000; 180000; 114000 CAPSULE, DELAYED RELEASE PELLETS ORAL at 08:42

## 2018-01-01 RX ADMIN — OXYCODONE HYDROCHLORIDE 5 MG: 5 TABLET ORAL at 10:30

## 2018-01-01 RX ADMIN — NYSTATIN 500000 UNITS: 100000 SUSPENSION ORAL at 16:18

## 2018-01-01 RX ADMIN — Medication 5 ML: at 07:51

## 2018-01-01 RX ADMIN — SODIUM CHLORIDE 250 ML: 9 INJECTION, SOLUTION INTRAVENOUS at 13:29

## 2018-01-01 RX ADMIN — ENOXAPARIN SODIUM 50 MG: 60 INJECTION SUBCUTANEOUS at 08:09

## 2018-01-01 RX ADMIN — NYSTATIN 500000 UNITS: 100000 SUSPENSION ORAL at 07:41

## 2018-01-01 RX ADMIN — SODIUM CHLORIDE, PRESERVATIVE FREE 5 ML: 5 INJECTION INTRAVENOUS at 14:25

## 2018-01-01 RX ADMIN — HEPARIN 5 ML: 100 SYRINGE at 09:30

## 2018-01-01 RX ADMIN — IRINOTECAN HYDROCHLORIDE 101 MG: 4.3 INJECTION INTRAVENOUS at 14:29

## 2018-01-01 RX ADMIN — IPRATROPIUM BROMIDE AND ALBUTEROL SULFATE 3 ML: .5; 3 SOLUTION RESPIRATORY (INHALATION) at 20:33

## 2018-01-01 RX ADMIN — OSELTAMIVIR PHOSPHATE 30 MG: 30 CAPSULE ORAL at 08:41

## 2018-01-01 RX ADMIN — SODIUM CHLORIDE, POTASSIUM CHLORIDE, SODIUM LACTATE AND CALCIUM CHLORIDE: 600; 310; 30; 20 INJECTION, SOLUTION INTRAVENOUS at 09:58

## 2018-01-01 RX ADMIN — Medication 10 ML: at 10:45

## 2018-01-01 RX ADMIN — ENOXAPARIN SODIUM 50 MG: 60 INJECTION SUBCUTANEOUS at 08:44

## 2018-01-01 RX ADMIN — SODIUM CHLORIDE, SODIUM LACTATE, POTASSIUM CHLORIDE, CALCIUM CHLORIDE: 600; 310; 30; 20 INJECTION, SOLUTION INTRAVENOUS at 10:19

## 2018-01-01 RX ADMIN — PHENYLEPHRINE HYDROCHLORIDE 200 MCG: 10 INJECTION, SOLUTION INTRAMUSCULAR; INTRAVENOUS; SUBCUTANEOUS at 10:08

## 2018-01-01 RX ADMIN — SODIUM CHLORIDE 2000 ML: 9 INJECTION, SOLUTION INTRAVENOUS at 13:35

## 2018-01-01 RX ADMIN — AZITHROMYCIN MONOHYDRATE 250 MG: 500 INJECTION, POWDER, LYOPHILIZED, FOR SOLUTION INTRAVENOUS at 17:17

## 2018-01-01 RX ADMIN — ENOXAPARIN SODIUM 50 MG: 60 INJECTION SUBCUTANEOUS at 08:10

## 2018-01-01 RX ADMIN — PIPERACILLIN AND TAZOBACTAM 4.5 G: 4; .5 INJECTION, POWDER, FOR SOLUTION INTRAVENOUS at 04:52

## 2018-01-01 RX ADMIN — SODIUM CHLORIDE 250 ML: 9 INJECTION, SOLUTION INTRAVENOUS at 11:41

## 2018-01-01 RX ADMIN — TIMOLOL MALEATE 1 DROP: 5 SOLUTION/ DROPS OPHTHALMIC at 08:26

## 2018-01-01 RX ADMIN — WATER 100 ML: 100 IRRIGANT IRRIGATION at 10:58

## 2018-01-01 RX ADMIN — ALBUTEROL SULFATE 2.5 MG: 2.5 SOLUTION RESPIRATORY (INHALATION) at 01:36

## 2018-01-01 RX ADMIN — TRAMADOL HYDROCHLORIDE 100 MG: 50 TABLET, COATED ORAL at 10:08

## 2018-01-01 RX ADMIN — AZITHROMYCIN MONOHYDRATE 250 MG: 500 INJECTION, POWDER, LYOPHILIZED, FOR SOLUTION INTRAVENOUS at 16:54

## 2018-01-01 RX ADMIN — Medication 10 MEQ: at 12:33

## 2018-01-01 RX ADMIN — PANCRELIPASE 72000 UNITS: 36000; 180000; 114000 CAPSULE, DELAYED RELEASE PELLETS ORAL at 17:45

## 2018-01-01 RX ADMIN — OXYCODONE HYDROCHLORIDE 10 MG: 10 TABLET ORAL at 08:22

## 2018-01-01 RX ADMIN — MULTIPLE VITAMINS W/ MINERALS TAB 1 TABLET: TAB at 07:40

## 2018-01-01 RX ADMIN — ATROPINE SULFATE 0.4 MG: 0.4 INJECTION, SOLUTION INTRAMUSCULAR; INTRAVENOUS; SUBCUTANEOUS at 10:52

## 2018-01-01 RX ADMIN — DEXAMETHASONE SODIUM PHOSPHATE 12 MG: 10 INJECTION, SOLUTION INTRAMUSCULAR; INTRAVENOUS at 09:47

## 2018-01-01 RX ADMIN — PANCRELIPASE 36000 UNITS: 36000; 180000; 114000 CAPSULE, DELAYED RELEASE PELLETS ORAL at 08:11

## 2018-01-01 RX ADMIN — DEXAMETHASONE SODIUM PHOSPHATE: 10 INJECTION, SOLUTION INTRAMUSCULAR; INTRAVENOUS at 14:38

## 2018-01-01 RX ADMIN — TRAMADOL HYDROCHLORIDE 100 MG: 50 TABLET, COATED ORAL at 06:23

## 2018-01-01 RX ADMIN — DILTIAZEM HYDROCHLORIDE 120 MG: 120 CAPSULE, EXTENDED RELEASE ORAL at 07:41

## 2018-01-01 RX ADMIN — HEPARIN SODIUM (PORCINE) LOCK FLUSH IV SOLN 100 UNIT/ML 5 ML: 100 SOLUTION at 10:57

## 2018-01-01 RX ADMIN — PHENYLEPHRINE HYDROCHLORIDE 100 MCG: 10 INJECTION, SOLUTION INTRAMUSCULAR; INTRAVENOUS; SUBCUTANEOUS at 19:54

## 2018-01-01 RX ADMIN — POTASSIUM CHLORIDE 40 MEQ: 1500 TABLET, EXTENDED RELEASE ORAL at 06:51

## 2018-01-01 RX ADMIN — LEVOTHYROXINE SODIUM 125 MCG: 25 TABLET ORAL at 08:23

## 2018-01-01 RX ADMIN — FLUTICASONE FUROATE AND VILANTEROL TRIFENATATE 1 PUFF: 100; 25 POWDER RESPIRATORY (INHALATION) at 08:10

## 2018-01-01 RX ADMIN — PROPOFOL 30 MG: 10 INJECTION, EMULSION INTRAVENOUS at 10:27

## 2018-01-01 RX ADMIN — CIPROFLOXACIN HYDROCHLORIDE 500 MG: 500 TABLET, FILM COATED ORAL at 20:58

## 2018-01-01 RX ADMIN — HEPARIN SODIUM (PORCINE) LOCK FLUSH IV SOLN 100 UNIT/ML 5 ML: 100 SOLUTION at 10:43

## 2018-01-01 RX ADMIN — SODIUM CHLORIDE, PRESERVATIVE FREE 5 ML: 5 INJECTION INTRAVENOUS at 22:07

## 2018-01-01 RX ADMIN — POTASSIUM CHLORIDE 10 MEQ: 7.46 INJECTION, SOLUTION INTRAVENOUS at 03:26

## 2018-01-01 RX ADMIN — PANCRELIPASE 72000 UNITS: 36000; 180000; 114000 CAPSULE, DELAYED RELEASE PELLETS ORAL at 08:26

## 2018-01-01 RX ADMIN — DEXAMETHASONE SODIUM PHOSPHATE: 10 INJECTION, SOLUTION INTRAMUSCULAR; INTRAVENOUS at 13:49

## 2018-01-01 RX ADMIN — Medication 0.3 MG: at 07:56

## 2018-01-01 RX ADMIN — DEXAMETHASONE SODIUM PHOSPHATE 12 MG: 10 INJECTION, SOLUTION INTRAMUSCULAR; INTRAVENOUS at 14:33

## 2018-01-01 RX ADMIN — Medication 0.3 MG: at 05:25

## 2018-01-01 RX ADMIN — NYSTATIN 500000 UNITS: 100000 SUSPENSION ORAL at 20:02

## 2018-01-01 RX ADMIN — FENTANYL CITRATE 25 MCG: 50 INJECTION, SOLUTION INTRAMUSCULAR; INTRAVENOUS at 10:42

## 2018-01-01 RX ADMIN — SODIUM CHLORIDE, PRESERVATIVE FREE 5 ML: 5 INJECTION INTRAVENOUS at 08:58

## 2018-01-01 RX ADMIN — OXYCODONE HYDROCHLORIDE 5 MG: 5 TABLET ORAL at 04:29

## 2018-01-01 RX ADMIN — IOPAMIDOL 59 ML: 755 INJECTION, SOLUTION INTRAVASCULAR at 10:11

## 2018-01-01 RX ADMIN — IPRATROPIUM BROMIDE AND ALBUTEROL SULFATE 3 ML: .5; 3 SOLUTION RESPIRATORY (INHALATION) at 19:21

## 2018-01-01 RX ADMIN — SODIUM CHLORIDE, PRESERVATIVE FREE 5 ML: 5 INJECTION INTRAVENOUS at 05:37

## 2018-01-01 RX ADMIN — HEPARIN SODIUM (PORCINE) LOCK FLUSH IV SOLN 100 UNIT/ML 500 UNITS: 100 SOLUTION at 10:21

## 2018-01-01 RX ADMIN — SODIUM CHLORIDE 250 ML: 9 INJECTION, SOLUTION INTRAVENOUS at 11:52

## 2018-01-01 RX ADMIN — SODIUM CHLORIDE, PRESERVATIVE FREE 5 ML: 5 INJECTION INTRAVENOUS at 08:09

## 2018-01-01 RX ADMIN — SODIUM CHLORIDE 1000 ML: 9 INJECTION, SOLUTION INTRAVENOUS at 16:40

## 2018-01-01 RX ADMIN — NYSTATIN 500000 UNITS: 100000 SUSPENSION ORAL at 20:04

## 2018-01-01 RX ADMIN — TRAMADOL HYDROCHLORIDE 50 MG: 50 TABLET, COATED ORAL at 19:03

## 2018-01-01 RX ADMIN — FENTANYL CITRATE 50 MCG: 50 INJECTION, SOLUTION INTRAMUSCULAR; INTRAVENOUS at 19:41

## 2018-01-01 RX ADMIN — IPRATROPIUM BROMIDE AND ALBUTEROL SULFATE 3 ML: .5; 3 SOLUTION RESPIRATORY (INHALATION) at 11:37

## 2018-01-01 RX ADMIN — PIPERACILLIN AND TAZOBACTAM 4.5 G: 4; .5 INJECTION, POWDER, FOR SOLUTION INTRAVENOUS at 07:53

## 2018-01-01 RX ADMIN — DEXAMETHASONE SODIUM PHOSPHATE: 10 INJECTION, SOLUTION INTRAMUSCULAR; INTRAVENOUS at 10:53

## 2018-01-01 RX ADMIN — OXYCODONE HYDROCHLORIDE 10 MG: 10 TABLET ORAL at 12:45

## 2018-01-01 RX ADMIN — OSELTAMIVIR PHOSPHATE 30 MG: 30 CAPSULE ORAL at 20:02

## 2018-01-01 RX ADMIN — IPRATROPIUM BROMIDE AND ALBUTEROL SULFATE 3 ML: .5; 3 SOLUTION RESPIRATORY (INHALATION) at 08:06

## 2018-01-01 RX ADMIN — MULTIPLE VITAMINS W/ MINERALS TAB 1 TABLET: TAB at 08:01

## 2018-01-01 RX ADMIN — PREDNISONE 10 MG: 10 TABLET ORAL at 07:41

## 2018-01-01 RX ADMIN — INDOMETHACIN 100 MG: 50 SUPPOSITORY RECTAL at 10:53

## 2018-01-01 RX ADMIN — IPRATROPIUM BROMIDE AND ALBUTEROL SULFATE 3 ML: .5; 3 SOLUTION RESPIRATORY (INHALATION) at 07:45

## 2018-01-01 RX ADMIN — ENOXAPARIN SODIUM 50 MG: 60 INJECTION SUBCUTANEOUS at 20:17

## 2018-01-01 RX ADMIN — LEVOTHYROXINE SODIUM 125 MCG: 125 TABLET ORAL at 08:08

## 2018-01-01 RX ADMIN — SODIUM CHLORIDE, PRESERVATIVE FREE 5 ML: 5 INJECTION INTRAVENOUS at 10:35

## 2018-01-01 RX ADMIN — NYSTATIN 500000 UNITS: 100000 SUSPENSION ORAL at 10:53

## 2018-01-01 RX ADMIN — ALBUTEROL SULFATE 2 PUFF: 90 AEROSOL, METERED RESPIRATORY (INHALATION) at 08:54

## 2018-01-01 RX ADMIN — OMEPRAZOLE 40 MG: 20 CAPSULE, DELAYED RELEASE ORAL at 07:45

## 2018-01-01 RX ADMIN — DEXAMETHASONE SODIUM PHOSPHATE: 10 INJECTION, SOLUTION INTRAMUSCULAR; INTRAVENOUS at 12:00

## 2018-01-01 RX ADMIN — SODIUM CHLORIDE 250 ML: 9 INJECTION, SOLUTION INTRAVENOUS at 10:32

## 2018-01-01 RX ADMIN — Medication 500 UNITS: at 09:48

## 2018-01-01 RX ADMIN — DEXTROSE 15 G: 15 GEL ORAL at 00:30

## 2018-01-01 RX ADMIN — LEVOTHYROXINE SODIUM 125 MCG: 125 TABLET ORAL at 08:11

## 2018-01-01 RX ADMIN — POTASSIUM CHLORIDE, DEXTROSE MONOHYDRATE AND SODIUM CHLORIDE 2000 ML: 150; 5; 450 INJECTION, SOLUTION INTRAVENOUS at 13:04

## 2018-01-01 RX ADMIN — SODIUM CHLORIDE, POTASSIUM CHLORIDE, SODIUM LACTATE AND CALCIUM CHLORIDE: 600; 310; 30; 20 INJECTION, SOLUTION INTRAVENOUS at 21:30

## 2018-01-01 RX ADMIN — SODIUM CHLORIDE, PRESERVATIVE FREE 5 ML: 5 INJECTION INTRAVENOUS at 12:49

## 2018-01-01 RX ADMIN — Medication 5 ML: at 08:51

## 2018-01-01 RX ADMIN — IRINOTECAN HYDROCHLORIDE 101 MG: 4.3 INJECTION, POWDER, FOR SOLUTION INTRAVENOUS at 12:11

## 2018-01-01 RX ADMIN — DEXAMETHASONE SODIUM PHOSPHATE 12 MG: 10 INJECTION, SOLUTION INTRAMUSCULAR; INTRAVENOUS at 09:51

## 2018-01-01 RX ADMIN — SODIUM CHLORIDE 250 ML: 9 INJECTION, SOLUTION INTRAVENOUS at 10:18

## 2018-01-01 RX ADMIN — LEUCOVORIN CALCIUM 300 MG: 200 INJECTION, POWDER, LYOPHILIZED, FOR SOLUTION INTRAMUSCULAR; INTRAVENOUS at 12:36

## 2018-01-01 RX ADMIN — DEXAMETHASONE 4 MG: 4 TABLET ORAL at 08:12

## 2018-01-01 RX ADMIN — SODIUM CHLORIDE, POTASSIUM CHLORIDE, SODIUM LACTATE AND CALCIUM CHLORIDE 1000 ML: 600; 310; 30; 20 INJECTION, SOLUTION INTRAVENOUS at 11:56

## 2018-01-01 RX ADMIN — DEXAMETHASONE SODIUM PHOSPHATE: 10 INJECTION, SOLUTION INTRAMUSCULAR; INTRAVENOUS at 11:15

## 2018-01-01 RX ADMIN — ONDANSETRON 4 MG: 2 INJECTION INTRAMUSCULAR; INTRAVENOUS at 10:19

## 2018-01-01 RX ADMIN — SODIUM CHLORIDE: 9 INJECTION, SOLUTION INTRAVENOUS at 16:41

## 2018-01-01 RX ADMIN — ENOXAPARIN SODIUM 50 MG: 60 INJECTION SUBCUTANEOUS at 21:38

## 2018-01-01 RX ADMIN — NYSTATIN 500000 UNITS: 100000 SUSPENSION ORAL at 08:41

## 2018-01-01 RX ADMIN — PHENYLEPHRINE HYDROCHLORIDE 100 MCG: 10 INJECTION, SOLUTION INTRAMUSCULAR; INTRAVENOUS; SUBCUTANEOUS at 10:34

## 2018-01-01 RX ADMIN — ACETAMINOPHEN 400 MCG: 400 TABLET ORAL at 08:11

## 2018-01-01 RX ADMIN — OSELTAMIVIR PHOSPHATE 30 MG: 30 CAPSULE ORAL at 07:44

## 2018-01-01 RX ADMIN — ALBUTEROL SULFATE 2 PUFF: 90 AEROSOL, METERED RESPIRATORY (INHALATION) at 08:00

## 2018-01-01 RX ADMIN — PANCRELIPASE 36000 UNITS: 36000; 180000; 114000 CAPSULE, DELAYED RELEASE PELLETS ORAL at 12:47

## 2018-01-01 RX ADMIN — OMEPRAZOLE 40 MG: 20 CAPSULE, DELAYED RELEASE ORAL at 20:01

## 2018-01-01 RX ADMIN — SODIUM CHLORIDE 1000 ML: 9 INJECTION, SOLUTION INTRAVENOUS at 12:35

## 2018-01-01 RX ADMIN — DEXAMETHASONE SODIUM PHOSPHATE: 10 INJECTION, SOLUTION INTRAMUSCULAR; INTRAVENOUS at 15:10

## 2018-01-01 RX ADMIN — ACETAMINOPHEN 400 MCG: 400 TABLET ORAL at 08:02

## 2018-01-01 RX ADMIN — SODIUM CHLORIDE, PRESERVATIVE FREE 5 ML: 5 INJECTION INTRAVENOUS at 11:09

## 2018-01-01 RX ADMIN — IPRATROPIUM BROMIDE AND ALBUTEROL SULFATE 3 ML: .5; 3 SOLUTION RESPIRATORY (INHALATION) at 01:40

## 2018-01-01 RX ADMIN — FENTANYL CITRATE 25 MCG: 50 INJECTION, SOLUTION INTRAMUSCULAR; INTRAVENOUS at 10:40

## 2018-01-01 RX ADMIN — SODIUM CHLORIDE 250 ML: 9 INJECTION, SOLUTION INTRAVENOUS at 10:08

## 2018-01-01 RX ADMIN — OXYCODONE HYDROCHLORIDE 10 MG: 10 TABLET ORAL at 21:51

## 2018-01-01 RX ADMIN — SODIUM CHLORIDE: 9 INJECTION, SOLUTION INTRAVENOUS at 09:18

## 2018-01-01 RX ADMIN — PANCRELIPASE 72000 UNITS: 36000; 180000; 114000 CAPSULE, DELAYED RELEASE PELLETS ORAL at 08:01

## 2018-01-01 RX ADMIN — ENOXAPARIN SODIUM 50 MG: 60 INJECTION SUBCUTANEOUS at 07:42

## 2018-01-01 RX ADMIN — PANCRELIPASE 72000 UNITS: 36000; 180000; 114000 CAPSULE, DELAYED RELEASE PELLETS ORAL at 12:36

## 2018-01-01 RX ADMIN — Medication 5 ML: at 11:11

## 2018-01-01 RX ADMIN — LEUCOVORIN CALCIUM 300 MG: 200 INJECTION, POWDER, LYOPHILIZED, FOR SOLUTION INTRAMUSCULAR; INTRAVENOUS at 15:58

## 2018-01-01 RX ADMIN — OSELTAMIVIR PHOSPHATE 30 MG: 30 CAPSULE ORAL at 08:12

## 2018-01-01 RX ADMIN — Medication 5 MG: at 19:46

## 2018-01-01 RX ADMIN — SODIUM CHLORIDE: 9 INJECTION, SOLUTION INTRAVENOUS at 17:30

## 2018-01-01 RX ADMIN — CIPROFLOXACIN HYDROCHLORIDE 500 MG: 500 TABLET, FILM COATED ORAL at 08:25

## 2018-01-01 RX ADMIN — CEFTRIAXONE SODIUM 2 G: 10 INJECTION, POWDER, FOR SOLUTION INTRAVENOUS at 22:46

## 2018-01-01 RX ADMIN — HYDROMORPHONE HYDROCHLORIDE 0.5 MG: 1 INJECTION, SOLUTION INTRAMUSCULAR; INTRAVENOUS; SUBCUTANEOUS at 17:56

## 2018-01-01 RX ADMIN — DEXAMETHASONE SODIUM PHOSPHATE: 10 INJECTION, SOLUTION INTRAMUSCULAR; INTRAVENOUS at 10:14

## 2018-01-01 RX ADMIN — IOPAMIDOL 10 ML: 510 INJECTION, SOLUTION INTRAVASCULAR at 10:43

## 2018-01-01 RX ADMIN — CALCIUM GLUCONATE 2 G: 98 INJECTION, SOLUTION INTRAVENOUS at 10:00

## 2018-01-01 RX ADMIN — HEPARIN SODIUM (PORCINE) LOCK FLUSH IV SOLN 100 UNIT/ML 5 ML: 100 SOLUTION at 10:19

## 2018-01-01 RX ADMIN — ATROPINE SULFATE 0.4 MG: 0.4 INJECTION, SOLUTION INTRAMUSCULAR; INTRAVENOUS; SUBCUTANEOUS at 14:09

## 2018-01-01 RX ADMIN — ALBUTEROL SULFATE 2 PUFF: 90 AEROSOL, METERED RESPIRATORY (INHALATION) at 14:01

## 2018-01-01 RX ADMIN — SODIUM CHLORIDE 1000 ML: 9 INJECTION, SOLUTION INTRAVENOUS at 21:37

## 2018-01-01 RX ADMIN — PROPOFOL 100 MCG/KG/MIN: 10 INJECTION, EMULSION INTRAVENOUS at 10:27

## 2018-01-01 RX ADMIN — OMEPRAZOLE 40 MG: 20 CAPSULE, DELAYED RELEASE ORAL at 20:03

## 2018-01-01 RX ADMIN — SODIUM CHLORIDE 250 ML: 9 INJECTION, SOLUTION INTRAVENOUS at 14:38

## 2018-01-01 RX ADMIN — ACETAMINOPHEN 650 MG: 325 TABLET, FILM COATED ORAL at 19:03

## 2018-01-01 RX ADMIN — ATROPINE SULFATE 0.4 MG: 0.4 INJECTION, SOLUTION INTRAMUSCULAR; INTRAVENOUS; SUBCUTANEOUS at 10:13

## 2018-01-01 RX ADMIN — SODIUM CHLORIDE 250 ML: 9 INJECTION, SOLUTION INTRAVENOUS at 09:51

## 2018-01-01 RX ADMIN — Medication 5 ML: at 10:41

## 2018-01-01 RX ADMIN — SODIUM CHLORIDE 500 ML: 9 INJECTION, SOLUTION INTRAVENOUS at 00:18

## 2018-01-01 RX ADMIN — POTASSIUM CHLORIDE 20 MEQ: 1500 TABLET, EXTENDED RELEASE ORAL at 08:54

## 2018-01-01 RX ADMIN — NYSTATIN 500000 UNITS: 100000 SUSPENSION ORAL at 20:06

## 2018-01-01 RX ADMIN — DEXAMETHASONE 4 MG: 4 TABLET ORAL at 20:06

## 2018-01-01 RX ADMIN — ROCURONIUM BROMIDE 30 MG: 10 INJECTION INTRAVENOUS at 10:08

## 2018-01-01 RX ADMIN — AZITHROMYCIN MONOHYDRATE 250 MG: 500 INJECTION, POWDER, LYOPHILIZED, FOR SOLUTION INTRAVENOUS at 16:17

## 2018-01-01 RX ADMIN — FENTANYL CITRATE 50 MCG: 50 INJECTION, SOLUTION INTRAMUSCULAR; INTRAVENOUS at 10:23

## 2018-01-01 RX ADMIN — ENOXAPARIN SODIUM 50 MG: 60 INJECTION SUBCUTANEOUS at 20:06

## 2018-01-01 RX ADMIN — IPRATROPIUM BROMIDE AND ALBUTEROL SULFATE 3 ML: .5; 3 SOLUTION RESPIRATORY (INHALATION) at 17:29

## 2018-01-01 RX ADMIN — DEXAMETHASONE SODIUM PHOSPHATE 4 MG: 4 INJECTION, SOLUTION INTRA-ARTICULAR; INTRALESIONAL; INTRAMUSCULAR; INTRAVENOUS; SOFT TISSUE at 14:43

## 2018-01-01 RX ADMIN — PREDNISONE 10 MG: 10 TABLET ORAL at 08:41

## 2018-01-01 RX ADMIN — OSELTAMIVIR PHOSPHATE 30 MG: 30 CAPSULE ORAL at 08:09

## 2018-01-01 RX ADMIN — SODIUM CHLORIDE 250 ML: 9 INJECTION, SOLUTION INTRAVENOUS at 14:18

## 2018-01-01 RX ADMIN — AZITHROMYCIN MONOHYDRATE 500 MG: 500 INJECTION, POWDER, LYOPHILIZED, FOR SOLUTION INTRAVENOUS at 00:46

## 2018-01-01 RX ADMIN — SODIUM CHLORIDE, PRESERVATIVE FREE 500 UNITS: 5 INJECTION INTRAVENOUS at 13:03

## 2018-01-01 RX ADMIN — PROPOFOL 150 MG: 10 INJECTION, EMULSION INTRAVENOUS at 10:08

## 2018-01-01 RX ADMIN — IOPAMIDOL 59 ML: 755 INJECTION, SOLUTION INTRAVASCULAR at 10:34

## 2018-01-01 RX ADMIN — OXYCODONE HYDROCHLORIDE 10 MG: 5 TABLET ORAL at 11:06

## 2018-01-01 RX ADMIN — ALBUTEROL SULFATE 2 PUFF: 90 AEROSOL, METERED RESPIRATORY (INHALATION) at 16:15

## 2018-01-01 RX ADMIN — PIPERACILLIN AND TAZOBACTAM 4.5 G: 4; .5 INJECTION, POWDER, FOR SOLUTION INTRAVENOUS at 13:38

## 2018-01-01 RX ADMIN — ATROPINE SULFATE 0.4 MG: 0.4 INJECTION, SOLUTION INTRAMUSCULAR; INTRAVENOUS; SUBCUTANEOUS at 12:10

## 2018-01-01 RX ADMIN — GEMCITABINE 1400 MG: 38 INJECTION, SOLUTION INTRAVENOUS at 12:39

## 2018-01-01 RX ADMIN — Medication 10 MEQ: at 11:18

## 2018-01-01 RX ADMIN — PANCRELIPASE 72000 UNITS: 36000; 180000; 114000 CAPSULE, DELAYED RELEASE PELLETS ORAL at 07:41

## 2018-01-01 RX ADMIN — SODIUM CHLORIDE, PRESERVATIVE FREE 5 ML: 5 INJECTION INTRAVENOUS at 09:59

## 2018-01-01 RX ADMIN — CALCIUM GLUCONATE 1 G: 98 INJECTION, SOLUTION INTRAVENOUS at 06:50

## 2018-01-01 RX ADMIN — IPRATROPIUM BROMIDE AND ALBUTEROL SULFATE 3 ML: .5; 3 SOLUTION RESPIRATORY (INHALATION) at 00:11

## 2018-01-01 RX ADMIN — OXYCODONE HYDROCHLORIDE 10 MG: 5 TABLET ORAL at 03:40

## 2018-01-01 RX ADMIN — OSELTAMIVIR PHOSPHATE 30 MG: 30 CAPSULE ORAL at 20:04

## 2018-01-01 RX ADMIN — SODIUM CHLORIDE, PRESERVATIVE FREE 5 ML: 5 INJECTION INTRAVENOUS at 15:18

## 2018-01-01 RX ADMIN — DEXAMETHASONE SODIUM PHOSPHATE: 10 INJECTION, SOLUTION INTRAMUSCULAR; INTRAVENOUS at 11:41

## 2018-01-01 RX ADMIN — PANCRELIPASE 72000 UNITS: 36000; 180000; 114000 CAPSULE, DELAYED RELEASE PELLETS ORAL at 17:46

## 2018-01-01 RX ADMIN — NYSTATIN 500000 UNITS: 100000 SUSPENSION ORAL at 16:54

## 2018-01-01 RX ADMIN — FLUTICASONE FUROATE AND VILANTEROL TRIFENATATE 1 PUFF: 100; 25 POWDER RESPIRATORY (INHALATION) at 09:45

## 2018-01-01 RX ADMIN — ACETAMINOPHEN 400 MCG: 400 TABLET ORAL at 07:56

## 2018-01-01 RX ADMIN — DILTIAZEM HYDROCHLORIDE 120 MG: 120 CAPSULE, EXTENDED RELEASE ORAL at 08:41

## 2018-01-01 RX ADMIN — ACETAMINOPHEN 975 MG: 325 TABLET ORAL at 10:08

## 2018-01-01 RX ADMIN — SODIUM CHLORIDE 250 ML: 9 INJECTION, SOLUTION INTRAVENOUS at 09:47

## 2018-01-01 RX ADMIN — DEXAMETHASONE SODIUM PHOSPHATE: 10 INJECTION, SOLUTION INTRAMUSCULAR; INTRAVENOUS at 09:43

## 2018-01-01 RX ADMIN — OMEPRAZOLE 40 MG: 20 CAPSULE, DELAYED RELEASE ORAL at 20:47

## 2018-01-01 RX ADMIN — SODIUM CHLORIDE, PRESERVATIVE FREE 5 ML: 5 INJECTION INTRAVENOUS at 09:34

## 2018-01-01 RX ADMIN — SODIUM CHLORIDE, PRESERVATIVE FREE 5 ML: 5 INJECTION INTRAVENOUS at 12:33

## 2018-01-01 RX ADMIN — OXYCODONE HYDROCHLORIDE 10 MG: 10 TABLET ORAL at 04:28

## 2018-01-01 RX ADMIN — IRINOTECAN HYDROCHLORIDE 101 MG: 4.3 INJECTION, POWDER, FOR SOLUTION INTRAVENOUS at 14:22

## 2018-01-01 RX ADMIN — DEXAMETHASONE SODIUM PHOSPHATE 12 MG: 10 INJECTION, SOLUTION INTRAMUSCULAR; INTRAVENOUS at 10:09

## 2018-01-01 RX ADMIN — SODIUM CHLORIDE, PRESERVATIVE FREE 5 ML: 5 INJECTION INTRAVENOUS at 13:23

## 2018-01-01 RX ADMIN — SODIUM CHLORIDE 250 ML: 9 INJECTION, SOLUTION INTRAVENOUS at 15:09

## 2018-01-01 RX ADMIN — HEPARIN 5 ML: 100 SYRINGE at 15:39

## 2018-01-01 RX ADMIN — NYSTATIN 500000 UNITS: 100000 SUSPENSION ORAL at 17:10

## 2018-01-01 RX ADMIN — PANCRELIPASE 72000 UNITS: 36000; 180000; 114000 CAPSULE, DELAYED RELEASE PELLETS ORAL at 11:50

## 2018-01-01 RX ADMIN — LEUCOVORIN CALCIUM 300 MG: 200 INJECTION, POWDER, LYOPHILIZED, FOR SOLUTION INTRAMUSCULAR; INTRAVENOUS at 10:49

## 2018-01-01 RX ADMIN — ONDANSETRON 4 MG: 2 INJECTION INTRAMUSCULAR; INTRAVENOUS at 20:07

## 2018-01-01 RX ADMIN — LEVOTHYROXINE SODIUM 125 MCG: 25 TABLET ORAL at 07:40

## 2018-01-01 RX ADMIN — DEXTROSE MONOHYDRATE 25 ML: 500 INJECTION PARENTERAL at 01:35

## 2018-01-01 RX ADMIN — SODIUM CHLORIDE 250 ML: 9 INJECTION, SOLUTION INTRAVENOUS at 11:15

## 2018-01-01 RX ADMIN — SODIUM CHLORIDE, PRESERVATIVE FREE 500 UNITS: 5 INJECTION INTRAVENOUS at 13:58

## 2018-01-01 RX ADMIN — OSELTAMIVIR PHOSPHATE 30 MG: 30 CAPSULE ORAL at 20:06

## 2018-01-01 RX ADMIN — SODIUM CHLORIDE, PRESERVATIVE FREE 5 ML: 5 INJECTION INTRAVENOUS at 14:10

## 2018-01-01 RX ADMIN — DEXAMETHASONE SODIUM PHOSPHATE 12 MG: 10 INJECTION, SOLUTION INTRAMUSCULAR; INTRAVENOUS at 11:52

## 2018-01-01 RX ADMIN — DEXAMETHASONE 4 MG: 4 TABLET ORAL at 20:04

## 2018-01-01 RX ADMIN — ROCURONIUM BROMIDE 20 MG: 10 INJECTION INTRAVENOUS at 19:43

## 2018-01-01 RX ADMIN — Medication 10 MEQ: at 09:47

## 2018-01-01 RX ADMIN — OMEPRAZOLE 40 MG: 20 CAPSULE, DELAYED RELEASE ORAL at 08:41

## 2018-01-01 RX ADMIN — PANTOPRAZOLE SODIUM 20 MG: 20 TABLET, DELAYED RELEASE ORAL at 07:56

## 2018-01-01 RX ADMIN — SODIUM CHLORIDE 1000 ML: 9 INJECTION, SOLUTION INTRAVENOUS at 17:30

## 2018-01-01 RX ADMIN — SODIUM CHLORIDE 2000 ML: 9 INJECTION, SOLUTION INTRAVENOUS at 05:40

## 2018-01-01 RX ADMIN — SODIUM CHLORIDE, PRESERVATIVE FREE 5 ML: 5 INJECTION INTRAVENOUS at 09:49

## 2018-01-01 RX ADMIN — DEXAMETHASONE SODIUM PHOSPHATE: 10 INJECTION, SOLUTION INTRAMUSCULAR; INTRAVENOUS at 09:33

## 2018-01-01 RX ADMIN — GEMCITABINE 1400 MG: 38 INJECTION, SOLUTION INTRAVENOUS at 10:50

## 2018-01-01 RX ADMIN — CEFTRIAXONE SODIUM 2 G: 10 INJECTION, POWDER, FOR SOLUTION INTRAVENOUS at 23:54

## 2018-01-01 RX ADMIN — PACLITAXEL 185 MG: 100 INJECTION, POWDER, LYOPHILIZED, FOR SUSPENSION INTRAVENOUS at 10:19

## 2018-01-01 RX ADMIN — ALBUTEROL SULFATE 2.5 MG: 2.5 SOLUTION RESPIRATORY (INHALATION) at 02:31

## 2018-01-01 RX ADMIN — FENTANYL CITRATE 25 MCG: 50 INJECTION, SOLUTION INTRAMUSCULAR; INTRAVENOUS at 20:05

## 2018-01-01 RX ADMIN — SODIUM CHLORIDE 250 ML: 9 INJECTION, SOLUTION INTRAVENOUS at 11:38

## 2018-01-01 RX ADMIN — ONDANSETRON 4 MG: 2 INJECTION INTRAMUSCULAR; INTRAVENOUS at 10:44

## 2018-01-01 RX ADMIN — SUGAMMADEX 100 MG: 100 INJECTION, SOLUTION INTRAVENOUS at 10:51

## 2018-01-01 RX ADMIN — DEXAMETHASONE SODIUM PHOSPHATE: 10 INJECTION, SOLUTION INTRAMUSCULAR; INTRAVENOUS at 10:20

## 2018-01-01 RX ADMIN — SODIUM CHLORIDE 250 ML: 9 INJECTION, SOLUTION INTRAVENOUS at 09:32

## 2018-01-01 RX ADMIN — PREDNISONE 10 MG: 10 TABLET ORAL at 08:09

## 2018-01-01 RX ADMIN — POTASSIUM CHLORIDE 10 MEQ: 7.46 INJECTION, SOLUTION INTRAVENOUS at 02:28

## 2018-01-01 RX ADMIN — IPRATROPIUM BROMIDE AND ALBUTEROL SULFATE 3 ML: .5; 3 SOLUTION RESPIRATORY (INHALATION) at 05:39

## 2018-01-01 RX ADMIN — SODIUM CHLORIDE 250 ML: 9 INJECTION, SOLUTION INTRAVENOUS at 13:48

## 2018-01-01 RX ADMIN — NYSTATIN 500000 UNITS: 100000 SUSPENSION ORAL at 11:49

## 2018-01-01 RX ADMIN — SODIUM CHLORIDE 250 ML: 9 INJECTION, SOLUTION INTRAVENOUS at 13:42

## 2018-01-01 RX ADMIN — IPRATROPIUM BROMIDE AND ALBUTEROL SULFATE 3 ML: .5; 3 SOLUTION RESPIRATORY (INHALATION) at 11:16

## 2018-01-01 RX ADMIN — IPRATROPIUM BROMIDE AND ALBUTEROL SULFATE 3 ML: .5; 3 SOLUTION RESPIRATORY (INHALATION) at 13:28

## 2018-01-01 RX ADMIN — SODIUM CHLORIDE, PRESERVATIVE FREE 5 ML: 5 INJECTION INTRAVENOUS at 17:58

## 2018-01-01 RX ADMIN — ALBUTEROL SULFATE 2 PUFF: 90 AEROSOL, METERED RESPIRATORY (INHALATION) at 12:37

## 2018-01-01 RX ADMIN — FLUTICASONE FUROATE AND VILANTEROL TRIFENATATE 1 PUFF: 100; 25 POWDER RESPIRATORY (INHALATION) at 08:09

## 2018-01-01 RX ADMIN — TRAMADOL HYDROCHLORIDE 100 MG: 50 TABLET, COATED ORAL at 11:07

## 2018-01-01 RX ADMIN — NYSTATIN 500000 UNITS: 100000 SUSPENSION ORAL at 08:10

## 2018-01-01 RX ADMIN — IOPAMIDOL 61 ML: 755 INJECTION, SOLUTION INTRAVASCULAR at 07:37

## 2018-01-01 RX ADMIN — LEUCOVORIN CALCIUM 300 MG: 100 INJECTION, POWDER, LYOPHILIZED, FOR SOLUTION INTRAMUSCULAR; INTRAVENOUS at 14:02

## 2018-01-01 RX ADMIN — OMEPRAZOLE 40 MG: 20 CAPSULE, DELAYED RELEASE ORAL at 08:12

## 2018-01-01 RX ADMIN — IRINOTECAN HYDROCHLORIDE 101 MG: 4.3 INJECTION, POWDER, FOR SOLUTION INTRAVENOUS at 11:09

## 2018-01-01 RX ADMIN — IRINOTECAN HYDROCHLORIDE 81 MG: 4.3 INJECTION, POWDER, FOR SOLUTION INTRAVENOUS at 10:45

## 2018-01-01 RX ADMIN — DILTIAZEM HYDROCHLORIDE 120 MG: 120 CAPSULE, EXTENDED RELEASE ORAL at 08:12

## 2018-01-01 RX ADMIN — OXYCODONE HYDROCHLORIDE 10 MG: 10 TABLET ORAL at 14:01

## 2018-01-01 RX ADMIN — Medication 5 ML: at 12:23

## 2018-01-01 RX ADMIN — LEVOTHYROXINE SODIUM 125 MCG: 25 TABLET ORAL at 07:56

## 2018-01-01 RX ADMIN — GEMCITABINE 1400 MG: 38 INJECTION, SOLUTION INTRAVENOUS at 11:36

## 2018-01-01 RX ADMIN — FLUTICASONE FUROATE AND VILANTEROL TRIFENATATE 1 PUFF: 100; 25 POWDER RESPIRATORY (INHALATION) at 08:45

## 2018-01-01 RX ADMIN — NYSTATIN 500000 UNITS: 100000 SUSPENSION ORAL at 20:47

## 2018-01-01 RX ADMIN — TRAMADOL HYDROCHLORIDE 50 MG: 50 TABLET, COATED ORAL at 21:54

## 2018-01-01 RX ADMIN — ONDANSETRON 8 MG: 2 INJECTION INTRAMUSCULAR; INTRAVENOUS at 12:04

## 2018-01-01 RX ADMIN — ENOXAPARIN SODIUM 50 MG: 60 INJECTION SUBCUTANEOUS at 20:03

## 2018-01-01 RX ADMIN — LEUCOVORIN CALCIUM 300 MG: 200 INJECTION, POWDER, LYOPHILIZED, FOR SOLUTION INTRAMUSCULAR; INTRAVENOUS at 13:43

## 2018-01-01 RX ADMIN — PACLITAXEL 185 MG: 100 INJECTION, POWDER, LYOPHILIZED, FOR SUSPENSION INTRAVENOUS at 10:13

## 2018-01-01 RX ADMIN — IRINOTECAN HYDROCHLORIDE 101 MG: 4.3 INJECTION, POWDER, FOR SOLUTION INTRAVENOUS at 14:17

## 2018-01-01 RX ADMIN — NYSTATIN 500000 UNITS: 100000 SUSPENSION ORAL at 12:47

## 2018-01-01 RX ADMIN — DEXAMETHASONE 4 MG: 4 TABLET ORAL at 08:09

## 2018-01-01 RX ADMIN — DILTIAZEM HYDROCHLORIDE 120 MG: 120 CAPSULE, EXTENDED RELEASE ORAL at 08:09

## 2018-01-01 RX ADMIN — ACETAMINOPHEN 400 MCG: 400 TABLET ORAL at 07:42

## 2018-01-01 RX ADMIN — TRAMADOL HYDROCHLORIDE 100 MG: 50 TABLET, COATED ORAL at 01:33

## 2018-01-01 RX ADMIN — Medication 500 UNITS: at 10:37

## 2018-01-01 RX ADMIN — SUGAMMADEX 100 MG: 100 INJECTION, SOLUTION INTRAVENOUS at 20:12

## 2018-01-01 RX ADMIN — POTASSIUM CHLORIDE 10 MEQ: 7.46 INJECTION, SOLUTION INTRAVENOUS at 05:58

## 2018-01-01 RX ADMIN — HYDROMORPHONE HYDROCHLORIDE 0.5 MG: 1 INJECTION, SOLUTION INTRAMUSCULAR; INTRAVENOUS; SUBCUTANEOUS at 16:52

## 2018-01-01 RX ADMIN — ALBUMIN HUMAN 50 G: 0.25 SOLUTION INTRAVENOUS at 03:09

## 2018-01-01 RX ADMIN — SODIUM CHLORIDE, PRESERVATIVE FREE 5 ML: 5 INJECTION INTRAVENOUS at 10:28

## 2018-01-01 RX ADMIN — FENTANYL CITRATE 25 MCG: 50 INJECTION, SOLUTION INTRAMUSCULAR; INTRAVENOUS at 10:34

## 2018-01-01 RX ADMIN — PACLITAXEL 185 MG: 100 INJECTION, POWDER, LYOPHILIZED, FOR SUSPENSION INTRAVENOUS at 11:57

## 2018-01-01 RX ADMIN — MAGNESIUM SULFATE HEPTAHYDRATE 4 G: 40 INJECTION, SOLUTION INTRAVENOUS at 01:53

## 2018-01-01 RX ADMIN — SODIUM CHLORIDE, PRESERVATIVE FREE 5 ML: 5 INJECTION INTRAVENOUS at 14:12

## 2018-01-01 RX ADMIN — SODIUM CHLORIDE 250 ML: 9 INJECTION, SOLUTION INTRAVENOUS at 09:43

## 2018-01-01 RX ADMIN — SODIUM CHLORIDE, PRESERVATIVE FREE 5 ML: 5 INJECTION INTRAVENOUS at 06:59

## 2018-01-01 RX ADMIN — LEVOTHYROXINE SODIUM 125 MCG: 125 TABLET ORAL at 08:41

## 2018-01-01 RX ADMIN — PANCRELIPASE 72000 UNITS: 36000; 180000; 114000 CAPSULE, DELAYED RELEASE PELLETS ORAL at 19:03

## 2018-01-01 RX ADMIN — SODIUM CHLORIDE, PRESERVATIVE FREE 5 ML: 5 INJECTION INTRAVENOUS at 07:51

## 2018-01-01 RX ADMIN — HEPARIN 5 ML: 100 SYRINGE at 12:37

## 2018-01-01 RX ADMIN — PANCRELIPASE 72000 UNITS: 36000; 180000; 114000 CAPSULE, DELAYED RELEASE PELLETS ORAL at 09:45

## 2018-01-01 RX ADMIN — LEVOTHYROXINE SODIUM 125 MCG: 25 TABLET ORAL at 08:01

## 2018-01-01 RX ADMIN — DEXAMETHASONE 4 MG: 4 TABLET ORAL at 20:02

## 2018-01-01 RX ADMIN — PANCRELIPASE 72000 UNITS: 36000; 180000; 114000 CAPSULE, DELAYED RELEASE PELLETS ORAL at 12:02

## 2018-01-01 RX ADMIN — IPRATROPIUM BROMIDE AND ALBUTEROL SULFATE 3 ML: .5; 3 SOLUTION RESPIRATORY (INHALATION) at 13:00

## 2018-01-01 RX ADMIN — SODIUM CHLORIDE, POTASSIUM CHLORIDE, SODIUM LACTATE AND CALCIUM CHLORIDE 1000 ML: 600; 310; 30; 20 INJECTION, SOLUTION INTRAVENOUS at 14:47

## 2018-01-01 RX ADMIN — PANTOPRAZOLE SODIUM 20 MG: 20 TABLET, DELAYED RELEASE ORAL at 07:40

## 2018-01-01 RX ADMIN — SODIUM CHLORIDE 1000 ML: 9 INJECTION, SOLUTION INTRAVENOUS at 09:55

## 2018-01-01 RX ADMIN — MULTIPLE VITAMINS W/ MINERALS TAB 1 TABLET: TAB at 08:24

## 2018-01-01 RX ADMIN — Medication 10 MEQ: at 14:43

## 2018-01-01 RX ADMIN — PHENYLEPHRINE HYDROCHLORIDE 50 MCG: 10 INJECTION, SOLUTION INTRAMUSCULAR; INTRAVENOUS; SUBCUTANEOUS at 19:46

## 2018-01-01 RX ADMIN — PANTOPRAZOLE SODIUM 20 MG: 20 TABLET, DELAYED RELEASE ORAL at 08:24

## 2018-01-01 RX ADMIN — Medication 0.5 MG: at 17:03

## 2018-01-01 RX ADMIN — Medication 0.5 MG: at 19:56

## 2018-01-01 RX ADMIN — NYSTATIN 500000 UNITS: 100000 SUSPENSION ORAL at 08:09

## 2018-01-01 RX ADMIN — PIPERACILLIN AND TAZOBACTAM 4.5 G: 4; .5 INJECTION, POWDER, FOR SOLUTION INTRAVENOUS at 18:09

## 2018-01-01 RX ADMIN — PACLITAXEL 185 MG: 100 INJECTION, POWDER, LYOPHILIZED, FOR SUSPENSION INTRAVENOUS at 15:17

## 2018-01-01 RX ADMIN — PIPERACILLIN AND TAZOBACTAM 4.5 G: 4; .5 INJECTION, POWDER, FOR SOLUTION INTRAVENOUS at 19:58

## 2018-01-01 RX ADMIN — IRINOTECAN HYDROCHLORIDE 101 MG: 4.3 INJECTION, POWDER, FOR SOLUTION INTRAVENOUS at 08:35

## 2018-01-01 RX ADMIN — TRAMADOL HYDROCHLORIDE 100 MG: 50 TABLET, COATED ORAL at 17:09

## 2018-01-01 RX ADMIN — OMEPRAZOLE 40 MG: 20 CAPSULE, DELAYED RELEASE ORAL at 08:08

## 2018-01-01 RX ADMIN — IOPAMIDOL 77 ML: 755 INJECTION, SOLUTION INTRAVASCULAR at 10:05

## 2018-01-01 RX ADMIN — ACETAMINOPHEN 400 MCG: 400 TABLET ORAL at 08:09

## 2018-01-01 RX ADMIN — DEXAMETHASONE 4 MG: 4 TABLET ORAL at 08:41

## 2018-01-01 RX ADMIN — Medication 5 ML: at 08:19

## 2018-01-01 RX ADMIN — LIDOCAINE HYDROCHLORIDE 80 MG: 20 INJECTION, SOLUTION INFILTRATION; PERINEURAL at 10:08

## 2018-01-01 RX ADMIN — IRINOTECAN HYDROCHLORIDE 81 MG: 4.3 INJECTION, POWDER, FOR SOLUTION INTRAVENOUS at 12:06

## 2018-01-01 RX ADMIN — PROPOFOL 40 MG: 10 INJECTION, EMULSION INTRAVENOUS at 19:41

## 2018-01-01 RX ADMIN — SODIUM CHLORIDE 250 ML: 9 INJECTION, SOLUTION INTRAVENOUS at 10:13

## 2018-01-01 RX ADMIN — ALBUTEROL SULFATE 2.5 MG: 2.5 SOLUTION RESPIRATORY (INHALATION) at 15:49

## 2018-01-01 RX ADMIN — ALBUTEROL SULFATE 2 PUFF: 90 AEROSOL, METERED RESPIRATORY (INHALATION) at 08:24

## 2018-01-01 RX ADMIN — IOPAMIDOL 64 ML: 755 INJECTION, SOLUTION INTRAVASCULAR at 08:05

## 2018-01-01 RX ADMIN — DEXTROSE 15 G: 15 GEL ORAL at 00:48

## 2018-01-01 RX ADMIN — LIDOCAINE HYDROCHLORIDE 40 MG: 20 INJECTION, SOLUTION INFILTRATION; PERINEURAL at 19:41

## 2018-01-01 RX ADMIN — OXYCODONE HYDROCHLORIDE 15 MG: 5 TABLET ORAL at 14:45

## 2018-01-01 RX ADMIN — PIPERACILLIN AND TAZOBACTAM 4.5 G: 4; .5 INJECTION, POWDER, FOR SOLUTION INTRAVENOUS at 22:36

## 2018-01-01 RX ADMIN — NYSTATIN 500000 UNITS: 100000 SUSPENSION ORAL at 12:33

## 2018-01-01 RX ADMIN — PIPERACILLIN AND TAZOBACTAM 4.5 G: 4; .5 INJECTION, POWDER, FOR SOLUTION INTRAVENOUS at 08:53

## 2018-01-01 RX ADMIN — ALBUTEROL SULFATE 2 PUFF: 90 AEROSOL, METERED RESPIRATORY (INHALATION) at 20:36

## 2018-01-01 RX ADMIN — PACLITAXEL 200 MG: 100 INJECTION, POWDER, LYOPHILIZED, FOR SUSPENSION INTRAVENOUS at 10:43

## 2018-01-01 RX ADMIN — IRINOTECAN HYDROCHLORIDE 101 MG: 4.3 INJECTION, POWDER, FOR SOLUTION INTRAVENOUS at 10:57

## 2018-01-01 RX ADMIN — OXYCODONE HYDROCHLORIDE 10 MG: 10 TABLET ORAL at 17:44

## 2018-01-01 RX ADMIN — IPRATROPIUM BROMIDE AND ALBUTEROL SULFATE 3 ML: .5; 3 SOLUTION RESPIRATORY (INHALATION) at 19:50

## 2018-01-01 RX ADMIN — PIPERACILLIN AND TAZOBACTAM 4.5 G: 4; .5 INJECTION, POWDER, FOR SOLUTION INTRAVENOUS at 10:40

## 2018-01-01 RX ADMIN — DEXAMETHASONE SODIUM PHOSPHATE 4 MG: 4 INJECTION, SOLUTION INTRA-ARTICULAR; INTRALESIONAL; INTRAMUSCULAR; INTRAVENOUS; SOFT TISSUE at 10:45

## 2018-01-01 RX ADMIN — GEMCITABINE 1400 MG: 38 INJECTION, SOLUTION INTRAVENOUS at 10:57

## 2018-01-01 RX ADMIN — POTASSIUM CHLORIDE 10 MEQ: 7.46 INJECTION, SOLUTION INTRAVENOUS at 04:29

## 2018-01-01 RX ADMIN — SODIUM CHLORIDE, PRESERVATIVE FREE 500 UNITS: 5 INJECTION INTRAVENOUS at 13:05

## 2018-01-01 RX ADMIN — CEFTRIAXONE SODIUM 2 G: 10 INJECTION, POWDER, FOR SOLUTION INTRAVENOUS at 21:58

## 2018-01-01 RX ADMIN — SODIUM CHLORIDE, PRESERVATIVE FREE 5 ML: 5 INJECTION INTRAVENOUS at 09:05

## 2018-01-01 RX ADMIN — SODIUM CHLORIDE 1000 ML: 9 INJECTION, SOLUTION INTRAVENOUS at 12:17

## 2018-01-01 RX ADMIN — CIPROFLOXACIN 400 MG: 2 INJECTION INTRAVENOUS at 10:25

## 2018-01-01 RX ADMIN — SODIUM CHLORIDE, PRESERVATIVE FREE 5 ML: 5 INJECTION INTRAVENOUS at 12:19

## 2018-01-01 RX ADMIN — ALBUTEROL SULFATE 2 PUFF: 90 AEROSOL, METERED RESPIRATORY (INHALATION) at 15:37

## 2018-01-01 RX ADMIN — DEXTROSE 15 G: 15 GEL ORAL at 08:12

## 2018-01-01 RX ADMIN — SODIUM CHLORIDE, PRESERVATIVE FREE 5 ML: 5 INJECTION INTRAVENOUS at 11:02

## 2018-01-01 RX ADMIN — SODIUM CHLORIDE, POTASSIUM CHLORIDE, SODIUM LACTATE AND CALCIUM CHLORIDE 500 ML: 600; 310; 30; 20 INJECTION, SOLUTION INTRAVENOUS at 01:33

## 2018-01-01 RX ADMIN — SODIUM CHLORIDE, PRESERVATIVE FREE 500 UNITS: 5 INJECTION INTRAVENOUS at 11:17

## 2018-01-01 RX ADMIN — Medication 5 ML: at 12:24

## 2018-01-01 RX ADMIN — TRAMADOL HYDROCHLORIDE 50 MG: 50 TABLET, COATED ORAL at 05:29

## 2018-01-01 RX ADMIN — DEXAMETHASONE SODIUM PHOSPHATE: 10 INJECTION, SOLUTION INTRAMUSCULAR; INTRAVENOUS at 12:11

## 2018-01-01 RX ADMIN — ONDANSETRON 8 MG: 2 INJECTION INTRAMUSCULAR; INTRAVENOUS at 13:46

## 2018-01-01 RX ADMIN — PIPERACILLIN AND TAZOBACTAM 4.5 G: 4; .5 INJECTION, POWDER, FOR SOLUTION INTRAVENOUS at 01:35

## 2018-01-01 RX ADMIN — CEFTRIAXONE SODIUM 2 G: 10 INJECTION, POWDER, FOR SOLUTION INTRAVENOUS at 21:57

## 2018-01-01 RX ADMIN — SIMETHICONE 133 MG: 63.3; 3.7 SOLUTION/ DROPS ORAL at 10:43

## 2018-01-01 RX ADMIN — HYDROMORPHONE HYDROCHLORIDE 0.5 MG: 1 INJECTION, SOLUTION INTRAMUSCULAR; INTRAVENOUS; SUBCUTANEOUS at 13:50

## 2018-01-01 RX ADMIN — DEXAMETHASONE SODIUM PHOSPHATE: 10 INJECTION, SOLUTION INTRAMUSCULAR; INTRAVENOUS at 13:42

## 2018-01-01 RX ADMIN — GEMCITABINE 1400 MG: 38 INJECTION, SOLUTION INTRAVENOUS at 16:01

## 2018-01-01 RX ADMIN — PANCRELIPASE 72000 UNITS: 36000; 180000; 114000 CAPSULE, DELAYED RELEASE PELLETS ORAL at 08:09

## 2018-01-01 RX ADMIN — PANTOPRAZOLE SODIUM 20 MG: 20 TABLET, DELAYED RELEASE ORAL at 08:02

## 2018-01-01 RX ADMIN — IRINOTECAN HYDROCHLORIDE 101 MG: 4.3 INJECTION, POWDER, FOR SOLUTION INTRAVENOUS at 12:32

## 2018-01-01 RX ADMIN — ACETAMINOPHEN 400 MCG: 400 TABLET ORAL at 08:22

## 2018-01-01 RX ADMIN — Medication 0.3 MG: at 07:46

## 2018-01-01 RX ADMIN — IRINOTECAN HYDROCHLORIDE 81 MG: 4.3 INJECTION, POWDER, FOR SOLUTION INTRAVENOUS at 15:41

## 2018-01-01 RX ADMIN — OMEPRAZOLE 40 MG: 20 CAPSULE, DELAYED RELEASE ORAL at 20:06

## 2018-01-01 RX ADMIN — PIPERACILLIN AND TAZOBACTAM 4.5 G: 4; .5 INJECTION, POWDER, FOR SOLUTION INTRAVENOUS at 14:45

## 2018-01-01 RX ADMIN — SODIUM CHLORIDE, PRESERVATIVE FREE 5 ML: 5 INJECTION INTRAVENOUS at 11:37

## 2018-01-01 RX ADMIN — LEUCOVORIN CALCIUM 300 MG: 200 INJECTION, POWDER, LYOPHILIZED, FOR SOLUTION INTRAMUSCULAR; INTRAVENOUS at 10:04

## 2018-01-01 RX ADMIN — SODIUM CHLORIDE, PRESERVATIVE FREE 5 ML: 5 INJECTION INTRAVENOUS at 09:15

## 2018-01-01 RX ADMIN — FENTANYL CITRATE 25 MCG: 50 INJECTION, SOLUTION INTRAMUSCULAR; INTRAVENOUS at 10:08

## 2018-01-01 RX ADMIN — ACETAMINOPHEN 400 MCG: 400 TABLET ORAL at 08:41

## 2018-01-01 RX ADMIN — OSELTAMIVIR PHOSPHATE 30 MG: 30 CAPSULE ORAL at 20:47

## 2018-01-01 RX ADMIN — Medication 5 ML: at 08:54

## 2018-01-01 RX ADMIN — SODIUM CHLORIDE, PRESERVATIVE FREE 5 ML: 5 INJECTION INTRAVENOUS at 14:54

## 2018-01-01 RX ADMIN — PHENYLEPHRINE HYDROCHLORIDE 50 MCG: 10 INJECTION, SOLUTION INTRAMUSCULAR; INTRAVENOUS; SUBCUTANEOUS at 19:50

## 2018-01-01 RX ADMIN — IRINOTECAN HYDROCHLORIDE 101 MG: 4.3 INJECTION, POWDER, FOR SOLUTION INTRAVENOUS at 11:16

## 2018-01-01 RX ADMIN — LEVOTHYROXINE SODIUM 125 MCG: 125 TABLET ORAL at 07:41

## 2018-01-01 RX ADMIN — ALBUTEROL SULFATE 2 PUFF: 90 AEROSOL, METERED RESPIRATORY (INHALATION) at 20:58

## 2018-01-01 RX ADMIN — PIPERACILLIN AND TAZOBACTAM 4.5 G: 4; .5 INJECTION, POWDER, FOR SOLUTION INTRAVENOUS at 03:23

## 2018-01-01 RX ADMIN — IRINOTECAN HYDROCHLORIDE 81 MG: 4.3 INJECTION, POWDER, FOR SOLUTION INTRAVENOUS at 09:57

## 2018-01-01 RX ADMIN — ALBUTEROL SULFATE 2 PUFF: 90 AEROSOL, METERED RESPIRATORY (INHALATION) at 22:38

## 2018-01-01 RX ADMIN — SODIUM CHLORIDE, PRESERVATIVE FREE 5 ML: 5 INJECTION INTRAVENOUS at 06:23

## 2018-01-01 RX ADMIN — ACETAMINOPHEN 400 MCG: 400 TABLET ORAL at 07:40

## 2018-01-01 RX ADMIN — DEXAMETHASONE SODIUM PHOSPHATE: 10 INJECTION, SOLUTION INTRAMUSCULAR; INTRAVENOUS at 08:21

## 2018-01-01 RX ADMIN — SODIUM CHLORIDE 250 ML: 9 INJECTION, SOLUTION INTRAVENOUS at 11:56

## 2018-01-01 RX ADMIN — IRINOTECAN HYDROCHLORIDE 101 MG: 4.3 INJECTION, POWDER, FOR SOLUTION INTRAVENOUS at 14:54

## 2018-01-01 RX ADMIN — PANCRELIPASE 72000 UNITS: 36000; 180000; 114000 CAPSULE, DELAYED RELEASE PELLETS ORAL at 18:00

## 2018-01-01 RX ADMIN — OXYCODONE HYDROCHLORIDE 10 MG: 5 TABLET ORAL at 17:45

## 2018-01-01 RX ADMIN — SODIUM CHLORIDE, PRESERVATIVE FREE 500 UNITS: 5 INJECTION INTRAVENOUS at 11:59

## 2018-01-01 RX ADMIN — IPRATROPIUM BROMIDE AND ALBUTEROL SULFATE 3 ML: .5; 3 SOLUTION RESPIRATORY (INHALATION) at 16:39

## 2018-01-01 RX ADMIN — SODIUM CHLORIDE, PRESERVATIVE FREE 5 ML: 5 INJECTION INTRAVENOUS at 13:58

## 2018-01-01 RX ADMIN — PANCRELIPASE 72000 UNITS: 36000; 180000; 114000 CAPSULE, DELAYED RELEASE PELLETS ORAL at 17:17

## 2018-01-01 RX ADMIN — GLYCOPYRROLATE 0.2 MG: 0.2 INJECTION, SOLUTION INTRAMUSCULAR; INTRAVENOUS at 10:08

## 2018-01-01 RX ADMIN — HEPARIN 500 UNITS: 100 SYRINGE at 14:02

## 2018-01-01 RX ADMIN — Medication 0.3 MG: at 22:46

## 2018-01-01 RX ADMIN — SODIUM CHLORIDE 250 ML: 9 INJECTION, SOLUTION INTRAVENOUS at 08:21

## 2018-01-01 ASSESSMENT — ACTIVITIES OF DAILY LIVING (ADL)
ADLS_ACUITY_SCORE: 12
RETIRED_COMMUNICATION: 0-->UNDERSTANDS/COMMUNICATES WITHOUT DIFFICULTY
ADLS_ACUITY_SCORE: 14
ADLS_ACUITY_SCORE: 13
FALL_HISTORY_WITHIN_LAST_SIX_MONTHS: NO
ADLS_ACUITY_SCORE: 13
DRESS: 0-->INDEPENDENT
ADLS_ACUITY_SCORE: 12
TRANSFERRING: 1-->ASSISTIVE EQUIPMENT
ADLS_ACUITY_SCORE: 13
ADLS_ACUITY_SCORE: 15
ADLS_ACUITY_SCORE: 12
COGNITION: 0 - NO COGNITION ISSUES REPORTED
ADLS_ACUITY_SCORE: 13
ADLS_ACUITY_SCORE: 13
SWALLOWING: 0-->SWALLOWS FOODS/LIQUIDS WITHOUT DIFFICULTY
RETIRED_EATING: 0-->INDEPENDENT
ADLS_ACUITY_SCORE: 13
ADLS_ACUITY_SCORE: 14
ADLS_ACUITY_SCORE: 12
AMBULATION: 1-->ASSISTIVE EQUIPMENT
ADLS_ACUITY_SCORE: 13
TOILETING: 0-->INDEPENDENT
ADLS_ACUITY_SCORE: 14
ADLS_ACUITY_SCORE: 13
BATHING: 0-->INDEPENDENT
ADLS_ACUITY_SCORE: 13
ADLS_ACUITY_SCORE: 15
ADLS_ACUITY_SCORE: 13

## 2018-01-01 ASSESSMENT — ENCOUNTER SYMPTOMS
TREMORS: 0
SPEECH CHANGE: 0
HEADACHES: 0
COLOR CHANGE: 0
SEIZURES: 0
DISTURBANCES IN COORDINATION: 0
ARTHRALGIAS: 0
FEVER: 0
DIFFICULTY URINATING: 0
PARALYSIS: 0
MEMORY LOSS: 0
TINGLING: 0
SHORTNESS OF BREATH: 0
APPETITE CHANGE: 1
WEAKNESS: 1
EYE REDNESS: 0
HEADACHES: 0
NUMBNESS: 1
LOSS OF CONSCIOUSNESS: 0
NECK STIFFNESS: 0
DIZZINESS: 1
CONFUSION: 0
ABDOMINAL PAIN: 1

## 2018-01-01 ASSESSMENT — PAIN SCALES - GENERAL
PAINLEVEL: NO PAIN (0)
PAINLEVEL: NO PAIN (1)
PAINLEVEL: NO PAIN (0)
PAINLEVEL: EXTREME PAIN (8)
PAINLEVEL: NO PAIN (0)
PAINLEVEL: SEVERE PAIN (7)
PAINLEVEL: NO PAIN (0)
PAINLEVEL: NO PAIN (1)
PAINLEVEL: NO PAIN (0)
PAINLEVEL: MODERATE PAIN (4)
PAINLEVEL: NO PAIN (0)
PAINLEVEL: NO PAIN (0)
PAINLEVEL: MODERATE PAIN (4)
PAINLEVEL: NO PAIN (0)

## 2018-01-01 ASSESSMENT — PAIN DESCRIPTION - DESCRIPTORS
DESCRIPTORS: ACHING
DESCRIPTORS: ACHING;DISCOMFORT;CRAMPING
DESCRIPTORS: ACHING;SORE
DESCRIPTORS: ACHING
DESCRIPTORS: ACHING;DISCOMFORT
DESCRIPTORS: ACHING;DISCOMFORT
DESCRIPTORS: ACHING
DESCRIPTORS: ACHING
DESCRIPTORS: ACHING;DISCOMFORT
DESCRIPTORS: ACHING
DESCRIPTORS: ACHING;DISCOMFORT
DESCRIPTORS: ACHING;DISCOMFORT
DESCRIPTORS: ACHING
DESCRIPTORS: ACHING;DISCOMFORT
DESCRIPTORS: DISCOMFORT;CONSTANT
DESCRIPTORS: ACHING
DESCRIPTORS: HEADACHE
DESCRIPTORS: ACHING;DISCOMFORT
DESCRIPTORS: ACHING;DISCOMFORT;SORE
DESCRIPTORS: ACHING

## 2018-01-01 ASSESSMENT — 6 MINUTE WALK TEST (6MWT): TOTAL DISTANCE WALKED (METERS): 313

## 2018-01-02 NOTE — PROGRESS NOTES
Infusion Nursing Note:  Iris Lewis presents today for cycle 2 day 8 Gemzar/Abraxane.    Patient seen by provider today: No   present during visit today: Not Applicable.    Note: pt states she feels like she has less energy and some weakness in her legs but denies any falls or complications with walking.    Intravenous Access:  Peripheral IV placed in lab    Treatment Conditions:  Lab Results   Component Value Date    HGB 10.5 01/02/2018     Lab Results   Component Value Date    WBC 6.1 01/02/2018      Lab Results   Component Value Date    ANEU 2.5 01/02/2018     Lab Results   Component Value Date     01/02/2018      Results reviewed, labs MET treatment parameters, ok to proceed with treatment.    Post Infusion Assessment:  Patient tolerated infusion without incident.  Blood return noted pre and post infusion.  Site patent and intact, free from redness, edema or discomfort.  No evidence of extravasations.  Access discontinued per protocol.    Discharge Plan:   Prescription refills given for Compazine and tramadol.  Discharge instructions reviewed with: Patient and Family.  Patient and/or family verbalized understanding of discharge instructions and all questions answered.  AVS to patient via [employer].  Patient will return 1/8/18 for next appointment.   Patient discharged in stable condition accompanied by: .  Departure Mode: Ambulatory.    CHRISTINE CUETO RN

## 2018-01-02 NOTE — PATIENT INSTRUCTIONS
Contact Numbers    [facility] Main Line: [phone]  [facility] Triage:  [phone]    Call triage with chills and/or temperature greater than or equal to 100.5, uncontrolled nausea/vomiting, diarrhea, constipation, dizziness, shortness of breath, chest pain, bleeding, unexplained bruising, or any new/concerning symptoms, questions/concerns.     If you are having any concerning symptoms or wish to speak to a provider before your next infusion visit, please call your care coordinator or triage to notify them so we can adequately serve you.       After Hours: [phone]    If after hours, weekends, or holidays, call main hospital  and ask for Oncology doctor on call.           January 2018 Sunday Monday Tuesday Wednesday Thursday Friday Saturday        1     2     [facility] DRAW    9:00 AM   (15 min.)   [facility]     [facility] ONC INFUSION 120    9:30 AM   (120 min.)    ONCOLOGY INFUSION   [facility] 3     [facility] RETURN    3:20 PM   (30 min.)   Neri Gipson MD   [facility] 4     5     6       7     8     [facility] DRAW    8:30 AM   (15 min.)    MASON[facility] LAB DRAW   [facility] Masonic Lab Draw     UMP ONC INFUSION 120    9:00 AM   (120 min.)   UC ONCOLOGY INFUSION   [facility] 9     10     ENDOSCOPIC RETROGRADE CHOLANGIOPANCREATOGRAM   10:10 AM   Felix Izaguirre MD   [facility] OR 11     12     13       14     15     16     17     18     CT CHEST/ABDOMEN/PELVIS W    9:05 AM   (20 min.)   UCCT2   [facility] CT 19     20       21     22     [facility] LAB DRAW    7:45 AM   (15 min.)   [facility] DRAW   [facility] Masonic Lab Draw     UMP RETURN    8:00 AM   (30 min.)   Vinyn Yu MD   [facility]     [facility] ONC INFUSION 120    9:00 AM   (120 min.)   UC ONCOLOGY INFUSION   [facility]     UMP RETURN    9:50 AM   (30 min.)   Neri Gpison MD     [facility] 23     24     25     26     27       28     29     [facility] DRAW    9:00 AM   (15 min.)    [facility]   [facility]     [facility] ONC INFUSION 120    9:30 AM   (120 min.)    ONCOLOGY INFUSION   [facility] 30 31 February 2018 Sunday Monday Tuesday Wednesday Thursday Friday Saturday                       1     2     3       4     5     [facility] DRAW    8:30 AM   (15 min.)    [facility]RAW   [facility]     [facility] ONC INFUSION 120    9:00 AM   (120 min.)    ONCOLOGY INFUSION   [facility] 6     7     8     9     10       11     12     13     14  Happy Birthday!     15     16     17       18     19     20     21     22     23     24       25     26     27     28                                Recent Results (from the past 24 hour(s))   CBC with platelets differential    Collection Time: 01/02/18  9:33 AM   Result Value Ref Range    WBC 6.1 4.0 - 11.0 10e9/L    RBC Count 3.44 (L) 3.8 - 5.2 10e12/L    Hemoglobin 10.5 (L) 11.7 - 15.7 g/dL    Hematocrit 32.8 (L) 35.0 - 47.0 %    MCV 95 78 - 100 fl    MCH 30.5 26.5 - 33.0 pg    MCHC 32.0 31.5 - 36.5 g/dL    RDW 14.1 10.0 - 15.0 %    Platelet Count 420 150 - 450 10e9/L    Diff Method Automated Method     % Neutrophils 41.6 %    % Lymphocytes 32.1 %    % Monocytes 21.2 %    % Eosinophils 1.8 %    % Basophils 0.7 %    % Immature Granulocytes 2.6 %    Nucleated RBCs 0 0 /100    Absolute Neutrophil 2.5 1.6 - 8.3 10e9/L    Absolute Lymphocytes 2.0 0.8 - 5.3 10e9/L    Absolute Monocytes 1.3 0.0 - 1.3 10e9/L    Absolute Eosinophils 0.1 0.0 - 0.7 10e9/L    Absolute Basophils 0.0 0.0 - 0.2 10e9/L    Abs Immature Granulocytes 0.2 0 - 0.4 10e9/L    Absolute Nucleated RBC 0.0     Poikilocytosis Slight     Platelet Estimate Confirming automated cell count

## 2018-01-02 NOTE — TELEPHONE ENCOUNTER
Oncology Distress Screening   Clinical Nutrition  [facility]     Identified Concern and Score From Distress Screening: Concern with her weight (8)     Date of Distress Screenin17     Data: Metastatic pancreatic      Intervention: Called Iris today.  Indicated reason for the phone call.  Iris reports that her weight had been down to 108 lbs, however, since her appetite has improved, she has been eating more and has gained 3 lbs (current wt 111 lb).  She reports that she is a  and 'knows a lot of about nutrition'.      She denies nutrition questions/concerns at this time but will reach out to RD if concerns arise.  She expressed her appreciation for the phone call.       Charisse Riggs RD, LD  Oncology Dietitian  [phone]  Pager: [phone]

## 2018-01-02 NOTE — NURSING NOTE
Chief Complaint   Patient presents with     Blood Draw     IV Placed by Vascular Access   Vitals done and labs drawn, see flow sheets.  NISH OsborneA

## 2018-01-02 NOTE — MR AVS SNAPSHOT
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     If you are having any concerning symptoms or wish to speak to a provider before your next infusion visit, please call your care coordinator or triage to notify them so we can adequately serve you.       After Hours: [phone]    If after hours, weekends, or holidays, call main hospital  and ask for Oncology doctor on call.           January 2018 Sunday Monday Tuesday Wednesday Thursday Friday Saturday        1     2     [facility] DRAW    9:00 AM   (15 min.)    MASONIC LAB DRAW   [facility] Lab Draw     [facility] ONC INFUSION 120    9:30 AM   (120 min.)    ONCOLOGY INFUSION   [facility] 3     [facility] RETURN    3:20 PM   (30 min.)   Neri Gipson MD   [facility] 4     5     6       7     8     [facility] DRAW    8:30 AM   (15 min.)    MASONIC LAB DRAW   [facility] Lab Draw     [facility] ONC INFUSION 120    9:00 AM   (120 min.)    ONCOLOGY INFUSION   [facility] 9     10     ENDOSCOPIC RETROGRADE CHOLANGIOPANCREATOGRAM   10:10 AM   Felix Izaguirre MD   [facility] OR 11     12     13       14     15     16     17     18     CT CHEST/ABDOMEN/PELVIS W    9:05 AM   (20 min.)   UCCT2   [facility] CT 19      20       21     22     [facility] DRAW    7:45 AM   (15 min.)   [facility] DRAW   [facility] Mason[facility] Lab Draw     UMP RETURN    8:00 AM   (30 min.)   Vinny Yu MD   [facility]     UMP ONC INFUSION 120    9:00 AM   (120 min.)    ONCOLOGY INFUSION   [facility]     UMP RETURN    9:50 AM   (30 min.)   Neri Gipson MD   [facility] 23     24     25     26     27       28     29     [facility] LAB DRAW    9:00 AM   (15 min.)    MASONIC LAB DRAW   [facility] Masonic Lab Draw     UMP ONC INFUSION 120    9:30 AM   (120 min.)    ONCOLOGY INFUSION   [facility] 30 31 February 2018 Sunday Monday Tuesday Wednesday Thursday Friday Saturday                       1     2     3       4     5     [facility] DRAW    8:30 AM   (15 min.)    [facility]IC LAB DRAW   [facility] Lab Draw     [facility] ONC INFUSION 120    9:00 AM   (120 min.)    ONCOLOGY INFUSION   [facility] 6     7     8     9     10       11     12     13     14  Happy Birthday!     15     16     17       18     19     20     21     22     23     24       25     26     27     28                                Recent Results (from the past 24 hour(s))   CBC with platelets differential    Collection Time: 01/02/18  9:33 AM   Result Value Ref Range    WBC 6.1 4.0 - 11.0 10e9/L    RBC Count 3.44 (L) 3.8 - 5.2 10e12/L    Hemoglobin 10.5 (L) 11.7 - 15.7 g/dL    Hematocrit 32.8 (L) 35.0 - 47.0 %    MCV 95 78 - 100 fl    MCH 30.5 26.5 - 33.0 pg    MCHC 32.0 31.5 - 36.5 g/dL    RDW 14.1 10.0 - 15.0 %    Platelet Count 420 150 - 450 10e9/L    Diff Method Automated Method     % Neutrophils 41.6 %    % Lymphocytes 32.1 %    % Monocytes 21.2 %    % Eosinophils 1.8 %    % Basophils 0.7 %    % Immature Granulocytes 2.6 %    Nucleated RBCs 0 0 /100    Absolute Neutrophil 2.5 1.6 - 8.3 10e9/L    Absolute Lymphocytes 2.0 0.8  - 5.3 10e9/L    Absolute Monocytes 1.3 0.0 - 1.3 10e9/L    Absolute Eosinophils 0.1 0.0 - 0.7 10e9/L    Absolute Basophils 0.0 0.0 - 0.2 10e9/L    Abs Immature Granulocytes 0.2 0 - 0.4 10e9/L    Absolute Nucleated RBC 0.0     Poikilocytosis Slight     Platelet Estimate Confirming automated cell count                  Follow-ups after your visit        Your next 10 appointments already scheduled     Jan 03, 2018  3:35 PM CST   (Arrive by 3:20 PM)   Return Visit with Neri Gipson MD   [facility] ([facility])    [address] [place] [address]  4th Floor  [facility] [address] [phone]            Jan 08, 2018  8:30 AM CST   Masonic Lab Draw with  MASONIC LAB DRAW   [facility] Lab Draw ([facility])    [address] [place] [address]  2nd [facility] [id number] [phone]            Jan 08, 2018  9:00 AM CST   Infusion 120 with  ONCOLOGY INFUSION, UC 24 ATC   [facility] ([facility])    [address] [place] [address]  2nd [facility] [id number] [phone]            Daniel 10, 2018   Procedure with Felix Izaguirre MD   [facility] [place] Same Day Surgery (--)    [address] [facility] [address]   [phone]            Jan 18, 2018  9:20 AM CST   (Arrive by 9:05 AM)   CT CHEST/ABDOMEN/PELVIS W CONTRAST with UCCT2   [facility] [place] [facility] ([facility])    [address] [place] [address]  1st [facility] [address] [phone]           Please bring any scans or X-rays taken at other hospitals, if similar tests were done. Also bring a list of your medicines, including vitamins, minerals and over-the-counter drugs. It is safest to leave personal items at home.  Be sure to tell your doctor:   If you have any allergies.   If there s any chance you are pregnant.   If you are breastfeeding.   If you have any special needs.  You may have contrast for this exam. To  prepare:   Do not eat or drink for 2 hours before your exam. If you need to take medicine, you may take it with small sips of water. (We may ask you to take liquid medicine as well.)   The day before your exam, drink extra fluids at least six 8-ounce glasses (unless your doctor tells you to restrict your fluids).  Patients over 70 or patients with diabetes or kidney problems:   If you haven t had a blood test (creatinine test) within the last 30 days, go to your clinic or Diagnostic Imaging Department for this test.  If you have diabetes:   If your kidney function is normal, continue taking your metformin (Avandamet, Glucophage, Glucovance, Metaglip) on the day of your exam.   If your kidney function is abnormal, wait 48 hours before restarting this medicine.  You will have oral contrast for this exam:   You will drink the contrast at home. Get this from your clinic or Diagnostic Imaging Department. Please follow the directions given.  Please wear loose clothing, such as a sweat suit or jogging clothes. Avoid snaps, zippers and other metal. We may ask you to undress and put on a hospital gown.  If you have any questions, please call the Imaging Department where you will have your exam.            Jan 22, 2018  7:45 AM CST   Masonic Lab Draw with UC MASONIC LAB DRAW   [facility] Lab Draw ([facility])    [address] [place] [id number] [phone]            Jan 22, 2018  8:15 AM CST   (Arrive by 8:00 AM)   Return Visit with Vinny Yu MD   [facility])    [address] [place] [id number] [phone]            Jan 22, 2018  9:00 AM CST   Infusion 120 with  ONCOLOGY INFUSION, UC 25 ATC   [facility])    [address] [place] [id number] [phone]              Who to contact      If you have questions or need follow up information about today's clinic visit or your schedule please contact [facility] directly at [phone].  Normal or non-critical lab and imaging results will be communicated to you by MyChart, letter or phone within 4 business days after the clinic has received the results. If you do not hear from us within 7 days, please contact the clinic through [employer] or phone. If you have a critical or abnormal lab result, we will notify you by phone as soon as possible.  Submit refill requests through [employer] or call your pharmacy and they will forward the refill request to us. Please allow 3 business days for your refill to be completed.          Additional Information About Your Visit        [employer] Information     [employer] gives you secure access to your electronic health record. If you see a primary care provider, you can also send messages to your care team and make appointments. If you have questions, please call your primary care clinic.  If you do not have a primary care provider, please call [phone] and they will assist you.        Care EveryWhere ID     This is your Care EveryWhere ID. This could be used by other organizations to access your [place] medical records  [id number]        Your Vitals Were     Pulse Temperature Respirations Pulse Oximetry BMI (Body Mass Index)       84 97.5  F (36.4  C) (Oral) 16 95% 20.38 kg/m2        Blood Pressure from Last 3 Encounters:   01/02/18 105/62   12/26/17 116/73   12/26/17 116/73    Weight from Last 3 Encounters:   01/02/18 50.5 kg (111 lb 6.4 oz)   12/26/17 49.8 kg (109 lb 11 oz)   12/26/17 49.8 kg (109 lb 11.2 oz)              We Performed the Following     CBC with platelets differential        Primary Care Provider Office Phone # Fax #    Neri Gipson [phone]       [address] [address]        Equal Access to Services     NESSA NICHOLS AH: Shaun simms  madison Martinez, waronnyda lumireyaadaha, qacheyta kacam correia, lisa skylarin hayaan blancatobi lucasdelphine lanayanasamira fernanda. [facility] [phone].    ATENCIÓN: Si reddla angelina, tiene a nava disposición servicios gratuitos de asistencia lingüística. Indu al [phone].    We comply with applicable federal civil rights laws and [place] laws. We do not discriminate on the basis of race, color, national origin, age, disability, sex, sexual orientation, or gender identity.            Thank you!     Thank you for choosing [facility]  for your care. Our goal is always to provide you with excellent care. Hearing back from our patients is one way we can continue to improve our services. Please take a few minutes to complete the written survey that you may receive in the mail after your visit with us. Thank you!             Your Updated Medication List - Protect others around you: Learn how to safely use, store and throw away your medicines at www.disposemymeds.org.          This list is accurate as of: 1/2/18  1:26 PM.  Always use your most recent med list.                   Brand Name Dispense Instructions for use Diagnosis    amylase-lipase-protease 10583 UNITS Cpep    CREON    180 capsule    Take 2 capsules (72,000 Units) by mouth 3 times daily (with meals)    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)       CALCIUM PO      Take by mouth every morning Form/strength unknown. Powder formulation. Add to water and fruits/vegetables daily to promote bone health.        camphor-menthol 0.5-0.5 % Lotn    DERMASARRA    59 mL    Apply 1 mL topically every 6 hours as needed for skin care        cholecalciferol 1000 UNIT tablet    vitamin D3     Take 1 tablet by mouth 2 times daily        cyanocolbalamin 500 MCG tablet    vitamin  B-12     Take 500 mcg by mouth every morning        denosumab 60 MG/ML Soln injection    PROLIA    1 mL    Inject 1 mL (60 mg) Subcutaneous every 6 months Inject subcutaneously in upper arm, upper  thigh or abdomen    Senile osteoporosis       enoxaparin 60 MG/0.6ML injection    LOVENOX      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)       folic acid 400 MCG tablet    FOLVITE     Take 1 tablet by mouth every morning        hydrOXYzine 25 MG tablet    ATARAX    90 tablet    Take 1-2 tablets (25-50 mg) by mouth every 6 hours as needed for itching (adjuvant pain)        levothyroxine 37.5 mcg Tabs half-tab    SYNTHROID/LEVOTHROID     Take 125 mcg by mouth every morning (before breakfast)        LORazepam 0.5 MG tablet    ATIVAN    30 tablet    Take 1 tablet (0.5 mg) by mouth every 4 hours as needed (Anxiety, Nausea/Vomiting or Sleep)    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)       omeprazole 40 MG capsule    priLOSEC    180 capsule    Take 1 capsule (40 mg) by mouth 2 times daily Take 30-60 minutes before a meal.    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       potassium 99 MG Tabs      Take 1 tablet by mouth 2 times daily        prochlorperazine 10 MG tablet    COMPAZINE    180 tablet    Take 0.5 tablets (5 mg) by mouth every 6 hours as needed for nausea or vomiting    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)       psyllium Packet    METAMUCIL/KONSYL     Take 1 packet by mouth daily as needed for constipation        timolol maleate 0.5 % opthalmic solution    ISTALOL     Place 1 drop into both eyes every morning Before placing contact lenses        traMADol 50 MG tablet    ULTRAM    100 tablet    Take 0.5-1 tablets (25-50 mg) by mouth every 6 hours as needed for breakthrough pain    Pancreatic mass       triamterene-hydrochlorothiazide 37.5-25 MG per tablet    MAXZIDE-25     Take 1 tablet by mouth every morning        TYLENOL  PO      Take 325 mg by mouth every 4 hours as needed for mild pain or fever        zinc 50 MG Tabs      Take 1 tablet by mouth every morning

## 2018-01-05 NOTE — NURSING NOTE
Chief Complaint   Patient presents with     Pre-Op Exam     pre op for ERCP (1/10/18)   Chely Vergara LPN 9:49 AM on 1/5/2018    Rooming Note  Health Maintenance   There are no preventive care reminders to display for this patient. All health maintenance items UP TO DATE  Chely Vergara LPN 9:49 AM on 1/5/2018

## 2018-01-05 NOTE — PATIENT INSTRUCTIONS
[facility] Clinic: [phone]     [facility] Medication Refill Request Information:  * Please contact your pharmacy regarding ANY request for medication refills.  ** [facility] Prescription Fax = [phone]  * Please allow 3 business days for routine medication refills.  * Please allow 5 business days for controlled substance medication refills.     [facility] Test Result notification information:  *You will be notified with in 7-10 days of your appointment day regarding the results of your test.  If you are on MyChart you will be notified as soon as the provider has reviewed the results and signed off on them.

## 2018-01-05 NOTE — MR AVS SNAPSHOT
After Visit Summary   1/5/2018    Iris Lewis    MRN: [id number]           Patient Information     Date Of Birth          1940        Visit Information        Provider Department      1/5/2018 9:30 AM Argelia Davis MD [facility]        Care Instructions    [facility]: [phone]     [facility] Medication Refill Request Information:  * Please contact your pharmacy regarding ANY request for medication refills.  ** [facility] Prescription Fax = [phone]  * Please allow 3 business days for routine medication refills.  * Please allow 5 business days for controlled substance medication refills.     Primary Care Center Test Result notification information:  *You will be notified with in 7-10 days of your appointment day regarding the results of your test.  If you are on MyChart you will be notified as soon as the provider has reviewed the results and signed off on them.          Follow-ups after your visit        Your next 10 appointments already scheduled     Jan 08, 2018  8:30 AM CST   Masonic Lab Draw with  MASONIC LAB DRAW   [facility]    [address]  [address]  [facility] [address] [phone]            Jan 08, 2018  9:00 AM CST   Infusion 120 with UC ONCOLOGY INFUSION, UC 24 ATC   [facility] ([facility])    [address]  [address]  [facility] [address] [phone]            Daniel 10, 2018   Procedure with Felix Izaguirre MD   [facility] [place], Same Day Surgery (--)    [address], [place] [address] [phone]            Jan 18, 2018  9:20 AM CST   (Arrive by 9:05 AM)   CT CHEST/ABDOMEN/PELVIS W CONTRAST with UCCT2   [facility] Imaging [place] [facility] ([facility])    [address]  1st Floor  [facility] [address] [phone]           Please bring any scans or X-rays  taken at other hospitals, if similar tests were done. Also bring a list of your medicines, including vitamins, minerals and over-the-counter drugs. It is safest to leave personal items at home.  Be sure to tell your doctor:   If you have any allergies.   If there s any chance you are pregnant.   If you are breastfeeding.   If you have any special needs.  You may have contrast for this exam. To prepare:   Do not eat or drink for 2 hours before your exam. If you need to take medicine, you may take it with small sips of water. (We may ask you to take liquid medicine as well.)   The day before your exam, drink extra fluids at least six 8-ounce glasses (unless your doctor tells you to restrict your fluids).  Patients over 70 or patients with diabetes or kidney problems:   If you haven t had a blood test (creatinine test) within the last 30 days, go to your clinic or Diagnostic Imaging Department for this test.  If you have diabetes:   If your kidney function is normal, continue taking your metformin (Avandamet, Glucophage, Glucovance, Metaglip) on the day of your exam.   If your kidney function is abnormal, wait 48 hours before restarting this medicine.  You will have oral contrast for this exam:   You will drink the contrast at home. Get this from your clinic or Diagnostic Imaging Department. Please follow the directions given.  Please wear loose clothing, such as a sweat suit or jogging clothes. Avoid snaps, zippers and other metal. We may ask you to undress and put on a hospital gown.  If you have any questions, please call the Imaging Department where you will have your exam.            Jan 22, 2018  7:45 AM ANN-MARIE Smith Lab Draw with  MYRON LAB DRAW   [facility] Lab Draw ([facility] [place])    [address]  [address]  [facility] [address] [phone]            Jan 22, 2018  8:15 AM ANN-MARIE   (Arrive by 8:00 AM)   Return Visit with MD CLAUDETTE Arroyo [facility] ([facility])    [address]  [address]  [facility] [address]   [phone]            Jan 22, 2018  9:00 AM CST   Infusion 120 with UC ONCOLOGY INFUSION, UC 25 ATC   [facility] ([facility])    [address]  [address]  [place] [facility] [address]   [phone]            Jan 22, 2018 10:05 AM CST   (Arrive by 9:50 AM)   Return Visit with Neri Gipson MD   [facility] ([facility])    [address]  4th Floor  [facility] [address] [phone]              Who to contact     Please call your clinic at [phone] to:    Ask questions about your health    Make or cancel appointments    Discuss your medicines    Learn about your test results    Speak to your doctor   If you have compliments or concerns about an experience at your clinic, or if you wish to file a complaint, please contact [facility] Patient Relations at [phone] or email us at [email or web address]         Additional Information About Your Visit        MiscotaharColto Information     Proficiency gives you secure access to your electronic health record. If you see a primary care provider, you can also send messages to your care team and make appointments. If you have questions, please call your primary care clinic.  If you do not have a primary care provider, please call [phone] and they will assist you.      Proficiency is an electronic gateway that provides easy, online access to your medical records. With Proficiency, you can request a clinic appointment, read your test results, renew a prescription or communicate with your care team.     To access your existing account, please contact your [facility] or call [phone] for assistance.        Care EveryWhere ID     This is your Care EveryWhere ID. This could be used by other  organizations to access your [place] medical records  [id number]        Your Vitals Were     Pulse Respirations Pulse Oximetry BMI (Body Mass Index)          77 20 99% 19.63 kg/m2         Blood Pressure from Last 3 Encounters:   01/05/18 133/78   01/02/18 105/62   12/26/17 116/73    Weight from Last 3 Encounters:   01/05/18 48.7 kg (107 lb 4.8 oz)   01/02/18 50.5 kg (111 lb 6.4 oz)   12/26/17 49.8 kg (109 lb 11 oz)              Today, you had the following     No orders found for display       Primary Care Provider Office Phone # Fax #    Neri Gipson [phone]       [address] [address]        Equal Access to Services     [facility] : Hadii aad ku hadasho Sothom, waaxda luqadaha, qaybta kaalmada adeegyada, lisa adams . So [facility] [phone].    ATENCIÓN: Si habla español, tiene a nava disposición servicios gratuitos de asistencia lingüística. Indu al [phone].    We comply with applicable federal civil rights laws and [place] laws. We do not discriminate on the basis of race, color, national origin, age, disability, sex, sexual orientation, or gender identity.            Thank you!     Thank you for choosing [facility]  for your care. Our goal is always to provide you with excellent care. Hearing back from our patients is one way we can continue to improve our services. Please take a few minutes to complete the written survey that you may receive in the mail after your visit with us. Thank you!             Your Updated Medication List - Protect others around you: Learn how to safely use, store and throw away your medicines at www.disposemymeds.org.          This list is accurate as of: 1/5/18 10:11 AM.  Always use your most recent med list.                   Brand Name Dispense Instructions for use Diagnosis    amylase-lipase-protease 20587 UNITS Cpep    CREON    180 capsule    Take 2 capsules (72,000 Units)  by mouth 3 times daily (with meals)    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)       CALCIUM PO      Take by mouth every morning Form/strength unknown. Powder formulation. Add to water and fruits/vegetables daily to promote bone health.        camphor-menthol 0.5-0.5 % Lotn    DERMASARRA    59 mL    Apply 1 mL topically every 6 hours as needed for skin care        cholecalciferol 1000 UNIT tablet    vitamin D3     Take 1 tablet by mouth 2 times daily        cyanocolbalamin 500 MCG tablet    vitamin  B-12     Take 500 mcg by mouth every morning        denosumab 60 MG/ML Soln injection    PROLIA    1 mL    Inject 1 mL (60 mg) Subcutaneous every 6 months Inject subcutaneously in upper arm, upper thigh or abdomen    Senile osteoporosis       enoxaparin 60 MG/0.6ML injection    LOVENOX      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)       folic acid 400 MCG tablet    FOLVITE     Take 1 tablet by mouth every morning        hydrOXYzine 25 MG tablet    ATARAX    90 tablet    Take 1-2 tablets (25-50 mg) by mouth every 6 hours as needed for itching (adjuvant pain)        levothyroxine 37.5 mcg Tabs half-tab    SYNTHROID/LEVOTHROID     Take 125 mcg by mouth every morning (before breakfast)        LORazepam 0.5 MG tablet    ATIVAN    30 tablet    Take 1 tablet (0.5 mg) by mouth every 4 hours as needed (Anxiety, Nausea/Vomiting or Sleep)    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)       omeprazole 40 MG capsule    priLOSEC    180 capsule    Take 1 capsule (40 mg) by mouth 2 times daily Take 30-60 minutes before a meal.    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       potassium 99 MG Tabs      Take 1 tablet by mouth 2 times daily         prochlorperazine 10 MG tablet    COMPAZINE    180 tablet    Take 0.5 tablets (5 mg) by mouth every 6 hours as needed for nausea or vomiting    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)       psyllium Packet    METAMUCIL/KONSYL     Take 1 packet by mouth daily as needed for constipation        timolol maleate 0.5 % opthalmic solution    ISTALOL     Place 1 drop into both eyes every morning Before placing contact lenses        traMADol 50 MG tablet    ULTRAM    100 tablet    Take 0.5-1 tablets (25-50 mg) by mouth every 6 hours as needed for breakthrough pain    Pancreatic mass       triamterene-hydrochlorothiazide 37.5-25 MG per tablet    MAXZIDE-25     Take 1 tablet by mouth every morning        TYLENOL PO      Take 325 mg by mouth every 4 hours as needed for mild pain or fever        zinc 50 MG Tabs      Take 1 tablet by mouth every morning

## 2018-01-08 NOTE — MR AVS SNAPSHOT
After Visit Summary   1/8/2018    Iris Lewis    MRN: [id number]           Patient Information     Date Of Birth          1940        Visit Information        Provider Department      1/8/2018 9:00 AM  24 ATC;  ONCOLOGY INFUSION [facility]        Today's Diagnoses     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)    -  1    Abnormal finding on thyroid function test          Care Instructions    Contact Numbers  [facility]: [phone]    After Hours:  [phone]  Triage: [phone]    Please call the [facility] Triage line if you experience a temperature greater than or equal to 100.5, shaking chills, have uncontrolled nausea, vomiting and/or diarrhea, dizziness, shortness of breath, chest pain, bleeding, unexplained bruising, or if you have any other new/concerning symptoms, questions or concerns.     If it is after hours, weekends, or holidays, please call the main hospital  at  [phone] and ask to speak to the Oncology doctor on call.     If you are having any concerning symptoms or wish to speak to a provider before your next infusion visit, please call your care coordinator or triage to notify them so we can adequately serve you.     If you need a refill on a narcotic prescription or other medication, please call triage before your infusion appointment.               Follow-ups after your visit        Your next 10 appointments already scheduled     Daniel 10, 2018   Procedure with Felix Izaguirre MD   [facility] [place], Same Day Surgery (--)    [address] [id number] [phone]            Jan 18, 2018  9:20 AM CST   (Arrive by 9:05 AM)   CT CHEST/ABDOMEN/PELVIS W CONTRAST with UCCT2   [facility] CT ([facility])    [address]  1st Floor  [facility] [address] [phone]           Please bring any scans or X-rays taken at other hospitals, if similar tests were done. Also  bring a list of your medicines, including vitamins, minerals and over-the-counter drugs. It is safest to leave personal items at home.  Be sure to tell your doctor:   If you have any allergies.   If there s any chance you are pregnant.   If you are breastfeeding.   If you have any special needs.  You may have contrast for this exam. To prepare:   Do not eat or drink for 2 hours before your exam. If you need to take medicine, you may take it with small sips of water. (We may ask you to take liquid medicine as well.)   The day before your exam, drink extra fluids at least six 8-ounce glasses (unless your doctor tells you to restrict your fluids).  Patients over 70 or patients with diabetes or kidney problems:   If you haven t had a blood test (creatinine test) within the last 30 days, go to your clinic or Diagnostic Imaging Department for this test.  If you have diabetes:   If your kidney function is normal, continue taking your metformin (Avandamet, Glucophage, Glucovance, Metaglip) on the day of your exam.   If your kidney function is abnormal, wait 48 hours before restarting this medicine.  You will have oral contrast for this exam:   You will drink the contrast at home. Get this from your clinic or Diagnostic Imaging Department. Please follow the directions given.  Please wear loose clothing, such as a sweat suit or jogging clothes. Avoid snaps, zippers and other metal. We may ask you to undress and put on a hospital gown.  If you have any questions, please call the Imaging Department where you will have your exam.            Jan 22, 2018  7:45 AM CST   Masonic Lab Draw with UC MASONIC LAB DRAW   [facility] Lab Draw ([facility])    [address] [place] [address]  [address] [place] [address] [phone]            Jan 22, 2018  8:15 AM CST   (Arrive by 8:00 AM)   Return Visit with Vinny Yu MD   [facility] ([facility])    [address]  [address]  [facility] [address]   [phone]            Jan 22, 2018  9:00 AM CST   Infusion 120 with UC ONCOLOGY INFUSION, UC 25 ATC   [facility] ([facility])    [address]  [address]  [facility] [address]   [phone]            Jan 22, 2018 10:05 AM CST   (Arrive by 9:50 AM)   Return Visit with Neri Gipson MD   [facility] ([facility])    [address] [place] [address]  4th Floor  [facility] [address] [phone]            Jan 29, 2018  9:00 AM CST   Masonic Lab Draw with  MASONIC LAB DRAW   [facility] Lab Draw ([facility])    [address] [place] [address]  [address]  [facility] [address]   [phone]            Jan 29, 2018  9:30 AM CST   Infusion 120 with UC ONCOLOGY INFUSION, UC 27 ATC   [facility] ([facility])    [address] [place] [address]  [address]  [facility] [address] [phone]              Who to contact     If you have questions or need follow up information about today's clinic visit or your schedule please contact [facility] directly at [phone].  Normal or non-critical lab and imaging results will be communicated to you by TapClickshart, letter or phone within 4 business days after the clinic has received the results. If you do not hear from us within 7 days, please contact the clinic through MyChart or phone. If you have a critical or abnormal lab result, we will notify you by phone as soon as possible.  Submit refill requests through [employer] or call your pharmacy and they will forward the refill request to us. Please allow 3 business days for your refill to be completed.          Additional Information About Your Visit        TapClicksharTeamwork Retail Information     [employer] gives you secure access to your electronic health record. If you see a primary care provider, you can also send  messages to your care team and make appointments. If you have questions, please call your primary care clinic.  If you do not have a primary care provider, please call [phone] and they will assist you.        Care EveryWhere ID     This is your Care EveryWhere ID. This could be used by other organizations to access your [place] medical records  [id number]        Your Vitals Were     Pulse Temperature Respirations Pulse Oximetry BMI (Body Mass Index)       84 97.8  F (36.6  C) (Oral) 16 95% 19.46 kg/m2        Blood Pressure from Last 3 Encounters:   01/08/18 112/67   01/05/18 133/78   01/02/18 105/62    Weight from Last 3 Encounters:   01/08/18 48.3 kg (106 lb 6.4 oz)   01/05/18 48.7 kg (107 lb 4.8 oz)   01/02/18 50.5 kg (111 lb 6.4 oz)              We Performed the Following     CBC with platelets differential     T4 free     TSH with free T4 reflex        Primary Care Provider Office Phone # Fax #    Neri Gipson [phone] [address] [address]        Equal Access to Services     [facility] : Hadii aad ku hadasho Sobrunildaali, waaxda luqadaha, qaybta kaalmada adeegyada, lisa michael. So [facility] [phone].    ATENCIÓN: Si habla español, tiene a nava disposición servicios gratuitos de asistencia lingüística. Llame al [phone].    We comply with applicable federal civil rights laws and [place] laws. We do not discriminate on the basis of race, color, national origin, age, disability, sex, sexual orientation, or gender identity.            Thank you!     Thank you for choosing [facility]  for your care. Our goal is always to provide you with excellent care. Hearing back from our patients is one way we can continue to improve our services. Please take a few minutes to complete the written survey that you may receive in the mail after your visit with us. Thank you!             Your Updated Medication List -  Protect others around you: Learn how to safely use, store and throw away your medicines at www.disposemymeds.org.          This list is accurate as of: 1/8/18 11:44 AM.  Always use your most recent med list.                   Brand Name Dispense Instructions for use Diagnosis    amylase-lipase-protease 82386 UNITS Cpep    CREON    180 capsule    Take 2 capsules (72,000 Units) by mouth 3 times daily (with meals)    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)       CALCIUM PO      Take by mouth every morning Form/strength unknown. Powder formulation. Add to water and fruits/vegetables daily to promote bone health.        camphor-menthol 0.5-0.5 % Lotn    DERMASARRA    59 mL    Apply 1 mL topically every 6 hours as needed for skin care        cholecalciferol 1000 UNIT tablet    vitamin D3     Take 1 tablet by mouth 2 times daily        cyanocolbalamin 500 MCG tablet    vitamin  B-12     Take 500 mcg by mouth every morning        denosumab 60 MG/ML Soln injection    PROLIA    1 mL    Inject 1 mL (60 mg) Subcutaneous every 6 months Inject subcutaneously in upper arm, upper thigh or abdomen    Senile osteoporosis       enoxaparin 60 MG/0.6ML injection    LOVENOX      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)       folic acid 400 MCG tablet    FOLVITE     Take 1 tablet by mouth every morning        hydrOXYzine 25 MG tablet    ATARAX    90 tablet    Take 1-2 tablets (25-50 mg) by mouth every 6 hours as needed for itching (adjuvant pain)        levothyroxine 37.5 mcg Tabs half-tab    SYNTHROID/LEVOTHROID     Take 125 mcg by mouth every morning (before breakfast)        LORazepam 0.5 MG tablet    ATIVAN    30 tablet    Take 1 tablet (0.5 mg) by mouth every 4 hours as needed (Anxiety, Nausea/Vomiting or Sleep)    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)       omeprazole 40 MG capsule    priLOSEC    180 capsule    Take 1 capsule (40 mg) by mouth 2 times daily Take 30-60 minutes before a meal.    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       potassium 99 MG Tabs      Take 1 tablet by mouth 2 times daily        prochlorperazine 10 MG tablet    COMPAZINE    180 tablet    Take 0.5 tablets (5 mg) by mouth every 6 hours as needed for nausea or vomiting    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)       psyllium Packet    METAMUCIL/KONSYL     Take 1 packet by mouth daily as needed for constipation        timolol maleate 0.5 % opthalmic solution    ISTALOL     Place 1 drop into both eyes every morning Before placing contact lenses        traMADol 50 MG tablet    ULTRAM    100 tablet    Take 0.5-1 tablets (25-50 mg) by mouth every 6 hours as needed for breakthrough pain    Pancreatic mass       triamterene-hydrochlorothiazide 37.5-25 MG per tablet    MAXZIDE-25     Take 1 tablet by mouth every morning        TYLENOL PO      Take 325 mg by mouth every 4 hours as needed for mild pain or fever        zinc 50 MG Tabs      Take 1 tablet by mouth every morning

## 2018-01-08 NOTE — PATIENT INSTRUCTIONS
Contact Numbers  [facility]: [phone]    After Hours:  [phone]  Triage: [phone]    Please call the [facility] Triage line if you experience a temperature greater than or equal to 100.5, shaking chills, have uncontrolled nausea, vomiting and/or diarrhea, dizziness, shortness of breath, chest pain, bleeding, unexplained bruising, or if you have any other new/concerning symptoms, questions or concerns.     If it is after hours, weekends, or holidays, please call the main hospital  at  [phone] and ask to speak to the Oncology doctor on call.     If you are having any concerning symptoms or wish to speak to a provider before your next infusion visit, please call your care coordinator or triage to notify them so we can adequately serve you.     If you need a refill on a narcotic prescription or other medication, please call triage before your infusion appointment.

## 2018-01-08 NOTE — PROGRESS NOTES
Infusion Nursing Note:  Iris Lewis presents today for C2D15 Abraxane/Gemzar.    Patient seen by provider today: No   present during visit today: Not Applicable.    Note: Patient feels well today. No complaints made.    Intravenous Access:  Peripheral IV placed.    Treatment Conditions:  Lab Results   Component Value Date    HGB 10.5 01/08/2018     Lab Results   Component Value Date    WBC 4.4 01/08/2018      Lab Results   Component Value Date    ANEU 2.2 01/08/2018     Lab Results   Component Value Date     01/08/2018      Results reviewed, labs MET treatment parameters, ok to proceed with treatment.        Post Infusion Assessment:Patient tolerated infusion without incident.  Blood return noted pre and post infusion.  Site patent and intact, free from redness, edema or discomfort.  No evidence of extravasations.  Access discontinued per protocol.    Discharge Plan:   Prescription refills given for Ativan.  Discharge instructions reviewed with: Patient.  Patient and/or family verbalized understanding of discharge instructions and all questions answered.  AVS to patient via [employer].  Patient will return 1/22/18 for next appointment.   Patient discharged in stable condition accompanied by: self.  Departure Mode: Ambulatory.    HARIKA CRAWFORD RN

## 2018-01-08 NOTE — NURSING NOTE
Chief Complaint   Patient presents with     Blood Draw     Labs drawn from PIV placed by RN x2. Line flushed with saline. Vs taken and pt checked in for appt     Labs drawn from PIV placed by RN x2. Line flushed with saline. Vitals taken. Pt checked in for appointment(s).    Ana Winchester RN

## 2018-01-10 NOTE — BRIEF OP NOTE
ERCP 01/10/2018 10:01 AM [facility], [address] [place] [address] [phone]     Endoscopy Department   _______________________________________________________________________________   Patient Name: Iris Lewis          Procedure Date: 1/10/2018 10:01 AM   MRN: [id number]                       Account Number: [id number]   YOB: 1940              Admit Type: Outpatient   Age: 77                               Room: OR   Gender: Female                        Note Status: Finalized   Attending MD: Felix Izaguirre MD   Total Sedation Time:   _______________________________________________________________________________       Procedure:           ERCP   Indications:         For therapy of acute pancreatitis, Bile duct stricture   Providers:           Felix Izaguirre MD, Vamsi Valadez MD   Patient Profile:     Ms Lewis is a delightful 78yo woman with metastatic                        pancreatic cancer complicated by biliary and pancreatic                        stenoses, the latter thought associated with smoldering                        symptomatic pancreatitis. We placed a metal biliary                        stent and a plastic pancreatic stent with resolution of                        RUQ pain and normalization of her lipase. She does have                        issues with early satiety and we therefore suspect a                        duodenal stenosis. She proceeds to ERCP for further                        evaluation and management.   Referring MD:        Vinny Yu MD   Requesting Provider: Rito Mcneil MD   Medicines:           General Anesthesia, Indomethacin 100 mg rectal,                        Ciprofloxacin 400 mg IV   Complications:       No immediate complications.   _______________________________________________________________________________   Procedure:           Pre-Anesthesia Assessment:                        -  Prior to the procedure, a History and Physical was                        performed, and patient medications and allergies were                        reviewed. The patient is competent. The risks and                        benefits of the procedure and the sedation options and                        risks were discussed with the patient. All questions                        were answered and informed consent was obtained. Patient                        identification and proposed procedure were verified by                        the nurse in the pre-procedure area. Mental Status                        Examination: alert and oriented. Airway Examination:                        Mallampati Class II (the uvula but not tonsillar pillars                        visualized). Respiratory Examination: clear to                        auscultation. CV Examination: normal. ASA Grade                        Assessment: III - A patient with severe systemic                        disease. After reviewing the risks and benefits, the                        patient was deemed in satisfactory condition to undergo                        the procedure. The anesthesia plan was to use general                        anesthesia. Immediately prior to administration of                        medications, the patient was re-assessed for adequacy to                        receive sedatives. The heart rate, respiratory rate,                        oxygen saturations, blood pressure, adequacy of                        pulmonary ventilation, and response to care were                        monitored throughout the procedure. The physical status                        of the patient was re-assessed after the procedure.                        After obtaining informed consent, the scope was passed                        under direct vision. Throughout the procedure, the                        patient's blood pressure, pulse, and oxygen saturations      "                   were monitored continuously. The duodenoscope was                        introduced through the mouth, and used to inject                        contrast into and used to inject contrast into the bile                        duct. The ERCP was accomplished without difficulty. The                        patient tolerated the procedure well.                                                                                     Findings:        A  film of the right upper quadrant demonstrated the biliary stent        fully expanded in its expected location, though the pancreatic stent was        not found. Spotaneous ejection was verified by limited white light        endoscopy as was a patent pylorus and proximal duodenum. There was a        malignant appearing nonbleeding ulceration without stigmata of impending        bleed just inferior to the biliary orifice. The pancreatic orifice was        not readily identified and extensive attempts at pancreatic cannulation        were deferred. The biliary stent demonstrated minimal stone concretion        and this cleared by suction and irrigation. The bile duct was deeply        cannulated with the short-nosed traction sphincterotome in concert with        an 0.025\" Visiglide wire. Contrast was injected and I personally        interpreted the bile duct images. Ductal flow of contrast was adequate,        image quality was adequate and contrast extended throughout the main        bile duct which appeared grossly patent without obstructing tumor        ingrowth following clearance of the distal stone concretion.                                                                                     Impression:          - No evidence of gastric outlet obstruction of duodenal                        stenosis                        - Malignant, nonbleeding, ulceration just inferior to                        the biliary orifice without indication for endoscopic "                        therapy at this juncture                        - Spotaneous ejection of the pancreatic stent; in light                        of symptom and laboratory improvement, extensive                        attempts at pancreatic cannulation were deferred                        - Small amount of biliary stone concreation cleared from                        a well postioned full expanded biliary stent   Recommendation:      - Standard outpatient general anesthesia recovery with                        probable discharge this morning/early afternoon                        - Follow up with oncology with consideration for                        placement of a port and repeat nutrition consult                        - Continue high dose twice daily PPI as prescribed                        - No plans for interval endoscopy at this juncture                        - The findings and recommendations were discussed with                        the patient and their family                                                                                       electronically signed by SUZIE Izaguirre

## 2018-01-10 NOTE — IP AVS SNAPSHOT
MRN:[id number]                      After Visit Summary   1/10/2018    Iris Lewis    MRN: [id number]           Thank you!     Thank you for choosing [facility] for your care. Our goal is always to provide you with excellent care. Hearing back from our patients is one way we can continue to improve our services. Please take a few minutes to complete the written survey that you may receive in the mail after you visit with us. Thank you!        Patient Information     Date Of Birth          1940        About your hospital stay     You were admitted on:  January 10, 2018 You last received care in the:  Same Day Surgery [facility]    You were discharged on:  January 10, 2018       Who to Call     For medical emergencies, please call 911.  For non-urgent questions about your medical care, please call your primary care provider or clinic, [phone]  For questions related to your surgery, please call your surgery clinic        Attending Provider     Provider Specialty    Felix Izaguirre MD Gastroenterology       Primary Care Provider Office Phone # Fax #    Neri Gipson [phone]      After Care Instructions     Discharge Instructions       Resume pre procedure diet            Discharge Instructions       Restart home medications.                  Your next 10 appointments already scheduled     Jan 18, 2018  9:20 AM CST   (Arrive by 9:05 AM)   CT CHEST/ABDOMEN/PELVIS W CONTRAST with UCCT2   [facility] ([facility])    [address] [address] [phone]           Please bring any scans or X-rays taken at other hospitals, if similar tests were done. Also bring a list of your medicines, including vitamins, minerals and over-the-counter drugs. It is safest to leave personal items at home.  Be sure to tell your doctor:   If you have any allergies.   If there s any chance you are  pregnant.   If you are breastfeeding.   If you have any special needs.  You may have contrast for this exam. To prepare:   Do not eat or drink for 2 hours before your exam. If you need to take medicine, you may take it with small sips of water. (We may ask you to take liquid medicine as well.)   The day before your exam, drink extra fluids at least six 8-ounce glasses (unless your doctor tells you to restrict your fluids).  Patients over 70 or patients with diabetes or kidney problems:   If you haven t had a blood test (creatinine test) within the last 30 days, go to your clinic or Diagnostic Imaging Department for this test.  If you have diabetes:   If your kidney function is normal, continue taking your metformin (Avandamet, Glucophage, Glucovance, Metaglip) on the day of your exam.   If your kidney function is abnormal, wait 48 hours before restarting this medicine.  You will have oral contrast for this exam:   You will drink the contrast at home. Get this from your clinic or Diagnostic Imaging Department. Please follow the directions given.  Please wear loose clothing, such as a sweat suit or jogging clothes. Avoid snaps, zippers and other metal. We may ask you to undress and put on a hospital gown.  If you have any questions, please call the Imaging Department where you will have your exam.            Jan 22, 2018  7:45 AM CST   [facility] Draw with  [facility] LAB DRAW   [facility] Draw ([facility])    [address] [place] [address]  [address]  [facility] [address]   [phone]            Jan 22, 2018  8:15 AM CST   (Arrive by 8:00 AM)   Return Visit with Vinny Yu MD   [facility] ([facility])    [address] [place] [address]  [address]  [facility] [address] [phone]            Jan 22, 2018  9:00 AM CST   Infusion 120 with  ONCOLOGY INFUSION, UC 25 ATC   [facility] ([facility])    [address]  [address]  [facility] [address]   [phone]            Jan 22, 2018 10:05 AM CST   (Arrive by 9:50 AM)   Return Visit with Neri Gpison MD   [facility] ([facility])    [address]  4th Floor  [facility] [address]   [phone]            Jan 29, 2018  9:00 AM CST   [facility] Lab Draw with UC [facility] LAB DRAW   [facility] ([facility])    [address]  [address]  [facility] [address]   [phone]            Jan 29, 2018  9:30 AM CST   Infusion 120 with UC ONCOLOGY INFUSION, UC 27 ATC   [facility] ([facility])    [address] [place] [address]  [address]  [facility] [address]   [phone]            Feb 05, 2018  8:30 AM CST   [facility] Lab Draw with UC [facility] LAB DRAW   [facility] ([facility])    [address]  [address]  [facility] [address]   [phone]              Further instructions from your care team       [facility]  Same-Day Surgery   Adult Discharge Orders & Instructions     For 24 hours after surgery    1. Get plenty of rest.  A responsible adult must stay with you for at least 24 hours after you leave the hospital.   2. Do not drive or use heavy equipment.  If you have weakness or tingling, don't drive or use heavy equipment until this feeling goes away.  3. Do not drink alcohol.  4. Avoid strenuous or risky activities.  Ask for help when climbing stairs.   5. You may feel lightheaded.  IF so, sit for a few minutes before standing.  Have someone help you get up.   6. If you have nausea (feel sick to your stomach): Drink only clear liquids such as apple juice, ginger ale, broth or 7-Up.  Rest may also help.  Be sure to drink enough fluids.  Move to a regular diet as you feel able.  7. You may have a slight fever. Call the  "doctor if your fever is over 100 F (37.7 C) (taken under the tongue) or lasts longer than 24 hours.  8. You may have a dry mouth, a sore throat, muscle aches or trouble sleeping.  These should go away after 24 hours.  9. Do not make important or legal decisions.   Call your doctor for any of the followin.  Signs of infection (fever, growing tenderness at the surgery site, a large amount of drainage or bleeding, severe pain, foul-smelling drainage, redness, swelling).    2. It has been over 8 to 10 hours since surgery and you are still not able to urinate (pass water).    3.  Headache for over 24 hours.    4.  Numbness, tingling or weakness the day after surgery (if you had spinal anesthesia).  To contact a doctor, call [phone] (Dr. Izaguirre's office) or:        [phone] and ask for the resident on call for   gastroenterology (answered 24 hours a day)      Emergency Department:    [facility]: [phone]       (TTY for hearing impaired: [phone])        Pending Results     No orders found from 2018 to 2018.            Admission Information     Date & Time Provider Department Dept. Phone    1/10/2018 Felix Izaguirre MD Same Day Surgery [facility] [phone]      Your Vitals Were     Blood Pressure Temperature Respirations Height Weight Pulse Oximetry    124/71 98.5  F (36.9  C) (Oral) 11 1.575 m (5' 2\") 47.4 kg (104 lb 8 oz) 97%    BMI (Body Mass Index)                   19.11 kg/m2           [facility] Information     [employer] gives you secure access to your electronic health record. If you see a primary care provider, you can also send messages to your care team and make appointments. If you have questions, please call your primary care clinic.  If you do not have a primary care provider, please call [phone] and they will assist you.        Care EveryWhere ID     This is your Care EveryWhere ID. This could be used by other organizations to access your [facility] " medical records  [id number]        Equal Access to Services     NESSA NICHOLS : Hadii aad ku hadmarklon Martinez, waronnyda rin, qacheyta shirleneadelaidameera correia, lisa michael. So [facility] [phone].    ATENCIÓN: Si habla español, tiene a nava disposición servicios gratuitos de asistencia lingüística. Llame al [phone].    We comply with applicable federal civil rights laws and [place] laws. We do not discriminate on the basis of race, color, national origin, age, disability, sex, sexual orientation, or gender identity.               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 Powder formulation. Add to water and fruits/vegetables daily to promote bone health.   Refills:  0       camphor-menthol 0.5-0.5 % Lotn   Commonly known as:  DERMASARRA        Dose:  1 mL   Apply 1 mL topically every 6 hours as needed for skin care   Quantity:  59 mL   Refills:  0       cholecalciferol 1000 UNIT tablet   Commonly known as:  vitamin D3        Dose:  1 tablet   Take 1 tablet by mouth 2 times daily   Refills:  0       cyanocolbalamin 500 MCG tablet   Commonly known as:  vitamin  B-12        Dose:  500 mcg   Take 500 mcg by mouth every morning   Refills:  0       denosumab 60 MG/ML Soln injection   Commonly known as:  PROLIA   Used for:  Senile osteoporosis        Dose:  60 mg   Inject 1 mL (60 mg) Subcutaneous every 6 months Inject subcutaneously in upper arm, upper thigh or abdomen   Quantity:  1 mL   Refills:  1       enoxaparin 60 MG/0.6ML injection   Commonly known as:  LOVENOX   Used for:  Malignant neoplasm of  head of pancreas (H)        Refills:  3       folic acid 400 MCG tablet   Commonly known as:  FOLVITE        Dose:  1 tablet   Take 1 tablet by mouth every morning   Refills:  0       hydrOXYzine 25 MG tablet   Commonly known as:  ATARAX        Dose:  25-50 mg   Take 1-2 tablets (25-50 mg) by mouth every 6 hours as needed for itching (adjuvant pain)   Quantity:  90 tablet   Refills:  0       levothyroxine 37.5 mcg Tabs half-tab   Commonly known as:  SYNTHROID/LEVOTHROID        Dose:  125 mcg   Take 125 mcg by mouth every morning (before breakfast)   Refills:  0       LORazepam 0.5 MG tablet   Commonly known as:  ATIVAN   Used for:  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        1unit being ordered: cranial prosthesis   Quantity:  1 Units   Refills:  0       potassium 99 MG Tabs        Dose:  1 tablet   Take 1 tablet by mouth 2 times daily   Refills:  0       prochlorperazine 10 MG tablet   Commonly known as:  COMPAZINE   Used for:  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)        Dose:  5 mg   Take 0.5 tablets (5 mg) by mouth every 6 hours as needed for nausea or vomiting   Quantity:  180 tablet   Refills:  0       psyllium Packet   Commonly known as:  METAMUCIL/KONSYL        Dose:  1 packet   Take 1 packet  by mouth daily as needed for constipation   Refills:  0       timolol maleate 0.5 % opthalmic solution   Commonly known as:  ISTALOL        Dose:  1 drop   Place 1 drop into both eyes every morning Before placing contact lenses   Refills:  0       traMADol 50 MG tablet   Commonly known as:  ULTRAM   Used for:  Pancreatic mass        Dose:  25-50 mg   Take 0.5-1 tablets (25-50 mg) by mouth every 6 hours as needed for breakthrough pain   Quantity:  100 tablet   Refills:  1       triamterene-hydrochlorothiazide 37.5-25 MG per tablet   Commonly known as:  MAXZIDE-25        Dose:  1 tablet   Take 1 tablet by mouth every morning   Refills:  0       TYLENOL PO        Dose:  325 mg   Take 325 mg by mouth every 4 hours as needed for mild pain or fever   Refills:  0       zinc 50 MG Tabs        Dose:  1 tablet   Take 1 tablet by mouth every morning   Refills:  0                Protect others around you: Learn how to safely use, store and throw away your medicines at www.disposemymeds.org.             Medication List: This is a list of all your medications and when to take them. Check marks below indicate your daily home schedule. Keep this list as a reference.      Medications           Morning Afternoon Evening Bedtime As Needed    amylase-lipase-protease 83191 UNITS Cpep   Commonly known as:  CREON   Take 2 capsules (72,000 Units) by mouth 3 times daily (with meals)                                CALCIUM PO   Take by mouth every morning Form/strength unknown. Powder formulation. Add to water and fruits/vegetables daily to promote bone health.                                camphor-menthol 0.5-0.5 % Lotn   Commonly known as:  DERMASARRA   Apply 1 mL topically every 6 hours as needed for skin care                                cholecalciferol 1000 UNIT tablet   Commonly known as:  vitamin D3   Take 1 tablet by mouth 2 times daily                                cyanocolbalamin 500 MCG tablet   Commonly known as:  vitamin   B-12   Take 500 mcg by mouth every morning                                denosumab 60 MG/ML Soln injection   Commonly known as:  PROLIA   Inject 1 mL (60 mg) Subcutaneous every 6 months Inject subcutaneously in upper arm, upper thigh or abdomen                                enoxaparin 60 MG/0.6ML injection   Commonly known as:  LOVENOX                                folic acid 400 MCG tablet   Commonly known as:  FOLVITE   Take 1 tablet by mouth every morning                                hydrOXYzine 25 MG tablet   Commonly known as:  ATARAX   Take 1-2 tablets (25-50 mg) by mouth every 6 hours as needed for itching (adjuvant pain)                                levothyroxine 37.5 mcg Tabs half-tab   Commonly known as:  SYNTHROID/LEVOTHROID   Take 125 mcg by mouth every morning (before breakfast)                                LORazepam 0.5 MG tablet   Commonly known as:  ATIVAN   Take 1 tablet (0.5 mg) by mouth every 4 hours as needed (Anxiety, Nausea/Vomiting or Sleep)                                omeprazole 40 MG capsule   Commonly known as:  priLOSEC   Take 1 capsule (40 mg) by mouth 2 times daily Take 30-60 minutes before a meal.                                ondansetron 4 MG tablet   Commonly known as:  ZOFRAN   Take 1 tablet (4 mg) by mouth every 6 hours as needed for nausea                                order for DME   1unit being ordered: cranial prosthesis                                potassium 99 MG Tabs   Take 1 tablet by mouth 2 times daily                                prochlorperazine 10 MG tablet   Commonly known as:  COMPAZINE   Take 0.5 tablets (5 mg) by mouth every 6 hours as needed for nausea or vomiting                                psyllium Packet   Commonly known as:  METAMUCIL/KONSYL   Take 1 packet by mouth daily as needed for constipation                                timolol maleate 0.5 % opthalmic solution   Commonly known as:  ISTALOL   Place 1 drop into both eyes every  morning Before placing contact lenses                                traMADol 50 MG tablet   Commonly known as:  ULTRAM   Take 0.5-1 tablets (25-50 mg) by mouth every 6 hours as needed for breakthrough pain                                triamterene-hydrochlorothiazide 37.5-25 MG per tablet   Commonly known as:  MAXZIDE-25   Take 1 tablet by mouth every morning                                TYLENOL PO   Take 325 mg by mouth every 4 hours as needed for mild pain or fever                                zinc 50 MG Tabs   Take 1 tablet by mouth every morning

## 2018-01-10 NOTE — DISCHARGE INSTRUCTIONS
[facility]  Same-Day Surgery   Adult Discharge Orders & Instructions     For 24 hours after surgery    1. Get plenty of rest.  A responsible adult must stay with you for at least 24 hours after you leave the hospital.   2. Do not drive or use heavy equipment.  If you have weakness or tingling, don't drive or use heavy equipment until this feeling goes away.  3. Do not drink alcohol.  4. Avoid strenuous or risky activities.  Ask for help when climbing stairs.   5. You may feel lightheaded.  IF so, sit for a few minutes before standing.  Have someone help you get up.   6. If you have nausea (feel sick to your stomach): Drink only clear liquids such as apple juice, ginger ale, broth or 7-Up.  Rest may also help.  Be sure to drink enough fluids.  Move to a regular diet as you feel able.  7. You may have a slight fever. Call the doctor if your fever is over 100 F (37.7 C) (taken under the tongue) or lasts longer than 24 hours.  8. You may have a dry mouth, a sore throat, muscle aches or trouble sleeping.  These should go away after 24 hours.  9. Do not make important or legal decisions.   Call your doctor for any of the followin.  Signs of infection (fever, growing tenderness at the surgery site, a large amount of drainage or bleeding, severe pain, foul-smelling drainage, redness, swelling).    2. It has been over 8 to 10 hours since surgery and you are still not able to urinate (pass water).    3.  Headache for over 24 hours.    4.  Numbness, tingling or weakness the day after surgery (if you had spinal anesthesia).  To contact a doctor, call [phone] (Dr. Izaguirre's office) or:        [phone] and ask for the resident on call for   gastroenterology (answered 24 hours a day)      Emergency Department:    [facility]: [phone]       (TTY for hearing impaired: [phone])

## 2018-01-10 NOTE — IP AVS SNAPSHOT
Same Day Surgery [address] [address]    Phone:  [phone]                                       After Visit Summary   1/10/2018    Iris Lewis    MRN: [id number]           After Visit Summary Signature Page     I have received my discharge instructions, and my questions have been answered. I have discussed any challenges I see with this plan with the nurse or doctor.    ..........................................................................................................................................  Patient/Patient Representative Signature      ..........................................................................................................................................  Patient Representative Print Name and Relationship to Patient    ..................................................               ................................................  Date                                            Time    ..........................................................................................................................................  Reviewed by Signature/Title    ...................................................              ..............................................  Date                                                            Time

## 2018-01-10 NOTE — ANESTHESIA POSTPROCEDURE EVALUATION
Patient: Iris Lewis    Procedure(s):  Endoscopic Retrograde Cholangiopancreatogram  - Wound Class: II-Clean Contaminated    Diagnosis:Bile Duct Stricture   Diagnosis Additional Information: No value filed.    Anesthesia Type:  General, ETT    Note:  Anesthesia Post Evaluation    Patient location during evaluation: PACU  Patient participation: Able to fully participate in evaluation  Level of consciousness: awake  Pain management: adequate  Airway patency: patent  Cardiovascular status: acceptable  Respiratory status: acceptable  Hydration status: acceptable  PONV: none     Anesthetic complications: None          Last vitals:  Vitals:    01/10/18 0800   BP: 116/73   Resp: 16   Temp: 36.7  C (98  F)   SpO2: 97%         Electronically Signed By: Derrick Vergara MD  January 10, 2018  11:08 AM

## 2018-01-10 NOTE — ANESTHESIA PREPROCEDURE EVALUATION
Anesthesia Evaluation     . Pt has had prior anesthetic. Type: General    No history of anesthetic complications          ROS/MED HX    ENT/Pulmonary:     (+)asthma , . .    Neurologic:       Cardiovascular:     (+) hypertension----. : . . . :. .       METS/Exercise Tolerance:     Hematologic:     (+) History of blood clots pt is anticoagulated, -      Musculoskeletal:         GI/Hepatic:     (+) GERD       Renal/Genitourinary:         Endo:     (+) thyroid problem .      Psychiatric:         Infectious Disease:         Malignancy:   (+) Malignancy History of GI          Other:                     Physical Exam  Normal systems: cardiovascular, pulmonary and dental    Airway   Mallampati: I  TM distance: >3 FB  Neck ROM: full    Dental     Cardiovascular       Pulmonary                     Anesthesia Plan      History & Physical Review  History and physical reviewed and following examination; no interval change.    ASA Status:  3 .    NPO Status:  > 8 hours    Plan for General and ETT with Intravenous induction. Maintenance will be Balanced.    PONV prophylaxis:  Ondansetron (or other 5HT-3) and Dexamethasone or Solumedrol       Postoperative Care      Consents  Anesthetic plan, risks, benefits and alternatives discussed with:  Patient.  Use of blood products discussed: No .   .          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     Arthritis      Colon polyp 2009,2015    no polyps - f/u in 5 yrs      Esophageal reflux      Glaucoma      Hypertension      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) 11/6/2017     Osteoporosis      Postsurgical hypothyroidism      Scoliosis (and kyphoscoliosis), idiopathic      Thyroid cancer (H)     papillary carcinoma age 33     Uncomplicated asthma        PAST SURGICAL HISTORY:   Past Surgical History:   Procedure Laterality Date     ARTHRODESIS FOOT Right 11/9/2016    Procedure: ARTHRODESIS FOOT;   Surgeon: Janes Mcelroy MD;  Location: [facility]     C STOMACH SURGERY PROCEDURE UNLISTED       COLONOSCOPY   2009, 3/2015    no polyps in 2015 told to return 10 yrs.      ENDOSCOPIC RETROGRADE CHOLANGIOPANCREATOGRAM N/A 11/2/2017    Procedure: COMBINED ENDOSCOPIC RETROGRADE CHOLANGIOPANCREATOGRAPHY, PLACE TUBE/STENT;  Endoscopic Retrograde Cholangiopancreatogram with billary sphincterotomy and billary stent placement;  Surgeon: Rito Mcneil MD;  Location: [facility] OR     ENDOSCOPIC RETROGRADE CHOLANGIOPANCREATOGRAPHY, EXCHANGE TUBE/STENT N/A 11/22/2017    Procedure: ENDOSCOPIC RETROGRADE CHOLANGIOPANCREATOGRAPHY, EXCHANGE TUBE/STENT;  Endoscopic Retrograde Cholangiopancreatogram with Biliary Stent Exchange and pancreatic stent placement;  Surgeon: Felix Izaguirre MD;  Location: [facility] OR     ESOPHAGOSCOPY, GASTROSCOPY, DUODENOSCOPY (EGD), COMBINED  2/17/2012    Procedure:COMBINED ESOPHAGOSCOPY, GASTROSCOPY, DUODENOSCOPY (EGD); COMBINED ESOPHAGOSCOPY, GASTROSCOPY, DUODENOSCOPY POSSIBLE DILATION; Surgeon:NICHOLE MCCLAIN; Location:[facility]     ESOPHAGOSCOPY, GASTROSCOPY, DUODENOSCOPY (EGD), COMBINED N/A 11/2/2017    Procedure: COMBINED ENDOSCOPIC ULTRASOUND, ESOPHAGOSCOPY, GASTROSCOPY, DUODENOSCOPY (EGD), FINE NEEDLE ASPIRATE/BIOPSY;  Upper Endoscopic Ultrasound with fine needle biopsy, upper endoscopy with biopsies, Endoscopic Retrograde Cholangiopancreatogram with billary sphincterotomy and billary stent placement;  Surgeon: Hadley Bailey MD;  Location: [facility] OR     FOOT SURGERY  2008    hammertoe left     LAPAROSCOPIC CHOLECYSTECTOMY N/A 1/5/2015    Procedure: LAPAROSCOPIC CHOLECYSTECTOMY;  Surgeon: Cruz Pearson MD;  Location: [facility]     NISSEN FUNDOPLICATION       ORTHOPEDIC SURGERY  2009    left hammertoe      REPAIR HAMMER TOE Right 11/9/2016    Procedure: REPAIR HAMMER TOE;  Surgeon: Janes Mcelroy MD;  Location: [facility]     SALPINGO OOPHORECTOMY,R/L/SERENA  1974    left, for  tubal pregnancy     THYROIDECTOMY      for thyroid cancer       FAMILY HISTORY:   Family History   Problem Relation Age of Onset     C.A.D. Father      MI at age 65     Asthma Father      Coronary Artery Disease Father      Alzheimer Disease Mother      OSTEOPOROSIS Mother      Depression Sister 80     Asthma Sister      Prostate Cancer Brother      Depression Son      Depression Sister      Coronary Artery Disease Brother 67     Skin Cancer No family hx of      no skin cancer           Current Outpatient Prescriptions   Medication Sig Dispense Refill     prochlorperazine (COMPAZINE) 10 MG tablet Take 0.5 tablets (5 mg) by mouth every 6 hours as needed for nausea or vomiting 180 tablet 0     enoxaparin (LOVENOX) 60 MG/0.6ML injection   3     ondansetron (ZOFRAN) 4 MG tablet Take 1 tablet (4 mg) by mouth every 6 hours as needed for nausea 60 tablet 0     omeprazole (PRILOSEC) 40 MG capsule Take 1 capsule (40 mg) by mouth 2 times daily Take 30-60 minutes before a meal. 180 capsule 0     order for DME 1unit being ordered: cranial prosthesis 1 Units 0     psyllium (METAMUCIL/KONSYL) Packet Take 1 packet by mouth daily as needed for constipation       Acetaminophen (TYLENOL PO) Take 325 mg by mouth every 4 hours as needed for mild pain or fever       traMADol (ULTRAM) 50 MG tablet Take 0.5-1 tablets (25-50 mg) by mouth every 6 hours as needed for breakthrough pain 100 tablet 1     LORazepam (ATIVAN) 0.5 MG tablet Take 1 tablet (0.5 mg) by mouth every 4 hours as needed (Anxiety, Nausea/Vomiting or Sleep) 30 tablet 2     amylase-lipase-protease (CREON) 85586 UNITS CPEP Take 2 capsules (72,000 Units) by mouth 3 times daily (with meals) 180 capsule 1     hydrOXYzine (ATARAX) 25 MG tablet Take 1-2 tablets (25-50 mg) by mouth every 6 hours as needed for itching (adjuvant pain) 90 tablet 0     camphor-menthol (DERMASARRA) 0.5-0.5 % LOTN Apply 1 mL topically every 6 hours as needed for skin care 59 mL 0     CALCIUM PO Take by  mouth every morning Form/strength unknown. Powder formulation. Add to water and fruits/vegetables daily to promote bone health.        levothyroxine (SYNTHROID/LEVOTHROID) 37.5 mcg TABS half-tab Take 125 mcg by mouth every morning (before breakfast)        triamterene-hydrochlorothiazide (MAXZIDE-25) 37.5-25 MG per tablet Take 1 tablet by mouth every morning        potassium 99 MG TABS Take 1 tablet by mouth 2 times daily        timolol maleate (ISTALOL) 0.5 % opthalmic solution Place 1 drop into both eyes every morning Before placing contact lenses       zinc 50 MG TABS Take 1 tablet by mouth every morning        denosumab (PROLIA) 60 MG/ML SOLN injection Inject 1 mL (60 mg) Subcutaneous every 6 months Inject subcutaneously in upper arm, upper thigh or abdomen 1 mL 1     cyanocolbalamin (VITAMIN B-12) 500 MCG tablet Take 500 mcg by mouth every morning        folic acid (FOLVITE) 400 MCG tablet Take 1 tablet by mouth every morning        cholecalciferol (VITAMIN D) 1000 UNIT tablet Take 1 tablet by mouth 2 times daily                  .

## 2018-01-10 NOTE — ANESTHESIA CARE TRANSFER NOTE
Patient: Iris Lewis    Procedure(s):  Endoscopic Retrograde Cholangiopancreatogram  - Wound Class: II-Clean Contaminated    Diagnosis: Bile Duct Stricture   Diagnosis Additional Information: No value filed.    Anesthesia Type:   General, ETT     Note:  Airway :Face Mask  Patient transferred to:PACU  Handoff Report: Identifed the Patient, Identified the Reponsible Provider, Reviewed the pertinent medical history, Discussed the surgical course, Reviewed Intra-OP anesthesia mangement and issues during anesthesia, Set expectations for post-procedure period and Allowed opportunity for questions and acknowledgement of understanding      Vitals: (Last set prior to Anesthesia Care Transfer)    CRNA VITALS  1/10/2018 1027 - 1/10/2018 1102      1/10/2018             Pulse: 104    SpO2: 100 %                Electronically Signed By: CAT Roberts CRNA  January 10, 2018  11:02 AM

## 2018-01-10 NOTE — ANESTHESIA CARE TRANSFER NOTE
Patient: Iris Lewis    Procedure(s):  Endoscopic Retrograde Cholangiopancreatogram  - Wound Class: II-Clean Contaminated    Diagnosis: Bile Duct Stricture   Diagnosis Additional Information: No value filed.    Anesthesia Type:   No value filed.     Note:  Airway :Face Mask  Patient transferred to:PACU  Handoff Report: Identifed the Patient, Identified the Reponsible Provider, Reviewed the pertinent medical history, Discussed the surgical course, Reviewed Intra-OP anesthesia mangement and issues during anesthesia, Set expectations for post-procedure period and Allowed opportunity for questions and acknowledgement of understanding      Vitals: (Last set prior to Anesthesia Care Transfer)              Electronically Signed By: CAT Roberts CRNA  January 10, 2018  9:26 AM

## 2018-01-10 NOTE — OR NURSING
Dr Cruz at bedside to talk with the patient at approx 11:20 He stated he would talk with her in phase 2

## 2018-01-10 NOTE — OR NURSING
Dr. Izaguirre in to see patient, discussed postop plan of care with patient and her , ok to discharge patient to home.

## 2018-01-11 NOTE — TELEPHONE ENCOUNTER
Dear Ban Simmons's recent thyroid lab (TSH) suggest under-replacement. I reviewed her medication dosage and recent discharge summary and needs clarification    Is she taking 37.5 mcg QD?  Or 125 mcg QD?    ThanksMONAE      Left message for pt to call back.  Lexi Lea RN 2:17 PM on 1/11/2018.

## 2018-01-12 NOTE — PROGRESS NOTES
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     Here for a preop for ERCP next week.  Ongoing weight loss has been an issue, but it stabilized after adding supplements.  She denies abdominal pain, jaundice, or any new symptoms.  No chest pain, shortness of breath, or cardiac symptoms.   ROS: 10 point ROS neg other than the symptoms noted above in the HPI.    Past, family, social history unchanged per Epic.    PHYSICAL EXAM:  B/P: 133/78, P: 77, R: 20  Constitutional: no distress, comfortable, pleasant   Eyes: anicteric, normal extra-ocular movements   Ears, Nose and Throat: tympanic membranes clear, nose clear and free of lesions, throat clear, neck supple with full range of motion, no thyromegaly.   Cardiovascular: regular rate and rhythm, normal S1 and S2, no murmurs, rubs or gallops,  peripheral pulses full and symmetric   Respiratory: clear to auscultation, no wheezes or crackles, normal breath sounds   Gastrointestinal: positive bowel sounds, nontender, no hepatosplenomegaly, no masses   Musculoskeletal: full range of motion, no edema   Skin: no concerning lesions, no jaundice   Neurological: normal gait, no tremor   Psychological: appropriate mood   Lymphatic: no cervical lymphadenopathy and no pedal edema      A/P:  Low risk for perioperative events; proceed with ERCP as planned.  No further testing indicated at this time.  Continue all current medications except, hold Lovenox as instructed day prior to surgery.      RTC prn     Argelia Mir MD

## 2018-01-12 NOTE — PROGRESS NOTES
Post ERCP (1/10/2017) with Dr. Izaguirre: Follow-up    Post procedure recommendations: Follow up with oncology with consideration for placement of a port and repeat nutrition consult - Continue high dose twice daily PPI as prescribed - No plans for interval endoscopy at this juncture - The findings and recommendations were discussed with the patient and their family     Message for Iris to call with any questions/concerns.     Orders placed: none at this time.     Contact information verified for future questions/concerns.    Lyn MARADIAGA RN Coordinator  Dr. Mcneil, Dr. Izaguirre & Dr. Holden  [facility]  [phone]

## 2018-01-15 NOTE — PROGRESS NOTES
"Patients son called with update that patient has been hospitalized in [place] while visiting her daughter. \"She has had some breathing problems and heart problems.\" Son is concerned about patient flying back to [place]. Writer discussed with . Returned call to son. Recommended they follow flying recommend they follow flying recommendations by local doctors.  will see patient as scheduled for treatment assessment. Son requests referral for palliative care, so it can be set up while he is here. Referral placed. Clinic number provided and he knows to call clinic for any ongoing needs.    "

## 2018-01-16 NOTE — TELEPHONE ENCOUNTER
Patient called to let you know she is taking the Synthroid at .375 and she has a visit with Dr. Gipson 1/22.  Follow-up accordingly.

## 2018-01-17 NOTE — TELEPHONE ENCOUNTER
Son Joanne calling to speak with RNCC to help coordinate pt care. Pt was discharged from the hospital in [place] and is now on continuous oxygen. They have been trying to find an oxygen company that will allow her to bring her supplies back to [place] and not been able to get any answers from the hospital. Joanne requesting a call back from covering RNCC.

## 2018-01-21 NOTE — TELEPHONE ENCOUNTER
----- Message from Lexi Lea RN sent at 1/16/2018  9:00 AM CST -----  Regarding: FW: Dr. Gipson, Synthroid Follow-Up  ?med dosage ok? Also pt in hospital in [place].  Lexi Lea RN 9:01 AM on 1/16/2018.        ----- Message -----     From: Patric Mckeon     Sent: 1/12/2018   1:12 PM       To: Lexi Lea RN  Subject: Dr. Gipson, Synthroid Follow-Up                Hi Lexi,     Hope you're well!    Patient called to let you know she is taking the Synthroid at .375 and she has a visit with Dr. Gipson 1/22.  Follow-up accordingly.     Kind Regards    Patric     Please DO NOT send this message and/or reply back to sender. Call Center Representatives DO NOT respond to messages.

## 2018-01-22 PROBLEM — J45.21 MILD INTERMITTENT ASTHMA WITH ACUTE EXACERBATION: Status: ACTIVE | Noted: 2018-01-01

## 2018-01-22 PROBLEM — I26.99 OTHER PULMONARY EMBOLISM WITHOUT ACUTE COR PULMONALE (H): Status: ACTIVE | Noted: 2018-01-01

## 2018-01-22 NOTE — MR AVS SNAPSHOT
After Visit Summary   1/22/2018    Iris Lewis    MRN: [id number]           Patient Information     Date Of Birth          1940        Visit Information        Provider Department      1/22/2018 10:05 AM Neri Gipson MD [facility]        Today's Diagnoses     Abnormal finding on thyroid function test    -  1       Follow-ups after your visit        Your next 10 appointments already scheduled     Jan 24, 2018  7:40 AM CST   (Arrive by 7:25 AM)   CT CHEST/ABDOMEN/PELVIS W CONTRAST with UCCT2   [facility] CT ([facility])    [address] [address] [phone]           Please bring any scans or X-rays taken at other hospitals, if similar tests were done. Also bring a list of your medicines, including vitamins, minerals and over-the-counter drugs. It is safest to leave personal items at home.  Be sure to tell your doctor:   If you have any allergies.   If there s any chance you are pregnant.   If you are breastfeeding.   If you have any special needs.  You may have contrast for this exam. To prepare:   Do not eat or drink for 2 hours before your exam. If you need to take medicine, you may take it with small sips of water. (We may ask you to take liquid medicine as well.)   The day before your exam, drink extra fluids at least six 8-ounce glasses (unless your doctor tells you to restrict your fluids).  Patients over 70 or patients with diabetes or kidney problems:   If you haven t had a blood test (creatinine test) within the last 30 days, go to your clinic or Diagnostic Imaging Department for this test.  If you have diabetes:   If your kidney function is normal, continue taking your metformin (Avandamet, Glucophage, Glucovance, Metaglip) on the day of your exam.   If your kidney function is abnormal, wait 48 hours before restarting this medicine.  You will have oral contrast for this exam:   You  will drink the contrast at home. Get this from your clinic or Diagnostic Imaging Department. Please follow the directions given.  Please wear loose clothing, such as a sweat suit or jogging clothes. Avoid snaps, zippers and other metal. We may ask you to undress and put on a hospital gown.  If you have any questions, please call the Imaging Department where you will have your exam.            Jan 24, 2018 10:45 AM CST   (Arrive by 9:15 AM)   IR CHEST PORT PLACEMENT > 5 YRS OF AGE with UCASCCARM6   [facility] ([facility])    [address]  5th Floor  [facility] [address]              1. Your doctor will need to do a history and physical within 7 days before this procedure. 2. Your doctor will which medications should not be taken the morning of the exam. 3. Laboratory tests are to be obtained by your doctor prior to the exam (Basic Metabolic Panel, CBCP, PTT and INR) (No labs needed if you are having a tunneled catheter exchange or removal) 4. If you have allergies to x-ray contrast or iodine, contact your doctor or a Radiology nurse prior to the exam day for instructions. 5. Someone will need to drive you to and from the hospital. 6. If you are or may be pregnant, contact your doctor or a Radiology nurse prior to the day of the exam. 7. If you have diabetes, check with your doctor or a Radiology nurse to see if your insulin needs to be adjusted for the exam. 8. If you are taking a medication called Glucophage or Glucovance; these medications need to be held the day of the exam and for approximately 48 hours following. A blood sample must be drawn so your creatinine level can be checked before resuming this medication. 9. If you are taking Coumadin (to thin you blood) please contact your doctor or a Radiology nurse at least 3 days before the exam for special instructions. 10. You should not have received contrast within 48 hours of this exam. 11. The day before your exam  you may eat your regular diet and are encouraged to drink at least 2 quarts of clear liquids. Drink no alcoholic beverages for 24 hours prior to the exam. 12. If you have a colostomy you will need to irrigate it with tap water at 8PM the evening before and again at 6AM the morning of the exam. 13. Do not smoke for 24 hours prior to the procedure. 14. Birth to 4 years: - Breast feeding must be stopped 4 hours prior to exam - Solid food or formula must be stopped 6 hours prior to exam - Tube feedings must be stopped 6 hours prior to exam 15. 4-10 years old: - Nothing to eat or drink 6 hours prior to exam 16. 10+ years old: - Nothing to eat or drink 8 hours prior to exam 17. The morning of the exam you may brush your teeth and take medications as directed with a sip of water. 18. When discharged, you cannot drive until morning, and an adult must be with you until then. You should stay in the [facility] overnight. 19. Bring a list of all drugs you are taking; include supplements and over-the-counter medications. Wear comfortable clothes and leave your valuables at home.            Jan 24, 2018   Procedure with Ventura Villarreal PA-C   [facility] ([facility])    [address] [place] [address]  5th Floor  [facility] [address] [phone]           Located in the Clinics and Surgery Center at [address] [place] [address].   parking is very convenient and highly recommended.  is a $6 flat rate fee.  Both  and self parkers should enter the main arrival plaza from [facility]; parking attendants will direct you based on your parking preference.            Jan 29, 2018  9:00 AM CST   Masonic Lab Draw with  MASONIC LAB DRAW   [facility] ([facility])    [address] [place] [address]  [address]  [facility] [address] [phone]            Jan 29, 2018  9:30 AM CST   Infusion 120 with  ONCOLOGY INFUSION,  UC 27 ATC   [facility] ([facility])    [address]  [address]  [facility] [address]   [phone]            Feb 05, 2018  8:30 AM CST   Masonic Lab Draw with  MASONIC LAB DRAW   [facility] ([facility])    [address]  [address]  [facility] [address]   [phone]            Feb 05, 2018  9:00 AM CST   Infusion 120 with  ONCOLOGY INFUSION, UC 24 ATC   [facility] ([facility])    [address]  [address]  [facility] [address]   [phone]            Mar 26, 2018  7:00 AM CDT   (Arrive by 6:45 AM)   CT CHEST ABDOMEN PELVIS W/O & W CONTRAST with UCCT2   [facility] ([facility])    [address]  1st Floor  [facility] [address]   [phone]           Please bring any scans or X-rays taken at other hospitals, if similar tests were done. Also bring a list of your medicines, including vitamins, minerals and over-the-counter drugs. It is safest to leave personal items at home.  Be sure to tell your doctor:   If you have any allergies.   If there s any chance you are pregnant.   If you are breastfeeding.   If you have any special needs.  You may have contrast for this exam. To prepare:   Do not eat or drink for 2 hours before your exam. If you need to take medicine, you may take it with small sips of water. (We may ask you to take liquid medicine as well.)   The day before your exam, drink extra fluids at least six 8-ounce glasses (unless your doctor tells you to restrict your fluids).  Patients over 70 or patients with diabetes or kidney problems:   If you haven t had a blood test (creatinine test) within the last 30 days, go to your clinic or Diagnostic Imaging Department for this test.  If you have diabetes:   If your kidney function is normal, continue taking your metformin (Avandamet,  Glucophage, Glucovance, Metaglip) on the day of your exam.   If your kidney function is abnormal, wait 48 hours before restarting this medicine.  You will have oral contrast for this exam:   You will drink the contrast at home. Get this from your clinic or Diagnostic Imaging Department. Please follow the directions given.  Please wear loose clothing, such as a sweat suit or jogging clothes. Avoid snaps, zippers and other metal. We may ask you to undress and put on a hospital gown.  If you have any questions, please call the Imaging Department where you will have your exam.              Future tests that were ordered for you today     Open Future Orders        Priority Expected Expires Ordered    IR Chest Port Placement > 5 Yrs of Age Routine  1/22/2019 1/22/2018    CT Chest Abdomen Pelvis w/o & w Contrast Routine 3/26/2018 4/22/2018 1/22/2018    CBC with platelets differential Routine 3/26/2018 4/22/2018 1/22/2018    Comprehensive metabolic panel Routine 3/26/2018 4/22/2018 1/22/2018    Cancer antigen 19-9 Routine 3/26/2018 4/22/2018 1/22/2018            Who to contact     Please call your clinic at [phone] to:    Ask questions about your health    Make or cancel appointments    Discuss your medicines    Learn about your test results    Speak to your doctor   If you have compliments or concerns about an experience at your clinic, or if you wish to file a complaint, please contact [facility] Patient Relations at [phone] or email us at [email or web address]         Additional Information About Your Visit        "[employer]hart Information     [employer] gives you secure access to your electronic health record. If you see a primary care provider, you can also send messages to your care team and make appointments. If you have questions, please call your primary care clinic.  If you do not have a primary care provider, please call [phone] and they will assist you.      [employer] is  an electronic gateway that provides easy, online access to your medical records. With [employer], you can request a clinic appointment, read your test results, renew a prescription or communicate with your care team.     To access your existing account, please contact your [facility] or call [phone] for assistance.        Care EveryWhere ID     This is your Care EveryWhere ID. This could be used by other organizations to access your [facility] medical records  [id number]        Your Vitals Were     Pulse Respirations Pulse Oximetry BMI (Body Mass Index)          76 16 96% 19.02 kg/m2         Blood Pressure from Last 3 Encounters:   01/22/18 128/74   01/22/18 128/74   01/10/18 117/68    Weight from Last 3 Encounters:   01/22/18 47.2 kg (104 lb)   01/22/18 47.2 kg (104 lb)   01/10/18 47.4 kg (104 lb 8 oz)              Today, you had the following     No orders found for display         Where to get your medicines      Some of these will need a paper prescription and others can be bought over the counter.  Ask your nurse if you have questions.     Bring a paper prescription for each of these medications     LORazepam 0.5 MG tablet          Primary Care Provider Office Phone # Fax #    Neri Gipson [phone]       [address] [address]        Equal Access to Services     NESSA NICHOLS : Hadii mary simms hadasho Sobrunildaali, waaxda luqadaha, qaybta kaalmada adesusana, lisa michael. So [facility] [phone].    ATENCIÓN: Si habla español, tiene a nava disposición servicios gratuitos de asistencia lingüística. Llame al [phone].    We comply with applicable federal civil rights laws and [place] laws. We do not discriminate on the basis of race, color, national origin, age, disability, sex, sexual orientation, or gender identity.            Thank you!     Thank you for choosing [facility]  for your care.  Our goal is always to provide you with excellent care. Hearing back from our patients is one way we can continue to improve our services. Please take a few minutes to complete the written survey that you may receive in the mail after your visit with us. Thank you!             Your Updated Medication List - Protect others around you: Learn how to safely use, store and throw away your medicines at www.disposemymeds.org.          This list is accurate as of: 1/22/18  4:46 PM.  Always use your most recent med list.                   Brand Name Dispense Instructions for use Diagnosis    amylase-lipase-protease 53908 UNITS Cpep    CREON    180 capsule    Take 2 capsules (72,000 Units) by mouth 3 times daily (with meals)    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)       CALCIUM PO      Take by mouth every morning Form/strength unknown. Powder formulation. Add to water and fruits/vegetables daily to promote bone health.        camphor-menthol 0.5-0.5 % Lotn    DERMASARRA    59 mL    Apply 1 mL topically every 6 hours as needed for skin care        CARTIA  MG 24 hr capsule   Generic drug:  diltiazem      TK 1 C PO QD        cholecalciferol 1000 UNIT tablet    vitamin D3     Take 1 tablet by mouth 2 times daily        cyanocolbalamin 500 MCG tablet    vitamin  B-12     Take 500 mcg by mouth every morning        denosumab 60 MG/ML Soln injection    PROLIA    1 mL    Inject 1 mL (60 mg) Subcutaneous every 6 months Inject subcutaneously in upper arm, upper thigh or abdomen    Senile osteoporosis       enoxaparin 60 MG/0.6ML injection    LOVENOX      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)       folic acid 400 MCG tablet    FOLVITE     Take 1 tablet by mouth every morning        hydrOXYzine 25 MG tablet    ATARAX    90 tablet    Take 1-2 tablets (25-50 mg) by mouth every 6 hours as needed for itching (adjuvant pain)        levofloxacin 750 MG tablet    LEVAQUIN     TAKE ONE TABLET PO TODAY ON 1/17/18         levothyroxine 125 MCG tablet    SYNTHROID/LEVOTHROID          LORazepam 0.5 MG tablet    ATIVAN    30 tablet    Take 1 tablet (0.5 mg) by mouth every 4 hours as needed (Anxiety, Nausea/Vomiting or Sleep)    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)       omeprazole 40 MG capsule    priLOSEC    180 capsule    Take 1 capsule (40 mg) by mouth 2 times daily Take 30-60 minutes before a meal.    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       potassium 99 MG Tabs      Take 1 tablet by mouth 2 times daily        predniSONE 10 MG tablet    DELTASONE          * prochlorperazine 10 MG tablet    COMPAZINE    180 tablet    Take 0.5 tablets (5 mg) by mouth every 6 hours as needed for nausea or vomiting    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)       * prochlorperazine 5 MG tablet    COMPAZINE          psyllium Packet    METAMUCIL/KONSYL     Take 1 packet by mouth daily as needed for constipation        * timolol maleate 0.5 % opthalmic solution    ISTALOL     Place 1 drop into both eyes every morning Before placing contact lenses        * timolol 0.5 % ophthalmic solution    TIMOPTIC     INT 1 GTT OU IN THE MORNING        traMADol 50 MG tablet    ULTRAM    100 tablet    Take 0.5-1 tablets (25-50 mg) by mouth every 6 hours as needed for breakthrough pain    Pancreatic mass       * triamterene-hydrochlorothiazide 37.5-25 MG per tablet    MAXZIDE-25     Take 1 tablet by mouth every morning        * triamterene-hydrochlorothiazide 37.5-25 MG per capsule    DYAZIDE          TYLENOL PO      Take 325 mg by mouth every 4 hours as needed for mild pain or fever        zinc 50 MG Tabs      Take 1 tablet by mouth every morning        * Notice:  This list has  6 medication(s) that are the same as other medications prescribed for you. Read the directions carefully, and ask your doctor or other care provider to review them with you.

## 2018-01-22 NOTE — LETTER
1/22/2018      RE: rIis Lewis  [address]  [place] [address]       Gloria Trinidad is here today in follow-up of metastatic pancreatic cancer.    She's here for her first planned response assessment after 2 months of gemcitabine and Abraxane. Unfortunately she was missed her planned scan because she was out of town last week and hospitalized with an acute asthma exacerbation. She feels like things gone actually quite well for her in the last 2 months. And up until this acute issue she is feeling better than when she started her chemotherapy. She's noted very little in the way of toxicity, with no significant nausea or other apparent side effects from treatment except for some mild tenderness of the oral mucosa with no actual ulcerations.. She has lost her hair as expected but is not yet experiencing any significant neuropathy. Her anorexia in the last week or so now is actually better, although I suspect some of this is from the high-dose steroids she was given for excess estimation of her asthma. She has a history of severe asthma in childhood with frequent hospitalizations but hadn't had an exacerbation since she was about 31 years old. She believes she may been exposed to some pads when she was visiting her daughter that may have set this off. It was severe enough that she required several days in the hospital and left the hospital on oxygen. She feels like her breathing is now back to her baseline. She had a pulmonary embolism during her first cycle of chemotherapy and remains anticoagulated. She tells me she had a chest CT done during this current hospitalization and was told she had no new pulmonary embolism. She is tolerating her Lovenox injections without any bleeding although she does note she bruises quite a bit of her IV and blood draw sites. The remainder of her complete review of systems is otherwise unremarkable.    On exam his Amos appears quite well. Her vital signs as noted in the  chart are unremarkable.  She has complete alopecia and is wearing a wig which was not removed for today's exam. She has no scleral icterus or conjunctival injection. No lesions are visible in the oropharynx.  She has no palpable adenopathy in the neck or supraclavicular spaces.  Her lungs are clear to auscultation without dullness to percussion.  Her heart rate and rhythm are regular without audible murmur or gallop in the jugular venous pressure appears normal.  Her abdomen is soft and nontender without palpable mass or organomegaly.  She has no peripheral edema and no tenderness or thighs.  Her speech is fluent and her cranial nerves are grossly intact.      She unfortunately left the CT scan and records from her outside hospitalization at home this morning's I don't have those for review. Lab work done here this morning shows her white count to be mildly elevated consistent with her steroids and she otherwise has unremarkable H renal function liver enzymes.    Assessment/plan: Metastatic pancreatic cancer, symptomatically improved after 2 months of gemcitabine and Abraxane will be getting her planned surveillance CT scan done later this week as soon as we can arrange it. In the unlikely event that should show evidence of disease progression I'll be in touch with her and will have another discussion. Given her good clinical response I am optimistic that will look good and we made a plan to go ahead and get started next week on further gemcitabine and Abraxane without any dose or schedule adjustments otherwise.     She's had enough difficulty with IVs that she does want a port and we will arrange for that to be done this week.     Her son who accompanied her today had questions about palliative care be talked through what services they could provide and she decided she didn't need that right now. However we did decide she needed more discussion about some end-of-life planning as her  is in the early stages  of dementia. Please able to care for himself right now it's not clear how quickly that's going to change and they don't believe to be able to provide a lot of care from she becomes more ill. We'll arrange for her to meet with social work to have further discussions in that regard.    She'll continue on the prescribed steroid taper for her asthma exacerbation which seems resolved. We rechecked her oxygen levels today and she has good saturation without any supplemental oxygen since she discontinue that.    She'll continue on the Lovenox for her prior pulmonary embolism but we'll need to hold that prior to her planned port placement.    Vinny Yu MD

## 2018-01-22 NOTE — PROGRESS NOTES
Gloria Trinidad is here today in follow-up of metastatic pancreatic cancer.    She's here for her first planned response assessment after 2 months of gemcitabine and Abraxane. Unfortunately she was missed her planned scan because she was out of town last week and hospitalized with an acute asthma exacerbation. She feels like things have gone actually quite well for her in the last 2 months. And up until this acute issue she was feeling better than when she started her chemotherapy. She's noted very little in the way of toxicity, with no significant nausea or other apparent side effects from treatment except for some mild tenderness of the oral mucosa with no actual ulcerations.. She has lost her hair as expected but is not yet experiencing any significant neuropathy. Her anorexia in the last week or so now is actually better, although I suspect some of this is from the high-dose steroids she was given for exacerbation of her asthma.     She has a history of severe asthma in childhood with frequent hospitalizations but hadn't had an exacerbation since she was about 31 years old. She believes she may been exposed to some pets when she was visiting her daughter that may have set this off. It was severe enough that she required several days in the hospital and left the hospital on oxygen. She feels like her breathing is now back to her baseline.     She had a pulmonary embolism during her first cycle of chemotherapy and remains anticoagulated. She tells me she had a chest CT done during this current hospitalization and was told she had no new pulmonary embolism. She is tolerating her Lovenox injections without any bleeding although she does note she bruises quite a bit of her IV and blood draw sites. The remainder of her complete review of systems is otherwise unremarkable.    On exam Ms. Trinidad appears quite well. Her vital signs as noted in the chart are unremarkable.  She has complete alopecia and is wearing a wig  which was not removed for today's exam. She has no scleral icterus or conjunctival injection. No lesions are visible in the oropharynx.  She has no palpable adenopathy in the neck or supraclavicular spaces.  Her lungs are clear to auscultation without dullness to percussion.  Her heart rate and rhythm are regular without audible murmur or gallop in the jugular venous pressure appears normal.  Her abdomen is soft and nontender without palpable mass or organomegaly.  She has no peripheral edema and no tenderness or thighs.  Her speech is fluent and her cranial nerves are grossly intact.    She unfortunately left the CT scan and records from her outside hospitalization at home this morning so I don't have those for review. Lab work done here this morning shows her white count to be mildly elevated consistent with her steroids and she otherwise has unremarkable electrolytes, renal function and liver enzymes.    Assessment/plan: Metastatic pancreatic cancer, symptomatically improved after 2 months of gemcitabine and Abraxane. We will be getting her planned response CT scan done later this week as soon as we can arrange it. In the unlikely event that should show evidence of disease progression I'll be in touch with her and will have another discussion. Given her good clinical response I am optimistic that will look good and we made a plan to go ahead and get started next week on further gemcitabine and Abraxane without any dose or schedule adjustments otherwise.     She's had enough difficulty with IVs that she does want a port and we will arrange for that to be done this week.     Her son who accompanied her today had questions about palliative care and we talked through what services they could provide and she decided she didn't need that right now. However we did decide she needed more discussion about some end-of-life planning particularly as her  is in the early stages of dementia. He is able to care for  himself right now, but it's not clear how quickly that's going to change. They don't believe he'll be able to provide a lot of care for her when she becomes more ill and all her kids live out of town. We'll arrange for her to meet with social work to have further discussions in that regard.    She'll continue on the prescribed steroid taper for her asthma exacerbation which seems resolved. We rechecked her oxygen levels today and she has good saturation without any supplemental oxygen since she discontinue that.    She'll continue on the Lovenox for her prior pulmonary embolism but we'll need to hold that prior to her planned port placement.

## 2018-01-22 NOTE — MR AVS SNAPSHOT
After Visit Summary   1/22/2018    Iris Lewis    MRN: [id number]           Patient Information     Date Of Birth          1940        Visit Information        Provider Department      1/22/2018 8:15 AM Vinny Yu MD [facility]        Today's Diagnoses     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)           Follow-ups after your visit        Follow-up notes from your care team     Return in about 2 months (around 3/26/2018) for MD visit with CT and labs.      Your next 10 appointments already scheduled     Jan 22, 2018 10:05 AM CST   (Arrive by 9:50 AM)   Return Visit with Neri Gipson MD   [facility] ([facility])    [address]  4th Floor  [facility] [id number] [phone]            Jan 24, 2018  7:40 AM CST   (Arrive by 7:25 AM)   CT CHEST/ABDOMEN/PELVIS W CONTRAST with UCCT2   [facility] ([facility])    [address]  1st Floor  [facility] [id number] [phone]           Please bring any scans or X-rays taken at other hospitals, if similar tests were done. Also bring a list of your medicines, including vitamins, minerals and over-the-counter drugs. It is safest to leave personal items at home.  Be sure to tell your doctor:   If you have any allergies.   If there s any chance you are pregnant.   If you are breastfeeding.   If you have any special needs.  You may have contrast for this exam. To prepare:   Do not eat or drink for 2 hours before your exam. If you need to take medicine, you may take it with small sips of water. (We may ask you to take liquid medicine as well.)   The day before your exam, drink extra fluids at least six 8-ounce glasses (unless your doctor tells you to restrict your fluids).  Patients over 70 or patients with diabetes or kidney problems:   If you haven t had a blood test (creatinine test) within the last 30  days, go to your clinic or Diagnostic Imaging Department for this test.  If you have diabetes:   If your kidney function is normal, continue taking your metformin (Avandamet, Glucophage, Glucovance, Metaglip) on the day of your exam.   If your kidney function is abnormal, wait 48 hours before restarting this medicine.  You will have oral contrast for this exam:   You will drink the contrast at home. Get this from your clinic or Diagnostic Imaging Department. Please follow the directions given.  Please wear loose clothing, such as a sweat suit or jogging clothes. Avoid snaps, zippers and other metal. We may ask you to undress and put on a hospital gown.  If you have any questions, please call the Imaging Department where you will have your exam.            Jan 24, 2018 10:45 AM CST   (Arrive by 9:15 AM)   IR CHEST PORT PLACEMENT > 5 YRS OF AGE with [id number]   [facility] ([facility])    [address]  5th [facility] [address]              1. Your doctor will need to do a history and physical within 7 days before this procedure. 2. Your doctor will which medications should not be taken the morning of the exam. 3. Laboratory tests are to be obtained by your doctor prior to the exam (Basic Metabolic Panel, CBCP, PTT and INR) (No labs needed if you are having a tunneled catheter exchange or removal) 4. If you have allergies to x-ray contrast or iodine, contact your doctor or a Radiology nurse prior to the exam day for instructions. 5. Someone will need to drive you to and from the hospital. 6. If you are or may be pregnant, contact your doctor or a Radiology nurse prior to the day of the exam. 7. If you have diabetes, check with your doctor or a Radiology nurse to see if your insulin needs to be adjusted for the exam. 8. If you are taking a medication called Glucophage or Glucovance; these medications need to be held the day of the exam and for approximately 48 hours  following. A blood sample must be drawn so your creatinine level can be checked before resuming this medication. 9. If you are taking Coumadin (to thin you blood) please contact your doctor or a Radiology nurse at least 3 days before the exam for special instructions. 10. You should not have received contrast within 48 hours of this exam. 11. The day before your exam you may eat your regular diet and are encouraged to drink at least 2 quarts of clear liquids. Drink no alcoholic beverages for 24 hours prior to the exam. 12. If you have a colostomy you will need to irrigate it with tap water at 8PM the evening before and again at 6AM the morning of the exam. 13. Do not smoke for 24 hours prior to the procedure. 14. Birth to 4 years: - Breast feeding must be stopped 4 hours prior to exam - Solid food or formula must be stopped 6 hours prior to exam - Tube feedings must be stopped 6 hours prior to exam 15. 4-10 years old: - Nothing to eat or drink 6 hours prior to exam 16. 10+ years old: - Nothing to eat or drink 8 hours prior to exam 17. The morning of the exam you may brush your teeth and take medications as directed with a sip of water. 18. When discharged, you cannot drive until morning, and an adult must be with you until then. You should stay in the [facility] overnight. 19. Bring a list of all drugs you are taking; include supplements and over-the-counter medications. Wear comfortable clothes and leave your valuables at home.            Jan 24, 2018   Procedure with Ventura Villarreal PA-C   [facility] ([facility])    [address] [place] [address]  5th [facility] [address] [phone]           Located in the Clinics and Surgery Center at [address] [place] [address].   parking is very convenient and highly recommended.  is a $6 flat rate fee.  Both  and self parkers should enter the main arrival plaza from [facility];  parking attendants will direct you based on your parking preference.            Jan 29, 2018  9:00 AM CST   M[facility]ic Lab Draw with UC MASONIC LAB DRAW   [facility])    [address]  [address]  [facility] [address]   [phone]            Jan 29, 2018  9:30 AM CST   Infusion 120 with UC ONCOLOGY INFUSION, UC 27 ATC   [facility] ([facility])    [address]  [address]  [facility] [address] [phone]            Feb 05, 2018  8:30 AM CST   M[facility]ic Lab Draw with UC MASONIC LAB DRAW   [facility])    [address] [place] [address]  [address]  [facility] [address]   [phone]            Feb 05, 2018  9:00 AM CST   Infusion 120 with UC ONCOLOGY INFUSION, UC 24 ATC   [facility] [place] [facility] ([facility])    [address] [place] [address]  [address]  [facility] [address] [phone]              Future tests that were ordered for you today     Open Future Orders        Priority Expected Expires Ordered    IR Chest Port Placement > 5 Yrs of Age Routine  1/22/2019 1/22/2018    CT Chest Abdomen Pelvis w/o & w Contrast Routine 3/26/2018 4/22/2018 1/22/2018    CBC with platelets differential Routine 3/26/2018 4/22/2018 1/22/2018    Comprehensive metabolic panel Routine 3/26/2018 4/22/2018 1/22/2018    Cancer antigen 19-9 Routine 3/26/2018 4/22/2018 1/22/2018            Who to contact     If you have questions or need follow up information about today's clinic visit or your schedule please contact [facility] directly at [phone].  Normal or non-critical lab and imaging results will be communicated to you by MyChart, letter or phone within 4 business days after the clinic has received the results. If you do not hear from us within 7 days, please contact the clinic through MyChart or phone. If you have a  critical or abnormal lab result, we will notify you by phone as soon as possible.  Submit refill requests through [email or web address] or call your pharmacy and they will forward the refill request to us. Please allow 3 business days for your refill to be completed.          Additional Information About Your Visit        LectureToolshart Information     [email or web address] gives you secure access to your electronic health record. If you see a primary care provider, you can also send messages to your care team and make appointments. If you have questions, please call your primary care clinic.  If you do not have a primary care provider, please call [phone] and they will assist you.        Care EveryWhere ID     This is your Care EveryWhere ID. This could be used by other organizations to access your [place] medical records  [id number]        Your Vitals Were     Pulse Temperature Respirations Pulse Oximetry BMI (Body Mass Index)       76 98.1  F (36.7  C) 16 96% 19.02 kg/m2        Blood Pressure from Last 3 Encounters:   01/22/18 128/74   01/10/18 117/68   01/08/18 112/67    Weight from Last 3 Encounters:   01/22/18 47.2 kg (104 lb)   01/10/18 47.4 kg (104 lb 8 oz)   01/08/18 48.3 kg (106 lb 6.4 oz)                 Where to get your medicines      Some of these will need a paper prescription and others can be bought over the counter.  Ask your nurse if you have questions.     Bring a paper prescription for each of these medications     LORazepam 0.5 MG tablet          Primary Care Provider Office Phone # Fax #    Neri Gipson [phone] [address] [address]        Equal Access to Services     [facility]: Hadii mary simms hadasho Sothom, waaxda luqadaha, qaybta kaalmada lisa correia. So [facility] [phone].    ATENCIÓN: Si habla español, tiene a nava disposición servicios gratuitos de asistencia lingüística. Llame al [phone].    We comply with  applicable federal civil rights laws and [place] laws. We do not discriminate on the basis of race, color, national origin, age, disability, sex, sexual orientation, or gender identity.            Thank you!     Thank you for choosing [facility]  for your care. Our goal is always to provide you with excellent care. Hearing back from our patients is one way we can continue to improve our services. Please take a few minutes to complete the written survey that you may receive in the mail after your visit with us. Thank you!             Your Updated Medication List - Protect others around you: Learn how to safely use, store and throw away your medicines at www.disposemymeds.org.          This list is accurate as of: 1/22/18 10:04 AM.  Always use your most recent med list.                   Brand Name Dispense Instructions for use Diagnosis    amylase-lipase-protease 33085 UNITS Cpep    CREON    180 capsule    Take 2 capsules (72,000 Units) by mouth 3 times daily (with meals)    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)       CALCIUM PO      Take by mouth every morning Form/strength unknown. Powder formulation. Add to water and fruits/vegetables daily to promote bone health.        camphor-menthol 0.5-0.5 % Lotn    DERMASARRA    59 mL    Apply 1 mL topically every 6 hours as needed for skin care        CARTIA  MG 24 hr capsule   Generic drug:  diltiazem      TK 1 C PO QD        cholecalciferol 1000 UNIT tablet    vitamin D3     Take 1 tablet by mouth 2 times daily        cyanocolbalamin 500 MCG tablet    vitamin  B-12     Take 500 mcg by mouth every morning        denosumab 60 MG/ML Soln injection    PROLIA    1 mL    Inject 1 mL (60 mg) Subcutaneous every 6 months Inject subcutaneously in upper arm, upper thigh or abdomen    Senile osteoporosis       enoxaparin 60 MG/0.6ML injection    LOVENOX      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)       folic acid 400 MCG tablet    FOLVITE     Take  1 tablet by mouth every morning        hydrOXYzine 25 MG tablet    ATARAX    90 tablet    Take 1-2 tablets (25-50 mg) by mouth every 6 hours as needed for itching (adjuvant pain)        levofloxacin 750 MG tablet    LEVAQUIN     TAKE ONE TABLET PO TODAY ON 1/17/18        levothyroxine 125 MCG tablet    SYNTHROID/LEVOTHROID          LORazepam 0.5 MG tablet    ATIVAN    30 tablet    Take 1 tablet (0.5 mg) by mouth every 4 hours as needed (Anxiety, Nausea/Vomiting or Sleep)    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)       omeprazole 40 MG capsule    priLOSEC    180 capsule    Take 1 capsule (40 mg) by mouth 2 times daily Take 30-60 minutes before a meal.    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       potassium 99 MG Tabs      Take 1 tablet by mouth 2 times daily        predniSONE 10 MG tablet    DELTASONE          * prochlorperazine 10 MG tablet    COMPAZINE    180 tablet    Take 0.5 tablets (5 mg) by mouth every 6 hours as needed for nausea or vomiting    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)       * prochlorperazine 5 MG tablet    COMPAZINE          psyllium Packet    METAMUCIL/KONSYL     Take 1 packet by mouth daily as needed for constipation        * timolol maleate 0.5 % opthalmic solution    ISTALOL     Place 1 drop into both eyes every morning Before placing contact lenses        * timolol 0.5 % ophthalmic solution    TIMOPTIC     INT 1 GTT OU IN THE MORNING        traMADol 50 MG tablet    ULTRAM    100 tablet    Take 0.5-1 tablets (25-50 mg) by mouth every 6 hours as needed for breakthrough pain    Pancreatic mass       * triamterene-hydrochlorothiazide 37.5-25 MG per tablet    MAXZIDE-25     Take 1 tablet by mouth every  morning        * triamterene-hydrochlorothiazide 37.5-25 MG per capsule    DYAZIDE          TYLENOL PO      Take 325 mg by mouth every 4 hours as needed for mild pain or fever        zinc 50 MG Tabs      Take 1 tablet by mouth every morning        * Notice:  This list has 6 medication(s) that are the same as other medications prescribed for you. Read the directions carefully, and ask your doctor or other care provider to review them with you.

## 2018-01-22 NOTE — NURSING NOTE
"Oncology Rooming Note    January 22, 2018 8:32 AM   Iris Lewis is a 77 year old female who presents for:    Chief Complaint   Patient presents with     Labs Only     venipuncture, vitals checked     Oncology Clinic Visit     Return for Adenocarcinoma , Tx     Initial Vitals: /74  Pulse 76  Temp 98.1  F (36.7  C)  Resp 16  Wt 47.2 kg (104 lb)  SpO2 100%  BMI 19.02 kg/m2 Estimated body mass index is 19.02 kg/(m^2) as calculated from the following:    Height as of 1/10/18: 1.575 m (5' 2\").    Weight as of this encounter: 47.2 kg (104 lb). Body surface area is 1.44 meters squared.  No Pain (0) Comment: Data Unavailable   No LMP recorded. Patient is postmenopausal.  Allergies reviewed: Yes  Medications reviewed: Yes    Medications: MEDICATION REFILLS NEEDED TODAY. Provider was notified.  Pharmacy name entered into [employer]:    [facility] - [facility] [address] [facility] [address] [place] [address] 10 AT [facility] [address]    Clinical concerns: Refills  Gigi was notified.    10  minutes for nursing intake (face to face time)     Bisi Ramsay MA              "

## 2018-01-22 NOTE — NURSING NOTE
Chief Complaint   Patient presents with     Labs Only     venipuncture, vitals checked     Pt states she does not want her infusion today

## 2018-01-22 NOTE — PROGRESS NOTES
HPI:    New Dx. Metastatic pancreatic cancer. She saw Dr. Yu 11/13/17 and is getting chemotherapy . She had ERCP for biliary stent removal/chage 11/22/17 and another ERCP 1/10/18. Today stable and eating and drinking OK. No other HEENT, cardiopulmonary, abdominal, , GYN, neurological, skin complaints. Her son from [place] is here today.    PE:    Vitals noted; she is wearing NC O2 today, gen nad cooperative alert, HEENT, oropharynx clear, neck supple nl rom, LCTA, B, good air  Movment,  RRR, S1, S2, abdomen positive BS's, non-tender, grossly normal neurological exam    A/P:    1. Metastatic pancreatic cancer followed by Dr. Yu and seen today 1/22/18. She is getting port 1/24/18. She was also seen at the [facility] 12/21/17 (note scanned into EMT0  2. Flu shot 10/11/17  3. She had ERCP 1/10/18 and no future studies planned  4. Thyroid 10/31/17 checked 1/8/18 and will follow; she states she is stable dose 125 mcg. She has been in the hospital and will recheck in a few week   5. She remains on BP medication; electrolytes checked today  6. She states while at [facility] Chest imaging showed PE and was started on Lovenox   7. She states she was in outside hospital for breathing issues (asmatha?). She feels fine and is breathing OK    I will see her back 3/26/18 same day as ONC appt.     Total time spent 25 minutes.  More than 50% of the time spent with Ms. Lewis on counseling / coordinating her care

## 2018-01-22 NOTE — NURSING NOTE
Chief Complaint   Patient presents with     Recheck Medication       All vitals abstracted in from previous appointment today.

## 2018-01-23 NOTE — TELEPHONE ENCOUNTER
Please call her regarding writing a prescription for an inhaler that she was given in [place] that she forgot to ask you about yesterday. She has 4 puffs left              Given Breo inhaler while in hospital in  [place]. Forgot to ask DR Gipson for a refill 01/22/2018.      RX for Breo inhaler sent to pt pharmacy.  Lexi Lea RN 3:50 PM on 1/23/2018.

## 2018-01-24 NOTE — PROCEDURES
Interventional Radiology Brief Post Procedure Note    Procedure: Chest port placement    Proceduralist: Derick Villarreal PA-C    Assistant: None    Time Out: Prior to the start of the procedure and with procedural staff participation, I verbally confirmed the patient s identity using two indicators, relevant allergies, that the procedure was appropriate and matched the consent or emergent situation, and that the correct equipment/implants were available. Immediately prior to starting the procedure I conducted the Time Out with the procedural staff and re-confirmed the patient s name, procedure, and site/side. (The Joint Commission universal protocol was followed.)  Yes    Sedation: Monitored Anesthesia Care (MAC) administered and documented by Anesthesia Care Provider    Findings: Completed image-guided placement of 6 Divehi 18 cm single lumen power-injectable central venous port via right IJ. Aspirates and flushes freely, heparin locked and is ready for immediate use.    Estimated Blood Loss: Minimal    Fluoroscopy Time: 1.3 minute(s)    Specimens: None    Complications: 1. None     Condition: Stable    Plan: Follow-up per primary team. Return for removal as indicated.    Comments: See dictated procedure note for full details.    Derick Villarreal PA-C  Interventional Radiology  [phone]

## 2018-01-24 NOTE — DISCHARGE INSTRUCTIONS
A collaboration between [facility] and [facility]  Experts in minimally invasive, targeted treatments performed using imaging guidance    Venous Access Device,  Port Catheter or Tunneled Central Line Placement    Today you had a procedure today to install a venous access device; either a tunneled central vein catheter or a subcutaneous port catheter.    One of our Radiology PAs performed this procedure for you today:  ? Cesar Lewis PA-C  ? FORD Griffin PA-C  ? Derick Villarreal PA-C  ? Danielle Will PA-C   ? Palomo Sanchez PA-C    After you go home:  - Drink plenty of fluids.  Generally 6-8 (8 ounce) glasses a day is recommended.  - Resume your regular diet unless otherwise ordered by a medical provider.  - Keep any applied tape/gauze dressings clean and dry.  Change tape/gauze dressings if they get wet or soiled.  - You may shower the following day after procedure, however cover and protect from moisture any tape/gauze dressings.  You may let water hit and run over dried skin glue, but do not scrub.  Pat the area dry after showering.  - Port placement incisions are closed with absorbable suture, meaning they do not need to be removed at a later date, and a topical skin adhesive (skin glue).  This glue will wear off in 7-14 days.  Do not remove before this time.  If 14 days have passed and residual glue is present, you may gently remove it.  - Do not apply gels, lotions, or ointments to the glue site for the first 10 days as this may cause the glue to prematurely soften and fail.  - Do not perform strenuous activities or lift greater than 10 pounds for the next three days.  - If there is bleeding or oozing from the procedure site, apply firm pressure to the area for 5-10 minutes.  If the bleeding continues seek medical advice at the numbers below.  - Mild procedure site discomfort can be treated with an ice pack and over-the-counter pain  relievers.        For 24 hours after any sedation used:  - Relax and take it easy.  No strenuous activities.  - Do not drive or operate machines at home or at work.  - No alcohol consumption.  - Do not make any important or legal decisions.    Call our Interventional Radiology (IR) service if:  - If you start bleeding from the procedure site.  If you do start to bleed from the site, lie down and hold some pressure on the site.  Our radiology provider can help you decide if you need to return to the hospital.  - If you have new or worsening pain related to the procedure.  - If you have concerning swelling at the procedure site.  - If you develop persistent nausea or vomiting.  - If you develop hives or a rash or any unexplained itching.  - If you have a fever of greater than 100.5  F and chills in the first 5 days after procedure.  - Any other concerns related to your procedure.      [facility]  Interventional Radiology (IR)  [address] [place] [address]    Contact Number:  [phone]  (IR control desk)  - Monday - Friday 8:00 am - 4:30 pm    After hours for urgent concerns:  [phone]  - After 4:30 pm Monday - Friday, Weekends and Holidays.   - Ask for Interventional Radiology on-call.  Someone is available 24 hours a day.  - [facility] toll free number:  [phone]

## 2018-01-24 NOTE — ANESTHESIA POSTPROCEDURE EVALUATION
Patient: Iris Lewis    Procedure(s):  Chest Port Placement - Wound Class: I-Clean    Diagnosis:Malignant Neoplasm of Head of Pancreas  Diagnosis Additional Information: No value filed.    Anesthesia Type:  No value filed.    Note:  Anesthesia Post Evaluation    Patient location during evaluation: Phase 2  Patient participation: Able to fully participate in evaluation  Level of consciousness: awake and alert  Pain management: adequate  Airway patency: patent  Cardiovascular status: acceptable  Respiratory status: acceptable  Hydration status: acceptable  PONV: none     Anesthetic complications: None          Last vitals:  Vitals:    01/24/18 1000 01/24/18 1115 01/24/18 1145   BP: 130/76 128/77 123/64   Resp: 18 16 16   Temp: 36.6  C (97.9  F) 37  C (98.6  F)    SpO2:  97% 95%         Electronically Signed By: Tank Hernandez MD  January 24, 2018  1:16 PM

## 2018-01-24 NOTE — DISCHARGE INSTRUCTIONS

## 2018-01-24 NOTE — ANESTHESIA CARE TRANSFER NOTE
Patient: Iris Lewis    Procedure(s):  Chest Port Placement - Wound Class: I-Clean    Diagnosis: Malignant Neoplasm of Head of Pancreas  Diagnosis Additional Information: No value filed.    Anesthesia Type:   No value filed.     Note:  Airway :Room Air  Patient transferred to:Phase II  Comments: Arrive Phase II, Stable, Airway Intact  128/77, 64,16,98%,98.6  All questions answered.  Handoff Report: Identifed the Patient, Identified the Reponsible Provider, Reviewed the pertinent medical history, Discussed the surgical course, Reviewed Intra-OP anesthesia mangement and issues during anesthesia, Set expectations for post-procedure period and Allowed opportunity for questions and acknowledgement of understanding      Vitals: (Last set prior to Anesthesia Care Transfer)    CRNA VITALS  1/24/2018 1038 - 1/24/2018 1111      1/24/2018             Resp Rate (set): 10                Electronically Signed By: CAT Hayward CRNA  January 24, 2018  11:11 AM

## 2018-01-24 NOTE — ANESTHESIA PREPROCEDURE EVALUATION
Anesthesia Evaluation     . Pt has had prior anesthetic. Type: General    No history of anesthetic complications          ROS/MED HX    ENT/Pulmonary:     (+)Mild Persistent asthma Treatment: Inhaler daily and Inhaler prn,  , . .    Neurologic:       Cardiovascular:     (+) hypertension----. : . . . :. .       METS/Exercise Tolerance:  >4 METS   Hematologic:     (+) History of blood clots pt is anticoagulated, -      Musculoskeletal: Comment: scoliosis  (+) arthritis, , , -       GI/Hepatic:     (+) GERD (s/p nissen)       Renal/Genitourinary:         Endo:     (+) thyroid problem hypothyroidism, .      Psychiatric:         Infectious Disease:         Malignancy:   (+) Malignancy History of GI and Other  Pancreatic CA receiving chemo. Thyroid CA s/p surgery        Other:                     Physical Exam  Normal systems: cardiovascular    Airway   Mallampati: II  TM distance: >3 FB  Neck ROM: full    Dental   (+) implants and caps    Cardiovascular       Pulmonary    breath sounds clear to auscultation                    Anesthesia Plan      History & Physical Review  History and physical reviewed and following examination; no interval change.    ASA Status:  3 .    NPO Status:  > 6 hours    Plan for MAC with Intravenous and Propofol induction. Maintenance will be TIVA.  Reason for MAC:  Deep or markedly invasive procedure (G8)  PONV prophylaxis:  Ondansetron (or other 5HT-3)       Postoperative Care  Postoperative pain management:  IV analgesics and Oral pain medications.      Consents  Anesthetic plan, risks, benefits and alternatives discussed with:  Patient..                  History and physical assessed; Patient examined.   Risks and alternatives presented and discussed. Patient agrees. All questions answered.      Tank Hernandez MD  Staff Anesthesiologist  *[phone]

## 2018-01-24 NOTE — IP AVS SNAPSHOT
[facility]    [address] [place] [id number]    Phone:  [phone]    Fax:  [phone]                                       After Visit Summary   1/24/2018    Iris Lewis    MRN: [id number]           After Visit Summary Signature Page     I have received my discharge instructions, and my questions have been answered. I have discussed any challenges I see with this plan with the nurse or doctor.    ..........................................................................................................................................  Patient/Patient Representative Signature      ..........................................................................................................................................  Patient Representative Print Name and Relationship to Patient    ..................................................               ................................................  Date                                            Time    ..........................................................................................................................................  Reviewed by Signature/Title    ...................................................              ..............................................  Date                                                            Time

## 2018-01-24 NOTE — IP AVS SNAPSHOT
MRN:[id number]                      After Visit Summary   1/24/2018    Iris Lewis    MRN: [id number]           Thank you!     Thank you for choosing [facility] for your care. Our goal is always to provide you with excellent care. Hearing back from our patients is one way we can continue to improve our services. Please take a few minutes to complete the written survey that you may receive in the mail after you visit with us. Thank you!        Patient Information     Date Of Birth          1940        About your hospital stay     You were admitted on:  January 24, 2018 You last received care in the:  [facility]    You were discharged on:  January 24, 2018       Who to Call     For medical emergencies, please call 911.  For non-urgent questions about your medical care, please call your primary care provider or clinic, [phone]  For questions related to your surgery, please call your surgery clinic        Attending Provider     Provider Ventura Be PA-C Physician Assistant       Primary Care Provider Office Phone # Fax #    Neri Gipson [phone]      Your next 10 appointments already scheduled     Jan 29, 2018  9:00 AM CST   Masonic Lab Draw with UC MASONIC LAB DRAW   [facility] ([facility])    [address] [place] [address]  [address]  [facility] [address] [phone]            Jan 29, 2018  9:30 AM CST   Infusion 120 with UC ONCOLOGY INFUSION, UC 27 ATC   [facility] ([facility])    [address] [place] [address]  [address]  [facility] [address]   [phone]            Feb 05, 2018  8:30 AM CST   Masonic Lab Draw with UC MASONIC LAB DRAW   [facility] ([facility])    [address] [place] [address] Se  [address]  [facility] [address]   [phone]            Feb 05, 2018  9:00 AM CST   Infusion 120 with UC  ONCOLOGY INFUSION,  24 ATC   [facility] ([facility])    [address]  [address]  [facility] [id number] [phone]            Mar 26, 2018  7:00 AM CDT   (Arrive by 6:45 AM)   CT CHEST ABDOMEN PELVIS W/O & W CONTRAST with UCCT2   [facility] ([facility])    [address]  1st Floor  [facility] [id number] [phone]           Please bring any scans or X-rays taken at other hospitals, if similar tests were done. Also bring a list of your medicines, including vitamins, minerals and over-the-counter drugs. It is safest to leave personal items at home.  Be sure to tell your doctor:   If you have any allergies.   If there s any chance you are pregnant.   If you are breastfeeding.   If you have any special needs.  You may have contrast for this exam. To prepare:   Do not eat or drink for 2 hours before your exam. If you need to take medicine, you may take it with small sips of water. (We may ask you to take liquid medicine as well.)   The day before your exam, drink extra fluids at least six 8-ounce glasses (unless your doctor tells you to restrict your fluids).  Patients over 70 or patients with diabetes or kidney problems:   If you haven t had a blood test (creatinine test) within the last 30 days, go to your clinic or Diagnostic Imaging Department for this test.  If you have diabetes:   If your kidney function is normal, continue taking your metformin (Avandamet, Glucophage, Glucovance, Metaglip) on the day of your exam.   If your kidney function is abnormal, wait 48 hours before restarting this medicine.  You will have oral contrast for this exam:   You will drink the contrast at home. Get this from your clinic or Diagnostic Imaging Department. Please follow the directions given.  Please wear loose clothing, such as a sweat suit or jogging clothes. Avoid snaps, zippers and other metal. We may ask you to  undress and put on a hospital gown.  If you have any questions, please call the Imaging Department where you will have your exam.            Mar 26, 2018  7:45 AM CDT   Masonic Lab Draw with  MASONIC LAB DRAW   [facility] ([facility])    [address]  [address]  [facility] [id number] [phone]            Mar 26, 2018  8:45 AM CDT   (Arrive by 8:30 AM)   Return Visit with Vinny Yu MD   [facility] ([facility])    [address]  [address]  [facility] [id number] [phone]              Further instructions from your care team         A collaboration between [facility] and [facility]  Experts in minimally invasive, targeted treatments performed using imaging guidance    Venous Access Device,  Port Catheter or Tunneled Central Line Placement    Today you had a procedure today to install a venous access device; either a tunneled central vein catheter or a subcutaneous port catheter.    One of our Radiology PAs performed this procedure for you today:  ? Cesar Lewis PA-C  ? FORD Griffin PA-C  ? Derick Villarreal PA-C  ? Danielle Will PA-C   ? Palomo Sanchez PA-C    After you go home:  - Drink plenty of fluids.  Generally 6-8 (8 ounce) glasses a day is recommended.  - Resume your regular diet unless otherwise ordered by a medical provider.  - Keep any applied tape/gauze dressings clean and dry.  Change tape/gauze dressings if they get wet or soiled.  - You may shower the following day after procedure, however cover and protect from moisture any tape/gauze dressings.  You may let water hit and run over dried skin glue, but do not scrub.  Pat the area dry after showering.  - Port placement incisions are closed with absorbable suture, meaning they do not need to be removed at a later date, and a topical skin adhesive (skin glue).  This  glue will wear off in 7-14 days.  Do not remove before this time.  If 14 days have passed and residual glue is present, you may gently remove it.  - Do not apply gels, lotions, or ointments to the glue site for the first 10 days as this may cause the glue to prematurely soften and fail.  - Do not perform strenuous activities or lift greater than 10 pounds for the next three days.  - If there is bleeding or oozing from the procedure site, apply firm pressure to the area for 5-10 minutes.  If the bleeding continues seek medical advice at the numbers below.  - Mild procedure site discomfort can be treated with an ice pack and over-the-counter pain relievers.        For 24 hours after any sedation used:  - Relax and take it easy.  No strenuous activities.  - Do not drive or operate machines at home or at work.  - No alcohol consumption.  - Do not make any important or legal decisions.    Call our Interventional Radiology (IR) service if:  - If you start bleeding from the procedure site.  If you do start to bleed from the site, lie down and hold some pressure on the site.  Our radiology provider can help you decide if you need to return to the hospital.  - If you have new or worsening pain related to the procedure.  - If you have concerning swelling at the procedure site.  - If you develop persistent nausea or vomiting.  - If you develop hives or a rash or any unexplained itching.  - If you have a fever of greater than 100.5  F and chills in the first 5 days after procedure.  - Any other concerns related to your procedure.      [facility]  Interventional Radiology (IR)  [address] [place] [address]    Contact Number:  [phone]  (IR control desk)  - Monday - Friday 8:00 am - 4:30 pm    After hours for urgent concerns:  [phone]  - After 4:30 pm Monday - Friday, Weekends and Holidays.   - Ask for Interventional Radiology on-call.  Someone is  "available 24 hours a day.  - [facility] toll free number:  [phone]          Pending Results     Date and Time Order Name Status Description    1/24/2018 0738 CT CHEST/ABDOMEN/PELVIS W CONTRAST In process             Admission Information     Date & Time Provider Department Dept. Phone    1/24/2018 Ventura Villarreal PA-C [facility] [phone]      Your Vitals Were     Blood Pressure Temperature Respirations Height Weight Pulse Oximetry    128/77 98.6  F (37  C) (Temporal) 16 1.575 m (5' 2\") 47.2 kg (104 lb) 97%    BMI (Body Mass Index)                   19.02 kg/m2           [employer] Information     [employer] gives you secure access to your electronic health record. If you see a primary care provider, you can also send messages to your care team and make appointments. If you have questions, please call your primary care clinic.  If you do not have a primary care provider, please call [phone] and they will assist you.      [employer] is an electronic gateway that provides easy, online access to your medical records. With [employer], you can request a clinic appointment, read your test results, renew a prescription or communicate with your care team.     To access your existing account, please contact your [facility] or call [phone] for assistance.        Care EveryWhere ID     This is your Care EveryWhere ID. This could be used by other organizations to access your [facility] medical records  [id number]        Equal Access to Services     NESSA NICHOLS : Hadii mary marsho Sothom, waaxda luqadaha, qaybta kaalmada masood, lisa michael. So [facility] [phone].    ATENCIÓN: Si habla español, tiene a nava disposición servicios gratuitos de asistencia lingüística. Llame al [phone].    We comply with applicable federal civil rights laws and [place] laws. We do not discriminate on the basis of race, color, national origin, " age, disability, sex, sexual orientation, or gender identity.               Review of your medicines      UNREVIEWED medicines. Ask your doctor about these medicines        Dose / Directions    amylase-lipase-protease 84057 UNITS Cpep   Commonly known as:  CREON   Used for:  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)        Dose:  2 capsule   Take 2 capsules (72,000 Units) by mouth 3 times daily (with meals)   Quantity:  180 capsule   Refills:  1       CALCIUM PO        Take by mouth every morning Form/strength unknown. Powder formulation. Add to water and fruits/vegetables daily to promote bone health.   Refills:  0       camphor-menthol 0.5-0.5 % Lotn   Commonly known as:  DERMASARRA        Dose:  1 mL   Apply 1 mL topically every 6 hours as needed for skin care   Quantity:  59 mL   Refills:  0       CARTIA  MG 24 hr capsule   Generic drug:  diltiazem        TK 1 C PO QD   Refills:  0       cholecalciferol 1000 UNIT tablet   Commonly known as:  vitamin D3        Dose:  1 tablet   Take 1 tablet by mouth 2 times daily   Refills:  0       cyanocolbalamin 500 MCG tablet   Commonly known as:  vitamin  B-12        Dose:  500 mcg   Take 500 mcg by mouth every morning   Refills:  0       denosumab 60 MG/ML Soln injection   Commonly known as:  PROLIA   Used for:  Senile osteoporosis        Dose:  60 mg   Inject 1 mL (60 mg) Subcutaneous every 6 months Inject subcutaneously in upper arm, upper thigh or abdomen   Quantity:  1 mL   Refills:  1       enoxaparin 60 MG/0.6ML injection   Commonly known as:  LOVENOX   Used for:  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)        Refills:  3       fluticasone-vilanterol 100-25 MCG/INH oral inhaler   Commonly known as:  BREO ELLIPTA   Used for:  Cough        Dose:  1 puff   Inhale 1 puff into the lungs daily   Quantity:  1 Inhaler   Refills:  1       folic acid 400 MCG tablet   Commonly known as:  FOLVITE        Dose:  1 tablet   Take 1 tablet by mouth every morning   Refills:   0       hydrOXYzine 25 MG tablet   Commonly known as:  ATARAX        Dose:  25-50 mg   Take 1-2 tablets (25-50 mg) by mouth every 6 hours as needed for itching (adjuvant pain)   Quantity:  90 tablet   Refills:  0       levofloxacin 750 MG tablet   Commonly known as:  LEVAQUIN        TAKE ONE TABLET PO TODAY ON 1/17/18   Refills:  0       levothyroxine 125 MCG tablet   Commonly known as:  SYNTHROID/LEVOTHROID        Refills:  0       LORazepam 0.5 MG tablet   Commonly known as:  ATIVAN   Used for:  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   Quantity:  180 capsule   Refills:  0       ondansetron 4 MG tablet   Commonly known as:  ZOFRAN   Used for:  Abdominal pain, generalized        Dose:  4 mg   Take 1 tablet (4 mg) by mouth every 6 hours as needed for nausea   Quantity:  60 tablet   Refills:  0       potassium 99 MG Tabs        Dose:  1 tablet   Take 1 tablet by mouth 2 times daily   Refills:  0       predniSONE 10 MG tablet   Commonly known as:  DELTASONE        Refills:  0       * prochlorperazine 10 MG tablet   Commonly known as:  COMPAZINE   Used for:  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)        Dose:  5 mg   Take 0.5 tablets (5 mg) by mouth every 6 hours as needed for nausea or vomiting   Quantity:  180 tablet   Refills:  0       * prochlorperazine 5 MG tablet   Commonly known as:  COMPAZINE        Refills:  0       psyllium Packet   Commonly known as:  METAMUCIL/KONSYL        Dose:  1 packet   Take 1 packet by mouth daily as needed for constipation   Refills:  0       * timolol maleate 0.5 % opthalmic solution   Commonly known as:  ISTALOL        Dose:  1 drop    Place 1 drop into both eyes every morning Before placing contact lenses   Refills:  0       * timolol 0.5 % ophthalmic solution   Commonly known as:  TIMOPTIC        INT 1 GTT OU IN THE MORNING   Refills:  3       traMADol 50 MG tablet   Commonly known as:  ULTRAM   Used for:  Pancreatic mass        Dose:  25-50 mg   Take 0.5-1 tablets (25-50 mg) by mouth every 6 hours as needed for breakthrough pain   Quantity:  100 tablet   Refills:  1       * triamterene-hydrochlorothiazide 37.5-25 MG per tablet   Commonly known as:  MAXZIDE-25        Dose:  1 tablet   Take 1 tablet by mouth every morning   Refills:  0       * triamterene-hydrochlorothiazide 37.5-25 MG per capsule   Commonly known as:  DYAZIDE        Refills:  0       TYLENOL PO        Dose:  325 mg   Take 325 mg by mouth every 4 hours as needed for mild pain or fever   Refills:  0       zinc 50 MG Tabs        Dose:  1 tablet   Take 1 tablet by mouth every morning   Refills:  0       * Notice:  This list has 6 medication(s) that are the same as other medications prescribed for you. Read the directions carefully, and ask your doctor or other care provider to review them with you.      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             Medication List: This is a list of all your medications and when to take them. Check marks below indicate your daily home schedule. Keep this list as a reference.      Medications           Morning Afternoon Evening Bedtime As Needed    amylase-lipase-protease 39110 UNITS Cpep   Commonly known as:  CREON   Take 2 capsules (72,000 Units) by mouth 3 times daily (with meals)                                 CALCIUM PO   Take by mouth every morning Form/strength unknown. Powder formulation. Add to water and fruits/vegetables daily to promote bone health.                                camphor-menthol 0.5-0.5 % Lotn   Commonly known as:  DERMASARRA   Apply 1 mL topically every 6 hours as needed for skin care                                CARTIA  MG 24 hr capsule   TK 1 C PO QD   Generic drug:  diltiazem                                cholecalciferol 1000 UNIT tablet   Commonly known as:  vitamin D3   Take 1 tablet by mouth 2 times daily                                cyanocolbalamin 500 MCG tablet   Commonly known as:  vitamin  B-12   Take 500 mcg by mouth every morning                                denosumab 60 MG/ML Soln injection   Commonly known as:  PROLIA   Inject 1 mL (60 mg) Subcutaneous every 6 months Inject subcutaneously in upper arm, upper thigh or abdomen                                enoxaparin 60 MG/0.6ML injection   Commonly known as:  LOVENOX                                fluticasone-vilanterol 100-25 MCG/INH oral inhaler   Commonly known as:  BREO ELLIPTA   Inhale 1 puff into the lungs daily                                folic acid 400 MCG tablet   Commonly known as:  FOLVITE   Take 1 tablet by mouth every morning                                hydrOXYzine 25 MG tablet   Commonly known as:  ATARAX   Take 1-2 tablets (25-50 mg) by mouth every 6 hours as needed for itching (adjuvant pain)                                levofloxacin 750 MG tablet   Commonly known as:  LEVAQUIN   TAKE ONE TABLET PO TODAY ON 1/17/18                                levothyroxine 125 MCG tablet   Commonly known as:  SYNTHROID/LEVOTHROID                                LORazepam 0.5 MG tablet   Commonly known as:  ATIVAN   Take 1 tablet (0.5 mg) by mouth every 4 hours as needed (Anxiety, Nausea/Vomiting or Sleep)                                omeprazole 40 MG capsule   Commonly known as:  priLOSEC   Take  1 capsule (40 mg) by mouth 2 times daily Take 30-60 minutes before a meal.                                ondansetron 4 MG tablet   Commonly known as:  ZOFRAN   Take 1 tablet (4 mg) by mouth every 6 hours as needed for nausea                                order for DME   1unit being ordered: cranial prosthesis                                potassium 99 MG Tabs   Take 1 tablet by mouth 2 times daily                                predniSONE 10 MG tablet   Commonly known as:  DELTASONE                                * prochlorperazine 10 MG tablet   Commonly known as:  COMPAZINE   Take 0.5 tablets (5 mg) by mouth every 6 hours as needed for nausea or vomiting                                * prochlorperazine 5 MG tablet   Commonly known as:  COMPAZINE                                psyllium Packet   Commonly known as:  METAMUCIL/KONSYL   Take 1 packet by mouth daily as needed for constipation                                * timolol maleate 0.5 % opthalmic solution   Commonly known as:  ISTALOL   Place 1 drop into both eyes every morning Before placing contact lenses                                * timolol 0.5 % ophthalmic solution   Commonly known as:  TIMOPTIC   INT 1 GTT OU IN THE MORNING                                traMADol 50 MG tablet   Commonly known as:  ULTRAM   Take 0.5-1 tablets (25-50 mg) by mouth every 6 hours as needed for breakthrough pain                                * triamterene-hydrochlorothiazide 37.5-25 MG per tablet   Commonly known as:  MAXZIDE-25   Take 1 tablet by mouth every morning                                * triamterene-hydrochlorothiazide 37.5-25 MG per capsule   Commonly known as:  DYAZIDE                                TYLENOL PO   Take 325 mg by mouth every 4 hours as needed for mild pain or fever   Last time this was given:  975 mg on 1/24/2018 10:08 AM                                zinc 50 MG Tabs   Take 1 tablet by mouth every morning                                *  Notice:  This list has 6 medication(s) that are the same as other medications prescribed for you. Read the directions carefully, and ask your doctor or other care provider to review them with you.

## 2018-01-29 PROBLEM — J45.909 ASTHMA: Status: ACTIVE | Noted: 2018-01-01

## 2018-01-29 NOTE — PROGRESS NOTES
Infusion Nursing Note:  Iris Lewis presents today for Cycle 3 Day 1 Abraxane, Gemzar.    Patient seen by provider today: No   present during visit today: Not Applicable.    Note: patient denies any new complaints and states that her asthma is well in control now.  Requesting a refill of Albuterol inhaler.  Pt's port placed on 01/24/2018 and appears bruised and a pocket of swelling noted.  Pt c/o slight discomfort in the area.  Paged Dr. Yu.      TOR[facility] 01/29/2018 @ 1132 Dr. Yu/Juan Miguel Tariq, RN  Prescription for Albuterol inhaler- sent to [facility] per pt request  Probable hematoma in port area.  Monitor until next chemotherapy and reassess at that time.  Peripheral IV for treatment today.    LEONOR Sepulveda from IR came to assess port.  Per conversation, he feels it is okay to use port with next chemotherapy treatment.    Intravenous Access:  Peripheral IV placed.    Treatment Conditions:  Lab Results   Component Value Date    HGB 11.2 01/29/2018     Lab Results   Component Value Date    WBC 22.3 01/29/2018      Lab Results   Component Value Date    ANEU 16.1 01/29/2018     Lab Results   Component Value Date     01/29/2018      Lab Results   Component Value Date     01/29/2018                   Lab Results   Component Value Date    POTASSIUM 4.2 01/29/2018           Lab Results   Component Value Date    MAG 1.9 11/15/2017            Lab Results   Component Value Date    CR 0.56 01/29/2018                   Lab Results   Component Value Date    EMMY 8.4 01/29/2018                Lab Results   Component Value Date    BILITOTAL 0.6 01/29/2018           Lab Results   Component Value Date    ALBUMIN 3.1 01/29/2018                    Lab Results   Component Value Date    ALT 22 01/29/2018           Lab Results   Component Value Date    AST 18 01/29/2018     Results reviewed, labs MET treatment parameters, ok to proceed with treatment.    Post Infusion Assessment:  Patient tolerated infusion  without incident.  Blood return noted pre and post infusion.  Site patent and intact, free from redness, edema or discomfort.  No evidence of extravasations.  Access discontinued per protocol.    Discharge Plan:   Prescription sent to [facility] for albuterol inhaler.  Discharge instructions reviewed with: Patient.  Patient and/or family verbalized understanding of discharge instructions and all questions answered.  AVS to patient via [employer].  Patient will return 02/05/2018 for next appointment.   Patient discharged in stable condition accompanied by: self.  Departure Mode: Ambulatory.    Matilda Tariq RN

## 2018-01-29 NOTE — MR AVS SNAPSHOT
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     If you are having any concerning symptoms or wish to speak to a provider before your next infusion visit, please call your care coordinator or triage to notify them so we can adequately serve you.     If you need a refill on a narcotic prescription or other medication, please call triage before your infusion appointment.           January 2018 Sunday Monday Tuesday Wednesday Thursday Friday Saturday        1     2     [facility] LAB DRAW    9:00 AM   (15 min.)    [facility] LAB DRAW   [facility] Lab Draw     [facility] ONC INFUSION 120    9:30 AM   (120 min.)    ONCOLOGY INFUSION   [facility] 3     UMP RETURN    3:20 PM   (30 min.)   Neri Gipson MD   [facility] 4     5     UMP PRE-OP    9:15 AM   (30 min.)   Argelia Davis MD   [facility] 6       7     8     [facility] LAB DRAW    8:30 AM   (15 min.)    [facility]   [facility]     [facility] ONC INFUSION 120    9:00 AM   (120 min.)    ONCOLOGY INFUSION   [facility] 9     10     Admission    7:41  AM   Felix Izaguirre MD   Same Day Surgery [facility]   (Discharge: 1/10/2018)     ENDOSCOPIC RETROGRADE CHOLANGIOPANCREATOGRAM   10:10 AM   Felix Izaguirre MD   [facility] OR     XR SURG [place] >5 MIN FL W STILL   10:15 AM   (15 min.)   UUXREPS1   [facility] [place],  Radiology 11     12     13       14     15     16     17     18     CT CHEST/ABDOMEN/PELVIS W    9:05 AM   (20 min.)   UCCT2   [facility] CT 19     20       21     22     [facility] LAB DRAW    7:45 AM   (15 min.)   [facility] LAB DRAW   [facility] Mason[facility] Lab Draw     UMP RETURN    8:00 AM   (30 min.)   Vinny Yu MD   [facility]     UMP RETURN    9:50 AM   (30 min.)   Neri Gipson MD   [facility] 23     24     CT CHEST/ABDOMEN/PELVIS W    7:25 AM   (20 min.)   CT2   [facility] Imaging [place]     Outpatient Visit    8:13 AM   [facility]     IR CHEST PORT PLACEMENT >5 YRS    9:15 AM   (75 min.)   UCASCCARM6   [facility] ASC Imaging     INSERT PORT VASCULAR ACCESS   10:45 AM   Ventura Villarreal PA-C   [facility] OR 25     26     27       28     29     [facility] LAB DRAW    9:00 AM   (15 min.)   [facility]   [facility]     UMP ONC INFUSION 120    9:30 AM   (120 min.)   UC ONCOLOGY INFUSION   [facility] 30 31 February 2018 Sunday Monday Tuesday Wednesday Thursday Friday Saturday                       1     2     3       4     5     [facility] LAB DRAW    8:30 AM   (15 min.)   [facility] LAB DRAW   [facility] Masonic Lab Draw     UMP ONC INFUSION 120    9:00 AM   (120 min.)   UC ONCOLOGY INFUSION   [facility] 6     7     8     9     10       11     12     13     14  Happy Birthday!     15     16     17       18     19     20     21     22     23     UMP NEW    8:45 AM   (60 min.)   José Antonio Ann MD   [facility] 24       25     26      27     28                                 Lab Results:  Recent Results (from the past 12 hour(s))   *CBC with platelets differential    Collection Time: 01/29/18 10:08 AM   Result Value Ref Range    WBC 22.3 (H) 4.0 - 11.0 10e9/L    RBC Count 3.63 (L) 3.8 - 5.2 10e12/L    Hemoglobin 11.2 (L) 11.7 - 15.7 g/dL    Hematocrit 34.7 (L) 35.0 - 47.0 %    MCV 96 78 - 100 fl    MCH 30.9 26.5 - 33.0 pg    MCHC 32.3 31.5 - 36.5 g/dL    RDW 18.7 (H) 10.0 - 15.0 %    Platelet Count 439 150 - 450 10e9/L    Diff Method Automated Method     % Neutrophils 72.3 %    % Lymphocytes 10.4 %    % Monocytes 13.3 %    % Eosinophils 0.4 %    % Basophils 0.4 %    % Immature Granulocytes 3.2 %    Nucleated RBCs 0 0 /100    Absolute Neutrophil 16.1 (H) 1.6 - 8.3 10e9/L    Absolute Lymphocytes 2.3 0.8 - 5.3 10e9/L    Absolute Monocytes 3.0 (H) 0.0 - 1.3 10e9/L    Absolute Eosinophils 0.1 0.0 - 0.7 10e9/L    Absolute Basophils 0.1 0.0 - 0.2 10e9/L    Abs Immature Granulocytes 0.7 (H) 0 - 0.4 10e9/L    Absolute Nucleated RBC 0.0    Comprehensive metabolic panel    Collection Time: 01/29/18 10:08 AM   Result Value Ref Range    Sodium 133 133 - 144 mmol/L    Potassium 4.2 3.4 - 5.3 mmol/L    Chloride 99 94 - 109 mmol/L    Carbon Dioxide 28 20 - 32 mmol/L    Anion Gap 6 3 - 14 mmol/L    Glucose 73 70 - 99 mg/dL    Urea Nitrogen 14 7 - 30 mg/dL    Creatinine 0.56 0.52 - 1.04 mg/dL    GFR Estimate >90 >60 mL/min/1.7m2    GFR Estimate If Black >90 >60 mL/min/1.7m2    Calcium 8.4 (L) 8.5 - 10.1 mg/dL    Bilirubin Total 0.6 0.2 - 1.3 mg/dL    Albumin 3.1 (L) 3.4 - 5.0 g/dL    Protein Total 7.3 6.8 - 8.8 g/dL    Alkaline Phosphatase 92 40 - 150 U/L    ALT 22 0 - 50 U/L    AST 18 0 - 45 U/L               Follow-ups after your visit        Your next 10 appointments already scheduled     Feb 05, 2018  8:30 AM [facility]   [facility] Lab Draw with  MASONIC LAB DRAW   [facility] Masonic Lab Draw ([facility])    [address]  [facility] [address]   [phone]            Feb 05, 2018  9:00 AM CST   Infusion 120 with UC ONCOLOGY INFUSION, UC 24 ATC   [facility] ([facility])    [address]  [address]  [facility] [address]   [phone]            Feb 23, 2018  9:00 AM CST   (Arrive by 8:45 AM)   New Patient Visit with José Antonio Ann MD   [facility] ([facility])    [address]  [address]  [facility] [address]   [phone]            Mar 26, 2018  7:00 AM CDT   (Arrive by 6:45 AM)   CT CHEST ABDOMEN PELVIS W/O & W CONTRAST with UCCT2   [facility] CT ([facility])    [address]  1st Floor  [facility] [address]   [phone]           Please bring any scans or X-rays taken at other hospitals, if similar tests were done. Also bring a list of your medicines, including vitamins, minerals and over-the-counter drugs. It is safest to leave personal items at home.  Be sure to tell your doctor:   If you have any allergies.   If there s any chance you are pregnant.   If you are breastfeeding.   If you have any special needs.  You may have contrast for this exam. To prepare:   Do not eat or drink for 2 hours before your exam. If you need to take medicine, you may take it with small sips of water. (We may ask you to take liquid medicine as well.)   The day before your exam, drink extra fluids at least six 8-ounce glasses (unless your doctor tells you to restrict your fluids).  Patients over 70 or patients with diabetes or kidney problems:   If you haven t had a blood test (creatinine test) within the last 30 days, go to your clinic or Diagnostic Imaging Department for this test.  If you have diabetes:   If your kidney function is normal, continue taking your metformin (Avandamet, Glucophage, Glucovance, Metaglip) on the day of your exam.   If your kidney function is  abnormal, wait 48 hours before restarting this medicine.  You will have oral contrast for this exam:   You will drink the contrast at home. Get this from your clinic or Diagnostic Imaging Department. Please follow the directions given.  Please wear loose clothing, such as a sweat suit or jogging clothes. Avoid snaps, zippers and other metal. We may ask you to undress and put on a hospital gown.  If you have any questions, please call the Imaging Department where you will have your exam.            Mar 26, 2018  7:45 AM CDT   M[facility]ic Lab Draw with  [employer] LAB DRAW   [facility] Lab Draw ([facility])    [address] [place] [address]  [address]  [facility] [address] [phone]            Mar 26, 2018  8:45 AM CDT   (Arrive by 8:30 AM)   Return Visit with Vinny Yu MD   [facility] ([facility])    [address] [place] [address]  [address]  [facility] [address] [phone]              Who to contact     If you have questions or need follow up information about today's clinic visit or your schedule please contact [facility] CANCER [facility] directly at [phone].  Normal or non-critical lab and imaging results will be communicated to you by MyChart, letter or phone within 4 business days after the clinic has received the results. If you do not hear from us within 7 days, please contact the clinic through [employer] or phone. If you have a critical or abnormal lab result, we will notify you by phone as soon as possible.  Submit refill requests through [employer] or call your pharmacy and they will forward the refill request to us. Please allow 3 business days for your refill to be completed.          Additional Information About Your Visit        [employer] Information     [employer] gives you secure access to your electronic health record. If you see a primary care provider, you can also send messages to your care team and make  appointments. If you have questions, please call your primary care clinic.  If you do not have a primary care provider, please call [phone] and they will assist you.        Care EveryWhere ID     This is your Care EveryWhere ID. This could be used by other organizations to access your [place] medical records  [id number]        Your Vitals Were     Pulse Temperature Pulse Oximetry BMI (Body Mass Index)          89 98.4  F (36.9  C) (Oral) 95% 19.5 kg/m2         Blood Pressure from Last 3 Encounters:   01/29/18 134/79   01/24/18 123/64   01/22/18 128/74    Weight from Last 3 Encounters:   01/29/18 48.4 kg (106 lb 9.6 oz)   01/24/18 47.2 kg (104 lb)   01/22/18 47.2 kg (104 lb)              We Performed the Following     *CBC with platelets differential     Comprehensive metabolic panel          Today's Medication Changes          These changes are accurate as of 1/29/18  1:55 PM.  If you have any questions, ask your nurse or doctor.               Start taking these medicines.        Dose/Directions    albuterol 108 (90 BASE) MCG/ACT Inhaler   Commonly known as:  PROAIR HFA/PROVENTIL HFA/VENTOLIN HFA   Used for:  Asthma        Dose:  2 puff   Inhale 2 puffs into the lungs 4 times daily   Quantity:  1 Inhaler   Refills:  1            Where to get your medicines      These medications were sent to [facility] [address] [place] [address] AT [place] [address], [facility] [address]     Phone:  [phone]     albuterol 108 (90 BASE) MCG/ACT Inhaler                Primary Care Provider Office Phone # Fax #    Neri Gipson [phone]       0 [address] [address]        Equal Access to Services     NESSA NICHOLS : Shaun Martinez, warhett luqadaha, qaybta kaalmameera correia, lisa michael. So [facility] [phone].    ATENCIÓN: Si habla español, tiene a nava disposición servicios gratuitos  de asistencia lingüística. Indu humphries [phone].    We comply with applicable federal civil rights laws and [place] laws. We do not discriminate on the basis of race, color, national origin, age, disability, sex, sexual orientation, or gender identity.            Thank you!     Thank you for choosing [facility]  for your care. Our goal is always to provide you with excellent care. Hearing back from our patients is one way we can continue to improve our services. Please take a few minutes to complete the written survey that you may receive in the mail after your visit with us. Thank you!             Your Updated Medication List - Protect others around you: Learn how to safely use, store and throw away your medicines at www.disposemymeds.org.          This list is accurate as of 1/29/18  1:55 PM.  Always use your most recent med list.                   Brand Name Dispense Instructions for use Diagnosis    albuterol 108 (90 BASE) MCG/ACT Inhaler    PROAIR HFA/PROVENTIL HFA/VENTOLIN HFA    1 Inhaler    Inhale 2 puffs into the lungs 4 times daily    Asthma       amylase-lipase-protease 35738 UNITS Cpep    CREON    180 capsule    Take 2 capsules (72,000 Units) by mouth 3 times daily (with meals)    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)       CALCIUM PO      Take by mouth every morning Form/strength unknown. Powder formulation. Add to water and fruits/vegetables daily to promote bone health.        camphor-menthol 0.5-0.5 % Lotn    DERMASARRA    59 mL    Apply 1 mL topically every 6 hours as needed for skin care        CARTIA  MG 24 hr capsule   Generic drug:  diltiazem      TK 1 C PO QD        cholecalciferol 1000 UNIT tablet    vitamin D3     Take 1 tablet by mouth 2 times daily        cyanocolbalamin 500 MCG tablet    vitamin  B-12     Take 500 mcg by mouth every morning        denosumab 60 MG/ML Soln injection    PROLIA    1 mL    Inject 1 mL (60 mg) Subcutaneous every 6 months Inject  subcutaneously in upper arm, upper thigh or abdomen    Senile osteoporosis       enoxaparin 60 MG/0.6ML injection    LOVENOX      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)       fluticasone-vilanterol 100-25 MCG/INH oral inhaler    BREO ELLIPTA    1 Inhaler    Inhale 1 puff into the lungs daily    Cough       folic acid 400 MCG tablet    FOLVITE     Take 1 tablet by mouth every morning        hydrOXYzine 25 MG tablet    ATARAX    90 tablet    Take 1-2 tablets (25-50 mg) by mouth every 6 hours as needed for itching (adjuvant pain)        levofloxacin 750 MG tablet    LEVAQUIN     TAKE ONE TABLET PO TODAY ON 1/17/18        levothyroxine 125 MCG tablet    SYNTHROID/LEVOTHROID          LORazepam 0.5 MG tablet    ATIVAN    30 tablet    Take 1 tablet (0.5 mg) by mouth every 4 hours as needed (Anxiety, Nausea/Vomiting or Sleep)    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)       omeprazole 40 MG capsule    priLOSEC    180 capsule    Take 1 capsule (40 mg) by mouth 2 times daily Take 30-60 minutes before a meal.    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       potassium 99 MG Tabs      Take 1 tablet by mouth 2 times daily        predniSONE 10 MG tablet    DELTASONE          * prochlorperazine 10 MG tablet    COMPAZINE    180 tablet    Take 0.5 tablets (5 mg) by mouth every 6 hours as needed for nausea or vomiting    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)       * prochlorperazine 5 MG tablet    COMPAZINE          psyllium Packet    METAMUCIL/KONSYL     Take 1 packet by mouth daily as needed for constipation        * timolol maleate 0.5 % opthalmic solution    ISTALOL     Place 1 drop into both eyes every morning Before placing contact  lenses        * timolol 0.5 % ophthalmic solution    TIMOPTIC     INT 1 GTT OU IN THE MORNING        traMADol 50 MG tablet    ULTRAM    100 tablet    Take 0.5-1 tablets (25-50 mg) by mouth every 6 hours as needed for breakthrough pain    Pancreatic mass       * triamterene-hydrochlorothiazide 37.5-25 MG per tablet    MAXZIDE-25     Take 1 tablet by mouth every morning        * triamterene-hydrochlorothiazide 37.5-25 MG per capsule    DYAZIDE          TYLENOL PO      Take 325 mg by mouth every 4 hours as needed for mild pain or fever        zinc 50 MG Tabs      Take 1 tablet by mouth every morning        * Notice:  This list has 6 medication(s) that are the same as other medications prescribed for you. Read the directions carefully, and ask your doctor or other care provider to review them with you.

## 2018-01-29 NOTE — NURSING NOTE
Chief Complaint   Patient presents with     Blood Draw     PIV attempted, positive blood return but pt c/o of stinging with IV flush. IV removed, labs collected. Port site swollen and bruised. Ice pack applied. Infusion RN notified.

## 2018-01-29 NOTE — PATIENT INSTRUCTIONS
Contact Numbers  [facility] Nurse Triage: [phone]  After Hours Nurse Line:  [phone]    Please call the [facility] Nurse Triage line or after hours number if you experience a temperature greater than or equal to 100.5, shaking chills, have uncontrolled nausea, vomiting and/or diarrhea, dizziness, shortness of breath, chest pain, bleeding, unexplained bruising, or if you have any other new/concerning symptoms, questions or concerns.     If you are having any concerning symptoms or wish to speak to a provider before your next infusion visit, please call your care coordinator or triage to notify them so we can adequately serve you.     If you need a refill on a narcotic prescription or other medication, please call triage before your infusion appointment.           January 2018 Sunday Monday Tuesday Wednesday Thursday Friday Saturday        1     2     P [facility] LAB DRAW    9:00 AM   (15 min.)    [facility]ONIC LAB DRAW   [facility] Lab Draw     P ONC INFUSION 120    9:30 AM   (120 min.)    ONCOLOGY INFUSION   [facility] 3     UMP RETURN    3:20 PM   (30 min.)   Neri Gipson MD   [facility] 4     5     UMP PRE-OP    9:15 AM   (30 min.)   Argelia Davis MD   [facility] 6       7     8     [facility] LAB DRAW    8:30 AM   (15 min.)    M[facility]IC LAB DRAW   [facility] Lab Draw     UMP ONC INFUSION 120    9:00 AM   (120 min.)    ONCOLOGY INFUSION   [facility] 9     10     Admission    7:41 AM   Felix Izaguirre MD   Same Day Surgery [facility]   (Discharge: 1/10/2018)     ENDOSCOPIC RETROGRADE CHOLANGIOPANCREATOGRAM   10:10 AM   Felix Izaguirre MD   [facility] OR     XR SURG [place] >5 MIN FL W STILL   10:15 AM   (15 min.)   UUXREPS1   [facility] [place],  Radiology 11     12     13       14     15     16     17     18     CT CHEST/ABDOMEN/PELVIS W    9:05 AM   (20 min.)   UCCT2   [facility]  Imaging Center CT 19     20       21     22     [facility] LAB DRAW    7:45 AM   (15 min.)    MASONIC LAB DRAW   [facility] Masonic Lab Draw     UMP RETURN    8:00 AM   (30 min.)   Vinny Yu MD   [facility]     UMP RETURN    9:50 AM   (30 min.)   Neri Gipson MD   [facility] 23     24     CT CHEST/ABDOMEN/PELVIS W    7:25 AM   (20 min.)   UCCT2   [facility] Imaging Center CT     Outpatient Visit    8:13 AM   [facility]     IR CHEST PORT PLACEMENT >5 YRS    9:15 AM   (75 min.)   UCASCCARM6   [facility] ASC Imaging     INSERT PORT VASCULAR ACCESS   10:45 AM   Ventura Villarreal PA-C    OR 25     26     27       28     29     [facility] LAB DRAW    9:00 AM   (15 min.)    [facility]     UMP ONC INFUSION 120    9:30 AM   (120 min.)    ONCOLOGY INFUSION   [facility] 30 31 February 2018 Sunday Monday Tuesday Wednesday Thursday Friday Saturday                       1     2     3       4     5     UMP [facility] LAB DRAW    8:30 AM   (15 min.)    MASONIC LAB DRAW   [facility] Mason[facility] Lab Draw     UMP ONC INFUSION 120    9:00 AM   (120 min.)   UC ONCOLOGY INFUSION   [facility] 6     7     8     9     10       11     12     13     14  Happy Birthday!     15     16     17       18     19     20     21     22     23     UMP NEW    8:45 AM   (60 min.)   José Antonio Ann MD   [facility] 24       25     26     27     28                                 Lab Results:  Recent Results (from the past 12 hour(s))   *CBC with platelets differential    Collection Time: 01/29/18 10:08 AM   Result Value Ref Range    WBC 22.3 (H) 4.0 - 11.0 10e9/L    RBC Count 3.63 (L) 3.8 - 5.2 10e12/L    Hemoglobin 11.2 (L) 11.7 - 15.7 g/dL    Hematocrit 34.7 (L) 35.0 - 47.0 %    MCV 96 78 - 100 fl    MCH 30.9 26.5 - 33.0 pg    MCHC 32.3  31.5 - 36.5 g/dL    RDW 18.7 (H) 10.0 - 15.0 %    Platelet Count 439 150 - 450 10e9/L    Diff Method Automated Method     % Neutrophils 72.3 %    % Lymphocytes 10.4 %    % Monocytes 13.3 %    % Eosinophils 0.4 %    % Basophils 0.4 %    % Immature Granulocytes 3.2 %    Nucleated RBCs 0 0 /100    Absolute Neutrophil 16.1 (H) 1.6 - 8.3 10e9/L    Absolute Lymphocytes 2.3 0.8 - 5.3 10e9/L    Absolute Monocytes 3.0 (H) 0.0 - 1.3 10e9/L    Absolute Eosinophils 0.1 0.0 - 0.7 10e9/L    Absolute Basophils 0.1 0.0 - 0.2 10e9/L    Abs Immature Granulocytes 0.7 (H) 0 - 0.4 10e9/L    Absolute Nucleated RBC 0.0    Comprehensive metabolic panel    Collection Time: 01/29/18 10:08 AM   Result Value Ref Range    Sodium 133 133 - 144 mmol/L    Potassium 4.2 3.4 - 5.3 mmol/L    Chloride 99 94 - 109 mmol/L    Carbon Dioxide 28 20 - 32 mmol/L    Anion Gap 6 3 - 14 mmol/L    Glucose 73 70 - 99 mg/dL    Urea Nitrogen 14 7 - 30 mg/dL    Creatinine 0.56 0.52 - 1.04 mg/dL    GFR Estimate >90 >60 mL/min/1.7m2    GFR Estimate If Black >90 >60 mL/min/1.7m2    Calcium 8.4 (L) 8.5 - 10.1 mg/dL    Bilirubin Total 0.6 0.2 - 1.3 mg/dL    Albumin 3.1 (L) 3.4 - 5.0 g/dL    Protein Total 7.3 6.8 - 8.8 g/dL    Alkaline Phosphatase 92 40 - 150 U/L    ALT 22 0 - 50 U/L    AST 18 0 - 45 U/L

## 2018-02-02 NOTE — PROGRESS NOTES
Patient is not elegible for halozyme study due to low HA expression. Will start standard of care gem/abraxane as previously discussed with the patient.  
negative...

## 2018-02-05 NOTE — PATIENT INSTRUCTIONS
Contact numbers:  Triage Main Line: [phone]  After hours: [phone]    Call with chills and/or temperature greater than or equal to 100.5 and questions or concerns.    If after hours, weekends, or holidays, call main hospital  at  [phone] and ask for Oncology doctor on call.           February 2018 Sunday Monday Tuesday Wednesday Thursday Friday Saturday                       1     2     3       4     5     [facility] DRAW    8:30 AM   (15 min.)    [facility] LAB DRAW   [facility] Lab Draw     P ONC INFUSION 120    9:00 AM   (120 min.)   [facility] ONCOLOGY INFUSION   [facility] 6     7     8     9     10       11     12     13     14  Happy Birthday!     15     16     17       18     19     20     21     22     23     [facility] NEW    8:45 AM   (60 min.)   José Antonio Ann MD   [facility] 24       25     26     27     28                               March 2018 Sunday Monday Tuesday Wednesday Thursday Friday Saturday                       1     2     3       4     5     6     7     8     9     10       11     12     13     14     15     16     17       18     19     20     21     22     23     24       25     26     CT CHEST ABDOMEN PELVIS WWO    6:45 AM   (20 min.)   UCCT2   [facility] CT     [facility] DRAW    7:45 AM   (15 min.)    [facility] LAB DRAW   [facility] Lab Draw     [facility] RETURN    8:30 AM   (30 min.)   Vinny Yu MD   [facility] 27     28     29     30     31                  Lab Results:  Recent Results (from the past 12 hour(s))   CBC with platelets differential    Collection Time: 02/05/18  9:08 AM   Result Value Ref Range    WBC 10.6 4.0 - 11.0 10e9/L    RBC Count 3.04 (L) 3.8 - 5.2 10e12/L    Hemoglobin 9.4 (L) 11.7 - 15.7 g/dL    Hematocrit 28.7 (L) 35.0 - 47.0 %    MCV 94 78 - 100 fl    MCH 30.9 26.5 - 33.0 pg    MCHC 32.8 31.5 - 36.5 g/dL    RDW 18.3 (H) 10.0 - 15.0 %     Platelet Count 179 150 - 450 10e9/L    Diff Method Automated Method     % Neutrophils 78.8 %    % Lymphocytes 8.4 %    % Monocytes 10.2 %    % Eosinophils 1.3 %    % Basophils 0.2 %    % Immature Granulocytes 1.1 %    Nucleated RBCs 0 0 /100    Absolute Neutrophil 8.4 (H) 1.6 - 8.3 10e9/L    Absolute Lymphocytes 0.9 0.8 - 5.3 10e9/L    Absolute Monocytes 1.1 0.0 - 1.3 10e9/L    Absolute Eosinophils 0.1 0.0 - 0.7 10e9/L    Absolute Basophils 0.0 0.0 - 0.2 10e9/L    Abs Immature Granulocytes 0.1 0 - 0.4 10e9/L    Absolute Nucleated RBC 0.0

## 2018-02-05 NOTE — PROGRESS NOTES
Infusion Nursing Note:  Iris Lewis presents for C3D8 Abraxane, Gemzar    Note: pt feeling well today, her port site is still very swollen with bruising surrounding it and traveling up the catheter. Pt states it's not tender or painful like it was last week, and the swelling has actually decreased a little bit. Pt denies any fevers or chills at home.     Hgb dropped from 11.4 to 9.6 and platelets dropped from 439 to 179, pt does admit to being more tired and fatigued and a little short of breath with exertion, but she states she also volunteered 3 days last week which she knew was probably too much for her. Denies any bleeding besides scant blood when she blows her nose.    Armen, a PA from  came to infusion to assess port site. Per Armen, he's confident it's a resolving hematoma and could be partly caused by her restarting her lovenox. He recommended not using her port today or next week, pt will have a week off and come back 2/26 at which point we can assess if it's safe to access.     Reinforced pt to call triage if the port site becomes more swollen, painful, or she develops any fevers or drainage from the insertion site. Pt and  verbalized understanding    Treatment Conditions:  Lab Results   Component Value Date    HGB 9.4 02/05/2018     Lab Results   Component Value Date    WBC 10.6 02/05/2018      Lab Results   Component Value Date    ANEU 8.4 02/05/2018     Lab Results   Component Value Date     02/05/2018      Lab Results   Component Value Date     01/29/2018                   Lab Results   Component Value Date    POTASSIUM 4.2 01/29/2018           Lab Results   Component Value Date    MAG 1.9 11/15/2017            Lab Results   Component Value Date    CR 0.56 01/29/2018                   Lab Results   Component Value Date    EMMY 8.4 01/29/2018                Lab Results   Component Value Date    BILITOTAL 0.6 01/29/2018           Lab Results   Component Value Date    ALBUMIN 3.1  01/29/2018                    Lab Results   Component Value Date    ALT 22 01/29/2018           Lab Results   Component Value Date    AST 18 01/29/2018       Results reviewed, labs MET treatment parameters, ok to proceed with treatment.    Intravenous Access:  Peripheral IV placed in lab.    Post Infusion Assessment:  Patient tolerated infusion without incident.  Blood return noted pre and post infusion.  No evidence of extravasations.  Access discontinued per protocol.    Discharge Plan:   Patient declined prescription refills.  Discharge instructions reviewed with: Patient and Family.  Patient and/or family verbalized understanding of discharge instructions and all questions answered.  AVS to patient via [email or web address].  Pt has no further appointments scheduled- IB sent to  to schedule her for D15 next week and C4 starting 2/26  Patient discharged in stable condition accompanied by: self and .  Face to Face time: 5 minutes.    Lexi Jacques RN

## 2018-02-05 NOTE — NURSING NOTE
Chief Complaint   Patient presents with     Blood Draw     Labs drawn from vpt by RN. Vs taken and pt checked in for appt. Notified Gigi of pt's port site assessment - did not access.     Labs collected from venipuncture by RN. Vitals taken. Checked in for appointment(s).  Notified Dr. Yu of pt's port site assessment - did not access.    Ana Winchester RN

## 2018-02-05 NOTE — MR AVS SNAPSHOT
After Visit Summary   2/5/2018    Iris Lewis    MRN: [id number]           Patient Information     Date Of Birth          1940        Visit Information        Provider Department      2/5/2018 9:00 AM UC 24 ATC; UC ONCOLOGY INFUSION [facility]        Today's Diagnoses     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)    -  1      Care Instructions    Contact numbers:  Triage Main Line: [phone]  After hours: [phone]    Call with chills and/or temperature greater than or equal to 100.5 and questions or concerns.    If after hours, weekends, or holidays, call main hospital  at  [phone] and ask for Oncology doctor on call.           February 2018 Sunday Monday Tuesday Wednesday Thursday Friday Saturday                       1     2     3       4     5     P [facility] DRAW    8:30 AM   (15 min.)    [facility] LAB DRAW   [facility] Lab Draw     P ONC INFUSION 120    9:00 AM   (120 min.)    ONCOLOGY INFUSION   [facility] 6     7     8     9     10       11     12     13     14  Happy Birthday!     15     16     17       18     19     20     21     22     23     [facility] NEW    8:45 AM   (60 min.)   José Antonio Ann MD   [facility] 24       25     26     27     28 March 2018 Sunday Monday Tuesday Wednesday Thursday Friday Saturday                       1     2     3       4     5     6     7     8     9     10       11     12     13     14     15     16     17       18     19     20     21     22     23     24       25     26     CT CHEST ABDOMEN PELVIS WWO    6:45 AM   (20 min.)   UCCT2   [facility] CT     P [facility] DRAW    7:45 AM   (15 min.)    [facility]     [facility] RETURN    8:30 AM   (30 min.)   Vinny Yu MD   [facility] 27     28     29     30     31                  Lab  Results:  Recent Results (from the past 12 hour(s))   CBC with platelets differential    Collection Time: 02/05/18  9:08 AM   Result Value Ref Range    WBC 10.6 4.0 - 11.0 10e9/L    RBC Count 3.04 (L) 3.8 - 5.2 10e12/L    Hemoglobin 9.4 (L) 11.7 - 15.7 g/dL    Hematocrit 28.7 (L) 35.0 - 47.0 %    MCV 94 78 - 100 fl    MCH 30.9 26.5 - 33.0 pg    MCHC 32.8 31.5 - 36.5 g/dL    RDW 18.3 (H) 10.0 - 15.0 %    Platelet Count 179 150 - 450 10e9/L    Diff Method Automated Method     % Neutrophils 78.8 %    % Lymphocytes 8.4 %    % Monocytes 10.2 %    % Eosinophils 1.3 %    % Basophils 0.2 %    % Immature Granulocytes 1.1 %    Nucleated RBCs 0 0 /100    Absolute Neutrophil 8.4 (H) 1.6 - 8.3 10e9/L    Absolute Lymphocytes 0.9 0.8 - 5.3 10e9/L    Absolute Monocytes 1.1 0.0 - 1.3 10e9/L    Absolute Eosinophils 0.1 0.0 - 0.7 10e9/L    Absolute Basophils 0.0 0.0 - 0.2 10e9/L    Abs Immature Granulocytes 0.1 0 - 0.4 10e9/L    Absolute Nucleated RBC 0.0                Follow-ups after your visit        Your next 10 appointments already scheduled     Feb 23, 2018  9:00 AM CST   (Arrive by 8:45 AM)   New Patient Visit with José Antonio Ann MD   [facility] ([facility])    [address]  [address] [place] [address] [phone]            Mar 26, 2018  7:00 AM CDT   (Arrive by 6:45 AM)   CT CHEST ABDOMEN PELVIS W/O & W CONTRAST with UCCT2   [facility] CT ([facility])    [address]  1st Floor  [facility] [address] [phone]           Please bring any scans or X-rays taken at other hospitals, if similar tests were done. Also bring a list of your medicines, including vitamins, minerals and over-the-counter drugs. It is safest to leave personal items at home.  Be sure to tell your doctor:   If you have any allergies.   If there s any chance you are pregnant.   If you are breastfeeding.   If you have any special  needs.  You may have contrast for this exam. To prepare:   Do not eat or drink for 2 hours before your exam. If you need to take medicine, you may take it with small sips of water. (We may ask you to take liquid medicine as well.)   The day before your exam, drink extra fluids at least six 8-ounce glasses (unless your doctor tells you to restrict your fluids).  Patients over 70 or patients with diabetes or kidney problems:   If you haven t had a blood test (creatinine test) within the last 30 days, go to your clinic or Diagnostic Imaging Department for this test.  If you have diabetes:   If your kidney function is normal, continue taking your metformin (Avandamet, Glucophage, Glucovance, Metaglip) on the day of your exam.   If your kidney function is abnormal, wait 48 hours before restarting this medicine.  You will have oral contrast for this exam:   You will drink the contrast at home. Get this from your clinic or Diagnostic Imaging Department. Please follow the directions given.  Please wear loose clothing, such as a sweat suit or jogging clothes. Avoid snaps, zippers and other metal. We may ask you to undress and put on a hospital gown.  If you have any questions, please call the Imaging Department where you will have your exam.            Mar 26, 2018  7:45 AM CDT   [facility] Lab Draw with  NGRAIN LAB DRAW   [facility] Lab Draw ([facility])    [address] [place] [address]  [address] [place] [address] [phone]            Mar 26, 2018  8:45 AM CDT   (Arrive by 8:30 AM)   Return Visit with Vinny Yu MD   [facility] ([facility])    [address] [place] [address]  [address]  [facility] [address] [phone]              Who to contact     If you have questions or need follow up information about today's clinic visit or your schedule please contact [facility] directly at [phone].  Normal or  non-critical lab and imaging results will be communicated to you by MyChart, letter or phone within 4 business days after the clinic has received the results. If you do not hear from us within 7 days, please contact the clinic through [employer] or phone. If you have a critical or abnormal lab result, we will notify you by phone as soon as possible.  Submit refill requests through [employer] or call your pharmacy and they will forward the refill request to us. Please allow 3 business days for your refill to be completed.          Additional Information About Your Visit        [employer] Information     [employer] gives you secure access to your electronic health record. If you see a primary care provider, you can also send messages to your care team and make appointments. If you have questions, please call your primary care clinic.  If you do not have a primary care provider, please call [phone] and they will assist you.        Care EveryWhere ID     This is your Care EveryWhere ID. This could be used by other organizations to access your [facility] medical records  [id number]        Your Vitals Were     Pulse Temperature Respirations Pulse Oximetry BMI (Body Mass Index)       78 98.3  F (36.8  C) (Oral) 16 97% 19.08 kg/m2        Blood Pressure from Last 3 Encounters:   02/05/18 135/80   01/29/18 134/79   01/24/18 123/64    Weight from Last 3 Encounters:   02/05/18 47.3 kg (104 lb 4.8 oz)   01/29/18 48.4 kg (106 lb 9.6 oz)   01/24/18 47.2 kg (104 lb)              We Performed the Following     CBC with platelets differential        Primary Care Provider Office Phone # Fax #    Neri Gipson [phone] [address] [address]        Equal Access to Services     NATASHA NICHOLS : Shaun Martinez, merle gar, lisa bradford. So [facility] [phone].    ATENCIÓN: Si habla español, tiene a nava disposición  servicios gratuitos de asistencia lingüística. Indu humphries [phone].    We comply with applicable federal civil rights laws and [place] laws. We do not discriminate on the basis of race, color, national origin, age, disability, sex, sexual orientation, or gender identity.            Thank you!     Thank you for choosing [facility]  for your care. Our goal is always to provide you with excellent care. Hearing back from our patients is one way we can continue to improve our services. Please take a few minutes to complete the written survey that you may receive in the mail after your visit with us. Thank you!             Your Updated Medication List - Protect others around you: Learn how to safely use, store and throw away your medicines at www.disposemymeds.org.          This list is accurate as of 2/5/18  1:01 PM.  Always use your most recent med list.                   Brand Name Dispense Instructions for use Diagnosis    albuterol 108 (90 BASE) MCG/ACT Inhaler    PROAIR HFA/PROVENTIL HFA/VENTOLIN HFA    1 Inhaler    Inhale 2 puffs into the lungs 4 times daily    Asthma       amylase-lipase-protease 68384 UNITS Cpep    CREON    180 capsule    Take 2 capsules (72,000 Units) by mouth 3 times daily (with meals)    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)       CALCIUM PO      Take by mouth every morning Form/strength unknown. Powder formulation. Add to water and fruits/vegetables daily to promote bone health.        camphor-menthol 0.5-0.5 % Lotn    DERMASARRA    59 mL    Apply 1 mL topically every 6 hours as needed for skin care        CARTIA  MG 24 hr capsule   Generic drug:  diltiazem      TK 1 C PO QD        cholecalciferol 1000 UNIT tablet    vitamin D3     Take 1 tablet by mouth 2 times daily        cyanocolbalamin 500 MCG tablet    vitamin  B-12     Take 500 mcg by mouth every morning        denosumab 60 MG/ML Soln injection    PROLIA    1 mL    Inject 1 mL (60 mg) Subcutaneous  every 6 months Inject subcutaneously in upper arm, upper thigh or abdomen    Senile osteoporosis       enoxaparin 60 MG/0.6ML injection    LOVENOX      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)       fluticasone-vilanterol 100-25 MCG/INH oral inhaler    BREO ELLIPTA    1 Inhaler    Inhale 1 puff into the lungs daily    Cough       folic acid 400 MCG tablet    FOLVITE     Take 1 tablet by mouth every morning        hydrOXYzine 25 MG tablet    ATARAX    90 tablet    Take 1-2 tablets (25-50 mg) by mouth every 6 hours as needed for itching (adjuvant pain)        levofloxacin 750 MG tablet    LEVAQUIN     TAKE ONE TABLET PO TODAY ON 1/17/18        levothyroxine 125 MCG tablet    SYNTHROID/LEVOTHROID          LORazepam 0.5 MG tablet    ATIVAN    30 tablet    Take 1 tablet (0.5 mg) by mouth every 4 hours as needed (Anxiety, Nausea/Vomiting or Sleep)    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)       omeprazole 40 MG capsule    priLOSEC    180 capsule    Take 1 capsule (40 mg) by mouth 2 times daily Take 30-60 minutes before a meal.    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       potassium 99 MG Tabs      Take 1 tablet by mouth 2 times daily        predniSONE 10 MG tablet    DELTASONE          * prochlorperazine 10 MG tablet    COMPAZINE    180 tablet    Take 0.5 tablets (5 mg) by mouth every 6 hours as needed for nausea or vomiting    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)       * prochlorperazine 5 MG tablet    COMPAZINE          psyllium Packet    METAMUCIL/KONSYL     Take 1 packet by mouth daily as needed for constipation        * timolol maleate 0.5 % opthalmic solution    ISTALOL     Place 1 drop into both eyes every morning Before  placing contact lenses        * timolol 0.5 % ophthalmic solution    TIMOPTIC     INT 1 GTT OU IN THE MORNING        traMADol 50 MG tablet    ULTRAM    100 tablet    Take 0.5-1 tablets (25-50 mg) by mouth every 6 hours as needed for breakthrough pain    Pancreatic mass       * triamterene-hydrochlorothiazide 37.5-25 MG per tablet    MAXZIDE-25     Take 1 tablet by mouth every morning        * triamterene-hydrochlorothiazide 37.5-25 MG per capsule    DYAZIDE          TYLENOL PO      Take 325 mg by mouth every 4 hours as needed for mild pain or fever        zinc 50 MG Tabs      Take 1 tablet by mouth every morning        * Notice:  This list has 6 medication(s) that are the same as other medications prescribed for you. Read the directions carefully, and ask your doctor or other care provider to review them with you.

## 2018-02-05 NOTE — PROGRESS NOTES
Patient is a pleasant 77 year old lady with a history of pancreatic cancer who underwent image-guided right IJ central venous chest port placement on 1/24/2018. IR was contacted this morning to evaluate patient's port site. Per nursing report, the right chest port site is swollen and bruised, with bruising extending superiorly above the right clavicle. Patient is without significant pain, and is hemodynamically stable at this time, per nursing report. The patient states that this is the second of three scheduled weekly infusion appointments, with plan for 1 week off prior to resuming her once per week infusion for another three weeks.    Patient seen and evaluated bedside in Infusion Center. Patient is a well-appearing lady, in no acute distress. She reports that she had held her Lovenox, as ordered, prior to the procedure, and resumed Lovenox the following morning. She reports that area was bruised and swollen, and that it was not used for her first scheduled infusion appointment on 1/29/2018., and that she first noted that the area was swollen approximately 2-3 days after the procedure. She denies pain and fever, and states that the swelling around the port is significantly reduced since last week.    Exam reveals mild ecchymosis from the right supraclavicular region extending inferiorly to the anterior right chest port site. The chest port site is moderately ecchymotic and swollen, with an approximately 4.0 cm X 4.0 cm X 2.5 cm nodular density overlying the port. The site is without fluctuance or tenderness to palpation, with the port palpable within the nodular density.    A: Pleasant 77 year old lady with central venous right IJ chest port placement on 1/24/18, now with stable hematoma at chest port site, likely precipitated by resuming anticoagulation medication. Hematoma is resolving, per patient report.    P: Continue with peripheral access for today and scheduled 2/12/18 weekly infusion. This should allow  for continued improvement / resolution of stable hematoma. May use existing RIJ central venous chest port for infusions on or after 2/26/18 following her scheduled week off, as long as infusion staff is comfortable with port access at that time.     Armen Lewis PA-C  [phone] pgr.

## 2018-02-12 NOTE — TELEPHONE ENCOUNTER
CARTIA  MG    Last Written Prescription Date:  UNK  Last Fill Quantity: UNK,   # refills: UNK  Last Office Visit : 1/22/18  Future Office visit:  NONE    Routing refill request to provider for review/approval because: Medication is reported/historical      Refill ok'd by Dr Gipson. Pt reminded to make an follow up appt with Dr Gpison.  Lexi Lea RN 9:29 AM on 2/14/2018.

## 2018-02-12 NOTE — MR AVS SNAPSHOT
After Visit Summary   2/12/2018    Iris Lewis    MRN: [id number]           Patient Information     Date Of Birth          1940        Visit Information        Provider Department      2/12/2018 2:00 PM UC 14 ATC; UC ONCOLOGY INFUSION [facility]        Today's Diagnoses     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)    -  1       Follow-ups after your visit        Your next 10 appointments already scheduled     Feb 23, 2018  9:00 AM CST   (Arrive by 8:45 AM)   New Patient Visit with José Antonio Ann MD   [facility]    [address] [place] [address]  [address]  [facility] [address]   [phone]            Feb 26, 2018  1:15 PM CST   [facility] Lab Draw with [facility] LAB DRAW   [facility] ([facility]    [address] [place] [address]  [address]  [facility] [address]   [phone]            Feb 26, 2018  1:50 PM CST   (Arrive by 1:35 PM)   Return Visit with CAT Dang CNP   [facility]    [address] [place] [address]  [address]  [facility] [address]   [phone]            Feb 26, 2018  2:30 PM CST   Infusion 120 with UC ONCOLOGY INFUSION, UC 20 ATC   [facility]    [address] [place] [address]  [address]  [facility] [address]   [phone]            Mar 05, 2018  8:30 AM CST   [facility] Lab Draw with [facility] LAB DRAW   [facility] Lab Draw ([facility])    [address] [place] [address]  [address]  [facility] [address]   [phone]            Mar 05, 2018  9:00 AM CST   Infusion 120 with UC ONCOLOGY INFUSION, UC 25 ATC   [facility]    [address] [place] [address]  [address]  [facility] [address]   [phone]            Mar 12, 2018 12:15 PM CDT   [facility] Lab Draw with UC  [facility] LAB DRAW   [facility] Lab Draw ([facility])    [address]  [address]  [facility] [address] [phone]            Mar 12, 2018  1:00 PM CDT   Infusion 120 with UC ONCOLOGY INFUSION   [facility] ([facility])    [address]  [address]  [facility] [id number] [phone]              Who to contact     If you have questions or need follow up information about today's clinic visit or your schedule please contact [facility] directly at [phone].  Normal or non-critical lab and imaging results will be communicated to you by MyChart, letter or phone within 4 business days after the clinic has received the results. If you do not hear from us within 7 days, please contact the clinic through [employer] or phone. If you have a critical or abnormal lab result, we will notify you by phone as soon as possible.  Submit refill requests through [employer] or call your pharmacy and they will forward the refill request to us. Please allow 3 business days for your refill to be completed.          Additional Information About Your Visit        [employer] Information     [employer] gives you secure access to your electronic health record. If you see a primary care provider, you can also send messages to your care team and make appointments. If you have questions, please call your primary care clinic.  If you do not have a primary care provider, please call [phone] and they will assist you.        Care EveryWhere ID     This is your Care EveryWhere ID. This could be used by other organizations to access your [facility] medical records  [id number]        Your Vitals Were     Pulse Temperature Pulse Oximetry BMI (Body Mass Index)          95 98  F (36.7  C) (Oral) 95% 18.35 kg/m2         Blood Pressure from Last 3 Encounters:   02/12/18 149/83   02/05/18 135/80   01/29/18 134/79    Weight from Last 3  Encounters:   02/12/18 45.5 kg (100 lb 4.8 oz)   02/05/18 47.3 kg (104 lb 4.8 oz)   01/29/18 48.4 kg (106 lb 9.6 oz)              We Performed the Following     CBC with platelets differential        Primary Care Provider Office Phone # Fax #    Neri Gipson [phone] [address] [address]        Equal Access to Services     NESSA NICHOLS : Hadii aad ku hadasho Soomaali, waaxda luqadaha, qaybta kaalmada adeegyada, waxay idiin hayaan adeeg kharash la'jaenne . So [facility] [phone].    ATENCIÓN: Si habla español, tiene a nava disposición servicios gratuitos de asistencia lingüística. [facility] [phone].    We comply with applicable federal civil rights laws and [place] laws. We do not discriminate on the basis of race, color, national origin, age, disability, sex, sexual orientation, or gender identity.            Thank you!     Thank you for choosing [facility]  for your care. Our goal is always to provide you with excellent care. Hearing back from our patients is one way we can continue to improve our services. Please take a few minutes to complete the written survey that you may receive in the mail after your visit with us. Thank you!             Your Updated Medication List - Protect others around you: Learn how to safely use, store and throw away your medicines at www.disposemymeds.org.          This list is accurate as of 2/12/18  4:42 PM.  Always use your most recent med list.                   Brand Name Dispense Instructions for use Diagnosis    albuterol 108 (90 BASE) MCG/ACT Inhaler    PROAIR HFA/PROVENTIL HFA/VENTOLIN HFA    1 Inhaler    Inhale 2 puffs into the lungs 4 times daily    Asthma       amylase-lipase-protease 19228 UNITS Cpep    CREON    180 capsule    Take 2 capsules (72,000 Units) by mouth 3 times daily (with meals)    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)       CALCIUM PO      Take by mouth every morning  Form/strength unknown. Powder formulation. Add to water and fruits/vegetables daily to promote bone health.        camphor-menthol 0.5-0.5 % Lotn    DERMASARRA    59 mL    Apply 1 mL topically every 6 hours as needed for skin care        CARTIA  MG 24 hr capsule   Generic drug:  diltiazem      TK 1 C PO QD        cholecalciferol 1000 UNIT tablet    vitamin D3     Take 1 tablet by mouth 2 times daily        cyanocolbalamin 500 MCG tablet    vitamin  B-12     Take 500 mcg by mouth every morning        denosumab 60 MG/ML Soln injection    PROLIA    1 mL    Inject 1 mL (60 mg) Subcutaneous every 6 months Inject subcutaneously in upper arm, upper thigh or abdomen    Senile osteoporosis       enoxaparin 60 MG/0.6ML injection    LOVENOX      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)       fluticasone-vilanterol 100-25 MCG/INH oral inhaler    BREO ELLIPTA    1 Inhaler    Inhale 1 puff into the lungs daily    Cough       folic acid 400 MCG tablet    FOLVITE     Take 1 tablet by mouth every morning        hydrOXYzine 25 MG tablet    ATARAX    90 tablet    Take 1-2 tablets (25-50 mg) by mouth every 6 hours as needed for itching (adjuvant pain)        levofloxacin 750 MG tablet    LEVAQUIN     TAKE ONE TABLET PO TODAY ON 1/17/18        levothyroxine 125 MCG tablet    SYNTHROID/LEVOTHROID          LORazepam 0.5 MG tablet    ATIVAN    30 tablet    Take 1 tablet (0.5 mg) by mouth every 4 hours as needed (Anxiety, Nausea/Vomiting or Sleep)    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)       omeprazole 40 MG capsule    priLOSEC    180 capsule    Take 1 capsule (40 mg) by mouth 2 times daily Take 30-60 minutes before a meal.    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       potassium 99 MG Tabs      Take 1 tablet by mouth 2 times daily        predniSONE 10 MG tablet    DELTASONE          * prochlorperazine 10 MG tablet    COMPAZINE    180 tablet    Take 0.5 tablets (5 mg) by mouth every 6 hours as needed for nausea or vomiting    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)       * prochlorperazine 5 MG tablet    COMPAZINE          psyllium Packet    METAMUCIL/KONSYL     Take 1 packet by mouth daily as needed for constipation        * timolol maleate 0.5 % opthalmic solution    ISTALOL     Place 1 drop into both eyes every morning Before placing contact lenses        * timolol 0.5 % ophthalmic solution    TIMOPTIC     INT 1 GTT OU IN THE MORNING        traMADol 50 MG tablet    ULTRAM    100 tablet    Take 0.5-1 tablets (25-50 mg) by mouth every 6 hours as needed for breakthrough pain    Pancreatic mass       * triamterene-hydrochlorothiazide 37.5-25 MG per tablet    MAXZIDE-25     Take 1 tablet by mouth every morning        * triamterene-hydrochlorothiazide 37.5-25 MG per capsule    DYAZIDE          TYLENOL PO      Take 325 mg by mouth every 4 hours as needed for mild pain or fever        zinc 50 MG Tabs      Take 1 tablet by mouth every morning        * Notice:  This list has 6 medication(s) that are the same as other medications prescribed for you. Read the directions carefully, and ask your doctor or other care provider to review them with you.

## 2018-02-12 NOTE — PROGRESS NOTES
"Infusion Nursing Note:  Iris Lewis presents today for Cycle 3 Day 15 Abraxane, Gemzar.    Patient seen by provider today: No   present during visit today: Not Applicable.    Note: patient continues with c/o occasional dizziness (\"when I rise too quickly\"), weakness, fatigue, and mouth sores.  She is using salt water rinses 4x/day with sores healing slowly and no new sores appearing.  She denies all other s/sx.    Intravenous Access:  Peripheral IV placed in lab.  Did not access port today due to site continues to appear edematous and ecchymotic, denies drainage, redness, pain.  Per patient, she will have Dr. Yu assess with next office visit in 2 weeks.    Treatment Conditions:  Lab Results   Component Value Date    HGB 9.3 02/12/2018     Lab Results   Component Value Date    WBC 4.7 02/12/2018      Lab Results   Component Value Date    ANEU 3.3 02/12/2018     Lab Results   Component Value Date     02/12/2018      Lab Results   Component Value Date     01/29/2018                   Lab Results   Component Value Date    POTASSIUM 4.2 01/29/2018           Lab Results   Component Value Date    MAG 1.9 11/15/2017            Lab Results   Component Value Date    CR 0.56 01/29/2018                   Lab Results   Component Value Date    EMMY 8.4 01/29/2018                Lab Results   Component Value Date    BILITOTAL 0.6 01/29/2018           Lab Results   Component Value Date    ALBUMIN 3.1 01/29/2018                    Lab Results   Component Value Date    ALT 22 01/29/2018           Lab Results   Component Value Date    AST 18 01/29/2018     Results reviewed, labs MET treatment parameters, ok to proceed with treatment.    Post Infusion Assessment:  Patient tolerated infusion without incident.  Blood return noted pre and post infusion.  Site patent and intact, free from redness, edema or discomfort.  No evidence of extravasations.  Access discontinued per protocol.    Discharge Plan: "   Patient declined prescription refills.  Discharge instructions reviewed with: Patient.  Patient and/or family verbalized understanding of discharge instructions and all questions answered.  AVS to patient via [employer].  Patient will return 02/26/2018 for next lab, provider, and chemotherapy appointment.   Patient discharged in stable condition accompanied by: self.  Departure Mode: Ambulatory.    Matilda Tariq RN

## 2018-02-12 NOTE — NURSING NOTE
Chief Complaint   Patient presents with     Blood Draw     Labs drawn via /PIV placed by RN. VS taken.     Lisa Mcknight RN

## 2018-02-12 NOTE — NURSING NOTE
Chief Complaint   Patient presents with     Blood Draw     Labs drawn via /PIV placed by RN. VS taken.     /83 (BP Location: Right arm, Patient Position: Chair, Cuff Size: Adult Regular)  Pulse 95  Temp 98  F (36.7  C) (Oral)  Wt 45.5 kg (100 lb 4.8 oz)  SpO2 95%  BMI 18.35 kg/m2    PIV placed left anticub for infusion and labs. Pt tolerated well. Pt checked in for next appointment.    Marilynn Stratton

## 2018-02-13 NOTE — TELEPHONE ENCOUNTER
Dear Lexi;    Asked to refill Cartia  mg. Don't see she is ever been on this medication and could not verify. Before refill, please call Iris to clarify and let me know    MONAE Wong    Left message for pt to call back.  Lexi Lea RN 7:55 AM on 2/13/2018.

## 2018-02-13 NOTE — TELEPHONE ENCOUNTER
----- Message from Trish Eldridge sent at 2/13/2018 10:04 AM CST -----  Regarding: Dr. Gipson patient returning your call  Contact: [phone]  Elton Elliott,    Patient returning your call in regards to Cartia prescription, and the approval sign off she needs from Dr. Gipson. Please call patient back: [phone].       Message left for pt to call back.  Lexi Lea RN 11:08 AM on 2/13/2018.

## 2018-02-14 NOTE — TELEPHONE ENCOUNTER
----- Message from Trish Eldridge sent at 2/13/2018 10:04 AM CST -----  Regarding: Dr. Gipson patient returning your call  Contact: [phone]  Elton Elliott,    Patient returning your call in regards to Cartia prescription, and the approval sign off she needs from Dr. Gipson. Please call patient back: [phone].       Message left for pt to call back.  Lexi Lea RN 8:52 AM on 2/14/2018.

## 2018-02-19 NOTE — TELEPHONE ENCOUNTER
----- Message from Elaine Du sent at 2/19/2018  8:31 AM CST -----  Regarding: Please call  Contact: [phone]  Patient calling with extreme dry mouth and is a cancer patient and wants to know what can relief this? Please call her

## 2018-02-20 NOTE — TELEPHONE ENCOUNTER
----- Message from Elaine Du sent at 2/19/2018  8:31 AM CST -----  Regarding: Please call  Contact: [phone]  Patient calling with extreme dry mouth and is a cancer patient and wants to know what can relief this? Please call her.    Pt states she has had severe dry mouth-using OTC biotene mouth rinse and salt water. No new medications. No sores in mouth.

## 2018-02-23 PROBLEM — J11.1 INFLUENZA: Status: ACTIVE | Noted: 2018-01-01

## 2018-02-23 NOTE — PROGRESS NOTES
"Infusion Nursing Note:  Iris Lewis presents today for IVF.    Patient seen by provider today: Dr. Ann prior to infusion and Benoit GALVEZ in infusion.   present during visit today: Not Applicable.    Note: Patient complains of feeling more acute onset SOB over her baseline for the past 2 days, as well as dizziness, weakness and fatigue. The patient denies fevers or chills, but reports that she has a \"throaty\" cough. LS diminished, but no extra LS, no crackles or rhonchi noted. Pt +influenza swab. Dr. Piedra and Benoit Molina in to see the patient in infusion. Sp02 was initially 80% on RA upon arrival to [facility], but returned to baseline of 95-97% on NC 3L. The patient occasionally wears NC 2L at home for baseline SOB. BP was also initially 80s/50s upon arrival, but 114/70 upon arrival to infusion room. The patient had a CXR today which showed possible pulmonary edema.    TORB per Dr. Ann/Elise Granger RN 2/23/2018 @ 1200:  -Okay to proceed with IVF today  -Patient to start Tamiflu, Rx sent to [facility] pharmacy.    TORB per Benoit GALVEZ/Elise Granger RN 2/23/2018 @ 03833:  -Please try to wean 02 and recheck Sp02.  -Please recheck BP after 1 hr.    Intravenous Access:  Peripheral IV placed.    Treatment Conditions:  Lab Results   Component Value Date    HGB 9.5 02/23/2018     Lab Results   Component Value Date    WBC 14.0 02/23/2018      Lab Results   Component Value Date    ANEU 3.3 02/12/2018     Lab Results   Component Value Date     02/23/2018      Lab Results   Component Value Date     02/23/2018                   Lab Results   Component Value Date    POTASSIUM 3.5 02/23/2018           Lab Results   Component Value Date    MAG 1.9 11/15/2017            Lab Results   Component Value Date    CR 0.74 02/23/2018                   Lab Results   Component Value Date    EMMY 9.0 02/23/2018                Lab Results   Component Value Date    BILITOTAL 0.6 " 01/29/2018           Lab Results   Component Value Date    ALBUMIN 3.1 01/29/2018                    Lab Results   Component Value Date    ALT 22 01/29/2018           Lab Results   Component Value Date    AST 18 01/29/2018       Not Applicable.  Influenza B +    Note: Upon completely weaning patient from Nasal cannula, the patient's Sp02 was initially 92-95%. Approximately 20 minutes after being weaned from 02, writer went in to re-assess patient. The patient reported not feeling well. Sp02 was between 67% and 71%. The patient was not dyspneic, but was not able to speak without coughing. The pulse ox waveform was clear with the hypoxic readings and the patient's fingers were warm to the touch. The patient was placed on NC 6L and slowly her Sp02 increased to 91%. The patient reported that her cough felt worse. LS were noted to have very fine crackles throughout with some expiratory wheezing. The patient felt she would feel more comfortable with a facemask versus NC. HR and BP were stable throughout. Benoit GALVEZ notified and in to assess patient.     TORB per Benoit GALVEZ/Elise Granger RN 2/23/2018 @ 1515:  -Please redraw BNP  -Please stop IVF  -Give albuterol nebulizer.   -Patient to be admitted to hospital on 7D.   -Patient okay to transfer via [facility]    Post Infusion Assessment:  Patient tolerated infusion without incident.  Blood return noted pre and post infusion.  Site patent and intact, free from redness, edema or discomfort.  No evidence of extravasations.  Access discontinued per protocol.    Discharge Plan:   Patient declined prescription refills.  Discharge instructions reviewed with: Patient and Family.  Patient and/or family verbalized understanding of discharge instructions and all questions answered.  Copy of AVS reviewed with patient and/or family.  Next appointment pending as the patient is currently being admitted to the hospital. Report called to Taylor GRIDER on 7D.   Patient  discharged in stable condition via [facility].  Departure Mode: Stretcher  Face to Face time: 20 minutes      Elise Granger RN

## 2018-02-23 NOTE — PROGRESS NOTES
"Oncology/Hematology Visit Note  Feb 23, 2018    Reason for Visit: Add on for hypoxia and influenza    History of Present Illness: Iris Lewis is a 78 year old female with metastatic pancreatic cancer. She is currently undergoing treatment with gemcitabine/abraxane, last given 2/12/18. Her oncologic history is as follows:    \"Iris Lewis is a 77 year old female with a previous medical history of thyroid cancer s/p thyroidectomy in 1980s, cholelithiasis s/p CCY 2015, GERD s/p Nissen fundoplication 2006 and hypertension, who presented with stomach pain, dark urine, constipation with pale floating stools and jaundice. She was admitted on 10/31/17 and a CT scan showed a 3.6 cm pancreatic mass concerning for malignancy with local invasion into the duodenum, 3 small liver lesions, small pulmonary nodulates on lung bases and enlarged peripancreatic and retroperitoneal lymph nodes. An upper endoscopic ultrasound with fine needle biopsy and ERCP with sphincterotomy and stent placement was thereafter preformed.      She met with Dr. Yu to discuss halozyme study, however she does not have sufficient HA expression to qualify.  It was discussed that she would start with Gemzar/Abraxane treatment.  Her first treatment was on 11/28/17. Restaging imaging 1/24/18 revealed disease response to treatment. She has now received 3 cycles of Sedan/Abraxane with plan to complete a 4th cycle and then restage.\"    I was asked to see her today after she presented with acute hypoxia and hypotension to her palliative care appointment and then tested positive for influenza B. Her PMH is significant for asthma with intermittent hypoxia, previously requiring O2, however she has NOT required any O2 since at least 1/22/18 per discussion with the patient and review of the chart.    Interval History:  Mrs. Cole was seen today with her . She states that the last three days she has felt terrible. She had a productive cough and " dyspnea on exertion, noting she can only walk a few steps without feeling short of breath. She has home O2 which she has not needed the last month, but has put it on at home the past few days due to feeling poorly. She denies shortness of breath at rest, however when she arrived to her palliative care appointment this morning her O2 was 80% on RA. She also admits that she cannot talk more than a few sentences without feeling the need to cough. She also admits to sleeping with pillows propped up due to worsening SOB lying flat. She also feels extremely weak, admitting to need to hold on to furniture when she walking out of fear of falling. She has not had any fevers to her knowledge and denies body aches. She does have abdominal pain from her cancer for which she takes tramadol and denies any new pain. She does state this is somewhat similar to how she has felt with asthma exacerbations in the past. She hasn't been able to use her albuterol inhalers the last few days due to not being able to take in a deep breath without coughing.        She admits to not eating or drinking as well the past few days due to feeling poorly, though she has been struggling with anorexia and weight . She denies bowel changes or urinary concerns. She denies ill contacts and states she has not really left the house. She does state that her mouth is extremely dry and her taste buds are off, which makes it really difficult to eat. She is doing salt and soda swishes and was just diagnosed with thrush. She does have bilateral peripheral neuropathy which has been worse since starting chemo involving her bilateral fingertips. She denies rashes, peripheral edema, or bleeding issues. She continues on Lovenox with no issues.    Current Outpatient Prescriptions   Medication Sig Dispense Refill     traMADol (ULTRAM) 50 MG tablet Take 1-2 tablets ( mg) by mouth every 4 hours as needed for breakthrough pain (No more than 8 tablets in a day) 150  tablet 1     nystatin (MYCOSTATIN) 514618 UNIT/ML suspension Take 5 mLs (500,000 Units) by mouth 4 times daily 200 mL 0     oseltamivir (TAMIFLU) 75 MG capsule Take 1 capsule (75 mg) by mouth 2 times daily 10 capsule 0     CARTIA  MG 24 hr capsule TK 1 C PO QD 90 capsule 1     albuterol (PROAIR HFA/PROVENTIL HFA/VENTOLIN HFA) 108 (90 BASE) MCG/ACT Inhaler Inhale 2 puffs into the lungs 4 times daily 1 Inhaler 1     fluticasone-vilanterol (BREO ELLIPTA) 100-25 MCG/INH oral inhaler Inhale 1 puff into the lungs daily 1 Inhaler 1     predniSONE (DELTASONE) 10 MG tablet   0     prochlorperazine (COMPAZINE) 5 MG tablet        levofloxacin (LEVAQUIN) 750 MG tablet TAKE ONE TABLET PO TODAY ON 1/17/18  0     levothyroxine (SYNTHROID/LEVOTHROID) 125 MCG tablet        timolol (TIMOPTIC) 0.5 % ophthalmic solution INT 1 GTT OU IN THE MORNING  3     triamterene-hydrochlorothiazide (DYAZIDE) 37.5-25 MG per capsule        LORazepam (ATIVAN) 0.5 MG tablet Take 1 tablet (0.5 mg) by mouth every 4 hours as needed (Anxiety, Nausea/Vomiting or Sleep) 30 tablet 2     prochlorperazine (COMPAZINE) 10 MG tablet Take 0.5 tablets (5 mg) by mouth every 6 hours as needed for nausea or vomiting 180 tablet 0     enoxaparin (LOVENOX) 60 MG/0.6ML injection   3     ondansetron (ZOFRAN) 4 MG tablet Take 1 tablet (4 mg) by mouth every 6 hours as needed for nausea 60 tablet 0     omeprazole (PRILOSEC) 40 MG capsule Take 1 capsule (40 mg) by mouth 2 times daily Take 30-60 minutes before a meal. 180 capsule 0     order for DME 1unit being ordered: cranial prosthesis 1 Units 0     psyllium (METAMUCIL/KONSYL) Packet Take 1 packet by mouth daily as needed for constipation       Acetaminophen (TYLENOL PO) Take 325 mg by mouth every 4 hours as needed for mild pain or fever       amylase-lipase-protease (CREON) 07043 UNITS CPEP Take 2 capsules (72,000 Units) by mouth 3 times daily (with meals) 180 capsule 1     hydrOXYzine (ATARAX) 25 MG tablet Take 1-2  tablets (25-50 mg) by mouth every 6 hours as needed for itching (adjuvant pain) 90 tablet 0     camphor-menthol (DERMASARRA) 0.5-0.5 % LOTN Apply 1 mL topically every 6 hours as needed for skin care 59 mL 0     CALCIUM PO Take by mouth every morning Form/strength unknown. Powder formulation. Add to water and fruits/vegetables daily to promote bone health.        triamterene-hydrochlorothiazide (MAXZIDE-25) 37.5-25 MG per tablet Take 1 tablet by mouth every morning        potassium 99 MG TABS Take 1 tablet by mouth 2 times daily        timolol maleate (ISTALOL) 0.5 % opthalmic solution Place 1 drop into both eyes every morning Before placing contact lenses       zinc 50 MG TABS Take 1 tablet by mouth every morning        denosumab (PROLIA) 60 MG/ML SOLN injection Inject 1 mL (60 mg) Subcutaneous every 6 months Inject subcutaneously in upper arm, upper thigh or abdomen (Patient not taking: Reported on 2/23/2018) 1 mL 1     cyanocolbalamin (VITAMIN B-12) 500 MCG tablet Take 500 mcg by mouth every morning        folic acid (FOLVITE) 400 MCG tablet Take 1 tablet by mouth every morning        cholecalciferol (VITAMIN D) 1000 UNIT tablet Take 1 tablet by mouth 2 times daily          Physical Examination:  BP 89/56 Pulse 85 Temp 96.5 RR 16 Weight 100lbs SpO2 80%  Wt Readings from Last 10 Encounters:   02/23/18 45.5 kg (100 lb 6 oz)   02/12/18 45.5 kg (100 lb 4.8 oz)   02/05/18 47.3 kg (104 lb 4.8 oz)   01/29/18 48.4 kg (106 lb 9.6 oz)   01/24/18 47.2 kg (104 lb)   01/22/18 47.2 kg (104 lb)   01/22/18 47.2 kg (104 lb)   01/10/18 47.4 kg (104 lb 8 oz)   01/08/18 48.3 kg (106 lb 6.4 oz)   01/05/18 48.7 kg (107 lb 4.8 oz)     Constitutional: Cachetic and chronically ill-appearing female in no acute distress on O2 via NC.  Eyes: EOMI, PERRL. No scleral icterus.  ENT: Oral mucosa is dry with thrush and few lesions.   Lymphatic: Neck is supple without cervical or supraclavicular lymphadenopathy.   Cardiovascular: Regular rate and  rhythm. No murmurs, gallops, or rubs. No peripheral edema.  Respiratory: Faint crackles and wheezing bilaterally. Non-labored breathing.  Gastrointestinal: Bowel sounds present. Abdomen soft, tender in the left lower quadrant. No palpable hepatosplenomegaly or masses.   Neurologic: Cranial nerves II through XII are grossly intact.  Skin: No rashes, petechiae, or bruising noted on exposed skin.    Laboratory Data:  Results for JONA SHETTY (MRN [id number]) as of 2/23/2018 15:55   2/23/2018 10:40   Sodium 132 (L)   Potassium 3.5   Chloride 97   Carbon Dioxide 27   Urea Nitrogen 19   Creatinine 0.74   GFR Estimate 76   GFR Estimate If Black >90   Calcium 9.0   Anion Gap 9   N-Terminal Pro Bnp 589 (H)   Glucose 99   WBC 14.0 (H)   Hemoglobin 9.5 (L)   Hematocrit 29.1 (L)   Platelet Count 377   RBC Count 3.08 (L)   MCV 95   MCH 30.8   MCHC 32.6   RDW 20.8 (H)   Influenza A/B Agn Specimen Nasal   Influenza A Negative   Influenza B Positive (A)     Assessment and Plan:  1. Recurrent hypoxia in the setting of possible asthma exacerbation and positive influenza B  -Has a history of hypoxia from asthma exacerbation for which she was hospitalized in [place] and sent home on O2. She had stopped the O2 though the end of January due to improvement in O2 sats. She presents now with recurrent hypoxia.  -Labs review elevated WBC to 14 and positive influenza B. CXR with bilateral opacities and Kerley B lines favoring pulmonary edema vs infection  -Did obtain a BNP which was indeterminate at 589, repeat after fluids 447. She has no peripheral edema and no CHF history, but if her SOB persists would need to do Echo  -Had nursing watch her throughout the day. She continued to require O2 and when O2 was removed, she dropped to 72% which persisted until O2 was reapplied. She is currently on 6L via NC sating at 96%  -Did give albuterol neb here in clinic given underlying asthma history and new crackles/wheezing on exam.  -She  hasn't been able to do her inhalers well at home due to SOB and coughing. Continue inhalers in the hospital if able, or switch to nebulizer  -Given persist hypoxia which is new for her in the last month, will admit to the hospital for observation of her vitals, specifically her O2 over night as a I worry about her de-sating at home, and to initiate Tamiflu. May also requiring steroids if nebulizer are not effective. She does have O2 at home for once she is feeling better and stabilized.    -May need to consider other etiologies of SOB if she continues to decline. Appreciate inpatient teams ongoing management    2. Hypotension   -Likely 2/2 infection and poor oral intake at home. Will give 1L IVF slowly over 2 hours (given questionable pulmonary edema on CXR) and monitor BP  -BP improved after fluids to 110/64.   -Continue very slow IV support as needed. Encourage good oral intake as able     3. Metastatic pancreatic cancer  -Currently on treatment with gemcitabine/abraxane. Has now completed 3 cycles (last dose given 2/12/18). She has tolerated treatment well so far with mild nausea, neuropathy, and constipation. No current concerns with this  -She does have follow-up with [facility] on Monday for her next cycle of chemo  -Continue Tramadol as needed for abdominal pain. Currently using on average 1 per day as needed. She will need a refill likely at discharge    4. FEN  -Historically has been struggling with anorexia. Labs today reveal worsening creat and hyponatremia, likely from poor oral intake  -Her weight today is lower to 100lbs. Will see if she can eat better after this acute illness, may need to have her see nutrition for ongoing support    -Start Nystatin for thrush. Continue salt and soda swishes and start Biotene for dry mouth.     5. Pulmonary embolism  -She does have a history of bilateral PE diagnosed December 2017 currently on Lovenox. She has not missed any doses and is not tachycardic, so less concern  for recurrent PE   -Will need to continue Lovenox BID inpatient. Due for her next dose at 8pm this evening  -Of note, her hgb and plt are stable today       Benoit Molina PA-C  [facility]  [address] [place] [address] [id number] [phone]

## 2018-02-23 NOTE — LETTER
2/23/2018       RE: Iris Lewis  [address]  [place] [address]     Dear Colleague,    Thank you for referring your patient, Iris Lewis, to the [facility] at [facility]. Please see a copy of my visit note below.    Palliative Staff/Outpatient Clinic    (This note was transcribed using voice recognition software. While I review and edit the transcription, I may miss errors. Also, the software capitalizes words strangely sometimes. Please let me know of any serious mis-transcriptions and I will addend this note.)    CC/Patient ID:  She has metastatic pancreatic cancer to the liver, lung, and retroperitoneal lymph nodes on gemcitabine and nab paclitaxel.  Important past medical history includes Nissen fundoplication in 2006.  She was diagnosed with her cancer in November of last year. First f/u scan showed some response to her chemotherapy.     +HCD which names  then daughter as decision makers    History:  She is seen alone today.  She was referred for care planning in the setting of her cancer but I should note that she was acutely ill today and the focus of my visit was mostly on that.  She is a complicated past medical history and unfortunately she has a important recent medical history that I do not really understand.  She tells me she has a childhood history of asthma but really did not have it as an adult.  In January she was in [place] and was hospitalized with respiratory problems.  She was told she had asthma-I very specifically confirm that with her-it was not COPD or emphysema or pneumonia, it was asthma.  She was found to be persistently hypoxic, which notably, is not a common asthma presentation, and was apparently discharged home on 2 L of oxygen per minute.  She still has oxygen at home but she uses it only as needed-symptomatically.  She believes her primary care doctor told her she did not really need it anymore  "although that is not clearly documented as far as I can tell.  At some point she is at [facility] for second opinion and was diagnosed with a PE and is on Lovenox which she reports she continues to take faithfully although she can barely afford it.    The last few days she has felt poorly.  She has a mildly productive cough which is yellow.  She has had worse for shortness of breath-she can only walk 3-4 steps without getting breathless.  She says before this she was very active although when I asked her in more detail it seems like she has been quite weak and tired and breathless for weeks now but it is very clear that it has gotten worse the last few days.  She is inhalers from the hospital in [place] and she is taking those and they make her cough she says.  She has started using the oxygen the last couple days-she cannot check her oxygen saturation at home.  She felt generally weak and tired with this acute change. She has very dry mouth and taste problems.  She denies chest wall pain, chest pain, rhinorrhea, fevers, sick contacts, or GI or  complaints today apart from her ongoing anorexia and weight loss.    She does note her appetite is terrible.  She does note she has been on tramadol since the time of her diagnosis for epigastric and left upper quadrant pain which wraps around her back.  She takes 3 or 4 tramadol a day which is modestly effective for her pain although lately it has been less effective.  She denies side effects from the tramadol including sedation or constipation.    She lives with her  who has some dementia.  They have many adult children between them and it sounds like 1 of her adult [place].  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    SH:   As above    ROS:  Comprehensive review of systems is negative otherwise    PE: BP (!) 89/56  Pulse 85  Temp 96.5  F (35.8  C) (Oral)  Resp 16  Ht 1.575 m (5' 2\") "  Wt 45.5 kg (100 lb 6 oz)  SpO2 (!) 80%  BMI 18.36 kg/m2  O2 sat is 95% on 2 L/min.  Cachectic chronically ill woman who does not appear to be in acute distress or acutely ill.  Head NCAT-diffuse alopecia.  Mouth is dry with white adherent plaques throughout.  Neck is supple without adenopathy.  Relatively unremarkable new Mediport in the right chest.  Back has marked kyphoscoliosis but is clear otherwise with no wheezing, prolonged expirations, crackles, or egophony.  CV regular rate rhythm S1-S2.  Abdomen is very thin soft and benign with no tenderness or masses.  No lower extremity edema.  Joints of the upper extremities are normal.  Diffuse sarcopenia.  Neuropsych exam is normal    Impression & Recommendations:  78-year-old woman with metastatic pancreatic cancer complicated by an acute episode today of weakness and shortness of breath.  As far as I can tell her hypoxia is at her recent baseline although it is fair to say that I do not understand why she is hypoxic and the history that she gives me from the [facility] that this is asthma just does not make any sense to me.  Nonetheless it sounds like she was chronically hypoxic to the 80s in January and they gave her oxygen for that and is not clearly worse today.  I am worried about the acute weakness and shortness of breath however and low BP. We are checking a chest x-ray labs and rapid influenza testing today.  I am giving her IV fluids and holding her blood pressure medicine.      She has anorexia and weight loss-we talked about this but is really no medicine which I think is safe for it right now.  Treating her thrush will help.    Pain: It is okay for her to take up to 8 tramadol a day and I rewrote her prescription to reflect that.    There are many other coping and care planning issues that need to be addressed but I did not do those today with her acute illness and we will see her soon to continue that conversation.    Later: She has  cecily MONZON Benoit ADRIEN from oncology is kindly monitoring her in infusion this afternoon. I think the patient is on the border of needing more observation and Benoit will decide later this afternoon whether she can go home vs admission/obs. Ordered Tamiflu.    Chart data reviewed today:    Family History   Problem Relation Age of Onset     C.A.D. 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     Arthritis      Colon polyp 2009,2015    no polyps - f/u in 5 yrs      Esophageal reflux      Glaucoma      Hypertension      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) 11/6/2017     Osteoporosis      Postsurgical hypothyroidism      Scoliosis (and kyphoscoliosis), idiopathic      Thyroid cancer (H)     papillary carcinoma age 33     Uncomplicated asthma      Past Surgical History:   Procedure Laterality Date     ARTHRODESIS FOOT Right 11/9/2016    Procedure: ARTHRODESIS FOOT;  Surgeon: Janes Mcelroy MD;  [facility]: [facility] OR     C STOMACH SURGERY PROCEDURE UNLISTED       COLONOSCOPY   2009, 3/2015    no polyps in 2015 told to return 10 yrs.      ENDOSCOPIC RETROGRADE CHOLANGIOPANCREATOGRAM N/A 11/2/2017    Procedure: COMBINED ENDOSCOPIC RETROGRADE CHOLANGIOPANCREATOGRAPHY, PLACE TUBE/STENT;  Endoscopic Retrograde Cholangiopancreatogram with billary sphincterotomy and billary stent placement;  Surgeon: Rito Mcneil MD;  Location: [facility] OR     ENDOSCOPIC RETROGRADE CHOLANGIOPANCREATOGRAM N/A 1/10/2018    Procedure: ENDOSCOPIC RETROGRADE CHOLANGIOPANCREATOGRAM;  Endoscopic Retrograde Cholangiopancreatogram ;  Surgeon: Felix Izaguirre  MD Alfredo;  Location: [facility] OR     ENDOSCOPIC RETROGRADE CHOLANGIOPANCREATOGRAPHY, EXCHANGE TUBE/STENT N/A 11/22/2017    Procedure: ENDOSCOPIC RETROGRADE CHOLANGIOPANCREATOGRAPHY, EXCHANGE TUBE/STENT;  Endoscopic Retrograde Cholangiopancreatogram with Biliary Stent Exchange and pancreatic stent placement;  Surgeon: Felix Izaguirre MD;  [facility]: [facility] OR     ESOPHAGOSCOPY, GASTROSCOPY, DUODENOSCOPY (EGD), COMBINED  2/17/2012    Procedure:COMBINED ESOPHAGOSCOPY, GASTROSCOPY, DUODENOSCOPY (EGD); COMBINED ESOPHAGOSCOPY, GASTROSCOPY, DUODENOSCOPY POSSIBLE DILATION; Surgeon:NICHOLE MCCLAIN; [facility] OR     ESOPHAGOSCOPY, GASTROSCOPY, DUODENOSCOPY (EGD), COMBINED N/A 11/2/2017    Procedure: COMBINED ENDOSCOPIC ULTRASOUND, ESOPHAGOSCOPY, GASTROSCOPY, DUODENOSCOPY (EGD), FINE NEEDLE ASPIRATE/BIOPSY;  Upper Endoscopic Ultrasound with fine needle biopsy, upper endoscopy with biopsies, Endoscopic Retrograde Cholangiopancreatogram with billary sphincterotomy and billary stent placement;  Surgeon: Hadley Bailey MD;  Location: [facility] OR     FOOT SURGERY  2008    hammertoe left     INSERT PORT VASCULAR ACCESS Right 1/24/2018    Procedure: INSERT PORT VASCULAR ACCESS;  Chest Port Placement;  Surgeon: Ventura Villarreal PA-C;  [facility]     LAPAROSCOPIC CHOLECYSTECTOMY N/A 1/5/2015    Procedure: LAPAROSCOPIC CHOLECYSTECTOMY;  Surgeon: Cruz Pearson MD;  [facility]     NISSEN FUNDOPLICATION       ORTHOPEDIC SURGERY  2009    left hammertoe      REPAIR HAMMER TOE Right 11/9/2016    Procedure: REPAIR HAMMER TOE;  Surgeon: Janes Mcelroy MD;  [facility]     SALPINGO OOPHORECTOMY,R/L/SERENA  1974    left, for tubal pregnancy     THYROIDECTOMY      for thyroid cancer     Allergies   Allergen Reactions     Nkda [No Known Drug Allergies]           Medications:   I have reviewed this patient's medication profile.  MNPMP: reviewed      Data reviewed:  I reviewed recent labs and imaging, comments  on pertinent findings: Cr 0.5, Alb 3.1. CA 19.9 892 down from 2700 at time of diagnosis    CT Daniel  IMPRESSION:   In this patient with history of pancreatic head adenocarcinoma, there  is evidence of an overall positive response to therapy:  1. Decreased size of the primary mass which continues to invade third  portion of the duodenum and abuts the SMA, superior  pancreaticoduodenal artery, aorta, IVC, and likely superior mesenteric  vein.  2. Significantly improved retroperitoneal and mediastinal  lymphadenopathy.  3. Decreased size of the liver metastases.  4. Numerous pulmonary nodules have resolved. There is mild residual  right upper lobe groundglass nodularity, favored to represent  infectious/inflammatory process. Attention on follow-up.  5. New external right breast prosthesis with underlying soft tissue  thickening containing gas of uncertain clinical significance. No  recent breast procedures noted in the patient's medical chart.  Recommend correlation with history and consider further evaluation  with mammography/breast ultrasound as primary breast malignancy and  metastatic lesions are not excluded.    Thank you for involving us in the patient's care.   José Antonio Ann MD / Palliative Medicine / Pager [phone] / [facility] Inpatient Team Consult Pager [phone] (answered 8am-430pm M-F) - ok to text page via [employer] / After-Hours Answering Service [phone] / Palliative Clinic in the [facility] at the [facility] - [phone] (scheduling); [phone] (triage).

## 2018-02-23 NOTE — IP AVS SNAPSHOT
MRN:[id number]                      After Visit Summary   2/23/2018    Iris Lewis    MRN: [id number]           Thank you!     Thank you for choosing [facility] for your care. Our goal is always to provide you with excellent care. Hearing back from our patients is one way we can continue to improve our services. Please take a few minutes to complete the written survey that you may receive in the mail after you visit with us. Thank you!        Patient Information     Date Of Birth          1940        Designated Caregiver       Most Recent Value    Caregiver    Will someone help with your care after discharge? yes    Name of designated caregiver Neri Lewis    Phone number of caregiver [phone]    Caregiver address [address] [place] [address]      About your hospital stay     You were admitted on:  February 23, 2018 You last received care in the:  [address] [facility]    You were discharged on:  February 27, 2018        Reason for your hospital stay       Admitted for shortness of breath and treated for influenza.                  Who to Call     For medical emergencies, please call 911.  For non-urgent questions about your medical care, please call your primary care provider or clinic, [phone]          Attending Provider     Provider Specialty    Valdo Main MD Oncology       Primary Care Provider Office Phone # Fax #    Neri Gipson [phone]       When to contact your care team       [facility] triage line at [phone] for temp >100.4, uncontrolled nausea/vomiting/diarrhea/constipation, unrelieved pain, bleeding not relieved with pressure, dizziness, chest pain, shortness of breath, loss of consciousness, and any new or concerning symptoms.                  After Care Instructions     Activity       Your activity upon discharge: activity as tolerated            Diet       Follow this diet upon discharge:  Regular            Discharge Instructions       >>Oseltamivir (Tamiflu) is for influenza treatment. Take 1 tab tonight and 1 tab tomorrow morning then this prescription will be completed.  >>Azithromycin is for possible pneumonia treatment. Take 1 tab tonight and 1 tab tomorrow night, then this prescription will be completed.  >>Imodium is for diarrhea as needed. Take 1 tab every 4 hrs as needed if you continue to have diarrhea.   >>Dexamethasone 2 mg every 12 hours is for possible pneumonitis (inflammation of lung tissue from chemotherapy). Please take as prescribed until you follow up with TARA in clinic. The outpatient team will advise you when to discontinue.  >>Continue to HOLD (not take) Maxzide until instructed otherwise by outpatient provider.  >>NOTE: the dosing of Lovenox was changed while you were inpatient to 50 mg every 12 hours, please continue this dose.                  Follow-up Appointments     Follow Up and recommended labs and tests       Follow up as scheduled below:                  Your next 10 appointments already scheduled     Mar 02, 2018  1:45 PM CST   [facility] Draw with  [facility] DRAW   [facility] ([facility])    [address] [place] [address]   [phone]            Mar 02, 2018  2:10 PM CST   (Arrive by 1:55 PM)   Return Visit with LEONOR White   [facility] ([facility])    [address] [place] [address]  [address] [place] [address]   [phone]            Mar 05, 2018  8:30 AM CST   [facility] Lab Draw with  [facility] DRAW   [facility] ([facility])    [address] [place] [address]  [address] [place] [address]   [phone]            Mar 05, 2018  9:00 AM CST   Infusion 120 with  ONCOLOGY INFUSION, UC 25 ATC   [facility] ([facility])    [address] [place] [address]  [address]  [facility] [address]   [phone]            Mar 12, 2018 12:15 PM CDT   [facility] Lab Draw with  [facility] LAB DRAW   [facility] Draw ([facility])    [address]  [address]  [facility] [address]   [phone]            Mar 12, 2018  1:00 PM CDT   Infusion 120 with UC ONCOLOGY INFUSION, UC 25 ATC   [facility] ([facility])    [address]  [address]  [facility] [address]   [phone]            Mar 16, 2018  4:00 PM CDT   (Arrive by 3:45 PM)   Return Visit with José Antonio Ann MD   [facility] ([facility])    [address]  [address]  [facility] [address]   [phone]            Mar 26, 2018  7:00 AM CDT   (Arrive by 6:45 AM)   CT CHEST ABDOMEN PELVIS W/O & W CONTRAST with UCCT2   [facility] CT ([facility])    [address]  1st Floor  [facility] [address]   [phone]           Please bring any scans or X-rays taken at other hospitals, if similar tests were done. Also bring a list of your medicines, including vitamins, minerals and over-the-counter drugs. It is safest to leave personal items at home.  Be sure to tell your doctor:   If you have any allergies.   If there s any chance you are pregnant.   If you are breastfeeding.  You may have contrast for this exam. To prepare:   Do not eat or drink for 2 hours before your exam. If you need to take medicine, you may take it with small sips of water. (We may ask you to take liquid medicine as well.)   The day before your exam, drink extra fluids at least six 8-ounce glasses (unless your doctor tells you to restrict your fluids).   You will be given instructions on how to drink the contrast.  Patients over 70 or patients with diabetes or kidney problems:   If you haven t had a blood test (creatinine test) within the last 30 days, the  Cardiologist/Radiologist may require you to get this test prior to your exam.  If you have diabetes:   Continue to take your metformin medication on the day of your exam  Please wear loose clothing, such as a sweat suit or jogging clothes. Avoid snaps, zippers and other metal. We may ask you to undress and put on a hospital gown.  If you have any questions, please call the Imaging Department where you will have your exam.              Future tests that were ordered for you     CBC with platelets differential       Last Lab Result: Hemoglobin (g/dL)       Date                     Value                 02/27/2018               8.5 (L)          ----------            Comprehensive metabolic panel                 Pending Results     Date and Time Order Name Status Description    2/23/2018 2110 Blood culture Preliminary     2/23/2018 2110 Blood culture Preliminary             Statement of Approval     Ordered          02/27/18 1106  I have reviewed and agree with all the recommendations and orders detailed in this document.  EFFECTIVE NOW     Approved and electronically signed by:  Val Duarte PA-C             Admission Information     Date & Time Provider Department Dept. Phone    2/23/2018 Valdo Main MD [address] [facility] [place] [phone]      Your Vitals Were     Blood Pressure Pulse Temperature Respirations Weight Pulse Oximetry    120/67 (BP Location: Right arm) 98 96.7  F (35.9  C) (Oral) 16 46.9 kg (103 lb 4.8 oz) 92%    BMI (Body Mass Index)                   18.89 kg/m2           MyChart Information     [facility] gives you secure access to your electronic health record. If you see a primary care provider, you can also send messages to your care team and make appointments. If you have questions, please call your primary care clinic.  If you do not have a primary care provider, please call [phone] and they will assist you.        Care EveryWhere ID     This is your Care EveryWhere ID. This  could be used by other organizations to access your [facility] medical records  [id number]        Equal Access to Services     NESSA NICHOLS : Hadii mary Martinez, merle gar, jose guadalupedioni garberadelaidameera correia, lisa zavalalindajayne michael. So [facility] [phone].    ATENCIÓN: Si habla español, tiene a nava disposición servicios gratuitos de asistencia lingüística. Llame al [phone].    We comply with applicable federal civil rights laws and [place] laws. We do not discriminate on the basis of race, color, national origin, age, disability, sex, sexual orientation, or gender identity.               Review of your medicines      START taking        Dose / Directions    azithromycin 250 MG tablet   Commonly known as:  ZITHROMAX   Used for:  Pneumonia due to infectious organism, unspecified laterality, unspecified part of lung        Dose:  250 mg   Take 1 tablet (250 mg) by mouth daily for 2 doses   Quantity:  2 tablet   Refills:  0       dexamethasone 2 MG tablet   Commonly known as:  DECADRON   Used for:  Pneumonitis        Dose:  2 mg   Take 1 tablet (2 mg) by mouth every 12 hours   Quantity:  20 tablet   Refills:  0         CONTINUE these medicines which may have CHANGED, or have new prescriptions. If we are uncertain of the size of tablets/capsules you have at home, strength may be listed as something that might have changed.        Dose / Directions    enoxaparin 60 MG/0.6ML injection   Commonly known as:  LOVENOX   This may have changed:    - how much to take  - how to take this  - when to take this   Used for:  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)        Dose:  50 mg   Inject 0.5 mLs (50 mg) Subcutaneous every 12 hours   Refills:  0       levothyroxine 125 MCG tablet   Commonly known as:  SYNTHROID/LEVOTHROID   This may have changed:    - how much to take  - how to take this  - when to take this   Used for:  Hypothyroidism, unspecified type        Dose:  125 mcg   Take 1 tablet (125  mcg) by mouth daily   Quantity:  10 tablet   Refills:  0       oseltamivir 75 MG capsule   Commonly known as:  TAMIFLU   This may have changed:  additional instructions   Used for:  Influenza B        Dose:  75 mg   Take 1 capsule (75 mg) by mouth 2 times daily for 2 doses . Take one in evening 2/27 and one in AM 2/28 then complete.   Quantity:  2 capsule   Refills:  0       prochlorperazine 5 MG tablet   Commonly known as:  COMPAZINE   This may have changed:    - medication strength  - Another medication with the same name was removed. Continue taking this medication, and follow the directions you see here.   Used for:  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)        Dose:  5 mg   Take 1 tablet (5 mg) by mouth every 6 hours as needed for nausea or vomiting   Quantity:  30 tablet   Refills:  0       timolol maleate 0.5 % opthalmic solution   Commonly known as:  ISTALOL   This may have changed:  Another medication with the same name was removed. Continue taking this medication, and follow the directions you see here.   Used for:  Cataract, unspecified cataract type, unspecified laterality        Dose:  1 drop   Place 1 drop into both eyes every morning Before placing contact lenses   Quantity:  1 Bottle   Refills:  0         CONTINUE these medicines which have NOT CHANGED        Dose / Directions    albuterol 108 (90 BASE) MCG/ACT Inhaler   Commonly known as:  PROAIR HFA/PROVENTIL HFA/VENTOLIN HFA   Used for:  Mild asthma, unspecified whether complicated, unspecified whether persistent        Dose:  2 puff   Inhale 2 puffs into the lungs 4 times daily   Quantity:  1 Inhaler   Refills:  1       amylase-lipase-protease 61335 UNITS Cpep   Commonly known as:  CREON   Used for:  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)        Dose:  2 capsule   Take 2 capsules (72,000 Units) by mouth 3 times daily (with meals)   Quantity:  60 capsule   Refills:  0       CALCIUM PO        Take by mouth every morning Form/strength  unknown. Powder formulation. Add to water and fruits/vegetables daily to promote bone health.   Refills:  0       CARTIA  MG 24 hr capsule   Used for:  Benign essential hypertension   Generic drug:  diltiazem        TK 1 C PO QD   Quantity:  10 capsule   Refills:  0       cholecalciferol 1000 UNIT tablet   Commonly known as:  vitamin D3        Dose:  1 tablet   Take 1 tablet by mouth 2 times daily   Refills:  0       cyanocolbalamin 500 MCG tablet   Commonly known as:  vitamin  B-12        Dose:  500 mcg   Take 500 mcg by mouth every morning   Refills:  0       fluticasone-vilanterol 100-25 MCG/INH oral inhaler   Commonly known as:  BREO ELLIPTA   Used for:  Cough        Dose:  1 puff   Inhale 1 puff into the lungs daily   Quantity:  1 Inhaler   Refills:  1       folic acid 400 MCG tablet   Commonly known as:  FOLVITE        Dose:  1 tablet   Take 1 tablet by mouth every morning   Refills:  0       hydrOXYzine 25 MG tablet   Commonly known as:  ATARAX   Used for:  Obstructive jaundice        Dose:  25-50 mg   Take 1-2 tablets (25-50 mg) by mouth every 6 hours as needed for itching (adjuvant pain)   Quantity:  10 tablet   Refills:  0       LORazepam 0.5 MG tablet   Commonly known as:  ATIVAN   Used for:  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)        Dose:  0.5 mg   Take 1 tablet (0.5 mg) by mouth every 4 hours as needed (Anxiety, Nausea/Vomiting or Sleep)   Quantity:  15 tablet   Refills:  0       omeprazole 40 MG capsule   Commonly known as:  priLOSEC   Used for:  Duodenal ulceration        Dose:  40 mg   Take 1 capsule (40 mg) by mouth 2 times daily Take 30-60 minutes before a meal.   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        1unit being ordered: cranial prosthesis   Quantity:  1 Units   Refills:  0       traMADol 50 MG tablet   Commonly known as:  ULTRAM   Used for:  Malignant neoplasm of head of  pancreas (H)        Dose:   mg   Take 1-2 tablets ( mg) by mouth every 4 hours as needed for breakthrough pain (No more than 8 tablets in a day)   Quantity:  60 tablet   Refills:  0       * triamterene-hydrochlorothiazide 37.5-25 MG per tablet   Commonly known as:  MAXZIDE-25        Dose:  1 tablet   Take 1 tablet by mouth every morning   Refills:  0       * triamterene-hydrochlorothiazide 37.5-25 MG per capsule   Commonly known as:  DYAZIDE        Refills:  0       TYLENOL PO        Dose:  325 mg   Take 325 mg by mouth every 4 hours as needed for mild pain or fever   Refills:  0       * Notice:  This list has 2 medication(s) that are the same as other medications prescribed for you. Read the directions carefully, and ask your doctor or other care provider to review them with you.      STOP taking     camphor-menthol 0.5-0.5 % Lotn   Commonly known as:  DERMASARRA           denosumab 60 MG/ML Soln injection   Commonly known as:  PROLIA           levofloxacin 750 MG tablet   Commonly known as:  LEVAQUIN           nystatin 048813 UNIT/ML suspension   Commonly known as:  MYCOSTATIN           ondansetron 4 MG tablet   Commonly known as:  ZOFRAN           potassium 99 MG Tabs           predniSONE 10 MG tablet   Commonly known as:  DELTASONE           psyllium Packet   Commonly known as:  METAMUCIL/KONSYL           zinc 50 MG Tabs                Where to get your medicines      These medications were sent to [facility] - [place], [place] - [address]  [address] [address]     Phone:  [phone]     albuterol 108 (90 BASE) MCG/ACT Inhaler    amylase-lipase-protease 11653 UNITS Cpep    azithromycin 250 MG tablet    CARTIA  MG 24 hr capsule    dexamethasone 2 MG tablet    fluticasone-vilanterol 100-25 MCG/INH oral inhaler    hydrOXYzine 25 MG tablet    levothyroxine 125 MCG tablet    omeprazole 40 MG capsule    oseltamivir 75 MG capsule    prochlorperazine 5  MG tablet    timolol maleate 0.5 % opthalmic solution         Some of these will need a paper prescription and others can be bought over the counter. Ask your nurse if you have questions.     Bring a paper prescription for each of these medications     LORazepam 0.5 MG tablet    traMADol 50 MG tablet                Protect others around you: Learn how to safely use, store and throw away your medicines at www.disposemymeds.org.        ANTIBIOTIC INSTRUCTION     You've Been Prescribed an Antibiotic - Now What?  Your healthcare team thinks that you or your loved one might have an infection. Some infections can be treated with antibiotics, which are powerful, life-saving drugs. Like all medications, antibiotics have side effects and should only be used when necessary. There are some important things you should know about your antibiotic treatment.      Your healthcare team may run tests before you start taking an antibiotic.    Your team may take samples (e.g., from your blood, urine or other areas) to run tests to look for bacteria. These test can be important to determine if you need an antibiotic at all and, if you do, which antibiotic will work best.      Within a few days, your healthcare team might change or even stop your antibiotic.    Your team may start you on an antibiotic while they are working to find out what is making you sick.    Your team might change your antibiotic because test results show that a different antibiotic would be better to treat your infection.    In some cases, once your team has more information, they learn that you do not need an antibiotic at all. They may find out that you don't have an infection, or that the antibiotic you're taking won't work against your infection. For example, an infection caused by a virus can't be treated with antibiotics. Staying on an antibiotic when you don't need it is more likely to be harmful than helpful.      You may experience side effects from your  antibiotic.    Like all medications, antibiotics have side effects. Some of these can be serious.    Let you healthcare team know if you have any known allergies when you are admitted to the hospital.    One significant side effect of nearly all antibiotics is the risk of severe and sometimes deadly diarrhea caused by Clostridium difficile (C. Difficile). This occurs when a person takes antibiotics because some good germs are destroyed. Antibiotic use allows C. diificile to take over, putting patients at high risk for this serious infection.    As a patient or caregiver, it is important to understand your or your loved one's antibiotic treatment. It is especially important for caregivers to speak up when patients can't speak for themselves. Here are some important questions to ask your healthcare team.    What infection is this antibiotic treating and how do you know I have that infection?    What side effects might occur from this antibiotic?    How long will I need to take this antibiotic?    Is it safe to take this antibiotic with other medications or supplements (e.g., vitamins) that I am taking?     Are there any special directions I need to know about taking this antibiotic? For example, should I take it with food?    How will I be monitored to know whether my infection is responding to the antibiotic?    What tests may help to make sure the right antibiotic is prescribed for me?      Information provided by:  www.cdc.gov/getsmart  U.S. Department of Health and Human Services  Centers for disease Control and Prevention  National Center for Emerging and Zoonotic Infectious Diseases  Division of Healthcare Quality Promotion        Information about OPIOIDS     PRESCRIPTION OPIOIDS: WHAT YOU NEED TO KNOW    Prescription opioids can be used to help relieve moderate to severe pain and are often prescribed following a surgery or injury, or for certain health conditions. These medications can be an important part of  treatment but also come with serious risks. It is important to work with your health care provider to make sure you are getting the safest, most effective care.    WHAT ARE THE RISKS AND SIDE EFFECTS OF OPIOID USE?  Prescription opioids carry serious risks of addiction and overdose, especially with prolonged use. An opioid overdose, often marked by slowed breathing can cause sudden death. The use of prescription opioids can have a number of side effects as well, even when taken as directed:      Tolerance - meaning you might need to take more of a medication for the same pain relief    Physical dependence - meaning you have symptoms of withdrawal when a medication is stopped    Increased sensitivity to pain    Constipation    Nausea, vomiting, and dry mouth    Sleepiness and dizziness    Confusion    Depression    Low levels of testosterone that can result in lower sex drive, energy, and strength    Itching and sweating    RISKS ARE GREATER WITH:    History of drug misuse, substance use disorder, or overdose    Mental health conditions (such as depression or anxiety)    Sleep apnea    Older age (65 years or older)    Pregnancy    Avoid alcohol while taking prescription opioids.   Also, unless specifically advised by your health care provider, medications to avoid include:    Benzodiazepines (such as Xanax or Valium)    Muscle relaxants (such as Soma or Flexeril)    Hypnotics (such as Ambien or Lunesta)    Other prescription opioids    KNOW YOUR OPTIONS:  Talk to your health care provider about ways to manage your pain that do not involve prescription opioids. Some of these options may actually work better and have fewer risks and side effects:    Pain relievers such as acetaminophen, ibuprofen, and naproxen    Some medications that are also used for depression or seizures    Physical therapy and exercise    Cognitive behavioral therapy, a psychological, goal-directed approach, in which patients learn how to modify  physical, behavioral, and emotional triggers of pain and stress    IF YOU ARE PRESCRIBED OPIOIDS FOR PAIN:    Never take opioids in greater amounts or more often than prescribed    Follow up with your primary health care provider and work together to create a plan on how to manage your pain.    Talk about ways to help manage your pain that do not involve prescription opioids    Talk about all concerns and side effects    Help prevent misuse and abuse    Never sell or share prescription opioids    Never use another person's prescription opioids    Store prescription opioids in a secure place and out of reach of others (this may include visitors, children, friends, and family)    Visit www.cdc.gov/drugoverdose to learn about risks of opioid abuse and overdose    If you believe you may be struggling with addiction, tell your health care provider and ask for guidance or call [facility]'s National Helpline at 9-107-255-HELP    LEARN MORE / www.cdc.gov/drugoverdose/prescribing/guideline.html    Safely dispose of unused prescription opioids: Find your local drug take-back programs and more information about the importance of safe disposal at www.doseofreality.[place].gov             Medication List: This is a list of all your medications and when to take them. Check marks below indicate your daily home schedule. Keep this list as a reference.      Medications           Morning Afternoon Evening Bedtime As Needed    albuterol 108 (90 BASE) MCG/ACT Inhaler   Commonly known as:  PROAIR HFA/PROVENTIL HFA/VENTOLIN HFA   Inhale 2 puffs into the lungs 4 times daily                                            amylase-lipase-protease 32487 UNITS Cpep   Commonly known as:  CREON   Take 2 capsules (72,000 Units) by mouth 3 times daily (with meals)   Last time this was given:  36,000 Units on 2/27/2018 12:47 PM                                      azithromycin 250 MG tablet   Commonly known as:  ZITHROMAX   Take 1 tablet (250 mg) by mouth daily  for 2 doses                                   CALCIUM PO   Take by mouth every morning Form/strength unknown. Powder formulation. Add to water and fruits/vegetables daily to promote bone health.                                   CARTIA  MG 24 hr capsule   TK 1 C PO QD   Last time this was given:  120 mg on 2/27/2018  8:12 AM   Generic drug:  diltiazem                                   cholecalciferol 1000 UNIT tablet   Commonly known as:  vitamin D3   Take 1 tablet by mouth 2 times daily                                      cyanocolbalamin 500 MCG tablet   Commonly known as:  vitamin  B-12   Take 500 mcg by mouth every morning                                   dexamethasone 2 MG tablet   Commonly known as:  DECADRON   Take 1 tablet (2 mg) by mouth every 12 hours   Last time this was given:  4 mg on 2/27/2018  8:12 AM                                      enoxaparin 60 MG/0.6ML injection   Commonly known as:  LOVENOX   Inject 0.5 mLs (50 mg) Subcutaneous every 12 hours   Last time this was given:  50 mg on 2/27/2018  8:10 AM                                      fluticasone-vilanterol 100-25 MCG/INH oral inhaler   Commonly known as:  BREO ELLIPTA   Inhale 1 puff into the lungs daily   Last time this was given:  1 puff on 2/27/2018  8:10 AM                                   folic acid 400 MCG tablet   Commonly known as:  FOLVITE   Take 1 tablet by mouth every morning   Last time this was given:  400 mcg on 2/27/2018  8:11 AM                                   hydrOXYzine 25 MG tablet   Commonly known as:  ATARAX   Take 1-2 tablets (25-50 mg) by mouth every 6 hours as needed for itching (adjuvant pain)                                   levothyroxine 125 MCG tablet   Commonly known as:  SYNTHROID/LEVOTHROID   Take 1 tablet (125 mcg) by mouth daily   Last time this was given:  125 mcg on 2/27/2018  8:11 AM                                   LORazepam 0.5 MG tablet   Commonly known as:  ATIVAN   Take 1 tablet (0.5 mg)  by mouth every 4 hours as needed (Anxiety, Nausea/Vomiting or Sleep)                                   omeprazole 40 MG capsule   Commonly known as:  priLOSEC   Take 1 capsule (40 mg) by mouth 2 times daily Take 30-60 minutes before a meal.   Last time this was given:  40 mg on 2/27/2018  8:12 AM                                   order for DME   1unit being ordered: cranial prosthesis                                oseltamivir 75 MG capsule   Commonly known as:  TAMIFLU   Take 1 capsule (75 mg) by mouth 2 times daily for 2 doses . Take one in evening 2/27 and one in AM 2/28 then complete.   Last time this was given:  30 mg on 2/27/2018  8:12 AM                                      prochlorperazine 5 MG tablet   Commonly known as:  COMPAZINE   Take 1 tablet (5 mg) by mouth every 6 hours as needed for nausea or vomiting                                   timolol maleate 0.5 % opthalmic solution   Commonly known as:  ISTALOL   Place 1 drop into both eyes every morning Before placing contact lenses                                   traMADol 50 MG tablet   Commonly known as:  ULTRAM   Take 1-2 tablets ( mg) by mouth every 4 hours as needed for breakthrough pain (No more than 8 tablets in a day)   Last time this was given:  100 mg on 2/27/2018 10:08 AM                                   * triamterene-hydrochlorothiazide 37.5-25 MG per tablet   Commonly known as:  MAXZIDE-25   Take 1 tablet by mouth every morning                                * triamterene-hydrochlorothiazide 37.5-25 MG per capsule   Commonly known as:  DYAZIDE                                TYLENOL PO   Take 325 mg by mouth every 4 hours as needed for mild pain or fever   Last time this was given:  650 mg on 2/25/2018  7:03 PM                                   * Notice:  This list has 2 medication(s) that are the same as other medications prescribed for you. Read the directions carefully, and ask your doctor or other care provider to review them with  you.

## 2018-02-23 NOTE — NURSING NOTE
Venipuncture labs drawn.  Influenza swab done as well.   Patient tolerated with no complications.   Patient taken down to X-ray for chest x-ray.    Katerin Fernandez CMA

## 2018-02-23 NOTE — MR AVS SNAPSHOT
After Visit Summary   2/23/2018    Iris Lewis    MRN: [id number]           Patient Information     Date Of Birth          1940        Visit Information        Provider Department      2/23/2018 3:00 PM Benoit Molina PA [facility]        Today's Diagnoses     SOB (shortness of breath)    -  1       Follow-ups after your visit        Your next 10 appointments already scheduled     Feb 26, 2018  1:15 PM CST   [facility] Draw with [facility]   [facility] ([facility])    [address] [place] [address]  [address]  [facility] [address]   [phone]            Feb 26, 2018  1:50 PM CST   (Arrive by 1:35 PM)   Return Visit with CAT Dang CNP   [facility] ([facility])    [address] [place] [address]  [address]  [facility] [address]   [phone]            Feb 26, 2018  2:30 PM CST   Infusion 120 with UC ONCOLOGY INFUSION, UC 20 ATC   [facility] ([facility])    [address] [place] [address]  [address]  [facility] [address]   [phone]            Mar 05, 2018  8:30 AM CST   [facility] Draw with [facility] D[facility]   [facility])    [address] [place] [address]  [address]  [facility] [address]   [phone]            Mar 05, 2018  9:00 AM CST   Infusion 120 with UC ONCOLOGY INFUSION, UC 25 ATC   [facility] ([facility])    [address] [place] [address]  [address]  [facility] [address]   [phone]            Mar 12, 2018 12:15 PM CDT   [facility] Draw with [facility] DRAW   [facility] ([facility])    [address] [place] [address]  [address]  [facility] [address]   [phone]            Mar 12, 2018  1:00 PM CDT   Infusion 120 with UC ONCOLOGY INFUSION, UC 25 ATC   [facility] (  [facility])    [address]  [address]  [facility] [id number] [phone]              Future tests that were ordered for you today     Open Standing Orders        Priority Remaining Interval Expires Ordered    Oxygen: Nasal cannula Routine 85567/11734 CONTINUOUS  2/23/2018    Potassium Routine 100/100 CONDITIONAL (SPECIFY)  2/23/2018    Oxygen: Nasal cannula Routine 17740/29171 CONTINUOUS  2/23/2018    Blood culture Routine 100/100 CONDITIONAL (SPECIFY)  2/23/2018    Blood culture Routine 100/100 CONDITIONAL (SPECIFY)  2/23/2018            Who to contact     If you have questions or need follow up information about today's clinic visit or your schedule please contact [facility] directly at [phone].  Normal or non-critical lab and imaging results will be communicated to you by [employer]br[employer], letter or phone within 4 business days after the clinic has received the results. If you do not hear from us within 7 days, please contact the clinic through [employer] or phone. If you have a critical or abnormal lab result, we will notify you by phone as soon as possible.  Submit refill requests through [employer] or call your pharmacy and they will forward the refill request to us. Please allow 3 business days for your refill to be completed.          Additional Information About Your Visit        [employer] Information     [employer] gives you secure access to your electronic health record. If you see a primary care provider, you can also send messages to your care team and make appointments. If you have questions, please call your primary care clinic.  If you do not have a primary care provider, please call [phone] and they will assist you.        Care EveryWhere ID     This is your Care EveryWhere ID. This could be used by other organizations to access your [place] medical records  [id number]         Blood Pressure from Last 3 Encounters:   02/24/18 100/53   02/23/18  110/64   02/23/18 (!) 89/56    Weight from Last 3 Encounters:   02/23/18 45.5 kg (100 lb 6 oz)   02/12/18 45.5 kg (100 lb 4.8 oz)   02/05/18 47.3 kg (104 lb 4.8 oz)              Today, you had the following     No orders found for display         Today's Medication Changes          These changes are accurate as of 2/23/18  6:30 PM.  If you have any questions, ask your nurse or doctor.               Start taking these medicines.        Dose/Directions    nystatin 318750 UNIT/ML suspension   Commonly known as:  MYCOSTATIN   Used for:  Thrush   Started by:  José Antonio Ann MD        Dose:  771838 Units   Take 5 mLs (500,000 Units) by mouth 4 times daily   Quantity:  200 mL   Refills:  0       oseltamivir 75 MG capsule   Commonly known as:  TAMIFLU   Used for:  Influenza B   Started by:  José Antonio Ann MD        Dose:  75 mg   Take 1 capsule (75 mg) by mouth 2 times daily   Quantity:  10 capsule   Refills:  0         These medicines have changed or have updated prescriptions.        Dose/Directions    traMADol 50 MG tablet   Commonly known as:  ULTRAM   This may have changed:    - how much to take  - when to take this  - reasons to take this   Changed by:  José Antonio Ann MD        Dose:   mg   Take 1-2 tablets ( mg) by mouth every 4 hours as needed for breakthrough pain (No more than 8 tablets in a day)   Quantity:  150 tablet   Refills:  1            Where to get your medicines      [facility] [address] [facility], [place] [address] AT [place] [address], [address] [facility] [address]     Phone:  [phone]     nystatin 298857 UNIT/ML suspension    oseltamivir 75 MG capsule         Some of these will need a paper prescription and others can be bought over the counter.  Ask your nurse if you have questions.     Bring a paper prescription for each of these medications     traMADol 50 MG tablet                Primary Care  Provider Office Phone # Fax #    Neri JURADO MD Brandan [phone] [address] [address]        Equal Access to Services     NESSA NICHOLS : Shaun mary simms madison Sothom, waaxda luqadaha, qaybta kaalmada adesusana, lisa romero laLatonyajeanne michael. So [facility] [phone].    ATENCIÓN: Si habla español, tiene a nava disposición servicios gratuitos de asistencia lingüística. Llame al [phone].    We comply with applicable federal civil rights laws and [place] laws. We do not discriminate on the basis of race, color, national origin, age, disability, sex, sexual orientation, or gender identity.            Thank you!     Thank you for choosing [facility]  for your care. Our goal is always to provide you with excellent care. Hearing back from our patients is one way we can continue to improve our services. Please take a few minutes to complete the written survey that you may receive in the mail after your visit with us. Thank you!             Your Updated Medication List - Protect others around you: Learn how to safely use, store and throw away your medicines at www.disposemymeds.org.          This list is accurate as of 2/23/18  6:30 PM.  Always use your most recent med list.                   Brand Name Dispense Instructions for use Diagnosis    albuterol 108 (90 BASE) MCG/ACT Inhaler    PROAIR HFA/PROVENTIL HFA/VENTOLIN HFA    1 Inhaler    Inhale 2 puffs into the lungs 4 times daily    Asthma       amylase-lipase-protease 25857 UNITS Cpep    CREON    180 capsule    Take 2 capsules (72,000 Units) by mouth 3 times daily (with meals)    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)       CALCIUM PO      Take by mouth every morning Form/strength unknown. Powder formulation. Add to water and fruits/vegetables daily to promote bone health.        camphor-menthol 0.5-0.5 % Lotn    DERMASARRA    59 mL    Apply 1 mL topically every 6 hours as needed for  skin care        CARTIA  MG 24 hr capsule   Generic drug:  diltiazem     90 capsule    TK 1 C PO QD    Benign essential hypertension       cholecalciferol 1000 UNIT tablet    vitamin D3     Take 1 tablet by mouth 2 times daily        cyanocolbalamin 500 MCG tablet    vitamin  B-12     Take 500 mcg by mouth every morning        denosumab 60 MG/ML Soln injection    PROLIA    1 mL    Inject 1 mL (60 mg) Subcutaneous every 6 months Inject subcutaneously in upper arm, upper thigh or abdomen    Senile osteoporosis       enoxaparin 60 MG/0.6ML injection    LOVENOX      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)       fluticasone-vilanterol 100-25 MCG/INH oral inhaler    BREO ELLIPTA    1 Inhaler    Inhale 1 puff into the lungs daily    Cough       folic acid 400 MCG tablet    FOLVITE     Take 1 tablet by mouth every morning        hydrOXYzine 25 MG tablet    ATARAX    90 tablet    Take 1-2 tablets (25-50 mg) by mouth every 6 hours as needed for itching (adjuvant pain)        levofloxacin 750 MG tablet    LEVAQUIN     TAKE ONE TABLET PO TODAY ON 1/17/18        levothyroxine 125 MCG tablet    SYNTHROID/LEVOTHROID          LORazepam 0.5 MG tablet    ATIVAN    30 tablet    Take 1 tablet (0.5 mg) by mouth every 4 hours as needed (Anxiety, Nausea/Vomiting or Sleep)    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)       nystatin 346048 UNIT/ML suspension    MYCOSTATIN    200 mL    Take 5 mLs (500,000 Units) by mouth 4 times daily    Thrush       omeprazole 40 MG capsule    priLOSEC    180 capsule    Take 1 capsule (40 mg) by mouth 2 times daily Take 30-60 minutes before a meal.    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       oseltamivir 75 MG  capsule    TAMIFLU    10 capsule    Take 1 capsule (75 mg) by mouth 2 times daily    Influenza B       potassium 99 MG Tabs      Take 1 tablet by mouth 2 times daily        predniSONE 10 MG tablet    DELTASONE          * prochlorperazine 10 MG tablet    COMPAZINE    180 tablet    Take 0.5 tablets (5 mg) by mouth every 6 hours as needed for nausea or vomiting    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)       * prochlorperazine 5 MG tablet    COMPAZINE          psyllium Packet    METAMUCIL/KONSYL     Take 1 packet by mouth daily as needed for constipation        * timolol maleate 0.5 % opthalmic solution    ISTALOL     Place 1 drop into both eyes every morning Before placing contact lenses        * timolol 0.5 % ophthalmic solution    TIMOPTIC     INT 1 GTT OU IN THE MORNING        traMADol 50 MG tablet    ULTRAM    150 tablet    Take 1-2 tablets ( mg) by mouth every 4 hours as needed for breakthrough pain (No more than 8 tablets in a day)        * triamterene-hydrochlorothiazide 37.5-25 MG per tablet    MAXZIDE-25     Take 1 tablet by mouth every morning        * triamterene-hydrochlorothiazide 37.5-25 MG per capsule    DYAZIDE          TYLENOL PO      Take 325 mg by mouth every 4 hours as needed for mild pain or fever        zinc 50 MG Tabs      Take 1 tablet by mouth every morning        * Notice:  This list has 6 medication(s) that are the same as other medications prescribed for you. Read the directions carefully, and ask your doctor or other care provider to review them with you.

## 2018-02-23 NOTE — PATIENT INSTRUCTIONS
Contact Numbers  [facility] Nurse Triage: [phone]  After Hours Nurse Line:  [phone]    Please call the [facility] Nurse Triage line or after hours number if you experience a temperature greater than or equal to 100.5, shaking chills, have uncontrolled nausea, vomiting and/or diarrhea, dizziness, shortness of breath, chest pain, bleeding, unexplained bruising, or if you have any other new/concerning symptoms, questions or concerns.     If you are having any concerning symptoms or wish to speak to a provider before your next infusion visit, please call your care coordinator or triage to notify them so we can adequately serve you.     If you need a refill on a narcotic prescription or other medication, please call triage before your infusion appointment.           February 2018 Sunday Monday Tuesday Wednesday Thursday Friday Saturday                       1     2     3       4     5     [facility] DRAW    8:30 AM   (15 min.)    [facility] LAB DRAW   [facility] Lab Draw     [facility] ONC INFUSION 120    9:00 AM   (120 min.)    ONCOLOGY INFUSION   [facility]     LAB   12:15 PM   (15 min.)    LAB   [facility] Lab 6     7     8     9     10       11     12     [facility] DRAW    1:30 PM   (15 min.)   [facility] DRAW   [facility] Lab Draw     [facility] ONC INFUSION 120    2:00 PM   (120 min.)    ONCOLOGY INFUSION   [facility] 13     14  Happy Birthday!     15     16     17       18     19     20     21     22     23     [facility] NEW    8:45 AM   (60 min.)   José Antonio Ann MD   [facility]     XR CHEST 2 VIEWS   10:15 AM   (15 min.)   UCXR1   [facility] Xray     [facility] ONC INFUSION 120   11:00 AM   (120 min.)    ONCOLOGY INFUSION   [facility]     [facility] RETURN    2:45 PM   (50 min.)   Benoit Molina PA   [facility]     24       25     26     [facility] DRAW    1:15  PM   (15 min.)    [facility] LAB DRAW   [facility] Draw     [facility] RETURN    1:35 PM   (50 min.)   Quyen Mazariegos APRN CNP   [facility]     [facility] ONC INFUSION 120    2:30 PM   (120 min.)   UC ONCOLOGY INFUSION   [facility] 27 28 March 2018 Sunday Monday Tuesday Wednesday Thursday Friday Saturday                       1     2     3       4     5     [facility] DRAW    8:30 AM   (15 min.)    [facility] LAB DRAW   [facility] Draw     [facility] ONC INFUSION 120    9:00 AM   (120 min.)    ONCOLOGY INFUSION   [facility] 6     7     8     9     10       11     12     [facility] DRAW   12:15 PM   (15 min.)    [facility] LAB DRAW   [facility] Draw     [facility] ONC INFUSION 120    1:00 PM   (120 min.)    ONCOLOGY INFUSION   [facility] 13     14     15     16     17       18     19     20     21     22     23     24       25     26     CT CHEST ABDOMEN PELVIS WWO    6:45 AM   (20 min.)   CT2   [facility] CT     [facility] DRAW    7:45 AM   (15 min.)    [facility] LAB DRAW   [facility] Lab Draw     [facility] RETURN    8:30 AM   (30 min.)   Vinny Yu MD   [facility] 27     28     29     30     31                 Recent Results (from the past 24 hour(s))   CBC with platelets    Collection Time: 02/23/18 10:40 AM   Result Value Ref Range    WBC 14.0 (H) 4.0 - 11.0 10e9/L    RBC Count 3.08 (L) 3.8 - 5.2 10e12/L    Hemoglobin 9.5 (L) 11.7 - 15.7 g/dL    Hematocrit 29.1 (L) 35.0 - 47.0 %    MCV 95 78 - 100 fl    MCH 30.8 26.5 - 33.0 pg    MCHC 32.6 31.5 - 36.5 g/dL    RDW 20.8 (H) 10.0 - 15.0 %    Platelet Count 377 150 - 450 10e9/L   Basic metabolic panel    Collection Time: 02/23/18 10:40 AM   Result Value Ref Range    Sodium 132 (L) 133 - 144 mmol/L    Potassium 3.5 3.4 - 5.3 mmol/L    Chloride 97 94 - 109 mmol/L    Carbon Dioxide 27 20 -  32 mmol/L    Anion Gap 9 3 - 14 mmol/L    Glucose 99 70 - 99 mg/dL    Urea Nitrogen 19 7 - 30 mg/dL    Creatinine 0.74 0.52 - 1.04 mg/dL    GFR Estimate 76 >60 mL/min/1.7m2    GFR Estimate If Black >90 >60 mL/min/1.7m2    Calcium 9.0 8.5 - 10.1 mg/dL   Influenza A/B antigen    Collection Time: 02/23/18 10:40 AM   Result Value Ref Range    Influenza A/B Agn Specimen Nasal     Influenza A Negative NEG^Negative    Influenza B Positive (A) NEG^Negative   N terminal pro BNP outpatient    Collection Time: 02/23/18 10:40 AM   Result Value Ref Range    N-Terminal Pro Bnp 589 (H) 0 - 450 pg/mL   N Terminal Pro Bnp Outpatient    Collection Time: 02/23/18  3:20 PM   Result Value Ref Range    N-Terminal Pro Bnp 447 0 - 450 pg/mL

## 2018-02-23 NOTE — MR AVS SNAPSHOT
After Visit Summary   2/23/2018    Iris Lewis    MRN: [id number]           Patient Information     Date Of Birth          1940        Visit Information        Provider Department      2/23/2018 9:00 AM José Antonio Ann MD [facility]        Today's Diagnoses     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)    -  1    Hypoxia        Thrush        Pancreatic mass          Care Instructions    1) stop the dyazide - until someone tells you to restart it  2) wear your oxygen all the time  3) It is ok to take 1 or 2 tramadol at a time, up to 8 a day  4) there are treatments for the low appetite but they all make people tired and dizzy and now is not the time to start them  5) We have other things to talk about that we didn't get to today and I look forward to seeing you soon.    If you feel worse this weekend - worse shortness of breath, dizziness, fever, pains in chest, weakness - you need to seek medical attention.           Follow-ups after your visit        Your next 10 appointments already scheduled     Feb 23, 2018 10:30 AM CST   (Arrive by 10:15 AM)   XR CHEST 2 VIEWS with UCXR1   [facility] [place] [facility] ([facility])    [address] [place] [address]  1st Floor  [facility] [address] [phone]           Please bring a list of your current medicines to your exam. (Include vitamins, minerals and over-thecounter medicines.) Leave your valuables at home.  Tell your doctor if there is a chance you may be pregnant.  You do not need to do anything special for this exam.            Feb 23, 2018 11:00 AM CST   Infusion 120 with  ONCOLOGY INFUSION, UC 21 ATC   [facility] ([facility])    [address]  [address]  [facility] [address] [phone]            Feb 26, 2018  1:15 PM CST   [facility] Draw with UC [facility] LAB DRAW   [facility] ([facility])     [address]  [address]  [facility] [address]   [phone]            Feb 26, 2018  1:50 PM CST   (Arrive by 1:35 PM)   Return Visit with CAT Dang CNP   [facility] ([facility])    [address] [place] [address]  [address]  [facility] [address]   [phone]            Feb 26, 2018  2:30 PM CST   Infusion 120 with UC ONCOLOGY INFUSION, UC 20 ATC   [facility] ([facility])    [address] [place] [address]  [address]  [facility] [address]   [phone]            Mar 05, 2018  8:30 AM CST   [facility] Lab Draw with UC [facility]ON[facility] LAB DRAW   [facility] ([facility])    [address] [place] [address]  [address]  [facility] [address]   [phone]            Mar 05, 2018  9:00 AM CST   Infusion 120 with UC ONCOLOGY INFUSION, UC 25 ATC   [facility] ([facility])    [address] [place] [address]  [address]  [facility] [address]   [phone]              Future tests that were ordered for you today     Open Future Orders        Priority Expected Expires Ordered    CBC with platelets differential Routine  2/23/2019 2/23/2018    X-ray Chest 2 vws* Routine 2/23/2018 2/23/2019 2/23/2018            Who to contact     If you have questions or need follow up information about today's clinic visit or your schedule please contact [facility] directly at [phone].  Normal or non-critical lab and imaging results will be communicated to you by MyChart, letter or phone within 4 business days after the clinic has received the results. If you do not hear from us within 7 days, please contact the clinic through MyChart or phone. If you have a critical or abnormal lab result, we will notify you by phone as soon as possible.  Submit refill requests through [employer] or call your pharmacy and they will forward the refill request to us. Please  "allow 3 business days for your refill to be completed.          Additional Information About Your Visit        MyChart Information     [employer] gives you secure access to your electronic health record. If you see a primary care provider, you can also send messages to your care team and make appointments. If you have questions, please call your primary care clinic.  If you do not have a primary care provider, please call [phone] and they will assist you.        Care EveryWhere ID     This is your Care EveryWhere ID. This could be used by other organizations to access your [place] medical records  [id number]        Your Vitals Were     Pulse Temperature Respirations Height Pulse Oximetry BMI (Body Mass Index)    85 96.5  F (35.8  C) (Oral) 16 1.575 m (5' 2\") 80% 18.36 kg/m2       Blood Pressure from Last 3 Encounters:   02/23/18 (!) 89/56   02/12/18 149/83   02/05/18 135/80    Weight from Last 3 Encounters:   02/23/18 45.5 kg (100 lb 6 oz)   02/12/18 45.5 kg (100 lb 4.8 oz)   02/05/18 47.3 kg (104 lb 4.8 oz)                 Today's Medication Changes          These changes are accurate as of 2/23/18 10:29 AM.  If you have any questions, ask your nurse or doctor.               Start taking these medicines.        Dose/Directions    nystatin 293976 UNIT/ML suspension   Commonly known as:  MYCOSTATIN   Used for:  Thrush   Started by:  José Antonio Ann MD        Dose:  327312 Units   Take 5 mLs (500,000 Units) by mouth 4 times daily   Quantity:  200 mL   Refills:  0         These medicines have changed or have updated prescriptions.        Dose/Directions    traMADol 50 MG tablet   Commonly known as:  ULTRAM   This may have changed:    - how much to take  - when to take this  - reasons to take this   Used for:  Pancreatic mass   Changed by:  José Antonio Ann MD        Dose:   mg   Take 1-2 tablets ( mg) by mouth every 4 hours as needed for breakthrough pain (No more than 8 tablets in a day) "   Quantity:  150 tablet   Refills:  1            Where to get your medicines      These medications were sent to [id number] - [place], [place] - [address] AT [facility] [address]  [address], [place] [address]     Phone:  [phone]     nystatin 454553 UNIT/ML suspension         Some of these will need a paper prescription and others can be bought over the counter.  Ask your nurse if you have questions.     Bring a paper prescription for each of these medications     traMADol 50 MG tablet                Primary Care Provider Office Phone # Fax #    Neri Gipson [phone] [address] [address]        Equal Access to Services     NESSA NICHOLS : Hadii mary marsho Sothom, waaxda luqadaha, qaybta kaalmada adeegyada, waxrachel michael. So [facility] [phone].    ATENCIÓN: Si habla español, tiene a nava disposición servicios gratuitos de asistencia lingüística. [facility] [phone].    We comply with applicable federal civil rights laws and [place] laws. We do not discriminate on the basis of race, color, national origin, age, disability, sex, sexual orientation, or gender identity.            Thank you!     Thank you for choosing [facility]  for your care. Our goal is always to provide you with excellent care. Hearing back from our patients is one way we can continue to improve our services. Please take a few minutes to complete the written survey that you may receive in the mail after your visit with us. Thank you!             Your Updated Medication List - Protect others around you: Learn how to safely use, store and throw away your medicines at www.disposemymeds.org.          This list is accurate as of 2/23/18 10:29 AM.  Always use your most recent med list.                   Brand Name Dispense Instructions for use Diagnosis    albuterol 108 (90 BASE) MCG/ACT Inhaler    PROAIR  HFA/PROVENTIL HFA/VENTOLIN HFA    1 Inhaler    Inhale 2 puffs into the lungs 4 times daily    Asthma       amylase-lipase-protease 99404 UNITS Cpep    CREON    180 capsule    Take 2 capsules (72,000 Units) by mouth 3 times daily (with meals)    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)       CALCIUM PO      Take by mouth every morning Form/strength unknown. Powder formulation. Add to water and fruits/vegetables daily to promote bone health.        camphor-menthol 0.5-0.5 % Lotn    DERMASARRA    59 mL    Apply 1 mL topically every 6 hours as needed for skin care        CARTIA  MG 24 hr capsule   Generic drug:  diltiazem     90 capsule    TK 1 C PO QD    Benign essential hypertension       cholecalciferol 1000 UNIT tablet    vitamin D3     Take 1 tablet by mouth 2 times daily        cyanocolbalamin 500 MCG tablet    vitamin  B-12     Take 500 mcg by mouth every morning        denosumab 60 MG/ML Soln injection    PROLIA    1 mL    Inject 1 mL (60 mg) Subcutaneous every 6 months Inject subcutaneously in upper arm, upper thigh or abdomen    Senile osteoporosis       enoxaparin 60 MG/0.6ML injection    LOVENOX      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)       fluticasone-vilanterol 100-25 MCG/INH oral inhaler    BREO ELLIPTA    1 Inhaler    Inhale 1 puff into the lungs daily    Cough       folic acid 400 MCG tablet    FOLVITE     Take 1 tablet by mouth every morning        hydrOXYzine 25 MG tablet    ATARAX    90 tablet    Take 1-2 tablets (25-50 mg) by mouth every 6 hours as needed for itching (adjuvant pain)        levofloxacin 750 MG tablet    LEVAQUIN     TAKE ONE TABLET PO TODAY ON 1/17/18        levothyroxine 125 MCG tablet    SYNTHROID/LEVOTHROID          LORazepam 0.5 MG tablet    ATIVAN    30 tablet    Take 1 tablet (0.5 mg) by mouth every 4 hours as needed (Anxiety, Nausea/Vomiting or Sleep)    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)       nystatin 290906 UNIT/ML suspension    MYCOSTATIN    200 mL     Take 5 mLs (500,000 Units) by mouth 4 times daily    Thrush       omeprazole 40 MG capsule    priLOSEC    180 capsule    Take 1 capsule (40 mg) by mouth 2 times daily Take 30-60 minutes before a meal.    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       potassium 99 MG Tabs      Take 1 tablet by mouth 2 times daily        predniSONE 10 MG tablet    DELTASONE          * prochlorperazine 10 MG tablet    COMPAZINE    180 tablet    Take 0.5 tablets (5 mg) by mouth every 6 hours as needed for nausea or vomiting    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)       * prochlorperazine 5 MG tablet    COMPAZINE          psyllium Packet    METAMUCIL/KONSYL     Take 1 packet by mouth daily as needed for constipation        * timolol maleate 0.5 % opthalmic solution    ISTALOL     Place 1 drop into both eyes every morning Before placing contact lenses        * timolol 0.5 % ophthalmic solution    TIMOPTIC     INT 1 GTT OU IN THE MORNING        traMADol 50 MG tablet    ULTRAM    150 tablet    Take 1-2 tablets ( mg) by mouth every 4 hours as needed for breakthrough pain (No more than 8 tablets in a day)    Pancreatic mass       * triamterene-hydrochlorothiazide 37.5-25 MG per tablet    MAXZIDE-25     Take 1 tablet by mouth every morning        * triamterene-hydrochlorothiazide 37.5-25 MG per capsule    DYAZIDE          TYLENOL PO      Take 325 mg by mouth every 4 hours as needed for mild pain or fever        zinc 50 MG Tabs      Take 1 tablet by mouth every morning        * Notice:  This list has 6 medication(s) that are the same as other medications prescribed for you. Read the directions carefully, and ask your doctor or other care provider to  review them with you.

## 2018-02-23 NOTE — IP AVS SNAPSHOT
[address] [address]    Phone:  [phone]                                       After Visit Summary   2/23/2018    Iris Lewis    MRN: [id number]           After Visit Summary Signature Page     I have received my discharge instructions, and my questions have been answered. I have discussed any challenges I see with this plan with the nurse or doctor.    ..........................................................................................................................................  Patient/Patient Representative Signature      ..........................................................................................................................................  Patient Representative Print Name and Relationship to Patient    ..................................................               ................................................  Date                                            Time    ..........................................................................................................................................  Reviewed by Signature/Title    ...................................................              ..............................................  Date                                                            Time

## 2018-02-23 NOTE — PROGRESS NOTES
Palliative Staff/Outpatient Clinic    (This note was transcribed using voice recognition software. While I review and edit the transcription, I may miss errors. Also, the software capitalizes words strangely sometimes. Please let me know of any serious mis-transcriptions and I will addend this note.)    CC/Patient ID:  She has metastatic pancreatic cancer to the liver, lung, and retroperitoneal lymph nodes on gemcitabine and nab paclitaxel.  Important past medical history includes Nissen fundoplication in 2006.  She was diagnosed with her cancer in November of last year. First f/u scan showed some response to her chemotherapy.     +HCD which names  then daughter as decision makers    History:  She is seen alone today.  She was referred for care planning in the setting of her cancer but I should note that she was acutely ill today and the focus of my visit was mostly on that.  She is a complicated past medical history and unfortunately she has a important recent medical history that I do not really understand.  She tells me she has a childhood history of asthma but really did not have it as an adult.  In January she was in [place] and was hospitalized with respiratory problems.  She was told she had asthma-I very specifically confirm that with her-it was not COPD or emphysema or pneumonia, it was asthma.  She was found to be persistently hypoxic, which notably, is not a common asthma presentation, and was apparently discharged home on 2 L of oxygen per minute.  She still has oxygen at home but she uses it only as needed-symptomatically.  She believes her primary care doctor told her she did not really need it anymore although that is not clearly documented as far as I can tell.  At some point she is at [place] for second opinion and was diagnosed with a PE and is on Lovenox which she reports she continues to take faithfully although she can barely afford it.    The last few days she has felt poorly.  She has  "a mildly productive cough which is yellow.  She has had worse for shortness of breath-she can only walk 3-4 steps without getting breathless.  She says before this she was very active although when I asked her in more detail it seems like she has been quite weak and tired and breathless for weeks now but it is very clear that it has gotten worse the last few days.  She is inhalers from the hospital in [place] and she is taking those and they make her cough she says.  She has started using the oxygen the last couple days-she cannot check her oxygen saturation at home.  She felt generally weak and tired with this acute change. She has very dry mouth and taste problems.  She denies chest wall pain, chest pain, rhinorrhea, fevers, sick contacts, or GI or  complaints today apart from her ongoing anorexia and weight loss.    She does note her appetite is terrible.  She does note she has been on tramadol since the time of her diagnosis for epigastric and left upper quadrant pain which wraps around her back.  She takes 3 or 4 tramadol a day which is modestly effective for her pain although lately it has been less effective.  She denies side effects from the tramadol including sedation or constipation.    She lives with her  who has some dementia.  They have many adult children between them and it sounds like 1 [place] her [place].  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    SH:   As above    ROS:  Comprehensive review of systems is negative otherwise    PE: BP (!) 89/56  Pulse 85  Temp 96.5  F (35.8  C) (Oral)  Resp 16  Ht 1.575 m (5' 2\")  Wt 45.5 kg (100 lb 6 oz)  SpO2 (!) 80%  BMI 18.36 kg/m2  O2 sat is 95% on 2 L/min.  Cachectic chronically ill woman who does not appear to be in acute distress or acutely ill.  Head NCAT-diffuse alopecia.  Mouth is dry with white adherent plaques throughout.  Neck is supple without " adenopathy.  Relatively unremarkable new Mediport in the right chest.  Back has marked kyphoscoliosis but is clear otherwise with no wheezing, prolonged expirations, crackles, or egophony.  CV regular rate rhythm S1-S2.  Abdomen is very thin soft and benign with no tenderness or masses.  No lower extremity edema.  Joints of the upper extremities are normal.  Diffuse sarcopenia.  Neuropsych exam is normal    Impression & Recommendations:  78-year-old woman with metastatic pancreatic cancer complicated by an acute episode today of weakness and shortness of breath.  As far as I can tell her hypoxia is at her recent baseline although it is fair to say that I do not understand why she is hypoxic and the history that she gives me from the [facility] that this is asthma just does not make any sense to me.  Nonetheless it sounds like she was chronically hypoxic to the 80s in January and they gave her oxygen for that and is not clearly worse today.  I am worried about the acute weakness and shortness of breath however and low BP. We are checking a chest x-ray labs and rapid influenza testing today.  I am giving her IV fluids and holding her blood pressure medicine.      She has anorexia and weight loss-we talked about this but is really no medicine which I think is safe for it right now.  Treating her thrush will help.    Pain: It is okay for her to take up to 8 tramadol a day and I rewrote her prescription to reflect that.    There are many other coping and care planning issues that need to be addressed but I did not do those today with her acute illness and we will see her soon to continue that conversation.    Later: She has influenza B. Benoit JURADO from oncology is kindly monitoring her in infusion this afternoon. I think the patient is on the border of needing more observation and Benoit will decide later this afternoon whether she can go home vs admission/obs. Ordered Tamiflu.    Chart data reviewed  today:    Family History   Problem Relation Age of Onset     C.A.D. 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     Arthritis      Colon polyp 2009,2015    no polyps - f/u in 5 yrs      Esophageal reflux      Glaucoma      Hypertension      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) 11/6/2017     Osteoporosis      Postsurgical hypothyroidism      Scoliosis (and kyphoscoliosis), idiopathic      Thyroid cancer (H)     papillary carcinoma age 33     Uncomplicated asthma      Past Surgical History:   Procedure Laterality Date     ARTHRODESIS FOOT Right 11/9/2016    Procedure: ARTHRODESIS FOOT;  Surgeon: Janes Mcelroy MD;  Location: [facility]     C STOMACH SURGERY PROCEDURE UNLISTED       COLONOSCOPY   2009, 3/2015    no polyps in 2015 told to return 10 yrs.      ENDOSCOPIC RETROGRADE CHOLANGIOPANCREATOGRAM N/A 11/2/2017    Procedure: COMBINED ENDOSCOPIC RETROGRADE CHOLANGIOPANCREATOGRAPHY, PLACE TUBE/STENT;  Endoscopic Retrograde Cholangiopancreatogram with billary sphincterotomy and billary stent placement;  Surgeon: Rito Mcneil MD;  [facility]: [facility] OR     ENDOSCOPIC RETROGRADE CHOLANGIOPANCREATOGRAM N/A 1/10/2018    Procedure: ENDOSCOPIC RETROGRADE CHOLANGIOPANCREATOGRAM;  Endoscopic Retrograde Cholangiopancreatogram ;  Surgeon: Felix Izaguirre MD;  [facility]: [facility] OR     ENDOSCOPIC RETROGRADE CHOLANGIOPANCREATOGRAPHY, EXCHANGE TUBE/STENT N/A 11/22/2017    Procedure: ENDOSCOPIC RETROGRADE CHOLANGIOPANCREATOGRAPHY, EXCHANGE TUBE/STENT;  Endoscopic Retrograde Cholangiopancreatogram with Biliary Stent Exchange and  pancreatic stent placement;  Surgeon: Felix Izaguirre MD;  [facility]     ESOPHAGOSCOPY, GASTROSCOPY, DUODENOSCOPY (EGD), COMBINED  2/17/2012    Procedure:COMBINED ESOPHAGOSCOPY, GASTROSCOPY, DUODENOSCOPY (EGD); COMBINED ESOPHAGOSCOPY, GASTROSCOPY, DUODENOSCOPY POSSIBLE DILATION; Surgeon:NICHOLE MCCLAIN; [facility]     ESOPHAGOSCOPY, GASTROSCOPY, DUODENOSCOPY (EGD), COMBINED N/A 11/2/2017    Procedure: COMBINED ENDOSCOPIC ULTRASOUND, ESOPHAGOSCOPY, GASTROSCOPY, DUODENOSCOPY (EGD), FINE NEEDLE ASPIRATE/BIOPSY;  Upper Endoscopic Ultrasound with fine needle biopsy, upper endoscopy with biopsies, Endoscopic Retrograde Cholangiopancreatogram with billary sphincterotomy and billary stent placement;  Surgeon: Hadley Bailey MD;  Location[facility]     FOOT SURGERY  2008    hammertoe left     INSERT PORT VASCULAR ACCESS Right 1/24/2018    Procedure: INSERT PORT VASCULAR ACCESS;  Chest Port Placement;  Surgeon: Ventura Villarreal PA-C;  [facility]     LAPAROSCOPIC CHOLECYSTECTOMY N/A 1/5/2015    Procedure: LAPAROSCOPIC CHOLECYSTECTOMY;  Surgeon: Cruz Pearson MD;  [facility]     NISSEN FUNDOPLICATION       ORTHOPEDIC SURGERY  2009    left hammertoe      REPAIR HAMMER TOE Right 11/9/2016    Procedure: REPAIR HAMMER TOE;  Surgeon: Janes Mcelroy MD;  [facility]     SALPINGO OOPHORECTOMY,R/L/SERENA  1974    left, for tubal pregnancy     THYROIDECTOMY      for thyroid cancer     Allergies   Allergen Reactions     Nkda [No Known Drug Allergies]           Medications:   I have reviewed this patient's medication profile.  MNPMP: reviewed      Data reviewed:  I reviewed recent labs and imaging, comments on pertinent findings: Cr 0.5, Alb 3.1. CA 19.9 892 down from 2700 at time of diagnosis    CT Daniel  IMPRESSION:   In this patient with history of pancreatic head adenocarcinoma, there  is evidence of an overall positive response to therapy:  1. Decreased size of the primary  mass which continues to invade third  portion of the duodenum and abuts the SMA, superior  pancreaticoduodenal artery, aorta, IVC, and likely superior mesenteric  vein.  2. Significantly improved retroperitoneal and mediastinal  lymphadenopathy.  3. Decreased size of the liver metastases.  4. Numerous pulmonary nodules have resolved. There is mild residual  right upper lobe groundglass nodularity, favored to represent  infectious/inflammatory process. Attention on follow-up.  5. New external right breast prosthesis with underlying soft tissue  thickening containing gas of uncertain clinical significance. No  recent breast procedures noted in the patient's medical chart.  Recommend correlation with history and consider further evaluation  with mammography/breast ultrasound as primary breast malignancy and  metastatic lesions are not excluded.    Thank you for involving us in the patient's care.   José Antonio Ann MD / Palliative Medicine / Pager [phone] / [facility] Inpatient Team Consult Pager [phone] (answered 8am-430pm M-F) - ok to text page via [employer] / After-Hours Answering Service [phone] / Palliative Clinic in the [facility] [phone] (scheduling); [phone] (triage).

## 2018-02-23 NOTE — NURSING NOTE
"Oncology Rooming Note    February 23, 2018 9:23 AM   Iris Lewis is a 78 year old female who presents for:    Chief Complaint   Patient presents with     Oncology Clinic Visit     New Pt. Palliative     Initial Vitals: BP (!) 89/56  Pulse 85  Temp 96.5  F (35.8  C) (Oral)  Resp 16  Ht 1.575 m (5' 2\")  Wt 45.5 kg (100 lb 6 oz)  SpO2 (!) 80%  BMI 18.36 kg/m2 Estimated body mass index is 18.36 kg/(m^2) as calculated from the following:    Height as of this encounter: 1.575 m (5' 2\").    Weight as of this encounter: 45.5 kg (100 lb 6 oz). Body surface area is 1.41 meters squared.  No Pain (0) Comment: Data Unavailable   No LMP recorded. Patient is postmenopausal.  Allergies reviewed: Yes  Medications reviewed: Yes    Medications: Medication refills not needed today.  Pharmacy name entered into [employer]:    [facility] - [facility] [address] [place] [address] DRUG STORE [address] [place] [address] [facility]    Clinical concerns: new patient, stated that she has been dizzy and lightheaded for the past 3 days, she can only take 3 or 4 steps and then she has to lye down, she can't bend down, she has noticed that her heart is pounding, she feels that her lungs are congested. She doesn't have any appetite.   Food tastes like cardboard. Dr. Ann was notified.    10 minutes for nursing intake (face to face time)     Katerin Fernandez CMA              "

## 2018-02-23 NOTE — LETTER
"2/23/2018      RE: Iris Lewis  [address]  [place] [address]       Oncology/Hematology Visit Note  Feb 23, 2018    Reason for Visit: Add on for hypoxia and influenza    History of Present Illness: Iris Lewis is a 78 year old female with metastatic pancreatic cancer. She is currently undergoing treatment with gemcitabine/abraxane, last given 2/12/18. Her oncologic history is as follows:    \"Iris Lewis is a 77 year old female with a previous medical history of thyroid cancer s/p thyroidectomy in 1980s, cholelithiasis s/p CCY 2015, GERD s/p Nissen fundoplication 2006 and hypertension, who presented with stomach pain, dark urine, constipation with pale floating stools and jaundice. She was admitted on 10/31/17 and a CT scan showed a 3.6 cm pancreatic mass concerning for malignancy with local invasion into the duodenum, 3 small liver lesions, small pulmonary nodulates on lung bases and enlarged peripancreatic and retroperitoneal lymph nodes. An upper endoscopic ultrasound with fine needle biopsy and ERCP with sphincterotomy and stent placement was thereafter preformed.      She met with Dr. Yu to discuss halozyme study, however she does not have sufficient HA expression to qualify.  It was discussed that she would start with Gemzar/Abraxane treatment.  Her first treatment was on 11/28/17. Restaging imaging 1/24/18 revealed disease response to treatment. She has now received 3 cycles of [place]/Abraxane with plan to complete a 4th cycle and then restage.\"    I was asked to see her today after she presented with acute hypoxia and hypotension to her palliative care appointment and then tested positive for influenza B. Her PMH is significant for asthma with intermittent hypoxia, previously requiring O2, however she has NOT required any O2 since at least 1/22/18 per discussion with the patient and review of the chart.    Interval History:  Mrs. Cole was seen today with her . She states " that the last three days she has felt terrible. She had a productive cough and dyspnea on exertion, noting she can only walk a few steps without feeling short of breath. She has home O2 which she has not needed the last month, but has put it on at home the past few days due to feeling poorly. She denies shortness of breath at rest, however when she arrived to her palliative care appointment this morning her O2 was 80% on RA. She also admits that she cannot talk more than a few sentences without feeling the need to cough. She also admits to sleeping with pillows propped up due to worsening SOB lying flat. She also feels extremely weak, admitting to need to hold on to furniture when she walking out of fear of falling. She has not had any fevers to her knowledge and denies body aches. She does have abdominal pain from her cancer for which she takes tramadol and denies any new pain. She does state this is somewhat similar to how she has felt with asthma exacerbations in the past. She hasn't been able to use her albuterol inhalers the last few days due to not being able to take in a deep breath without coughing.        She admits to not eating or drinking as well the past few days due to feeling poorly, though she has been struggling with anorexia and weight . She denies bowel changes or urinary concerns. She denies ill contacts and states she has not really left the house. She does state that her mouth is extremely dry and her taste buds are off, which makes it really difficult to eat. She is doing salt and soda swishes and was just diagnosed with thrush. She does have bilateral peripheral neuropathy which has been worse since starting chemo involving her bilateral fingertips. She denies rashes, peripheral edema, or bleeding issues. She continues on Lovenox with no issues.    Current Outpatient Prescriptions   Medication Sig Dispense Refill     traMADol (ULTRAM) 50 MG tablet Take 1-2 tablets ( mg) by mouth every 4  hours as needed for breakthrough pain (No more than 8 tablets in a day) 150 tablet 1     nystatin (MYCOSTATIN) 140878 UNIT/ML suspension Take 5 mLs (500,000 Units) by mouth 4 times daily 200 mL 0     oseltamivir (TAMIFLU) 75 MG capsule Take 1 capsule (75 mg) by mouth 2 times daily 10 capsule 0     CARTIA  MG 24 hr capsule TK 1 C PO QD 90 capsule 1     albuterol (PROAIR HFA/PROVENTIL HFA/VENTOLIN HFA) 108 (90 BASE) MCG/ACT Inhaler Inhale 2 puffs into the lungs 4 times daily 1 Inhaler 1     fluticasone-vilanterol (BREO ELLIPTA) 100-25 MCG/INH oral inhaler Inhale 1 puff into the lungs daily 1 Inhaler 1     predniSONE (DELTASONE) 10 MG tablet   0     prochlorperazine (COMPAZINE) 5 MG tablet        levofloxacin (LEVAQUIN) 750 MG tablet TAKE ONE TABLET PO TODAY ON 1/17/18  0     levothyroxine (SYNTHROID/LEVOTHROID) 125 MCG tablet        timolol (TIMOPTIC) 0.5 % ophthalmic solution INT 1 GTT OU IN THE MORNING  3     triamterene-hydrochlorothiazide (DYAZIDE) 37.5-25 MG per capsule        LORazepam (ATIVAN) 0.5 MG tablet Take 1 tablet (0.5 mg) by mouth every 4 hours as needed (Anxiety, Nausea/Vomiting or Sleep) 30 tablet 2     prochlorperazine (COMPAZINE) 10 MG tablet Take 0.5 tablets (5 mg) by mouth every 6 hours as needed for nausea or vomiting 180 tablet 0     enoxaparin (LOVENOX) 60 MG/0.6ML injection   3     ondansetron (ZOFRAN) 4 MG tablet Take 1 tablet (4 mg) by mouth every 6 hours as needed for nausea 60 tablet 0     omeprazole (PRILOSEC) 40 MG capsule Take 1 capsule (40 mg) by mouth 2 times daily Take 30-60 minutes before a meal. 180 capsule 0     order for DME 1unit being ordered: cranial prosthesis 1 Units 0     psyllium (METAMUCIL/KONSYL) Packet Take 1 packet by mouth daily as needed for constipation       Acetaminophen (TYLENOL PO) Take 325 mg by mouth every 4 hours as needed for mild pain or fever       amylase-lipase-protease (CREON) 80107 UNITS CPEP Take 2 capsules (72,000 Units) by mouth 3 times daily  (with meals) 180 capsule 1     hydrOXYzine (ATARAX) 25 MG tablet Take 1-2 tablets (25-50 mg) by mouth every 6 hours as needed for itching (adjuvant pain) 90 tablet 0     camphor-menthol (DERMASARRA) 0.5-0.5 % LOTN Apply 1 mL topically every 6 hours as needed for skin care 59 mL 0     CALCIUM PO Take by mouth every morning Form/strength unknown. Powder formulation. Add to water and fruits/vegetables daily to promote bone health.        triamterene-hydrochlorothiazide (MAXZIDE-25) 37.5-25 MG per tablet Take 1 tablet by mouth every morning        potassium 99 MG TABS Take 1 tablet by mouth 2 times daily        timolol maleate (ISTALOL) 0.5 % opthalmic solution Place 1 drop into both eyes every morning Before placing contact lenses       zinc 50 MG TABS Take 1 tablet by mouth every morning        denosumab (PROLIA) 60 MG/ML SOLN injection Inject 1 mL (60 mg) Subcutaneous every 6 months Inject subcutaneously in upper arm, upper thigh or abdomen (Patient not taking: Reported on 2/23/2018) 1 mL 1     cyanocolbalamin (VITAMIN B-12) 500 MCG tablet Take 500 mcg by mouth every morning        folic acid (FOLVITE) 400 MCG tablet Take 1 tablet by mouth every morning        cholecalciferol (VITAMIN D) 1000 UNIT tablet Take 1 tablet by mouth 2 times daily          Physical Examination:  BP 89/56 Pulse 85 Temp 96.5 RR 16 Weight 100lbs SpO2 80%  Wt Readings from Last 10 Encounters:   02/23/18 45.5 kg (100 lb 6 oz)   02/12/18 45.5 kg (100 lb 4.8 oz)   02/05/18 47.3 kg (104 lb 4.8 oz)   01/29/18 48.4 kg (106 lb 9.6 oz)   01/24/18 47.2 kg (104 lb)   01/22/18 47.2 kg (104 lb)   01/22/18 47.2 kg (104 lb)   01/10/18 47.4 kg (104 lb 8 oz)   01/08/18 48.3 kg (106 lb 6.4 oz)   01/05/18 48.7 kg (107 lb 4.8 oz)     Constitutional: Cachetic and chronically ill-appearing female in no acute distress on O2 via NC.  Eyes: EOMI, PERRL. No scleral icterus.  ENT: Oral mucosa is dry with thrush and few lesions.   Lymphatic: Neck is supple without  cervical or supraclavicular lymphadenopathy.   Cardiovascular: Regular rate and rhythm. No murmurs, gallops, or rubs. No peripheral edema.  Respiratory: Faint crackles and wheezing bilaterally. Non-labored breathing.  Gastrointestinal: Bowel sounds present. Abdomen soft, tender in the left lower quadrant. No palpable hepatosplenomegaly or masses.   Neurologic: Cranial nerves II through XII are grossly intact.  Skin: No rashes, petechiae, or bruising noted on exposed skin.    Laboratory Data:  Results for JONA SHETTY (MRN [id number]) as of 2/23/2018 15:55   2/23/2018 10:40   Sodium 132 (L)   Potassium 3.5   Chloride 97   Carbon Dioxide 27   Urea Nitrogen 19   Creatinine 0.74   GFR Estimate 76   GFR Estimate If Black >90   Calcium 9.0   Anion Gap 9   N-Terminal Pro Bnp 589 (H)   Glucose 99   WBC 14.0 (H)   Hemoglobin 9.5 (L)   Hematocrit 29.1 (L)   Platelet Count 377   RBC Count 3.08 (L)   MCV 95   MCH 30.8   MCHC 32.6   RDW 20.8 (H)   Influenza A/B Agn Specimen Nasal   Influenza A Negative   Influenza B Positive (A)     Assessment and Plan:  1. Recurrent hypoxia in the setting of possible asthma exacerbation and positive influenza B  -Has a history of hypoxia from asthma exacerbation for which she was hospitalized in [place] and sent home on O2. She had stopped the O2 though the end of January due to improvement in O2 sats. She presents now with recurrent hypoxia.  -Labs review elevated WBC to 14 and positive influenza B. CXR with bilateral opacities and Kerley B lines favoring pulmonary edema vs infection  -Did obtain a BNP which was indeterminate at 589, repeat after fluids 447. She has no peripheral edema and no CHF history, but if her SOB persists would need to do Echo  -Had nursing watch her throughout the day. She continued to require O2 and when O2 was removed, she dropped to 72% which persisted until O2 was reapplied. She is currently on 6L via NC sating at 96%  -Did give albuterol neb here in  clinic given underlying asthma history and new crackles/wheezing on exam.  -She hasn't been able to do her inhalers well at home due to SOB and coughing. Continue inhalers in the hospital if able, or switch to nebulizer  -Given persist hypoxia which is new for her in the last month, will admit to the hospital for observation of her vitals, specifically her O2 over night as a I worry about her de-sating at home, and to initiate Tamiflu. May also requiring steroids if nebulizer are not effective. She does have O2 at home for once she is feeling better and stabilized.    -May need to consider other etiologies of SOB if she continues to decline. Appreciate inpatient teams ongoing management    2. Hypotension   -Likely 2/2 infection and poor oral intake at home. Will give 1L IVF slowly over 2 hours (given questionable pulmonary edema on CXR) and monitor BP  -BP improved after fluids to 110/64.   -Continue very slow IV support as needed. Encourage good oral intake as able     3. Metastatic pancreatic cancer  -Currently on treatment with gemcitabine/abraxane. Has now completed 3 cycles (last dose given 2/12/18). She has tolerated treatment well so far with mild nausea, neuropathy, and constipation. No current concerns with this  -She does have follow-up with [facility] on Monday for her next cycle of chemo  -Continue Tramadol as needed for abdominal pain. Currently using on average 1 per day as needed. She will need a refill likely at discharge    4. FEN  -Historically has been struggling with anorexia. Labs today reveal worsening creat and hyponatremia, likely from poor oral intake  -Her weight today is lower to 100lbs. Will see if she can eat better after this acute illness, may need to have her see nutrition for ongoing support    -Start Nystatin for thrush. Continue salt and soda swishes and start Biotene for dry mouth.     5. Pulmonary embolism  -She does have a history of bilateral PE diagnosed December 2017 currently on  Lovenox. She has not missed any doses and is not tachycardic, so less concern for recurrent PE   -Will need to continue Lovenox BID inpatient. Due for her next dose at 8pm this evening  -Of note, her hgb and plt are stable today       Benoit Molina PA-C  [facility]  [address], [place] [id number] [phone]

## 2018-02-23 NOTE — PATIENT INSTRUCTIONS
1) stop the dyazide - until someone tells you to restart it  2) wear your oxygen all the time  3) It is ok to take 1 or 2 tramadol at a time, up to 8 a day  4) there are treatments for the low appetite but they all make people tired and dizzy and now is not the time to start them  5) We have other things to talk about that we didn't get to today and I look forward to seeing you soon.    If you feel worse this weekend - worse shortness of breath, dizziness, fever, pains in chest, weakness - you need to seek medical attention.

## 2018-02-23 NOTE — MR AVS SNAPSHOT
After Visit Summary   2/23/2018    Iris Lewis    MRN: [id number]           Patient Information     Date Of Birth          1940        Visit Information        Provider Department      2/23/2018 11:00 AM  21 ATC;  ONCOLOGY INFUSION [facility]        Today's Diagnoses     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)    -  1    SOB (shortness of breath)          Care Instructions    Contact Numbers  [facility] Nurse Triage: [phone]  After Hours Nurse Line:  [phone]    Please call the [facility] Nurse Triage line or after hours number if you experience a temperature greater than or equal to 100.5, shaking chills, have uncontrolled nausea, vomiting and/or diarrhea, dizziness, shortness of breath, chest pain, bleeding, unexplained bruising, or if you have any other new/concerning symptoms, questions or concerns.     If you are having any concerning symptoms or wish to speak to a provider before your next infusion visit, please call your care coordinator or triage to notify them so we can adequately serve you.     If you need a refill on a narcotic prescription or other medication, please call triage before your infusion appointment.           February 2018 Sunday Monday Tuesday Wednesday Thursday Friday Saturday                       1     2     3       4     5     [facility]    8:30 AM   (15 min.)   [facility] LAB DRAW   [facility] Lab Draw     [facility] ONC INFUSION 120    9:00 AM   (120 min.)    ONCOLOGY INFUSION   [facility]     LAB   12:15 PM   (15 min.)    LAB   [facility] Lab 6     7     8     9     10       11     12     [facility] LAB DRAW    1:30 PM   (15 min.)   [facility] LAB DRAW   [facility] Lab Draw     [facility] ONC INFUSION 120    2:00 PM   (120 min.)    ONCOLOGY INFUSION   [facility] 13     14  Happy Birthday!     15     16     17       18     19     20     21     22     23     [facility]  NEW    8:45 AM   (60 min.)   José Antonio Ann MD   [facility]     XR CHEST 2 VIEWS   10:15 AM   (15 min.)   UCXR1   [facility] Xray     [facility] ONC INFUSION 120   11:00 AM   (120 min.)   [facility] ONCOLOGY INFUSION   [facility]     [facility] RETURN    2:45 PM   (50 min.)   Benoit Molina PA   [facility]     24       25     26     [facility] LAB DRAW    1:15 PM   (15 min.)   [facility]   [facility]     [facility] RETURN    1:35 PM   (50 min.)   Quyen Mazariegos, CAT CNP   [facility]     [facility] ONC INFUSION 120    2:30 PM   (120 min.)    ONCOLOGY INFUSION   [facility] 27 28 March 2018 Sunday Monday Tuesday Wednesday Thursday Friday Saturday                       1     2     3       4     5     [facility] LAB DRAW    8:30 AM   (15 min.)    [facility] LAB DRAW   [facility] Lab Draw     [facility] ONC INFUSION 120    9:00 AM   (120 min.)    ONCOLOGY INFUSION   [facility] 6     7     8     9     10       11     12     [facility] LAB DRAW   12:15 PM   (15 min.)    [facility] LAB DRAW   [facility] Lab Draw     [facility] ONC INFUSION 120    1:00 PM   (120 min.)    ONCOLOGY INFUSION   [facility] 13     14     15     16     17       18     19     20     21     22     23     24       25     26     CT CHEST ABDOMEN PELVIS WWO    6:45 AM   (20 min.)   UCCT2   [facility] CT     [facility] LAB DRAW    7:45 AM   (15 min.)    [facility] LAB DRAW   [facility] Lab Draw     [facility] RETURN    8:30 AM   (30 min.)   Vinny Yu MD   [facility] 27     28     29     30     31                 Recent Results (from the past 24 hour(s))   CBC with platelets    Collection Time: 02/23/18 10:40 AM   Result Value Ref Range    WBC 14.0 (H) 4.0 - 11.0 10e9/L    RBC Count 3.08 (L) 3.8 - 5.2 10e12/L     Hemoglobin 9.5 (L) 11.7 - 15.7 g/dL    Hematocrit 29.1 (L) 35.0 - 47.0 %    MCV 95 78 - 100 fl    MCH 30.8 26.5 - 33.0 pg    MCHC 32.6 31.5 - 36.5 g/dL    RDW 20.8 (H) 10.0 - 15.0 %    Platelet Count 377 150 - 450 10e9/L   Basic metabolic panel    Collection Time: 02/23/18 10:40 AM   Result Value Ref Range    Sodium 132 (L) 133 - 144 mmol/L    Potassium 3.5 3.4 - 5.3 mmol/L    Chloride 97 94 - 109 mmol/L    Carbon Dioxide 27 20 - 32 mmol/L    Anion Gap 9 3 - 14 mmol/L    Glucose 99 70 - 99 mg/dL    Urea Nitrogen 19 7 - 30 mg/dL    Creatinine 0.74 0.52 - 1.04 mg/dL    GFR Estimate 76 >60 mL/min/1.7m2    GFR Estimate If Black >90 >60 mL/min/1.7m2    Calcium 9.0 8.5 - 10.1 mg/dL   Influenza A/B antigen    Collection Time: 02/23/18 10:40 AM   Result Value Ref Range    Influenza A/B Agn Specimen Nasal     Influenza A Negative NEG^Negative    Influenza B Positive (A) NEG^Negative   N terminal pro BNP outpatient    Collection Time: 02/23/18 10:40 AM   Result Value Ref Range    N-Terminal Pro Bnp 589 (H) 0 - 450 pg/mL   [facility] Terminal Pro Bnp Outpatient    Collection Time: 02/23/18  3:20 PM   Result Value Ref Range    N-Terminal Pro Bnp 447 0 - 450 pg/mL                 Follow-ups after your visit        Your next 10 appointments already scheduled     Feb 26, 2018  1:15 PM CST   [facility] Lab Draw with [facility] LAB DRAW   [facility] Lab Draw ([facility])    [address]  [address]  [place] [facility] [facility] [address] [phone]            Feb 26, 2018  1:50 PM CST   (Arrive by 1:35 PM)   Return Visit with CAT Dang CNP   [facility] ([facility])    [address]  [address]  [place] [facility] [address] [phone]            Feb 26, 2018  2:30 PM CST   Infusion 120 with  ONCOLOGY INFUSION,  20 ATC   [facility] ([facility])    [address]  [address]  [facility] [address]    [phone]            Mar 05, 2018  8:30 AM CST   [facility] Lab Draw with [facility] LAB DRAW   [facility]    [address]  [address]  [facility] [address] [phone]            Mar 05, 2018  9:00 AM CST   Infusion 120 with UC ONCOLOGY INFUSION, UC 25 ATC   [facility] ([facility])    [address]  [address]  [place] [address] [phone]            Mar 12, 2018 12:15 PM CDT   [facility] Lab Draw with [facility] LAB DRAW   [facility]    [address]  [address]  [place] [address] [phone]            Mar 12, 2018  1:00 PM CDT   Infusion 120 with UC ONCOLOGY INFUSION, UC 25 ATC   [facility] ([facility])    [address] [place] [address]  [address]  [facility] [address] [phone]              Who to contact     If you have questions or need follow up information about today's clinic visit or your schedule please contact [facility] [place] directly at [phone].  Normal or non-critical lab and imaging results will be communicated to you by [employer] System[employer]art, letter or phone within 4 business days after the clinic has received the results. If you do not hear from us within 7 days, please contact the clinic through [employer] Systemshart or phone. If you have a critical or abnormal lab result, we will notify you by phone as soon as possible.  Submit refill requests through [employer] or call your pharmacy and they will forward the refill request to us. Please allow 3 business days for your refill to be completed.          Additional Information About Your Visit        [employer] Information     [employer] gives you secure access to your electronic health record. If you see a primary care provider, you can also send messages to your care team and make appointments. If you have questions, please  call your primary care clinic.  If you do not have a primary care provider, please call [phone] and they will assist you.        Care EveryWhere ID     This is your Care EveryWhere ID. This could be used by other organizations to access your [facility] medical records  [id number]        Your Vitals Were     Pulse Respirations Pulse Oximetry             75 18 96%          Blood Pressure from Last 3 Encounters:   02/23/18 110/64   02/23/18 (!) 89/56   02/12/18 149/83    Weight from Last 3 Encounters:   02/23/18 45.5 kg (100 lb 6 oz)   02/12/18 45.5 kg (100 lb 4.8 oz)   02/05/18 47.3 kg (104 lb 4.8 oz)              We Performed the Following     N Terminal Pro Bnp Outpatient          Today's Medication Changes          These changes are accurate as of 2/23/18  5:56 PM.  If you have any questions, ask your nurse or doctor.               Start taking these medicines.        Dose/Directions    nystatin 895569 UNIT/ML suspension   Commonly known as:  MYCOSTATIN   Used for:  Thrush   Started by:  José Antonio Ann MD        Dose:  409909 Units   Take 5 mLs (500,000 Units) by mouth 4 times daily   Quantity:  200 mL   Refills:  0       oseltamivir 75 MG capsule   Commonly known as:  TAMIFLU   Used for:  Influenza B   Started by:  José Antonio Ann MD        Dose:  75 mg   Take 1 capsule (75 mg) by mouth 2 times daily   Quantity:  10 capsule   Refills:  0         These medicines have changed or have updated prescriptions.        Dose/Directions    traMADol 50 MG tablet   Commonly known as:  ULTRAM   This may have changed:    - how much to take  - when to take this  - reasons to take this   Changed by:  José Antonio Ann MD        Dose:   mg   Take 1-2 tablets ( mg) by mouth every 4 hours as needed for breakthrough pain (No more than 8 tablets in a day)   Quantity:  150 tablet   Refills:  1            Where to get your medicines      These medications were sent to [id number] -  [place], [place] - [address]  [address], [place] [address]     Phone:  [phone]     nystatin 351788 UNIT/ML suspension    oseltamivir 75 MG capsule         Some of these will need a paper prescription and others can be bought over the counter.  Ask your nurse if you have questions.     Bring a paper prescription for each of these medications     traMADol 50 MG tablet                Primary Care Provider Office Phone # Fax #    Neri Gipson [phone] [address] [address]        Equal Access to Services     [facility] : Hadii mary simms hadasho Sothom, waaxda luqadaha, qaybta kaalmada adetobiyada, lisa michael. So [facility] [phone].    ATENCIÓN: Si habla español, tiene a nava disposición servicios gratuitos de asistencia lingüística. [facility] [phone].    We comply with applicable federal civil rights laws and [place] laws. We do not discriminate on the basis of race, color, national origin, age, disability, sex, sexual orientation, or gender identity.            Thank you!     Thank you for choosing [facility]  for your care. Our goal is always to provide you with excellent care. Hearing back from our patients is one way we can continue to improve our services. Please take a few minutes to complete the written survey that you may receive in the mail after your visit with us. Thank you!             Your Updated Medication List - Protect others around you: Learn how to safely use, store and throw away your medicines at www.disposemymeds.org.          This list is accurate as of 2/23/18  5:56 PM.  Always use your most recent med list.                   Brand Name Dispense Instructions for use Diagnosis    albuterol 108 (90 BASE) MCG/ACT Inhaler    PROAIR HFA/PROVENTIL HFA/VENTOLIN HFA    1 Inhaler    Inhale 2 puffs into the lungs 4 times daily    Asthma        amylase-lipase-protease 10685 UNITS Cpep    CREON    180 capsule    Take 2 capsules (72,000 Units) by mouth 3 times daily (with meals)    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)       CALCIUM PO      Take by mouth every morning Form/strength unknown. Powder formulation. Add to water and fruits/vegetables daily to promote bone health.        camphor-menthol 0.5-0.5 % Lotn    DERMASARRA    59 mL    Apply 1 mL topically every 6 hours as needed for skin care        CARTIA  MG 24 hr capsule   Generic drug:  diltiazem     90 capsule    TK 1 C PO QD    Benign essential hypertension       cholecalciferol 1000 UNIT tablet    vitamin D3     Take 1 tablet by mouth 2 times daily        cyanocolbalamin 500 MCG tablet    vitamin  B-12     Take 500 mcg by mouth every morning        denosumab 60 MG/ML Soln injection    PROLIA    1 mL    Inject 1 mL (60 mg) Subcutaneous every 6 months Inject subcutaneously in upper arm, upper thigh or abdomen    Senile osteoporosis       enoxaparin 60 MG/0.6ML injection    LOVENOX      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)       fluticasone-vilanterol 100-25 MCG/INH oral inhaler    BREO ELLIPTA    1 Inhaler    Inhale 1 puff into the lungs daily    Cough       folic acid 400 MCG tablet    FOLVITE     Take 1 tablet by mouth every morning        hydrOXYzine 25 MG tablet    ATARAX    90 tablet    Take 1-2 tablets (25-50 mg) by mouth every 6 hours as needed for itching (adjuvant pain)        levofloxacin 750 MG tablet    LEVAQUIN     TAKE ONE TABLET PO TODAY ON 1/17/18        levothyroxine 125 MCG tablet    SYNTHROID/LEVOTHROID          LORazepam 0.5 MG tablet    ATIVAN    30 tablet    Take 1 tablet (0.5 mg) by mouth every 4 hours as needed (Anxiety, Nausea/Vomiting or Sleep)    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)       nystatin 380905 UNIT/ML suspension    MYCOSTATIN    200 mL    Take 5 mLs (500,000 Units) by mouth 4 times daily    Thrush       omeprazole 40 MG capsule    priLOSEC     180 capsule    Take 1 capsule (40 mg) by mouth 2 times daily Take 30-60 minutes before a meal.    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       oseltamivir 75 MG capsule    TAMIFLU    10 capsule    Take 1 capsule (75 mg) by mouth 2 times daily    Influenza B       potassium 99 MG Tabs      Take 1 tablet by mouth 2 times daily        predniSONE 10 MG tablet    DELTASONE          * prochlorperazine 10 MG tablet    COMPAZINE    180 tablet    Take 0.5 tablets (5 mg) by mouth every 6 hours as needed for nausea or vomiting    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)       * prochlorperazine 5 MG tablet    COMPAZINE          psyllium Packet    METAMUCIL/KONSYL     Take 1 packet by mouth daily as needed for constipation        * timolol maleate 0.5 % opthalmic solution    ISTALOL     Place 1 drop into both eyes every morning Before placing contact lenses        * timolol 0.5 % ophthalmic solution    TIMOPTIC     INT 1 GTT OU IN THE MORNING        traMADol 50 MG tablet    ULTRAM    150 tablet    Take 1-2 tablets ( mg) by mouth every 4 hours as needed for breakthrough pain (No more than 8 tablets in a day)        * triamterene-hydrochlorothiazide 37.5-25 MG per tablet    MAXZIDE-25     Take 1 tablet by mouth every morning        * triamterene-hydrochlorothiazide 37.5-25 MG per capsule    DYAZIDE          TYLENOL PO      Take 325 mg by mouth every 4 hours as needed for mild pain or fever        zinc 50 MG Tabs      Take 1 tablet by mouth every morning        * Notice:  This list has 6 medication(s) that are the same as other medications prescribed for you. Read the directions carefully, and ask your doctor or other care provider to  review them with you.

## 2018-02-24 NOTE — PROGRESS NOTES
"[facility], [place] -- Hematology / Oncology Progress Note  Date of Service (when I saw the patient): -- 02/24/2018     Assessment & Plan   78 year old female with metastatic pancreatic cancer on gemcitabine, abraxane presenting with slowly progressive SOB over 1 month, however found to be influenza positive 2/23 without associated typical symptoms. Chest CT shows GGOs beyond what would be expected from influenza alone, raising concern for gemcitabine-pneumonitis, atypical pneumonia, or lymphangitic spread.       # Dyspnea-   # New GGOs on chest CT   # Influenza B +  # OSH diagnosis of asthma  SOB ongoing for 1 month but sharply worse in week preceding admission. Was admitted to hospital in [place] last month and was treated for asthma attack. Unclear if she received any antibiotics of steroids at that time. Positive influenza B swab suggests flu, but could be residual shedding from \"asthma exacerbation\" in [place] last month, rather than active  of current SOB.  Chest CT with diffuse GGOs, concerning for superimposed process; favor gemcitabine pneumonitis.   --Continue ceftriaxone, azithromycin for possible superimposed pneumonia; but with with no fevers on admission and negative procalcitonin, pneumonia less likely.  --F/U RVP  --Continue Tamiflu  --Dexamethasone started today for [place]-pneumonitis; will follow response  --Pulm consulted placed, appreciate reccs    --Duonebs Q3H PRN, scheduled Breo-ellipta, PTA prednisone 10 mg QD continued    # Hypotension, resolved  -Likely 2/2 infection and poor oral intake at home. IVF resuscitated on admission    # Metastatic pancreatic cancer  -Currently on treatment with gemcitabine/abraxane. Has now completed 3 cycles (last dose given 2/12/18). She has tolerated treatment well so far with mild nausea, neuropathy, and constipation.   -She does have follow-up with [facility] on 3/26 for her next cycle of chemo  -Continue Tramadol as needed for abdominal " pain. Currently using on average 1 per day as needed. She will need a refill likely at discharge      # FEN  -Historically has been struggling with anorexia. Labs today reveal worsening creat and hyponatremia, likely from poor oral intake  -Her weight today is lower to 100lbs. Will see if she can eat better after this acute illness, may need to have her see nutrition for ongoing support    -Start Nystatin for thrush. Continue salt and soda swishes and start Biotene for dry mouth.       # Pulmonary embolism  -She does have a history of bilateral PE diagnosed December 2017 currently on Lovenox. She has not missed any doses and is not tachycardic, so less concern for recurrent PE. Adjust dose lower to 50mg BID.  -Will need to continue Lovenox BID inpatient. Due for her next dose at 8pm this evening  -Of note, her hgb and plt are stable today.      Port site swelling - present since placement. I think it is superficial due to skinny habitus, will review CT to see if any fluid collection/hematoma etc. If ok, then may access.    Wan Soria  Hematology/Oncology fellow  p [phone]  February 24, 2018    Interval History   Stable overnight, but still on 5 L to maintain O2 sats in mid-90s. No CP/pressure, calf tenderness, N/V, myalgias, or other symptoms at this time.      Physical Exam   Vitals:    02/24/18 0818   Weight: 44.7 kg (98 lb 9.6 oz)     Vital Signs with Ranges  Temp:  [97.5  F (36.4  C)-99.9  F (37.7  C)] 98.1  F (36.7  C)  Pulse:  [77] 77  Heart Rate:  [68-95] 89  Resp:  [18-28] 20  BP: ()/(44-69) 101/53  FiO2 (%):  [0 %] 0 %  SpO2:  [72 %-99 %] 98 %  I/O last 3 completed shifts:  In: 1350 [P.O.:100; I.V.:250; IV Piggyback:1000]  Out: 300 [Urine:300]    Constitutional: Awake, alert, cooperative, in NAD.  Eyes: PERRL, EOMI, sclera clear, conjunctiva normal.  ENT: Normocephalic, without obvious abnormality, moist mucus membranes, no lesions or thrush.   Respiratory: Non-labored breathing, good air exchange,  CTAB, basilar crackles; on 5 L.    Cardiovascular: RRR, no murmur noted.  GI: +BS, soft, non-distended, non-tender, no masses palpated, no hepatosplenomegaly.  Skin: No concerning lesions or rash on exposed areas.  Musculoskeletal: No edema trini LEs  Neurologic: Awake, A&O x 3. Cranial nerves II-XII are grossly intact.   Neuropsychiatric: Calm, normal affect     MEDS  Medications     - MEDICATION INSTRUCTIONS -         amylase-lipase-protease  2 capsule Oral TID w/meals     diltiazem  120 mg Oral Daily     enoxaparin  50 mg Subcutaneous BID     fluticasone-vilanterol  1 puff Inhalation Daily     folic acid  400 mcg Oral QAM     levothyroxine  125 mcg Oral Daily     nystatin  500,000 Units Oral 4x Daily     omeprazole  40 mg Oral BID     oseltamivir  30 mg Oral BID     predniSONE  10 mg Oral Daily     heparin lock flush  5-10 mL Intracatheter Q24H     heparin  5 mL Intracatheter Q28 Days     cefTRIAXone  2 g Intravenous Q24H     azithromycin  250 mg Intravenous Q24H       LABS  Recent Labs   Lab Test  02/24/18   0641  02/23/18   1040  02/12/18   1316  02/05/18   0908  01/29/18   1008  01/22/18   0805   WBC  14.0*  14.0*  4.7  10.6  22.3*  13.7*   NEUTROPHIL  61.0   --   69.7  78.8  72.3  69.0   HGB  8.7*  9.5*  9.3*  9.4*  11.2*  10.1*   PLT  426  377  146*  179  439  585*     Recent Labs   Lab Test  02/24/18   0641  02/23/18   1040  01/29/18   1008  01/22/18   0805  01/10/18   0930   NA  134  132*  133  136  136   POTASSIUM  3.3*  3.5  4.2  3.3*  3.5   CHLORIDE  103  97  99  102  98   CO2  22  27  28  27  26   ANIONGAP  8  9  6  7  12   BUN  14  19  14  16  14   CR  0.57  0.74  0.56  0.65  0.57   EMMY  7.9*  9.0  8.4*  8.1*  8.4*     Recent Labs   Lab Test  02/23/18   2056  11/15/17   1621  11/02/17   1337  11/01/17   0727  08/08/17   1143   MAG   --   1.9   --   1.8  2.0   PHOS   --    --   3.0  2.3*  2.8   LDH  637*   --    --    --    --      Recent Labs   Lab Test  02/23/18 2056 01/29/18   1008  01/22/18   0805   01/10/18   0930   BILITOTAL   --   0.6  0.5  0.5   ALKPHOS   --   92  87  134   ALT   --   22  32  106*   AST   --   18  20  88*   ALBUMIN   --   3.1*  3.1*  3.1*   LDH  637*   --    --    --        All laboratory data reviewed     CULTURES    Recent Labs   Lab Test  02/24/18   0641  02/23/18   2143  02/17/14   0930  12/08/13   1028   CULT  No growth after 2 hours  No growth after 11 hours  No growth  >100,000 colonies/mL Escherichia coli*   SDES  Blood Left Hand  Blood Right Arm  Unspecified Urine  Midstream Urine       IMAGING  Recent Results (from the past 24 hour(s))   CT Chest w/o Contrast    Narrative    EXAMINATION: Chest CT  2/23/2018     CLINICAL HISTORY: Bilateral infiltration, hypoxia. History of  pancreatic cancer.    COMPARISON: CT cap dated 1/24/2018.    TECHNIQUE: CT imaging obtained through the chest without intravenous  contrast. Coronal and axial MIP reformatted images obtained.  Intravenous sedation.  FINDINGS:  Right Port-A-Cath with the tip presents at SVC. Central  tracheobronchial tree is patent. There is new central predominant left  greater than right groundglass opacities. Bibasilar subsegmental  atelectasis. No pneumothorax. Trace pleural effusion bilaterally.    Heart size is within normal limits. Atherosclerotic calcifications  along the aortic arch There is no pericardial effusion.  Multiple  mediastinal lymph nodes, largest one measures 1.6 cm at precarinal  region. No significant axillary tendinopathy.     Bones and soft tissues: Severe degenerative changes superimposed on S  shaped spine. No suspicious bone lesions. The previously demonstrated  right breast prosthesis is not visualized on today's exam.    Partially imaged upper abdomen: No significant change in very stent  and pneumobilia.      Impression    IMPRESSION:   New central predominant groundglass left greater than right opacities,  differential includes infection, pulmonary edema hemorrhage or drug  induced pneumonitis.  Trace bilateral pleural effusions.    I have personally reviewed the examination and initial interpretation  and I agree with the findings.    DOROTHY STRONG MD

## 2018-02-24 NOTE — PLAN OF CARE
Problem: Patient Care Overview  Goal: Plan of Care/Patient Progress Review  Outcome: No Change  [id number]: Tmax 98.8. OVSS. Sats around 95% on 5 LPM. Denies pain, N/V. Up to bathroom independently. Fair appetite. Echo done, EF 60-65%. Awaiting sputum culture. Received potassium today, recheck was , recheck in AM. Started on PO decadron, received one time IV dose. Continue with POC.

## 2018-02-24 NOTE — PLAN OF CARE
Problem: Patient Care Overview  Goal: Plan of Care/Patient Progress Review    Nursing Focus: Admission  D: Arrived at 7D from Clinic via stretcher. Patient accompanied by EMTs. Admitted for hypoxia and influenza B. Complains of shortness of breath.      I: Admission process began.  Patient oriented to room, enviroment, call light.  Md. notified of patients arrival on unit.     A: Vital signs stable, afebrile.  Patient stable at this time.  Complaining of shortness of breath. Pt currently saturating 98% on 6L.     P: Implement plan of care when available. Continue to monitor patient. Nursing interventions as appropriate. Notify md with changes in pt status.

## 2018-02-24 NOTE — H&P
[facility] History and Physical    Iris Lewis MRN# [id number]   Age: 78 year old YOB: 1940     Date of Admission:  2/23/2018    Home clinic:   Primary care provider: Neri Gipson          Assessment and Plan:   78 year old female with metastatic pancreatic cancer on gemcitabine, abraxane presenting with slowly progressive SOB over 1 month, however found to be influenza positive today without associated typical symptoms.     SOB - ongoing for 1 month. Was admitted to hospital in [place] last month and was treated for asthma attack. Unclear if she received any antibiotics of steroids at that time.   Clearly has increased oxygen needs and infiltrates on CXR.  All this could be influenza, but would like to make sure it is not other infectious process, pulm edema, PJP pneumonia, lymphangitic spread etc. Hence will evaluated with CT chest.  Continue tamiflu.  Add ceftriaxone, zithromax for possible superimposed pneumonia, although she has no fevers, cough. Add RVP to confirm.  Procalcitonin, LDH, fungitell. Depending on CT, may need to consider pulmonary consult for bronchoscopy.   Exam not consisnted with fluid overload situation, but will check Echo in am.   She is already on therapeutic lovenox for h/o PE. Dose is high at 60mg BID, she is 45kg. Will lower to 50mg bid.   No wheezing to support reactive airway condition.   She was also recently started on home oxygen while in [place].     2. Hypotension   -Likely 2/2 infection and poor oral intake at home. Will give 1L IVF slowly over 2 hours (given questionable pulmonary edema on CXR) and monitor BP  -BP improved after fluids to 110/64.   -monitor. Encourage good oral intake as able      3. Metastatic pancreatic cancer  -Currently on treatment with gemcitabine/abraxane. Has now completed 3 cycles (last dose given 2/12/18). She has tolerated treatment well so far with mild nausea, neuropathy, and constipation. No current concerns with this  -She  does have follow-up with [facility] on Monday for her next cycle of chemo  -Continue Tramadol as needed for abdominal pain. Currently using on average 1 per day as needed. She will need a refill likely at discharge     4. FEN  -Historically has been struggling with anorexia. Labs today reveal worsening creat and hyponatremia, likely from poor oral intake  -Her weight today is lower to 100lbs. Will see if she can eat better after this acute illness, may need to have her see nutrition for ongoing support    -Start Nystatin for thrush. Continue salt and soda swishes and start Biotene for dry mouth.      5. Pulmonary embolism  -She does have a history of bilateral PE diagnosed December 2017 currently on Lovenox. She has not missed any doses and is not tachycardic, so less concern for recurrent PE. Adjust dose lower to 50mg BID.  -Will need to continue Lovenox BID inpatient. Due for her next dose at 8pm this evening  -Of note, her hgb and plt are stable today.     Port site swelling - present since placement. I think it is superficial due to skinny habitus, will review CT to see if any fluid collection/hematoma etc. If ok, then may access.    Full code.         Chief Complaint:   SOB    History is obtained from the patient and electronic health record    Per clinic note today, reviewed and agree-I was asked to see her today after she presented with acute hypoxia and hypotension to her palliative care appointment and then tested positive for influenza B. Her PMH is significant for asthma with intermittent hypoxia, previously requiring O2, however she has NOT required any O2 since at least 1/22/18 per discussion with the patient and review of the chart.     Interval History:  Mrs. Cole was seen today with her . She states that the last three days she has felt terrible. She had a productive cough and dyspnea on exertion, noting she can only walk a few steps without feeling short of breath. She has home O2 which she  has not needed the last month, but has put it on at home the past few days due to feeling poorly. She denies shortness of breath at rest, however when she arrived to her palliative care appointment this morning her O2 was 80% on RA. She also admits that she cannot talk more than a few sentences without feeling the need to cough. She also admits to sleeping with pillows propped up due to worsening SOB lying flat. She also feels extremely weak, admitting to need to hold on to furniture when she walking out of fear of falling. She has not had any fevers to her knowledge and denies body aches. She does have abdominal pain from her cancer for which she takes tramadol and denies any new pain. She does state this is somewhat similar to how she has felt with asthma exacerbations in the past. She hasn't been able to use her albuterol inhalers the last few days due to not being able to take in a deep breath without coughing.         She admits to not eating or drinking as well the past few days due to feeling poorly, though she has been struggling with anorexia and weight . She denies bowel changes or urinary concerns. She denies ill contacts and states she has not really left the house. She does state that her mouth is extremely dry and her taste buds are off, which makes it really difficult to eat. She is doing salt and soda swishes and was just diagnosed with thrush. She does have bilateral peripheral neuropathy which has been worse since starting chemo involving her bilateral fingertips. She denies rashes, peripheral edema, or bleeding issues. She continues on Lovenox with no issues.         Cancer Treatment History:   Per onc note, reviewed and agree-Iris Lewis is a 77 year old female with a previous medical history of thyroid cancer s/p thyroidectomy in 1980s, cholelithiasis s/p CCY 2015, GERD s/p Nissen fundoplication 2006 and hypertension, who presented with stomach pain, dark urine, constipation with pale  "floating stools and jaundice. She was admitted on 10/31/17 and a CT scan showed a 3.6 cm pancreatic mass concerning for malignancy with local invasion into the duodenum, 3 small liver lesions, small pulmonary nodulates on lung bases and enlarged peripancreatic and retroperitoneal lymph nodes. An upper endoscopic ultrasound with fine needle biopsy and ERCP with sphincterotomy and stent placement was thereafter preformed.       She met with Dr. Yu to discuss halozyme study, however she does not have sufficient HA expression to qualify.  It was discussed that she would start with Gemzar/Abraxane treatment.  Her first treatment was on 11/28/17. Restaging imaging 1/24/18 revealed disease response to treatment. She has now received 3 cycles of [place]/Abraxane with plan to complete a 4th cycle and then restage.\"         Past Medical History:     Past Medical History:   Diagnosis Date     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 12/18/2017     Arthritis      Colon polyp 2009,2015    no polyps - f/u in 5 yrs      Esophageal reflux      Glaucoma      Hypertension      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) 11/6/2017     Osteoporosis      Postsurgical hypothyroidism      Scoliosis (and kyphoscoliosis), idiopathic      Thyroid cancer (H)     papillary carcinoma age 33     Uncomplicated asthma             Past Surgical History:      Past Surgical History:   Procedure Laterality Date     ARTHRODESIS FOOT Right 11/9/2016    Procedure: ARTHRODESIS FOOT;  Surgeon: Janes Mcelroy MD;  Location: [facility] OR     C STOMACH SURGERY PROCEDURE UNLISTED       COLONOSCOPY   2009, 3/2015    no polyps in 2015 told to return 10 yrs.      ENDOSCOPIC RETROGRADE CHOLANGIOPANCREATOGRAM N/A 11/2/2017    Procedure: COMBINED ENDOSCOPIC RETROGRADE CHOLANGIOPANCREATOGRAPHY, PLACE TUBE/STENT;  Endoscopic Retrograde Cholangiopancreatogram with billary sphincterotomy and billary stent placement;  Surgeon: Rito Mcneil MD;  Location: [facility] OR     " ENDOSCOPIC RETROGRADE CHOLANGIOPANCREATOGRAM N/A 1/10/2018    Procedure: ENDOSCOPIC RETROGRADE CHOLANGIOPANCREATOGRAM;  Endoscopic Retrograde Cholangiopancreatogram ;  Surgeon: Felix Izaguirre MD;  Location: [facility] OR     ENDOSCOPIC RETROGRADE CHOLANGIOPANCREATOGRAPHY, EXCHANGE TUBE/STENT N/A 11/22/2017    Procedure: ENDOSCOPIC RETROGRADE CHOLANGIOPANCREATOGRAPHY, EXCHANGE TUBE/STENT;  Endoscopic Retrograde Cholangiopancreatogram with Biliary Stent Exchange and pancreatic stent placement;  Surgeon: Felix Izaguirre MD;  Location: [facility] OR     ESOPHAGOSCOPY, GASTROSCOPY, DUODENOSCOPY (EGD), COMBINED  2/17/2012    Procedure:COMBINED ESOPHAGOSCOPY, GASTROSCOPY, DUODENOSCOPY (EGD); COMBINED ESOPHAGOSCOPY, GASTROSCOPY, DUODENOSCOPY POSSIBLE DILATION; Surgeon:NICHOLE MCCLAIN; Location:[facility]     ESOPHAGOSCOPY, GASTROSCOPY, DUODENOSCOPY (EGD), COMBINED N/A 11/2/2017    Procedure: COMBINED ENDOSCOPIC ULTRASOUND, ESOPHAGOSCOPY, GASTROSCOPY, DUODENOSCOPY (EGD), FINE NEEDLE ASPIRATE/BIOPSY;  Upper Endoscopic Ultrasound with fine needle biopsy, upper endoscopy with biopsies, Endoscopic Retrograde Cholangiopancreatogram with billary sphincterotomy and billary stent placement;  Surgeon: Hadley Bailey MD;  Location: [facility] OR     FOOT SURGERY  2008    hammertoe left     INSERT PORT VASCULAR ACCESS Right 1/24/2018    Procedure: INSERT PORT VASCULAR ACCESS;  Chest Port Placement;  Surgeon: Ventura Villarreal PA-C;  [facility]     LAPAROSCOPIC CHOLECYSTECTOMY N/A 1/5/2015    Procedure: LAPAROSCOPIC CHOLECYSTECTOMY;  Surgeon: Cruz Pearson MD;  Location: [facility]     NISSEN FUNDOPLICATION       ORTHOPEDIC SURGERY  2009    left hammertoe      REPAIR HAMMER TOE Right 11/9/2016    Procedure: REPAIR HAMMER TOE;  Surgeon: Janes Mcelroy MD;  Location: [facility]     SALPINGO OOPHORECTOMY,R/L/SERENA  1974    left, for tubal pregnancy     THYROIDECTOMY      for thyroid cancer            Social History:     Social  History   Substance Use Topics     Smoking status: Former Smoker     Years: 5.00     Types: Cigarettes     Start date: 9/15/1959     Quit date: 1/28/1983     Smokeless tobacco: Never Used     Alcohol use Yes      Comment: wine 1-2 glasses/day            Family History:     Family History   Problem Relation Age of Onset     C.A.D. Father      MI at age 65     Asthma Father      Coronary Artery Disease Father      Alzheimer Disease Mother      OSTEOPOROSIS Mother      Depression Sister 80     Asthma Sister      Prostate Cancer Brother      Depression Son      Depression Sister      Coronary Artery Disease Brother 67     Skin Cancer No family hx of      no skin cancer     Family history          Immunizations:     Immunization History   Administered Date(s) Administered     Influenza (High Dose) 3 valent vaccine 11/18/2015, 10/24/2016, 10/11/2017     Influenza (IIV3) PF 10/23/2006, 10/22/2007, 09/23/2009, 10/21/2010, 09/28/2011, 11/05/2012, 10/16/2014     Pneumo Conj 13-V (2010&after) 03/02/2015     Pneumococcal 23 valent 09/23/2009     TD (ADULT, 7+) 09/23/2009     TDAP Vaccine (Adacel) 09/23/2009     Zoster vaccine, live 01/28/2013            Allergies:     Allergies   Allergen Reactions     Nkda [No Known Drug Allergies]             Medications:       Current Facility-Administered Medications on File Prior to Encounter:  [COMPLETED] 0.9% sodium chloride BOLUS   [DISCONTINUED] albuterol neb solution 2.5 mg     Current Outpatient Prescriptions on File Prior to Encounter:  traMADol (ULTRAM) 50 MG tablet Take 1-2 tablets ( mg) by mouth every 4 hours as needed for breakthrough pain (No more than 8 tablets in a day)   CARTIA  MG 24 hr capsule TK 1 C PO QD   albuterol (PROAIR HFA/PROVENTIL HFA/VENTOLIN HFA) 108 (90 BASE) MCG/ACT Inhaler Inhale 2 puffs into the lungs 4 times daily   fluticasone-vilanterol (BREO ELLIPTA) 100-25 MCG/INH oral inhaler Inhale 1 puff into the lungs daily   predniSONE (DELTASONE) 10 MG  tablet    levothyroxine (SYNTHROID/LEVOTHROID) 125 MCG tablet    triamterene-hydrochlorothiazide (DYAZIDE) 37.5-25 MG per capsule    LORazepam (ATIVAN) 0.5 MG tablet Take 1 tablet (0.5 mg) by mouth every 4 hours as needed (Anxiety, Nausea/Vomiting or Sleep)   prochlorperazine (COMPAZINE) 10 MG tablet Take 0.5 tablets (5 mg) by mouth every 6 hours as needed for nausea or vomiting   enoxaparin (LOVENOX) 60 MG/0.6ML injection    ondansetron (ZOFRAN) 4 MG tablet Take 1 tablet (4 mg) by mouth every 6 hours as needed for nausea   omeprazole (PRILOSEC) 40 MG capsule Take 1 capsule (40 mg) by mouth 2 times daily Take 30-60 minutes before a meal.   Acetaminophen (TYLENOL PO) Take 325 mg by mouth every 4 hours as needed for mild pain or fever   amylase-lipase-protease (CREON) 51601 UNITS CPEP Take 2 capsules (72,000 Units) by mouth 3 times daily (with meals)   CALCIUM PO Take by mouth every morning Form/strength unknown. Powder formulation. Add to water and fruits/vegetables daily to promote bone health.    triamterene-hydrochlorothiazide (MAXZIDE-25) 37.5-25 MG per tablet Take 1 tablet by mouth every morning    potassium 99 MG TABS Take 1 tablet by mouth 2 times daily    cyanocolbalamin (VITAMIN B-12) 500 MCG tablet Take 500 mcg by mouth every morning    folic acid (FOLVITE) 400 MCG tablet Take 1 tablet by mouth every morning    cholecalciferol (VITAMIN D) 1000 UNIT tablet Take 1 tablet by mouth 2 times daily    nystatin (MYCOSTATIN) 913539 UNIT/ML suspension Take 5 mLs (500,000 Units) by mouth 4 times daily   oseltamivir (TAMIFLU) 75 MG capsule Take 1 capsule (75 mg) by mouth 2 times daily   prochlorperazine (COMPAZINE) 5 MG tablet    levofloxacin (LEVAQUIN) 750 MG tablet TAKE ONE TABLET PO TODAY ON 1/17/18   timolol (TIMOPTIC) 0.5 % ophthalmic solution INT 1 GTT OU IN THE MORNING   order for DME 1unit being ordered: cranial prosthesis   psyllium (METAMUCIL/KONSYL) Packet Take 1 packet by mouth daily as needed for  constipation   hydrOXYzine (ATARAX) 25 MG tablet Take 1-2 tablets (25-50 mg) by mouth every 6 hours as needed for itching (adjuvant pain)   camphor-menthol (DERMASARRA) 0.5-0.5 % LOTN Apply 1 mL topically every 6 hours as needed for skin care   timolol maleate (ISTALOL) 0.5 % opthalmic solution Place 1 drop into both eyes every morning Before placing contact lenses   zinc 50 MG TABS Take 1 tablet by mouth every morning    denosumab (PROLIA) 60 MG/ML SOLN injection Inject 1 mL (60 mg) Subcutaneous every 6 months Inject subcutaneously in upper arm, upper thigh or abdomen (Patient not taking: Reported on 2/23/2018)              Review of Systems:   The Review of Systems is negative other than noted in the HPI         Physical Exam:   Temp: 97.5  F (36.4  C) Temp src: Oral BP: 109/50 Pulse: 77   Resp: 18 SpO2: 97 % O2 Device: Nasal cannula Oxygen Delivery: 6 LPM  Constitutional: Awake, alert, cooperative, no apparent distress, and appears stated age.  Eyes: Lids and lashes normal, pupils equal, round and reactive to light, extra ocular muscles intact, sclera clear, conjunctiva normal.  ENT: Normocephalic, without obvious abnormality, atraumatic, oral thrush+  Neck: Supple, symmetrical, trachea midline, no adenopathy.  Lungs: No increased work of breathing, good air exchange, clear to auscultation bilaterally, occasional crackles, no wheezing.   Cardiovascular: Regular rate and rhythm, normal S1 and S2, no S3 or S4, and no murmur noted.  Chest / Breast: Port a cath in situ.  Abdomen: normal bowel sounds, soft, non-distended, non-tender, no masses palpated, no hepatosplenomegally.  Genitourinary: deferred.  Musculoskeletal: No redness, warmth, or swelling of the joints. No edema.   Neurologic: Awake, alert, oriented to name, place and time.  No gross focal deficits.   Skin: No rashes, erythema, pallor, petechia or purpura.         Data:     Lab Results   Component Value Date    WBC 14.0 (H) 02/23/2018    WBC 4.7  02/12/2018    WBC 10.6 02/05/2018    HGB 9.5 (L) 02/23/2018    HGB 9.3 (L) 02/12/2018    HGB 9.4 (L) 02/05/2018    HCT 29.1 (L) 02/23/2018    HCT 27.6 (L) 02/12/2018    HCT 28.7 (L) 02/05/2018     02/23/2018     (L) 02/12/2018     02/05/2018     (L) 02/23/2018     01/29/2018     01/22/2018    POTASSIUM 3.5 02/23/2018    POTASSIUM 4.2 01/29/2018    POTASSIUM 3.3 (L) 01/22/2018    CHLORIDE 97 02/23/2018    CHLORIDE 99 01/29/2018    CHLORIDE 102 01/22/2018    CO2 27 02/23/2018    CO2 28 01/29/2018    CO2 27 01/22/2018    BUN 19 02/23/2018    BUN 14 01/29/2018    BUN 16 01/22/2018    CR 0.74 02/23/2018    CR 0.56 01/29/2018    CR 0.65 01/22/2018    GLC 99 02/23/2018    GLC 73 01/29/2018    GLC 95 01/22/2018    SED 8 10/22/2007    NTBNP 447 02/23/2018    NTBNP 589 (H) 02/23/2018    TROPI  08/08/2017     <0.015  The 99th percentile for upper reference range is 0.045 ug/L.  Troponin values in   the range of 0.045 - 0.120 ug/L may be associated with risks of adverse   clinical events.      AST 18 01/29/2018    AST 20 01/22/2018    AST 88 (H) 01/10/2018    ALT 22 01/29/2018    ALT 32 01/22/2018     (H) 01/10/2018    ALKPHOS 92 01/29/2018    ALKPHOS 87 01/22/2018    ALKPHOS 134 01/10/2018    BILITOTAL 0.6 01/29/2018    BILITOTAL 0.5 01/22/2018    BILITOTAL 0.5 01/10/2018    KITA 12 10/31/2017    INR 0.96 01/24/2018    INR 0.98 11/15/2017    INR 0.93 11/02/2017     -  -     Attestation:  -    Christofer Bolanos MD

## 2018-02-24 NOTE — PLAN OF CARE
Problem: Patient Care Overview  Goal: Plan of Care/Patient Progress Review  Outcome: No Change    Influenza B positive; on tamiflu. Tmax 99.9. Alternating between oxymask and NC 5-6 L. Desats quickly to low 70s% on room air. Poor activity tolerance. Baseline soft BPs and lactic acid 2.5; finished 1L NS bolus from evening shift. Chest CT complete; infection vs. Pulmonary edema vs. Pneumonitis. Blood cultures done. On azithromycin and ceftriaxone. Infrequent productive cough; sputum cup at bedside to collect. RVP panel sent. Neb given per pt request for SOB x2. Port site swollen; possible hematoma (placed 3 weeks ago). Per Dr. Bolanos, may possibly access today if team gives ok. Tramadol given for abdominal pain. Up with SBA; calling appropriately. Plan for echo this morning. Continue with plan of care.

## 2018-02-24 NOTE — PLAN OF CARE
Problem: Patient Care Overview  Goal: Plan of Care/Patient Progress Review  Outcome: No Change  [id number]: Tmax 99.5. RR 28, O2 sats greater than 92% on 5L nasal cannula. BPs soft - 90/44. Lactic triggered, came back at 2.5. Moonlighter notified. 1L fluid bolus ordered. Denies pain/nausea. CT of chest completed, blood cultures drawn. Needs abx administered and new RSV swab. Continue to monitor.    Problem: Infection, Risk/Actual (Adult)  Goal: Identify Related Risk Factors and Signs and Symptoms  Related risk factors and signs and symptoms are identified upon initiation of Human Response Clinical Practice Guideline (CPG).   Outcome: No Change   02/24/18 0004   Infection, Risk/Actual   Related Risk Factors (Infection, Risk/Actual) chronic illness/condition   Signs and Symptoms (Infection, Risk/Actual) body temperature changes;feeding/eating intolerance;respiratory rate increase

## 2018-02-24 NOTE — CONSULTS
[facility]  Pulmonary Consult     Patient:  Iris Lewis, Date of birth 1940, Medical record number [id number]  Date of Visit:  02/24/2018    Reason for Consult: Pulmonary Infiltrates         Assessment and Recommendations:     Ms. Lewis is a 79 yo female with history of asthma, thyroid cancer s/p thyroidectomy, HTN, GERD s/p nissen fundoplication, and metastatic pancreatic cancer on gemcitabine and abraxane who presented with shortness of breath for one month and found to be influenza positive. Pulmonary is being consulted for infiltrates on CT Chest.    In comparison to CT Chest obtained on 1/24/18, CT Chest obtained on admission shows new bilateral diffuse GGO, left>right.  Patient was found to be influenza positive on admission, which could certainly explain the diffuse bilateral infiltrates. On the differential is pulmonary edema (seems less likely based on exam), bacterial infection, and drug toxicity.  There have been reports of both gemcitabine and Abraxane leading to lung injury, but these would be a diagnosis of exclusion.    Overall, patient is feeling better respiratory wise than when she first presented.    --Agree with treatment for influenza with Tamiflu  --Agree with empiric treatment for community acquired pneumonia   --No need for bronchoscopy at this point, given that patient is feeling better and a causative organism has been found, can reconsider if patient fails to improve further.    Thank you very much for this consultation. Pulmonary will continue to follow.    Patient staffed with Dr. Brooke.    Tasneem Segundo  Pulmonary and Critical Care Fellow  [phone]        History of Present Illness     Ms. Lewis is a 79 yo female with history of asthma, thyroid cancer s/p thyroidectomy, HTN, GERD s/p nissen fundoplication, and metastatic pancreatic cancer on gemcitabine and abraxane who presented with shortness of breath for one month and found to be influenza  positive. Pulmonary is being consulted for infiltrates on CT Chest.    Patient was seen in clinic on 2/23 and states that she has had a nonproductive cough and dyspnea on exertion, only able to walk a few steps.  Denies any fevers or chills.  Has been using her home O2 at home (which she hadn't used in the prior month). Found to have O2 sat of 80% on RA and felt short of breath with speaking a few sentences. Feeling weak and sleeping on more pillows. Denies any fevers and has had poor appetite. Patient was previously hospitalized in [place] in January while visiting her daughter, diagnosed with asthma exacerbation, and was discharged home with oxygen.     In regards to her cancer history, patient received first cycle of gemcitabine/paclitaxel on 11/28/17. Has completed 3 cycles with plans on completing a 4th cycle then restaging.    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 12/18/2017     Arthritis      Colon polyp 2009,2015    no polyps - f/u in 5 yrs      Esophageal reflux      Glaucoma      Hypertension      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) 11/6/2017     Osteoporosis      Postsurgical hypothyroidism      Scoliosis (and kyphoscoliosis), idiopathic      Thyroid cancer (H)     papillary carcinoma age 33     Uncomplicated asthma        Past Surgical History:   Procedure Laterality Date     ARTHRODESIS FOOT Right 11/9/2016    Procedure: ARTHRODESIS FOOT;  Surgeon: Janes Mcelroy MD;  Location: [facility] OR     C STOMACH SURGERY PROCEDURE UNLISTED       COLONOSCOPY   2009, 3/2015    no polyps in 2015 told to return 10 yrs.      ENDOSCOPIC RETROGRADE CHOLANGIOPANCREATOGRAM N/A 11/2/2017    Procedure: COMBINED ENDOSCOPIC RETROGRADE CHOLANGIOPANCREATOGRAPHY, PLACE TUBE/STENT;  Endoscopic Retrograde Cholangiopancreatogram with billary sphincterotomy and billary stent placement;  Surgeon: Rito Mcneil MD;   Location: [facility] OR     ENDOSCOPIC RETROGRADE CHOLANGIOPANCREATOGRAM N/A 1/10/2018    Procedure: ENDOSCOPIC RETROGRADE CHOLANGIOPANCREATOGRAM;  Endoscopic Retrograde Cholangiopancreatogram ;  Surgeon: Felix Izaguirre MD;  Location: [facility] OR     ENDOSCOPIC RETROGRADE CHOLANGIOPANCREATOGRAPHY, EXCHANGE TUBE/STENT N/A 11/22/2017    Procedure: ENDOSCOPIC RETROGRADE CHOLANGIOPANCREATOGRAPHY, EXCHANGE TUBE/STENT;  Endoscopic Retrograde Cholangiopancreatogram with Biliary Stent Exchange and pancreatic stent placement;  Surgeon: Felix Izaguirre MD;  Location: [facility] OR     ESOPHAGOSCOPY, GASTROSCOPY, DUODENOSCOPY (EGD), COMBINED  2/17/2012    Procedure:COMBINED ESOPHAGOSCOPY, GASTROSCOPY, DUODENOSCOPY (EGD); COMBINED ESOPHAGOSCOPY, GASTROSCOPY, DUODENOSCOPY POSSIBLE DILATION; Surgeon:NICHOLE MCCLAIN; Location:[facility] OR     ESOPHAGOSCOPY, GASTROSCOPY, DUODENOSCOPY (EGD), COMBINED N/A 11/2/2017    Procedure: COMBINED ENDOSCOPIC ULTRASOUND, ESOPHAGOSCOPY, GASTROSCOPY, DUODENOSCOPY (EGD), FINE NEEDLE ASPIRATE/BIOPSY;  Upper Endoscopic Ultrasound with fine needle biopsy, upper endoscopy with biopsies, Endoscopic Retrograde Cholangiopancreatogram with billary sphincterotomy and billary stent placement;  Surgeon: Hadley Bailey MD;  Location: [facility] OR     FOOT SURGERY  2008    hammertoe left     INSERT PORT VASCULAR ACCESS Right 1/24/2018    Procedure: INSERT PORT VASCULAR ACCESS;  Chest Port Placement;  Surgeon: Ventura Villarreal PA-C;  [facility]     LAPAROSCOPIC CHOLECYSTECTOMY N/A 1/5/2015    Procedure: LAPAROSCOPIC CHOLECYSTECTOMY;  Surgeon: Cruz Pearson MD;  Location: [facility]     NISSEN FUNDOPLICATION       ORTHOPEDIC SURGERY  2009    left hammertoe      REPAIR HAMMER TOE Right 11/9/2016    Procedure: REPAIR HAMMER TOE;  Surgeon: Janes Mcelroy MD;  Location: [facility]     SALPINGO OOPHORECTOMY,R/L/SERENA  1974    left, for tubal pregnancy     THYROIDECTOMY      for thyroid cancer       Family  History   Problem Relation Age of Onset     C.A.D. Father      MI at age 65     Asthma Father      Coronary Artery Disease Father      Alzheimer Disease Mother      OSTEOPOROSIS Mother      Depression Sister 80     Asthma Sister      Prostate Cancer Brother      Depression Son      Depression Sister      Coronary Artery Disease Brother 67     Skin Cancer No family hx of      no skin cancer     Social History: No sick contacts.  No pets.  No smoking.  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         Current Medications & Allergies:       amylase-lipase-protease  2 capsule Oral TID w/meals     diltiazem  120 mg Oral Daily     enoxaparin  50 mg Subcutaneous BID     fluticasone-vilanterol  1 puff Inhalation Daily     folic acid  400 mcg Oral QAM     levothyroxine  125 mcg Oral Daily     nystatin  500,000 Units Oral 4x Daily     omeprazole  40 mg Oral BID     oseltamivir  30 mg Oral BID     predniSONE  10 mg Oral Daily     heparin lock flush  5-10 mL Intracatheter Q24H     heparin  5 mL Intracatheter Q28 Days     cefTRIAXone  2 g Intravenous Q24H     azithromycin  250 mg Intravenous Q24H       Infusions/Drips:    - MEDICATION INSTRUCTIONS -         Allergies   Allergen Reactions     Nkda [No Known Drug Allergies]             Physical Exam:   Ranges for vital signs:  Temp:  [96.5  F (35.8  C)-99.9  F (37.7  C)] 98.8  F (37.1  C)  Pulse:  [75-85] 77  Heart Rate:  [68-94] 94  Resp:  [16-28] 20  BP: ()/(44-70) 109/66  FiO2 (%):  [0 %] 0 %  SpO2:  [72 %-99 %] 95 % on 6L  Vitals:    02/24/18 0818   Weight: 44.7 kg (98 lb 9.6 oz)       Physical Examination:  GENERAL:  Lying in bed, no acute distress  HEAD:  Head is normocephalic, atraumatic   EYES:  Eyes have anicteric sclerae   LUNGS:  Clear to auscultation bilaterally.   CARDIOVASCULAR:  Regular rate and rhythm   ABDOMEN:  Soft, nontender, nondistended  SKIN:  No acute rashes.    NEUROLOGIC:   Awake and alert, answering questions appropriately.  PSYCH: Normal affect.         Laboratory Data:     Metabolic Studies       Recent Labs   Lab Test  02/24/18   0641  02/23/18 2056 02/23/18   1040   11/15/17   1621   11/02/17   1337   10/31/17   1722   NA  134   --   132*   < >  134   < >  137   --   135   POTASSIUM  3.3*   --   3.5   < >  3.6   < >  3.7   < >  3.2*   CHLORIDE  103   --   97   < >  101   < >  104   --   99   CO2  22   --   27   < >  29   < >  27   --   27   ANIONGAP  8   --   9   < >  4   < >  7   --   9   BUN  14   --   19   < >  10   < >  7   --   12   CR  0.57   --   0.74   < >  0.75   < >  0.52   --   0.67   GFRESTIMATED  >90   --   76   < >  75   < >  >90   --   86   GLC  93   --   99   < >  139*   < >  127*   --   114*   EMMY  7.9*   --   9.0   < >  8.2*   < >  8.6   --   9.3   PHOS   --    --    --    --    --    --   3.0   < >   --    MAG   --    --    --    --   1.9   --    --    < >   --    LACT  0.8   --    --    --    --    --    --    --   1.1   PCAL   --   0.30   --    --    --    --    --    --    --     < > = values in this interval not displayed.       Hepatic Studies    Recent Labs   Lab Test  02/23/18 2056 01/29/18   1008  01/22/18   0805  01/10/18   0930   11/02/17   1337   BILITOTAL   --   0.6  0.5  0.5   < >  12.2*   DBIL   --    --    --    --    --   10.2*   ALKPHOS   --   92  87  134   < >  832*   PROTTOTAL   --   7.3  7.1  7.1   < >  6.6*   ALBUMIN   --   3.1*  3.1*  3.1*   < >  2.6*   AST   --   18  20  88*   < >  188*   ALT   --   22  32  106*   < >  233*   LDH  637*   --    --    --    --    --     < > = values in this interval not displayed.       Hematology Studies      Recent Labs   Lab Test  02/24/18   0641  02/23/18   1040  02/12/18   1316  02/05/18   0908  01/29/18   1008  01/22/18   0805   WBC  14.0*  14.0*  4.7  10.6  22.3*  13.7*   ANEU  8.5*   --   3.3  8.4*  16.1*  9.5*   ALYM  2.2   --   0.6*  0.9  2.3  1.4   KATHERYN  2.7*   --   0.7  1.1  3.0*  2.6*    AEOS  0.0   --   0.0  0.1  0.1  0.0   HGB  8.7*  9.5*  9.3*  9.4*  11.2*  10.1*   HCT  27.6*  29.1*  27.6*  28.7*  34.7*  30.2*   PLT  426  377  146*  179  439  585*       Clotting Studies    Recent Labs   Lab Test  01/24/18   0959  11/15/17   1621  11/02/17   1337  10/31/17   1722   INR  0.96  0.98  0.93  0.93   PTT   --   29   --   26     Microbiology:  Last 6 Culture results with specimen source  Culture Micro   Date Value Ref Range Status   02/23/2018 PENDING  Preliminary   02/17/2014 No growth  Final   12/08/2013 >100,000 colonies/mL Escherichia coli (A)  Final    Specimen Description   Date Value Ref Range Status   02/23/2018 Blood Right Arm  Final   02/17/2014 Unspecified Urine  Final   12/08/2013 Midstream Urine  Final        Imaging:  Recent Results (from the past 48 hour(s))   X-ray Chest 2 vws*    Narrative    EXAM: XR CHEST 2 VW  2/23/2018 10:59 AM      HISTORY: ; 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); Hypoxia    COMPARISON: CT dated 1/24/2018, chest radiograph dated 3/4/2016    FINDINGS: Right chest wall port tip projects over the cavoatrial  junction. The cardiomediastinal silhouette is stable. Pulmonary  vasculature is indistinct. Diffuse hazy opacities originating from the  pearl. Kerley B lines. No significant pleural effusion. No  pneumothorax.      Impression    IMPRESSION: Bilateral hazy pulmonary opacities radiating form the pearl  with Kerley B lines. Findings are favored to represent pulmonary  edema. Superimposed infection cannot be excluded.    I have personally reviewed the examination and initial interpretation  and I agree with the findings.    ATIYA MONTEZ MD   CT Chest w/o Contrast    Narrative    EXAMINATION: Chest CT  2/23/2018     CLINICAL HISTORY: Bilateral infiltration, hypoxia. History of  pancreatic cancer.    COMPARISON: CT cap dated 1/24/2018.    TECHNIQUE: CT imaging obtained through the chest without intravenous  contrast. Coronal and axial MIP reformatted images  obtained.  Intravenous sedation.  FINDINGS:  Right Port-A-Cath with the tip presents at SVC. Central  tracheobronchial tree is patent. There is new central predominant left  greater than right groundglass opacities. Bibasilar subsegmental  atelectasis. No pneumothorax. Trace pleural effusion bilaterally.    Heart size is within normal limits. Atherosclerotic calcifications  along the aortic arch There is no pericardial effusion.  Multiple  mediastinal lymph nodes, largest one measures 1.6 cm at precarinal  region. No significant axillary tendinopathy.     Bones and soft tissues: Severe degenerative changes superimposed on S  shaped spine. No suspicious bone lesions. The previously demonstrated  right breast prosthesis is not visualized on today's exam.    Partially imaged upper abdomen: No significant change in very stent  and pneumobilia.      Impression    IMPRESSION:   New central predominant groundglass left greater than right opacities,  differential includes infection, pulmonary edema hemorrhage or drug  induced pneumonitis. Trace bilateral pleural effusions.    I have personally reviewed the examination and initial interpretation  and I agree with the findings.    DOROTHY STRONG MD

## 2018-02-25 NOTE — PROGRESS NOTES
Pulmonary Progress Note    Subjective  Patient feeling better today. Less short of breath walking to the bathroom. Oxygen requirements decreased. Appetite improving. Having intermittent diarrhea.    Objective  BP 99/51  Pulse 90  Temp 96.7  F (35.9  C) (Oral)  Resp 18  Wt 44.7 kg (98 lb 9.6 oz)  SpO2 95%  BMI 18.03 kg/m2 on 2L  General: Sitting up in bed, no acute distress  Heart: Normal rate, regular rhythm, no murmurs   Lungs: Clear bilaterally  Abd: Soft, nontender  Ext: No edema  Neuro: Answering questions appropriately.    Labs personally reviewed.  RVP negative.    Assessment and Recommendations  Ms. Lewis is a 79 yo female with history of asthma, thyroid cancer s/p thyroidectomy, HTN, GERD s/p nissen fundoplication, and metastatic pancreatic cancer on gemcitabine and abraxane who presented with shortness of breath for one month and found to be influenza positive. Pulmonary is being consulted for infiltrates on CT Chest.     In comparison to CT Chest obtained on 1/24/18, CT Chest obtained on admission shows new bilateral diffuse GGO, left>right.  Patient was found to be influenza positive on admission, which could certainly explain the diffuse bilateral infiltrates. On the differential is pulmonary edema (seems less likely based on exam), bacterial infection, and drug toxicity.  There have been reports of both gemcitabine and Abraxane leading to lung injury, but these would be a diagnosis of exclusion. 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     Overall, patient continues to feel better respiratory wise than when she first presented.     --Agree with treatment for influenza with Tamiflu  --Agree with empiric treatment for community acquired pneumonia   --No need for bronchoscopy at this point, given that patient is feeling better and a causative organism has been found, can reconsider if patient fails to improve further.  --Defer use of  dexamethasone to oncology team.     Thank you very much for this consultation. Pulmonary will sign off.     Patient staffed with Dr. Brooke.     Tasneem Segundo  Pulmonary and Critical Care Fellow  [phone]

## 2018-02-25 NOTE — PLAN OF CARE
1900- 0730. Afeb, VSS. Sating 93% on 4L NS during the night. Nebulizer treatments now scheduled every 4 hours. Denies pain or nausea. Slept well. No other complaints, continue to monitor.

## 2018-02-25 NOTE — PLAN OF CARE
Problem: Patient Care Overview  Goal: Plan of Care/Patient Progress Review  Outcome: No Change    1010: Renetta heard from patients room and a loud crash while sitting at nurses station. Upon entering room, pt was found lying on the floor on her left side. She reported tripping over her oxygen tubing on the way to the bathroom, denied dizziness upon standing. Denied losing consciousness. Unwitnessed by staff. Pt was completely alert and oriented both prior to and after reported fall. Left cheek bone slightly bruised, left wrist with small eccymotic spot, left hip appears unbruised at this time. All skin intact. Pt assisted to her feet with the help of herself and two staff members. VS taken and WNL. MD notified and came to assess patient. Will continue to monitor for any signs of bleeding and neuro changes.

## 2018-02-25 NOTE — PLAN OF CARE
Problem: Patient Care Overview  Goal: Plan of Care/Patient Progress Review  Outcome: No Change    [id number]: Slightly tachycardic at times, OVSS.  Resting on room air, pt 90-91%. Currently maintaining saturations on 1L of oxygen at 94-95%. Have not tried activity on room air yet. Pt had unwitnessed fall this am (See previous note).  Head CT completed, negative. Slight bruise above left zygomatic bone, left knee with two ecchymotic spots and slightly sore. Left hip unaffected.  Bed alarm now on and pt educated to call. SIRS triggered, lactic acid 3.9 - MD notified and 1L bolus LR administered over 2 hours. Recheck lactic acid four hours later was 2.8. Moonlighter notified.  Calorie counts ordered to start tomorrow. Oral thrush appears improved but continues with nystatin. Port accessed per pt request. PIV removed.  Continue POC.

## 2018-02-25 NOTE — PROGRESS NOTES
"[facility], [place] -- Hematology / Oncology Progress Note  Date of Service (when I saw the patient): -- 02/25/2018     Assessment & Plan   78 year old female with metastatic pancreatic cancer on gemcitabine, abraxane presenting with slowly progressive SOB over 1 month, however found to be influenza positive 2/23 without associated typical symptoms. Chest CT on admission shows GGOs correlating with increased O2 requirement; differential diagnosis includes influenza, gemcitabine-pneumonitis, bacterial pneumonia less favored.       # Dyspnea-   # New GGOs on chest CT   # Influenza B +  # OSH diagnosis of asthma  SOB ongoing for 1 month but sharply worse in week preceding admission. Was admitted to hospital in [place] last month and was treated for asthma attack. Unclear if she received any antibiotics of steroids at that time. Positive influenza B swab suggests flu, but could be residual shedding from \"asthma exacerbation\" in [place] last month, rather than active  of current SOB; not confirmed by [facility].  Chest CT with diffuse GGOs, flu vs. [place] toxicity vs pneumonia  --Continue ceftriaxone, azithromycin for possible superimposed pneumonia; but with with no fevers on admission and negative procalcitonin, pneumonia less likely. Anticipate discontinuation on 2/26 if clinically stable.    --Continue Tamiflu for 5 days  --Dexamethasone started 2/24 for [place]-pneumonitis; will follow response  --Pulm consulted placed, no indication for bronch with improving clinical status; signed off 2/25.    --Asthma management: uonebs Q3H PRN, scheduled Breo-ellipta, PTA prednisone 10 mg QD continued; need to discontinue prednisone if planning on remaining on dexamethasone for extended period.     # SIRS   -Triggered SIRS alert 2/25 for HR (90s) and WBC (14; in setting of dex initiation).  Lactate elevated to 3.3; bolused 1 L LR, will re-trend lactate.      # Fall  -Mechanical fall 2/25; tripped over oxygen cord.  " Unwitnessed. Reports striking head on floor, landing on right wrist (ulnar styloid).  No LOC.  Lingering pain with palpation over left zygomatic arch. Anticoagulated; head CT ordered.     # Hypotension, resolved  -Likely 2/2 infection and poor oral intake at home. IVF resuscitated on admission    # Metastatic pancreatic cancer  -Currently on treatment with gemcitabine/abraxane. Has now completed 3 cycles (last dose given 2/12/18). She has tolerated treatment well so far with mild nausea, neuropathy, and constipation.   -She does have follow-up with [facility] on 3/26 for her next cycle of chemo  -Continue Tramadol as needed for abdominal pain. Currently using on average 1 per day as needed. She will need a refill likely at discharge.      # FEN  -Historically has been struggling with anorexia. Labs today reveal worsening creat and hyponatremia, likely from poor oral intake  -Her weight today is lower to 100lbs. Will see if she can eat better after this acute illness, may need to have her see nutrition for ongoing support    -Start Nystatin for thrush. Continue salt and soda swishes and start Biotene for dry mouth.       # Pulmonary embolism  -She does have a history of bilateral PE diagnosed December 2017 currently on Lovenox. She has not missed any doses and is not tachycardic, so less concern for recurrent PE. Adjust dose lower to 50mg BID.  -Will need to continue Lovenox BID inpatient. Due for her next dose at 8pm this evening  -Of note, her hgb and plt are stable today.      Port site swelling - present since placement. I think it is superficial due to skinny habitus, will review CT to see if any fluid collection/hematoma etc. If ok, then may access.    Wan Soria  Hematology/Oncology fellow  p [phone]  February 24, 2018    Interval History   Improving oxygenation overnight, afebrile, but suffered mechanical fall this AM.  Also borderline tachycardic, triggering SIRS alert.  Feeling well, ambulating, states she is  eating regularly; will initiate calorie count.      Physical Exam   Vitals:    02/24/18 0818 02/25/18 1015   Weight: 44.7 kg (98 lb 9.6 oz) 44.3 kg (97 lb 11.2 oz)     Vital Signs with Ranges  Temp:  [96.7  F (35.9  C)-98.2  F (36.8  C)] 97  F (36.1  C)  Pulse:  [82-98] 98  Heart Rate:  [] 99  Resp:  [18-20] 18  BP: ()/(47-64) 105/64  SpO2:  [93 %-98 %] 94 %  I/O last 3 completed shifts:  In: 860 [P.O.:210; I.V.:650]  Out: 1050 [Urine:1050]    Constitutional: Awake, alert, cooperative, in NAD.  Eyes: PERRL, EOMI, sclera clear, conjunctiva normal.  ENT: Normocephalic, without obvious abnormality, moist mucus membranes, no lesions or thrush. No evidence of dental trauma. Tenderness to palpation over left zygomatic arch/early bruising.    Respiratory: Non-labored breathing, good air exchange, CTAB, basilar crackles; on 2 L, satting 96%.    Cardiovascular: RRR, no murmur noted.  GI: +BS, soft, non-distended, non-tender, no masses palpated, no hepatosplenomegaly.  Skin: No concerning lesions or rash on exposed areas. Port site C/D/I with prominent soft tissue surrounding it.   Musculoskeletal: No edema trini LEs.  No tenderness over right wrist following fall; ROM, strength without deficit.  Pulses 2+.    Neurologic: Awake, A&O x 3. Cranial nerves II-XII are grossly intact.   Neuropsychiatric: Calm, normal affect     MEDS  Medications     - MEDICATION INSTRUCTIONS -         ipratropium - albuterol 0.5 mg/2.5 mg/3 mL  3 mL Nebulization 4x daily     dexamethasone  4 mg Oral Q12H CHEPE     amylase-lipase-protease  2 capsule Oral TID w/meals     diltiazem  120 mg Oral Daily     enoxaparin  50 mg Subcutaneous BID     fluticasone-vilanterol  1 puff Inhalation Daily     folic acid  400 mcg Oral QAM     levothyroxine  125 mcg Oral Daily     nystatin  500,000 Units Oral 4x Daily     omeprazole  40 mg Oral BID     oseltamivir  30 mg Oral BID     predniSONE  10 mg Oral Daily     heparin lock flush  5-10 mL Intracatheter  Q24H     heparin  5 mL Intracatheter Q28 Days     cefTRIAXone  2 g Intravenous Q24H     azithromycin  250 mg Intravenous Q24H       LABS  Recent Labs   Lab Test  02/24/18   0641  02/23/18   1040  02/12/18   1316  02/05/18   0908  01/29/18   1008  01/22/18   0805   WBC  14.0*  14.0*  4.7  10.6  22.3*  13.7*   NEUTROPHIL  61.0   --   69.7  78.8  72.3  69.0   HGB  8.7*  9.5*  9.3*  9.4*  11.2*  10.1*   PLT  426  377  146*  179  439  585*     Recent Labs   Lab Test  02/24/18   0641  02/23/18   1040  01/29/18   1008  01/22/18   0805  01/10/18   0930   NA  134  132*  133  136  136   POTASSIUM  3.3*  3.5  4.2  3.3*  3.5   CHLORIDE  103  97  99  102  98   CO2  22  27  28  27  26   ANIONGAP  8  9  6  7  12   BUN  14  19  14  16  14   CR  0.57  0.74  0.56  0.65  0.57   EMMY  7.9*  9.0  8.4*  8.1*  8.4*     Recent Labs   Lab Test  02/23/18   2056  11/15/17   1621  11/02/17   1337  11/01/17   0727  08/08/17   1143   MAG   --   1.9   --   1.8  2.0   PHOS   --    --   3.0  2.3*  2.8   LDH  637*   --    --    --    --      Recent Labs   Lab Test  02/23/18 2056  01/29/18   1008  01/22/18   0805  01/10/18   0930   BILITOTAL   --   0.6  0.5  0.5   ALKPHOS   --   92  87  134   ALT   --   22  32  106*   AST   --   18  20  88*   ALBUMIN   --   3.1*  3.1*  3.1*   LDH  637*   --    --    --        All laboratory data reviewed     CULTURES    Recent Labs   Lab Test  02/24/18   0641  02/23/18   2143  02/17/14   0930  12/08/13   1028   CULT  No growth after 1 day  No growth after 2 days  No growth  >100,000 colonies/mL Escherichia coli*   SDES  Blood Left Hand  Blood Right Arm  Unspecified Urine  Midstream Urine       IMAGING  No results found for this or any previous visit (from the past 24 hour(s)).

## 2018-02-25 NOTE — PLAN OF CARE
Problem: Patient Care Overview  Goal: Plan of Care/Patient Progress Review   02/24/18 0004 02/25/18 0541   OTHER   Plan Of Care Reviewed With patient --    Plan of Care Review   Progress --  progress toward functional goals as expected

## 2018-02-26 NOTE — PROGRESS NOTES
"[facility], [place] -- Hematology / Oncology Progress Note  Date of Service (when I saw the patient): -- 02/26/2018     Assessment & Plan   78 year old female with metastatic pancreatic cancer on gemcitabine, abraxane presenting with slowly progressive SOB over 1 month, however found to be influenza positive 2/23 without associated typical symptoms. Chest CT on admission shows GGOs correlating with increased O2 requirement; differential diagnosis includes influenza, gemcitabine-pneumonitis, bacterial pneumonia less favored.       # Dyspnea, IMPROVED.  # New GGOs on chest CT.   # Influenza B +.  # OSH diagnosis of asthma.  SOB ongoing for 1 month but sharply worse in week preceding admission. Was admitted to hospital in [place] last month and was treated for asthma attack. Unclear if she received any antibiotics of steroids at that time. Positive influenza B swab suggests flu, but could be residual shedding from \"asthma exacerbation\" in [place] last month, rather than active  of current SOB; not confirmed by [facility].  Chest CT with diffuse GGOs, flu vs. [place] toxicity vs pneumonia  --Continue ceftriaxone, azithromycin for possible superimposed pneumonia, improving clinically, will continue at this time.  --Continue Tamiflu, to complete a 5 day course.  --Dexamethasone started 2/24 for [place]-pneumonitis; will follow response.  --Pulm consulted placed, no indication for bronch with improving clinical status; signed off 2/25. Discussed + fungitell with Pulm and they feel this is of little value at this point as patient improving clinically on antiviral/antibacterial. Will monitor.  --Asthma management: uonebs Q3H PRN, scheduled Breo-ellipta, discontinued PTA prednisone 10 mg QD with initiation of pred.    #SIRS   -Triggered SIRS alert 2/25 for HR (90s) and WBC (14; in setting of dex initiation).  Lactate elevated to 3.9; bolused 1 L LR with improvement to 2.8. SIRS alert triggered 2/26 am for HR (103) " and WBC 15 (on steroids)--LA 3.7-->improved to 2.8 with 1L fluid bolus.    # Mechanical fall-2/2 oxygen cord  -On 2/25 patient states she tripped over oxygen cord.  Unwitnessed fall. Reports striking head on floor, landing on right wrist (ulnar styloid).  No LOC. Lingering pain with palpation over left zygomatic arch and L knee, now with small ecchymoses. Anticoagulated.  -Head CT w/o evidence of intracranial pathology. Sm amount of sinusitis present.     #Hypotension.  -Likely 2/2 infection and poor oral intake at home. IVF resuscitated on admission with improvement.  -Bolused for hypotension overnight 2/25 (90s/40-50s), 500 mL bolus.    #Metastatic pancreatic cancer  -Currently on treatment with gemcitabine/abraxane. Has now completed 3 cycles (last dose given 2/12/18). She has tolerated treatment well so far with mild nausea, neuropathy, and constipation.   -She does have follow-up with [facility] on 3/26 for her next cycle of chemo  -Continue Tramadol as needed for abdominal pain. Currently using on average 1 per day as needed. She will need a refill likely at discharge.    #Oral thrush.  -started nystatin qid.    #Pulmonary embolism  She does have a history of bilateral PE diagnosed December 2017 currently managed on Lovenox. She has not missed any doses and is not tachycardic, so less concern for recurrent PE. Adjust dose lower to 50mg BID.  -Continued Lovenox bid on admission (dosing as above).    #Port site swelling - present since placement. likely superficial due to skinny habitus.      FEN  -Historically has been struggling with anorexia. Labs today reveal worsening creat and hyponatremia, likely from poor oral intake  -Her weight today is lower to 100lbs. RADT, may need nutrition to follow as outpatient.   -Continue salt and soda swishes and start Biotene for dry mouth.     Prophys  -VTE: none, on therapeutic Lovenox bid  -PUD: omeprazole 40 mg bid  -bowels: prn    Code: Full    Dispo:  Pending further  improvement in O2 status and vitals, likely discharge tomorrow.     Patient discussed with Heme/Onc attending, Dr. Main.    Val Duarte PA-C  Hematology/Oncology  Pager #[phone]    Interval History   Improving oxygen status, still on small amount of nasal canula this AM. Patient states she is feeling well, overall improved since admission. Chest tightness has improved. Denies chest pain or SOB. No cough or sputum production. Denies abdominal pain, nausea or vomiting. Reports having diarrhea yesterday and today, will check C diff. Otherwise, patient states she is ready to discharge home.     Physical Exam   Vitals:    02/24/18 0818 02/25/18 1015 02/26/18 0900   Weight: 44.7 kg (98 lb 9.6 oz) 44.3 kg (97 lb 11.2 oz) 46.9 kg (103 lb 4.8 oz)     Vital Signs with Ranges  Temp:  [95.2  F (35.1  C)-97.9  F (36.6  C)] 95.8  F (35.4  C)  Heart Rate:  [] 109  Resp:  [16-18] 16  BP: ()/(49-64) 110/56  SpO2:  [88 %-98 %] 94 %  I/O last 3 completed shifts:  In: 2590 [P.O.:840; I.V.:250; IV Piggyback:1500]  Out: 200 [Urine:200]    Constitutional: Pleasant female seen sitting up in bed, in NAD. Alert and interactive.   HEENT: NCAT, anicteric sclerae, conjunctiva clear. Moist mucous membranes with mild thrush. Dentition intact/well cared for.  Respiratory: Non-labored breathing, good air exchange. Lungs are clear to auscultation bilaterally, with mild crackles in R base. Otherwise no wheezing. No cough noted.   Cardiovascular: Regular rate and rhythm with no murmur, rub or gallop.  GI: Normoactive BS. Abdomen is soft, non-distended, and non-tender to palpation. No rigidity or guarding.  Skin: Warm and dry. No rashes or lesions on exposed surfaces. Small ecchymoses on lateral left knee, no tenderness to palpation. No abrasions.   Musculoskeletal: Extremities grossly normal. No tenderness or edema present.   Neurologic: A &O x3, speech normal, answering questions appropriately. Moves all extremities spontaneously.  Grossly non-focal.  Neuropsychiatric: Mentation and affect normal/appropriate.    MEDS  Medications     - MEDICATION INSTRUCTIONS -         ipratropium - albuterol 0.5 mg/2.5 mg/3 mL  3 mL Nebulization 4x daily     dexamethasone  4 mg Oral Q12H CHEPE     amylase-lipase-protease  2 capsule Oral TID w/meals     diltiazem  120 mg Oral Daily     enoxaparin  50 mg Subcutaneous BID     fluticasone-vilanterol  1 puff Inhalation Daily     folic acid  400 mcg Oral QAM     levothyroxine  125 mcg Oral Daily     nystatin  500,000 Units Oral 4x Daily     omeprazole  40 mg Oral BID     oseltamivir  30 mg Oral BID     predniSONE  10 mg Oral Daily     heparin lock flush  5-10 mL Intracatheter Q24H     heparin  5 mL Intracatheter Q28 Days     cefTRIAXone  2 g Intravenous Q24H     azithromycin  250 mg Intravenous Q24H       LABS  Recent Labs   Lab Test  02/24/18   0641  02/23/18   1040  02/12/18   1316  02/05/18   0908  01/29/18   1008  01/22/18   0805   WBC  14.0*  14.0*  4.7  10.6  22.3*  13.7*   NEUTROPHIL  61.0   --   69.7  78.8  72.3  69.0   HGB  8.7*  9.5*  9.3*  9.4*  11.2*  10.1*   PLT  426  377  146*  179  439  585*     Recent Labs   Lab Test  02/24/18   0641  02/23/18   1040  01/29/18   1008  01/22/18   0805  01/10/18   0930   NA  134  132*  133  136  136   POTASSIUM  3.3*  3.5  4.2  3.3*  3.5   CHLORIDE  103  97  99  102  98   CO2  22  27  28  27  26   ANIONGAP  8  9  6  7  12   BUN  14  19  14  16  14   CR  0.57  0.74  0.56  0.65  0.57   EMMY  7.9*  9.0  8.4*  8.1*  8.4*     Recent Labs   Lab Test  02/23/18   2056  11/15/17   1621  11/02/17   1337  11/01/17   0727  08/08/17   1143   MAG   --   1.9   --   1.8  2.0   PHOS   --    --   3.0  2.3*  2.8   LDH  637*   --    --    --    --      Recent Labs   Lab Test  02/23/18 2056 01/29/18   1008  01/22/18   0805  01/10/18   0930   BILITOTAL   --   0.6  0.5  0.5   ALKPHOS   --   92  87  134   ALT   --   22  32  106*   AST   --   18  20  88*   ALBUMIN   --   3.1*  3.1*  3.1*    LDH  637*   --    --    --        All laboratory data reviewed     CULTURES    Recent Labs   Lab Test  02/24/18   0641  02/23/18   2143  02/17/14   0930  12/08/13   1028   CULT  No growth after 2 days  No growth after 3 days  No growth  >100,000 colonies/mL Escherichia coli*   SDES  Blood Left Hand  Blood Right Arm  Unspecified Urine  Midstream Urine       IMAGING  No results found for this or any previous visit (from the past 24 hour(s)).

## 2018-02-26 NOTE — PLAN OF CARE
Problem: Patient Care Overview  Goal: Plan of Care/Patient Progress Review  Outcome: No Change    Hypotension; BPs slightly lower than baseline. 500 mL LR bolus given. PRN neb given per pt request. On 2L NC. Bed alarm for safety; pt had fall yesterday. No neuro changes and head CT was clear. Tramadol given for bilateral knee pain. On brandon counts. Pt did express having diarrhea yesterday; hat placed in toilet to assess; no BM overnight. Port now accessed and working well. Pt with off comments at times but overall alert and oriented. Continue with plan of care.

## 2018-02-26 NOTE — PROGRESS NOTES
Lab notified critical LA = 3.7. Notified PA ( Val), new orders placed for bolus and lab redraw timed.

## 2018-02-27 NOTE — PROGRESS NOTES
Calorie Count  Intake recorded for: 2/26  Kcals: 462  Protein: 16g  # Meals Recorded: 100% 1 [place] toast, 75% whole milk, sausage merrick  # Supplements Recorded: 0

## 2018-02-27 NOTE — PLAN OF CARE
Problem: Patient Care Overview  Goal: Plan of Care/Patient Progress Review  PT 7D: Cancel. Evaluation orders received and appreciated. Educated pt on PT role to facilitate safe disch home, however pt declining participation. Pt verbalizes understanding of supine LE strengthening exercises and positioning to reduce swelling at feet. Pt declines engaging in formal balance assessment or [facility] was related to line management and urgency rather than true instability. Pt eager to return home, reports waiting on 's arrival and requesting on therapy prior to disch.

## 2018-02-27 NOTE — PROGRESS NOTES
Problem: Patient Care Overview  Goal: Plan of Care/Patient Progress Review  Outcome: No Change    2616-0684: HR, slight tachy 113, WBC 15.2, triggered SIRS, awaiting lactic acid results, VSS completed (stable). OVSS, afebrile. Denies pain, nausea. Reports breathing better, wean off O2, mid-90's at RA. Spot checked patient every 1-2hrs. Albuterol neb x1 PRN given, pt reports dyspnea at bedtime. Awaiting sputum cx, nonproductive cough. Pt continues on brandon counts till 2/28 at midnight. SBA d/t fall yesterday; bed alarm off, pt calls appropriately. Continues to be on droplet isolation for positive influenza. Pt reports strong interest about going home today. Continue with POC.

## 2018-02-27 NOTE — DISCHARGE SUMMARY
"[facility], [place]    Discharge Summary  Hematology / Oncology    Date of Admission:  2/23/2018  Date of Discharge:  2/27/2018  Discharging Provider: Val Duarte  Date of Service (when I saw the patient): 02/27/18    Discharge Diagnoses   Dyspnea, improved  Influenza B  Hypotension, improved.  Metastatic pancreatic cancer   Oral thrush  H/o pulmonary embolism    History of Present Illness   78 year old female with metastatic pancreatic cancer on gemcitabine, abraxane presenting with slowly progressive SOB over 1 month, however found to be influenza positive 2/23 without associated typical symptoms. Chest CT on admission shows GGOs correlating with increased O2 requirement; differential diagnosis includes influenza, gemcitabine-pneumonitis, bacterial pneumonia. Patient started on Tamiflu, azithromycin, and ceftriaxone with improvement in dyspnea and hypoxia. No longer requiring O2 support at discharge. Pulmonary team consulted d/t CT chest findings (new b/l diffuse ggo) but no bronch necessary with causative organism for findings/sx. Dr. Main suspicious that respiratory issues at least in part due to Gemzar toxicity/pneumonitis. Hypotension has improved with IVF boluses. Discharged with close follow up with [facility] on 3/2.     Hospital Course   Iris Lewis was admitted on 2/23/2018.  The following problems were addressed during her hospitalization:      # Dyspnea, IMPROVED.  # New GGOs on chest CT.   # Influenza B +.  # OSH diagnosis of asthma.  SOB ongoing for 1 month but sharply worse in week preceding admission. Was admitted to hospital in [place] last month and was treated for asthma attack. Unclear if she received any antibiotics of steroids at that time. Positive influenza B swab suggests flu, but could be residual shedding from \"asthma exacerbation\" in [place] last month, rather than active  of current SOB; not confirmed by RVP.  Chest CT with diffuse GGOs, flu vs. Lena " toxicity vs pneumonia.   --Started on ceftriaxone and azithromycin for possible superimposed pneumonia with improvement in sx. Discharged on azithromycin to complete full 5 day course.  -- Tamiflu started for + influenza test with improvement in sx. To complete a 5 day course.  --Dexamethasone started 2/24 for suspected Gemzar toxicity/pneumonitis. Continued dex 2 mg bid at discharge. OP team to follow and taper as desired.  --Pulm consulted--no indication for bronch with improving clinical status on antibacterial/antiviral therapies. Discussed + fungitell result with them and they feel this is of little value at this point as patient improving clinically on antiviral/antibacterial.   --Asthma management: uonebs Q3H PRN, scheduled Breo-ellipta, discontinued PTA prednisone 10 mg QD with initiation of pred.     #SIRS, likely 2/2 possible PNA, influenza infection, CA.  -Triggered SIRS alert 2/25 for HR (90s) and WBC (14; in setting of dex initiation).  Lactate elevated to 3.9; bolused 1 L LR with improvement to 2.8. SIRS alert triggered 2/26 am for HR (103) and WBC 15 (on steroids)--LA 3.7-->improved to 2.8 with 1L fluid bolus. Triggered SIRS alert 2/26-27 with lactic acid in normal range.     # Mechanical fall-2/2 oxygen cord.  -On 2/25 patient states she tripped over oxygen cord.  Unwitnessed fall. Reports striking head on floor, landing on right wrist (ulnar styloid).  No LOC. Lingering pain with palpation over left zygomatic arch and L knee, now with small ecchymoses. Anticoagulated.  -Head CT w/o evidence of intracranial pathology. Sm amount of sinusitis present.      #Hypotension, IMPROVED.  -Likely 2/2 infection and poor oral intake at home. IVF resuscitated on admission with improvement. Bolused for hypotension overnight 2/25 (90s/40-50s), 500 mL bolus.     #Metastatic pancreatic cancer  -Currently on treatment with gemcitabine/abraxane. Has now completed 3 cycles (last dose given 2/12/18). She has tolerated  treatment well so far with mild nausea, neuropathy, and constipation.   -scheduled for f/u with [facility]ARA + labs 3/2 as pt with existing scheduled apt for [place]/Abraxane 3/5.  -Continue Tramadol as needed for abdominal pain. Refilled 30 tabs at discharge.     #Oral thrush.  -Nystatin qid.     #Pulmonary embolism  She does have a history of bilateral PE diagnosed December 2017 currently managed on Lovenox. She has not missed any doses and is not tachycardic, so less concern for recurrent PE.  -PTA dosing 60 mg bid. Now adjusted dose to 50mg BID-->Continue with this dosing at discharge.      #Port site swelling - present since placement. likely superficial due to skinny habitus. No erythema or tenderness on assessment. Functioning appropriately.      FEN  -RADT. Notes some anorexia. May need nutrition apt for follow up/support if continues to lose weight/continue with poor appetite.   -Continue salt and soda swishes and start Biotene for dry mouth.      Prophys  -VTE: none, on therapeutic Lovenox bid  -PUD: PTA omeprazole 40 mg bid  -bowels: prn     Code: Full     Patient discussed with Heme/Onc attending, Dr. Main.     Val Duarte PA-C  Hematology/Oncology  Pager #[phone]    Significant Results and Procedures   CT chest w/o contrast--new ggo's L>R concerning for infection vs pulmonary edema vs drug-induced pneumonitis.   CT head-no intracranial pathology.    Pending Results   These results will be followed up by Benoit Grande PA-C.  Unresulted Labs Ordered in the Past 30 Days of this Admission     Date and Time Order Name Status Description    2/23/2018 2110 Blood culture Preliminary     2/23/2018 2110 Blood culture Preliminary         Code Status   Full Code    Primary Care Physician   Neri Gipson    Physical Exam   Temp: 96.7  F (35.9  C) Temp src: Oral BP: 120/67   Heart Rate: 87 Resp: 16 SpO2: 92 % O2 Device: None (Room air) Oxygen Delivery: 2 LPM  Vitals:    02/24/18 0818 02/25/18 1015 02/26/18 0900    Weight: 44.7 kg (98 lb 9.6 oz) 44.3 kg (97 lb 11.2 oz) 46.9 kg (103 lb 4.8 oz)     Vital Signs with Ranges  Temp:  [95.8  F (35.4  C)-98.1  F (36.7  C)] 96.7  F (35.9  C)  Heart Rate:  [] 87  Resp:  [16] 16  BP: (101-133)/(52-67) 120/67  SpO2:  [90 %-96 %] 92 %  I/O last 3 completed shifts:  In: 2500 [P.O.:1460; I.V.:40; IV Piggyback:1000]  Out: 300 [Stool:300]    Constitutional: Pleasant female seen sitting up on EOB, in NAD. Alert and interactive.   HEENT: NCAT, anicteric sclerae, conjunctiva clear. Moist mucous membranes with mild thrush.  Respiratory: Non-labored breathing, good air exchange on RA. Lungs are clear to auscultation bilaterally, without wheezing, crackles, or rhonchi. No cough noted.   Cardiovascular: Regular rate and rhythm with no murmur, rub or gallop.  GI: Normoactive BS. Abdomen is soft, non-distended, and non-tender to palpation. No rigidity or guarding.  Skin: Warm and dry. No rashes or lesions on exposed surfaces. Small ecchymoses on lateral left knee, no tenderness to palpation. No abrasions. Additionally, small ecchymosis on L zygomatic arch, no abrasion.  Musculoskeletal: Extremities grossly normal. No tenderness or edema present.   Neurologic: A &O x3, speech normal, answering questions appropriately. Moves all extremities spontaneously. Grossly non-focal.  Neuropsychiatric: Mentation and affect normal/appropriate.    Discharge Disposition   Discharged to home  Condition at discharge: Stable    Consultations This Hospital Stay   PULMONARY GENERAL ADULT IP CONSULT  PHYSICAL THERAPY ADULT IP CONSULT    Discharge Orders     CBC with platelets differential   Last Lab Result: Hemoglobin (g/dL)      Date                     Value                02/27/2018               8.5 (L)          ----------     Comprehensive metabolic panel     Reason for your hospital stay   Admitted for shortness of breath and treated for influenza.     Follow Up and recommended labs and tests   Follow up as  scheduled below:     Activity   Your activity upon discharge: activity as tolerated     When to contact your care team   [facility] cancer clinic triage line at [phone] for temp >100.4, uncontrolled nausea/vomiting/diarrhea/constipation, unrelieved pain, bleeding not relieved with pressure, dizziness, chest pain, shortness of breath, loss of consciousness, and any new or concerning symptoms.     Discharge Instructions   >>Oseltamivir (Tamiflu) is for influenza treatment. Take 1 tab tonight and 1 tab tomorrow morning then this prescription will be completed.  >>Azithromycin is for possible pneumonia treatment. Take 1 tab tonight and 1 tab tomorrow night, then this prescription will be completed.  >>Imodium is for diarrhea as needed. Take 1 tab every 4 hrs as needed if you continue to have diarrhea.   >>Dexamethasone 2 mg every 12 hours is for possible pneumonitis (inflammation of lung tissue from chemotherapy). Please take as prescribed until you follow up with TARA in clinic. The outpatient team will advise you when to discontinue.  >>Continue to HOLD (not take) Maxzide until instructed otherwise by outpatient provider.  >>NOTE: the dosing of Lovenox was changed while you were inpatient to 50 mg every 12 hours, please continue this dose.     Full Code     Diet   Follow this diet upon discharge: Regular       Discharge Medications   Current Discharge Medication List      START taking these medications    Details   dexamethasone (DECADRON) 2 MG tablet Take 1 tablet (2 mg) by mouth every 12 hours  Qty: 20 tablet, Refills: 0    Associated Diagnoses: Pneumonitis      azithromycin (ZITHROMAX) 250 MG tablet Take 1 tablet (250 mg) by mouth daily for 2 doses  Qty: 2 tablet, Refills: 0    Associated Diagnoses: Pneumonia due to infectious organism, unspecified laterality, unspecified part of lung      loperamide (IMODIUM) 2 MG capsule Take 1 capsule (2 mg) by mouth 4 times daily as needed for diarrhea  Qty: 10 capsule,  Refills: 0    Associated Diagnoses: Diarrhea, unspecified type         CONTINUE these medications which have CHANGED    Details   traMADol (ULTRAM) 50 MG tablet Take 1-2 tablets ( mg) by mouth every 4 hours as needed for breakthrough pain (No more than 8 tablets in a day)  Qty: 60 tablet, Refills: 0    Associated Diagnoses: 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)      LORazepam (ATIVAN) 0.5 MG tablet Take 1 tablet (0.5 mg) by mouth every 4 hours as needed (Anxiety, Nausea/Vomiting or Sleep)  Qty: 15 tablet, Refills: 0    Associated Diagnoses: 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)      enoxaparin (LOVENOX) 60 MG/0.6ML injection Inject 0.5 mLs (50 mg) Subcutaneous every 12 hours  Refills: 0    Associated Diagnoses: 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)      oseltamivir (TAMIFLU) 75 MG capsule Take 1 capsule (75 mg) by mouth 2 times daily for 2 doses . Take one in evening 2/27 and one in AM 2/28 then complete.  Qty: 2 capsule, Refills: 0    Associated Diagnoses: Influenza B         CONTINUE these medications which have NOT CHANGED    Details   CARTIA  MG 24 hr capsule TK 1 C PO QD  Qty: 90 capsule, Refills: 1    Associated Diagnoses: Benign essential hypertension      albuterol (PROAIR HFA/PROVENTIL HFA/VENTOLIN HFA) 108 (90 BASE) MCG/ACT Inhaler Inhale 2 puffs into the lungs 4 times daily  Qty: 1 Inhaler, Refills: 1    Associated Diagnoses: Asthma      fluticasone-vilanterol (BREO ELLIPTA) 100-25 MCG/INH oral inhaler Inhale 1 puff into the lungs daily  Qty: 1 Inhaler, Refills: 1    Associated Diagnoses: Cough      levothyroxine (SYNTHROID/LEVOTHROID) 125 MCG tablet       triamterene-hydrochlorothiazide (DYAZIDE) 37.5-25 MG per capsule       prochlorperazine (COMPAZINE) 10 MG tablet Take 0.5 tablets (5 mg) by mouth every 6 hours as needed for nausea or vomiting  Qty: 180 tablet, Refills: 0    Comments: **Patient requests 90 days supply**  Associated Diagnoses: Malignant neoplasm of head of  pancreas (H)      ondansetron (ZOFRAN) 4 MG tablet Take 1 tablet (4 mg) by mouth every 6 hours as needed for nausea  Qty: 60 tablet, Refills: 0    Associated Diagnoses: Abdominal pain, generalized      omeprazole (PRILOSEC) 40 MG capsule Take 1 capsule (40 mg) by mouth 2 times daily Take 30-60 minutes before a meal.  Qty: 180 capsule, Refills: 0    Associated Diagnoses: Duodenal ulceration      Acetaminophen (TYLENOL PO) Take 325 mg by mouth every 4 hours as needed for mild pain or fever      amylase-lipase-protease (CREON) 38403 UNITS CPEP Take 2 capsules (72,000 Units) by mouth 3 times daily (with meals)  Qty: 180 capsule, Refills: 1    Associated Diagnoses: 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)      CALCIUM PO Take by mouth every morning Form/strength unknown. Powder formulation. Add to water and fruits/vegetables daily to promote bone health.       triamterene-hydrochlorothiazide (MAXZIDE-25) 37.5-25 MG per tablet Take 1 tablet by mouth every morning       potassium 99 MG TABS Take 1 tablet by mouth 2 times daily       cyanocolbalamin (VITAMIN B-12) 500 MCG tablet Take 500 mcg by mouth every morning       folic acid (FOLVITE) 400 MCG tablet Take 1 tablet by mouth every morning       cholecalciferol (VITAMIN D) 1000 UNIT tablet Take 1 tablet by mouth 2 times daily       nystatin (MYCOSTATIN) 005893 UNIT/ML suspension Take 5 mLs (500,000 Units) by mouth 4 times daily  Qty: 200 mL, Refills: 0    Associated Diagnoses: Thrush      timolol (TIMOPTIC) 0.5 % ophthalmic solution INT 1 GTT OU IN THE MORNING  Refills: 3      order for DME 1unit being ordered: cranial prosthesis  Qty: 1 Units, Refills: 0    Associated Diagnoses: 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); 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      psyllium (METAMUCIL/KONSYL) Packet Take 1 packet by mouth daily as needed for constipation      hydrOXYzine (ATARAX) 25 MG tablet Take 1-2 tablets (25-50 mg) by mouth every 6 hours as needed for itching (adjuvant  pain)  Qty: 90 tablet, Refills: 0    Associated Diagnoses: Obstructive jaundice      camphor-menthol (DERMASARRA) 0.5-0.5 % LOTN Apply 1 mL topically every 6 hours as needed for skin care  Qty: 59 mL, Refills: 0    Associated Diagnoses: Obstructive jaundice      timolol maleate (ISTALOL) 0.5 % opthalmic solution Place 1 drop into both eyes every morning Before placing contact lenses      zinc 50 MG TABS Take 1 tablet by mouth every morning       denosumab (PROLIA) 60 MG/ML SOLN injection Inject 1 mL (60 mg) Subcutaneous every 6 months Inject subcutaneously in upper arm, upper thigh or abdomen  Qty: 1 mL, Refills: 1    Associated Diagnoses: Senile osteoporosis         STOP taking these medications       predniSONE (DELTASONE) 10 MG tablet Comments:   Reason for Stopping:         levofloxacin (LEVAQUIN) 750 MG tablet Comments:   Reason for Stopping:             Allergies   Allergies   Allergen Reactions     Nkda [No Known Drug Allergies]      Data   Most Recent 3 CBC's:  Recent Labs   Lab Test  02/27/18   0804  02/26/18   0613  02/25/18   0800   WBC  18.2*  15.2*  15.6*   HGB  8.5*  7.6*  9.3*   MCV  97  97  96   PLT  609*  502*  542*      Most Recent 3 BMP's:  Recent Labs   Lab Test  02/27/18   0804  02/26/18   0613  02/25/18   0800   NA  140  143  141   POTASSIUM  4.2  4.6  4.3   CHLORIDE  107  113*  108   CO2  25  24  23   BUN  12  14  14   CR  0.49*  0.45*  0.52   ANIONGAP  8  7  10   EMMY  8.4*  7.7*  8.6   GLC  103*  152*  139*     Most Recent 2 LFT's:  Recent Labs   Lab Test  02/26/18   0613  02/25/18   0800   AST  21  36   ALT  25  29   ALKPHOS  89  118   BILITOTAL  0.4  0.4     Most Recent INR's and Anticoagulation Dosing History:  Anticoagulation Dose History     Recent Dosing and Labs Latest Ref Rng & Units 10/21/2010 5/4/2016 10/31/2017 11/2/2017 11/15/2017 1/24/2018 2/26/2018    INR 0.86 - 1.14 0.92 0.97 0.93 0.93 0.98 0.96 -    Chromogenic Factor 10 70 - 130 % - - - - - - 62(L)        Most Recent 3  Troponin's:  Recent Labs   Lab Test  08/08/17   1143   TROPI  <0.015  The 99th percentile for upper reference range is 0.045 ug/L.  Troponin values in   the range of 0.045 - 0.120 ug/L may be associated with risks of adverse   clinical events.       Most Recent Cholesterol Panel:  Recent Labs   Lab Test  06/19/17   1136   CHOL  250*   LDL  69   HDL  168   TRIG  68     Most Recent 6 Bacteria Isolates From Any Culture (See EPIC Reports for Culture Details):  Recent Labs   Lab Test  02/24/18   0641  02/23/18   2143  02/17/14   0930  12/08/13   1028   CULT  No growth after 3 days  No growth after 4 days  No growth  >100,000 colonies/mL Escherichia coli*     Most Recent TSH, T4 and A1c Labs:  Recent Labs   Lab Test  01/08/18   0909   TSH  12.86*   T4  1.27     Results for orders placed or performed during the hospital encounter of 02/23/18   CT Chest w/o Contrast    Narrative    EXAMINATION: Chest CT  2/23/2018     CLINICAL HISTORY: Bilateral infiltration, hypoxia. History of  pancreatic cancer.    COMPARISON: CT cap dated 1/24/2018.    TECHNIQUE: CT imaging obtained through the chest without intravenous  contrast. Coronal and axial MIP reformatted images obtained.  Intravenous sedation.  FINDINGS:  Right Port-A-Cath with the tip presents at SVC. Central  tracheobronchial tree is patent. There is new central predominant left  greater than right groundglass opacities. Bibasilar subsegmental  atelectasis. No pneumothorax. Trace pleural effusion bilaterally.    Heart size is within normal limits. Atherosclerotic calcifications  along the aortic arch There is no pericardial effusion.  Multiple  mediastinal lymph nodes, largest one measures 1.6 cm at precarinal  region. No significant axillary tendinopathy.     Bones and soft tissues: Severe degenerative changes superimposed on S  shaped spine. No suspicious bone lesions. The previously demonstrated  right breast prosthesis is not visualized on today's exam.    Partially  imaged upper abdomen: No significant change in very stent  and pneumobilia.      Impression    IMPRESSION:   New central predominant groundglass left greater than right opacities,  differential includes infection, pulmonary edema hemorrhage or drug  induced pneumonitis. Trace bilateral pleural effusions.    I have personally reviewed the examination and initial interpretation  and I agree with the findings.    DOROTHY STRONG MD   CT Head w/o contrast*    Narrative    Head CT without contrast    History: Fell and struck head on floor, worst on left zygomatic arch;  is anticoagualted. No LOC.  ?Fracture/intracranial bleed .  Comparison: Head MRI 10/16/2014    Technique: Thin section helical acquisition was performed from the  skull base through the cranial vertex without the administration of  intravenous contrast. Images were reconstructed in axial, sagittal,  and coronal planes. Additional thin section sagittal and coronal  reformats were created in bone window.    Findings: There is no evidence of intracranial hemorrhage. There is no  mass-effect or midline shift. Moderate generalized cerebral atrophy.  The ventricles are proportionate to the cerebral sulci. Gray-white  matter differentiation is preserved. Tiny old infarct in the right  cerebellar hemisphere.    Layering low-attenuation fluid in the sphenoid sinuses. No fracture  identified along the margins of sphenoid sinuses. Remaining paranasal  sinuses are clear. Mastoid air cells are clear. Orbital structures are  unremarkable.      Impression    Impression:  1. No acute intracranial pathology.  2. Acute sphenoid sinusitis.       I have personally reviewed the examination and initial interpretation  and I agree with the findings.    JUWAN YOUNGBLOOD MD   X-ray Chest 2 vws*    Narrative    Exam:  Chest X-ray 2/26/2018 11:28 AM    History: evaluate for infection/pneumonia vs pneumonitis;     Comparison: CT 2/23/2018, chest x-ray 2/2/2018    Findings: PA  and lateral views of the chest. Right IJ Port-A-Cath with  tip projecting over the low SVC. The trachea is midline. The cardiac  silhouette is normal in size. The pulmonary vasculature is indistinct.  Diffuse bilateral interstitial opacities originating from the pearl.  Patchy bibasilar opacities, left greater than right. Trace bilateral  pleural effusions. No pneumothorax. Severe scoliotic deformity of the  thoracic spine. Multilevel compression deformities of thoracic spine,  which appears similar compared to the prior exam. The upper abdomen  appears unremarkable.      Impression    Impression:   1. Bilateral interstitial opacities radiating from the pearl, favored  to represent pulmonary edema.  2. Basilar patchy opacities, left greater than right, which may  represent infection/aspiration.  3. Trace bilateral pleural effusions.    I have personally reviewed the examination and initial interpretation  and I agree with the findings.    ATIYA MONTEZ MD

## 2018-02-27 NOTE — PLAN OF CARE
Problem: Patient Care Overview  Goal: Plan of Care/Patient Progress Review  Outcome: Adequate for Discharge Date Met: 02/27/18  Discharge  D: Orders for discharge and outpatient medications written. Controlled substance home meds returned from security; however, patient states a lot of home meds missing that she brought into hospital.   I: Home medications and return to clinic schedule reviewed with patient. Discharge instructions and parameters for calling Health Care Provider reviewed. Patient Relations consulted about missing meds following unsuccessful attempts to relocate. Port de-accessed by oncoming RN. Patient left at 15:30 accompanied by transport.   A: Patient verbalized understanding and was ready for discharge.   P: Patient instructed to  medications in Pharmacy.  to  patient following discharge pharmacy. Follow up as scheduled as per After Visit Summary.

## 2018-02-27 NOTE — PLAN OF CARE
"Problem: Patient Care Overview  Goal: Plan of Care/Patient Progress Review  Outcome: Improving  7am-7pm:    Vital Signs: VSS, HR intermittently runs on higher end. Denies any dizziness, lightheadedness or visual or auditory changes.     IV Access: Right side port accessed, patent with good blood return noted with saline flush, heparin locked when not being used. Incisional site scabbed and bruised, no drainage noted. Dressing C/D/I. TKO NS @ 10ml/hr. IV abx infused per orders.     Labs: Triggered SIRS at 0830, (, WBC 15.2). Vital signs and lactic acid done per protocol. LA 3.7. TARA notified. 1 liter normal saline bolus given, lactic acid redrawn at 12n = 2.8.  Other morning lab results: hgb 7.6, plt 502, ANC 1.3.     Pain: Reported bilateral knee pain/discomfort from fall yesterday. PRN PO tramadol given x 2 with pt reporting decrease in pain at reassessment. Encouraged pt to try to bend knees and move legs while in bed. BLE elevated on pillow. Pt also reported some left cheek discomfort from bruise occurred with fall yesterday as well.     GI/: Appetite fair, denied nausea. Reports \"frequent loose stools yesterday and several today\". TARA ordered C-Diff, stool specimen obtained, C-Diff negative. Pt removed from enteric precautions. Bowel sounds audible in all areas, reports flatus.     Respiratory: Bilateral lower lung sounds diminished, fine crackles heard in LLL. Reports shortness of breath with activity. Able to wean pt down to 1L/NC to keep sats >92%. With no oxygen pt became anxious and reported shortness of breath. Cough intermittent but unable to give sputum specimen. Encouraged pt to cough and deep breathe frequently while awake, also encouraged pt to use IS. CXR done today. See report.     Activity: Due to recent fall, pt on fall risk precaution, up with stand by assist x 1, non slip socks on. Generalized weakness. Educated pt on falls prevention and use of call light for assistance. Bed alarm on " "while in bed.     Mood/Affect: Good spirits but \"really wanted to go home today\". Interaction with staff appropriate.       Continue with current POC per MD orders.       "

## 2018-02-27 NOTE — PROGRESS NOTES
[facility], [place]    Sepsis Evaluation Progress Note    Date of Service: 02/27/2018    I was called to see Iris Lewis due to abnormal vital signs triggering the Sepsis SIRS screening alert. She is known to have an infection.     Physical Exam    Vital Signs:  Temp: 97.9  F (36.6  C) Temp src: Oral BP: 109/59   Heart Rate: 89 Resp: 16 SpO2: 92 % O2 Device: None (Room air) Oxygen Delivery: 2 LPM    Lab:  Lactic Acid   Date Value Ref Range Status   02/26/2018 2.8 (H) 0.7 - 2.0 mmol/L Final       The patient is at baseline mental status.    The rest of their physical exam is significant for mild tachycardia, HR regular, stable respiratory rate on 2L NC, lung sounds clear with crackles in RLL, A&Ox3.    Assessment and Plan    The SIRS and exam findings are likely due to malignancy and influenza infection, there is no sign of sepsis at this time.    Disposition: The patient will remain on the current unit. We will continue to monitor this patient closely.    Val Duarte PA-C

## 2018-02-28 NOTE — TELEPHONE ENCOUNTER
Patient's PCP is Neri Gipson. Patient is also being followed by Oncology. Will route to Oncology to follow up.     Ariadna Biswas RN, BSN

## 2018-02-28 NOTE — TELEPHONE ENCOUNTER
Patient discharged from [facility] IP  ( Inpatient or ER).    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  Care Coord:  NA  Please follow up as appropriate. If no follow up required, please close encounter.

## 2018-03-01 NOTE — TELEPHONE ENCOUNTER
MTM referral from: Transitions of Care (recent hospital discharge or ED visit)    MTM referral outreach attempt #1 on March 1, 2018 at 11:26 AM      Outcome: Left Message    Sam Stanley MTM Coordinator

## 2018-03-02 NOTE — NURSING NOTE
Chief Complaint   Patient presents with     Port Draw     Labs drawn via port by RN. Line flushed and hep locked, then de-accessed. VS taken.     Lisa Mcknight RN

## 2018-03-02 NOTE — MR AVS SNAPSHOT
After Visit Summary   3/2/2018    Iris Lewis    MRN: [id number]           Patient Information     Date Of Birth          1940        Visit Information        Provider Department      3/2/2018 2:10 PM Benoit Molina PA [facility]        Today's Diagnoses     Left wrist pain    -  1    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)        Lymphedema           Follow-ups after your visit        Your next 10 appointments already scheduled     Mar 05, 2018  2:15 PM CST   [facility] Lab Draw with  [facility] DRAW   [facility] Draw ([facility])    [address] [place] [address]  [address]  [facility] [address]   [phone]            Mar 05, 2018  2:40 PM CST   (Arrive by 2:25 PM)   Return Visit with CAT Dang CNP   [facility] ([facility])    [address] [place] [address]  [address]  [facility] [address]   [phone]            Mar 08, 2018 10:00 AM CST   [facility] ([facility]    [address] [place] [address]  [address]  [facility] [address]   [phone]           Note: this is not an onsite visit; there is no need to come to the facility.            Mar 12, 2018 12:15 PM CDT   [facility] Lab Draw with  [facility] DRAW   [facility] Lab Draw ([facility])    [address] [place] [address]  [address]  [facility] [address]   [phone]            Mar 12, 2018  1:00 PM CDT   Infusion 120 with REJI ONCOLOGY INFUSION, UC 25 ATC   [facility] ([facility])    [address] [place] [address]  [address]  [facility] [address]   [phone]            Mar 16, 2018  3:00 PM CDT   (Arrive by 2:45 PM)   Return Visit with José Antonio Ann MD   [facility] ([facility])    [address] [place] [address]  [address]  [facility] [address]   [phone]            Mar 26, 2018  7:00 AM CDT   (Arrive by 6:45 AM)   CT CHEST ABDOMEN PELVIS W/O & W CONTRAST with UCCT2   [facility] [place] [facility] ([facility])    [address]  1st Floor  [facility] [address] [phone]           Please bring any scans or X-rays taken at other hospitals, if similar tests were done. Also bring a list of your medicines, including vitamins, minerals and over-the-counter drugs. It is safest to leave personal items at home.  Be sure to tell your doctor:   If you have any allergies.   If there s any chance you are pregnant.   If you are breastfeeding.  You may have contrast for this exam. To prepare:   Do not eat or drink for 2 hours before your exam. If you need to take medicine, you may take it with small sips of water. (We may ask you to take liquid medicine as well.)   The day before your exam, drink extra fluids at least six 8-ounce glasses (unless your doctor tells you to restrict your fluids).   You will be given instructions on how to drink the contrast.  Patients over 70 or patients with diabetes or kidney problems:   If you haven t had a blood test (creatinine test) within the last 30 days, the Cardiologist/Radiologist may require you to get this test prior to your exam.  If you have diabetes:   Continue to take your metformin medication on the day of your exam  Please wear loose clothing, such as a sweat suit or jogging clothes. Avoid snaps, zippers and other metal. We may ask you to undress and put on a hospital gown.  If you have any questions, please call the Imaging Department where you will have your exam.            Mar 26, 2018  7:45 AM CDT   [facility] Lab Draw with  [facility] LAB DRAW   [facility] Draw ([facility])    [address]  [address]  [facility] [address] [phone]            Mar 26, 2018  8:45 AM CDT   (Arrive by 8:30 AM)    Return Visit with Vinny Yu MD   [facility] ([facility] [place])    [address]  [address]  [facility] [address] [phone]              Future tests that were ordered for you today     Open Future Orders        Priority Expected Expires Ordered    XR Wrist Left 2 Views Routine 3/2/2018 3/2/2019 3/2/2018            Who to contact     If you have questions or need follow up information about today's clinic visit or your schedule please contact [facility] directly at [phone].  Normal or non-critical lab and imaging results will be communicated to you by [employer], letter or phone within 4 business days after the clinic has received the results. If you do not hear from us within 7 days, please contact the clinic through [employer] or phone. If you have a critical or abnormal lab result, we will notify you by phone as soon as possible.  Submit refill requests through [facility] or call your pharmacy and they will forward the refill request to us. Please allow 3 business days for your refill to be completed.          Additional Information About Your Visit        MyChart Information     [facility] gives you secure access to your electronic health record. If you see a primary care provider, you can also send messages to your care team and make appointments. If you have questions, please call your primary care clinic.  If you do not have a primary care provider, please call [phone] and they will assist you.        Care EveryWhere ID     This is your Care EveryWhere ID. This could be used by other organizations to access your [place] medical records  [id number]        Your Vitals Were     Pulse Temperature Respirations Pulse Oximetry BMI (Body Mass Index)       110 98.1  F (36.7  C) (Oral) 18 97% 19.44 kg/m2        Blood Pressure from Last 3 Encounters:   03/02/18 127/72   02/27/18 120/67   02/23/18 110/64    Weight from Last 3  Encounters:   03/02/18 48.2 kg (106 lb 4.8 oz)   02/26/18 46.9 kg (103 lb 4.8 oz)   02/23/18 45.5 kg (100 lb 6 oz)              We Performed the Following     CBC with platelets differential     Comprehensive metabolic panel          Today's Medication Changes          These changes are accurate as of 3/2/18  3:31 PM.  If you have any questions, ask your nurse or doctor.               These medicines have changed or have updated prescriptions.        Dose/Directions    * order for DME   This may have changed:  Another medication with the same name was added. Make sure you understand how and when to take each.   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 Make sure you understand how and when to take each.   Used for:  Lymphedema   Changed by:  Benoit Molina PA        Equipment being ordered: Compression Stockings. Please dispense 2 pairs of knee high 20-30 mmHg   Quantity:  2 Units   Refills:  3       * Notice:  This list has 2 medication(s) that are the same as other medications prescribed for you. Read the directions carefully, and ask your doctor or other care provider to review them with you.         Where to get your medicines      Some of these will need a paper prescription and others can be bought over the counter.  Ask your nurse if you have questions.     Bring a paper prescription for each of these medications     order for DME                Primary Care Provider Office Phone # Fax #    Neri Gipson [phone] [address] [address]        Equal Access to Services     NESSA NICHOLS AH: merle Johnson qaybta kaalmada  lisa correiatobi lopezaan ah. [facility] [phone].    ATENCIÓN: Si habla angelina, tiene a nava disposición servicios gratuitos de asistencia lingüística. Indu al [phone].    We comply with applicable federal civil rights laws and [place] laws. We do not discriminate on the basis of race, color, national origin, age, disability, sex, sexual orientation, or gender identity.            Thank you!     Thank you for choosing [facility]  for your care. Our goal is always to provide you with excellent care. Hearing back from our patients is one way we can continue to improve our services. Please take a few minutes to complete the written survey that you may receive in the mail after your visit with us. Thank you!             Your Updated Medication List - Protect others around you: Learn how to safely use, store and throw away your medicines at www.disposemymeds.org.          This list is accurate as of 3/2/18  3:31 PM.  Always use your most recent med list.                   Brand Name Dispense Instructions for use Diagnosis    albuterol 108 (90 BASE) MCG/ACT Inhaler    PROAIR HFA/PROVENTIL HFA/VENTOLIN HFA    1 Inhaler    Inhale 2 puffs into the lungs 4 times daily    Mild asthma, unspecified whether complicated, unspecified whether persistent       amylase-lipase-protease 05861 UNITS Cpep    CREON    60 capsule    Take 2 capsules (72,000 Units) by mouth 3 times daily (with meals)    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)       CALCIUM PO      Take by mouth every morning Form/strength unknown. Powder formulation. Add to water and fruits/vegetables daily to promote bone health.        CARTIA  MG 24 hr capsule   Generic drug:  diltiazem     10 capsule    TK 1 C PO QD    Benign essential hypertension       cholecalciferol 1000 UNIT tablet    vitamin D3     Take 1 tablet by mouth 2 times daily        cyanocolbalamin 500 MCG tablet    vitamin  B-12     Take 500 mcg  by mouth every morning        dexamethasone 2 MG tablet    DECADRON    20 tablet    Take 1 tablet (2 mg) by mouth every 12 hours    Pneumonitis       enoxaparin 60 MG/0.6ML injection    LOVENOX     Inject 0.5 mLs (50 mg) Subcutaneous every 12 hours    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)       fluticasone-vilanterol 100-25 MCG/INH oral inhaler    BREO ELLIPTA    1 Inhaler    Inhale 1 puff into the lungs daily    Cough       folic acid 400 MCG tablet    FOLVITE     Take 1 tablet by mouth every morning        hydrOXYzine 25 MG tablet    ATARAX    10 tablet    Take 1-2 tablets (25-50 mg) by mouth every 6 hours as needed for itching (adjuvant pain)    Obstructive jaundice       levothyroxine 125 MCG tablet    SYNTHROID/LEVOTHROID    10 tablet    Take 1 tablet (125 mcg) by mouth daily    Hypothyroidism, unspecified type       LORazepam 0.5 MG tablet    ATIVAN    15 tablet    Take 1 tablet (0.5 mg) by mouth every 4 hours as needed (Anxiety, Nausea/Vomiting or Sleep)    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)       nystatin 301635 UNIT/ML suspension    MYCOSTATIN          omeprazole 40 MG capsule    priLOSEC    20 capsule    Take 1 capsule (40 mg) by mouth 2 times daily Take 30-60 minutes before a meal.    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 Please dispense 2 pairs of knee high 20-30 mmHg    Lymphedema       * prochlorperazine 10 MG tablet    COMPAZINE          * prochlorperazine 5 MG tablet    COMPAZINE    30 tablet    Take 1 tablet (5 mg) by mouth every 6 hours as needed for nausea or vomiting    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)       timolol maleate 0.5 % opthalmic solution    ISTALOL    1 Bottle    Place 1 drop into both eyes every  morning Before placing contact lenses    Cataract, unspecified cataract type, unspecified laterality       traMADol 50 MG tablet    ULTRAM    60 tablet    Take 1-2 tablets ( mg) by mouth every 4 hours as needed for breakthrough pain (No more than 8 tablets in a day)    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)       * triamterene-hydrochlorothiazide 37.5-25 MG per tablet    MAXZIDE-25     Take 1 tablet by mouth every morning        * triamterene-hydrochlorothiazide 37.5-25 MG per capsule    DYAZIDE          TYLENOL PO      Take 325 mg by mouth every 4 hours as needed for mild pain or fever        * Notice:  This list has 6 medication(s) that are the same as other medications prescribed for you. Read the directions carefully, and ask your doctor or other care provider to review them with you.

## 2018-03-02 NOTE — NURSING NOTE
"Oncology Rooming Note    March 2, 2018 2:32 PM   Iris Lewis is a 78 year old female who presents for:    Chief Complaint   Patient presents with     Port Draw     Labs drawn via port by RN. Line flushed and hep locked, then de-accessed. VS taken.     Oncology Clinic Visit     Return adenocarcinoma     Initial Vitals: /72 (BP Location: Right arm, Patient Position: Sitting, Cuff Size: Child)  Pulse 110  Temp 98.1  F (36.7  C) (Oral)  Resp 18  Wt 48.2 kg (106 lb 4.8 oz)  SpO2 97%  BMI 19.44 kg/m2 Estimated body mass index is 19.44 kg/(m^2) as calculated from the following:    Height as of 2/23/18: 1.575 m (5' 2\").    Weight as of this encounter: 48.2 kg (106 lb 4.8 oz). Body surface area is 1.45 meters squared.  No Pain (0) Comment: Data Unavailable   No LMP recorded. Patient is postmenopausal.  Allergies reviewed: Yes  Medications reviewed: Yes    Medications: MEDICATION REFILLS NEEDED TODAY. Provider was NOT notified.  Would like to discuss compazine dose  Pharmacy name entered into [employer]:    [email or web address] - [place], [place] [facility] [address] [id number] - [facility] [place] - [address] AT [facility] [address] [facility] [address]    Clinical concerns: no new concerns     6 minutes for nursing intake (face to face time)     Petra Nagy RN              "

## 2018-03-02 NOTE — LETTER
"3/2/2018      RE: Iris Lewis  [address]  [place] [address]       Oncology/Hematology Visit Note  Mar 2, 2018    Reason for Visit: Follow up of metastatic pancreatic cancer, hospital follow-up    History of Present Illness: Iris Lewis is a 78 year old female with metastatic pancreatic cancer. She is currently undergoing treatment with gemcitabine/abraxane, last given 2/12/18. Her oncologic history is as follows:     \"Iris Lewis is a 77 year old female with a previous medical history of thyroid cancer s/p thyroidectomy in 1980s, cholelithiasis s/p CCY 2015, GERD s/p Nissen fundoplication 2006 and hypertension, who presented with stomach pain, dark urine, constipation with pale floating stools and jaundice. She was admitted on 10/31/17 and a CT scan showed a 3.6 cm pancreatic mass concerning for malignancy with local invasion into the duodenum, 3 small liver lesions, small pulmonary nodulates on lung bases and enlarged peripancreatic and retroperitoneal lymph nodes. An upper endoscopic ultrasound with fine needle biopsy and ERCP with sphincterotomy and stent placement was thereafter preformed.       She met with Dr. Yu to discuss halozyme study, however she does not have sufficient HA expression to qualify.  It was discussed that she would start with Gemzar/Abraxane treatment.  Her first treatment was on 11/28/17. Restaging imaging 1/24/18 revealed disease response to treatment. She has now received 3 cycles of [place]/Abraxane with plan to complete a 4th cycle and then restage.\"    Unfortunately she developed acute hypoxia and weakness and was admitted 2/23-2/27. 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 I am seeing her today for hospital follow-up.    Interval History:  Ms. Lewis returns to clinic today with her . She states that overall since discharge she has been doing well. Her breathing has improved " greatly and while she still uses O2 occasionally when she has to walk a lot of stairs, she otherwise has been doing well. She denies chest pain or cough and has not had any fevers. She completed her antibiotics and Tamiflu, and is still on her inhalers and oral dexamethasone.     She is concerned about worsening peripheral edema bilaterally that started right after she discharged. She states her legs feel extremely swollen to the point it is hard to walk and bend her legs. She denies any erythema or warmth but does note they are tender. Because of this she feels a bit more unsteady on her feet. She also has a new large bruise and tenderness in her left wrist that started yesterday but was worse this morning. She denies new trauma to the area, but does admit she caught her self with both hands when she fell in the hospital (head CT negative).    She is eating better and has noted an improvement in her appetite. She is eating 3 meals a day, which is certainly an improvement for her. She denies nausea or vomiting, though she is still having looser stools and occasional diarrhea when she urinates. She is not taking anything for the diarrhea and C. Diff testing inpatient was negative. Her abdominal pain is at baseline, still mainly located in the left lower quadrant and is managed with Tramadol. Her thrush has much improved with the Nystatin.    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 This has not improved since stopping gemcitabine. She denies rashes or bleeding issues. No headaches or dizziness. She is still holding her BP medication.    Current Outpatient Prescriptions   Medication Sig Dispense Refill     nystatin (MYCOSTATIN) 163624 UNIT/ML suspension        ondansetron (ZOFRAN) 4 MG tablet        prochlorperazine (COMPAZINE) 10 MG tablet        order for DME Equipment being ordered: Compression Stockings. Please dispense 2 pairs of knee high 20-30 mmHg 2 Units 3     traMADol  (ULTRAM) 50 MG tablet Take 1-2 tablets ( mg) by mouth every 4 hours as needed for breakthrough pain (No more than 8 tablets in a day) 60 tablet 0     LORazepam (ATIVAN) 0.5 MG tablet Take 1 tablet (0.5 mg) by mouth every 4 hours as needed (Anxiety, Nausea/Vomiting or Sleep) 15 tablet 0     enoxaparin (LOVENOX) 60 MG/0.6ML injection Inject 0.5 mLs (50 mg) Subcutaneous every 12 hours  0     dexamethasone (DECADRON) 2 MG tablet Take 1 tablet (2 mg) by mouth every 12 hours 20 tablet 0     hydrOXYzine (ATARAX) 25 MG tablet Take 1-2 tablets (25-50 mg) by mouth every 6 hours as needed for itching (adjuvant pain) 10 tablet 0     albuterol (PROAIR HFA/PROVENTIL HFA/VENTOLIN HFA) 108 (90 BASE) MCG/ACT Inhaler Inhale 2 puffs into the lungs 4 times daily 1 Inhaler 1     fluticasone-vilanterol (BREO ELLIPTA) 100-25 MCG/INH oral inhaler Inhale 1 puff into the lungs daily 1 Inhaler 1     prochlorperazine (COMPAZINE) 5 MG tablet Take 1 tablet (5 mg) by mouth every 6 hours as needed for nausea or vomiting 30 tablet 0     CARTIA  MG 24 hr capsule TK 1 C PO QD 10 capsule 0     amylase-lipase-protease (CREON) 03336 UNITS CPEP Take 2 capsules (72,000 Units) by mouth 3 times daily (with meals) 60 capsule 0     timolol maleate (ISTALOL) 0.5 % opthalmic solution Place 1 drop into both eyes every morning Before placing contact lenses 1 Bottle 0     levothyroxine (SYNTHROID/LEVOTHROID) 125 MCG tablet Take 1 tablet (125 mcg) by mouth daily 10 tablet 0     omeprazole (PRILOSEC) 40 MG capsule Take 1 capsule (40 mg) by mouth 2 times daily Take 30-60 minutes before a meal. 20 capsule 0     order for DME 1unit being ordered: cranial prosthesis 1 Units 0     Acetaminophen (TYLENOL PO) Take 325 mg by mouth every 4 hours as needed for mild pain or fever       CALCIUM PO Take by mouth every morning Form/strength unknown. Powder formulation. Add to water and fruits/vegetables daily to promote bone health.        folic acid (FOLVITE) 400 MCG  tablet Take 1 tablet by mouth every morning        cholecalciferol (VITAMIN D) 1000 UNIT tablet Take 1 tablet by mouth 2 times daily        triamterene-hydrochlorothiazide (DYAZIDE) 37.5-25 MG per capsule        triamterene-hydrochlorothiazide (MAXZIDE-25) 37.5-25 MG per tablet Take 1 tablet by mouth every morning        cyanocolbalamin (VITAMIN B-12) 500 MCG tablet Take 500 mcg by mouth every morning          Physical Examination:  /72 (BP Location: Right arm, Patient Position: Sitting, Cuff Size: Child)  Pulse 110  Temp 98.1  F (36.7  C) (Oral)  Resp 18  Wt 48.2 kg (106 lb 4.8 oz)  SpO2 97%  BMI 19.44 kg/m2  Wt Readings from Last 10 Encounters:   03/02/18 48.2 kg (106 lb 4.8 oz)   02/26/18 46.9 kg (103 lb 4.8 oz)   02/23/18 45.5 kg (100 lb 6 oz)   02/12/18 45.5 kg (100 lb 4.8 oz)   02/05/18 47.3 kg (104 lb 4.8 oz)   01/29/18 48.4 kg (106 lb 9.6 oz)   01/24/18 47.2 kg (104 lb)   01/22/18 47.2 kg (104 lb)   01/22/18 47.2 kg (104 lb)   01/10/18 47.4 kg (104 lb 8 oz)     Constitutional: Well-appearing but thin female in no acute distress.  Eyes: EOMI, PERRL. No scleral icterus.  ENT: Oral mucosa is moist without lesions and very minimal thrush.   Lymphatic: Neck is supple without cervical or supraclavicular lymphadenopathy.   Cardiovascular: Regular rate and rhythm. No murmurs, gallops, or rubs. +2 pitting peripheral edema through feet and calves. Mildly tender.  Respiratory: Clear to auscultation bilaterally. No wheezes or crackles.  Gastrointestinal: Bowel sounds present. Abdomen soft, non-tender except minimally in the LLQ. No palpable hepatosplenomegaly or masses.   Neurologic: Cranial nerves II through XII are grossly intact.  Skin: Large ecchymosis on left wrist along with mild swelling and tenderness over the radial styloid process. No erythema over lower extremities.    Laboratory Data:  Results for [place], JONA N (MRN [id number]) as of 3/2/2018 20:03   3/2/2018 14:08   Sodium 135   Potassium  3.9   Chloride 103   Carbon Dioxide 25   Urea Nitrogen 10   Creatinine 0.54   GFR Estimate >90   GFR Estimate If Black >90   Calcium 8.2 (L)   Anion Gap 7   Albumin 2.5 (L)   Protein Total 6.0 (L)   Bilirubin Total 0.4   Alkaline Phosphatase 90   ALT 43   AST 30   Glucose 132 (H)   WBC 17.5 (H)   Hemoglobin 9.6 (L)   Hematocrit 30.7 (L)   Platelet Count 617 (H)   RBC Count 3.19 (L)   MCV 96   MCH 30.1   MCHC 31.3 (L)   RDW 21.9 (H)   Diff Method Manual Differential   % Neutrophils 62.2   % Lymphocytes 21.1   % Monocytes 14.0   % Eosinophils 0.0   % Basophils 0.0   % Metamyelocytes 0.9   % Myelocytes 1.8   Absolute Neutrophil 10.9 (H)   Absolute Lymphocytes 3.7   Absolute Monocytes 2.5 (H)   Absolute Eosinophils 0.0   Absolute Basophils 0.0   Absolute Metamyelocytes 0.2 (H)   Absolute Myelocytes 0.3 (H)   Anisocytosis Moderate   Poikilocytosis Slight   Polychromasia Slight   Ovalocytes Slight   [place] Cells Slight   Platelet Estimate Confirming automa...       Assessment and Plan:  1. Metastatic pancreatic cancer  Was recently on gemcitabine/abraxane and was due for treatment this week. 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 Discussed with Dr. Yu. He would like to have close follow-up to allow for improvement of her symptoms and then start a new systemic treatment (likely 5FU and maybe irinotecan). Would not recommend any Oxaliplatin given neuropathy from prior treatments.    Plan to see her back next week and Dr. Yu will touch base with the TARA seeing her to determine best course of action. Of note, we will likely need new baseline images once we decided to start treatment.     2. Pulmonary  Admission for acute on chronic hypoxia and was treated with Tamiflu for positive influenza B and Azithromycin for suspected pneumonia. CT Chest with diffuse ground glass appearance concerning for pneumonitis. Her symptoms did improve with initiation of  dexamethasone. O2 Sats on RA today 97%. She does have an improving leukocytosis, likely 2/2 dexamethasone. With no other infectious symptoms can monitor for now.    Continue home inhalers for prior asthma. Continue Lovenox 50mg BID for prior PE. Will start Dexamethasone taper and have her take 2mg daily instead of BID for one week. Continue close monitoring.    3. Peripheral edema  Likely 2/2 IVF from hospitalization plus dexamethasone plus poor nutritional status. Low concern for CHF given normal echo inpatient and no SOB. As above, we are decreasing her dexamethasone to 2mg daily for one week and then off. Also encouraged her to continue adding protein to her diet. Did give prescription for compression stockings which she will fill today. Continue leg elevation.     Monitor for improvement in ambulation. My hope is with her feet less swollen and painful she will be walking better, but if not may need further investigation.    4. Left wrist pain and bruising  She does have significant bruising on her left wrist with swelling. As such, did obtain on left wrist Xray which was negative for acute fracture, however when I spoke to the radiologist he recommended getting an MRI if her pain persisted given her degree of osteoporosis and swelling.    Discussed with patient to not use her wrist much over the weekend and use ice for swelling/pain. I also want to check her Hep Xa level on Monday given the degree of bruising she has. Of note, she denies any other bleeding concerns. She takes her Lovenox at 9am so will plan on labs between 1-3pm.     5. FEN  Weight and albumin improved today and encouraged her to keep doing well with high calorie, protein intake. Continue Nystatin for thrush as I do think this was inhibiting her eating.     Her hydration status is improved but given her normal BP today and history of hypotension instructed her to continue holding her BP medication at this time.     6. GI  For diarrhea she was  tested for C. Diff inpatient which was negative. She was instructed to use Imodium but never got it. She will pick some up today and use PRN for loose stools. Continue Tramadol PRN for abdominal pain, which is unchanged.    Benoit Molina PA-C  [facility]  [address], [place] [id number] [phone]

## 2018-03-02 NOTE — TELEPHONE ENCOUNTER
MTM referral from: Transitions of Care (recent hospital discharge or ED visit)    MTM referral outreach attempt #2 on March 2, 2018 at 9:52 AM      Outcome: Patient scheduled for MTM appointment on 03/08/2018    Sam Stanley MTM Coordinator

## 2018-03-03 NOTE — PROGRESS NOTES
"Oncology/Hematology Visit Note  Mar 2, 2018    Reason for Visit: Follow up of metastatic pancreatic cancer, hospital follow-up    History of Present Illness: Iris Lewis is a 78 year old female with metastatic pancreatic cancer. She is currently undergoing treatment with gemcitabine/abraxane, last given 2/12/18. Her oncologic history is as follows:     \"Iris Lewis is a 77 year old female with a previous medical history of thyroid cancer s/p thyroidectomy in 1980s, cholelithiasis s/p CCY 2015, GERD s/p Nissen fundoplication 2006 and hypertension, who presented with stomach pain, dark urine, constipation with pale floating stools and jaundice. She was admitted on 10/31/17 and a CT scan showed a 3.6 cm pancreatic mass concerning for malignancy with local invasion into the duodenum, 3 small liver lesions, small pulmonary nodulates on lung bases and enlarged peripancreatic and retroperitoneal lymph nodes. An upper endoscopic ultrasound with fine needle biopsy and ERCP with sphincterotomy and stent placement was thereafter preformed.       She met with Dr. Yu to discuss halozyme study, however she does not have sufficient HA expression to qualify.  It was discussed that she would start with Gemzar/Abraxane treatment.  Her first treatment was on 11/28/17. Restaging imaging 1/24/18 revealed disease response to treatment. She has now received 3 cycles of [place]/Abraxane with plan to complete a 4th cycle and then restage.\"    Unfortunately she developed acute hypoxia and weakness and was admitted 2/23-2/27. 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 I am seeing her today for hospital follow-up.    Interval History:  Ms. Lewis returns to clinic today with her . She states that overall since discharge she has been doing well. Her breathing has improved greatly and while she still uses O2 occasionally when she has to walk a lot of stairs, " she otherwise has been doing well. She denies chest pain or cough and has not had any fevers. She completed her antibiotics and Tamiflu, and is still on her inhalers and oral dexamethasone.     She is concerned about worsening peripheral edema bilaterally that started right after she discharged. She states her legs feel extremely swollen to the point it is hard to walk and bend her legs. She denies any erythema or warmth but does note they are tender. Because of this she feels a bit more unsteady on her feet. She also has a new large bruise and tenderness in her left wrist that started yesterday but was worse this morning. She denies new trauma to the area, but does admit she caught her self with both hands when she fell in the hospital (head CT negative).    She is eating better and has noted an improvement in her appetite. She is eating 3 meals a day, which is certainly an improvement for her. She denies nausea or vomiting, though she is still having looser stools and occasional diarrhea when she urinates. She is not taking anything for the diarrhea and C. Diff testing inpatient was negative. Her abdominal pain is at baseline, still mainly located in the left lower quadrant and is managed with Tramadol. Her thrush has much improved with the Nystatin.    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 This has not improved since stopping gemcitabine. She denies rashes or bleeding issues. No headaches or dizziness. She is still holding her BP medication.    Current Outpatient Prescriptions   Medication Sig Dispense Refill     nystatin (MYCOSTATIN) 294881 UNIT/ML suspension        ondansetron (ZOFRAN) 4 MG tablet        prochlorperazine (COMPAZINE) 10 MG tablet        order for DME Equipment being ordered: Compression Stockings. Please dispense 2 pairs of knee high 20-30 mmHg 2 Units 3     traMADol (ULTRAM) 50 MG tablet Take 1-2 tablets ( mg) by mouth every 4 hours as needed for  breakthrough pain (No more than 8 tablets in a day) 60 tablet 0     LORazepam (ATIVAN) 0.5 MG tablet Take 1 tablet (0.5 mg) by mouth every 4 hours as needed (Anxiety, Nausea/Vomiting or Sleep) 15 tablet 0     enoxaparin (LOVENOX) 60 MG/0.6ML injection Inject 0.5 mLs (50 mg) Subcutaneous every 12 hours  0     dexamethasone (DECADRON) 2 MG tablet Take 1 tablet (2 mg) by mouth every 12 hours 20 tablet 0     hydrOXYzine (ATARAX) 25 MG tablet Take 1-2 tablets (25-50 mg) by mouth every 6 hours as needed for itching (adjuvant pain) 10 tablet 0     albuterol (PROAIR HFA/PROVENTIL HFA/VENTOLIN HFA) 108 (90 BASE) MCG/ACT Inhaler Inhale 2 puffs into the lungs 4 times daily 1 Inhaler 1     fluticasone-vilanterol (BREO ELLIPTA) 100-25 MCG/INH oral inhaler Inhale 1 puff into the lungs daily 1 Inhaler 1     prochlorperazine (COMPAZINE) 5 MG tablet Take 1 tablet (5 mg) by mouth every 6 hours as needed for nausea or vomiting 30 tablet 0     CARTIA  MG 24 hr capsule TK 1 C PO QD 10 capsule 0     amylase-lipase-protease (CREON) 64565 UNITS CPEP Take 2 capsules (72,000 Units) by mouth 3 times daily (with meals) 60 capsule 0     timolol maleate (ISTALOL) 0.5 % opthalmic solution Place 1 drop into both eyes every morning Before placing contact lenses 1 Bottle 0     levothyroxine (SYNTHROID/LEVOTHROID) 125 MCG tablet Take 1 tablet (125 mcg) by mouth daily 10 tablet 0     omeprazole (PRILOSEC) 40 MG capsule Take 1 capsule (40 mg) by mouth 2 times daily Take 30-60 minutes before a meal. 20 capsule 0     order for DME 1unit being ordered: cranial prosthesis 1 Units 0     Acetaminophen (TYLENOL PO) Take 325 mg by mouth every 4 hours as needed for mild pain or fever       CALCIUM PO Take by mouth every morning Form/strength unknown. Powder formulation. Add to water and fruits/vegetables daily to promote bone health.        folic acid (FOLVITE) 400 MCG tablet Take 1 tablet by mouth every morning        cholecalciferol (VITAMIN D) 1000  UNIT tablet Take 1 tablet by mouth 2 times daily        triamterene-hydrochlorothiazide (DYAZIDE) 37.5-25 MG per capsule        triamterene-hydrochlorothiazide (MAXZIDE-25) 37.5-25 MG per tablet Take 1 tablet by mouth every morning        cyanocolbalamin (VITAMIN B-12) 500 MCG tablet Take 500 mcg by mouth every morning          Physical Examination:  /72 (BP Location: Right arm, Patient Position: Sitting, Cuff Size: Child)  Pulse 110  Temp 98.1  F (36.7  C) (Oral)  Resp 18  Wt 48.2 kg (106 lb 4.8 oz)  SpO2 97%  BMI 19.44 kg/m2  Wt Readings from Last 10 Encounters:   03/02/18 48.2 kg (106 lb 4.8 oz)   02/26/18 46.9 kg (103 lb 4.8 oz)   02/23/18 45.5 kg (100 lb 6 oz)   02/12/18 45.5 kg (100 lb 4.8 oz)   02/05/18 47.3 kg (104 lb 4.8 oz)   01/29/18 48.4 kg (106 lb 9.6 oz)   01/24/18 47.2 kg (104 lb)   01/22/18 47.2 kg (104 lb)   01/22/18 47.2 kg (104 lb)   01/10/18 47.4 kg (104 lb 8 oz)     Constitutional: Well-appearing but thin female in no acute distress.  Eyes: EOMI, PERRL. No scleral icterus.  ENT: Oral mucosa is moist without lesions and very minimal thrush.   Lymphatic: Neck is supple without cervical or supraclavicular lymphadenopathy.   Cardiovascular: Regular rate and rhythm. No murmurs, gallops, or rubs. +2 pitting peripheral edema through feet and calves. Mildly tender.  Respiratory: Clear to auscultation bilaterally. No wheezes or crackles.  Gastrointestinal: Bowel sounds present. Abdomen soft, non-tender except minimally in the LLQ. No palpable hepatosplenomegaly or masses.   Neurologic: Cranial nerves II through XII are grossly intact.  Skin: Large ecchymosis on left wrist along with mild swelling and tenderness over the radial styloid process. No erythema over lower extremities.    Laboratory Data:  Results for JONA SHETTY (MRN [id number]) as of 3/2/2018 20:03   3/2/2018 14:08   Sodium 135   Potassium 3.9   Chloride 103   Carbon Dioxide 25   Urea Nitrogen 10   Creatinine 0.54   GFR  Estimate >90   GFR Estimate If Black >90   Calcium 8.2 (L)   Anion Gap 7   Albumin 2.5 (L)   Protein Total 6.0 (L)   Bilirubin Total 0.4   Alkaline Phosphatase 90   ALT 43   AST 30   Glucose 132 (H)   WBC 17.5 (H)   Hemoglobin 9.6 (L)   Hematocrit 30.7 (L)   Platelet Count 617 (H)   RBC Count 3.19 (L)   MCV 96   MCH 30.1   MCHC 31.3 (L)   RDW 21.9 (H)   Diff Method Manual Differential   % Neutrophils 62.2   % Lymphocytes 21.1   % Monocytes 14.0   % Eosinophils 0.0   % Basophils 0.0   % Metamyelocytes 0.9   % Myelocytes 1.8   Absolute Neutrophil 10.9 (H)   Absolute Lymphocytes 3.7   Absolute Monocytes 2.5 (H)   Absolute Eosinophils 0.0   Absolute Basophils 0.0   Absolute Metamyelocytes 0.2 (H)   Absolute Myelocytes 0.3 (H)   Anisocytosis Moderate   Poikilocytosis Slight   Polychromasia Slight   Ovalocytes Slight   [place] Cells Slight   Platelet Estimate Confirming automa...       Assessment and Plan:  1. Metastatic pancreatic cancer  Was recently on gemcitabine/abraxane and was due for treatment this week. 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 Discussed with Dr. Yu. He would like to have close follow-up to allow for improvement of her symptoms and then start a new systemic treatment (likely 5FU and maybe irinotecan). Would not recommend any Oxaliplatin given neuropathy from prior treatments.    Plan to see her back next week and Dr. Yu will touch base with the TARA seeing her to determine best course of action. Of note, we will likely need new baseline images once we decided to start treatment.     2. Pulmonary  Admission for acute on chronic hypoxia and was treated with Tamiflu for positive influenza B and Azithromycin for suspected pneumonia. CT Chest with diffuse ground glass appearance concerning for pneumonitis. Her symptoms did improve with initiation of dexamethasone. O2 Sats on RA today 97%. She does have an improving leukocytosis, likely 2/2  dexamethasone. With no other infectious symptoms can monitor for now.    Continue home inhalers for prior asthma. Continue Lovenox 50mg BID for prior PE. Will start Dexamethasone taper and have her take 2mg daily instead of BID for one week. Continue close monitoring.    3. Peripheral edema  Likely 2/2 IVF from hospitalization plus dexamethasone plus poor nutritional status. Low concern for CHF given normal echo inpatient and no SOB. As above, we are decreasing her dexamethasone to 2mg daily for one week and then off. Also encouraged her to continue adding protein to her diet. Did give prescription for compression stockings which she will fill today. Continue leg elevation.     Monitor for improvement in ambulation. My hope is with her feet less swollen and painful she will be walking better, but if not may need further investigation.    4. Left wrist pain and bruising  She does have significant bruising on her left wrist with swelling. As such, did obtain on left wrist Xray which was negative for acute fracture, however when I spoke to the radiologist he recommended getting an MRI if her pain persisted given her degree of osteoporosis and swelling.    Discussed with patient to not use her wrist much over the weekend and use ice for swelling/pain. I also want to check her Hep Xa level on Monday given the degree of bruising she has. Of note, she denies any other bleeding concerns. She takes her Lovenox at 9am so will plan on labs between 1-3pm.     5. FEN  Weight and albumin improved today and encouraged her to keep doing well with high calorie, protein intake. Continue Nystatin for thrush as I do think this was inhibiting her eating.     Her hydration status is improved but given her normal BP today and history of hypotension instructed her to continue holding her BP medication at this time.     6. GI  For diarrhea she was tested for C. Diff inpatient which was negative. She was instructed to use Imodium but never  got it. She will pick some up today and use PRN for loose stools. Continue Tramadol PRN for abdominal pain, which is unchanged.    Benoit Molina PA-C  [facility]  [address] [place] [address] [phone]

## 2018-03-05 NOTE — NURSING NOTE
"Oncology Rooming Note    March 5, 2018 2:44 PM   Iris Lewis is a 78 year old female who presents for:    Chief Complaint   Patient presents with     Port Draw     Labs drawn from port by RN. Line flushed with saline and heparin. Vs taken and pt checked in for appt     Oncology Clinic Visit     return patient visit for follow up related to poorly differentiated adenocarcinoma      Initial Vitals: /70 (BP Location: Right arm, Cuff Size: Adult Small)  Pulse 68  Temp 98.5  F (36.9  C) (Oral)  Resp 16  Ht 1.575 m (5' 2\")  Wt 45.7 kg (100 lb 11.2 oz)  SpO2 96%  Breastfeeding? No  BMI 18.42 kg/m2 Estimated body mass index is 18.42 kg/(m^2) as calculated from the following:    Height as of this encounter: 1.575 m (5' 2\").    Weight as of this encounter: 45.7 kg (100 lb 11.2 oz). Body surface area is 1.41 meters squared.  No Pain (0) Comment: Data Unavailable   No LMP recorded. Patient is postmenopausal.  Allergies reviewed: Yes  Medications reviewed: Yes    Medications: MEDICATION REFILLS NEEDED TODAY. Provider was notified.  Pharmacy name entered into [facility]:     [facility] - [facility] [address] [facility] [facility] - [address]    Clinical concerns: no concerns manuel was notified.    6 minutes for nursing intake (face to face time)     Sandra Mondragon CMA              "

## 2018-03-05 NOTE — PROGRESS NOTES
Reason for Visit: seen in f/u of metastatic pancreatic cancer    Oncology HPI: Iris Lewis is a 78 year old woman with a hx significant for thyroid cancer s/p thyroidectomy in the 1980s, cholelithiasis s/p CCY 2015, GERD s/p Nissen fundoplication in 2006 and HTN. She was dx with metastatic pancreatic cancer involving the liver, lung and lymph nodes in October 2017 after presenting with obstructive jaundice. She met with  an did not qualify for the HALOZYME study. She was initiated on [place]/Abraxane on 11/28/17. Restaging showed a positive response to treatment after 2 cycles. Prior to cycle 4, she developed acute hypoxia and weakness. She was found to have influenza and pneumonia. 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    Interval history: Iris is noting continued improvement in her breathing. She is no longer requiring O2 regularly. She has noted increased ability to work around the house, but not back to baseline. She cleaned the house yesterday and had to sit down for a bit and rest. She wears the O2 to recover more quickly. No cough or wheezing. No fevers/chills. No new bruises or bleeding. Bowels remain watery at times. Used an imodium last week with some improvement, but had an incontinent stool again today. No back pain or pelvic neuropathy. Has some swelling in the left knee that worsens when she has prolonged activity. This started after her recent fall. Ankle edema is getting better. Has some neuropathy in the fingertips. No mouth sores. No N/V. No abdominal pain or cramping.    Current Outpatient Prescriptions   Medication Sig Dispense Refill     nystatin (MYCOSTATIN) 620561 UNIT/ML suspension        ondansetron (ZOFRAN) 4 MG tablet        prochlorperazine (COMPAZINE) 10 MG tablet        order for DME Equipment being ordered: Compression Stockings. Please dispense 2 pairs of knee high 20-30 mmHg 2 Units 3     traMADol (ULTRAM) 50 MG tablet Take 1-2 tablets (  mg) by mouth every 4 hours as needed for breakthrough pain (No more than 8 tablets in a day) 60 tablet 0     LORazepam (ATIVAN) 0.5 MG tablet Take 1 tablet (0.5 mg) by mouth every 4 hours as needed (Anxiety, Nausea/Vomiting or Sleep) 15 tablet 0     enoxaparin (LOVENOX) 60 MG/0.6ML injection Inject 0.5 mLs (50 mg) Subcutaneous every 12 hours  0     dexamethasone (DECADRON) 2 MG tablet Take 1 tablet (2 mg) by mouth every 12 hours 20 tablet 0     hydrOXYzine (ATARAX) 25 MG tablet Take 1-2 tablets (25-50 mg) by mouth every 6 hours as needed for itching (adjuvant pain) 10 tablet 0     albuterol (PROAIR HFA/PROVENTIL HFA/VENTOLIN HFA) 108 (90 BASE) MCG/ACT Inhaler Inhale 2 puffs into the lungs 4 times daily 1 Inhaler 1     fluticasone-vilanterol (BREO ELLIPTA) 100-25 MCG/INH oral inhaler Inhale 1 puff into the lungs daily 1 Inhaler 1     prochlorperazine (COMPAZINE) 5 MG tablet Take 1 tablet (5 mg) by mouth every 6 hours as needed for nausea or vomiting 30 tablet 0     CARTIA  MG 24 hr capsule TK 1 C PO QD 10 capsule 0     amylase-lipase-protease (CREON) 78153 UNITS CPEP Take 2 capsules (72,000 Units) by mouth 3 times daily (with meals) 60 capsule 0     timolol maleate (ISTALOL) 0.5 % opthalmic solution Place 1 drop into both eyes every morning Before placing contact lenses 1 Bottle 0     levothyroxine (SYNTHROID/LEVOTHROID) 125 MCG tablet Take 1 tablet (125 mcg) by mouth daily 10 tablet 0     omeprazole (PRILOSEC) 40 MG capsule Take 1 capsule (40 mg) by mouth 2 times daily Take 30-60 minutes before a meal. 20 capsule 0     triamterene-hydrochlorothiazide (DYAZIDE) 37.5-25 MG per capsule        order for DME 1unit being ordered: cranial prosthesis 1 Units 0     Acetaminophen (TYLENOL PO) Take 325 mg by mouth every 4 hours as needed for mild pain or fever       CALCIUM PO Take by mouth every morning Form/strength unknown. Powder formulation. Add to water and fruits/vegetables daily to promote bone health.         "triamterene-hydrochlorothiazide (MAXZIDE-25) 37.5-25 MG per tablet Take 1 tablet by mouth every morning        cyanocolbalamin (VITAMIN B-12) 500 MCG tablet Take 500 mcg by mouth every morning        folic acid (FOLVITE) 400 MCG tablet Take 1 tablet by mouth every morning        cholecalciferol (VITAMIN D) 1000 UNIT tablet Take 1 tablet by mouth 2 times daily             Allergies   Allergen Reactions     Nkda [No Known Drug Allergies]          Exam: alert, appears chronically ill. Blood pressure 115/70, pulse 68, temperature 98.5  F (36.9  C), temperature source Oral, resp. rate 16, height 1.575 m (5' 2\"), weight 45.7 kg (100 lb 11.2 oz), SpO2 96 %, not currently breastfeeding.  Wt Readings from Last 4 Encounters:   03/05/18 45.7 kg (100 lb 11.2 oz)   03/02/18 48.2 kg (106 lb 4.8 oz)   02/26/18 46.9 kg (103 lb 4.8 oz)   02/23/18 45.5 kg (100 lb 6 oz)     Oropharynx is moist, no focal lesion. No icterus. Neck supple and without adenopathy. Lungs:CTA. Heart:RRR, no murmur or rub. Abdomen: soft, non-tender, BS active. No masses or organomegaly. Extremities: warm, bruising noted on the left wrist and left knee. Pitting ankle edema bilaterally    Labs: Results for JONA SHETTY (MRN [id number]) as of 3/5/2018 16:32   Ref. Range 3/5/2018 14:20   Sodium Latest Ref Range: 133 - 144 mmol/L 137   Potassium Latest Ref Range: 3.4 - 5.3 mmol/L 4.5   Chloride Latest Ref Range: 94 - 109 mmol/L 104   Carbon Dioxide Latest Ref Range: 20 - 32 mmol/L 28   Urea Nitrogen Latest Ref Range: 7 - 30 mg/dL 11   Creatinine Latest Ref Range: 0.52 - 1.04 mg/dL 0.55   GFR Estimate Latest Ref Range: >60 mL/min/1.7m2 >90   GFR Estimate If Black Latest Ref Range: >60 mL/min/1.7m2 >90   Calcium Latest Ref Range: 8.5 - 10.1 mg/dL 8.1 (L)   Anion Gap Latest Ref Range: 3 - 14 mmol/L 5   Albumin Latest Ref Range: 3.4 - 5.0 g/dL 2.5 (L)   Protein Total Latest Ref Range: 6.8 - 8.8 g/dL 6.4 (L)   Bilirubin Total Latest Ref Range: 0.2 - 1.3 mg/dL 0.4 "   Alkaline Phosphatase Latest Ref Range: 40 - 150 U/L 87   ALT Latest Ref Range: 0 - 50 U/L 37   AST Latest Ref Range: 0 - 45 U/L 26   Glucose Latest Ref Range: 70 - 99 mg/dL 81   WBC Latest Ref Range: 4.0 - 11.0 10e9/L 17.4 (H)   Hemoglobin Latest Ref Range: 11.7 - 15.7 g/dL 9.3 (L)   Hematocrit Latest Ref Range: 35.0 - 47.0 % 29.6 (L)   Platelet Count Latest Ref Range: 150 - 450 10e9/L 467 (H)   RBC Count Latest Ref Range: 3.8 - 5.2 10e12/L 2.99 (L)   MCV Latest Ref Range: 78 - 100 fl 99   MCH Latest Ref Range: 26.5 - 33.0 pg 31.1   MCHC Latest Ref Range: 31.5 - 36.5 g/dL 31.4 (L)   RDW Latest Ref Range: 10.0 - 15.0 % 21.6 (H)   Diff Method Unknown Manual Differential   % Neutrophils Latest Units: % 80.9   % Lymphocytes Latest Units: % 5.2   % Monocytes Latest Units: % 4.3   % Eosinophils Latest Units: % 0.9   % Basophils Latest Units: % 3.5   % Metamyelocytes Latest Units: % 0.9   % Myelocytes Latest Units: % 4.3   Absolute Neutrophil Latest Ref Range: 1.6 - 8.3 10e9/L 14.1 (H)   Absolute Lymphocytes Latest Ref Range: 0.8 - 5.3 10e9/L 0.9   Absolute Monocytes Latest Ref Range: 0.0 - 1.3 10e9/L 0.7   Absolute Eosinophils Latest Ref Range: 0.0 - 0.7 10e9/L 0.2   Absolute Basophils Latest Ref Range: 0.0 - 0.2 10e9/L 0.6 (H)   Absolute Metamyelocytes Latest Ref Range: 0 10e9/L 0.2 (H)   Absolute Myelocytes Latest Ref Range: 0 10e9/L 0.7 (H)   Anisocytosis Unknown Moderate   Poikilocytosis Unknown Slight   Target Cells Unknown Slight   Ovalocytes Unknown Slight   Platelet Estimate Unknown Confirming automa...       Impression/plan:   1. Metastatic pancreatic cancer involving the liver, lung and retroperitoneal nodes  -on gem/abraxane with positive response after 2 months,but developed complicating pneumonitis (See below)  -discussed alternate treatment options including 5FU and liposomal irinotecan  -she continues to want to treat the cancer, but acknowledges that she needs to be stronger.  -Discussed that 5 FU is  given via continuous infusion pump and liposomal irinotecan is given as an IV infusion. Treatment cycles every 2 weeks. Would plan on re-staging every 2 months. Side effects reviewed including cytopenias, fatigue, N/V, diarrhea, alopecia, mucositis, hand/foot toxicity  -she was interested in pursuing that once she makes a better recovery  --anticipate at least another week recovery before we would be ready to start any treatment  -will plan to see her next week. If doing better, will schedule CT CAP for new baseline and discuss starting 5FU and liposomal irinotecan    2. Hx of pneumonitis in the setting of influenza B +, month long hx of SOB, and prior Gemzar use  -clinically improving with increased exercise tolerance and less need for home O2  -reviewed hx of pneumonitis with . 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 Hx of bilateral PE, dx Dec 2017  Current on enoxaparin 50 mg bid (adjusted for weight in the hospital)  -was to have a Xa drawn today, but forgot to time that with her lab draw. Today's level will not be accurate  -No new bruises and no active bleeding now. Will scheduled a timed Xa next week    4. Diarrhea: c. Diff negative last week, but continues to have occasional incontinent watery stools  -repeat C. Diff today    5. Leukocytosis: s/t steroids. No s/s of recurrent infection    6. Peripheral edema, improving as getting further out from hospitalization and decreased dexamethasone to 2 mg daily    7. Nutrition: starting to eat a little better now. Albumin remains low at 2.5, but stable. Weight loss this week likely related to improving edema.

## 2018-03-05 NOTE — LETTER
3/5/2018       RE: Iris Lewis  [address]  [place] [address]     Dear Colleague,    Thank you for referring your patient, Iris Lewis, to the [facility]. Please see a copy of my visit note below.    Reason for Visit: seen in f/u of metastatic pancreatic cancer    Oncology HPI: Iris Lewis is a 78 year old woman with a hx significant for thyroid cancer s/p thyroidectomy in the 1980s, cholelithiasis s/p CCY 2015, GERD s/p Nissen fundoplication in 2006 and HTN. She was dx with metastatic pancreatic cancer involving the liver, lung and lymph nodes in October 2017 after presenting with obstructive jaundice. She met with  an did not qualify for the HALOZYME study. She was initiated on Clarion/Abraxane on 11/28/17. Restaging showed a positive response to treatment after 2 cycles. Prior to cycle 4, she developed acute hypoxia and weakness. She was found to have influenza and pneumonia. 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    Interval history: Iris is noting continued improvement in her breathing. She is no longer requiring O2 regularly. She has noted increased ability to work around the house, but not back to baseline. She cleaned the house yesterday and had to sit down for a bit and rest. She wears the O2 to recover more quickly. No cough or wheezing. No fevers/chills. No new bruises or bleeding. Bowels remain watery at times. Used an imodium last week with some improvement, but had an incontinent stool again today. No back pain or pelvic neuropathy. Has some swelling in the left knee that worsens when she has prolonged activity. This started after her recent fall. Ankle edema is getting better. Has some neuropathy in the fingertips. No mouth sores. No N/V. No abdominal pain or cramping.    Current Outpatient Prescriptions   Medication Sig Dispense Refill     nystatin (MYCOSTATIN) 787902 UNIT/ML suspension        ondansetron (ZOFRAN) 4 MG  tablet        prochlorperazine (COMPAZINE) 10 MG tablet        order for DME Equipment being ordered: Compression Stockings. Please dispense 2 pairs of knee high 20-30 mmHg 2 Units 3     traMADol (ULTRAM) 50 MG tablet Take 1-2 tablets ( mg) by mouth every 4 hours as needed for breakthrough pain (No more than 8 tablets in a day) 60 tablet 0     LORazepam (ATIVAN) 0.5 MG tablet Take 1 tablet (0.5 mg) by mouth every 4 hours as needed (Anxiety, Nausea/Vomiting or Sleep) 15 tablet 0     enoxaparin (LOVENOX) 60 MG/0.6ML injection Inject 0.5 mLs (50 mg) Subcutaneous every 12 hours  0     dexamethasone (DECADRON) 2 MG tablet Take 1 tablet (2 mg) by mouth every 12 hours 20 tablet 0     hydrOXYzine (ATARAX) 25 MG tablet Take 1-2 tablets (25-50 mg) by mouth every 6 hours as needed for itching (adjuvant pain) 10 tablet 0     albuterol (PROAIR HFA/PROVENTIL HFA/VENTOLIN HFA) 108 (90 BASE) MCG/ACT Inhaler Inhale 2 puffs into the lungs 4 times daily 1 Inhaler 1     fluticasone-vilanterol (BREO ELLIPTA) 100-25 MCG/INH oral inhaler Inhale 1 puff into the lungs daily 1 Inhaler 1     prochlorperazine (COMPAZINE) 5 MG tablet Take 1 tablet (5 mg) by mouth every 6 hours as needed for nausea or vomiting 30 tablet 0     CARTIA  MG 24 hr capsule TK 1 C PO QD 10 capsule 0     amylase-lipase-protease (CREON) 59165 UNITS CPEP Take 2 capsules (72,000 Units) by mouth 3 times daily (with meals) 60 capsule 0     timolol maleate (ISTALOL) 0.5 % opthalmic solution Place 1 drop into both eyes every morning Before placing contact lenses 1 Bottle 0     levothyroxine (SYNTHROID/LEVOTHROID) 125 MCG tablet Take 1 tablet (125 mcg) by mouth daily 10 tablet 0     omeprazole (PRILOSEC) 40 MG capsule Take 1 capsule (40 mg) by mouth 2 times daily Take 30-60 minutes before a meal. 20 capsule 0     triamterene-hydrochlorothiazide (DYAZIDE) 37.5-25 MG per capsule        order for DME 1unit being ordered: cranial prosthesis 1 Units 0     Acetaminophen  "(TYLENOL PO) Take 325 mg by mouth every 4 hours as needed for mild pain or fever       CALCIUM PO Take by mouth every morning Form/strength unknown. Powder formulation. Add to water and fruits/vegetables daily to promote bone health.        triamterene-hydrochlorothiazide (MAXZIDE-25) 37.5-25 MG per tablet Take 1 tablet by mouth every morning        cyanocolbalamin (VITAMIN B-12) 500 MCG tablet Take 500 mcg by mouth every morning        folic acid (FOLVITE) 400 MCG tablet Take 1 tablet by mouth every morning        cholecalciferol (VITAMIN D) 1000 UNIT tablet Take 1 tablet by mouth 2 times daily             Allergies   Allergen Reactions     Nkda [No Known Drug Allergies]          Exam: alert, appears chronically ill. Blood pressure 115/70, pulse 68, temperature 98.5  F (36.9  C), temperature source Oral, resp. rate 16, height 1.575 m (5' 2\"), weight 45.7 kg (100 lb 11.2 oz), SpO2 96 %, not currently breastfeeding.  Wt Readings from Last 4 Encounters:   03/05/18 45.7 kg (100 lb 11.2 oz)   03/02/18 48.2 kg (106 lb 4.8 oz)   02/26/18 46.9 kg (103 lb 4.8 oz)   02/23/18 45.5 kg (100 lb 6 oz)     Oropharynx is moist, no focal lesion. No icterus. Neck supple and without adenopathy. Lungs:CTA. Heart:RRR, no murmur or rub. Abdomen: soft, non-tender, BS active. No masses or organomegaly. Extremities: warm, bruising noted on the left wrist and left knee. Pitting ankle edema bilaterally    Labs: Results for JONA SHETTY (MRN [id number]) as of 3/5/2018 16:32   Ref. Range 3/5/2018 14:20   Sodium Latest Ref Range: 133 - 144 mmol/L 137   Potassium Latest Ref Range: 3.4 - 5.3 mmol/L 4.5   Chloride Latest Ref Range: 94 - 109 mmol/L 104   Carbon Dioxide Latest Ref Range: 20 - 32 mmol/L 28   Urea Nitrogen Latest Ref Range: 7 - 30 mg/dL 11   Creatinine Latest Ref Range: 0.52 - 1.04 mg/dL 0.55   GFR Estimate Latest Ref Range: >60 mL/min/1.7m2 >90   GFR Estimate If Black Latest Ref Range: >60 mL/min/1.7m2 >90   Calcium Latest Ref " Range: 8.5 - 10.1 mg/dL 8.1 (L)   Anion Gap Latest Ref Range: 3 - 14 mmol/L 5   Albumin Latest Ref Range: 3.4 - 5.0 g/dL 2.5 (L)   Protein Total Latest Ref Range: 6.8 - 8.8 g/dL 6.4 (L)   Bilirubin Total Latest Ref Range: 0.2 - 1.3 mg/dL 0.4   Alkaline Phosphatase Latest Ref Range: 40 - 150 U/L 87   ALT Latest Ref Range: 0 - 50 U/L 37   AST Latest Ref Range: 0 - 45 U/L 26   Glucose Latest Ref Range: 70 - 99 mg/dL 81   WBC Latest Ref Range: 4.0 - 11.0 10e9/L 17.4 (H)   Hemoglobin Latest Ref Range: 11.7 - 15.7 g/dL 9.3 (L)   Hematocrit Latest Ref Range: 35.0 - 47.0 % 29.6 (L)   Platelet Count Latest Ref Range: 150 - 450 10e9/L 467 (H)   RBC Count Latest Ref Range: 3.8 - 5.2 10e12/L 2.99 (L)   MCV Latest Ref Range: 78 - 100 fl 99   MCH Latest Ref Range: 26.5 - 33.0 pg 31.1   MCHC Latest Ref Range: 31.5 - 36.5 g/dL 31.4 (L)   RDW Latest Ref Range: 10.0 - 15.0 % 21.6 (H)   Diff Method Unknown Manual Differential   % Neutrophils Latest Units: % 80.9   % Lymphocytes Latest Units: % 5.2   % Monocytes Latest Units: % 4.3   % Eosinophils Latest Units: % 0.9   % Basophils Latest Units: % 3.5   % Metamyelocytes Latest Units: % 0.9   % Myelocytes Latest Units: % 4.3   Absolute Neutrophil Latest Ref Range: 1.6 - 8.3 10e9/L 14.1 (H)   Absolute Lymphocytes Latest Ref Range: 0.8 - 5.3 10e9/L 0.9   Absolute Monocytes Latest Ref Range: 0.0 - 1.3 10e9/L 0.7   Absolute Eosinophils Latest Ref Range: 0.0 - 0.7 10e9/L 0.2   Absolute Basophils Latest Ref Range: 0.0 - 0.2 10e9/L 0.6 (H)   Absolute Metamyelocytes Latest Ref Range: 0 10e9/L 0.2 (H)   Absolute Myelocytes Latest Ref Range: 0 10e9/L 0.7 (H)   Anisocytosis Unknown Moderate   Poikilocytosis Unknown Slight   Target Cells Unknown Slight   Ovalocytes Unknown Slight   Platelet Estimate Unknown Confirming automa...       Impression/plan:   1. Metastatic pancreatic cancer involving the liver, lung and retroperitoneal nodes  -on gem/abraxane with positive response after 2 months,but  developed complicating pneumonitis (See below)  -discussed alternate treatment options including 5FU and liposomal irinotecan  -she continues to want to treat the cancer, but acknowledges that she needs to be stronger.  -Discussed that 5 FU is given via continuous infusion pump and liposomal irinotecan is given as an IV infusion. Treatment cycles every 2 weeks. Would plan on re-staging every 2 months. Side effects reviewed including cytopenias, fatigue, N/V, diarrhea, alopecia, mucositis, hand/foot toxicity  -she was interested in pursuing that once she makes a better recovery  --anticipate at least another week recovery before we would be ready to start any treatment  -will plan to see her next week. If doing better, will schedule CT CAP for new baseline and discuss starting 5FU and liposomal irinotecan    2. Hx of pneumonitis in the setting of influenza B +, month long hx of SOB, and prior Gemzar use  -clinically improving with increased exercise tolerance and less need for home O2  -reviewed hx of pneumonitis with . 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 Hx of bilateral PE, dx Dec 2017  Current on enoxaparin 50 mg bid (adjusted for weight in the hospital)  -was to have a Xa drawn today, but forgot to time that with her lab draw. Today's level will not be accurate  -No new bruises and no active bleeding now. Will scheduled a timed Xa next week    4. Diarrhea: c. Diff negative last week, but continues to have occasional incontinent watery stools  -repeat C. Diff today    5. Leukocytosis: s/t steroids. No s/s of recurrent infection    6. Peripheral edema, improving as getting further out from hospitalization and decreased dexamethasone to 2 mg daily    7. Nutrition: starting to eat a little better now. Albumin remains low at 2.5,  but stable. Weight loss this week likely related to improving edema.      Again, thank you for allowing me to participate in the care of your patient.      Sincerely,    CAT Block CNP

## 2018-03-05 NOTE — MR AVS SNAPSHOT
After Visit Summary   3/5/2018    Iris Lewis    MRN: [id number]           Patient Information     Date Of Birth          1940        Visit Information        Provider Department      3/5/2018 2:40 PM Quyen Mazariegos APRN CNP [facility]        Today's Diagnoses     Diarrhea, unspecified type    -  1    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)        Anticoagulated on heparin           Follow-ups after your visit        Your next 10 appointments already scheduled     Mar 08, 2018 10:00 AM CST   TELEMEDICINE with Bhakti Evans [facility] Medication Therapy Management ([facility])    [address]  [address]  [place] [place] [id number] [phone]           Note: this is not an onsite visit; there is no need to come to the facility.            Mar 12, 2018  2:00 PM CDT   [facility] Lab Draw with  [facility] LAB DRAW   [facility] Lab Draw ([facility])    [address]  [address]  [place] [place] [id number] [phone]            Mar 12, 2018  2:40 PM CDT   (Arrive by 2:25 PM)   Return Visit with CAT Dang CNP   [facility] ([facility])    [address]  [address]  [place] [id number] [phone]            Mar 16, 2018  3:00 PM CDT   (Arrive by 2:45 PM)   Return Visit with José Antonio Ann MD   [facility] ([facility])    [address]  [address]  [place] [place] [id number] [phone]            Mar 26, 2018  7:00 AM CDT   (Arrive by 6:45 AM)   CT CHEST ABDOMEN PELVIS W/O & W CONTRAST with UCCT2   [facility] ([facility])    [address]  1st Floor  [place] [place] [id number] [phone]           Please bring any scans or X-rays taken at other hospitals, if similar tests were done. Also bring a list of your medicines,  including vitamins, minerals and over-the-counter drugs. It is safest to leave personal items at home.  Be sure to tell your doctor:   If you have any allergies.   If there s any chance you are pregnant.   If you are breastfeeding.  You may have contrast for this exam. To prepare:   Do not eat or drink for 2 hours before your exam. If you need to take medicine, you may take it with small sips of water. (We may ask you to take liquid medicine as well.)   The day before your exam, drink extra fluids at least six 8-ounce glasses (unless your doctor tells you to restrict your fluids).   You will be given instructions on how to drink the contrast.  Patients over 70 or patients with diabetes or kidney problems:   If you haven t had a blood test (creatinine test) within the last 30 days, the Cardiologist/Radiologist may require you to get this test prior to your exam.  If you have diabetes:   Continue to take your metformin medication on the day of your exam  Please wear loose clothing, such as a sweat suit or jogging clothes. Avoid snaps, zippers and other metal. We may ask you to undress and put on a hospital gown.  If you have any questions, please call the Imaging Department where you will have your exam.            Mar 26, 2018  7:45 AM CDT   [facility] Lab Draw with  [facility] LAB DRAW   [facility] Lab Draw ([facility])    [address] [place] [address]  [address]  [facility] [id number] [phone]            Mar 26, 2018  8:45 AM CDT   (Arrive by 8:30 AM)   Return Visit with Vinny Yu MD   [facility] ([facility])    [address] [place] [address]  [address]  [facility] [id number] [phone]              Future tests that were ordered for you today     Open Future Orders        Priority Expected Expires Ordered    Clostridium difficile toxin B PCR Routine  5/4/2018 3/5/2018            Who to contact     If you have questions or need  "follow up information about today's clinic visit or your schedule please contact [facility] directly at [phone].  Normal or non-critical lab and imaging results will be communicated to you by [employer], letter or phone within 4 business days after the clinic has received the results. If you do not hear from us within 7 days, please contact the clinic through [employer] or phone. If you have a critical or abnormal lab result, we will notify you by phone as soon as possible.  Submit refill requests through [employer] or call your pharmacy and they will forward the refill request to us. Please allow 3 business days for your refill to be completed.          Additional Information About Your Visit        [employer] Information     [employer] gives you secure access to your electronic health record. If you see a primary care provider, you can also send messages to your care team and make appointments. If you have questions, please call your primary care clinic.  If you do not have a primary care provider, please call [phone] and they will assist you.        Care EveryWhere ID     This is your Care EveryWhere ID. This could be used by other organizations to access your [facility] medical records  [id number]        Your Vitals Were     Pulse Temperature Respirations Height Pulse Oximetry Breastfeeding?    68 98.5  F (36.9  C) (Oral) 16 1.575 m (5' 2\") 96% No    BMI (Body Mass Index)                   18.42 kg/m2            Blood Pressure from Last 3 Encounters:   03/05/18 115/70   03/02/18 127/72   02/27/18 120/67    Weight from Last 3 Encounters:   03/05/18 45.7 kg (100 lb 11.2 oz)   03/02/18 48.2 kg (106 lb 4.8 oz)   02/26/18 46.9 kg (103 lb 4.8 oz)              We Performed the Following     *CBC with platelets differential     Cancer antigen 19-9     Comprehensive metabolic panel        Primary Care Provider Office Phone # Fax #    Neri Gipson [phone] [address] " [facility] [address]        Equal Access to Services     NESSA NICHOLS : Hadii mary simms madison Martinez, waronnyda luqadaha, qaybta kaalmada masood, lisa michael. So [facility] [phone].    ATENCIÓN: Si habla español, tiene a nava disposición servicios gratuitos de asistencia lingüística. Indu al [phone].    We comply with applicable federal civil rights laws and [place] laws. We do not discriminate on the basis of race, color, national origin, age, disability, sex, sexual orientation, or gender identity.            Thank you!     Thank you for choosing [facility]  for your care. Our goal is always to provide you with excellent care. Hearing back from our patients is one way we can continue to improve our services. Please take a few minutes to complete the written survey that you may receive in the mail after your visit with us. Thank you!             Your Updated Medication List - Protect others around you: Learn how to safely use, store and throw away your medicines at www.disposemymeds.org.          This list is accurate as of 3/5/18  4:50 PM.  Always use your most recent med list.                   Brand Name Dispense Instructions for use Diagnosis    albuterol 108 (90 BASE) MCG/ACT Inhaler    PROAIR HFA/PROVENTIL HFA/VENTOLIN HFA    1 Inhaler    Inhale 2 puffs into the lungs 4 times daily    Mild asthma, unspecified whether complicated, unspecified whether persistent       amylase-lipase-protease 36053 UNITS Cpep    CREON    60 capsule    Take 2 capsules (72,000 Units) by mouth 3 times daily (with meals)    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)       CALCIUM PO      Take by mouth every morning Form/strength unknown. Powder formulation. Add to water and fruits/vegetables daily to promote bone health.        CARTIA  MG 24 hr capsule   Generic drug:  diltiazem     10 capsule    TK 1 C PO QD    Benign essential hypertension       cholecalciferol 1000  UNIT tablet    vitamin D3     Take 1 tablet by mouth 2 times daily        cyanocolbalamin 500 MCG tablet    vitamin  B-12     Take 500 mcg by mouth every morning        dexamethasone 2 MG tablet    DECADRON    20 tablet    Take 1 tablet (2 mg) by mouth every 12 hours    Pneumonitis       enoxaparin 60 MG/0.6ML injection    LOVENOX     Inject 0.5 mLs (50 mg) Subcutaneous every 12 hours    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)       fluticasone-vilanterol 100-25 MCG/INH oral inhaler    BREO ELLIPTA    1 Inhaler    Inhale 1 puff into the lungs daily    Cough       folic acid 400 MCG tablet    FOLVITE     Take 1 tablet by mouth every morning        hydrOXYzine 25 MG tablet    ATARAX    10 tablet    Take 1-2 tablets (25-50 mg) by mouth every 6 hours as needed for itching (adjuvant pain)    Obstructive jaundice       levothyroxine 125 MCG tablet    SYNTHROID/LEVOTHROID    10 tablet    Take 1 tablet (125 mcg) by mouth daily    Hypothyroidism, unspecified type       LORazepam 0.5 MG tablet    ATIVAN    15 tablet    Take 1 tablet (0.5 mg) by mouth every 4 hours as needed (Anxiety, Nausea/Vomiting or Sleep)    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)       nystatin 830497 UNIT/ML suspension    MYCOSTATIN          omeprazole 40 MG capsule    priLOSEC    20 capsule    Take 1 capsule (40 mg) by mouth 2 times daily Take 30-60 minutes before a meal.    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 Please dispense 2 pairs of knee high 20-30 mmHg    Lymphedema       * prochlorperazine 10 MG tablet    COMPAZINE          * prochlorperazine 5 MG tablet    COMPAZINE    30 tablet    Take 1 tablet (5 mg) by mouth every 6 hours as needed for nausea or vomiting    Malignant  neoplasm of head of pancreas (H)       timolol maleate 0.5 % opthalmic solution    ISTALOL    1 Bottle    Place 1 drop into both eyes every morning Before placing contact lenses    Cataract, unspecified cataract type, unspecified laterality       traMADol 50 MG tablet    ULTRAM    60 tablet    Take 1-2 tablets ( mg) by mouth every 4 hours as needed for breakthrough pain (No more than 8 tablets in a day)    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)       * triamterene-hydrochlorothiazide 37.5-25 MG per tablet    MAXZIDE-25     Take 1 tablet by mouth every morning        * triamterene-hydrochlorothiazide 37.5-25 MG per capsule    DYAZIDE          TYLENOL PO      Take 325 mg by mouth every 4 hours as needed for mild pain or fever        * Notice:  This list has 6 medication(s) that are the same as other medications prescribed for you. Read the directions carefully, and ask your doctor or other care provider to review them with you.

## 2018-03-05 NOTE — NURSING NOTE
"Chief Complaint   Patient presents with     Port Draw     Labs drawn from port by RN. Line flushed with saline and heparin. Vs taken and pt checked in for appt     Port accessed with 20g 3/4\" gripper needle by RN, labs collected, line flushed with saline and heparin.  Vitals taken. Pt checked in for appointment(s).    Ana Winchester RN  "

## 2018-03-12 NOTE — NURSING NOTE
Port accessed by RN, pt tolerated well. Labs collected and sent, line flushed with NS and heparin. Vitals taken and pt checked in for next appt.   Danya Stevens

## 2018-03-12 NOTE — NURSING NOTE
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     Initial Vitals: /65 (BP Location: Right arm, Patient Position: Sitting, Cuff Size: Adult Small)  Pulse 81  Temp 98.1  F (36.7  C)  Resp 18  Ht 1.575 m (5' 2.01\")  Wt 44.1 kg (97 lb 3.2 oz)  SpO2 95%  BMI 17.77 kg/m2 Estimated body mass index is 17.77 kg/(m^2) as calculated from the following:    Height as of this encounter: 1.575 m (5' 2.01\").    Weight as of this encounter: 44.1 kg (97 lb 3.2 oz). Body surface area is 1.39 meters squared.  No Pain (0) Comment: Data Unavailable   No LMP recorded. Patient is postmenopausal.  Allergies reviewed: Yes  Medications reviewed: Yes    Medications: MEDICATION REFILLS NEEDED TODAY. Provider was notified.  Pharmacy name entered into [employer]:    [facility] [address] [facility] [place] [address] [facility] [address] [facility] [address]    Clinical concerns: Refill needed for Tramadol 50 MG tablet. Provider was notified.    10 minutes for nursing intake (face to face time)     Donna Munguia LPN            "

## 2018-03-12 NOTE — LETTER
3/12/2018       RE: Iris Lewis  [address]  [place] [address]     Dear Colleague,    Thank you for referring your patient, Iris Lewis, to the [facility]. Please see a copy of my visit note below.    Reason for Visit: seen in f/u of metastatic pancreatic [facility]    Oncology HPI: Iris Lewis is a 78 year old woman with a hx significant for thyroid cancer s/p thyroidectomy in the 1980s, cholelithiasis s/p CCY 2015, GERD s/p Nissen fundoplication in 2006 and HTN. She was dx with metastatic pancreatic cancer involving the liver, lung and lymph nodes in October 2017 after presenting with obstructive jaundice. She met with  an did not qualify for the HALOZYME study. She was initiated on [place]/Abraxane on 11/28/17. Restaging showed a positive response to treatment after 2 cycles. Prior to cycle 4, she developed acute hypoxia and weakness. She was found to have influenza and pneumonia. 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    Interval history: Iris is here with her , Neri, today. She is not feeling significantly better from last week, but notes a few areas of improvement. Leg/ankle edema is a little better. Appetite is improving. Breathing is stable, but still feels fatigued with stair climbing. Doesn't feel the need for O2 any longer. Has occasional wheezing with exertion. Isn't able to exercise much yet. Has developed some mid-abdominal pain and queeziness. Does not have reflux or vomiting since her Nissen fundoplication. No trouble with dysphagia. Has occasional vocal hoarseness. No new bruises or bleeding. No melena or hematuria. Diarrhea from last week has resolved. Bowels regular now. No new bone aches/pains. No fevers/chills. No headaches or vision/hearing changes. No focal weakness.    Current Outpatient Prescriptions   Medication Sig Dispense Refill     nystatin (MYCOSTATIN) 858819 UNIT/ML suspension        ondansetron  (ZOFRAN) 4 MG tablet        prochlorperazine (COMPAZINE) 10 MG tablet        order for DME Equipment being ordered: Compression Stockings. Please dispense 2 pairs of knee high 20-30 mmHg 2 Units 3     traMADol (ULTRAM) 50 MG tablet Take 1-2 tablets ( mg) by mouth every 4 hours as needed for breakthrough pain (No more than 8 tablets in a day) 60 tablet 0     LORazepam (ATIVAN) 0.5 MG tablet Take 1 tablet (0.5 mg) by mouth every 4 hours as needed (Anxiety, Nausea/Vomiting or Sleep) 15 tablet 0     enoxaparin (LOVENOX) 60 MG/0.6ML injection Inject 0.5 mLs (50 mg) Subcutaneous every 12 hours  0     dexamethasone (DECADRON) 2 MG tablet Take 1 tablet (2 mg) by mouth every 12 hours 20 tablet 0     hydrOXYzine (ATARAX) 25 MG tablet Take 1-2 tablets (25-50 mg) by mouth every 6 hours as needed for itching (adjuvant pain) 10 tablet 0     albuterol (PROAIR HFA/PROVENTIL HFA/VENTOLIN HFA) 108 (90 BASE) MCG/ACT Inhaler Inhale 2 puffs into the lungs 4 times daily 1 Inhaler 1     fluticasone-vilanterol (BREO ELLIPTA) 100-25 MCG/INH oral inhaler Inhale 1 puff into the lungs daily 1 Inhaler 1     prochlorperazine (COMPAZINE) 5 MG tablet Take 1 tablet (5 mg) by mouth every 6 hours as needed for nausea or vomiting 30 tablet 0     CARTIA  MG 24 hr capsule TK 1 C PO QD 10 capsule 0     amylase-lipase-protease (CREON) 84848 UNITS CPEP Take 2 capsules (72,000 Units) by mouth 3 times daily (with meals) 60 capsule 0     timolol maleate (ISTALOL) 0.5 % opthalmic solution Place 1 drop into both eyes every morning Before placing contact lenses 1 Bottle 0     levothyroxine (SYNTHROID/LEVOTHROID) 125 MCG tablet Take 1 tablet (125 mcg) by mouth daily 10 tablet 0     omeprazole (PRILOSEC) 40 MG capsule Take 1 capsule (40 mg) by mouth 2 times daily Take 30-60 minutes before a meal. 20 capsule 0     triamterene-hydrochlorothiazide (DYAZIDE) 37.5-25 MG per capsule        order for DME 1unit being ordered: cranial prosthesis 1 Units 0      Acetaminophen (TYLENOL PO) Take 325 mg by mouth every 4 hours as needed for mild pain or fever       CALCIUM PO Take by mouth every morning Form/strength unknown. Powder formulation. Add to water and fruits/vegetables daily to promote bone health.        triamterene-hydrochlorothiazide (MAXZIDE-25) 37.5-25 MG per tablet Take 1 tablet by mouth every morning        cyanocolbalamin (VITAMIN B-12) 500 MCG tablet Take 500 mcg by mouth every morning        folic acid (FOLVITE) 400 MCG tablet Take 1 tablet by mouth every morning        cholecalciferol (VITAMIN D) 1000 UNIT tablet Take 1 tablet by mouth 2 times daily             Allergies   Allergen Reactions     Nkda [No Known Drug Allergies]          Exam: alert, appears slim, in NAD. Blood pressure 100/65, pulse 81, temperature 98.1  F (36.7  C), resp. rate 18, weight 44.1 kg (97 lb 3.2 oz), SpO2 95 %, not currently breastfeeding.  Wt Readings from Last 4 Encounters:   03/12/18 44.1 kg (97 lb 3.2 oz)   03/05/18 45.7 kg (100 lb 11.2 oz)   03/02/18 48.2 kg (106 lb 4.8 oz)   02/26/18 46.9 kg (103 lb 4.8 oz)     Oropharynx is moist, no focal lesion. No icterus. Neck supple and without adenopathy. Lungs:CTA. Heart:RRR, no murmur or rub. Abdomen: soft, mildly tender in mid-epigastrum, BS active. No masses or organomegaly. Extremities: warm, trace ankle edema bilaterally. Speech is clear. CN wnl. Gait/station wnl. Skin: multiple bruises on forearms/wrist, not significantly changed from last week.    Labs: Results for JONA SHETTY (MRN [id number]) as of 3/12/2018 16:59   Ref. Range 3/12/2018 14:36   Sodium Latest Ref Range: 133 - 144 mmol/L 134   Potassium Latest Ref Range: 3.4 - 5.3 mmol/L 3.9   Chloride Latest Ref Range: 94 - 109 mmol/L 100   Carbon Dioxide Latest Ref Range: 20 - 32 mmol/L 27   Urea Nitrogen Latest Ref Range: 7 - 30 mg/dL 9   Creatinine Latest Ref Range: 0.52 - 1.04 mg/dL 0.58   GFR Estimate Latest Ref Range: >60 mL/min/1.7m2 >90   GFR Estimate If Black  Latest Ref Range: >60 mL/min/1.7m2 >90   Calcium Latest Ref Range: 8.5 - 10.1 mg/dL 8.3 (L)   Anion Gap Latest Ref Range: 3 - 14 mmol/L 7   Albumin Latest Ref Range: 3.4 - 5.0 g/dL 2.5 (L)   Protein Total Latest Ref Range: 6.8 - 8.8 g/dL 6.7 (L)   Bilirubin Total Latest Ref Range: 0.2 - 1.3 mg/dL 0.4   Alkaline Phosphatase Latest Ref Range: 40 - 150 U/L 94   ALT Latest Ref Range: 0 - 50 U/L 28   AST Latest Ref Range: 0 - 45 U/L 20   Glucose Latest Ref Range: 70 - 99 mg/dL 155 (H)   WBC Latest Ref Range: 4.0 - 11.0 10e9/L 15.3 (H)   Hemoglobin Latest Ref Range: 11.7 - 15.7 g/dL 9.6 (L)   Hematocrit Latest Ref Range: 35.0 - 47.0 % 31.1 (L)   Platelet Count Latest Ref Range: 150 - 450 10e9/L 300   RBC Count Latest Ref Range: 3.8 - 5.2 10e12/L 3.06 (L)   MCV Latest Ref Range: 78 - 100 fl 102 (H)   MCH Latest Ref Range: 26.5 - 33.0 pg 31.4   MCHC Latest Ref Range: 31.5 - 36.5 g/dL 30.9 (L)   RDW Latest Ref Range: 10.0 - 15.0 % 20.3 (H)   Diff Method Unknown Automated Method   % Neutrophils Latest Units: % 69.2   % Lymphocytes Latest Units: % 12.7   % Monocytes Latest Units: % 13.9   % Eosinophils Latest Units: % 1.4   % Basophils Latest Units: % 0.5   % Immature Granulocytes Latest Units: % 2.3   Nucleated RBCs Latest Ref Range: 0 /100 0   Absolute Neutrophil Latest Ref Range: 1.6 - 8.3 10e9/L 10.6 (H)   Absolute Lymphocytes Latest Ref Range: 0.8 - 5.3 10e9/L 2.0   Absolute Monocytes Latest Ref Range: 0.0 - 1.3 10e9/L 2.1 (H)   Absolute Eosinophils Latest Ref Range: 0.0 - 0.7 10e9/L 0.2   Absolute Basophils Latest Ref Range: 0.0 - 0.2 10e9/L 0.1   Abs Immature Granulocytes Latest Ref Range: 0 - 0.4 10e9/L 0.4   Absolute Nucleated RBC Unknown 0.0         Impression/plan:   1. Metastatic pancreatic cancer involving the liver, lung and retroperitoneal nodes  -on gem/abraxane with positive response after 2 months, but developed complicating gemzar related pneumonitis (See below)  -had discussed using 5FU and liposomal  irinotecan last week  -performance status slowly improving, but still ECOG 2. Not able to walk a flight of stairs without feeling fatigue  -will refer to cancer rehab, anticipate being able to start chemo in a couple weeks if she more improvement in strength  -will see her in about 10 days with CT CAP to f/u pneumonitis and get new baseline imaging. If PS has improved, will schedule for 5FU and liposomal irinotecan     2. Hx of pneumonitis in the setting of influenza B +, month long hx of SOB, and prior Gemzar use  - clinically stable, not using home O2 any longer improving with increased exercise tolerance and less need for home O2  -OK to stop dex now. Monitor for worsening SOB.     3. Hx of bilateral PE, dx Dec 2017  Current on enoxaparin 50 mg bid (adjusted for weight in the hospital)  -Xa is supratherapeutic at 1.4. She should HOLD the lovenox today, and resume at 40 mg every 12 hours. New script sent to [facility]. Patient called with instructions     4. Diarrhea:   -c. Diff negative on 2/26/18, resolved as of last week.    5. Leukocytosis: s/t steroids. No s/s of recurrent infection  -improving with decrease in steroids     6. Peripheral edema, improving     7. Nutrition: Appetite is improving, but still losing weight. Some of this likely relates to edema improving  - Albumin remains low at 2.5, but stable.     8. Gastritis--likely related to steroids. Will add protonix 20 mg daily x 2 weeks.      Again, thank you for allowing me to participate in the care of your patient.      Sincerely,    CAT Block CNP

## 2018-03-12 NOTE — PROGRESS NOTES
Reason for Visit: seen in f/u of metastatic pancreatic [facility]    Oncology HPI: Iris Lewis is a 78 year old woman with a hx significant for thyroid cancer s/p thyroidectomy in the 1980s, cholelithiasis s/p CCY 2015, GERD s/p Nissen fundoplication in 2006 and HTN. She was dx with metastatic pancreatic cancer involving the liver, lung and lymph nodes in October 2017 after presenting with obstructive jaundice. She met with  an did not qualify for the HALOZYME study. She was initiated on [place]/Abraxane on 11/28/17. Restaging showed a positive response to treatment after 2 cycles. Prior to cycle 4, she developed acute hypoxia and weakness. She was found to have influenza and pneumonia. 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    Interval history: Iris is here with her , Neri, today. She is not feeling significantly better from last week, but notes a few areas of improvement. Leg/ankle edema is a little better. Appetite is improving. Breathing is stable, but still feels fatigued with stair climbing. Doesn't feel the need for O2 any longer. Has occasional wheezing with exertion. Isn't able to exercise much yet. Has developed some mid-abdominal pain and queeziness. Does not have reflux or vomiting since her Nissen fundoplication. No trouble with dysphagia. Has occasional vocal hoarseness. No new bruises or bleeding. No melena or hematuria. Diarrhea from last week has resolved. Bowels regular now. No new bone aches/pains. No fevers/chills. No headaches or vision/hearing changes. No focal weakness.    Current Outpatient Prescriptions   Medication Sig Dispense Refill     nystatin (MYCOSTATIN) [id number] UNIT/ML suspension        ondansetron (ZOFRAN) 4 MG tablet        prochlorperazine (COMPAZINE) 10 MG tablet        order for DME Equipment being ordered: Compression Stockings. Please dispense 2 pairs of knee high 20-30 mmHg 2 Units 3     traMADol (ULTRAM) 50 MG tablet Take 1-2 tablets  ( mg) by mouth every 4 hours as needed for breakthrough pain (No more than 8 tablets in a day) 60 tablet 0     LORazepam (ATIVAN) 0.5 MG tablet Take 1 tablet (0.5 mg) by mouth every 4 hours as needed (Anxiety, Nausea/Vomiting or Sleep) 15 tablet 0     enoxaparin (LOVENOX) 60 MG/0.6ML injection Inject 0.5 mLs (50 mg) Subcutaneous every 12 hours  0     dexamethasone (DECADRON) 2 MG tablet Take 1 tablet (2 mg) by mouth every 12 hours 20 tablet 0     hydrOXYzine (ATARAX) 25 MG tablet Take 1-2 tablets (25-50 mg) by mouth every 6 hours as needed for itching (adjuvant pain) 10 tablet 0     albuterol (PROAIR HFA/PROVENTIL HFA/VENTOLIN HFA) 108 (90 BASE) MCG/ACT Inhaler Inhale 2 puffs into the lungs 4 times daily 1 Inhaler 1     fluticasone-vilanterol (BREO ELLIPTA) 100-25 MCG/INH oral inhaler Inhale 1 puff into the lungs daily 1 Inhaler 1     prochlorperazine (COMPAZINE) 5 MG tablet Take 1 tablet (5 mg) by mouth every 6 hours as needed for nausea or vomiting 30 tablet 0     CARTIA  MG 24 hr capsule TK 1 C PO QD 10 capsule 0     amylase-lipase-protease (CREON) 99381 UNITS CPEP Take 2 capsules (72,000 Units) by mouth 3 times daily (with meals) 60 capsule 0     timolol maleate (ISTALOL) 0.5 % opthalmic solution Place 1 drop into both eyes every morning Before placing contact lenses 1 Bottle 0     levothyroxine (SYNTHROID/LEVOTHROID) 125 MCG tablet Take 1 tablet (125 mcg) by mouth daily 10 tablet 0     omeprazole (PRILOSEC) 40 MG capsule Take 1 capsule (40 mg) by mouth 2 times daily Take 30-60 minutes before a meal. 20 capsule 0     triamterene-hydrochlorothiazide (DYAZIDE) 37.5-25 MG per capsule        order for DME 1unit being ordered: cranial prosthesis 1 Units 0     Acetaminophen (TYLENOL PO) Take 325 mg by mouth every 4 hours as needed for mild pain or fever       CALCIUM PO Take by mouth every morning Form/strength unknown. Powder formulation. Add to water and fruits/vegetables daily to promote bone health.         triamterene-hydrochlorothiazide (MAXZIDE-25) 37.5-25 MG per tablet Take 1 tablet by mouth every morning        cyanocolbalamin (VITAMIN B-12) 500 MCG tablet Take 500 mcg by mouth every morning        folic acid (FOLVITE) 400 MCG tablet Take 1 tablet by mouth every morning        cholecalciferol (VITAMIN D) 1000 UNIT tablet Take 1 tablet by mouth 2 times daily             Allergies   Allergen Reactions     Nkda [No Known Drug Allergies]          Exam: alert, appears slim, in NAD. Blood pressure 100/65, pulse 81, temperature 98.1  F (36.7  C), resp. rate 18, weight 44.1 kg (97 lb 3.2 oz), SpO2 95 %, not currently breastfeeding.  Wt Readings from Last 4 Encounters:   03/12/18 44.1 kg (97 lb 3.2 oz)   03/05/18 45.7 kg (100 lb 11.2 oz)   03/02/18 48.2 kg (106 lb 4.8 oz)   02/26/18 46.9 kg (103 lb 4.8 oz)     Oropharynx is moist, no focal lesion. No icterus. Neck supple and without adenopathy. Lungs:CTA. Heart:RRR, no murmur or rub. Abdomen: soft, mildly tender in mid-epigastrum, BS active. No masses or organomegaly. Extremities: warm, trace ankle edema bilaterally. Speech is clear. CN wnl. Gait/station wnl. Skin: multiple bruises on forearms/wrist, not significantly changed from last week.    Labs: Results for JONA SHETTY (MRN [id number]) as of 3/12/2018 16:59   Ref. Range 3/12/2018 14:36   Sodium Latest Ref Range: 133 - 144 mmol/L 134   Potassium Latest Ref Range: 3.4 - 5.3 mmol/L 3.9   Chloride Latest Ref Range: 94 - 109 mmol/L 100   Carbon Dioxide Latest Ref Range: 20 - 32 mmol/L 27   Urea Nitrogen Latest Ref Range: 7 - 30 mg/dL 9   Creatinine Latest Ref Range: 0.52 - 1.04 mg/dL 0.58   GFR Estimate Latest Ref Range: >60 mL/min/1.7m2 >90   GFR Estimate If Black Latest Ref Range: >60 mL/min/1.7m2 >90   Calcium Latest Ref Range: 8.5 - 10.1 mg/dL 8.3 (L)   Anion Gap Latest Ref Range: 3 - 14 mmol/L 7   Albumin Latest Ref Range: 3.4 - 5.0 g/dL 2.5 (L)   Protein Total Latest Ref Range: 6.8 - 8.8 g/dL 6.7 (L)    Bilirubin Total Latest Ref Range: 0.2 - 1.3 mg/dL 0.4   Alkaline Phosphatase Latest Ref Range: 40 - 150 U/L 94   ALT Latest Ref Range: 0 - 50 U/L 28   AST Latest Ref Range: 0 - 45 U/L 20   Glucose Latest Ref Range: 70 - 99 mg/dL 155 (H)   WBC Latest Ref Range: 4.0 - 11.0 10e9/L 15.3 (H)   Hemoglobin Latest Ref Range: 11.7 - 15.7 g/dL 9.6 (L)   Hematocrit Latest Ref Range: 35.0 - 47.0 % 31.1 (L)   Platelet Count Latest Ref Range: 150 - 450 10e9/L 300   RBC Count Latest Ref Range: 3.8 - 5.2 10e12/L 3.06 (L)   MCV Latest Ref Range: 78 - 100 fl 102 (H)   MCH Latest Ref Range: 26.5 - 33.0 pg 31.4   MCHC Latest Ref Range: 31.5 - 36.5 g/dL 30.9 (L)   RDW Latest Ref Range: 10.0 - 15.0 % 20.3 (H)   Diff Method Unknown Automated Method   % Neutrophils Latest Units: % 69.2   % Lymphocytes Latest Units: % 12.7   % Monocytes Latest Units: % 13.9   % Eosinophils Latest Units: % 1.4   % Basophils Latest Units: % 0.5   % Immature Granulocytes Latest Units: % 2.3   Nucleated RBCs Latest Ref Range: 0 /100 0   Absolute Neutrophil Latest Ref Range: 1.6 - 8.3 10e9/L 10.6 (H)   Absolute Lymphocytes Latest Ref Range: 0.8 - 5.3 10e9/L 2.0   Absolute Monocytes Latest Ref Range: 0.0 - 1.3 10e9/L 2.1 (H)   Absolute Eosinophils Latest Ref Range: 0.0 - 0.7 10e9/L 0.2   Absolute Basophils Latest Ref Range: 0.0 - 0.2 10e9/L 0.1   Abs Immature Granulocytes Latest Ref Range: 0 - 0.4 10e9/L 0.4   Absolute Nucleated RBC Unknown 0.0         Impression/plan:   1. Metastatic pancreatic cancer involving the liver, lung and retroperitoneal nodes  -on gem/abraxane with positive response after 2 months, but developed complicating gemzar related pneumonitis (See below)  -had discussed using 5FU and liposomal irinotecan last week  -performance status slowly improving, but still ECOG 2. Not able to walk a flight of stairs without feeling fatigue  -will refer to cancer rehab, anticipate being able to start chemo in a couple weeks if she more improvement in  strength  -will see her in about 10 days with CT CAP to f/u pneumonitis and get new baseline imaging. If PS has improved, will schedule for 5FU and liposomal irinotecan     2. Hx of pneumonitis in the setting of influenza B +, month long hx of SOB, and prior Gemzar use  - clinically stable, not using home O2 any longer improving with increased exercise tolerance and less need for home O2  -OK to stop dex now. Monitor for worsening SOB.     3. Hx of bilateral PE, dx Dec 2017  Current on enoxaparin 50 mg bid (adjusted for weight in the hospital)  -Xa is supratherapeutic at 1.4. She should HOLD the lovenox today, and resume at 40 mg every 12 hours. New script sent to [facility]. Patient called with instructions     4. Diarrhea:   -c. Diff negative on 2/26/18, resolved as of last week.    5. Leukocytosis: s/t steroids. No s/s of recurrent infection  -improving with decrease in steroids     6. Peripheral edema, improving     7. Nutrition: Appetite is improving, but still losing weight. Some of this likely relates to edema improving  - Albumin remains low at 2.5, but stable.     8. Gastritis--likely related to steroids. Will add protonix 20 mg daily x 2 weeks.

## 2018-03-12 NOTE — PATIENT INSTRUCTIONS
Stop dexamethasone    Start protonix 20 mg daily for gastritis    Referral to physical therapy    Return to clinic in 7-10 days with CT and labs

## 2018-03-12 NOTE — MR AVS SNAPSHOT
After Visit Summary   3/12/2018    Iris Lewis    MRN: [id number]           Patient Information     Date Of Birth          1940        Visit Information        Provider Department      3/12/2018 2:40 PM Quyen Mazariegos APRN CNP [facility]        Today's Diagnoses     Malignant neoplasm of pancreas (H)    -  1    Other acute gastritis without hemorrhage        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)        Physical deconditioning        Other chronic 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 cor pulmonale (H)          Care Instructions    Stop dexamethasone    Start protonix 20 mg daily for gastritis    Referral to physical therapy    Return to clinic in 7-10 days with CT and labs          Follow-ups after your visit        Additional Services     PHYSICAL THERAPY REFERRAL       *This therapy referral will be filtered to a centralized scheduling office at [facility] and the patient will receive a call to schedule an appointment at a [place] location most convenient for them. *     [facility] provides Physical Therapy evaluation and treatment and many specialty services across the [place] system.  If requesting a specialty program, please choose from the list below.    If you have not heard from the scheduling office within 2 business days, please call [phone] for all locations, with the exception of [place], please call [phone] and [facility], please call [phone]  Treatment: Evaluation & Treatment  Special Instructions/Modalities: cancer rehab  Special Programs: Cancer Rehabilitation (PT and OT Evaluate & Treat)    Please be aware that coverage of these services is subject to the terms and limitations of your health insurance plan.  Call member services at your health plan with any benefit or coverage questions.      **Note to Provider:  If you are referring outside of [place] for the therapy appointment, please list  "the name of the location in the \"special instructions\" above, print the referral and give to the patient to schedule the appointment.                  Your next 10 appointments already scheduled     Mar 16, 2018  3:00 PM CDT   (Arrive by 2:45 PM)   Return Visit with José Antonio Ann MD   [facility] ([facility] [place])    [address]  [address]  [facility] [id number] [phone]            Mar 26, 2018  7:00 AM CDT   (Arrive by 6:45 AM)   CT CHEST ABDOMEN PELVIS W/O & W CONTRAST with UCCT2   [facility] CT ([facility])    [address]  1st Floor  [facility] [id number] [phone]           Please bring any scans or X-rays taken at other hospitals, if similar tests were done. Also bring a list of your medicines, including vitamins, minerals and over-the-counter drugs. It is safest to leave personal items at home.  Be sure to tell your doctor:   If you have any allergies.   If there s any chance you are pregnant.   If you are breastfeeding.  You may have contrast for this exam. To prepare:   Do not eat or drink for 2 hours before your exam. If you need to take medicine, you may take it with small sips of water. (We may ask you to take liquid medicine as well.)   The day before your exam, drink extra fluids at least six 8-ounce glasses (unless your doctor tells you to restrict your fluids).   You will be given instructions on how to drink the contrast.  Patients over 70 or patients with diabetes or kidney problems:   If you haven t had a blood test (creatinine test) within the last 30 days, the Cardiologist/Radiologist may require you to get this test prior to your exam.  If you have diabetes:   Continue to take your metformin medication on the day of your exam  Please wear loose clothing, such as a sweat suit or jogging clothes. Avoid snaps, zippers and other metal. We may ask you to undress and put on a " [facility].  If you have any questions, please call the Imaging Department where you will have your exam.            Mar 26, 2018  7:45 AM CDT   [facility] Draw with  [facility] DRAW   [facility] Draw ([facility])    [address] [place] [address] [address]  [address]  [facility] [place] [id number] [phone]            Mar 26, 2018  8:45 AM CDT   (Arrive by 8:30 AM)   Return Visit with Vinny Yu MD   [facility] ([facility])    [address] [place] [address]  [address]  [facility] [place] [address] [phone]              Future tests that were ordered for you today     Open Future Orders        Priority Expected Expires Ordered    Heparin 10a Level Routine 3/21/2018 3/12/2019 3/12/2018    CT Chest/Abdomen/Pelvis w Contrast Routine 3/26/2018 4/9/2018 3/12/2018    CBC with platelets differential Routine 3/26/2018 4/9/2018 3/12/2018    Comprehensive metabolic panel Routine 3/26/2018 4/9/2018 3/12/2018            Who to contact     If you have questions or need follow up information about today's clinic visit or your schedule please contact [facility] directly at [phone].  Normal or non-critical lab and imaging results will be communicated to you by [employer], letter or phone within 4 business days after the clinic has received the results. If you do not hear from us within 7 days, please contact the clinic through [employer] or phone. If you have a critical or abnormal lab result, we will notify you by phone as soon as possible.  Submit refill requests through [employer] or call your pharmacy and they will forward the refill request to us. Please allow 3 business days for your refill to be completed.          Additional Information About Your Visit        Jotky Information     Jotky gives you secure access to your electronic health record. If you see a primary care provider, you can also send messages to your care team and make  "appointments. If you have questions, please call your primary care clinic.  If you do not have a primary care provider, please call [phone] and they will assist you.        Care EveryWhere ID     This is your Care EveryWhere ID. This could be used by other organizations to access your [place] medical records  [id number]        Your Vitals Were     Pulse Temperature Respirations Height Pulse Oximetry BMI (Body Mass Index)    81 98.1  F (36.7  C) 18 1.575 m (5' 2.01\") 95% 17.77 kg/m2       Blood Pressure from Last 3 Encounters:   03/12/18 100/65   03/05/18 115/70   03/02/18 127/72    Weight from Last 3 Encounters:   03/12/18 44.1 kg (97 lb 3.2 oz)   03/05/18 45.7 kg (100 lb 11.2 oz)   03/02/18 48.2 kg (106 lb 4.8 oz)              We Performed the Following     CBC with platelets differential     Comprehensive metabolic panel     PHYSICAL THERAPY REFERRAL          Today's Medication Changes          These changes are accurate as of 3/12/18  5:25 PM.  If you have any questions, ask your nurse or doctor.               Start taking these medicines.        Dose/Directions    pantoprazole 20 MG EC tablet   Commonly known as:  PROTONIX   Used for:  Other acute gastritis without hemorrhage   Started by:  Quyen Mazariegos APRN CNP        Dose:  20 mg   Take 1 tablet (20 mg) by mouth daily   Quantity:  14 tablet   Refills:  0         These medicines have changed or have updated prescriptions.        Dose/Directions    * enoxaparin 60 MG/0.6ML injection   Commonly known as:  LOVENOX   This may have changed:  Another medication with the same name was added. Make sure you understand how and when to take each.   Used for:  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)   Changed by:  Quyen Mazariegos APRN CNP        Dose:  50 mg   Inject 0.5 mLs (50 mg) Subcutaneous every 12 hours   Refills:  0       * enoxaparin 40 MG/0.4ML injection   Commonly known as:  LOVENOX   This may have changed:  You were already taking a medication " with the same name, and this prescription was added. Make sure you understand how and when to take each.   Used for:  Malignant neoplasm of pancreas (H), Other chronic 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 cor pulmonale (H)   Changed by:  Quyen Mazariegos APRN CNP        Dose:  40 mg   Inject 0.4 mLs (40 mg) Subcutaneous every 12 hours   Quantity:  60 Syringe   Refills:  3       * Notice:  This list has 2 medication(s) that are the same as other medications prescribed for you. Read the directions carefully, and ask your doctor or other care provider to review them with you.         Where to get your medicines      These medications were sent to [facility] [id number] [facility] [place], [place] [address] [facility] [address] [place] [address]  [address], [place] [address]     Phone:  [phone]     enoxaparin 40 MG/0.4ML injection    pantoprazole 20 MG EC tablet         Some of these will need a paper prescription and others can be bought over the counter.  Ask your nurse if you have questions.     Bring a paper prescription for each of these medications     traMADol 50 MG tablet                Primary Care Provider Office Phone # Fax #    Neri Gipson [phone]       [address] [address]        Equal Access to Services     NESSA NICHOLS AH: Shaun marsho Sobrunildaali, waaxda luqadaha, qaybta kaalmada adeegyada, lisa michael. So [facility] [phone].    ATENCIÓN: Si habla español, tiene a nava disposición servicios gratuitos de asistencia lingüística. Llame al [phone].    We comply with applicable federal civil rights laws and [place] laws. We do not discriminate on the basis of race, color, national origin, age, disability, sex, sexual orientation, or gender identity.            Thank you!     Thank you for choosing [facility]  for your care. Our goal is always to provide you with excellent care. Hearing back from  our patients is one way we can continue to improve our services. Please take a few minutes to complete the written survey that you may receive in the mail after your visit with us. Thank you!             Your Updated Medication List - Protect others around you: Learn how to safely use, store and throw away your medicines at www.disposemymeds.org.          This list is accurate as of 3/12/18  5:25 PM.  Always use your most recent med list.                   Brand Name Dispense Instructions for use Diagnosis    albuterol 108 (90 BASE) MCG/ACT Inhaler    PROAIR HFA/PROVENTIL HFA/VENTOLIN HFA    1 Inhaler    Inhale 2 puffs into the lungs 4 times daily    Mild asthma, unspecified whether complicated, unspecified whether persistent       amylase-lipase-protease 73590 UNITS Cpep    CREON    60 capsule    Take 2 capsules (72,000 Units) by mouth 3 times daily (with meals)    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)       CALCIUM PO      Take by mouth every morning Form/strength unknown. Powder formulation. Add to water and fruits/vegetables daily to promote bone health.        CARTIA  MG 24 hr capsule   Generic drug:  diltiazem     10 capsule    TK 1 C PO QD    Benign essential hypertension       cholecalciferol 1000 UNIT tablet    vitamin D3     Take 1 tablet by mouth 2 times daily        cyanocolbalamin 500 MCG tablet    vitamin  B-12     Take 500 mcg by mouth every morning        dexamethasone 2 MG tablet    DECADRON    20 tablet    Take 1 tablet (2 mg) by mouth every 12 hours    Pneumonitis       * enoxaparin 60 MG/0.6ML injection    LOVENOX     Inject 0.5 mLs (50 mg) Subcutaneous every 12 hours    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)       * enoxaparin 40 MG/0.4ML injection    LOVENOX    60 Syringe    Inject 0.4 mLs (40 mg) Subcutaneous every 12 hours    Malignant neoplasm of pancreas (H), Other chronic 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 cor pulmonale (H)       fluticasone-vilanterol 100-25 MCG/INH oral  inhaler    BREO ELLIPTA    1 Inhaler    Inhale 1 puff into the lungs daily    Cough       folic acid 400 MCG tablet    FOLVITE     Take 1 tablet by mouth every morning        hydrOXYzine 25 MG tablet    ATARAX    10 tablet    Take 1-2 tablets (25-50 mg) by mouth every 6 hours as needed for itching (adjuvant pain)    Obstructive jaundice       levothyroxine 125 MCG tablet    SYNTHROID/LEVOTHROID    10 tablet    Take 1 tablet (125 mcg) by mouth daily    Hypothyroidism, unspecified type       LORazepam 0.5 MG tablet    ATIVAN    15 tablet    Take 1 tablet (0.5 mg) by mouth every 4 hours as needed (Anxiety, Nausea/Vomiting or Sleep)    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)       nystatin 374269 UNIT/ML suspension    MYCOSTATIN          omeprazole 40 MG capsule    priLOSEC    20 capsule    Take 1 capsule (40 mg) by mouth 2 times daily Take 30-60 minutes before a meal.    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 Please dispense 2 pairs of knee high 20-30 mmHg    Lymphedema       pantoprazole 20 MG EC tablet    PROTONIX    14 tablet    Take 1 tablet (20 mg) by mouth daily    Other acute gastritis without hemorrhage       * prochlorperazine 10 MG tablet    COMPAZINE          * prochlorperazine 5 MG tablet    COMPAZINE    30 tablet    Take 1 tablet (5 mg) by mouth every 6 hours as needed for nausea or vomiting    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)       timolol maleate 0.5 % opthalmic solution    ISTALOL    1 Bottle    Place 1 drop into both eyes every morning Before placing contact lenses    Cataract, unspecified cataract type, unspecified laterality       traMADol 50 MG tablet    ULTRAM    60 tablet    Take 1-2 tablets ( mg) by mouth every 4 hours  as needed for breakthrough pain (No more than 8 tablets in a day)    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)       * triamterene-hydrochlorothiazide 37.5-25 MG per tablet    MAXZIDE-25     Take 1 tablet by mouth every morning        * triamterene-hydrochlorothiazide 37.5-25 MG per capsule    DYAZIDE          TYLENOL PO      Take 325 mg by mouth every 4 hours as needed for mild pain or fever        * Notice:  This list has 8 medication(s) that are the same as other medications prescribed for you. Read the directions carefully, and ask your doctor or other care provider to review them with you.

## 2018-03-16 NOTE — PROGRESS NOTES
"Palliative Staff/Outpatient Clinic    (This note was transcribed using voice recognition software. While I review and edit the transcription, I may miss errors. Also, the software capitalizes words strangely sometimes. Please let me know of any serious mis-transcriptions and I will addend this note.)    CC/Patient ID:  She has metastatic pancreatic cancer to the liver, lung, and retroperitoneal lymph nodes on gemcitabine and nab paclitaxel.  Important past medical history includes Nissen fundoplication in 2006.  She was diagnosed with her cancer in November of last year. First f/u scan showed some response to her chemotherapy.      +HCD which names  then daughter as decision makers     History:  She is with her  today.  I met her a few weeks ago but she was acutely ill and ended up having influenza was admitted to the hospital and so my visit was focused on that.  She reports she is recovering from that.  It took a couple weeks but she feels like she is improving this week.  Her breathing strength and energy are all improved although she is still weak and tired and spends much of the day resting.  She has stairs in her home and she is able to take them adequately she has been inhalers and those are helping her breathing.  She uses oxygen intermittently.  Josiah Mazariegos started Protonix a few days ago for some dyspepsia and postprandial pain and cramping she is getting.  Otherwise she takes tramadol and acetaminophen pain with good effects.  Most of her pain is in the left upper quadrant and left abdomen.    We talked a lot about her cancer in the big picture.  She describes herself as \"and ostrich with her head in the sand.\"  However it clear that she is facing what is going on for cancer and making plans.  She is completed a will and is taking care of possessions.  Her daughter is a  and helping her with that.  1 of her sons is also heavily involved in her healthcare and is often on the phone with " her doctors she greatly appreciates that.  She relies on him to help make decisions.  While she has not asked about time she understands her cancer is considered to be incurable in time I be short.  We talk about future decisions including CODE STATUS and she more or less indicates she wanted be DNR.  Showed her a POLST form went over what that means and she is going to think about it and talk with her family about it.  She asks about body donation because her uncle and aunt did that and I gave her the [facility] anatomy bequest form.    With this she reports her mood and spirits are adequate.  She is lots of good things in her life including her children and grandchildren.  She also talks about her son who committed suicide about 5 years ago leaving behind 5 children and a wife.  She is philosophical about all this and indicates that she is making sense of her life and and does feel like she is coping adequately.  She describes herself as a woman of science, not a arian.    SH:   Reviewed and unchanged.  Her  has at least mild dementia.    ROS:  Comprehensive review of systems is negative except as above    PE: /78  Pulse 94  Temp 97  F (36.1  C) (Oral)  Resp 18  Wt 45 kg (99 lb 3.3 oz)  SpO2 99%  BMI 18.14 kg/m2  Alert pleasant frail older woman in no acute distress.  She is wearing a wig.  Mouth is moist no lesions.  She has a right neck Mediport which is overall unremarkable.  Lungs unlabored, no wheezing or crackles today.  CV regular rhythm.  Abdomen is thin soft and benign.  Trace lower extremity edema.  She is cachectic with diffuse sarcopenia.  Affect is full pleasant interactive with a clear sensorium fluent speech normal memory and thought processes.  Skin is warm and dry    Impression & Recommendations:  78-year-old woman with metastatic pancreatic cancer receiving palliative chemotherapy, currently on a treatment break due to influenza and marginal performance status.  It  looks like she is turning around this week-she has follow-up with Dr. Yu in a couple weeks to talk about next steps.  Pain breathing overall adequately controlled much better than when I met her.  She is about to start Protonix for her dyspepsia.    Discussed the role of palliative care program and advance care planning as above.  Discussed CODE STATUS post forms and what that means.  She overall appears to be well-informed and preparing adequately despite her claims that she has got her head in the sand I do not think she really does.  Gave her encouragement.  Follow-up in a couple months or earlier with anything going on    Chart data reviewed today:    Family History   Problem Relation Age of Onset     C.A.D. 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     Arthritis      Colon polyp 2009,2015    no polyps - f/u in 5 yrs      Esophageal reflux      Glaucoma      Hypertension      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) 11/6/2017     Osteoporosis      Postsurgical hypothyroidism      Scoliosis (and kyphoscoliosis), idiopathic      Thyroid cancer (H)     papillary carcinoma age 33     Uncomplicated asthma      Past Surgical History:   Procedure Laterality Date     ARTHRODESIS FOOT Right 11/9/2016    Procedure: ARTHRODESIS FOOT;  Surgeon: Janes Mcelroy MD;  [facility]     C STOMACH SURGERY PROCEDURE UNLISTED       COLONOSCOPY   2009, 3/2015    no polyps in 2015 told to return 10 yrs.      ENDOSCOPIC RETROGRADE CHOLANGIOPANCREATOGRAM N/A 11/2/2017    Procedure: COMBINED ENDOSCOPIC RETROGRADE CHOLANGIOPANCREATOGRAPHY,  PLACE TUBE/STENT;  Endoscopic Retrograde Cholangiopancreatogram with billary sphincterotomy and billary stent placement;  Surgeon: Rito Mcneil MD;  [facility]     ENDOSCOPIC RETROGRADE CHOLANGIOPANCREATOGRAM N/A 1/10/2018    Procedure: ENDOSCOPIC RETROGRADE CHOLANGIOPANCREATOGRAM;  Endoscopic Retrograde Cholangiopancreatogram ;  Surgeon: Felix Izaguirre MD;  [facility] OR     ENDOSCOPIC RETROGRADE CHOLANGIOPANCREATOGRAPHY, EXCHANGE TUBE/STENT N/A 11/22/2017    Procedure: ENDOSCOPIC RETROGRADE CHOLANGIOPANCREATOGRAPHY, EXCHANGE TUBE/STENT;  Endoscopic Retrograde Cholangiopancreatogram with Biliary Stent Exchange and pancreatic stent placement;  Surgeon: Felix Izaguirre MD;  [facility]     ESOPHAGOSCOPY, GASTROSCOPY, DUODENOSCOPY (EGD), COMBINED  2/17/2012    Procedure:COMBINED ESOPHAGOSCOPY, GASTROSCOPY, DUODENOSCOPY (EGD); COMBINED ESOPHAGOSCOPY, GASTROSCOPY, DUODENOSCOPY POSSIBLE DILATION; Surgeon:NICHOLE MCCLAIN; [facility]     ESOPHAGOSCOPY, GASTROSCOPY, DUODENOSCOPY (EGD), COMBINED N/A 11/2/2017    Procedure: COMBINED ENDOSCOPIC ULTRASOUND, ESOPHAGOSCOPY, GASTROSCOPY, DUODENOSCOPY (EGD), FINE NEEDLE ASPIRATE/BIOPSY;  Upper Endoscopic Ultrasound with fine needle biopsy, upper endoscopy with biopsies, Endoscopic Retrograde Cholangiopancreatogram with billary sphincterotomy and billary stent placement;  Surgeon: Hadley Bailey MD;  [facility] OR     FOOT SURGERY  2008    hammertoe left     INSERT PORT VASCULAR ACCESS Right 1/24/2018    Procedure: INSERT PORT VASCULAR ACCESS;  Chest Port Placement;  Surgeon: Ventura Villarreal PA-C;  [facility]     LAPAROSCOPIC CHOLECYSTECTOMY N/A 1/5/2015    Procedure: LAPAROSCOPIC CHOLECYSTECTOMY;  Surgeon: Cruz Pearson MD;  [facility]     NISSEN FUNDOPLICATION       ORTHOPEDIC SURGERY  2009    left hammertoe      REPAIR HAMMER TOE Right 11/9/2016    Procedure: REPAIR HAMMER TOE;  Surgeon: Janes Mcelroy  MD Vance;  [facility]     SALPINGO OOPHORECTOMY,R/L/SERENA  1974    left, for tubal pregnancy     THYROIDECTOMY      for thyroid cancer     Allergies   Allergen Reactions     Nkda [No Known Drug Allergies]           Medications:   I have reviewed this patient's medication profile.  MNPMP: reviewed      Data reviewed:  I reviewed recent labs and imaging, comments on pertinent findings:  Alb 2.5,   Cr 0.58    Thank you for involving us in the patient's care.   José Antonio Ann MD / Palliative Medicine / Pager [phone] / [facility] Inpatient Team Consult Pager [phone] (answered 8am-430pm M-F) - ok to text page via Zoomabet / After-Hours Answering Service [phone] / Palliative Clinic in the [facility] the [facility] - [phone] (scheduling); [phone] (triage).

## 2018-03-16 NOTE — LETTER
"3/16/2018       RE: Iris Lewis  [address]  [place] [address]     Dear Colleague,    Thank you for referring your patient, Iris Lewis, to the [facility] CANCER [facility]IC at [facility]. Please see a copy of my visit note below.    Palliative Staff/Outpatient Clinic    (This note was transcribed using voice recognition software. While I review and edit the transcription, I may miss errors. Also, the software capitalizes words strangely sometimes. Please let me know of any serious mis-transcriptions and I will addend this note.)    CC/Patient ID:  She has metastatic pancreatic cancer to the liver, lung, and retroperitoneal lymph nodes on gemcitabine and nab paclitaxel.  Important past medical history includes Nissen fundoplication in 2006.  She was diagnosed with her cancer in November of last year. First f/u scan showed some response to her chemotherapy.      +HCD which names  then daughter as decision makers     History:  She is with her  today.  I met her a few weeks ago but she was acutely ill and ended up having influenza was admitted to the hospital and so my visit was focused on that.  She reports she is recovering from that.  It took a couple weeks but she feels like she is improving this week.  Her breathing strength and energy are all improved although she is still weak and tired and spends much of the day resting.  She has stairs in her home and she is able to take them adequately she has been inhalers and those are helping her breathing.  She uses oxygen intermittently.  Josiah Mazariegos started Protonix a few days ago for some dyspepsia and postprandial pain and cramping she is getting.  Otherwise she takes tramadol and acetaminophen pain with good effects.  Most of her pain is in the left upper quadrant and left abdomen.    We talked a lot about her cancer in the big picture.  She describes herself as \"and ostrich with her head in the " "sand.\"  However it clear that she is facing what is going on for cancer and making plans.  She is completed a will and is taking care of possessions.  Her daughter is a  and helping her with that.  1 of her sons is also heavily involved in her healthcare and is often on the phone with her doctors she greatly appreciates that.  She relies on him to help make decisions.  While she has not asked about time she understands her cancer is considered to be incurable in time I be short.  We talk about future decisions including CODE STATUS and she more or less indicates she wanted be DNR.  Showed her a POLST form went over what that means and she is going to think about it and talk with her family about it.  She asks about body donation because her uncle and aunt did that and I gave her the [facility] anatomy bequest form.    With this she reports her mood and spirits are adequate.  She is lots of good things in her life including her children and grandchildren.  She also talks about her son who committed suicide about 5 years ago leaving behind 5 children and a wife.  She is philosophical about all this and indicates that she is making sense of her life and and does feel like she is coping adequately.  She describes herself as a woman of science, not a arian.    SH:   Reviewed and unchanged.  Her  has at least mild dementia.    ROS:  Comprehensive review of systems is negative except as above    PE: /78  Pulse 94  Temp 97  F (36.1  C) (Oral)  Resp 18  Wt 45 kg (99 lb 3.3 oz)  SpO2 99%  BMI 18.14 kg/m2  Alert pleasant frail older woman in no acute distress.  She is wearing a wig.  Mouth is moist no lesions.  She has a right neck Mediport which is overall unremarkable.  Lungs unlabored, no wheezing or crackles today.  CV regular rhythm.  Abdomen is thin soft and benign.  Trace lower extremity edema.  She is cachectic with diffuse sarcopenia.  Affect is full pleasant interactive with a clear " sensorium fluent speech normal memory and thought processes.  Skin is warm and dry    Impression & Recommendations:  78-year-old woman with metastatic pancreatic cancer receiving palliative chemotherapy, currently on a treatment break due to influenza and marginal performance status.  It looks like she is turning around this week-she has follow-up with Dr. Yu in a couple weeks to talk about next steps.  Pain breathing overall adequately controlled much better than when I met her.  She is about to start Protonix for her dyspepsia.    Discussed the role of palliative care program and advance care planning as above.  Discussed CODE STATUS post forms and what that means.  She overall appears to be well-informed and preparing adequately despite her claims that she has got her head in the sand I do not think she really does.  Gave her encouragement.  Follow-up in a couple months or earlier with anything going on    Chart data reviewed today:    Family History   Problem Relation Age of Onset     C.A.D. 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     Arthritis      Colon polyp 2009,2015    no polyps - f/u in 5 yrs      Esophageal reflux      Glaucoma      Hypertension      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) 11/6/2017     Osteoporosis      Postsurgical hypothyroidism      Scoliosis (and kyphoscoliosis), idiopathic      Thyroid cancer (H)     papillary carcinoma age 33     Uncomplicated asthma      Past Surgical History:   Procedure Laterality Date     ARTHRODESIS FOOT Right 11/9/2016    Procedure: ARTHRODESIS FOOT;  Surgeon: Navi  Janes Woodard MD;  [facility]     C STOMACH SURGERY PROCEDURE UNLISTED       COLONOSCOPY   2009, 3/2015    no polyps in 2015 told to return 10 yrs.      ENDOSCOPIC RETROGRADE CHOLANGIOPANCREATOGRAM N/A 11/2/2017    Procedure: COMBINED ENDOSCOPIC RETROGRADE CHOLANGIOPANCREATOGRAPHY, PLACE TUBE/STENT;  Endoscopic Retrograde Cholangiopancreatogram with billary sphincterotomy and billary stent placement;  Surgeon: Rito Mcneil MD;  [facility]     ENDOSCOPIC RETROGRADE CHOLANGIOPANCREATOGRAM N/A 1/10/2018    Procedure: ENDOSCOPIC RETROGRADE CHOLANGIOPANCREATOGRAM;  Endoscopic Retrograde Cholangiopancreatogram ;  Surgeon: Felix Izaguirre MD;  [facility]     ENDOSCOPIC RETROGRADE CHOLANGIOPANCREATOGRAPHY, EXCHANGE TUBE/STENT N/A 11/22/2017    Procedure: ENDOSCOPIC RETROGRADE CHOLANGIOPANCREATOGRAPHY, EXCHANGE TUBE/STENT;  Endoscopic Retrograde Cholangiopancreatogram with Biliary Stent Exchange and pancreatic stent placement;  Surgeon: Felix Izaguirre MD;  [facility]     ESOPHAGOSCOPY, GASTROSCOPY, DUODENOSCOPY (EGD), COMBINED  2/17/2012    Procedure:COMBINED ESOPHAGOSCOPY, GASTROSCOPY, DUODENOSCOPY (EGD); COMBINED ESOPHAGOSCOPY, GASTROSCOPY, DUODENOSCOPY POSSIBLE DILATION; Surgeon:NICHOLE MCCLAIN; [facility]     ESOPHAGOSCOPY, GASTROSCOPY, DUODENOSCOPY (EGD), COMBINED N/A 11/2/2017    Procedure: COMBINED ENDOSCOPIC ULTRASOUND, ESOPHAGOSCOPY, GASTROSCOPY, DUODENOSCOPY (EGD), FINE NEEDLE ASPIRATE/BIOPSY;  Upper Endoscopic Ultrasound with fine needle biopsy, upper endoscopy with biopsies, Endoscopic Retrograde Cholangiopancreatogram with billary sphincterotomy and billary stent placement;  Surgeon: Hadley Bailey MD;  [facility]     FOOT SURGERY  2008    hammertoe left     INSERT PORT VASCULAR ACCESS Right 1/24/2018    Procedure: INSERT PORT VASCULAR ACCESS;  Chest Port Placement;  Surgeon: Ventura Villarreal PA-C;  [facility]     LAPAROSCOPIC  CHOLECYSTECTOMY N/A 1/5/2015    Procedure: LAPAROSCOPIC CHOLECYSTECTOMY;  Surgeon: Cruz Pearson MD;  [facility]     NISSEN FUNDOPLICATION       ORTHOPEDIC SURGERY  2009    left hammertoe      REPAIR HAMMER TOE Right 11/9/2016    Procedure: REPAIR HAMMER TOE;  Surgeon: Janes Mcelroy MD;  [facility]     SALPINGO OOPHORECTOMY,R/L/SERENA  1974    left, for tubal pregnancy     THYROIDECTOMY      for thyroid cancer     Allergies   Allergen Reactions     Nkda [No Known Drug Allergies]           Medications:   I have reviewed this patient's medication profile.  MNPMP: reviewed      Data reviewed:  I reviewed recent labs and imaging, comments on pertinent findings:  Alb 2.5,   Cr 0.58    Thank you for involving us in the patient's care.   José Antonio Ann MD / Palliative Medicine / Pager [phone] / [facility] Inpatient Team Consult Pager [phone] (answered 8am-430pm M-F) - ok to text page via [employer] / After-Hours Answering Service [phone] / Palliative Clinic in the [facility] at the [facility] - [phone] (scheduling); [phone] (triage).

## 2018-03-16 NOTE — MR AVS SNAPSHOT
After Visit Summary   3/16/2018    Iris Lweis    MRN: [id number]           Patient Information     Date Of Birth          1940        Visit Information        Provider Department      3/16/2018 3:00 PM José Antonio Ann MD [facility]        Today's Diagnoses     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)    -  1    Advance care planning           Follow-ups after your visit        Your next 10 appointments already scheduled     Mar 26, 2018  7:00 AM CDT   (Arrive by 6:45 AM)   CT CHEST ABDOMEN PELVIS W/O & W CONTRAST with UCCT2   [facility] CT ([facility])    [address]  1st Floor  [facility] [address] [phone]           Please bring any scans or X-rays taken at other hospitals, if similar tests were done. Also bring a list of your medicines, including vitamins, minerals and over-the-counter drugs. It is safest to leave personal items at home.  Be sure to tell your doctor:   If you have any allergies.   If there s any chance you are pregnant.   If you are breastfeeding.  You may have contrast for this exam. To prepare:   Do not eat or drink for 2 hours before your exam. If you need to take medicine, you may take it with small sips of water. (We may ask you to take liquid medicine as well.)   The day before your exam, drink extra fluids at least six 8-ounce glasses (unless your doctor tells you to restrict your fluids).   You will be given instructions on how to drink the contrast.  Patients over 70 or patients with diabetes or kidney problems:   If you haven t had a blood test (creatinine test) within the last 30 days, the Cardiologist/Radiologist may require you to get this test prior to your exam.  If you have diabetes:   Continue to take your metformin medication on the day of your exam  Please wear loose clothing, such as a sweat suit or jogging clothes. Avoid snaps, zippers and other metal. We may ask  you to undress and put on a hospital gown.  If you have any questions, please call the Imaging Department where you will have your exam.            Mar 26, 2018  7:45 AM CDT   [facility] Lab Draw with [facility] LAB DRAW   [facility] Lab Draw ([facility])    [address] [place] [address]  [address]  [facility] [address]   [phone]            Mar 26, 2018  8:45 AM CDT   (Arrive by 8:30 AM)   Return Visit with Vinny Yu MD   [facility] Cancer [facility] ([facility])    [address] [place] [address]  [address]  [facility] [address]   [phone]            Apr 03, 2018 11:00 AM CDT   Cancer Rehab Eval with Ela Paredes PT   [facility] ([facility])    [address] [facility] [address]   [phone]            May 09, 2018 10:30 AM CDT   (Arrive by 10:15 AM)   Return Visit with José Antonio Ann MD   [facility] ([facility])    [address] [place] [address]  [address]  [facility] [address]   [phone]              Who to contact     If you have questions or need follow up information about today's clinic visit or your schedule please contact [facility] CANCER [facility] directly at [phone].  Normal or non-critical lab and imaging results will be communicated to you by MyChart, letter or phone within 4 business days after the clinic has received the results. If you do not hear from us within 7 days, please contact the clinic through MyChart or phone. If you have a critical or abnormal lab result, we will notify you by phone as soon as possible.  Submit refill requests through [email or web address] or call your pharmacy and they will forward the refill request to us. Please allow 3 business days for your refill to be completed.          Additional Information About Your Visit        MyChart Information     [email or web address] gives you secure access to your electronic health  record. If you see a primary care provider, you can also send messages to your care team and make appointments. If you have questions, please call your primary care clinic.  If you do not have a primary care provider, please call [phone] and they will assist you.        Care EveryWhere ID     This is your Care EveryWhere ID. This could be used by other organizations to access your [facility] medical records  [id number]        Your Vitals Were     Pulse Temperature Respirations Pulse Oximetry BMI (Body Mass Index)       94 97  F (36.1  C) (Oral) 18 99% 18.14 kg/m2        Blood Pressure from Last 3 Encounters:   03/16/18 123/78   03/12/18 100/65   03/05/18 115/70    Weight from Last 3 Encounters:   03/16/18 45 kg (99 lb 3.3 oz)   03/12/18 44.1 kg (97 lb 3.2 oz)   03/05/18 45.7 kg (100 lb 11.2 oz)              Today, you had the following     No orders found for display       Primary Care Provider Office Phone # Fax #    Neri Gipson [phone]       [address] [address]        Equal Access to Services     NESSA NICHOLS AH: Hadii mary marsho Sobrunildaali, waaxda luqadaha, qaybta kaalmada adeegyada, lisa michael. So [facility] [phone].    ATENCIÓN: Si habla español, tiene a nava disposición servicios gratuitos de asistencia lingüística. Ll[facility] [phone].    We comply with applicable federal civil rights laws and [place] laws. We do not discriminate on the basis of race, color, national origin, age, disability, sex, sexual orientation, or gender identity.            Thank you!     Thank you for choosing [facility] CANCER [facility]  for your care. Our goal is always to provide you with excellent care. Hearing back from our patients is one way we can continue to improve our services. Please take a few minutes to complete the written survey that you may receive in the mail after your visit with us. Thank you!             Your Updated  Medication List - Protect others around you: Learn how to safely use, store and throw away your medicines at www.disposemymeds.org.          This list is accurate as of 3/16/18  5:00 PM.  Always use your most recent med list.                   Brand Name Dispense Instructions for use Diagnosis    albuterol 108 (90 BASE) MCG/ACT Inhaler    PROAIR HFA/PROVENTIL HFA/VENTOLIN HFA    1 Inhaler    Inhale 2 puffs into the lungs 4 times daily    Mild asthma, unspecified whether complicated, unspecified whether persistent       amylase-lipase-protease 34980 UNITS Cpep    CREON    60 capsule    Take 2 capsules (72,000 Units) by mouth 3 times daily (with meals)    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)       CALCIUM PO      Take by mouth every morning Form/strength unknown. Powder formulation. Add to water and fruits/vegetables daily to promote bone health.        CARTIA  MG 24 hr capsule   Generic drug:  diltiazem     10 capsule    TK 1 C PO QD    Benign essential hypertension       cholecalciferol 1000 UNIT tablet    vitamin D3     Take 1 tablet by mouth 2 times daily        cyanocolbalamin 500 MCG tablet    vitamin  B-12     Take 500 mcg by mouth every morning        dexamethasone 2 MG tablet    DECADRON    20 tablet    Take 1 tablet (2 mg) by mouth every 12 hours    Pneumonitis       * enoxaparin 60 MG/0.6ML injection    LOVENOX     Inject 0.5 mLs (50 mg) Subcutaneous every 12 hours    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)       * enoxaparin 40 MG/0.4ML injection    LOVENOX    60 Syringe    Inject 0.4 mLs (40 mg) Subcutaneous every 12 hours    Malignant neoplasm of pancreas (H), Other chronic 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 cor pulmonale (H)       fluticasone-vilanterol 100-25 MCG/INH oral inhaler    BREO ELLIPTA    1 Inhaler    Inhale 1 puff into the lungs daily    Cough       folic acid 400 MCG tablet    FOLVITE     Take 1 tablet by mouth every morning        hydrOXYzine 25 MG tablet    ATARAX    10  tablet    Take 1-2 tablets (25-50 mg) by mouth every 6 hours as needed for itching (adjuvant pain)    Obstructive jaundice       levothyroxine 125 MCG tablet    SYNTHROID/LEVOTHROID    10 tablet    Take 1 tablet (125 mcg) by mouth daily    Hypothyroidism, unspecified type       LORazepam 0.5 MG tablet    ATIVAN    15 tablet    Take 1 tablet (0.5 mg) by mouth every 4 hours as needed (Anxiety, Nausea/Vomiting or Sleep)    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)       nystatin 832582 UNIT/ML suspension    MYCOSTATIN          omeprazole 40 MG capsule    priLOSEC    20 capsule    Take 1 capsule (40 mg) by mouth 2 times daily Take 30-60 minutes before a meal.    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 Please dispense 2 pairs of knee high 20-30 mmHg    Lymphedema       pantoprazole 20 MG EC tablet    PROTONIX    14 tablet    Take 1 tablet (20 mg) by mouth daily    Other acute gastritis without hemorrhage       * prochlorperazine 10 MG tablet    COMPAZINE          * prochlorperazine 5 MG tablet    COMPAZINE    30 tablet    Take 1 tablet (5 mg) by mouth every 6 hours as needed for nausea or vomiting    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)       timolol maleate 0.5 % opthalmic solution    ISTALOL    1 Bottle    Place 1 drop into both eyes every morning Before placing contact lenses    Cataract, unspecified cataract type, unspecified laterality       traMADol 50 MG tablet    ULTRAM    60 tablet    Take 1-2 tablets ( mg) by mouth every 4 hours as needed for breakthrough pain (No more than 8 tablets in a day)    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)       * triamterene-hydrochlorothiazide 37.5-25 MG per tablet    MAXZIDE-25     Take 1 tablet by mouth  every morning        * triamterene-hydrochlorothiazide 37.5-25 MG per capsule    DYAZIDE          TYLENOL PO      Take 325 mg by mouth every 4 hours as needed for mild pain or fever        * Notice:  This list has 8 medication(s) that are the same as other medications prescribed for you. Read the directions carefully, and ask your doctor or other care provider to review them with you.

## 2018-03-16 NOTE — NURSING NOTE
"Oncology Rooming Note    March 16, 2018 2:56 PM   Iris Lewis is a 78 year old female who presents for:    Chief Complaint   Patient presents with     Oncology Clinic Visit     Return for Pancreatic Ca      Initial Vitals: /78  Pulse 94  Temp 97  F (36.1  C) (Oral)  Resp 18  Wt 45 kg (99 lb 3.3 oz)  SpO2 99%  BMI 18.14 kg/m2 Estimated body mass index is 18.14 kg/(m^2) as calculated from the following:    Height as of 3/12/18: 1.575 m (5' 2.01\").    Weight as of this encounter: 45 kg (99 lb 3.3 oz). Body surface area is 1.4 meters squared.  Moderate Pain (4) Comment: Data Unavailable   No LMP recorded. Patient is postmenopausal.  Allergies reviewed: Yes  Medications reviewed: Yes    Medications: Medication refills not needed today.  Pharmacy name entered into [employer]:    [facility] - [facility] [address] [facility] [address] [place] [address] 10 AT [facility]    Clinical concerns: results , Pt showed me a Bump on her L wrist ,  Marissa  was notified.    7  minutes for nursing intake (face to face time)     Bisi Ramsay MA              "

## 2018-03-26 NOTE — MR AVS SNAPSHOT
After Visit Summary   3/26/2018    Iris Lewis    MRN: [id number]           Patient Information     Date Of Birth          1940        Visit Information        Provider Department      3/26/2018 8:45 AM Vinny Yu MD [facility]        Today's Diagnoses     Mild intermittent asthma without complication    -  1    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)        Other chronic 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 cor pulmonale (H)        Obstructive jaundice        Other acute gastritis without hemorrhage           Follow-ups after your visit        Follow-up notes from your care team     Return in about 2 months (around 5/26/2018) for MD visit with CT and labs.      Your next 10 appointments already scheduled     Mar 28, 2018 12:30 PM CDT   Masonic Lab Draw with UC MASONIC LAB DRAW   [facility] Lab Draw ([facility])    [address] [place] [address]  [address]  [facility] [address]   [phone]            Mar 28, 2018  1:00 PM CDT   Infusion 180 with UC ONCOLOGY INFUSION, UC 20 ATC   [facility] ([facility])    [address] [place] [address]  [address]  [facility] [address]   [phone]            Apr 03, 2018 11:00 AM CDT   Cancer Rehab Eval with Ela Paredes, PT   [facility] ([facility])    [address] [facility] [address]   [phone]            Apr 10, 2018  9:45 AM CDT   Masonic Lab Draw with UC MASONIC LAB DRAW   [facility] Draw ([facility])    [address] [place] [address]  [address]  [facility] [address]   [phone]            Apr 10, 2018 10:20 AM CDT   (Arrive by 10:05 AM)   Return Visit with CAT Dang CNP   [facility] ([facility])    [address] [place] [address]  [address]  [facility] [address]   [phone]            Apr 10, 2018 11:00 AM  CDT   Infusion 180 with UC ONCOLOGY INFUSION, UC 18 ATC   [facility] ([facility])    [address]  [address]  [facility] [address]   [phone]            Apr 24, 2018  7:30 AM CDT   Masonic Lab Draw with UC MASONIC LAB DRAW   [facility] ([facility])    [address]  [address]  [facility] [address]   [phone]            Apr 24, 2018  8:00 AM CDT   Infusion 180 with UC ONCOLOGY INFUSION   [facility] ([facility])    [address]  [address]  [facility] [address]   [phone]              Future tests that were ordered for you today     Open Future Orders        Priority Expected Expires Ordered    CT Chest Abdomen Pelvis w/o & w Contrast Routine 5/21/2018 6/26/2018 3/26/2018    Comprehensive metabolic panel Routine 5/21/2018 6/26/2018 3/26/2018    CBC with platelets differential Routine 5/21/2018 6/26/2018 3/26/2018    Cancer antigen 19-9 Routine 5/21/2018 6/26/2018 3/26/2018            Who to contact     If you have questions or need follow up information about today's clinic visit or your schedule please contact [facility] directly at [phone].  Normal or non-critical lab and imaging results will be communicated to you by [employer], letter or phone within 4 business days after the clinic has received the results. If you do not hear from us within 7 days, please contact the clinic through [employer] or phone. If you have a critical or abnormal lab result, we will notify you by phone as soon as possible.  Submit refill requests through [employer] (TTN) or call your pharmacy and they will forward the refill request to us. Please allow 3 business days for your refill to be completed.          Additional Information About Your Visit        [employer] (TTN) Information     [employer] (TT[employer]) gives you secure access to your electronic health record. If you see  "a primary care provider, you can also send messages to your care team and make appointments. If you have questions, please call your primary care clinic.  If you do not have a primary care provider, please call [phone] and they will assist you.        Care EveryWhere ID     This is your Care EveryWhere ID. This could be used by other organizations to access your [facility] medical records  [id number]        Your Vitals Were     Pulse Temperature Respirations Height Pulse Oximetry BMI (Body Mass Index)    80 97.7  F (36.5  C) (Oral) 16 1.575 m (5' 2.01\") 99% 19 kg/m2       Blood Pressure from Last 3 Encounters:   03/26/18 113/61   03/16/18 123/78   03/12/18 100/65    Weight from Last 3 Encounters:   03/26/18 47.1 kg (103 lb 14.4 oz)   03/16/18 45 kg (99 lb 3.3 oz)   03/12/18 44.1 kg (97 lb 3.2 oz)              We Performed the Following     Cancer antigen 19-9     CBC with platelets differential     Comprehensive metabolic panel     Heparin 10a Level          Where to get your medicines      These medications were sent to [facility] [id number] [place] [address] AT [place] [address], [facility] [address]     Phone:  [phone]     hydrOXYzine 25 MG tablet    pantoprazole 20 MG EC tablet         Some of these will need a paper prescription and others can be bought over the counter.  Ask your nurse if you have questions.     Bring a paper prescription for each of these medications     LORazepam 0.5 MG tablet          Primary Care Provider Office Phone # Fax #    Neri Gipson [phone] [address] [address]        Equal Access to Services     NESSA NICHOLS : Shaun Martinez, merle gar, lisa bradford. So [facility] [phone].    ATENCIÓN: Si habla español, tiene a nava disposición servicios gratuitos de asistencia lingüística. Llame al " [phone].    We comply with applicable federal civil rights laws and [place] laws. We do not discriminate on the basis of race, color, national origin, age, disability, sex, sexual orientation, or gender identity.            Thank you!     Thank you for choosing [facility]  for your care. Our goal is always to provide you with excellent care. Hearing back from our patients is one way we can continue to improve our services. Please take a few minutes to complete the written survey that you may receive in the mail after your visit with us. Thank you!             Your Updated Medication List - Protect others around you: Learn how to safely use, store and throw away your medicines at www.disposemymeds.org.          This list is accurate as of 3/26/18  5:29 PM.  Always use your most recent med list.                   Brand Name Dispense Instructions for use Diagnosis    albuterol 108 (90 BASE) MCG/ACT Inhaler    PROAIR HFA/PROVENTIL HFA/VENTOLIN HFA    1 Inhaler    Inhale 2 puffs into the lungs 4 times daily    Mild asthma, unspecified whether complicated, unspecified whether persistent       amylase-lipase-protease 55053 UNITS Cpep    CREON    60 capsule    Take 2 capsules (72,000 Units) by mouth 3 times daily (with meals)    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)       CALCIUM PO      Take by mouth every morning Form/strength unknown. Powder formulation. Add to water and fruits/vegetables daily to promote bone health.        CARTIA  MG 24 hr capsule   Generic drug:  diltiazem     10 capsule    TK 1 C PO QD    Benign essential hypertension       cholecalciferol 1000 UNIT tablet    vitamin D3     Take 1 tablet by mouth 2 times daily        cyanocolbalamin 500 MCG tablet    vitamin  B-12     Take 500 mcg by mouth every morning        dexamethasone 2 MG tablet    DECADRON    20 tablet    Take 1 tablet (2 mg) by mouth every 12 hours    Pneumonitis       * enoxaparin 60 MG/0.6ML injection     LOVENOX     Inject 0.5 mLs (50 mg) Subcutaneous every 12 hours    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)       * enoxaparin 40 MG/0.4ML injection    LOVENOX    60 Syringe    Inject 0.4 mLs (40 mg) Subcutaneous every 12 hours    Malignant neoplasm of pancreas (H), Other chronic 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 cor pulmonale (H)       fluticasone-vilanterol 100-25 MCG/INH oral inhaler    BREO ELLIPTA    1 Inhaler    Inhale 1 puff into the lungs daily    Cough       folic acid 400 MCG tablet    FOLVITE     Take 1 tablet by mouth every morning        hydrOXYzine 25 MG tablet    ATARAX    10 tablet    Take 1-2 tablets (25-50 mg) by mouth every 6 hours as needed for itching (adjuvant pain)    Obstructive jaundice       levothyroxine 125 MCG tablet    SYNTHROID/LEVOTHROID    10 tablet    Take 1 tablet (125 mcg) by mouth daily    Hypothyroidism, unspecified type       LORazepam 0.5 MG tablet    ATIVAN    15 tablet    Take 1 tablet (0.5 mg) by mouth every 4 hours as needed (Anxiety, Nausea/Vomiting or Sleep)    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)       nystatin 526519 UNIT/ML suspension    MYCOSTATIN          omeprazole 40 MG capsule    priLOSEC    20 capsule    Take 1 capsule (40 mg) by mouth 2 times daily Take 30-60 minutes before a meal.    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 Please dispense 2 pairs of knee high 20-30 mmHg    Lymphedema       pantoprazole 20 MG EC tablet    PROTONIX    14 tablet    Take 1 tablet (20 mg) by mouth daily    Other acute gastritis without hemorrhage       * prochlorperazine 10 MG tablet    COMPAZINE          * prochlorperazine 5 MG tablet    COMPAZINE    30 tablet    Take 1 tablet (5 mg) by mouth every 6 hours as  needed for nausea or vomiting    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)       timolol maleate 0.5 % opthalmic solution    ISTALOL    1 Bottle    Place 1 drop into both eyes every morning Before placing contact lenses    Cataract, unspecified cataract type, unspecified laterality       traMADol 50 MG tablet    ULTRAM    60 tablet    Take 1-2 tablets ( mg) by mouth every 4 hours as needed for breakthrough pain (No more than 8 tablets in a day)    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)       * triamterene-hydrochlorothiazide 37.5-25 MG per tablet    MAXZIDE-25     Take 1 tablet by mouth every morning        * triamterene-hydrochlorothiazide 37.5-25 MG per capsule    DYAZIDE          TYLENOL PO      Take 325 mg by mouth every 4 hours as needed for mild pain or fever        * Notice:  This list has 8 medication(s) that are the same as other medications prescribed for you. Read the directions carefully, and ask your doctor or other care provider to review them with you.

## 2018-03-26 NOTE — DISCHARGE INSTRUCTIONS

## 2018-03-26 NOTE — NURSING NOTE
"Oncology Rooming Note    March 26, 2018 8:40 AM   Iris Lewis is a 53 year old female who presents for: Port Draw and Oncology Clinic Visit    Initial Vitals: /61 (BP Location: Right arm, Patient Position: Sitting, Cuff Size: Adult Regular)  Pulse 80  Temp 97.7  F (36.5  C) (Oral)  Resp 16  Ht 1.575 m (5' 2.01\")  Wt 47.1 kg (103 lb 14.4 oz)  SpO2 99%  BMI 19 kg/m2 Estimated body mass index is 19 kg/(m^2) as calculated from the following:    Height as of this encounter: 1.575 m (5' 2.01\").    Weight as of this encounter: 47.1 kg (103 lb 14.4 oz). Body surface area is 1.44 meters squared.  No Pain (0) Comment: Data Unavailable   No LMP recorded. Patient is postmenopausal.  Allergies reviewed: Yes  Medications reviewed: Yes    Medications: MEDICATION REFILLS NEEDED TODAY. Provider was NOT notified.  Pharmacy name entered into [employer]:    [facility] - [facility] [address] [place] [address] DRUG STORE [address] [place] [address] 10 AT [facility] OF [address]    Clinical concerns:refills needed on Hydroxyzine.  I informed pt to remind the Provider/TARA about refills in case i dont touch bases with them, if the provider was in the exam room while i attend on rooming the next pt. Pt verbalized understandings.  Patricia Renee CMA    Pt received flu shot elsewhere. See Immunizations       6 minutes for nursing intake (face to face time)     Patricia Renee CMA                              "

## 2018-03-26 NOTE — PROGRESS NOTES
Iris Trinidad is here today in follow-up of pancreatic cancer.    She has disease metastatic to the liver and is here for a planned response assessment. She had an excellent response to her first couple months of therapy with gemcitabine and Abraxane. The last couple months have gone poorly as she developed a severe pulmonary syndrome. 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 She's missed her last couple of weeks of therapy as a consequence. She says her breathing has gotten back to just about normal. She is increasing her activity level, and while not back to her usual vigorous activities such as snow shoveling, she's able to do most of her other planned activities. Her appetite is getting much better as she gets further away from chemotherapy and she is regaining some of her taste for food. She's eating much more than she had been. She notes she's developed some lower extremity edema mostly just in her feet over the last week. She is having a little bit of orthostatic dizziness if she gets up too quickly. She continues on Lovenox for her prior pulmonary embolism, and is not having any bleeding problems. She still has a fair bit of numbness and tingling in her hands and feet. The remainder of the complete review of systems documented patient questionnaire.    On exam today Mrs. Trinidad appears well. Her vital signs are noted in the chart and are unremarkable.  She has no scleral icterus and no lesions are visible in the oropharynx.  She has no palpable adenopathy in the neck or supra-clavicular spaces.  Lungs are clear to auscultation without dullness to percussion.  Her heart rate and rhythm are regular without audible murmur or gallop. Jugular venous pressure appears normal to low.  Her abdomen is soft and nontender without palpable mass, organomegaly or ascites.  She has 1+ pitting edema to just above the ankles  bilaterally. Is no tenderness in her calves or thighs. There is no cyanosis or clubbing of her digits.  Her speech is fluent and her cranial nerves are grossly intact. She has severe kyphoscoliosis but an otherwise unremarkable station and gait.    I reviewed with the patient her CT scan and lab work. Her electrolytes and renal function are normal. Her albumin is still quite low. Her liver enzymes are unremarkable. She has moderate mildly macrocytic anemia.I don't have the radiologist's interpretation of her CT scan yet but to my review she's had the development of significant new or increased metastatic disease in her liver.    Assessment/plan: Metastatic pancreatic cancer now with progression. While it is unclear if her respiratory problems were entirely due to the gemcitabine, that is now a moot issue given that she has clear evidence of progression having missed only a couple weeks of therapy. Her performance status is improved, now back to a 1 and so she is a candidate to resume some sort of therapy. I reviewed with her the options. She clearly is not well enough, and still has enough neuropathy, that folfirinox would not be an option. I think she is well enough to tolerate combination therapy however, and 5-FU plus liposomal irinotecan would be a good choice for her. We also discussed possibility of doing single agent Xeloda, but she would still like to be aggressive. I reviewed with her the expected toxicities with the liposomal irinotecan and 5-FU. She understands this is non-curative, and after asking several questions expressed a good understanding of the risk and rationale and wish to proceed. We will plan on getting started on the new therapy within the next week.   She'll continue on with her anticoagulation.   She will continue to hold her Dyazide as her blood pressure is well-controlled and I think of anything she's a little bit volume depleted.

## 2018-03-26 NOTE — NURSING NOTE
Chief Complaint   Patient presents with     Port Draw     labs drawn from port by rn.  vs taken     Good blood return noted from already-accessed port and labs drawn by rn.  Port flushed with NS and heparin; port de-accessed.  Pt tolerated well.  VS taken.  Pt checked in for next appt.  Nehal Dorado RN

## 2018-03-26 NOTE — LETTER
3/26/2018      RE: Iris Lewis  [address]  [place] [address]       Iris Trinidad is here today in follow-up of pancreatic cancer.    She has disease metastatic to the liver and is here for a planned response assessment. She had an excellent response to her first couple months of therapy with gemcitabine and Abraxane. The last couple months have gone poorly as she developed a severe pulmonary syndrome. 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 She's missed her last couple of weeks of therapy as a consequence. She says her breathing has gotten back to just about normal. She is increasing her activity level, and while not back to her usual vigorous activities such as snow shoveling, she's able to do most of her other planned activities. Her appetite is getting much better as she gets further away from chemotherapy and she is regaining some of her taste for food. She's eating much more than she had been. She notes she's developed some lower extremity edema mostly just in her feet over the last week. She is having a little bit of orthostatic dizziness if she gets up too quickly. She continues on Lovenox for her prior pulmonary embolism, and is not having any bleeding problems. She still has a fair bit of numbness and tingling in her hands and feet. The remainder of the complete review of systems documented patient questionnaire.    On exam today Mrs. Trinidad appears well. Her vital signs are noted in the chart and are unremarkable.  She has no scleral icterus and no lesions are visible in the oropharynx.  She has no palpable adenopathy in the neck or supra-clavicular spaces.  Lungs are clear to auscultation without dullness to percussion.  Her heart rate and rhythm are regular without audible murmur or gallop. Jugular venous pressure appears normal to low.  Her abdomen is soft and nontender without palpable mass,  organomegaly or ascites.  She has 1+ pitting edema to just above the ankles bilaterally. Is no tenderness in her calves or thighs. There is no cyanosis or clubbing of her digits.  Her speech is fluent and her cranial nerves are grossly intact. She has severe kyphoscoliosis but an otherwise unremarkable station and gait.    I reviewed with the patient her CT scan and lab work. Her electrolytes and renal function are normal. Her albumin is still quite low. Her liver enzymes are unremarkable. She has moderate mildly macrocytic anemia.I don't have the radiologist's interpretation of her CT scan yet but to my review she's had the development of significant new or increased metastatic disease in her liver.    Assessment/plan: Metastatic pancreatic cancer now with progression. While it is unclear if her respiratory problems were entirely due to the gemcitabine, that is now a moot issue given that she has clear evidence of progression having missed only a couple weeks of therapy. Her performance status is improved, now back to a 1 and so she is a candidate to resume some sort of therapy. I reviewed with her the options. She clearly is not well enough, and still has enough neuropathy, that folfirinox would not be an option. I think she is well enough to tolerate combination therapy however, and 5-FU plus liposomal irinotecan would be a good choice for her. We also discussed possibility of doing single agent Xeloda, but she would still like to be aggressive. I reviewed with her the expected toxicities with the liposomal irinotecan and 5-FU. She understands this is non-curative, and after asking several questions expressed a good understanding of the risk and rationale and wish to proceed. We will plan on getting started on the new therapy within the next week.   She'll continue on with her anticoagulation.   She will continue to hold her Dyazide as her blood pressure is well-controlled and I think of anything she's a little  bit volume depleted.    Vinny Yu MD

## 2018-03-26 NOTE — PROGRESS NOTES
"Met with Iris to discuss chemotherapy and resources available at the [facility], patient has been on previous treament but had not previously received guidebook. Provided patient with \"My Cancer Guidebook\" and [employer] printouts on Irinotecan Liposomal, 5 FU. Reviewed administration, side effects (including myelosuppression, nausea/vomiting, diarrhea/constipation, hair loss, mouth sores) and ongoing symptom management by APPs in clinic. Provided phone numbers for triage and after hours care. Highlighted steps to expect when getting chemotherapy (check in, labs, pre- meds, infusion), Discussed that chemo treatment may be delayed a day or two due to blood counts, infusion schedule or patient's need to modify. Included a one page resource of symptoms and when to contact the Cancer Clinic with questions or concerns.      Iris signed Authorization to Discuss Paperwork. Patient is active on [employer] and requests to have appointments notified through [employer].     Answered all Iris's questions to her stated satisfaction.                                                                                                                                                  "

## 2018-03-28 NOTE — PATIENT INSTRUCTIONS
Restart Lovenox (Enoxaprin) injections 40 mg every 12 hours.     You will have your Heparin 10A lab level checked on Friday to make sure your dose is the correct amount.     Give yourself the injection at 9 am and 9 pm.       Contact Numbers    [facility] Main Line: [phone]  [facility] Triage:  [phone]    Call triage with chills and/or temperature greater than or equal to 100.5, uncontrolled nausea/vomiting, diarrhea, constipation, dizziness, shortness of breath, chest pain, bleeding, unexplained bruising, or any new/concerning symptoms, questions/concerns.     If you are having any concerning symptoms or wish to speak to a provider before your next infusion visit, please call your care coordinator or triage to notify them so we can adequately serve you.       After Hours: [phone]    If after hours, weekends, or holidays, call main hospital  and ask for Oncology doctor on call.         March 2018 Sunday Monday Tuesday Wednesday Thursday Friday Saturday                       1     2     [facility]    1:45 PM   (15 min.)    [facility] LAB DRAW   [facility] Lab Draw     [facility] RETURN    1:55 PM   (50 min.)   Benoit Molina PA   [facility]     XR WRIST LEFT 2 VIEWS    3:30 PM   (15 min.)   UCXR1   [facility] Xray 3       4     5     [facility] DRAW    2:15 PM   (15 min.)    [facility] LAB DRAW   [facility] Lab Draw     [facility] RETURN    2:25 PM   (50 min.)   Quyen Mazariegos APRN CNP   [facility] 6     7     8     TELEMEDICINE   10:00 AM   (60 min.)   [facility] 9     10       11     12     [facility]RAW    2:00 PM   (15 min.)    [facility] LAB DRAW   [facility] Draw     [facility] RETURN    2:25 PM   (50 min.)   Quyen Mazariegos APRN CNP   [facility] 13     14     15     16     [facility] RETURN    2:45 PM   (30 min.)   José Antonio Ann MD   [facility]  [facility] 17       18     19     20     21     22     23     24       25     26     CT CHEST ABDOMEN PELVIS WWO    6:45 AM   (20 min.)   UCCT2   [facility] CT     [facility] LAB DRAW    7:45 AM   (15 min.)    [facility] LAB DRAW   [facility] Lab Draw     [facility] RETURN    8:30 AM   (30 min.)   Vinny Yu MD   [facility] 27     28     [facility] LAB DRAW   12:30 PM   (15 min.)    [facility] LAB DRAW   [facility] Lab Draw     [facility] ONC INFUSION 180    1:00 PM   (180 min.)   UC ONCOLOGY INFUSION   [facility] 29     30     [facility] ONC INFUSION 60    2:30 PM   (60 min.)    ONCOLOGY INFUSION   [facility] 31 April 2018 Sunday Monday Tuesday Wednesday Thursday Friday Saturday   1     2     3     CANCER REHAB EVAL   11:00 AM   (60 min.)   Ela Paredes, SANJANA   [facility] 4     5     6     7       8     9     10     [facility] LAB DRAW    9:45 AM   (15 min.)    [facility] LAB DRAW   [facility] Lab Draw     [facility] RETURN   10:05 AM   (50 min.)   Quyen Mazariegos, CAT CNP   [facility]     [facility] ONC INFUSION 180   11:00 AM   (180 min.)    ONCOLOGY INFUSION   [facility] 11     12     13     14       15     16     17     18     19     20     21       22     23     24     [facility] DRAW    7:30 AM   (15 min.)    [facility] LAB DRAW   [facility] Lab Draw     [facility] ONC INFUSION 180    8:00 AM   (180 min.)    ONCOLOGY INFUSION   [facility] 25     26     27     28       29     30                                          Recent Results (from the past 24 hour(s))   CBC with platelets differential    Collection Time: 03/28/18 12:47 PM   Result Value Ref Range    WBC 8.6 4.0 - 11.0 10e9/L    RBC Count 3.07 (L) 3.8 - 5.2 10e12/L    Hemoglobin 9.8 (L) 11.7 - 15.7 g/dL    Hematocrit 30.9 (L) 35.0 - 47.0 %     (H) 78 - 100 fl     MCH 31.9 26.5 - 33.0 pg    MCHC 31.7 31.5 - 36.5 g/dL    RDW 16.1 (H) 10.0 - 15.0 %    Platelet Count 322 150 - 450 10e9/L    Diff Method Automated Method     % Neutrophils 64.9 %    % Lymphocytes 19.4 %    % Monocytes 11.4 %    % Eosinophils 2.9 %    % Basophils 0.7 %    % Immature Granulocytes 0.7 %    Nucleated RBCs 0 0 /100    Absolute Neutrophil 5.6 1.6 - 8.3 10e9/L    Absolute Lymphocytes 1.7 0.8 - 5.3 10e9/L    Absolute Monocytes 1.0 0.0 - 1.3 10e9/L    Absolute Eosinophils 0.3 0.0 - 0.7 10e9/L    Absolute Basophils 0.1 0.0 - 0.2 10e9/L    Abs Immature Granulocytes 0.1 0 - 0.4 10e9/L    Absolute Nucleated RBC 0.0    Bilirubin  total    Collection Time: 03/28/18 12:47 PM   Result Value Ref Range    Bilirubin Total 0.2 0.2 - 1.3 mg/dL   Comprehensive metabolic panel    Collection Time: 03/28/18 12:47 PM   Result Value Ref Range    Sodium 134 133 - 144 mmol/L    Potassium 3.9 3.4 - 5.3 mmol/L    Chloride 102 94 - 109 mmol/L    Carbon Dioxide 25 20 - 32 mmol/L    Anion Gap 7 3 - 14 mmol/L    Glucose 141 (H) 70 - 99 mg/dL    Urea Nitrogen 10 7 - 30 mg/dL    Creatinine 0.51 (L) 0.52 - 1.04 mg/dL    GFR Estimate >90 >60 mL/min/1.7m2    GFR Estimate If Black >90 >60 mL/min/1.7m2    Calcium 8.4 (L) 8.5 - 10.1 mg/dL    Bilirubin Total 0.3 0.2 - 1.3 mg/dL    Albumin 2.6 (L) 3.4 - 5.0 g/dL    Protein Total 6.6 (L) 6.8 - 8.8 g/dL    Alkaline Phosphatase 106 40 - 150 U/L    ALT 13 0 - 50 U/L    AST 17 0 - 45 U/L

## 2018-03-28 NOTE — MR AVS SNAPSHOT
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     You will have your Heparin 10A lab level checked on Friday to make sure your dose is the correct amount.     Give yourself the injection at 9 am and 9 pm.       Contact Numbers    [facility] [place] [facility] Main Line: [phone]  [facility] [place] [facility] Triage:  [phone]    Call triage with chills and/or temperature greater than or equal to 100.5, uncontrolled nausea/vomiting, diarrhea, constipation, dizziness, shortness of breath, chest pain, bleeding, unexplained bruising, or any new/concerning symptoms, questions/concerns.     If you are having any concerning symptoms or wish to speak to a provider before your next infusion visit, please call your care coordinator or triage to notify them so we can adequately serve you.       After Hours: [phone]    If after hours, weekends, or holidays, call main hospital  and ask for Oncology doctor on call.         March 2018 Sunday Monday Tuesday Wednesday Thursday Friday Saturday                       1     2     [facility]    1:45 PM   (15 min.)   [facility] DRAW   [facility] Lab Draw     [facility] RETURN    1:55 PM   (50 min.)   Benoit Molina PA M [facility]     XR WRIST LEFT 2 VIEWS    3:30 PM   (15 min.)   UCXR1   [facility] Xray 3       4     5     [facility] DRAW    2:15 PM   (15 min.)    [facility] LAB DRAW   [facility] Lab Draw     UMP RETURN    2:25 PM   (50 min.)   Quyen Mazariegos APRN CNP   [facility] 6      7     8     TELEMEDICINE   10:00 AM   (60 min.)   Bhakti [facility] Medication Therapy Management 9     10       11     12     [facility] DRAW    2:00 PM   (15 min.)   [facility] DRAW   [facility] Lab Draw     UMP RETURN    2:25 PM   (50 min.)   Quyen Mazariegos APRN CNP   [facility] 13     14     15     16     UMP RETURN    2:45 PM   (30 min.)   José Antonio Ann MD   [facility] 17       18     19     20     21     22     23     24       25     26     CT CHEST ABDOMEN PELVIS WWO    6:45 AM   (20 min.)   UCCT2   [facility] CT     [facility] DRAW    7:45 AM   (15 min.)    [facility] LAB DRAW   [facility] Lab Draw     UMP RETURN    8:30 AM   (30 min.)   Vinny Yu MD   [facility] 27     28     [facility] DRAW   12:30 PM   (15 min.)    [facility] LAB DRAW   [facility] Lab Draw     UMP ONC INFUSION 180    1:00 PM   (180 min.)   UC ONCOLOGY INFUSION   [facility] 29     30     UMP ONC INFUSION 60    2:30 PM   (60 min.)   UC ONCOLOGY INFUSION   [facility] 31 April 2018 Sunday Monday Tuesday Wednesday Thursday Friday Saturday   1     2     3     CANCER REHAB EVAL   11:00 AM   (60 min.)   Ela Paredes, PT   [facility] 4     5     6     7       8     9     10     [facility] DRAW    9:45 AM   (15 min.)   [facility] DRAW   [facility] Lab Draw     UMP RETURN   10:05 AM   (50 min.)   Quyen Mazariegos APRN CNP   [facility]     [facility] ONC INFUSION 180   11:00 AM   (180 min.)   UC ONCOLOGY INFUSION   [facility] 11     12     13     14       15     16     17     18     19     20     21       22     23     24     [facility] DRAW    7:30 AM   (15 min.)   [facility] LAB DRAW   [facility] Lab Draw     [facility] ONC INFUSION 180    8:00 AM   (180 min.)    ONCOLOGY  [facility] 25     26     27     28       29     30                                          Recent Results (from the past 24 hour(s))   CBC with platelets differential    Collection Time: 03/28/18 12:47 PM   Result Value Ref Range    WBC 8.6 4.0 - 11.0 10e9/L    RBC Count 3.07 (L) 3.8 - 5.2 10e12/L    Hemoglobin 9.8 (L) 11.7 - 15.7 g/dL    Hematocrit 30.9 (L) 35.0 - 47.0 %     (H) 78 - 100 fl    MCH 31.9 26.5 - 33.0 pg    MCHC 31.7 31.5 - 36.5 g/dL    RDW 16.1 (H) 10.0 - 15.0 %    Platelet Count 322 150 - 450 10e9/L    Diff Method Automated Method     % Neutrophils 64.9 %    % Lymphocytes 19.4 %    % Monocytes 11.4 %    % Eosinophils 2.9 %    % Basophils 0.7 %    % Immature Granulocytes 0.7 %    Nucleated RBCs 0 0 /100    Absolute Neutrophil 5.6 1.6 - 8.3 10e9/L    Absolute Lymphocytes 1.7 0.8 - 5.3 10e9/L    Absolute Monocytes 1.0 0.0 - 1.3 10e9/L    Absolute Eosinophils 0.3 0.0 - 0.7 10e9/L    Absolute Basophils 0.1 0.0 - 0.2 10e9/L    Abs Immature Granulocytes 0.1 0 - 0.4 10e9/L    Absolute Nucleated RBC 0.0    Bilirubin  total    Collection Time: 03/28/18 12:47 PM   Result Value Ref Range    Bilirubin Total 0.2 0.2 - 1.3 mg/dL   Comprehensive metabolic panel    Collection Time: 03/28/18 12:47 PM   Result Value Ref Range    Sodium 134 133 - 144 mmol/L    Potassium 3.9 3.4 - 5.3 mmol/L    Chloride 102 94 - 109 mmol/L    Carbon Dioxide 25 20 - 32 mmol/L    Anion Gap 7 3 - 14 mmol/L    Glucose 141 (H) 70 - 99 mg/dL    Urea Nitrogen 10 7 - 30 mg/dL    Creatinine 0.51 (L) 0.52 - 1.04 mg/dL    GFR Estimate >90 >60 mL/min/1.7m2    GFR Estimate If Black >90 >60 mL/min/1.7m2    Calcium 8.4 (L) 8.5 - 10.1 mg/dL    Bilirubin Total 0.3 0.2 - 1.3 mg/dL    Albumin 2.6 (L) 3.4 - 5.0 g/dL    Protein Total 6.6 (L) 6.8 - 8.8 g/dL    Alkaline Phosphatase 106 40 - 150 U/L    ALT 13 0 - 50 U/L    AST 17 0 - 45 U/L                 Follow-ups after your visit        Your next 10 appointments  already scheduled     Mar 30, 2018  2:30 PM CDT   Infusion 60 with UC ONCOLOGY INFUSION, UC 32 ATC   [facility]    [address]  [address]  [place] [address]   [phone]            Apr 03, 2018 11:00 AM CDT   Cancer Rehab Eval with Ela Paredes, PT   [facility] ([facility]    [address] [facility] [address]   [phone]            Apr 10, 2018  9:45 AM CDT   [facility] Lab Draw with UC [facility] LAB DRAW   [facility] Lab Draw ([facility])    [address]  [address]  [place] [address]   [phone]            Apr 10, 2018 10:20 AM CDT   (Arrive by 10:05 AM)   Return Visit with CAT Dang CNP   [facility]    [address]  [address]  [place] [address]   [phone]            Apr 10, 2018 11:00 AM CDT   Infusion 180 with UC ONCOLOGY INFUSION, UC 18 ATC   [facility]    [address]  [address]  [place] [address]   [phone]            Apr 24, 2018  7:30 AM CDT   M[facility] Lab Draw with UC MASONIC LAB DRAW   [facility] Lab Draw ([facility])    [address]  [address]  [place] [address]   [phone]            Apr 24, 2018  8:00 AM CDT   Infusion 180 with UC ONCOLOGY INFUSION, UC 18 ATC   [facility]    [address]  [address]  [place] [address]   [phone]              Future tests that were ordered for you today     Open Future Orders        Priority Expected Expires Ordered    Heparin 10a Level Routine 3/30/2018 3/28/2019 3/28/2018            Who to contact     If you have questions or need follow up information about today's clinic visit or your schedule please  contact [facility] directly at [phone].  Normal or non-critical lab and imaging results will be communicated to you by MyChart, letter or phone within 4 business days after the clinic has received the results. If you do not hear from us within 7 days, please contact the clinic through [employer] or phone. If you have a critical or abnormal lab result, we will notify you by phone as soon as possible.  Submit refill requests through [employer] or call your pharmacy and they will forward the refill request to us. Please allow 3 business days for your refill to be completed.          Additional Information About Your Visit        [employer] Information     [employer] gives you secure access to your electronic health record. If you see a primary care provider, you can also send messages to your care team and make appointments. If you have questions, please call your primary care clinic.  If you do not have a primary care provider, please call [phone] and they will assist you.        Care EveryWhere ID     This is your Care EveryWhere ID. This could be used by other organizations to access your [facility] medical records  [id number]        Your Vitals Were     Pulse Temperature Respirations Pulse Oximetry BMI (Body Mass Index)       78 98.1  F (36.7  C) 16 100% 19.44 kg/m2        Blood Pressure from Last 3 Encounters:   03/28/18 124/70   03/26/18 113/61   03/16/18 123/78    Weight from Last 3 Encounters:   03/28/18 48.2 kg (106 lb 4.8 oz)   03/26/18 47.1 kg (103 lb 14.4 oz)   03/16/18 45 kg (99 lb 3.3 oz)              We Performed the Following     Bilirubin  total     CBC with platelets differential     Comprehensive metabolic panel          Today's Medication Changes          These changes are accurate as of 3/28/18  4:20 PM.  If you have any questions, ask your nurse or doctor.               Start taking these medicines.        Dose/Directions    loperamide 2 MG capsule   Commonly known as:  IMODIUM    Used for:  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)        2 caps at 1st sign of diarrhea & 1 cap every 2hrs until 12hrs diarrhea free. During night, 2 caps at bedtime & 2 caps every 4hrs until AM   Quantity:  30 capsule   Refills:  0         These medicines have changed or have updated prescriptions.        Dose/Directions    * ondansetron 4 MG tablet   Commonly known as:  ZOFRAN   This may have changed:  Another medication with the same name was added. Make sure you understand how and when to take each.        Refills:  0       * ondansetron 8 MG tablet   Commonly known as:  ZOFRAN   This may have changed:  You were already taking a medication with the same name, and this prescription was added. Make sure you understand how and when to take each.   Used for:  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)        Dose:  8 mg   Take 1 tablet (8 mg) by mouth every 8 hours as needed (nausea/vomiting)   Quantity:  10 tablet   Refills:  2       * Notice:  This list has 2 medication(s) that are the same as other medications prescribed for you. Read the directions carefully, and ask your doctor or other care provider to review them with you.         Where to get your medicines      These medications were sent to [address] [place] [address] [place], [place] [id number]    Hours:  TRANSPLANT PHONE NUMBER [phone] Phone:  [phone]     loperamide 2 MG capsule    ondansetron 8 MG tablet                Primary Care Provider Office Phone # Fax #    Neri Gipson [phone]       [address] [place] [address]        Equal Access to Services     [facility]: Hadii aad ku hadasho Sothom, waaxda luqadaha, qaybta kaalmada adeegyameera, lisa michael. So [facility] [phone].    ATENCIÓN: Si habla español, tiene a nava disposición servicios gratuitos de asistencia lingüística. Llame al  [phone].    We comply with applicable federal civil rights laws and [place] laws. We do not discriminate on the basis of race, color, national origin, age, disability, sex, sexual orientation, or gender identity.            Thank you!     Thank you for choosing [facility]  for your care. Our goal is always to provide you with excellent care. Hearing back from our patients is one way we can continue to improve our services. Please take a few minutes to complete the written survey that you may receive in the mail after your visit with us. Thank you!             Your Updated Medication List - Protect others around you: Learn how to safely use, store and throw away your medicines at www.disposemymeds.org.          This list is accurate as of 3/28/18  4:20 PM.  Always use your most recent med list.                   Brand Name Dispense Instructions for use Diagnosis    albuterol 108 (90 BASE) MCG/ACT Inhaler    PROAIR HFA/PROVENTIL HFA/VENTOLIN HFA    1 Inhaler    Inhale 2 puffs into the lungs 4 times daily    Mild asthma, unspecified whether complicated, unspecified whether persistent       amylase-lipase-protease 88863 UNITS Cpep    CREON    60 capsule    Take 2 capsules (72,000 Units) by mouth 3 times daily (with meals)    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)       CALCIUM PO      Take by mouth every morning Form/strength unknown. Powder formulation. Add to water and fruits/vegetables daily to promote bone health.        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 cor pulmonale (H)       fluticasone-vilanterol 100-25 MCG/INH oral inhaler    BREO ELLIPTA    1 Inhaler    Inhale 1 puff into the lungs daily    Cough       folic acid 400 MCG tablet    FOLVITE     Take 1 tablet by mouth every morning        hydrOXYzine 25 MG tablet    ATARAX    10 tablet    Take 1-2 tablets (25-50 mg) by mouth every 6 hours as needed for itching (adjuvant pain)    Obstructive jaundice       levothyroxine 125 MCG tablet    SYNTHROID/LEVOTHROID    10 tablet    Take 1 tablet (125 mcg) by mouth daily    Hypothyroidism, unspecified type       loperamide 2 MG capsule    IMODIUM    30 capsule    2 caps at 1st sign of diarrhea & 1 cap every 2hrs until 12hrs diarrhea free. During night, 2 caps at bedtime & 2 caps every 4hrs until AM    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)       LORazepam 0.5 MG tablet    ATIVAN    15 tablet    Take 1 tablet (0.5 mg) by mouth every 4 hours as needed (Anxiety, Nausea/Vomiting or Sleep)    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)       nystatin 098339 UNIT/ML suspension    MYCOSTATIN          omeprazole 40 MG capsule    priLOSEC    20 capsule    Take 1 capsule (40 mg) by mouth 2 times daily Take 30-60 minutes before a meal.    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 Please dispense 2 pairs of knee high 20-30 mmHg    Lymphedema       pantoprazole 20 MG EC tablet    PROTONIX     14 tablet    Take 1 tablet (20 mg) by mouth daily    Other acute gastritis without hemorrhage       * prochlorperazine 10 MG tablet    COMPAZINE          * prochlorperazine 5 MG tablet    COMPAZINE    30 tablet    Take 1 tablet (5 mg) by mouth every 6 hours as needed for nausea or vomiting    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)       timolol maleate 0.5 % opthalmic solution    ISTALOL    1 Bottle    Place 1 drop into both eyes every morning Before placing contact lenses    Cataract, unspecified cataract type, unspecified laterality       traMADol 50 MG tablet    ULTRAM    60 tablet    Take 1-2 tablets ( mg) by mouth every 4 hours as needed for breakthrough pain (No more than 8 tablets in a day)    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)       * triamterene-hydrochlorothiazide 37.5-25 MG per tablet    MAXZIDE-25     Take 1 tablet by mouth every morning        * triamterene-hydrochlorothiazide 37.5-25 MG per capsule    DYAZIDE          TYLENOL PO      Take 325 mg by mouth every 4 hours as needed for mild pain or fever        * Notice:  This list has 8 medication(s) that are the same as other medications prescribed for you. Read the directions carefully, and ask your doctor or other care provider to review them with you.

## 2018-03-28 NOTE — NURSING NOTE
Chief Complaint   Patient presents with     Port Draw     labs drawn from port by RN     Port accessed, labs drawn, flushed with NS & heparin.  Patient checked in for infusion visit.  Bhakti Pagan RN    
no loss of consciousness, no gait abnormality, no headache, no sensory deficits, and no weakness.

## 2018-03-28 NOTE — PROGRESS NOTES
Infusion Nursing Note:  Iris Lewis presents today for Cycle 1 Day 1 Irinotecan Liposome, Leucovorin, Fluorouracil pump connection.   Patient seen by provider today: No    Note: Patient states she feels good today. Denies fevers, chills, nausea. Endorses constipation managed by prn miralax. 1+ pitting edema noted to bilateral ankles and feet. Patient states she elevates her feet as often as possible. Encouraged patient to wear compression socks during the day.     Chemotherapy teaching completed by Jersey King RNCC. Medications reviewed with patient. All questions answered.     Patient states she discontinued the Lovenox injections on 3/12/18 when she saw Ron FISCHER. Quyen paged for medication clarification.     TORB 3/28/18 1530 Quyen Mazariegos NP/Marilou Vergara RN: Instruct patient to restart Lovenox injections 40 mg every 12 hours as prescribed. Check Heparin 10a level on Friday 3/30/18 at infusion appointment.     Patient instructed to start Lovenox 40 mg injection tonight at 2100 and continue every 12 hours. Patient aware of lab draw on Friday that needs to occur 4-6 hours after Lovenox injection. Patient instructed to give her Lovenox injection at 0900 on Friday. Verbalized understanding of plan.     Per Heraclio Mattson PharmD, Leucovorin to be administered even though there is no Fluorouracil bolus ordered due to insurance coverage.     Intravenous Access:  Implanted Port.    Treatment Conditions:  Lab Results   Component Value Date    HGB 9.8 03/28/2018     Lab Results   Component Value Date    WBC 8.6 03/28/2018      Lab Results   Component Value Date    ANEU 5.6 03/28/2018     Lab Results   Component Value Date     03/28/2018      Lab Results   Component Value Date     03/28/2018                   Lab Results   Component Value Date    POTASSIUM 3.9 03/28/2018           Lab Results   Component Value Date    MAG 1.9 11/15/2017            Lab Results   Component Value Date    CR 0.51 03/28/2018          "          Lab Results   Component Value Date    EMMY 8.4 03/28/2018                Lab Results   Component Value Date    BILITOTAL 0.2 03/28/2018    BILITOTAL 0.3 03/28/2018           Lab Results   Component Value Date    ALBUMIN 2.6 03/28/2018                    Lab Results   Component Value Date    ALT 13 03/28/2018           Lab Results   Component Value Date    AST 17 03/28/2018     Results reviewed, labs MET treatment parameters, ok to proceed with treatment.      Post Infusion Assessment:  Patient tolerated infusion without incident.  Blood return noted pre and post infusion.  Site patent and intact, free from redness, edema or discomfort.  No evidence of extravasations.    Prior to discharge: Port is secured in place with tegaderm and flushed with 10cc NS with positive blood return noted.  Continuous home infusion CADD pump connected.    All connectors secured in place and clamps taped open. Connections and pump double checked with Marylu Cordoba RN.    Pump started, \"running\" noted on display (CADD): YES.  Patient instructed to call our clinic or [facility] with any questions or concerns at home.  Patient verbalized understanding.    Patient set up for pump disconnect at our clinic on 3/30/18 at 1430.      Discharge Plan:   Prescription refills given for Imodium, BREO inhaler.   Discharge instructions reviewed with: Patient and Family.  Patient and family verbalized understanding of discharge instructions and all questions answered.  Copy of AVS reviewed with patient and family.  Patient will return 3/30/18 for pump disconnect and 4/10/18 for next infusion appointment.  Patient discharged in stable condition accompanied by: .  Face to Face time: 5.    MANSI MCADAMS RN      "

## 2018-03-30 NOTE — PROGRESS NOTES
Xa level is not likely to be accurate as this level was drawn outside the therapeutic window. Will keep dose at 40 mg every 12 hours and recheck at next visit.

## 2018-03-30 NOTE — PROGRESS NOTES
Infusion Nursing Note:  Iris Lewis presents today for Fluorouracil pump disconnection.    Patient seen by provider today: No    Note: Patient presents to clinic today feeling generally well with no questions.  Pump empty at time of disconnect.  Pt reports continued BLE edema (2+).  States she purchased compression stockings today and they are helping.  Denies any sharp pain, redness, warmth.  Endorses some SOB when laying, but states this is baseline.    Pt self administered Lovenox at 0830 today and presented to clinic at 1435.  Heparin Xa drawn at 1445/1450; 15-20 minutes after 4-6 hour window.  Notified Quyen Mazariegos NP.  Will redraw at next cycle.      Intravenous Access:  Implanted Port.    Treatment Conditions:  Not Applicable.    Post Infusion Assessment:  Patient tolerated infusion without incident.  Blood return noted pre and post infusion.  Site patent and intact, free from redness, edema or discomfort.  No evidence of extravasations.  Access discontinued per protocol.    Discharge Plan:   Patient declined prescription refills.  Discharge instructions reviewed with: Patient.  Patient and/or family verbalized understanding of discharge instructions and all questions answered.  AVS to patient via [employer].  Patient will return 4/10/2018 for next appointment.   Departure Mode: Ambulatory.  Face to Face time: 3 minutes.    Mine Avila RN

## 2018-03-30 NOTE — MR AVS SNAPSHOT
After Visit Summary   3/30/2018    Iris Lewis    MRN: [id number]           Patient Information     Date Of Birth          1940        Visit Information        Provider Department      3/30/2018 2:30 PM  32 ATC;  ONCOLOGY INFUSION [facility]        Today's Diagnoses     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)          Care Instructions    Contact Numbers    [facility] [place] [facility] Main Line: [phone]  [facility] Triage:  [phone]    Call triage with chills and/or temperature greater than or equal to 100.5, uncontrolled nausea/vomiting, diarrhea, constipation, dizziness, shortness of breath, chest pain, bleeding, unexplained bruising, or any new/concerning symptoms, questions/concerns.     If you are having any concerning symptoms or wish to speak to a provider before your next infusion visit, please call your care coordinator or triage to notify them so we can adequately serve you.       After Hours: [phone]    If after hours, weekends, or holidays, call main hospital  and ask for Oncology doctor on call.         March 2018 Sunday Monday Tuesday Wednesday Thursday Friday Saturday                       1     2     [facility] DRAW    1:45 PM   (15 min.)   [facility] DRAW   [facility] Lab Draw     [facility] RETURN    1:55 PM   (50 min.)   Benoit Molina PA   [facility]     XR WRIST LEFT 2 VIEWS    3:30 PM   (15 min.)   UCXR1   [facility] Xray 3       4     5     [facility] DRAW    2:15 PM   (15 min.)    [facility] LAB DRAW   [facility] Lab Draw     [facility] RETURN    2:25 PM   (50 min.)   Quyen Mazariegos, CAT CNP   [facility] 6     7     8     TELEMEDICINE   10:00 AM   (60 min.)   Bhakti Evans [facility] Medication Therapy Management 9     10       11     12     [facility] DRAW    2:00 PM   (15 min.)    [facility] LAB DRAW   [facility] Lab Draw     [facility] RETURN    2:25 PM   (50 min.)    Quyen Mazariegos APRN CNP   [facility] 13     14     15     16     UMP RETURN    2:45 PM   (30 min.)   José Antonio Ann MD   [facility] 17       18     19     20     21     22     23     24       25     26     CT CHEST ABDOMEN PELVIS WWO    6:45 AM   (20 min.)   UCCT2   [facility] CT     [facility] DRAW    7:45 AM   (15 min.)    [facility] LAB DRAW   [facility] Lab Draw     [facility] RETURN    8:30 AM   (30 min.)   Vinny Yu MD   [facility] 27     28     [facility] DRAW   12:30 PM   (15 min.)    [facility] LAB DRAW   [facility] Lab Draw     UMP ONC INFUSION 180    1:00 PM   (180 min.)   UC ONCOLOGY INFUSION   [facility] 29     30     UMP ONC INFUSION 60    2:30 PM   (60 min.)    ONCOLOGY INFUSION   [facility] 31 April 2018 Sunday Monday Tuesday Wednesday Thursday Friday Saturday   1     2     3     CANCER REHAB EVAL   11:00 AM   (60 min.)   Ela Paredes, PT   [facility] 4     5     6     7       8     9     10     [facility] DRAW    9:45 AM   (15 min.)   [facility] DRAW   [facility] Lab Draw     UMP RETURN   10:05 AM   (50 min.)   Quyen Mazariegos APRN CNP   [facility]     [facility] ONC INFUSION 180   11:00 AM   (180 min.)   UC ONCOLOGY INFUSION   [facility] 11     12     13     14       15     16     17     18     19     20     21       22     23     24     [facility] DRAW    7:30 AM   (15 min.)    [facility]   [facility]     [facility] ONC INFUSION 180    8:00 AM   (180 min.)    ONCOLOGY INFUSION   [facility] 25     26     27     28       29     30                                           Lab Results:  No results found for this or any previous visit (from the past 12 hour(s)).            Follow-ups after your visit        Your next 10  appointments already scheduled     Apr 03, 2018 11:00 AM CDT   Cancer Rehab Eval with Ela Paredes, PT   [facility] ([facility])    [address] [facility] [address] [phone]            Apr 10, 2018  9:45 AM CDT   [facility] Lab Draw with [facility] DRAW   [facility] ([facility])    [address]  [address]  [facility] [facility] [address]   [phone]            Apr 10, 2018 10:20 AM CDT   (Arrive by 10:05 AM)   Return Visit with CAT Dang CNP   [facility] ([facility])    [address]  [address]  [place] [facility] [address]   [phone]            Apr 10, 2018 11:00 AM CDT   Infusion 180 with UC ONCOLOGY INFUSION, UC 18 ATC   [facility] ([facility])    [address]  [address]  [place] [address]   [phone]            Apr 24, 2018  7:30 AM CDT   [facility] Lab Draw with [facility] LAB DRAW   [facility] Lab Draw ([facility])    [address]  [address]  [place] [address]   [phone]            Apr 24, 2018  8:00 AM CDT   Infusion 180 with UC ONCOLOGY INFUSION, UC 18 ATC   [facility] ([facility])    [address]  [address]  [place] [address]   [phone]            May 08, 2018  8:30 AM CDT   [facility] Lab Draw with [facility] DRAW   [facility] Draw ([facility])    [address]  [address]  [place] [address]   [phone]            May 08, 2018  9:00 AM CDT   Infusion 180 with UC ONCOLOGY INFUSION   [facility] ([facility])    [address]  [address]  [place] [address]   [phone]              Who to contact     If you have questions or need follow up information  about today's clinic visit or your schedule please contact [facility] directly at [phone].  Normal or non-critical lab and imaging results will be communicated to you by MyChart, letter or phone within 4 business days after the clinic has received the results. If you do not hear from us within 7 days, please contact the clinic through [employer] or phone. If you have a critical or abnormal lab result, we will notify you by phone as soon as possible.  Submit refill requests through [employer] or call your pharmacy and they will forward the refill request to us. Please allow 3 business days for your refill to be completed.          Additional Information About Your Visit        [employer] Information     [employer] gives you secure access to your electronic health record. If you see a primary care provider, you can also send messages to your care team and make appointments. If you have questions, please call your primary care clinic.  If you do not have a primary care provider, please call [phone] and they will assist you.        Care EveryWhere ID     This is your Care EveryWhere ID. This could be used by other organizations to access your [place] medical records  [id number]        Your Vitals Were     Pulse Temperature Respirations Pulse Oximetry          71 98.1  F (36.7  C) (Oral) 20 97%         Blood Pressure from Last 3 Encounters:   03/30/18 (!) 153/93   03/28/18 124/70   03/26/18 113/61    Weight from Last 3 Encounters:   03/28/18 48.2 kg (106 lb 4.8 oz)   03/26/18 47.1 kg (103 lb 14.4 oz)   03/16/18 45 kg (99 lb 3.3 oz)              We Performed the Following     Heparin 10a Level        Primary Care Provider Office Phone # Fax #    Neri Gipson [phone]       [address] [address]        Equal Access to Services     NESSA NICHOLS : Shaun Martinez, merle gar, qayesy correia, lisa romero  laelana michael. So [facility] [phone].    ATENCIÓN: Si habla angelina, tiene a nava disposición servicios gratuitos de asistencia lingüística. Indu humphries [phone].    We comply with applicable federal civil rights laws and [place] laws. We do not discriminate on the basis of race, color, national origin, age, disability, sex, sexual orientation, or gender identity.            Thank you!     Thank you for choosing [facility]  for your care. Our goal is always to provide you with excellent care. Hearing back from our patients is one way we can continue to improve our services. Please take a few minutes to complete the written survey that you may receive in the mail after your visit with us. Thank you!             Your Updated Medication List - Protect others around you: Learn how to safely use, store and throw away your medicines at www.disposemymeds.org.          This list is accurate as of 3/30/18  3:59 PM.  Always use your most recent med list.                   Brand Name Dispense Instructions for use Diagnosis    albuterol 108 (90 BASE) MCG/ACT Inhaler    PROAIR HFA/PROVENTIL HFA/VENTOLIN HFA    1 Inhaler    Inhale 2 puffs into the lungs 4 times daily    Mild asthma, unspecified whether complicated, unspecified whether persistent       amylase-lipase-protease 22906 UNITS Cpep    CREON    60 capsule    Take 2 capsules (72,000 Units) by mouth 3 times daily (with meals)    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)       CALCIUM PO      Take by mouth every morning Form/strength unknown. Powder formulation. Add to water and fruits/vegetables daily to promote bone health.        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)       fluticasone-vilanterol 100-25 MCG/INH oral inhaler    BREO ELLIPTA    1 Inhaler    Inhale 1 puff into the lungs daily    Cough       folic acid 400 MCG tablet    FOLVITE     Take 1 tablet by mouth every morning        hydrOXYzine 25 MG tablet    ATARAX    10 tablet    Take 1-2 tablets (25-50 mg) by mouth every 6 hours as needed for itching (adjuvant pain)    Obstructive jaundice       levothyroxine 125 MCG tablet    SYNTHROID/LEVOTHROID    10 tablet    Take 1 tablet (125 mcg) by mouth daily    Hypothyroidism, unspecified type       loperamide 2 MG capsule    IMODIUM    30 capsule    2 caps at 1st sign of diarrhea & 1 cap every 2hrs until 12hrs diarrhea free. During night, 2 caps at bedtime & 2 caps every 4hrs until AM    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)       LORazepam 0.5 MG tablet    ATIVAN    15 tablet    Take 1 tablet (0.5 mg) by mouth every 4 hours as needed (Anxiety, Nausea/Vomiting or Sleep)    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)       nystatin 650599 UNIT/ML suspension    MYCOSTATIN          omeprazole 40 MG capsule    priLOSEC    20 capsule    Take 1 capsule (40 mg) by mouth 2 times daily Take 30-60 minutes before a meal.    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 Please dispense 2 pairs of knee high 20-30 mmHg    Lymphedema       pantoprazole 20 MG EC tablet    PROTONIX    14 tablet    Take 1 tablet (20 mg) by mouth daily    Other acute gastritis without hemorrhage       * prochlorperazine 10 MG tablet    COMPAZINE          * prochlorperazine 5 MG tablet    COMPAZINE    30 tablet    Take 1 tablet (5 mg) by mouth every 6 hours as needed for nausea or vomiting    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)       timolol maleate 0.5 % opthalmic solution    ISTALOL    1 Bottle    Place 1 drop into both eyes every morning Before placing contact lenses    Cataract, unspecified cataract type, unspecified laterality       traMADol 50 MG tablet    ULTRAM    60 tablet    Take 1-2 tablets ( mg) by mouth every 4 hours as needed for breakthrough pain (No more than 8 tablets in a day)    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)       * triamterene-hydrochlorothiazide 37.5-25 MG per tablet    MAXZIDE-25     Take 1 tablet by mouth every morning        * triamterene-hydrochlorothiazide 37.5-25 MG per capsule    DYAZIDE          TYLENOL PO      Take 325 mg by mouth every 4 hours as needed for mild pain or fever        * Notice:  This list has 8 medication(s) that are the same as other medications prescribed for you. Read the directions carefully, and ask your doctor or other care provider to review them with you.

## 2018-03-30 NOTE — PROGRESS NOTES
This is a recent snapshot of the patient's [facility] medical record.  For current drug dose and complete information and questions, call [phone] or In Basket pool, fv home infusion ([id number])  CSN Number:  [id number]

## 2018-03-30 NOTE — PATIENT INSTRUCTIONS
Contact Numbers    [facility] [place] [place] Main Line: [phone]  [facility] [place] [place] Triage:  [phone]    Call triage with chills and/or temperature greater than or equal to 100.5, uncontrolled nausea/vomiting, diarrhea, constipation, dizziness, shortness of breath, chest pain, bleeding, unexplained bruising, or any new/concerning symptoms, questions/concerns.     If you are having any concerning symptoms or wish to speak to a provider before your next infusion visit, please call your care coordinator or triage to notify them so we can adequately serve you.       After Hours: [phone]    If after hours, weekends, or holidays, call main hospital  and ask for Oncology doctor on call.         March 2018 Sunday Monday Tuesday Wednesday Thursday Friday Saturday                       1     2     [facility] DRAW    1:45 PM   (15 min.)    [facility] LAB DRAW   [facility] Lab Draw     [facility] RETURN    1:55 PM   (50 min.)   Benoit Molina PA   [facility]     XR WRIST LEFT 2 VIEWS    3:30 PM   (15 min.)   XR1   [facility] Xray 3       4     5     [facility] DRAW    2:15 PM   (15 min.)    [facility] DRAW   [facility]     [facility] RETURN    2:25 PM   (50 min.)   Quyen Mazariegos APRN CNP   [facility] 6     7     8     TELEMEDICINE   10:00 AM   (60 min.)   Bhakti Evans [facility] Medication Therapy Management 9     10       11     12     [facility] DRAW    2:00 PM   (15 min.)    [facility]     [facility] RETURN    2:25 PM   (50 min.)   Quyen Mazariegos APRN CNP   [facility] 13     14     15     16     UMP RETURN    2:45 PM   (30 min.)   José Antonio Ann MD   [facility] 17       18     19     20     21     22     23     24       25     26     CT CHEST ABDOMEN PELVIS WWO    6:45 AM   (20 min.)   CT2   [facility] CT     [facility] DRAW    7:45 AM   (15  min.)    [facility] LAB DRAW   [facility] Lab Draw     UMP RETURN    8:30 AM   (30 min.)   Vinny Yu MD   [facility] 27     28     [facility] DRAW   12:30 PM   (15 min.)    [facility]   [facility]     [facility] ONC INFUSION 180    1:00 PM   (180 min.)    ONCOLOGY INFUSION   [facility] 29     30     UMP ONC INFUSION 60    2:30 PM   (60 min.)    ONCOLOGY INFUSION   [facility] 31 April 2018 Sunday Monday Tuesday Wednesday Thursday Friday Saturday   1     2     3     CANCER REHAB EVAL   11:00 AM   (60 min.)   Ela Paredes PT [facility] 4     5     6     7       8     9     10     [facility] DRAW    9:45 AM   (15 min.)    [facility] LAB DRAW   [facility] Lab Draw     UMP RETURN   10:05 AM   (50 min.)   Quyen Mazariegos APRN CNP   [facility]     UMP ONC INFUSION 180   11:00 AM   (180 min.)    ONCOLOGY INFUSION   [facility] 11     12     13     14       15     16     17     18     19     20     21       22     23     24     [facility] LAB DRAW    7:30 AM   (15 min.)    [facility] DRAW   [facility]     [facility] ONC INFUSION 180    8:00 AM   (180 min.)    ONCOLOGY INFUSION   [facility] 25     26     27     28       29     30                                           Lab Results:  No results found for this or any previous visit (from the past 12 hour(s)).

## 2018-04-03 NOTE — PROGRESS NOTES
[facility]        OUTPATIENT PHYSICAL THERAPY FUNCTIONAL EVALUATION  PLAN OF TREATMENT FOR OUTPATIENT REHABILITATION  (COMPLETE FOR INITIAL CLAIMS ONLY)  Patient's Last Name, First Name, M.I.  YOB: 1940  Iris Lewis     Provider's Name   [facility]   Medical Record No.  [id number]     Start of Care Date:  04/03/18   Onset Date:  03/12/18   Type:     _X__PT   ____OT  ____SLP Medical Diagnosis:  malignant neoplasm of pancreas; physical deconditioning      PT Diagnosis:  Weakness and decreased activity tolerance Visits from SOC:  1                              __________________________________________________________________________________  Plan of Treatment/Functional Goals:  balance training, ROM, strengthening, stretching, transfer training, gait training           GOALS  6MWT   Patient will improved 6MWT by 70 meters in order to show significant improvement in her functional endurance to ambulate outside the home with less fatigue.   06/01/18    sit<>stand  Patient will perform 3 sit<>stands from standard chair with use of UEs in order to show improvement in her proximal muscle strength and make transfers easier for patient.   06/01/18    HEP  Patient will be independent with HEP for strengthening, lengthening and balance in order to continue making improvements with her function once she is finished with PT.   06/01/18          Therapy Frequency:  1 time/week   Predicted Duration of Therapy Intervention:  up to 60 day    Ela Paredes PT                                    I CERTIFY THE NEED FOR THESE SERVICES FURNISHED UNDER        THIS PLAN OF TREATMENT AND WHILE UNDER MY CARE     (Physician co-signature of this document indicates review and certification of the therapy plan).                Certification Date From:  04/03/18   Certification Date  To:  06/01/18    Referring Provider:  Quyen Mazariegos, SARAH    Initial Assessment  See Epic Evaluation- Start of Care Date: 04/03/18

## 2018-04-03 NOTE — PROGRESS NOTES
04/03/18 1100   Quick Adds   Type of Visit Initial OP PT Evaluation   General Information   Start of Care Date 04/03/18   Referring Physician Quyen Mazariegos CNP   Orders Evaluate and Treat as Indicated   Order Date 03/12/18   Medical Diagnosis malignant neoplasm    Onset of illness/injury or Date of Surgery 03/12/18   Surgical/Medical history reviewed Yes   Pertinent history of current problem (include personal factors and/or comorbidities that impact the POC) Patient with significant medical hx for pancreatic cancer. Feeling weak and short of breath with walking- steps make her winded and she has trouble getting around house at times. Appetite has been difficult. Started chemo last week- every two weeks; feeling okay from this-- mouth is dry. Scheduled into June for chemo at this point. Has some dizziness with tipping head down and needs to hang onto things- she feels like she needs to give herself sometime once in standing to allow her body to adjust.  Wants to be stronger.    Prior level of function comment has 02 machine- has not used in ~3 weeks though. Has been doing more household tasks at home but still gets very fatigue. She was hardly able to do the stairs a few weeks ago d/t weakness    Current Community Support Family/friend caregiver   Patient role/Employment history Retired   Living environment [facility]   Home/Community Accessibility Comments multilevel stairs but at least 6 or 7    Assistive Devices Comments does not use    Patient/Family Goals Statement improve her strength and endurance    Fall Risk Screen   Fall screen completed by PT   Have you fallen 2 or more times in the past year? Yes   Have you fallen and had an injury in the past year? No   Timed Up and Go score (seconds) not tested today    Is patient a fall risk? No   Fall screen comments Fell in hospital in February- tripped over a cord    System Outcome Measures   Outcome Measures Cancer Rehab   FACIT Fatigue Subscale (score  out of 52). The higher the score, the better the QOL. 32   Six Minute Walk (meters). An increase of 70 or more meters indicates statistically significant change. 313   Pain   Pain comments mouth soreness, does get abdominal pain more from indigestion    Vital Signs   Vital Signs SpO2   Pulse 114   /85   SpO2 99 %   Cognitive Status Examination   Orientation orientation to person, place and time   Level of Consciousness alert   Follows Commands and Answers Questions 100% of the time   Personal Safety and Judgment intact   Memory intact   Integumentary   Integumentary Comments reports that she did have swelling in B LEs but has been wearing compression stockings and seen large improvement.    Posture   Posture Comments mild foward shoulder posture; curvature in spine shifting her over to R in stand    Range of Motion (ROM)   ROM Comment heel cord tightness B    Strength   Strength Comments LEs- DF; 4/5 B, PF: 4/5 B, knee extension: 5/5, knee flexion 4/5 B, hip flexion 3+/5 B, hip extension: 4/5 B: UEs- shoulder flexion: 4/5 elbow flexion extension: 4+/5, shoulder ER 3+/5, shoulder IR 5/5-- proximally is quite weak    Bed Mobility   Bed Mobility Comments reports independence with this task    Transfer Skills   Transfer Comments needs UE on chair to perform sit<>stand    Gait   Gait Comments ambulates with increased foward flexed posture, decreased step length and gait speed- see 6MWT    Gait Special Tests Dynamic Gait Index   Score out of 24 will perform at future session    Balance   Balance Comments fair balance- likes to keep wall close when walking in case of LOB, when fatigued in 6MWT, balance was more challenging for patient    Sensory Examination   Sensory Perception Comments reports tingling/numbess in feet    Planned Therapy Interventions   Planned Therapy Interventions balance training;ROM;strengthening;stretching;transfer training;gait training   Clinical Impression   Criteria for Skilled Therapeutic  Interventions Met yes, treatment indicated   PT Diagnosis Weakness and decreased activity tolerance   Influenced by the following impairments full body weakness, decresaed activity tolerance, balnace impairments, fatigue    Functional limitations due to impairments unable to fully participate in activites she enjoys d/t pain and weakness    Clinical Presentation Stable/Uncomplicated   Clinical Presentation Rationale PT impairments, co morbidities, clinical judgement    Clinical Decision Making (Complexity) Low complexity   Therapy Frequency 1 time/week   Predicted Duration of Therapy Intervention (days/wks) up to 60 day   Risk & Benefits of therapy have been explained Yes   Patient, Family & other staff in agreement with plan of care Yes   Clinical Impression Comments Patient presents with full body weakness most notably in proximal muscles making daily mobility challenging for patient and with decreased activity tolerance. Will benefit from PT to address.    Goal 1   Goal Identifier 6MWT    Goal Description Patient will improved 6MWT by 70 meters in order to show significant improvement in her functional endurance to ambulate outside the home with less fatigue.    Target Date 06/01/18   Goal 2   Goal Identifier sit<>stand   Goal Description Patient will perform 3 sit<>stands from standard chair with use of UEs in order to show improvement in her proximal muscle strength and make transfers easier for patient.    Target Date 06/01/18   Goal 3   Goal Identifier HEP   Goal Description Patient will be independent with HEP for strengthening, lengthening and balance in order to continue making improvements with her function once she is finished with PT.    Target Date 06/01/18   Total Evaluation Time   Total Evaluation Time (Minutes) 35   Therapy Certification   Certification date from 04/03/18   Certification date to 06/01/18   Medical Diagnosis malignant neoplasm of pancreas; physical deconditioning    Certification I  certify the need for these services furnished under this plan of treatment and while under my care.  (Physician co-signature of this document indicates review and certification of the therapy plan).

## 2018-04-10 PROBLEM — Z86.711 HISTORY OF PULMONARY EMBOLISM: Status: ACTIVE | Noted: 2018-01-01

## 2018-04-10 PROBLEM — M62.81 GENERALIZED MUSCLE WEAKNESS: Status: ACTIVE | Noted: 2018-01-01

## 2018-04-10 NOTE — MR AVS SNAPSHOT
After Visit Summary   4/10/2018    Iris Lewis    MRN: [id number]           Patient Information     Date Of Birth          1940        Visit Information        Provider Department      4/10/2018 10:20 AM Quyen Mazariegos APRN CNP [facility]        Today's Diagnoses     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)           Follow-ups after your visit        Your next 10 appointments already scheduled     Apr 12, 2018 12:30 PM CDT   Infusion 60 with UC ONCOLOGY INFUSION, UC 31 ATC   [facility] ([facility])    [address]  [address]  [facility] [address]   [phone]            Apr 17, 2018  2:00 PM CDT   Cancer Rehab Treatment with Ela Callisto, PT   [facility] ([facility])    [address] [facility] [address] [phone]            Apr 24, 2018  6:30 AM CDT   Masonic Lab Draw with UC MASONIC LAB DRAW   [facility] ([facility])    [address] [place] [address]  [address]  [facility] [address]   [phone]            Apr 24, 2018  7:00 AM CDT   (Arrive by 6:45 AM)   Return Visit with CAT Dang CNP   [facility] ([facility])    [address] [place] [address]  [address]  [facility] [address]   [phone]            Apr 24, 2018  8:00 AM CDT   Infusion 180 with UC ONCOLOGY INFUSION, UC 18 ATC   [facility] ([facility])    [address] [place] [address]  [address]  [facility] [address]   [phone]            May 01, 2018 11:15 AM CDT   Cancer Rehab Treatment with Ela Callisto, PT   [facility] ([facility])    [address] [facility] [address] [phone]            May 08, 2018  8:30 AM CDT   Masonic Lab Draw with UC MASONIC LAB DRAW   [facility] ([facility]    [address]  [address]  [facility] [address] [phone]            May 08, 2018  9:00 AM CDT   Infusion 180 with UC ONCOLOGY INFUSION   [facility] ([facility])    [address]  [address]  [facility] [id number] [phone]              Who to contact     If you have questions or need follow up information about today's clinic visit or your schedule please contact [facility] [place] directly at [phone].  Normal or non-critical lab and imaging results will be communicated to you by IEMOhart, letter or phone within 4 business days after the clinic has received the results. If you do not hear from us within 7 days, please contact the clinic through [employer] or phone. If you have a critical or abnormal lab result, we will notify you by phone as soon as possible.  Submit refill requests through Soum or call your pharmacy and they will forward the refill request to us. Please allow 3 business days for your refill to be completed.          Additional Information About Your Visit        IEMOhart Information     S[employer]m gives you secure access to your electronic health record. If you see a primary care provider, you can also send messages to your care team and make appointments. If you have questions, please call your primary care clinic.  If you do not have a primary care provider, please call [phone] and they will assist you.        Care EveryWhere ID     This is your Care EveryWhere ID. This could be used by other organizations to access your [place] medical records  [id number]        Your Vitals Were     Pulse Temperature Respirations Height Pulse Oximetry BMI (Body Mass Index)    76 97.6  F (36.4  C) 16 1.575 m (5' 2.01\") 99% 18.21 kg/m2       Blood Pressure from Last 3 Encounters:   04/10/18 128/74   03/30/18 (!) 153/93   03/28/18 124/70    Weight from Last 3 Encounters:   04/10/18 45.2 kg (99 lb " 9.6 oz)   03/28/18 48.2 kg (106 lb 4.8 oz)   03/26/18 47.1 kg (103 lb 14.4 oz)              Today, you had the following     No orders found for display         Where to get your medicines      These medications were sent to [place], [place] - [address]  [address], [facility] [address]    Hours:  TRANSPLANT PHONE NUMBER [phone] Phone:  [phone]     amylase-lipase-protease 05737 units Cpep          Primary Care Provider Office Phone # Fax #    Neri Gipson [phone]       [address] [facility] [address]        Equal Access to Services     NESSA NICHOLS : Hadii aad ku hadasho Soomaali, waaxda luqadaha, qaybta kaalmada adeegyada, lisa michael. So [facility] [phone].    ATENCIÓN: Si habla español, tiene a nava disposición servicios gratuitos de asistencia lingüística. Llame al [phone].    We comply with applicable federal civil rights laws and [place] laws. We do not discriminate on the basis of race, color, national origin, age, disability, sex, sexual orientation, or gender identity.            Thank you!     Thank you for choosing [facility]  for your care. Our goal is always to provide you with excellent care. Hearing back from our patients is one way we can continue to improve our services. Please take a few minutes to complete the written survey that you may receive in the mail after your visit with us. Thank you!             Your Updated Medication List - Protect others around you: Learn how to safely use, store and throw away your medicines at www.disposemymeds.org.          This list is accurate as of 4/10/18  1:59 PM.  Always use your most recent med list.                   Brand Name Dispense Instructions for use Diagnosis    albuterol 108 (90 Base) MCG/ACT Inhaler    PROAIR HFA/PROVENTIL HFA/VENTOLIN HFA    1 Inhaler    Inhale 2 puffs into  the lungs 4 times daily    Mild asthma, unspecified whether complicated, unspecified whether persistent       amylase-lipase-protease 14623 units Cpep    CREON    180 capsule    Take 2 capsules (72,000 Units) by mouth 3 times daily (with meals)    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)       CALCIUM PO      Take by mouth every morning Form/strength unknown. Powder formulation. Add to water and fruits/vegetables daily to promote bone health.        CARTIA  MG 24 hr capsule   Generic drug:  diltiazem     10 capsule    TK 1 C PO QD    Benign essential hypertension       cholecalciferol 1000 UNIT tablet    vitamin D3     Take 1 tablet by mouth 2 times daily        cyanocolbalamin 500 MCG tablet    vitamin  B-12     Take 500 mcg by mouth every morning        dexamethasone 2 MG tablet    DECADRON    20 tablet    Take 1 tablet (2 mg) by mouth every 12 hours    Pneumonitis       * enoxaparin 60 MG/0.6ML injection    LOVENOX          * enoxaparin 40 MG/0.4ML injection    LOVENOX    60 Syringe    Inject 0.4 mLs (40 mg) Subcutaneous every 12 hours    Malignant neoplasm of pancreas (H), Other chronic 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 cor pulmonale (H)       fluticasone-vilanterol 100-25 MCG/INH oral inhaler    BREO ELLIPTA    1 Inhaler    Inhale 1 puff into the lungs daily    Cough       folic acid 400 MCG tablet    FOLVITE     Take 1 tablet by mouth every morning        hydrOXYzine 25 MG tablet    ATARAX    10 tablet    Take 1-2 tablets (25-50 mg) by mouth every 6 hours as needed for itching (adjuvant pain)    Obstructive jaundice       levothyroxine 125 MCG tablet    SYNTHROID/LEVOTHROID    10 tablet    Take 1 tablet (125 mcg) by mouth daily    Hypothyroidism, unspecified type       loperamide 2 MG capsule    IMODIUM    30 capsule    2 caps at 1st sign of diarrhea & 1 cap every 2hrs until 12hrs diarrhea free. During night, 2 caps at bedtime & 2 caps every 4hrs until AM    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)        LORazepam 0.5 MG tablet    ATIVAN    15 tablet    Take 1 tablet (0.5 mg) by mouth every 4 hours as needed (Anxiety, Nausea/Vomiting or Sleep)    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)       nystatin 571260 UNIT/ML suspension    MYCOSTATIN          omeprazole 40 MG capsule    priLOSEC    20 capsule    Take 1 capsule (40 mg) by mouth 2 times daily Take 30-60 minutes before a meal.    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 Please dispense 2 pairs of knee high 20-30 mmHg    Lymphedema       pantoprazole 20 MG EC tablet    PROTONIX    30 tablet    Take 1 tablet (20 mg) by mouth daily    Other acute gastritis without hemorrhage       * prochlorperazine 10 MG tablet    COMPAZINE          * prochlorperazine 5 MG tablet    COMPAZINE    30 tablet    Take 1 tablet (5 mg) by mouth every 6 hours as needed for nausea or vomiting    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)       timolol maleate 0.5 % opthalmic solution    ISTALOL    1 Bottle    Place 1 drop into both eyes every morning Before placing contact lenses    Cataract, unspecified cataract type, unspecified laterality       traMADol 50 MG tablet    ULTRAM    60 tablet    Take 1-2 tablets ( mg) by mouth every 4 hours as needed for breakthrough pain (No more than 8 tablets in a day)    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)       * triamterene-hydrochlorothiazide 37.5-25 MG per tablet    MAXZIDE-25     Take 1 tablet by mouth every morning        * triamterene-hydrochlorothiazide 37.5-25 MG per capsule    DYAZIDE           TYLENOL PO      Take 325 mg by mouth every 4 hours as needed for mild pain or fever        * Notice:  This list has 10 medication(s) that are the same as other medications prescribed for you. Read the directions carefully, and ask your doctor or other care provider to review them with you.

## 2018-04-10 NOTE — PROGRESS NOTES
Infusion Nursing Note:  Iris Lewis presents today for Cycle 2 day 1 Onyvide, leucovorin, fluorouracil pump connect.    Patient seen by provider today: Yes: Quyen Mazariegos NP   present during visit today: Not Applicable.    Note:   TORB 4/10/18 1400 Quyen Mazariegos/Patricia Starks RN:  Reduce dose of lovenox to 35 mg BID    Pt educated and aware how much to take and when to take it. Pt aware to take lovenox injection around 7:30 for pump d/c apt at 1230.    Intravenous Access:  Implanted Port accessed in lab    Treatment Conditions:  Lab Results   Component Value Date    HGB 10.2 04/10/2018     Lab Results   Component Value Date    WBC 9.0 04/10/2018      Lab Results   Component Value Date    ANEU 6.7 04/10/2018     Lab Results   Component Value Date     04/10/2018      Lab Results   Component Value Date     03/28/2018                   Lab Results   Component Value Date    POTASSIUM 3.9 03/28/2018           Lab Results   Component Value Date    MAG 1.9 11/15/2017            Lab Results   Component Value Date    CR 0.51 03/28/2018                   Lab Results   Component Value Date    EMMY 8.4 03/28/2018                Lab Results   Component Value Date    BILITOTAL 0.2 04/10/2018           Lab Results   Component Value Date    ALBUMIN 2.6 03/28/2018                    Lab Results   Component Value Date    ALT 13 03/28/2018           Lab Results   Component Value Date    AST 17 03/28/2018     Results reviewed, labs MET treatment parameters, ok to proceed with treatment.    Post Infusion Assessment:  Patient tolerated infusion without incident.  Blood return noted pre and post infusion.  Site patent and intact, free from redness, edema or discomfort.  No evidence of extravasations.  Access left intact with pump connected    Prior to discharge: Port is secured in place with tegaderm and flushed with 10cc NS with positive blood return noted.  Continuous home infusion CADD pump connected.    All  "connectors secured in place and clamps taped open, confirmed with andrew Miranda RN  Pump started, \"running\" noted on display (CADD): YES.  Patient instructed to call our clinic or [facility] with any questions or concerns at home.  Patient verbalized understanding.    Patient set up for pump disconnect at our clinic on 4/12/18 at 1230.      Discharge Plan:   Patient declined prescription refills.  Discharge instructions reviewed with: Patient.  Patient and/or family verbalized understanding of discharge instructions and all questions answered.  AVS to patient via [employer].  Patient will return 4/12/18 for pump d/c and 4/24/18 for next appointment.   Patient discharged in stable condition accompanied by: self.  Departure Mode: Ambulatory.    CHRISTINE CUETO RN                        "

## 2018-04-10 NOTE — PATIENT INSTRUCTIONS
Contact Numbers    [facility] Main Line: [phone]  [facility] Triage:  [phone]    Call triage with chills and/or temperature greater than or equal to 100.5, uncontrolled nausea/vomiting, diarrhea, constipation, dizziness, shortness of breath, chest pain, bleeding, unexplained bruising, or any new/concerning symptoms, questions/concerns.     If you are having any concerning symptoms or wish to speak to a provider before your next infusion visit, please call your care coordinator or triage to notify them so we can adequately serve you.       After Hours: [phone]    If after hours, weekends, or holidays, call main hospital  and ask for Oncology doctor on call.     Take Lovenox injection 4-6 hours before 1230 Pump d/c appointment. **Give yourself your injection around 7:30 am**          April 2018 Sunday Monday Tuesday Wednesday Thursday Friday Saturday   1     2     3     CANCER REHAB EVAL   11:00 AM   (60 min.)   Callisto, Ela, PT   [facility] 4     5     6     7       8     9     10     [facility]    9:45 AM   (15 min.)    [facility]   [facility] Draw     [facility] RETURN   10:05 AM   (50 min.)   Quyen Mazariegos APRN CNP   [facility]     [facility] ONC INFUSION 180   11:00 AM   (180 min.)   [facility]ION   [facility] 11     12     [facility] ONC INFUSION 60   12:30 PM   (60 min.)   [facility] ON[facility] INFUSION   [facility] 13     14       15     16     17     CANCER REHAB TREATMENT    2:00 PM   (45 min.)   Callisto, Ela, PT   [facility] 18     19     20     21       22     23     24     [facility] D[facility]    6:30 AM   (15 min.)   [facility] D[facility]   [facility] Draw     [facility] RETURN    6:45 AM   (50 min.)   Quyen Mazariegos APRN CNP   [facility]     [facility] ONC INFUSION 180    8:00 AM   (180 min.)    ONCOLOGY INFUSION   [facility] 25     26      27     28       29     30                                         May 2018   Don Monday Tuesday Wednesday Thursday Friday Saturday             1     CANCER REHAB TREATMENT   11:15 AM   (45 min.)   Callisto, Ela, PT   [facility] 2     3     4     5       6     7     8     [facility] DRAW    8:30 AM   (15 min.)    [facility] DRAW   [facility] Draw     [facility] ONC INFUSION 180    9:00 AM   (180 min.)   [facility] ON[facility] INFUSION   [facility] 9     [facility] RETURN   10:15 AM   (30 min.)   José Antonio Ann MD   [facility] 10     11     12       13     14     15     CANCER REHAB TREATMENT   11:15 AM   (45 min.)   Callisto, Ela, PT   [facility] 16     17     18     19       20     21     22     [facility] D[facility]    8:30 AM   (15 min.)    [facility] DRAW   [facility] Draw     [facility] ONC INFUSION 180    9:00 AM   (180 min.)    ONCOLOGY INFUSION   [facility] 23     24     25     26       27     28     29     30     31                           Recent Results (from the past 24 hour(s))   CBC with platelets differential    Collection Time: 04/10/18 10:08 AM   Result Value Ref Range    WBC 9.0 4.0 - 11.0 10e9/L    RBC Count 3.19 (L) 3.8 - 5.2 10e12/L    Hemoglobin 10.2 (L) 11.7 - 15.7 g/dL    Hematocrit 32.0 (L) 35.0 - 47.0 %     78 - 100 fl    MCH 32.0 26.5 - 33.0 pg    MCHC 31.9 31.5 - 36.5 g/dL    RDW 15.1 (H) 10.0 - 15.0 %    Platelet Count 316 150 - 450 10e9/L    Diff Method Automated Method     % Neutrophils 74.5 %    % Lymphocytes 14.5 %    % Monocytes 8.2 %    % Eosinophils 2.0 %    % Basophils 0.4 %    % Immature Granulocytes 0.4 %    Nucleated RBCs 0 0 /100    Absolute Neutrophil 6.7 1.6 - 8.3 10e9/L    Absolute Lymphocytes 1.3 0.8 - 5.3 10e9/L    Absolute Monocytes 0.7 0.0 - 1.3 10e9/L    Absolute Eosinophils 0.2 0.0 - 0.7 10e9/L    Absolute Basophils 0.0 0.0 - 0.2 10e9/L    Abs  Immature Granulocytes 0.0 0 - 0.4 10e9/L    Absolute Nucleated RBC 0.0    Bilirubin  total    Collection Time: 04/10/18 10:08 AM   Result Value Ref Range    Bilirubin Total 0.2 0.2 - 1.3 mg/dL

## 2018-04-10 NOTE — LETTER
4/10/2018       RE: Iris Lewis  [address]  [place] [address]     Dear Colleague,    Thank you for referring your patient, Iris Lewis, to the [facility]. Please see a copy of my visit note below.    Reason for Visit: seen in f/u of metastatic pancreatic cancer    Oncology HPI: Iris Lewis is a 78 year old woman with a hx significant for thyroid cancer s/p thyroidectomy in the 1980s, cholelithiasis s/p CCY 2015, GERD s/p Nissen fundoplication in 2006 and HTN. She was dx with metastatic pancreatic cancer involving the liver, lung and lymph nodes in October 2017 after presenting with obstructive jaundice. She met with  an did not qualify for the HALOZYME study. She was initiated on [place]/Abraxane on 11/28/17. Restaging showed a positive response to treatment after 2 cycles. Prior to cycle 4, she developed acute hypoxia and weakness. She was found to have influenza and pneumonia. 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 At f/u with Dr. Yu on 3/26/18, she was found to have progression and recommended to start 5FU plus liposomal irinotecan. She initiated therapy on 3/28/18 and presents for evaluation prior to cycle 2.    Interval history: Iris is here with her  today. She was surprised about how well she felt with this chemo. Has occasional indigestion. No diarrhea. Stools are a little loose on occasion. No vomiting. No hand/foot tenderness. Neurpathy is unchanged. Has some soreness in the right throat/cheek mucosa. Is doing salt water rinses. Pain is not limiting eating. She finds it difficult to get in enough food. Has a good appetite, but only able to eat 1/4 of what she normally eats. Snacks on apples or fruit. Has tried some boost, but not taking that regularly. Edema is much improved. Is working with PT on strengthening exercises. Is finding it easier to clean the house and stair climbing. No fevers/chills. No cough or  shortness of breath. No chest pain. Has some dizziness with position changes. Urination wnl.    Current Outpatient Prescriptions   Medication Sig Dispense Refill     pantoprazole (PROTONIX) 20 MG EC tablet Take 1 tablet (20 mg) by mouth daily 30 tablet 1     amylase-lipase-protease (CREON) 45274 UNITS CPEP Take 2 capsules (72,000 Units) by mouth 3 times daily (with meals) 180 capsule 3     ondansetron (ZOFRAN) 8 MG tablet Take 1 tablet (8 mg) by mouth every 8 hours as needed (nausea/vomiting) 10 tablet 2     loperamide (IMODIUM) 2 MG capsule 2 caps at 1st sign of diarrhea & 1 cap every 2hrs until 12hrs diarrhea free. During night, 2 caps at bedtime & 2 caps every 4hrs until AM 30 capsule 0     hydrOXYzine (ATARAX) 25 MG tablet Take 1-2 tablets (25-50 mg) by mouth every 6 hours as needed for itching (adjuvant pain) 10 tablet 0     LORazepam (ATIVAN) 0.5 MG tablet Take 1 tablet (0.5 mg) by mouth every 4 hours as needed (Anxiety, Nausea/Vomiting or Sleep) 15 tablet 0     traMADol (ULTRAM) 50 MG tablet Take 1-2 tablets ( mg) by mouth every 4 hours as needed for breakthrough pain (No more than 8 tablets in a day) 60 tablet 0     enoxaparin (LOVENOX) 40 MG/0.4ML injection Inject 0.4 mLs (40 mg) Subcutaneous every 12 hours (Patient not taking: Reported on 3/26/2018) 60 Syringe 3     nystatin (MYCOSTATIN) 357820 UNIT/ML suspension        ondansetron (ZOFRAN) 4 MG tablet        prochlorperazine (COMPAZINE) 10 MG tablet        order for DME Equipment being ordered: Compression Stockings. Please dispense 2 pairs of knee high 20-30 mmHg 2 Units 3     dexamethasone (DECADRON) 2 MG tablet Take 1 tablet (2 mg) by mouth every 12 hours 20 tablet 0     albuterol (PROAIR HFA/PROVENTIL HFA/VENTOLIN HFA) 108 (90 BASE) MCG/ACT Inhaler Inhale 2 puffs into the lungs 4 times daily 1 Inhaler 1     fluticasone-vilanterol (BREO ELLIPTA) 100-25 MCG/INH oral inhaler Inhale 1 puff into the lungs daily 1 Inhaler 1     prochlorperazine  "(COMPAZINE) 5 MG tablet Take 1 tablet (5 mg) by mouth every 6 hours as needed for nausea or vomiting 30 tablet 0     CARTIA  MG 24 hr capsule TK 1 C PO QD 10 capsule 0     timolol maleate (ISTALOL) 0.5 % opthalmic solution Place 1 drop into both eyes every morning Before placing contact lenses 1 Bottle 0     levothyroxine (SYNTHROID/LEVOTHROID) 125 MCG tablet Take 1 tablet (125 mcg) by mouth daily 10 tablet 0     omeprazole (PRILOSEC) 40 MG capsule Take 1 capsule (40 mg) by mouth 2 times daily Take 30-60 minutes before a meal. (Patient not taking: Reported on 3/26/2018) 20 capsule 0     triamterene-hydrochlorothiazide (DYAZIDE) 37.5-25 MG per capsule        order for DME 1unit being ordered: cranial prosthesis 1 Units 0     Acetaminophen (TYLENOL PO) Take 325 mg by mouth every 4 hours as needed for mild pain or fever       CALCIUM PO Take by mouth every morning Form/strength unknown. Powder formulation. Add to water and fruits/vegetables daily to promote bone health.                cyanocolbalamin (VITAMIN B-12) 500 MCG tablet Take 500 mcg by mouth every morning        folic acid (FOLVITE) 400 MCG tablet Take 1 tablet by mouth every morning        cholecalciferol (VITAMIN D) 1000 UNIT tablet Take 1 tablet by mouth 2 times daily             Allergies   Allergen Reactions     Nkda [No Known Drug Allergies]          Exam: alert, appears well. Blood pressure 128/74, pulse 76, temperature 97.6  F (36.4  C), resp. rate 16, height 1.575 m (5' 2.01\"), weight 45.2 kg (99 lb 9.6 oz), SpO2 99 %, not currently breastfeeding.  Wt Readings from Last 4 Encounters:   04/10/18 45.2 kg (99 lb 9.6 oz)   03/28/18 48.2 kg (106 lb 4.8 oz)   03/26/18 47.1 kg (103 lb 14.4 oz)   03/16/18 45 kg (99 lb 3.3 oz)     Oropharynx is moist, no focal lesion. No icterus. Neck supple and w/o adenopathy. Lungs:CTA. Heart:RRR, no murmur or rub. Abdomen: soft, nontender, BS active. No masses or organomegaly. Extremities: warm, no edema. Speech is " clear. CN wnl. Gait/station wnl.    Labs: Results for JONA SHETTY (MRN [id number]) as of 4/10/2018 13:48   Ref. Range 4/10/2018 10:08   Bilirubin Total Latest Ref Range: 0.2 - 1.3 mg/dL 0.2   WBC Latest Ref Range: 4.0 - 11.0 10e9/L 9.0   Hemoglobin Latest Ref Range: 11.7 - 15.7 g/dL 10.2 (L)   Hematocrit Latest Ref Range: 35.0 - 47.0 % 32.0 (L)   Platelet Count Latest Ref Range: 150 - 450 10e9/L 316   RBC Count Latest Ref Range: 3.8 - 5.2 10e12/L 3.19 (L)   MCV Latest Ref Range: 78 - 100 fl 100   MCH Latest Ref Range: 26.5 - 33.0 pg 32.0   MCHC Latest Ref Range: 31.5 - 36.5 g/dL 31.9   RDW Latest Ref Range: 10.0 - 15.0 % 15.1 (H)   Diff Method Unknown Automated Method   % Neutrophils Latest Units: % 74.5   % Lymphocytes Latest Units: % 14.5   % Monocytes Latest Units: % 8.2   % Eosinophils Latest Units: % 2.0   % Basophils Latest Units: % 0.4   % Immature Granulocytes Latest Units: % 0.4   Nucleated RBCs Latest Ref Range: 0 /100 0   Absolute Neutrophil Latest Ref Range: 1.6 - 8.3 10e9/L 6.7   Absolute Lymphocytes Latest Ref Range: 0.8 - 5.3 10e9/L 1.3   Absolute Monocytes Latest Ref Range: 0.0 - 1.3 10e9/L 0.7   Absolute Eosinophils Latest Ref Range: 0.0 - 0.7 10e9/L 0.2   Absolute Basophils Latest Ref Range: 0.0 - 0.2 10e9/L 0.0   Abs Immature Granulocytes Latest Ref Range: 0 - 0.4 10e9/L 0.0   Absolute Nucleated RBC Unknown 0.0   Heparin 10A Level Latest Units: IU/mL 1.03 (HH)         Impression/plan:   1. Metastatic pancreatic cancer, currently on 5FU/irinotecan after progression and possible pneumonitis on [place]/Abraxane  -tolerating quite well, proceed with cycle 2 today without dose adjustment  -will return in 2 weeks for cycle 3, restaging after 4 cycles is planned    2. Hx of bilateral PE, dx Dec 2017  -currently on enoxaparin 40 mg bid after being supratherapeutic at 50 mg bid  -Xa is supratherapeutic. Will reduce dose to 35 mg bid and recheck Xa level at next lab check    3.  FEN-appetite is good, but  fills up quickly, weight continues to trend down--in part relate to decreased leg edema  -reviewed strategies to increase calories, increase protein, higher calorie snacks, nutrition shakes daily  -has been underdosing the creon due to expense of the medication. Will try to refill toady and see if the copay here is more affordable.  -will f/u prior to next infusion. If she continues to lose weight, will discuss use of an appetite stimulant    4. Weakness: improving with PT, continue with the cancer rehab program.    5. Hx of HTN, off of HCTZ and BP stable.      Again, thank you for allowing me to participate in the care of your patient.      Sincerely,    CAT Block CNP

## 2018-04-10 NOTE — MR AVS SNAPSHOT
After Visit Summary   4/10/2018    Iris Lewis    MRN: [id number]           Patient Information     Date Of Birth          1940        Visit Information        Provider Department      4/10/2018 11:00 AM UC 18 ATC;  ONCOLOGY INFUSION [facility]        Today's Diagnoses     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)    -  1    Other chronic 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 cor pulmonale (H)          Care Instructions    Contact Numbers    [facility] Main Line: [phone]  [facility] Triage:  [phone]    Call triage with chills and/or temperature greater than or equal to 100.5, uncontrolled nausea/vomiting, diarrhea, constipation, dizziness, shortness of breath, chest pain, bleeding, unexplained bruising, or any new/concerning symptoms, questions/concerns.     If you are having any concerning symptoms or wish to speak to a provider before your next infusion visit, please call your care coordinator or triage to notify them so we can adequately serve you.       After Hours: [phone]    If after hours, weekends, or holidays, call main hospital  and ask for Oncology doctor on call.     Take Lovenox injection 4-6 hours before 1230 Pump d/c appointment. **Give yourself your injection around 7:30 am**          April 2018 Sunday Monday Tuesday Wednesday Thursday Friday Saturday   1     2     3     CANCER REHAB EVAL   11:00 AM   (60 min.)   Ela Paredes, PT   [facility] 4     5     6     7       8     9     10     [facility]    9:45 AM   (15 min.)   [facility] DRAW   [facility] Lab Draw     UMP RETURN   10:05 AM   (50 min.)   Quyen Mazariegos, APRN CNP   [facility]   11:00 AM   (180 min.)    ONCOLOGY INFUSION   [facility] 11     12     UMP ONC INFUSION 60   12:30 PM   (60 min.)    ONCOLOGY INFUSION   [facility] 13     14       15     16      17     CANCER REHAB TREATMENT    2:00 PM   (45 min.)   Ela Paredes, PT   [facility] 18     19     20     21       22     23     24     [facility] LAB DRAW    6:30 AM   (15 min.)    MASONIC LAB DRAW   [facility] Lab Draw     UMP RETURN    6:45 AM   (50 min.)   Quyen Mazariegos APRN CNP   [facility]     UMP ONC INFUSION 180    8:00 AM   (180 min.)   UC ONCOLOGY INFUSION   [facility] 25     26     27     28       29     30                                         May 2018   Don Monday Tuesday Wednesday Thursday Friday Saturday             1     CANCER REHAB TREATMENT   11:15 AM   (45 min.)   Ela Paredes, PT   [facility] 2     3     4     5       6     7     8     [facility] LAB DRAW    8:30 AM   (15 min.)    [facility]     [facility] ONC INFUSION 180    9:00 AM   (180 min.)    ONCOLOGY INFUSION   [facility] 9     UMP RETURN   10:15 AM   (30 min.)   José Antonio Ann MD   [facility] 10     11     12       13     14     15     CANCER REHAB TREATMENT   11:15 AM   (45 min.)   Ela Paredes, PT   [facility] 16     17     18     19       20     21     22     [facility] LAB DRAW    8:30 AM   (15 min.)    [facility]   [facility]     [facility] ONC INFUSION 180    9:00 AM   (180 min.)    ONCOLOGY INFUSION   [facility] 23     24     25     26       27     28     29     30     31                           Recent Results (from the past 24 hour(s))   CBC with platelets differential    Collection Time: 04/10/18 10:08 AM   Result Value Ref Range    WBC 9.0 4.0 - 11.0 10e9/L    RBC Count 3.19 (L) 3.8 - 5.2 10e12/L    Hemoglobin 10.2 (L) 11.7 - 15.7 g/dL    Hematocrit 32.0 (L) 35.0 - 47.0 %     78 - 100 fl    MCH 32.0 26.5 - 33.0 pg    MCHC 31.9 31.5 - 36.5 g/dL    RDW 15.1 (H) 10.0 - 15.0 %     Platelet Count 316 150 - 450 10e9/L    Diff Method Automated Method     % Neutrophils 74.5 %    % Lymphocytes 14.5 %    % Monocytes 8.2 %    % Eosinophils 2.0 %    % Basophils 0.4 %    % Immature Granulocytes 0.4 %    Nucleated RBCs 0 0 /100    Absolute Neutrophil 6.7 1.6 - 8.3 10e9/L    Absolute Lymphocytes 1.3 0.8 - 5.3 10e9/L    Absolute Monocytes 0.7 0.0 - 1.3 10e9/L    Absolute Eosinophils 0.2 0.0 - 0.7 10e9/L    Absolute Basophils 0.0 0.0 - 0.2 10e9/L    Abs Immature Granulocytes 0.0 0 - 0.4 10e9/L    Absolute Nucleated RBC 0.0    Bilirubin  total    Collection Time: 04/10/18 10:08 AM   Result Value Ref Range    Bilirubin Total 0.2 0.2 - 1.3 mg/dL                 Follow-ups after your visit        Your next 10 appointments already scheduled     Apr 12, 2018 12:30 PM CDT   Infusion 60 with UC ONCOLOGY INFUSION, UC 31 ATC   [facility])    [address] [place] [address]  [address]  [facility] [id number] [phone]            Apr 17, 2018  2:00 PM CDT   Cancer Rehab Treatment with Ela Paredes, PT   [facility] ([facility])    [address] [facility] [address] [phone]            Apr 24, 2018  6:30 AM CDT   [facility] Lab Draw with UC [facility] LAB DRAW   [facility] Lab Draw ([facility])    [address] [place] [address]  [address]  [facility] [id number] [phone]            Apr 24, 2018  7:00 AM CDT   (Arrive by 6:45 AM)   Return Visit with CAT Dang CNP   [facility] ([facility])    [address] [place] [address]  [address]  [facility] [id number] [phone]            Apr 24, 2018  8:00 AM CDT   Infusion 180 with UC ONCOLOGY INFUSION, UC 18 ATC   [facility] ([facility])    [address] [place] [address]  [address]  [facility] [id number] [phone]            May 01, 2018 11:15  AM CDT   Cancer Rehab Treatment with Ela Paredes, PT   [facility] ([facility])    [address] [facility] [id number] [phone]            May 08, 2018  8:30 AM CDT   [facility]ic Lab Draw with  MASONIC LAB DRAW   [facility] Draw ([place] [facility])    [address]  [address]  [facility] [address] [phone]            May 08, 2018  9:00 AM CDT   Infusion 180 with UC ONCOLOGY INFUSION   [facility] ([place] [facility])    [address]  [address]  [facility] [address] [phone]              Future tests that were ordered for you today     Open Future Orders        Priority Expected Expires Ordered    Heparin 10a Level Routine 4/24/2018 4/10/2019 4/10/2018            Who to contact     If you have questions or need follow up information about today's clinic visit or your schedule please contact [facility] directly at [phone].  Normal or non-critical lab and imaging results will be communicated to you by [employer], letter or phone within 4 business days after the clinic has received the results. If you do not hear from us within 7 days, please contact the clinic through [employer] or phone. If you have a critical or abnormal lab result, we will notify you by phone as soon as possible.  Submit refill requests through [employer] or call your pharmacy and they will forward the refill request to us. Please allow 3 business days for your refill to be completed.          Additional Information About Your Visit        [employer] Information     [employer] gives you secure access to your electronic health record. If you see a primary care provider, you can also send messages to your care team and make appointments. If you have questions, please call your primary care clinic.  If you do not have a primary care provider, please call [phone] and they will assist  you.        Care EveryWhere ID     This is your Care EveryWhere ID. This could be used by other organizations to access your [facility] medical records  [id number]         Blood Pressure from Last 3 Encounters:   04/10/18 128/74   03/30/18 (!) 153/93   03/28/18 124/70    Weight from Last 3 Encounters:   04/10/18 45.2 kg (99 lb 9.6 oz)   03/28/18 48.2 kg (106 lb 4.8 oz)   03/26/18 47.1 kg (103 lb 14.4 oz)              We Performed the Following     Bilirubin  total     CBC with platelets differential     Heparin 10a Level          Where to get your medicines      These medications were sent to [place] [id number] [facility] [address]  [address] [place] [address] [address] [place] [address]    Hours:  TRANSPLANT PHONE NUMBER [phone] Phone:  [phone]     amylase-lipase-protease 30644 units Cpep          Primary Care Provider Office Phone # Fax #    Neri Gipson [phone]        [address] [address]        Equal Access to Services     NESSA NICHOLS : Hadii mary ku hadasho Sobrunildaali, waaxda luqadaha, qaybta kaalmada adetobiyada, lisa adams . So [facility] [phone].    ATENCIÓN: Si habla español, tiene a nava disposición servicios gratuitos de asistencia lingüística. Llame al [phone].    We comply with applicable federal civil rights laws and [place] laws. We do not discriminate on the basis of race, color, national origin, age, disability, sex, sexual orientation, or gender identity.            Thank you!     Thank you for choosing [facility]  for your care. Our goal is always to provide you with excellent care. Hearing back from our patients is one way we can continue to improve our services. Please take a few minutes to complete the written survey that you may receive in the mail after your visit with us. Thank you!             Your Updated Medication List - Protect others around  you: Learn how to safely use, store and throw away your medicines at www.disposemymeds.org.          This list is accurate as of 4/10/18  5:25 PM.  Always use your most recent med list.                   Brand Name Dispense Instructions for use Diagnosis    albuterol 108 (90 Base) MCG/ACT Inhaler    PROAIR HFA/PROVENTIL HFA/VENTOLIN HFA    1 Inhaler    Inhale 2 puffs into the lungs 4 times daily    Mild asthma, unspecified whether complicated, unspecified whether persistent       amylase-lipase-protease 61361 units Cpep    CREON    180 capsule    Take 2 capsules (72,000 Units) by mouth 3 times daily (with meals)    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)       CALCIUM PO      Take by mouth every morning Form/strength unknown. Powder formulation. Add to water and fruits/vegetables daily to promote bone health.        CARTIA  MG 24 hr capsule   Generic drug:  diltiazem     10 capsule    TK 1 C PO QD    Benign essential hypertension       cholecalciferol 1000 UNIT tablet    vitamin D3     Take 1 tablet by mouth 2 times daily        cyanocolbalamin 500 MCG tablet    vitamin  B-12     Take 500 mcg by mouth every morning        dexamethasone 2 MG tablet    DECADRON    20 tablet    Take 1 tablet (2 mg) by mouth every 12 hours    Pneumonitis       * enoxaparin 60 MG/0.6ML injection    LOVENOX     35 mg        * enoxaparin 40 MG/0.4ML injection    LOVENOX    60 Syringe    Inject 0.4 mLs (40 mg) Subcutaneous every 12 hours    Malignant neoplasm of pancreas (H), Other chronic 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 cor pulmonale (H)       fluticasone-vilanterol 100-25 MCG/INH oral inhaler    BREO ELLIPTA    1 Inhaler    Inhale 1 puff into the lungs daily    Cough       folic acid 400 MCG tablet    FOLVITE     Take 1 tablet by mouth every morning        hydrOXYzine 25 MG tablet    ATARAX    10 tablet    Take 1-2 tablets (25-50 mg) by mouth every 6 hours as needed for itching (adjuvant pain)    Obstructive jaundice        levothyroxine 125 MCG tablet    SYNTHROID/LEVOTHROID    10 tablet    Take 1 tablet (125 mcg) by mouth daily    Hypothyroidism, unspecified type       loperamide 2 MG capsule    IMODIUM    30 capsule    2 caps at 1st sign of diarrhea & 1 cap every 2hrs until 12hrs diarrhea free. During night, 2 caps at bedtime & 2 caps every 4hrs until AM    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)       LORazepam 0.5 MG tablet    ATIVAN    15 tablet    Take 1 tablet (0.5 mg) by mouth every 4 hours as needed (Anxiety, Nausea/Vomiting or Sleep)    Malignant neoplasm of head of pancreas (H)       nystatin 356004 UNIT/ML suspension    MYCOSTATIN          omeprazole 40 MG capsule    priLOSEC    20 capsule    Take 1 capsule (40 mg) by mouth 2 times daily Take 30-60 minutes before a meal.    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 Please dispense 2 pairs of knee high 20-30 mmHg    Lymphedema       pantoprazole 20 MG EC tablet    PROTONIX    30 tablet    Take 1 tablet (20 mg) by mouth daily    Other acute gastritis without hemorrhage       * prochlorperazine 10 MG tablet    COMPAZINE          * prochlorperazine 5 MG tablet    COMPAZINE    30 tablet    Take 1 tablet (5 mg) by mouth every 6 hours as needed for nausea or vomiting    Malignant neoplasm of head of pancreas (H)       timolol maleate 0.5 % opthalmic solution    ISTALOL    1 Bottle    Place 1 drop into both eyes every morning Before placing contact lenses    Cataract, unspecified cataract type, unspecified laterality       traMADol 50 MG tablet     ULTRAM    60 tablet    Take 1-2 tablets ( mg) by mouth every 4 hours as needed for breakthrough pain (No more than 8 tablets in a day)    Malignant neoplasm of head of pancreas (H)       * triamterene-hydrochlorothiazide 37.5-25 MG per tablet    MAXZIDE-25     Take 1 tablet by mouth every morning        * triamterene-hydrochlorothiazide 37.5-25 MG per capsule    DYAZIDE          TYLENOL PO      Take 325 mg by mouth every 4 hours as needed for mild pain or fever        * Notice:  This list has 10 medication(s) that are the same as other medications prescribed for you. Read the directions carefully, and ask your doctor or other care provider to review them with you.

## 2018-04-10 NOTE — PROGRESS NOTES
Reason for Visit: seen in f/u of metastatic pancreatic cancer    Oncology HPI: Iris Lewis is a 78 year old woman with a hx significant for thyroid cancer s/p thyroidectomy in the 1980s, cholelithiasis s/p CCY 2015, GERD s/p Nissen fundoplication in 2006 and HTN. She was dx with metastatic pancreatic cancer involving the liver, lung and lymph nodes in October 2017 after presenting with obstructive jaundice. She met with  an did not qualify for the HALOZYME study. She was initiated on [place]/Abraxane on 11/28/17. Restaging showed a positive response to treatment after 2 cycles. Prior to cycle 4, she developed acute hypoxia and weakness. She was found to have influenza and pneumonia. 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 At f/u with Dr. Yu on 3/26/18, she was found to have progression and recommended to start 5FU plus liposomal irinotecan. She initiated therapy on 3/28/18 and presents for evaluation prior to cycle 2.    Interval history: Iris is here with her  today. She was surprised about how well she felt with this chemo. Has occasional indigestion. No diarrhea. Stools are a little loose on occasion. No vomiting. No hand/foot tenderness. Neurpathy is unchanged. Has some soreness in the right throat/cheek mucosa. Is doing salt water rinses. Pain is not limiting eating. She finds it difficult to get in enough food. Has a good appetite, but only able to eat 1/4 of what she normally eats. Snacks on apples or fruit. Has tried some boost, but not taking that regularly. Edema is much improved. Is working with PT on strengthening exercises. Is finding it easier to clean the house and stair climbing. No fevers/chills. No cough or shortness of breath. No chest pain. Has some dizziness with position changes. Urination wnl.    Current Outpatient Prescriptions   Medication Sig Dispense Refill     pantoprazole (PROTONIX) 20 MG EC tablet Take 1 tablet (20 mg) by mouth daily 30  tablet 1     amylase-lipase-protease (CREON) 87995 UNITS CPEP Take 2 capsules (72,000 Units) by mouth 3 times daily (with meals) 180 capsule 3     ondansetron (ZOFRAN) 8 MG tablet Take 1 tablet (8 mg) by mouth every 8 hours as needed (nausea/vomiting) 10 tablet 2     loperamide (IMODIUM) 2 MG capsule 2 caps at 1st sign of diarrhea & 1 cap every 2hrs until 12hrs diarrhea free. During night, 2 caps at bedtime & 2 caps every 4hrs until AM 30 capsule 0     hydrOXYzine (ATARAX) 25 MG tablet Take 1-2 tablets (25-50 mg) by mouth every 6 hours as needed for itching (adjuvant pain) 10 tablet 0     LORazepam (ATIVAN) 0.5 MG tablet Take 1 tablet (0.5 mg) by mouth every 4 hours as needed (Anxiety, Nausea/Vomiting or Sleep) 15 tablet 0     traMADol (ULTRAM) 50 MG tablet Take 1-2 tablets ( mg) by mouth every 4 hours as needed for breakthrough pain (No more than 8 tablets in a day) 60 tablet 0     enoxaparin (LOVENOX) 40 MG/0.4ML injection Inject 0.4 mLs (40 mg) Subcutaneous every 12 hours (Patient not taking: Reported on 3/26/2018) 60 Syringe 3     nystatin (MYCOSTATIN) 540913 UNIT/ML suspension        ondansetron (ZOFRAN) 4 MG tablet        prochlorperazine (COMPAZINE) 10 MG tablet        order for DME Equipment being ordered: Compression Stockings. Please dispense 2 pairs of knee high 20-30 mmHg 2 Units 3     dexamethasone (DECADRON) 2 MG tablet Take 1 tablet (2 mg) by mouth every 12 hours 20 tablet 0     albuterol (PROAIR HFA/PROVENTIL HFA/VENTOLIN HFA) 108 (90 BASE) MCG/ACT Inhaler Inhale 2 puffs into the lungs 4 times daily 1 Inhaler 1     fluticasone-vilanterol (BREO ELLIPTA) 100-25 MCG/INH oral inhaler Inhale 1 puff into the lungs daily 1 Inhaler 1     prochlorperazine (COMPAZINE) 5 MG tablet Take 1 tablet (5 mg) by mouth every 6 hours as needed for nausea or vomiting 30 tablet 0     CARTIA  MG 24 hr capsule TK 1 C PO QD 10 capsule 0     timolol maleate (ISTALOL) 0.5 % opthalmic solution Place 1 drop into both  "eyes every morning Before placing contact lenses 1 Bottle 0     levothyroxine (SYNTHROID/LEVOTHROID) 125 MCG tablet Take 1 tablet (125 mcg) by mouth daily 10 tablet 0     omeprazole (PRILOSEC) 40 MG capsule Take 1 capsule (40 mg) by mouth 2 times daily Take 30-60 minutes before a meal. (Patient not taking: Reported on 3/26/2018) 20 capsule 0     triamterene-hydrochlorothiazide (DYAZIDE) 37.5-25 MG per capsule        order for DME 1unit being ordered: cranial prosthesis 1 Units 0     Acetaminophen (TYLENOL PO) Take 325 mg by mouth every 4 hours as needed for mild pain or fever       CALCIUM PO Take by mouth every morning Form/strength unknown. Powder formulation. Add to water and fruits/vegetables daily to promote bone health.                cyanocolbalamin (VITAMIN B-12) 500 MCG tablet Take 500 mcg by mouth every morning        folic acid (FOLVITE) 400 MCG tablet Take 1 tablet by mouth every morning        cholecalciferol (VITAMIN D) 1000 UNIT tablet Take 1 tablet by mouth 2 times daily             Allergies   Allergen Reactions     Nkda [No Known Drug Allergies]          Exam: alert, appears well. Blood pressure 128/74, pulse 76, temperature 97.6  F (36.4  C), resp. rate 16, height 1.575 m (5' 2.01\"), weight 45.2 kg (99 lb 9.6 oz), SpO2 99 %, not currently breastfeeding.  Wt Readings from Last 4 Encounters:   04/10/18 45.2 kg (99 lb 9.6 oz)   03/28/18 48.2 kg (106 lb 4.8 oz)   03/26/18 47.1 kg (103 lb 14.4 oz)   03/16/18 45 kg (99 lb 3.3 oz)     Oropharynx is moist, no focal lesion. No icterus. Neck supple and w/o adenopathy. Lungs:CTA. Heart:RRR, no murmur or rub. Abdomen: soft, nontender, BS active. No masses or organomegaly. Extremities: warm, no edema. Speech is clear. CN wnl. Gait/station wnl.    Labs: Results for JONA SHETTY (MRN [id number]) as of 4/10/2018 13:48   Ref. Range 4/10/2018 10:08   Bilirubin Total Latest Ref Range: 0.2 - 1.3 mg/dL 0.2   WBC Latest Ref Range: 4.0 - 11.0 10e9/L 9.0 "   Hemoglobin Latest Ref Range: 11.7 - 15.7 g/dL 10.2 (L)   Hematocrit Latest Ref Range: 35.0 - 47.0 % 32.0 (L)   Platelet Count Latest Ref Range: 150 - 450 10e9/L 316   RBC Count Latest Ref Range: 3.8 - 5.2 10e12/L 3.19 (L)   MCV Latest Ref Range: 78 - 100 fl 100   MCH Latest Ref Range: 26.5 - 33.0 pg 32.0   MCHC Latest Ref Range: 31.5 - 36.5 g/dL 31.9   RDW Latest Ref Range: 10.0 - 15.0 % 15.1 (H)   Diff Method Unknown Automated Method   % Neutrophils Latest Units: % 74.5   % Lymphocytes Latest Units: % 14.5   % Monocytes Latest Units: % 8.2   % Eosinophils Latest Units: % 2.0   % Basophils Latest Units: % 0.4   % Immature Granulocytes Latest Units: % 0.4   Nucleated RBCs Latest Ref Range: 0 /100 0   Absolute Neutrophil Latest Ref Range: 1.6 - 8.3 10e9/L 6.7   Absolute Lymphocytes Latest Ref Range: 0.8 - 5.3 10e9/L 1.3   Absolute Monocytes Latest Ref Range: 0.0 - 1.3 10e9/L 0.7   Absolute Eosinophils Latest Ref Range: 0.0 - 0.7 10e9/L 0.2   Absolute Basophils Latest Ref Range: 0.0 - 0.2 10e9/L 0.0   Abs Immature Granulocytes Latest Ref Range: 0 - 0.4 10e9/L 0.0   Absolute Nucleated RBC Unknown 0.0   Heparin 10A Level Latest Units: IU/mL 1.03 (HH)         Impression/plan:   1. Metastatic pancreatic cancer, currently on 5FU/irinotecan after progression and possible pneumonitis on [place]/Abraxane  -tolerating quite well, proceed with cycle 2 today without dose adjustment  -will return in 2 weeks for cycle 3, restaging after 4 cycles is planned    2. Hx of bilateral PE, dx Dec 2017  -currently on enoxaparin 40 mg bid after being supratherapeutic at 50 mg bid  -Xa is supratherapeutic. Will reduce dose to 35 mg bid and recheck Xa level at next lab check    3.  FEN-appetite is good, but fills up quickly, weight continues to trend down--in part relate to decreased leg edema  -reviewed strategies to increase calories, increase protein, higher calorie snacks, nutrition shakes daily  -has been underdosing the creon due to  expense of the medication. Will try to refill to[place] and see if the copay here is more affordable.  -will f/u prior to next infusion. If she continues to lose weight, will discuss use of an appetite stimulant    4. Weakness: improving with PT, continue with the cancer rehab program.    5. Hx of HTN, off of HCTZ and BP stable.

## 2018-04-12 NOTE — PROGRESS NOTES
Infusion Nursing Note:  Iris Lewis presents today for pump disconnect.    Patient seen by provider today: No      Note: Patient had no questions or concerns today.    Intravenous Access:  Implanted Port.    Treatment Conditions:  Lab Results   Component Value Date    HGB 10.2 04/10/2018     Lab Results   Component Value Date    WBC 9.0 04/10/2018      Lab Results   Component Value Date    ANEU 6.7 04/10/2018     Lab Results   Component Value Date     04/10/2018      Lab Results   Component Value Date     03/28/2018                   Lab Results   Component Value Date    POTASSIUM 3.9 03/28/2018           Lab Results   Component Value Date    MAG 1.9 11/15/2017            Lab Results   Component Value Date    CR 0.51 03/28/2018                   Lab Results   Component Value Date    EMMY 8.4 03/28/2018                Lab Results   Component Value Date    BILITOTAL 0.2 04/10/2018           Lab Results   Component Value Date    ALBUMIN 2.6 03/28/2018                    Lab Results   Component Value Date    ALT 13 03/28/2018           Lab Results   Component Value Date    AST 17 03/28/2018       Results reviewed, labs MET treatment parameters, ok to proceed with treatment.      Post Infusion Assessment:  Patient tolerated infusion without incident.  Blood return noted pre and post infusion.  No evidence of extravasations.  Access discontinued per protocol.    Discharge Plan:   Patient declined prescription refills.  Discharge instructions reviewed with: Patient.  AVS to patient via [employer].  Patient will return 4/24/2018 for next appointment.   Patient discharged in stable condition accompanied by: self.  Departure Mode: Ambulatory.  Face to Face time: 0.    Zacarias Castillo RN

## 2018-04-12 NOTE — MR AVS SNAPSHOT
After Visit Summary   4/12/2018    Iris Lewis    MRN: [id number]           Patient Information     Date Of Birth          1940        Visit Information        Provider Department      4/12/2018 12:30 PM  31 ATC;  ONCOLOGY INFUSION [facility]        Today's Diagnoses     History of pulmonary embolism          Care Instructions    Contact Numbers    [facility] [place] [phone]  [facility] [place]:  [phone]    Call triage with chills and/or temperature greater than or equal to 100.5, uncontrolled nausea/vomiting, diarrhea, constipation, dizziness, shortness of breath, chest pain, bleeding, unexplained bruising, or any new/concerning symptoms, questions/concerns.     If you are having any concerning symptoms or wish to speak to a provider before your next infusion visit, please call your care coordinator or triage to notify them so we can adequately serve you.       After Hours: [phone]    If after hours, weekends, or holidays, call main hospital  and ask for Oncology doctor on call.           April 2018 Sunday Monday Tuesday Wednesday Thursday Friday Saturday   1     2     3     CANCER REHAB EVAL   11:00 AM   (60 min.)   Ela Paredes, PT   [facility] 4     5     6     7       8     9     10     [facility] DRAW    9:45 AM   (15 min.)    [facility] LAB DRAW   [facility] Lab Draw     [facility] RETURN   10:05 AM   (50 min.)   Quyen Mazariegos, CAT CNP   [facility]     [facility] ONC INFUSION 180   11:00 AM   (180 min.)    ONCOLOGY INFUSION   [facility] 11     12     [facility] ONC INFUSION 60   12:30 PM   (60 min.)    ONCOLOGY INFUSION   [facility] 13     14       15     16     17     CANCER REHAB TREATMENT    2:00 PM   (45 min.)   Ela Paredes, PT   [facility] 18     19     20     21       22     23     24     [facility] DRAW    6:30 AM   (15 min.)     [facility] LAB DRAW   [facility] Lab Draw     [facility] RETURN    6:45 AM   (50 min.)   Quyen Mazariegos APRN CNP   [facility]     [facility] ONC INFUSION 180    8:00 AM   (180 min.)    ONCOLOGY INFUSION   [facility] 25     26     27     28       29     30                                         May 2018   Don Monday Tuesday Wednesday Thursday Friday Saturday             1     CANCER REHAB TREATMENT   11:15 AM   (45 min.)   Ela Paredes PT   [facility] 2     3     4     5       6     7     8     [facility] DRAW    8:30 AM   (15 min.)    [facility]     [facility] ONC INFUSION 180    9:00 AM   (180 min.)   UC ONCOLOGY INFUSION   [facility] 9     UMP RETURN   10:15 AM   (30 min.)   José Antonio Ann MD   [facility] 10     11     12       13     14     15     CANCER REHAB TREATMENT   11:15 AM   (45 min.)   Ela Paredes PT   [facility] 16     17     18     19       20     21     22     [facility] DRAW    8:30 AM   (15 min.)    [facility] LAB DRAW   [facility] Draw     [facility] ONC INFUSION 180    9:00 AM   (180 min.)    ONCOLOGY INFUSION   [facility] 23     24     25     26       27     28     29     30     31                            Lab Results:  Recent Results (from the past 12 hour(s))   Heparin 10a Level    Collection Time: 04/12/18  1:05 PM   Result Value Ref Range    Heparin 10A Level 0.69 IU/mL             Follow-ups after your visit        Your next 10 appointments already scheduled     Apr 17, 2018  2:00 PM CDT   Cancer Rehab Treatment with [facility] ([facility]    [address] [facility] [address]   [phone]            Apr 24, 2018  6:30 AM CDT   [facility] Lab Draw with  [facility] DRAW   [facility] Draw [facility]    [address] [place] [address]  [address]  [facility] [address]   [phone]            Apr 24, 2018  7:00 AM CDT   (Arrive by 6:45 AM)   Return Visit with CAT Dang CNP   [facility] ([facility])    [address] [place] [address]  [address]  [facility] [address]   [phone]            Apr 24, 2018  8:00 AM CDT   Infusion 180 with UC ONCOLOGY INFUSION, UC 18 ATC   [facility] ([facility])    [address] [place] [address]  [address]  [facility] [address]   [phone]            May 01, 2018 11:15 AM CDT   Cancer Rehab Treatment with Ela Paredes PT   [facility] ([facility])    [address] [facility] [address]   [phone]            May 08, 2018  8:30 AM CDT   [facility] Lab Draw with UC [facility] LAB DRAW   [facility] Lab Draw ([facility])    [address] [place] [address]  [address]  [facility] [address]   [phone]            May 08, 2018  9:00 AM CDT   Infusion 180 with UC ONCOLOGY INFUSION, UC 24 ATC   [facility] ([facility])    [address] [place] [address]  [address]  [facility] [address]   [phone]            May 09, 2018 10:30 AM CDT   (Arrive by 10:15 AM)   Return Visit with José Antonio Ann MD   [facility] ([facility])    [address] [place] [address]  [address]  [facility] [address]   [phone]              Who to contact     If you have questions or need follow up information about today's clinic visit or your schedule please contact [facility] directly at [phone].  Normal or non-critical lab and imaging results will be communicated to you by MyChart, letter or phone within 4 business days after the clinic has received the results. If you do not hear from us within 7 days, please contact the  clinic through [employer] or phone. If you have a critical or abnormal lab result, we will notify you by phone as soon as possible.  Submit refill requests through [employer] or call your pharmacy and they will forward the refill request to us. Please allow 3 business days for your refill to be completed.          Additional Information About Your Visit        [employer] Information     [employer] gives you secure access to your electronic health record. If you see a primary care provider, you can also send messages to your care team and make appointments. If you have questions, please call your primary care clinic.  If you do not have a primary care provider, please call [phone] and they will assist you.        Care EveryWhere ID     This is your Care EveryWhere ID. This could be used by other organizations to access your [facility] medical records  [id number]        Your Vitals Were     Pulse Temperature Respirations Pulse Oximetry          75 97.8  F (36.6  C) 16 99%         Blood Pressure from Last 3 Encounters:   04/12/18 132/54   04/10/18 128/74   03/30/18 (!) 153/93    Weight from Last 3 Encounters:   04/10/18 45.2 kg (99 lb 9.6 oz)   03/28/18 48.2 kg (106 lb 4.8 oz)   03/26/18 47.1 kg (103 lb 14.4 oz)              We Performed the Following     Heparin 10a Level        Primary Care Provider Office Phone # Fax #    Neri Gipson [phone]        [address] [address]        Equal Access to Services     NESSA NICHOLS : Hadii aad ku hadasho Soomaali, waaxda luqadaha, qaybta kaalmada adeegyada, waxay rodrick adams . So [facility] [phone].    ATENCIÓN: Si habla español, tiene a nava disposición servicios gratuitos de asistencia lingüística. Llame al [phone].    We comply with applicable federal civil rights laws and [place] laws. We do not discriminate on the basis of race, color, national origin, age, disability, sex, sexual orientation, or gender  identity.            Thank you!     Thank you for choosing [facility]  for your care. Our goal is always to provide you with excellent care. Hearing back from our patients is one way we can continue to improve our services. Please take a few minutes to complete the written survey that you may receive in the mail after your visit with us. Thank you!             Your Updated Medication List - Protect others around you: Learn how to safely use, store and throw away your medicines at www.disposemymeds.org.          This list is accurate as of 4/12/18  4:09 PM.  Always use your most recent med list.                   Brand Name Dispense Instructions for use Diagnosis    albuterol 108 (90 Base) MCG/ACT Inhaler    PROAIR HFA/PROVENTIL HFA/VENTOLIN HFA    1 Inhaler    Inhale 2 puffs into the lungs 4 times daily    Mild asthma, unspecified whether complicated, unspecified whether persistent       amylase-lipase-protease 94089 units Cpep    CREON    180 capsule    Take 2 capsules (72,000 Units) by mouth 3 times daily (with meals)    Malignant neoplasm of head of pancreas (H)       CALCIUM PO      Take by mouth every morning Form/strength unknown. Powder formulation. Add to water and fruits/vegetables daily to promote bone health.        CARTIA  MG 24 hr capsule   Generic drug:  diltiazem     10 capsule    TK 1 C PO QD    Benign essential hypertension       cholecalciferol 1000 UNIT tablet    vitamin D3     Take 1 tablet by mouth 2 times daily        cyanocolbalamin 500 MCG tablet    vitamin  B-12     Take 500 mcg by mouth every morning        dexamethasone 2 MG tablet    DECADRON    20 tablet    Take 1 tablet (2 mg) by mouth every 12 hours    Pneumonitis       * enoxaparin 60 MG/0.6ML injection    LOVENOX     35 mg        * enoxaparin 40 MG/0.4ML injection    LOVENOX    60 Syringe    Inject 0.4 mLs (40 mg) Subcutaneous every 12 hours    Malignant neoplasm of pancreas (H), Other chronic pulmonary  embolism without acute cor pulmonale (H)       fluticasone-vilanterol 100-25 MCG/INH oral inhaler    BREO ELLIPTA    1 Inhaler    Inhale 1 puff into the lungs daily    Cough       folic acid 400 MCG tablet    FOLVITE     Take 1 tablet by mouth every morning        hydrOXYzine 25 MG tablet    ATARAX    10 tablet    Take 1-2 tablets (25-50 mg) by mouth every 6 hours as needed for itching (adjuvant pain)    Obstructive jaundice       levothyroxine 125 MCG tablet    SYNTHROID/LEVOTHROID    10 tablet    Take 1 tablet (125 mcg) by mouth daily    Hypothyroidism, unspecified type       loperamide 2 MG capsule    IMODIUM    30 capsule    2 caps at 1st sign of diarrhea & 1 cap every 2hrs until 12hrs diarrhea free. During night, 2 caps at bedtime & 2 caps every 4hrs until AM    Malignant neoplasm of head of pancreas (H)       LORazepam 0.5 MG tablet    ATIVAN    15 tablet    Take 1 tablet (0.5 mg) by mouth every 4 hours as needed (Anxiety, Nausea/Vomiting or Sleep)    Malignant neoplasm of head of pancreas (H)       nystatin 916408 UNIT/ML suspension    MYCOSTATIN          omeprazole 40 MG capsule    priLOSEC    20 capsule    Take 1 capsule (40 mg) by mouth 2 times daily Take 30-60 minutes before a meal.    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 Please dispense 2 pairs of knee high 20-30 mmHg    Lymphedema       pantoprazole 20 MG EC tablet    PROTONIX    30 tablet    Take 1 tablet (20 mg) by mouth daily    Other acute gastritis without hemorrhage       * prochlorperazine 10 MG tablet     COMPAZINE          * prochlorperazine 5 MG tablet    COMPAZINE    30 tablet    Take 1 tablet (5 mg) by mouth every 6 hours as needed for nausea or vomiting    Malignant neoplasm of head of pancreas (H)       timolol maleate 0.5 % opthalmic solution    ISTALOL    1 Bottle    Place 1 drop into both eyes every morning Before placing contact lenses    Cataract, unspecified cataract type, unspecified laterality       traMADol 50 MG tablet    ULTRAM    60 tablet    Take 1-2 tablets ( mg) by mouth every 4 hours as needed for breakthrough pain (No more than 8 tablets in a day)    Malignant neoplasm of head of pancreas (H)       * triamterene-hydrochlorothiazide 37.5-25 MG per tablet    MAXZIDE-25     Take 1 tablet by mouth every morning        * triamterene-hydrochlorothiazide 37.5-25 MG per capsule    DYAZIDE          TYLENOL PO      Take 325 mg by mouth every 4 hours as needed for mild pain or fever        * Notice:  This list has 10 medication(s) that are the same as other medications prescribed for you. Read the directions carefully, and ask your doctor or other care provider to review them with you.

## 2018-04-12 NOTE — TELEPHONE ENCOUNTER
Called to let her know that she is therapeutic on enoxaparin 35 mg every 12 hours. She will continue current dosing indefinitely. TW

## 2018-04-12 NOTE — PATIENT INSTRUCTIONS
Contact Numbers    [facility] [place]: [phone]  [facility] [place] Triage:  [phone]    Call triage with chills and/or temperature greater than or equal to 100.5, uncontrolled nausea/vomiting, diarrhea, constipation, dizziness, shortness of breath, chest pain, bleeding, unexplained bruising, or any new/concerning symptoms, questions/concerns.     If you are having any concerning symptoms or wish to speak to a provider before your next infusion visit, please call your care coordinator or triage to notify them so we can adequately serve you.       After Hours: [phone]    If after hours, weekends, or holidays, call main hospital  and ask for Oncology doctor on call.           April 2018 Sunday Monday Tuesday Wednesday Thursday Friday Saturday   1     2     3     CANCER REHAB EVAL   11:00 AM   (60 min.)   Ela Paredes, PT   [facility] 4     5     6     7       8     9     10     [facility] DRAW    9:45 AM   (15 min.)   [facility] DRAW   [facility] Lab Draw     [facility] RETURN   10:05 AM   (50 min.)   Quyen Mazariegos APRN CNP   [facility]     [facility] ONC INFUSION 180   11:00 AM   (180 min.)   UC ONCOLOGY INFUSION   [facility] 11     12     [facility] ONC INFUSION 60   12:30 PM   (60 min.)   [facility] ONCOLOGY INFUSION   [facility] 13     14       15     16     17     CANCER REHAB TREATMENT    2:00 PM   (45 min.)   Callisto, Ela, PT   [facility] 18     19     20     21       22     23     24     [facility] DRAW    6:30 AM   (15 min.)   [facility] DRAW   [facility] Lab Draw     UMP RETURN    6:45 AM   (50 min.)   Quyen Mazariegos APRN CNP   [facility]     [facility] ONC INFUSION 180    8:00 AM   (180 min.)   [facility] ONCOLOGY INFUSION   [facility] 25     26     27     28       29     30                                         May 2018   Don Monday Tuesday Wednesday Thursday  Friday Saturday             1     CANCER REHAB TREATMENT   11:15 AM   (45 min.)   Ela Paredes, PT   [facility] 2     3     4     5       6     7     8     [facility] LAB DRAW    8:30 AM   (15 min.)    [facility]     [facility] ONC INFUSION 180    9:00 AM   (180 min.)    ONCOLOGY INFUSION   [facility] 9     UMP RETURN   10:15 AM   (30 min.)   José Antonio Ann MD   [facility] 10     11     12       13     14     15     CANCER REHAB TREATMENT   11:15 AM   (45 min.)   Ela Paredes, PT   [facility] 16     17     18     19       20     21     22     [facility] LAB DRAW    8:30 AM   (15 min.)    [facility]     [facility] ONC INFUSION 180    9:00 AM   (180 min.)    ONCOLOGY INFUSION   [facility] 23     24     25     26       27     28     29     30     31                            Lab Results:  Recent Results (from the past 12 hour(s))   Heparin 10a Level    Collection Time: 04/12/18  1:05 PM   Result Value Ref Range    Heparin 10A Level 0.69 IU/mL

## 2018-04-24 NOTE — MR AVS SNAPSHOT
After Visit Summary   4/24/2018    Iris Lewis    MRN: [id number]           Patient Information     Date Of Birth          1940        Visit Information        Provider Department      4/24/2018 7:00 AM Quyen Mazariegos APRN CNP [facility]        Today's Diagnoses     Malignant neoplasm of pancreas, unspecified location of malignancy (H)        Other chronic 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 cor pulmonale (H)           Follow-ups after your visit        Your next 10 appointments already scheduled     May 01, 2018 11:15 AM CDT   Cancer Rehab Treatment with Ela Paredes, PT   [facility] ([facility])    [address] [facility] [address]   [phone]            May 08, 2018  8:30 AM CDT   Masonic Lab Draw with UC MASONIC LAB DRAW   [facility] Draw ([facility])    [address] [place] [address]  [address]  [facility] [address]   [phone]            May 08, 2018  9:00 AM CDT   Infusion 180 with UC ONCOLOGY INFUSION, UC 24 ATC   [facility] ([facility])    [address]  [address]  [facility] [address]   [phone]            May 09, 2018 10:30 AM CDT   (Arrive by 10:15 AM)   Return Visit with José Antonio Ann MD   [facility] ([facility])    [address]  [address]  [facility] [address]   [phone]            May 15, 2018 11:15 AM CDT   Cancer Rehab Treatment with Ela Paredes, PT   [facility] ([facility])    [address] [facility] [address]   [phone]            May 22, 2018  8:30 AM CDT   Masonic Lab Draw with UC MASONIC LAB DRAW   [facility] Draw ([facility])    [address]  [address]  [facility] [address]   [phone]            May 22, 2018  9:00 AM CDT    Infusion 180 with  ONCOLOGY INFUSION, UC 24 ATC   [facility] ([facility])    [address]  [address]  [facility] [id number] [phone]            Jun 01, 2018  9:40 AM CDT   (Arrive by 9:25 AM)   CT CHEST ABDOMEN PELVIS W/O & W CONTRAST with UCCT2   [facility] ([facility])    [address]  [address]  [facility] [id number] [phone]           Please bring any scans or X-rays taken at other hospitals, if similar tests were done. Also bring a list of your medicines, including vitamins, minerals and over-the-counter drugs. It is safest to leave personal items at home.  Be sure to tell your doctor:   If you have any allergies.   If there s any chance you are pregnant.   If you are breastfeeding.  How to prepare:   Do not eat or drink for 2 hours before your exam. If you need to take medicine, you may take it with small sips of water. (We may ask you to take liquid medicine as well.)   Please wear loose clothing, such as a sweat suit or jogging clothes. Avoid snaps, zippers and other metal. We may ask you to undress and put on a hospital gown.  Please arrive 30 minutes early for your CT. Once in the department you might be asked to drink water 15-20 minutes prior to your exam.  If indicated you may be asked to drink an oral contrast in advance of your CT.  If this is the case, the imaging team will let you know or be in contact with you prior to your appointment  Patients over 70 or patients with diabetes or kidney problems:   If you haven t had a blood test (creatinine test) within the last 30 days, the Cardiologist/Radiologist may require you to get this test prior to your exam.  If you have diabetes:   Continue to take your metformin medication on the day of your exam  If you have any questions, please call the Imaging Department where you will have your exam.              Who to contact     If you  "have questions or need follow up information about today's clinic visit or your schedule please contact [facility] directly at [phone].  Normal or non-critical lab and imaging results will be communicated to you by Zupplerhart, letter or phone within 4 business days after the clinic has received the results. If you do not hear from us within 7 days, please contact the clinic through Zupplerhart or phone. If you have a critical or abnormal lab result, we will notify you by phone as soon as possible.  Submit refill requests through Cantargia or call your pharmacy and they will forward the refill request to us. Please allow 3 business days for your refill to be completed.          Additional Information About Your Visit        ZupplerharLexim Information     Cantargia gives you secure access to your electronic health record. If you see a primary care provider, you can also send messages to your care team and make appointments. If you have questions, please call your primary care clinic.  If you do not have a primary care provider, please call [phone] and they will assist you.        Care EveryWhere ID     This is your Care EveryWhere ID. This could be used by other organizations to access your [facility] medical records  [id number]        Your Vitals Were     Pulse Temperature Respirations Height Pulse Oximetry BMI (Body Mass Index)    78 97.4  F (36.3  C) (Oral) 18 1.575 m (5' 2.01\") 99% 18.93 kg/m2       Blood Pressure from Last 3 Encounters:   04/24/18 125/72   04/12/18 132/54   04/10/18 128/74    Weight from Last 3 Encounters:   04/24/18 46.9 kg (103 lb 8 oz)   04/10/18 45.2 kg (99 lb 9.6 oz)   03/28/18 48.2 kg (106 lb 4.8 oz)              Today, you had the following     No orders found for display         Today's Medication Changes          These changes are accurate as of 4/24/18  8:06 AM.  If you have any questions, ask your nurse or doctor.               These medicines have changed or have updated " prescriptions.        Dose/Directions    * enoxaparin 60 MG/0.6ML injection   Commonly known as:  LOVENOX   This may have changed:  Another medication with the same name was changed. Make sure you understand how and when to take each.   Changed by:  Quyen Mazariegos APRN CNP        Dose:  35 mg   35 mg   Refills:  0       * LOVENOX 40 MG/0.4ML injection   This may have changed:  how much to take   Used for:  Malignant neoplasm of pancreas, unspecified location of malignancy (H), Other chronic 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 cor pulmonale (H)   Generic drug:  enoxaparin   Changed by:  Quyen Mazariegos APRN CNP        Dose:  35 mg   Inject 0.35 mLs (35 mg) Subcutaneous every 12 hours   Quantity:  60 Syringe   Refills:  3       * Notice:  This list has 2 medication(s) that are the same as other medications prescribed for you. Read the directions carefully, and ask your doctor or other care provider to review them with you.             Primary Care Provider Office Phone # Fax #    Neri Gipson [phone] [address] [address]        Equal Access to Services     [facility]: Haddiana Martinez, merle gar, qayesy correia, lisa adams . So [facility] [phone].    ATENCIÓN: Si habla español, tiene a nava disposición servicios gratuitos de asistencia lingüística. [facility] [phone].    We comply with applicable federal civil rights laws and [place] laws. We do not discriminate on the basis of race, color, national origin, age, disability, sex, sexual orientation, or gender identity.            Thank you!     Thank you for choosing [facility]  for your care. Our goal is always to provide you with excellent care. Hearing back from our patients is one way we can continue to improve our services. Please take a few minutes to complete the written survey that you may receive in the mail after your  visit with us. Thank you!             Your Updated Medication List - Protect others around you: Learn how to safely use, store and throw away your medicines at www.disposemymeds.org.          This list is accurate as of 4/24/18  8:06 AM.  Always use your most recent med list.                   Brand Name Dispense Instructions for use Diagnosis    albuterol 108 (90 Base) MCG/ACT Inhaler    PROAIR HFA/PROVENTIL HFA/VENTOLIN HFA    1 Inhaler    Inhale 2 puffs into the lungs 4 times daily    Mild asthma, unspecified whether complicated, unspecified whether persistent       amylase-lipase-protease 31467 units Cpep    CREON    180 capsule    Take 2 capsules (72,000 Units) by mouth 3 times daily (with meals)    Malignant neoplasm of head of pancreas (H)       CALCIUM PO      Take by mouth every morning Form/strength unknown. Powder formulation. Add to water and fruits/vegetables daily to promote bone health.        CARTIA  MG 24 hr capsule   Generic drug:  diltiazem     10 capsule    TK 1 C PO QD    Benign essential hypertension       cholecalciferol 1000 UNIT tablet    vitamin D3     Take 1 tablet by mouth 2 times daily        cyanocolbalamin 500 MCG tablet    vitamin  B-12     Take 500 mcg by mouth every morning        dexamethasone 2 MG tablet    DECADRON    20 tablet    Take 1 tablet (2 mg) by mouth every 12 hours    Pneumonitis       * enoxaparin 60 MG/0.6ML injection    LOVENOX     35 mg        * LOVENOX 40 MG/0.4ML injection   Generic drug:  enoxaparin     60 Syringe    Inject 0.35 mLs (35 mg) Subcutaneous every 12 hours    Malignant neoplasm of pancreas, unspecified location of malignancy (H), Other chronic 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 cor pulmonale (H)       fluticasone-vilanterol 100-25 MCG/INH oral inhaler    BREO ELLIPTA    1 Inhaler    Inhale 1 puff into the lungs daily    Cough       folic acid 400 MCG tablet    FOLVITE     Take 1 tablet by mouth every morning        hydrOXYzine 25 MG tablet     ATARAX    10 tablet    Take 1-2 tablets (25-50 mg) by mouth every 6 hours as needed for itching (adjuvant pain)    Obstructive jaundice       levothyroxine 125 MCG tablet    SYNTHROID/LEVOTHROID    10 tablet    Take 1 tablet (125 mcg) by mouth daily    Hypothyroidism, unspecified type       loperamide 2 MG capsule    IMODIUM    30 capsule    2 caps at 1st sign of diarrhea & 1 cap every 2hrs until 12hrs diarrhea free. 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 Please dispense 2 pairs of knee high 20-30 mmHg    Lymphedema       pantoprazole 20 MG EC tablet    PROTONIX    30 tablet    Take 1 tablet (20 mg) by mouth daily    Other acute gastritis without hemorrhage       * prochlorperazine 10 MG tablet    COMPAZINE          * prochlorperazine 5 MG tablet    COMPAZINE    30 tablet    Take 1 tablet (5 mg) by mouth every 6 hours as needed for nausea or vomiting    Malignant neoplasm of head of pancreas (H)       timolol maleate 0.5 % opthalmic solution    ISTALOL    1 Bottle    Place 1 drop into both  eyes every morning Before placing contact lenses    Cataract, unspecified cataract type, unspecified laterality       traMADol 50 MG tablet    ULTRAM    60 tablet    Take 1-2 tablets ( mg) by mouth every 4 hours as needed for breakthrough pain (No more than 8 tablets in a day)    Malignant neoplasm of head of pancreas (H)       triamterene-hydrochlorothiazide 37.5-25 MG per tablet    MAXZIDE-25     Take 1 tablet by mouth every morning        TYLENOL PO      Take 325 mg by mouth every 4 hours as needed for mild pain or fever        * Notice:  This list has 8 medication(s) that are the same as other medications prescribed for you. Read the directions carefully, and ask your doctor or other care provider to review them with you.

## 2018-04-24 NOTE — MR AVS SNAPSHOT
After Visit Summary   4/24/2018    Iris Lewis    MRN: [id number]           Patient Information     Date Of Birth          1940        Visit Information        Provider Department      4/24/2018 8:00 AM UC 18 ATC;  ONCOLOGY INFUSION M [facility]        Today's Diagnoses     Malignant neoplasm of head of pancreas (H)    -  1      Care Instructions    Contact Numbers  [facility]: [phone]    After Hours:  [phone]  Triage: [phone]    Please call the [facility] Triage line if you experience a temperature greater than or equal to 100.5, shaking chills, have uncontrolled nausea, vomiting and/or diarrhea, dizziness, shortness of breath, chest pain, bleeding, unexplained bruising, or if you have any other new/concerning symptoms, questions or concerns.     If it is after hours, weekends, or holidays, please call the main hospital  at  [phone] and ask to speak to the Oncology doctor on call.     If you are having any concerning symptoms or wish to speak to a provider before your next infusion visit, please call your care coordinator or triage to notify them so we can adequately serve you.     If you need a refill on a narcotic prescription or other medication, please call triage before your infusion appointment.         April 2018 Sunday Monday Tuesday Wednesday Thursday Friday Saturday   1     2     3     CANCER REHAB EVAL   11:00 AM   (60 min.)   Ela Paredes, PT   [facility] 4     5     6     7       8     9     10     [facility] LAB DRAW    9:45 AM   (15 min.)   [facility] LAB DRAW   [facility] Lab Draw     [facility] RETURN   10:05 AM   (50 min.)   Quyen Mazariegos APRN CNP   [facility] ONC INFUSION 180   11:00 AM   (180 min.)    ONCOLOGY INFUSION   [facility] 11     12     [facility] ONC INFUSION 60   12:30 PM   (60 min.)    ONCOLOGY INFUSION   [facility]  [facility] 13     14       15     16     17     CANCER REHAB TREATMENT    2:00 PM   (45 min.)   Ela Paredes, PT   [facility] 18     19     20     21       22     23     24     [facility] LAB DRAW    6:30 AM   (15 min.)    [facility] LAB DRAW   [facility] Lab Draw     [facility] RETURN    6:45 AM   (50 min.)   Quyen Mazariegos APRN CNP   [facility]     [facility] ONC INFUSION 180    8:00 AM   (180 min.)    ONCOLOGY INFUSION   [facility] 25     26     [facility] ONC INFUSION 60    8:30 AM   (60 min.)    ONCOLOGY INFUSION   [facility] 27     28       29     30                                         May 2018   Don Monday Tuesday Wednesday Thursday Friday Saturday             1     CANCER REHAB TREATMENT   11:15 AM   (45 min.)   Ela Paredes, PT   [facility] 2     3     4     5       6     7     8     [facility] LAB DRAW    8:30 AM   (15 min.)    [facility] LAB DRAW   [facility] Lab Draw     [facility] ONC INFUSION 180    9:00 AM   (180 min.)    ONCOLOGY INFUSION   [facility] 9     [facility] RETURN   10:15 AM   (30 min.)   José Antonio Ann MD   [facility] 10     11     12       13     14     15     CANCER REHAB TREATMENT   11:15 AM   (45 min.)   Ela Paredes, PT   [facility] 16     17     18     19       20     21     22     [facility] LAB DRAW    8:30 AM   (15 min.)    [facility] LAB DRAW   [facility] Lab Draw     [facility] ONC INFUSION 180    9:00 AM   (180 min.)    ONCOLOGY INFUSION   [facility] 23     24     25     26       27     28     29     30     31                           Recent Results (from the past 24 hour(s))   CBC with platelets differential    Collection Time: 04/24/18  7:13 AM   Result Value Ref Range    WBC 6.5 4.0 - 11.0 10e9/L    RBC Count 3.18 (L) 3.8 - 5.2 10e12/L    Hemoglobin 10.2 (L) 11.7 - 15.7 g/dL     Hematocrit 31.7 (L) 35.0 - 47.0 %     78 - 100 fl    MCH 32.1 26.5 - 33.0 pg    MCHC 32.2 31.5 - 36.5 g/dL    RDW 15.1 (H) 10.0 - 15.0 %    Platelet Count 275 150 - 450 10e9/L    Diff Method Automated Method     % Neutrophils 43.3 %    % Lymphocytes 31.2 %    % Monocytes 15.6 %    % Eosinophils 8.8 %    % Basophils 0.8 %    % Immature Granulocytes 0.3 %    Nucleated RBCs 0 0 /100    Absolute Neutrophil 2.8 1.6 - 8.3 10e9/L    Absolute Lymphocytes 2.0 0.8 - 5.3 10e9/L    Absolute Monocytes 1.0 0.0 - 1.3 10e9/L    Absolute Eosinophils 0.6 0.0 - 0.7 10e9/L    Absolute Basophils 0.1 0.0 - 0.2 10e9/L    Abs Immature Granulocytes 0.0 0 - 0.4 10e9/L    Absolute Nucleated RBC 0.0     Platelet Estimate Confirming automated cell count    Bilirubin  total    Collection Time: 04/24/18  7:13 AM   Result Value Ref Range    Bilirubin Total 0.3 0.2 - 1.3 mg/dL                 Follow-ups after your visit        Your next 10 appointments already scheduled     Apr 26, 2018  8:30 AM CDT   Infusion 60 with UC ONCOLOGY INFUSION,  17 ATC   [facility] ([facility])    [address] [place] [address]  [address]  [facility] [address] [phone]            May 01, 2018 11:15 AM CDT   Cancer Rehab Treatment with Ela Paredes, PT   [facility] ([facility]    [address] [facility] [address]   [phone]            May 08, 2018  8:30 AM CDT   [facility] Lab Draw with  [facility] DRAW   [facility] ([facility])    [address] [place] [address]  [address]  [facility] [id number] [phone]            May 08, 2018  9:00 AM CDT   Infusion 180 with UC ONCOLOGY INFUSION, UC 24 ATC   [facility] ([facility])    [address] [place] [address]  [address]  [facility] [id number] [phone]            May 09, 2018 10:30 AM CDT   (Arrive by 10:15 AM)   Return Visit with  José Antonio Ann MD   [facility] ([facility])    [address] [facility]  [address]  [facility] [address]   [phone]            May 15, 2018 11:15 AM CDT   Cancer Rehab Treatment with Ela Paredes PT   [facility] ([facility]    [address] [facility] [address]   [phone]            May 22, 2018  8:30 AM CDT   [facility] Lab Draw with  [facility] LAB DRAW   [facility] ([facility])    [address]  [address]  [facility] [address] [phone]            May 22, 2018  9:00 AM CDT   Infusion 180 with UC ONCOLOGY INFUSION   [facility] ([facility])    [address]  [address]  [facility] [address] [phone]              Who to contact     If you have questions or need follow up information about today's clinic visit or your schedule please contact [facility] directly at [phone].  Normal or non-critical lab and imaging results will be communicated to you by MyChart, letter or phone within 4 business days after the clinic has received the results. If you do not hear from us within 7 days, please contact the clinic through [employer] or phone. If you have a critical or abnormal lab result, we will notify you by phone as soon as possible.  Submit refill requests through [employer] or call your pharmacy and they will forward the refill request to us. Please allow 3 business days for your refill to be completed.          Additional Information About Your Visit        [employer] Information     [employer] gives you secure access to your electronic health record. If you see a primary care provider, you can also send messages to your care team and make appointments. If you have questions, please call your primary care clinic.  If you do not have a primary care provider, please call  [phone] and they will assist you.        Care EveryWhere ID     This is your Care EveryWhere ID. This could be used by other organizations to access your [place] medical records  [id number]         Blood Pressure from Last 3 Encounters:   04/24/18 125/72   04/12/18 132/54   04/10/18 128/74    Weight from Last 3 Encounters:   04/24/18 46.9 kg (103 lb 8 oz)   04/10/18 45.2 kg (99 lb 9.6 oz)   03/28/18 48.2 kg (106 lb 4.8 oz)              We Performed the Following     Bilirubin  total     CBC with platelets differential          Today's Medication Changes          These changes are accurate as of 4/24/18 10:13 AM.  If you have any questions, ask your nurse or doctor.               These medicines have changed or have updated prescriptions.        Dose/Directions    * enoxaparin 60 MG/0.6ML injection   Commonly known as:  LOVENOX   This may have changed:  Another medication with the same name was changed. Make sure you understand how and when to take each.   Changed by:  Quyen Mazariegos APRN CNP        Dose:  35 mg   35 mg   Refills:  0       * LOVENOX 40 MG/0.4ML injection   This may have changed:  how much to take   Used for:  Malignant neoplasm of pancreas, unspecified location of malignancy (H), Other chronic 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 cor pulmonale (H)   Generic drug:  enoxaparin   Changed by:  Quyen Mazariegos APRN CNP        Dose:  35 mg   Inject 0.35 mLs (35 mg) Subcutaneous every 12 hours   Quantity:  60 Syringe   Refills:  3       * Notice:  This list has 2 medication(s) that are the same as other medications prescribed for you. Read the directions carefully, and ask your doctor or other care provider to review them with you.      Stop taking these medicines if you haven't already. Please contact your care team if you have questions.     dexamethasone 2 MG tablet   Commonly known as:  DECADRON   Stopped by:  Quyen Mazariegos APRN CNP                Where to get your medicines      These  medications were sent to [id number] - [place], [place] - [address] [place] [address]  [address], [place] [address]     Phone:  [phone]     loperamide 2 MG capsule         Some of these will need a paper prescription and others can be bought over the counter.  Ask your nurse if you have questions.     Bring a paper prescription for each of these medications     LORazepam 0.5 MG tablet    traMADol 50 MG tablet                Primary Care Provider Office Phone # Fax #    Neri Gipson [phone]       [address] [address]        Equal Access to Services     NESSA NICHOLS : Shaun marsho Sothom, waaxda luqadaha, qaybta kaalmada adetobiyameera, lisa michael. So [facility] [phone].    ATENCIÓN: Si habla español, tiene a nava disposición servicios gratuitos de asistencia lingüística. Llame al [phone].    We comply with applicable federal civil rights laws and [place] laws. We do not discriminate on the basis of race, color, national origin, age, disability, sex, sexual orientation, or gender identity.            Thank you!     Thank you for choosing [facility]  for your care. Our goal is always to provide you with excellent care. Hearing back from our patients is one way we can continue to improve our services. Please take a few minutes to complete the written survey that you may receive in the mail after your visit with us. Thank you!             Your Updated Medication List - Protect others around you: Learn how to safely use, store and throw away your medicines at www.disposemymeds.org.          This list is accurate as of 4/24/18 10:13 AM.  Always use your most recent med list.                   Brand Name Dispense Instructions for use Diagnosis    albuterol 108 (90 Base) MCG/ACT Inhaler    PROAIR HFA/PROVENTIL HFA/VENTOLIN HFA    1 Inhaler    Inhale 2 puffs into the  lungs 4 times daily    Mild asthma, unspecified whether complicated, unspecified whether persistent       amylase-lipase-protease 12761 units Cpep    CREON    180 capsule    Take 2 capsules (72,000 Units) by mouth 3 times daily (with meals)    Malignant neoplasm of head of pancreas (H)       CALCIUM PO      Take by mouth every morning Form/strength unknown. Powder formulation. Add to water and fruits/vegetables daily to promote bone health.        CARTIA  MG 24 hr capsule   Generic drug:  diltiazem     10 capsule    TK 1 C PO QD    Benign essential hypertension       cholecalciferol 1000 UNIT tablet    vitamin D3     Take 1 tablet by mouth 2 times daily        cyanocolbalamin 500 MCG tablet    vitamin  B-12     Take 500 mcg by mouth every morning        * enoxaparin 60 MG/0.6ML injection    LOVENOX     35 mg        * LOVENOX 40 MG/0.4ML injection   Generic drug:  enoxaparin     60 Syringe    Inject 0.35 mLs (35 mg) Subcutaneous every 12 hours    Malignant neoplasm of pancreas, unspecified location of malignancy (H), Other chronic 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 cor pulmonale (H)       fluticasone-vilanterol 100-25 MCG/INH oral inhaler    BREO ELLIPTA    1 Inhaler    Inhale 1 puff into the lungs daily    Cough       folic acid 400 MCG tablet    FOLVITE     Take 1 tablet by mouth every morning        hydrOXYzine 25 MG tablet    ATARAX    10 tablet    Take 1-2 tablets (25-50 mg) by mouth every 6 hours as needed for itching (adjuvant pain)    Obstructive jaundice       levothyroxine 125 MCG tablet    SYNTHROID/LEVOTHROID    10 tablet    Take 1 tablet (125 mcg) by mouth daily    Hypothyroidism, unspecified type       loperamide 2 MG capsule    IMODIUM    60 capsule    2 caps at 1st sign of diarrhea & 1 cap every 2hrs until 12hrs diarrhea free. During night, 2 caps at bedtime & 2 caps every 4hrs until AM    Malignant neoplasm of head of pancreas (H)       LORazepam 0.5 MG tablet    ATIVAN    30 tablet    Take 1  tablet (0.5 mg) by mouth every 4 hours as needed (Anxiety, Nausea/Vomiting or Sleep)    Malignant neoplasm of head of pancreas (H)       nystatin 522337 UNIT/ML suspension    MYCOSTATIN          omeprazole 40 MG capsule    priLOSEC    20 capsule    Take 1 capsule (40 mg) by mouth 2 times daily Take 30-60 minutes before a meal.    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 Please dispense 2 pairs of knee high 20-30 mmHg    Lymphedema       pantoprazole 20 MG EC tablet    PROTONIX    30 tablet    Take 1 tablet (20 mg) by mouth daily    Other acute gastritis without hemorrhage       * prochlorperazine 10 MG tablet    COMPAZINE          * prochlorperazine 5 MG tablet    COMPAZINE    30 tablet    Take 1 tablet (5 mg) by mouth every 6 hours as needed for nausea or vomiting    Malignant neoplasm of head of pancreas (H)       timolol maleate 0.5 % opthalmic solution    ISTALOL    1 Bottle    Place 1 drop into both eyes every morning Before placing contact lenses    Cataract, unspecified cataract type, unspecified laterality       traMADol 50 MG tablet    ULTRAM    60 tablet    Take 1-2 tablets ( mg) by mouth every 4 hours as needed for breakthrough pain (No more than 8 tablets in a day)    Malignant neoplasm of head of pancreas (H)       triamterene-hydrochlorothiazide 37.5-25 MG per tablet    MAXZIDE-25     Take 1 tablet by mouth every morning        TYLENOL PO      Take 325 mg by mouth every 4 hours as needed for mild pain or fever        * Notice:  This list has 8 medication(s) that  are the same as other medications prescribed for you. Read the directions carefully, and ask your doctor or other care provider to review them with you.

## 2018-04-24 NOTE — NURSING NOTE
Chief Complaint   Patient presents with     Port Draw     Labs drawn via port by RN. Line flushed and hep locked. VS taken.     Lisa Mcknight RN

## 2018-04-24 NOTE — LETTER
4/24/2018       RE: Iris Lewis  [address]  [place] [address]     Dear Colleague,    Thank you for referring your patient, Iris Lewis, to the [facility]. Please see a copy of my visit note below.    Reason for Visit: seen in f/u of metastatic pancreatic cancer    Oncology HPI: Iris Lewis is a 78 year old woman with a hx significant for thyroid cancer s/p thyroidectomy in the 1980s, cholelithiasis s/p CCY 2015, GERD s/p Nissen fundoplication in 2006 and HTN. She was dx with metastatic pancreatic cancer involving the liver, lung and lymph nodes in October 2017 after presenting with obstructive jaundice. She met with  an did not qualify for the HALOZYME study. She was initiated on [place]/Abraxane on 11/28/17. Restaging showed a positive response to treatment after 2 cycles. Prior to cycle 4, she developed acute hypoxia and weakness. She was found to have influenza and pneumonia. 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 At f/u with Dr. Yu on 3/26/18, she was found to have progression and recommended to start 5FU plus liposomal irinotecan. She initiated therapy on 3/28/18 and presents for evaluation prior to cycle 3.    Interval history: Iris is feeling great. Energy is better than in a few months. She has been able to resume some house duties and has resumed her sewing and knitting projects. Her appetite is good, but she doesn't enjoy food as much as she used to. Has been purposefully eating more. No N/V. Has occasional sensitivity in the left mouth. Has noted mild worsening of neuropathy in the fingers and toes. Is being careful of how she is walking. Has been working on strengthening and balance with PT. Feels she is doing most of what she had been. No blisters/peeling on the fingertips. Has noted some dryness and fissuring of the fingertips. No bleeding, bruising or rash. No headaches, vision changes or hearing changes. No diarrhea.  Bowel/bladder function wnl.    Current Outpatient Prescriptions   Medication Sig Dispense Refill     Acetaminophen (TYLENOL PO) Take 325 mg by mouth every 4 hours as needed for mild pain or fever       albuterol (PROAIR HFA/PROVENTIL HFA/VENTOLIN HFA) 108 (90 BASE) MCG/ACT Inhaler Inhale 2 puffs into the lungs 4 times daily 1 Inhaler 1     amylase-lipase-protease (CREON) 54508 UNITS CPEP Take 2 capsules (72,000 Units) by mouth 3 times daily (with meals) 180 capsule 3     CALCIUM PO Take by mouth every morning Form/strength unknown. Powder formulation. Add to water and fruits/vegetables daily to promote bone health.        CARTIA  MG 24 hr capsule TK 1 C PO QD 10 capsule 0     cholecalciferol (VITAMIN D) 1000 UNIT tablet Take 1 tablet by mouth 2 times daily        cyanocolbalamin (VITAMIN B-12) 500 MCG tablet Take 500 mcg by mouth every morning        dexamethasone (DECADRON) 2 MG tablet Take 1 tablet (2 mg) by mouth every 12 hours 20 tablet 0     enoxaparin (LOVENOX) 40 MG/0.4ML injection Inject 0.4 mLs (40 mg) Subcutaneous every 12 hours 60 Syringe 3     enoxaparin (LOVENOX) 60 MG/0.6ML injection 35 mg        fluticasone-vilanterol (BREO ELLIPTA) 100-25 MCG/INH oral inhaler Inhale 1 puff into the lungs daily 1 Inhaler 1     folic acid (FOLVITE) 400 MCG tablet Take 1 tablet by mouth every morning        hydrOXYzine (ATARAX) 25 MG tablet Take 1-2 tablets (25-50 mg) by mouth every 6 hours as needed for itching (adjuvant pain) 10 tablet 0     levothyroxine (SYNTHROID/LEVOTHROID) 125 MCG tablet Take 1 tablet (125 mcg) by mouth daily 10 tablet 0     loperamide (IMODIUM) 2 MG capsule 2 caps at 1st sign of diarrhea & 1 cap every 2hrs until 12hrs diarrhea free. During night, 2 caps at bedtime & 2 caps every 4hrs until AM 30 capsule 0     LORazepam (ATIVAN) 0.5 MG tablet Take 1 tablet (0.5 mg) by mouth every 4 hours as needed (Anxiety, Nausea/Vomiting or Sleep) 15 tablet 0     nystatin (MYCOSTATIN) 094257 UNIT/ML  suspension        omeprazole (PRILOSEC) 40 MG capsule Take 1 capsule (40 mg) by mouth 2 times daily Take 30-60 minutes before a meal. 20 capsule 0     ondansetron (ZOFRAN) 4 MG tablet        ondansetron (ZOFRAN) 8 MG tablet Take 1 tablet (8 mg) by mouth every 8 hours as needed (nausea/vomiting) 10 tablet 2     order for DME 1unit being ordered: cranial prosthesis 1 Units 0     order for DME Equipment being ordered: Compression Stockings. Please dispense 2 pairs of knee high 20-30 mmHg 2 Units 3     pantoprazole (PROTONIX) 20 MG EC tablet Take 1 tablet (20 mg) by mouth daily 30 tablet 1     prochlorperazine (COMPAZINE) 10 MG tablet        prochlorperazine (COMPAZINE) 5 MG tablet Take 1 tablet (5 mg) by mouth every 6 hours as needed for nausea or vomiting 30 tablet 0     timolol maleate (ISTALOL) 0.5 % opthalmic solution Place 1 drop into both eyes every morning Before placing contact lenses 1 Bottle 0     traMADol (ULTRAM) 50 MG tablet Take 1-2 tablets ( mg) by mouth every 4 hours as needed for breakthrough pain (No more than 8 tablets in a day) 60 tablet 0     triamterene-hydrochlorothiazide (DYAZIDE) 37.5-25 MG per capsule        triamterene-hydrochlorothiazide (MAXZIDE-25) 37.5-25 MG per tablet Take 1 tablet by mouth every morning             Allergies   Allergen Reactions     Nkda [No Known Drug Allergies]          Exam: alert, appears well. Blood pressure 125/72, pulse 78, temperature 97.4  F (36.3  C), temperature source Oral, resp. rate 18, weight 46.9 kg (103 lb 8 oz), SpO2 99 %, not currently breastfeeding.  Wt Readings from Last 4 Encounters:   04/24/18 46.9 kg (103 lb 8 oz)   04/10/18 45.2 kg (99 lb 9.6 oz)   03/28/18 48.2 kg (106 lb 4.8 oz)   03/26/18 47.1 kg (103 lb 14.4 oz)     Oropharynx is moist, no focal lesion. Wearing a wig. No icterus. Neck supple and without adenopathy. Lungs:CTA. Heart:RRR, no murmur or rub. Abdomen: soft, nontender, BS active. No masses or organomegaly. Extremities: warm,  no edema. Speech clear. CN wnl. Gait/station wnl.    Labs: Results for JONA SHETTY (MRN [id number]) as of 4/24/2018 08:03   Ref. Range 4/24/2018 07:13   Bilirubin Total Latest Ref Range: 0.2 - 1.3 mg/dL 0.3   WBC Latest Ref Range: 4.0 - 11.0 10e9/L 6.5   Hemoglobin Latest Ref Range: 11.7 - 15.7 g/dL 10.2 (L)   Hematocrit Latest Ref Range: 35.0 - 47.0 % 31.7 (L)   Platelet Count Latest Ref Range: 150 - 450 10e9/L 275   RBC Count Latest Ref Range: 3.8 - 5.2 10e12/L 3.18 (L)   MCV Latest Ref Range: 78 - 100 fl 100   MCH Latest Ref Range: 26.5 - 33.0 pg 32.1   MCHC Latest Ref Range: 31.5 - 36.5 g/dL 32.2   RDW Latest Ref Range: 10.0 - 15.0 % 15.1 (H)   Diff Method Unknown Automated Method   % Neutrophils Latest Units: % 43.3   % Lymphocytes Latest Units: % 31.2   % Monocytes Latest Units: % 15.6   % Eosinophils Latest Units: % 8.8   % Basophils Latest Units: % 0.8   % Immature Granulocytes Latest Units: % 0.3   Nucleated RBCs Latest Ref Range: 0 /100 0   Absolute Neutrophil Latest Ref Range: 1.6 - 8.3 10e9/L 2.8   Absolute Lymphocytes Latest Ref Range: 0.8 - 5.3 10e9/L 2.0   Absolute Monocytes Latest Ref Range: 0.0 - 1.3 10e9/L 1.0   Absolute Eosinophils Latest Ref Range: 0.0 - 0.7 10e9/L 0.6   Absolute Basophils Latest Ref Range: 0.0 - 0.2 10e9/L 0.1   Abs Immature Granulocytes Latest Ref Range: 0 - 0.4 10e9/L 0.0   Absolute Nucleated RBC Unknown 0.0   Platelet Estimate Unknown Confirming automa...     Impression/plan:   1. Metastatic pancreatic cancer, on 5FU/liposomal irinotecan  -tolerating treatment very well. Has mild neuropathy, will monitor that and let us know if this is worsening.  -will continue cycle 3 today. Will have restaging scans, labs and f/u with Dr. Yu in June 2. Hx of bilateral PE, dx Dec 2017  -therapeutic on enoxaparin 35 mg bid when checked on 4/12/18    3. FEN:appetite is good, still having some food aversion, but eating more. Weight up a few pounds. Will monitor. Continue small  frequent meals    4. Weakness: Is getting stronger, nearing baseline a few months ago. Working with the cancer rehab program    5. Hx of HTN, off HCTZ, BP wnl.      Again, thank you for allowing me to participate in the care of your patient.      Sincerely,    CAT Block CNP

## 2018-04-24 NOTE — PROGRESS NOTES
"  Infusion Nursing Note:  Iris Lewis presents today for C3 Irinotecan Liposome-Fluorouracil pump.    Patient seen by provider today: Yes: Quyen Mazariegos DNP    Treatment Conditions:  Lab Results   Component Value Date    HGB 10.2 04/24/2018     Lab Results   Component Value Date    WBC 6.5 04/24/2018      Lab Results   Component Value Date    ANEU 2.8 04/24/2018     Lab Results   Component Value Date     04/24/2018          Results reviewed, labs MET treatment parameters, ok to proceed with treatment.    Intravenous Access:  Implanted Port.  Access left intact at time of discharge with pump attached per protocol.  Connections checked by Patricia Starks RN.      Note:   Results reviewed, copy given to patient.  Proceed with treatment.   Prior to discharge: Port is secured in place with tegaderm and flushed with 10cc NS with positive blood return noted.  Continuous home infusion CADD pump connected.    All connectors secured in place and clamps taped open.    Pump started, \"running\" noted on display (CADD): YES.  Patient instructed to call our clinic or [facility] with any questions or concerns at home.  Patient verbalized understanding.    Patient set up for pump disconnect at our clinic on 4/26 @830.    Copy of AVS given to patient.  Tolerated infusion without incident. Loperamide, Ativan, and Tramadol sent to local pharmacy.   D/C in care of spouse.  Pt will return 5/8 for next infusion appointment.       Kelin Pepper RN    "

## 2018-04-24 NOTE — PATIENT INSTRUCTIONS
Contact Numbers  [facility]: [phone]    After Hours:  [phone]  Triage: [phone]    Please call the [facility] Triage line if you experience a temperature greater than or equal to 100.5, shaking chills, have uncontrolled nausea, vomiting and/or diarrhea, dizziness, shortness of breath, chest pain, bleeding, unexplained bruising, or if you have any other new/concerning symptoms, questions or concerns.     If it is after hours, weekends, or holidays, please call the main hospital  at  [phone] and ask to speak to the Oncology doctor on call.     If you are having any concerning symptoms or wish to speak to a provider before your next infusion visit, please call your care coordinator or triage to notify them so we can adequately serve you.     If you need a refill on a narcotic prescription or other medication, please call triage before your infusion appointment.         April 2018 Sunday Monday Tuesday Wednesday Thursday Friday Saturday   1     2     3     CANCER REHAB EVAL   11:00 AM   (60 min.)   Ela Paredes, PT   [facility] 4     5     6     7       8     9     10     [facility] DRAW    9:45 AM   (15 min.)    [facility] DRAW   [facility] Lab Draw     [facility] RETURN   10:05 AM   (50 min.)   Quyen Mazariegos APRN CNP   [facility] ONC INFUSION 180   11:00 AM   (180 min.)    ONCOLOGY INFUSION   [facility] 11     12     [facility] ONC INFUSION 60   12:30 PM   (60 min.)    ONCOLOGY INFUSION   [facility] 13     14       15     16     17     CANCER REHAB TREATMENT    2:00 PM   (45 min.)   Callisto, Ela, PT   [facility] 18     19     20     21       22     23     24     [facility] DRAW    6:30 AM   (15 min.)    [facility] DRAW   [facility] Lab Draw     [facility] RETURN    6:45 AM   (50 min.)   Quyen Mazariegos APRN CNP   [facility]  ONC INFUSION 180    8:00 AM   (180 min.)    ONCOLOGY INFUSION   [facility] 25     26     [facility] ONC INFUSION 60    8:30 AM   (60 min.)    ONCOLOGY INFUSION   [facility] 27     28       29     30                                         May 2018   Don Monday Tuesday Wednesday Thursday Friday Saturday             1     CANCER REHAB TREATMENT   11:15 AM   (45 min.)   CallEla marquez, PT   [facility] 2     3     4     5       6     7     8     [facility] DRAW    8:30 AM   (15 min.)    [facility] DRAW   [facility] Lab Draw     [facility] ONC INFUSION 180    9:00 AM   (180 min.)    ONCOLOGY INFUSION   [facility] 9     [facility] RETURN   10:15 AM   (30 min.)   José Antonio Ann MD   [facility] 10     11     12       13     14     15     CANCER REHAB TREATMENT   11:15 AM   (45 min.)   CallEla marquez, PT   [facility] 16     17     18     19       20     21     22     [facility] DRAW    8:30 AM   (15 min.)   [facility] DRAW   [facility] Lab Draw     [facility] ONC INFUSION 180    9:00 AM   (180 min.)    ONCOLOGY INFUSION   [facility] 23     24     25     26       27     28     29     30     31                           Recent Results (from the past 24 hour(s))   CBC with platelets differential    Collection Time: 04/24/18  7:13 AM   Result Value Ref Range    WBC 6.5 4.0 - 11.0 10e9/L    RBC Count 3.18 (L) 3.8 - 5.2 10e12/L    Hemoglobin 10.2 (L) 11.7 - 15.7 g/dL    Hematocrit 31.7 (L) 35.0 - 47.0 %     78 - 100 fl    MCH 32.1 26.5 - 33.0 pg    MCHC 32.2 31.5 - 36.5 g/dL    RDW 15.1 (H) 10.0 - 15.0 %    Platelet Count 275 150 - 450 10e9/L    Diff Method Automated Method     % Neutrophils 43.3 %    % Lymphocytes 31.2 %    % Monocytes 15.6 %    % Eosinophils 8.8 %    % Basophils 0.8 %    % Immature Granulocytes 0.3 %    Nucleated RBCs 0 0 /100    Absolute  Neutrophil 2.8 1.6 - 8.3 10e9/L    Absolute Lymphocytes 2.0 0.8 - 5.3 10e9/L    Absolute Monocytes 1.0 0.0 - 1.3 10e9/L    Absolute Eosinophils 0.6 0.0 - 0.7 10e9/L    Absolute Basophils 0.1 0.0 - 0.2 10e9/L    Abs Immature Granulocytes 0.0 0 - 0.4 10e9/L    Absolute Nucleated RBC 0.0     Platelet Estimate Confirming automated cell count    Bilirubin  total    Collection Time: 04/24/18  7:13 AM   Result Value Ref Range    Bilirubin Total 0.3 0.2 - 1.3 mg/dL

## 2018-04-24 NOTE — PROGRESS NOTES
Reason for Visit: seen in f/u of metastatic pancreatic cancer    Oncology HPI: Iris Lewis is a 78 year old woman with a hx significant for thyroid cancer s/p thyroidectomy in the 1980s, cholelithiasis s/p CCY 2015, GERD s/p Nissen fundoplication in 2006 and HTN. She was dx with metastatic pancreatic cancer involving the liver, lung and lymph nodes in October 2017 after presenting with obstructive jaundice. She met with  an did not qualify for the HALOZYME study. She was initiated on [place]/Abraxane on 11/28/17. Restaging showed a positive response to treatment after 2 cycles. Prior to cycle 4, she developed acute hypoxia and weakness. She was found to have influenza and pneumonia. 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 At f/u with Dr. Yu on 3/26/18, she was found to have progression and recommended to start 5FU plus liposomal irinotecan. She initiated therapy on 3/28/18 and presents for evaluation prior to cycle 3.    Interval history: Iris is feeling great. Energy is better than in a few months. She has been able to resume some house duties and has resumed her sewing and knitting projects. Her appetite is good, but she doesn't enjoy food as much as she used to. Has been purposefully eating more. No N/V. Has occasional sensitivity in the left mouth. Has noted mild worsening of neuropathy in the fingers and toes. Is being careful of how she is walking. Has been working on strengthening and balance with PT. Feels she is doing most of what she had been. No blisters/peeling on the fingertips. Has noted some dryness and fissuring of the fingertips. No bleeding, bruising or rash. No headaches, vision changes or hearing changes. No diarrhea. Bowel/bladder function wnl.    Current Outpatient Prescriptions   Medication Sig Dispense Refill     Acetaminophen (TYLENOL PO) Take 325 mg by mouth every 4 hours as needed for mild pain or fever       albuterol (PROAIR HFA/PROVENTIL  HFA/VENTOLIN HFA) 108 (90 BASE) MCG/ACT Inhaler Inhale 2 puffs into the lungs 4 times daily 1 Inhaler 1     amylase-lipase-protease (CREON) 04751 UNITS CPEP Take 2 capsules (72,000 Units) by mouth 3 times daily (with meals) 180 capsule 3     CALCIUM PO Take by mouth every morning Form/strength unknown. Powder formulation. Add to water and fruits/vegetables daily to promote bone health.        CARTIA  MG 24 hr capsule TK 1 C PO QD 10 capsule 0     cholecalciferol (VITAMIN D) 1000 UNIT tablet Take 1 tablet by mouth 2 times daily        cyanocolbalamin (VITAMIN B-12) 500 MCG tablet Take 500 mcg by mouth every morning        dexamethasone (DECADRON) 2 MG tablet Take 1 tablet (2 mg) by mouth every 12 hours 20 tablet 0     enoxaparin (LOVENOX) 40 MG/0.4ML injection Inject 0.4 mLs (40 mg) Subcutaneous every 12 hours 60 Syringe 3     enoxaparin (LOVENOX) 60 MG/0.6ML injection 35 mg        fluticasone-vilanterol (BREO ELLIPTA) 100-25 MCG/INH oral inhaler Inhale 1 puff into the lungs daily 1 Inhaler 1     folic acid (FOLVITE) 400 MCG tablet Take 1 tablet by mouth every morning        hydrOXYzine (ATARAX) 25 MG tablet Take 1-2 tablets (25-50 mg) by mouth every 6 hours as needed for itching (adjuvant pain) 10 tablet 0     levothyroxine (SYNTHROID/LEVOTHROID) 125 MCG tablet Take 1 tablet (125 mcg) by mouth daily 10 tablet 0     loperamide (IMODIUM) 2 MG capsule 2 caps at 1st sign of diarrhea & 1 cap every 2hrs until 12hrs diarrhea free. During night, 2 caps at bedtime & 2 caps every 4hrs until AM 30 capsule 0     LORazepam (ATIVAN) 0.5 MG tablet Take 1 tablet (0.5 mg) by mouth every 4 hours as needed (Anxiety, Nausea/Vomiting or Sleep) 15 tablet 0     nystatin (MYCOSTATIN) 113491 UNIT/ML suspension        omeprazole (PRILOSEC) 40 MG capsule Take 1 capsule (40 mg) by mouth 2 times daily Take 30-60 minutes before a meal. 20 capsule 0     ondansetron (ZOFRAN) 4 MG tablet        ondansetron (ZOFRAN) 8 MG tablet Take 1 tablet  (8 mg) by mouth every 8 hours as needed (nausea/vomiting) 10 tablet 2     order for DME 1unit being ordered: cranial prosthesis 1 Units 0     order for DME Equipment being ordered: Compression Stockings. Please dispense 2 pairs of knee high 20-30 mmHg 2 Units 3     pantoprazole (PROTONIX) 20 MG EC tablet Take 1 tablet (20 mg) by mouth daily 30 tablet 1     prochlorperazine (COMPAZINE) 10 MG tablet        prochlorperazine (COMPAZINE) 5 MG tablet Take 1 tablet (5 mg) by mouth every 6 hours as needed for nausea or vomiting 30 tablet 0     timolol maleate (ISTALOL) 0.5 % opthalmic solution Place 1 drop into both eyes every morning Before placing contact lenses 1 Bottle 0     traMADol (ULTRAM) 50 MG tablet Take 1-2 tablets ( mg) by mouth every 4 hours as needed for breakthrough pain (No more than 8 tablets in a day) 60 tablet 0     triamterene-hydrochlorothiazide (DYAZIDE) 37.5-25 MG per capsule        triamterene-hydrochlorothiazide (MAXZIDE-25) 37.5-25 MG per tablet Take 1 tablet by mouth every morning             Allergies   Allergen Reactions     Nkda [No Known Drug Allergies]          Exam: alert, appears well. Blood pressure 125/72, pulse 78, temperature 97.4  F (36.3  C), temperature source Oral, resp. rate 18, weight 46.9 kg (103 lb 8 oz), SpO2 99 %, not currently breastfeeding.  Wt Readings from Last 4 Encounters:   04/24/18 46.9 kg (103 lb 8 oz)   04/10/18 45.2 kg (99 lb 9.6 oz)   03/28/18 48.2 kg (106 lb 4.8 oz)   03/26/18 47.1 kg (103 lb 14.4 oz)     Oropharynx is moist, no focal lesion. Wearing a wig. No icterus. Neck supple and without adenopathy. Lungs:CTA. Heart:RRR, no murmur or rub. Abdomen: soft, nontender, BS active. No masses or organomegaly. Extremities: warm, no edema. Speech clear. CN wnl. Gait/station wnl.    Labs: Results for JONA SHETTY (MRN [id number]) as of 4/24/2018 08:03   Ref. Range 4/24/2018 07:13   Bilirubin Total Latest Ref Range: 0.2 - 1.3 mg/dL 0.3   WBC Latest Ref Range:  4.0 - 11.0 10e9/L 6.5   Hemoglobin Latest Ref Range: 11.7 - 15.7 g/dL 10.2 (L)   Hematocrit Latest Ref Range: 35.0 - 47.0 % 31.7 (L)   Platelet Count Latest Ref Range: 150 - 450 10e9/L 275   RBC Count Latest Ref Range: 3.8 - 5.2 10e12/L 3.18 (L)   MCV Latest Ref Range: 78 - 100 fl 100   MCH Latest Ref Range: 26.5 - 33.0 pg 32.1   MCHC Latest Ref Range: 31.5 - 36.5 g/dL 32.2   RDW Latest Ref Range: 10.0 - 15.0 % 15.1 (H)   Diff Method Unknown Automated Method   % Neutrophils Latest Units: % 43.3   % Lymphocytes Latest Units: % 31.2   % Monocytes Latest Units: % 15.6   % Eosinophils Latest Units: % 8.8   % Basophils Latest Units: % 0.8   % Immature Granulocytes Latest Units: % 0.3   Nucleated RBCs Latest Ref Range: 0 /100 0   Absolute Neutrophil Latest Ref Range: 1.6 - 8.3 10e9/L 2.8   Absolute Lymphocytes Latest Ref Range: 0.8 - 5.3 10e9/L 2.0   Absolute Monocytes Latest Ref Range: 0.0 - 1.3 10e9/L 1.0   Absolute Eosinophils Latest Ref Range: 0.0 - 0.7 10e9/L 0.6   Absolute Basophils Latest Ref Range: 0.0 - 0.2 10e9/L 0.1   Abs Immature Granulocytes Latest Ref Range: 0 - 0.4 10e9/L 0.0   Absolute Nucleated RBC Unknown 0.0   Platelet Estimate Unknown Confirming automa...     Impression/plan:   1. Metastatic pancreatic cancer, on 5FU/liposomal irinotecan  -tolerating treatment very well. Has mild neuropathy, will monitor that and let us know if this is worsening.  -will continue cycle 3 today. Will have restaging scans, labs and f/u with Dr. Yu in June    2. Hx of bilateral PE, dx Dec 2017  -therapeutic on enoxaparin 35 mg bid when checked on 4/12/18    3. FEN:appetite is good, still having some food aversion, but eating more. Weight up a few pounds. Will monitor. Continue small frequent meals    4. Weakness: Is getting stronger, nearing baseline a few months ago. Working with the cancer rehab program    5. Hx of HTN, off HCTZ, BP wnl.

## 2018-04-24 NOTE — NURSING NOTE
"Oncology Rooming Note    April 24, 2018 7:34 AM   Iris Lewis is a 78 year old female who presents for:    Chief Complaint   Patient presents with     Port Draw     Labs drawn via port by RN. Line flushed and hep locked. VS taken.     Oncology Clinic Visit     F/U Adenocarcinoma     Initial Vitals: /72 (BP Location: Right arm, Patient Position: Sitting, Cuff Size: Adult Regular)  Pulse 78  Temp 97.4  F (36.3  C) (Oral)  Resp 18  Ht 1.575 m (5' 2.01\")  Wt 46.9 kg (103 lb 8 oz)  SpO2 99%  BMI 18.93 kg/m2 Estimated body mass index is 18.93 kg/(m^2) as calculated from the following:    Height as of this encounter: 1.575 m (5' 2.01\").    Weight as of this encounter: 46.9 kg (103 lb 8 oz). Body surface area is 1.43 meters squared.  No Pain (0) Comment: Data Unavailable   No LMP recorded. Patient is postmenopausal.  Allergies reviewed: Yes  Medications reviewed: Yes    Medications: MEDICATION REFILLS NEEDED TODAY. Provider was notified.  Pharmacy name entered into [employer]:    [facility] - [place], [place] - [address] [facility] [address] [facility] - [place] [address] AT [facility] [address]    Clinical concerns: none Quyen was NOT notified.    10 minutes for nursing intake (face to face time)     RHEA ROD LPN            "

## 2018-04-26 NOTE — PATIENT INSTRUCTIONS
Contact Numbers    [facility] [place]: [phone]  [facility] [place]:  [phone]    Call triage with chills and/or temperature greater than or equal to 100.5, uncontrolled nausea/vomiting, diarrhea, constipation, dizziness, shortness of breath, chest pain, bleeding, unexplained bruising, or any new/concerning symptoms, questions/concerns.     If you are having any concerning symptoms or wish to speak to a provider before your next infusion visit, please call your care coordinator or triage to notify them so we can adequately serve you.       After Hours: [phone]    If after hours, weekends, or holidays, call main hospital  and ask for Oncology doctor on call.           April 2018 Sunday Monday Tuesday Wednesday Thursday Friday Saturday   1     2     3     CANCER REHAB EVAL   11:00 AM   (60 min.)   Ela Paredes, PT   [facility] 4     5     6     7       8     9     10     [facility]    9:45 AM   (15 min.)   [facility]   [facility] Lab Draw     [facility] RETURN   10:05 AM   (50 min.)   Quyen Mazariegos APRN CNP   [facility]     [facility] ONC INFUSION 180   11:00 AM   (180 min.)   UC ONCOLOGY INFUSION   [facility] 11     12     [facility] ONC INFUSION 60   12:30 PM   (60 min.)   [facility] ONCOLOGY INFUSION   [facility] 13     14       15     16     17     CANCER REHAB TREATMENT    2:00 PM   (45 min.)   Callisto, Ela, PT   [facility] 18     19     20     21       22     23     24     [facility]    6:30 AM   (15 min.)   [facility]   [facility] Lab Draw     [facility] RETURN    6:45 AM   (50 min.)   Quyen Mazariegos APRN CNP   [facility]     [facility] ONC INFUSION 180    8:00 AM   (180 min.)   [facility] ONCOLOGY INFUSION   [facility] 25     26     [facility] ONC INFUSION 60    8:30 AM   (60 min.)   [facility] ONCOLOGY INFUSION   [facility] 27     28        29     30                                         May 2018   Don Monday Tuesday Wednesday Thursday Friday Saturday             1     CANCER REHAB TREATMENT   11:15 AM   (45 min.)   Ela Paredes, PT   [facility] 2     3     4     5       6     7     8     [facility] DRAW    8:30 AM   (15 min.)    [facility] DRAW   [facility] Lab Draw     [facility] ONC INFUSION 180    9:00 AM   (180 min.)    ONCOLOGY INFUSION   [facility] 9     UMP RETURN   10:15 AM   (30 min.)   José Antonio Ann MD   [facility] 10     11     12       13     14     15     CANCER REHAB TREATMENT   11:15 AM   (45 min.)   Ela Paredes, PT   [facility] 16     17     18     19       20     21     22     [facility] DRAW    8:30 AM   (15 min.)    [facility] DRAW   [facility] Lab Draw     P ONC INFUSION 180    9:00 AM   (180 min.)    ONCOLOGY INFUSION   [facility] 23     24     25     26       27     28     29     30     31

## 2018-04-26 NOTE — MR AVS SNAPSHOT
After Visit Summary   4/26/2018    Iris Lewis    MRN: [id number]           Patient Information     Date Of Birth          1940        Visit Information        Provider Department      4/26/2018 8:30 AM [facility] 17 ATC; [facility] ONCOLOGY INFUSION [facility]        Today's Diagnoses     Malignant neoplasm of head of pancreas (H)    -  1      Care Instructions    Contact Numbers    [facility] [place]: [phone]  [facility] Triage:  [phone]    Call triage with chills and/or temperature greater than or equal to 100.5, uncontrolled nausea/vomiting, diarrhea, constipation, dizziness, shortness of breath, chest pain, bleeding, unexplained bruising, or any new/concerning symptoms, questions/concerns.     If you are having any concerning symptoms or wish to speak to a provider before your next infusion visit, please call your care coordinator or triage to notify them so we can adequately serve you.       After Hours: [phone]    If after hours, weekends, or holidays, call main hospital  and ask for Oncology doctor on call.           April 2018 Sunday Monday Tuesday Wednesday Thursday Friday Saturday   1     2     3     CANCER REHAB EVAL   11:00 AM   (60 min.)   Callisto, Ela, PT   [facility] 4     5     6     7       8     9     10     [facility] DRAW    9:45 AM   (15 min.)    [facility] LAB DRAW   [facility] Lab Draw     [facility] RETURN   10:05 AM   (50 min.)   Quyen Mazariegos, CAT CNP   [facility]     [facility] ONC INFUSION 180   11:00 AM   (180 min.)   [facility] ONCOLOGY INFUSION   [facility] 11     12     [facility] ONC INFUSION 60   12:30 PM   (60 min.)   [facility] ONCOLOGY INFUSION   [facility] 13     14       15     16     17     CANCER REHAB TREATMENT    2:00 PM   (45 min.)   Ela Paredes, PT   [facility] 18     19     20     21       22     23     24     [facility] D[facility]    6:30 AM    (15 min.)    [facility] LAB DRAW   [facility] Draw     UMP RETURN    6:45 AM   (50 min.)   Quyen Mazariegos APRN CNP   [facility]     [facility] ONC INFUSION 180    8:00 AM   (180 min.)   UC ONCOLOGY INFUSION   [facility] 25     26     [facility] ONC INFUSION 60    8:30 AM   (60 min.)   UC ONCOLOGY INFUSION   [facility] 27     28       29     30                                         May 2018   Don Monday Tuesday Wednesday Thursday Friday Saturday             1     CANCER REHAB TREATMENT   11:15 AM   (45 min.)   Ela Paredes, PT   [facility] 2     3     4     5       6     7     8     [facility] D[facility]    8:30 AM   (15 min.)    [facility] LAB DRAW   [facility] Draw     [facility] ONC INFUSION 180    9:00 AM   (180 min.)   UC ONCOLOGY INFUSION   [facility] 9     UMP RETURN   10:15 AM   (30 min.)   José Antonio Ann MD   [facility] 10     11     12       13     14     15     CANCER REHAB TREATMENT   11:15 AM   (45 min.)   Ela Paredes, PT   [facility] 16     17     18     19       20     21     22     [facility] DRAW    8:30 AM   (15 min.)    [facility] DRAW   [facility] Draw     [facility] ONC INFUSION 180    9:00 AM   (180 min.)   UC ONCOLOGY INFUSION   [facility] 23     24     25     26       27     28     29     30     31                              Follow-ups after your visit        Your next 10 appointments already scheduled     May 01, 2018 11:15 AM CDT   Cancer Rehab Treatment with Ela Paredes, PT   [facility] [place]    [address] [facility] [address]   [phone]            May 08, 2018  8:30 AM CDT   [facility] Lab Draw with  [facility] DRAW   [facility] Draw ([facility])    [address]  [address] [place] [address]  [address]   [phone]            May 08, 2018  9:00 AM CDT   Infusion 180 with UC ONCOLOGY INFUSION, UC 24 ATC   [facility] ([facility])    [address]  [address]  [facility] [address]   [phone]            May 09, 2018 10:30 AM CDT   (Arrive by 10:15 AM)   Return Visit with José Antonio Ann MD   [facility] ([facility])    [address]  [address]  [facility] [address]   [phone]            May 15, 2018 11:15 AM CDT   Cancer Rehab Treatment with Ela Paredes PT   [facility] ([facility])    [address] [facility] [address]   [phone]            May 22, 2018  8:30 AM CDT   [facility] Lab Draw with [facility] DRAW   [facility] Lab Draw ([facility])    [address] [place] [address]  [address]  [facility] [address]   [phone]            May 22, 2018  9:00 AM CDT   Infusion 180 with UC ONCOLOGY INFUSION, UC 24 ATC   [facility] ([facility])    [address] [place] [address]  [address]  [facility] [address]   [phone]            Jun 01, 2018  9:40 AM CDT   (Arrive by 9:25 AM)   CT CHEST ABDOMEN PELVIS W/O & W CONTRAST with UCCT2   [facility] [place] [facility] ([facility])    [address] [place] [address]  1st Floor  [facility] [address]   [phone]           Please bring any scans or X-rays taken at other hospitals, if similar tests were done. Also bring a list of your medicines, including vitamins, minerals and over-the-counter drugs. It is safest to leave personal items at home.  Be sure to tell your doctor:   If you have any allergies.   If there s any chance you are pregnant.   If you are breastfeeding.  How to prepare:   Do not eat or drink for 2 hours before your exam. If you need to take medicine, you may take it with small sips  of water. (We may ask you to take liquid medicine as well.)   Please wear loose clothing, such as a sweat suit or jogging clothes. Avoid snaps, zippers and other metal. We may ask you to undress and put on a hospital gown.  Please arrive 30 minutes early for your CT. Once in the department you might be asked to drink water 15-20 minutes prior to your exam.  If indicated you may be asked to drink an oral contrast in advance of your CT.  If this is the case, the imaging team will let you know or be in contact with you prior to your appointment  Patients over 70 or patients with diabetes or kidney problems:   If you haven t had a blood test (creatinine test) within the last 30 days, the Cardiologist/Radiologist may require you to get this test prior to your exam.  If you have diabetes:   Continue to take your metformin medication on the day of your exam  If you have any questions, please call the Imaging Department where you will have your exam.              Who to contact     If you have questions or need follow up information about today's clinic visit or your schedule please contact [facility] directly at [phone].  Normal or non-critical lab and imaging results will be communicated to you by [employer], letter or phone within 4 business days after the clinic has received the results. If you do not hear from us within 7 days, please contact the clinic through [employer] or phone. If you have a critical or abnormal lab result, we will notify you by phone as soon as possible.  Submit refill requests through [employer] or call your pharmacy and they will forward the refill request to us. Please allow 3 business days for your refill to be completed.          Additional Information About Your Visit        [employer] Information     [employer] gives you secure access to your electronic health record. If you see a primary care provider, you can also send messages to your care team and make appointments. If you  have questions, please call your primary care clinic.  If you do not have a primary care provider, please call [phone] and they will assist you.        Care EveryWhere ID     This is your Care EveryWhere ID. This could be used by other organizations to access your [facility] medical records  [id number]        Your Vitals Were     Pulse Temperature Respirations Pulse Oximetry          72 98  F (36.7  C) (Oral) 18 100%         Blood Pressure from Last 3 Encounters:   04/26/18 110/61   04/24/18 125/72   04/12/18 132/54    Weight from Last 3 Encounters:   04/24/18 46.9 kg (103 lb 8 oz)   04/10/18 45.2 kg (99 lb 9.6 oz)   03/28/18 48.2 kg (106 lb 4.8 oz)              Today, you had the following     No orders found for display       Primary Care Provider Office Phone # Fax #    Neri Gipson [phone]       [address] [address]        Equal Access to Services     NATASHA [facility] : Hadii aad ku hadasho Soomaali, waaxda luqadaha, qaybta kaalmada adeegyada, lisa mensah hayjeanne adams . So [facility] [phone].    ATENCIÓN: Si habla español, tiene a nava disposición servicios gratuitos de asistencia lingüística. Ll[facility] [phone].    We comply with applicable federal civil rights laws and [place] laws. We do not discriminate on the basis of race, color, national origin, age, disability, sex, sexual orientation, or gender identity.            Thank you!     Thank you for choosing [facility]  for your care. Our goal is always to provide you with excellent care. Hearing back from our patients is one way we can continue to improve our services. Please take a few minutes to complete the written survey that you may receive in the mail after your visit with us. Thank you!             Your Updated Medication List - Protect others around you: Learn how to safely use, store and throw away your medicines at www.disposemymeds.org.          This list is  accurate as of 4/26/18  9:16 AM.  Always use your most recent med list.                   Brand Name Dispense Instructions for use Diagnosis    albuterol 108 (90 Base) MCG/ACT Inhaler    PROAIR HFA/PROVENTIL HFA/VENTOLIN HFA    1 Inhaler    Inhale 2 puffs into the lungs 4 times daily    Mild asthma, unspecified whether complicated, unspecified whether persistent       amylase-lipase-protease 80809 units Cpep    CREON    180 capsule    Take 2 capsules (72,000 Units) by mouth 3 times daily (with meals)    Malignant neoplasm of head of pancreas (H)       CALCIUM PO      Take by mouth every morning Form/strength unknown. Powder formulation. Add to water and fruits/vegetables daily to promote bone health.        CARTIA  MG 24 hr capsule   Generic drug:  diltiazem     10 capsule    TK 1 C PO QD    Benign essential hypertension       cholecalciferol 1000 UNIT tablet    vitamin D3     Take 1 tablet by mouth 2 times daily        cyanocolbalamin 500 MCG tablet    vitamin  B-12     Take 500 mcg by mouth every morning        * enoxaparin 60 MG/0.6ML injection    LOVENOX     35 mg        * LOVENOX 40 MG/0.4ML injection   Generic drug:  enoxaparin     60 Syringe    Inject 0.35 mLs (35 mg) Subcutaneous every 12 hours    Malignant neoplasm of pancreas, unspecified location of malignancy (H), Other chronic 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 cor pulmonale (H)       fluticasone-vilanterol 100-25 MCG/INH oral inhaler    BREO ELLIPTA    1 Inhaler    Inhale 1 puff into the lungs daily    Cough       folic acid 400 MCG tablet    FOLVITE     Take 1 tablet by mouth every morning        hydrOXYzine 25 MG tablet    ATARAX    10 tablet    Take 1-2 tablets (25-50 mg) by mouth every 6 hours as needed for itching (adjuvant pain)    Obstructive jaundice       levothyroxine 125 MCG tablet    SYNTHROID/LEVOTHROID    10 tablet    Take 1 tablet (125 mcg) by mouth daily    Hypothyroidism, unspecified type       loperamide 2 MG capsule    IMODIUM     60 capsule    2 caps at 1st sign of diarrhea & 1 cap every 2hrs until 12hrs diarrhea free. During night, 2 caps at bedtime & 2 caps every 4hrs until AM    Malignant neoplasm of head of pancreas (H)       LORazepam 0.5 MG tablet    ATIVAN    30 tablet    Take 1 tablet (0.5 mg) by mouth every 4 hours as needed (Anxiety, Nausea/Vomiting or Sleep)    Malignant neoplasm of head of pancreas (H)       nystatin 952873 UNIT/ML suspension    MYCOSTATIN          omeprazole 40 MG capsule    priLOSEC    20 capsule    Take 1 capsule (40 mg) by mouth 2 times daily Take 30-60 minutes before a meal.    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 Please dispense 2 pairs of knee high 20-30 mmHg    Lymphedema       pantoprazole 20 MG EC tablet    PROTONIX    30 tablet    Take 1 tablet (20 mg) by mouth daily    Other acute gastritis without hemorrhage       * prochlorperazine 10 MG tablet    COMPAZINE          * prochlorperazine 5 MG tablet    COMPAZINE    30 tablet    Take 1 tablet (5 mg) by mouth every 6 hours as needed for nausea or vomiting    Malignant neoplasm of head of pancreas (H)       timolol maleate 0.5 % opthalmic solution    ISTALOL    1 Bottle    Place 1 drop into both eyes every morning Before placing contact lenses    Cataract, unspecified cataract type, unspecified laterality       traMADol 50 MG tablet    ULTRAM    60 tablet    Take 1-2 tablets ( mg) by mouth every 4 hours as needed for breakthrough pain (No more than 8 tablets in a day)    Malignant neoplasm of head of pancreas (H)        triamterene-hydrochlorothiazide 37.5-25 MG per tablet    MAXZIDE-25     Take 1 tablet by mouth every morning        TYLENOL PO      Take 325 mg by mouth every 4 hours as needed for mild pain or fever        * Notice:  This list has 8 medication(s) that are the same as other medications prescribed for you. Read the directions carefully, and ask your doctor or other care provider to review them with you.

## 2018-04-26 NOTE — PROGRESS NOTES
"Infusion Nursing Note:  Iris Lewis presents today for Fluorouracil pump disconnect.    Patient seen by provider today: No   present during visit today: Not Applicable.    Note: Patient arrived to infusion center with complaints of some mouth sores that she's been using salt rinses, unchanged baseline numbness and tingling to bilateral fingertips, intermittent constipation. Denies complaints of dizziness, pain, and nausea at this time.    Fluorouracil pump disconnected -- see doc flowsheets for details. Pump volume read \"0\" and bag empty.    Intravenous Access:  Implanted Port.    Treatment Conditions:  Not Applicable.      Post Infusion Assessment:  Patient tolerated infusion without incident.  Blood return noted post infusion.  Site patent and intact, free from redness, edema or discomfort.  No evidence of extravasations.  Access discontinued per protocol.    Discharge Plan:   Patient declined prescription refills.  Discharge instructions reviewed with: Patient.  Patient and/or family verbalized understanding of discharge instructions and all questions answered.  AVS to patient via [employer].   Patient discharged in stable condition accompanied by: self.  Departure Mode: Ambulatory.    Rene Ramon RN  "

## 2018-04-29 NOTE — TELEPHONE ENCOUNTER
levothyroxine (SYNTHROID/LEVOTHROID) 125 MCG tablet  Last Written Prescription Date:  2/27/18  Last Fill Quantity: 10,   # refills: 0  Last Office Visit :1/22/18  Future Office visit:  None      Routing because: tsh  Last rf #10 ?

## 2018-05-08 NOTE — MR AVS SNAPSHOT
After Visit Summary   5/8/2018    Iris Lewis    MRN: [id number]           Patient Information     Date Of Birth          1940        Visit Information        Provider Department      5/8/2018 9:00 AM UC 24 ATC;  ONCOLOGY INFUSION [facility]        Today's Diagnoses     Malignant neoplasm of head of pancreas (H)    -  1      Care Instructions    Contact Numbers    [facility] Main Line: [phone]  [facility] Triage:  [phone]    Call triage with chills and/or temperature greater than or equal to 100.5, uncontrolled nausea/vomiting, diarrhea, constipation, dizziness, shortness of breath, chest pain, bleeding, unexplained bruising, or any new/concerning symptoms, questions/concerns.     If you are having any concerning symptoms or wish to speak to a provider before your next infusion visit, please call your care coordinator or triage to notify them so we can adequately serve you.       After Hours: [phone]    If after hours, weekends, or holidays, call main hospital  and ask for Oncology doctor on call.           May 2018   Don Monday Tuesday Wednesday Thursday Friday Saturday             1     2     3     4     5       6     7     8     P [facility] DRAW    8:30 AM   (15 min.)    [facility] LAB DRAW   [facility] Lab Draw     UM ONC INFUSION 180    9:00 AM   (180 min.)    ONCOLOGY INFUSION   [facility] 9     UMP RETURN   10:15 AM   (30 min.)   José Antonio Ann MD   [facility] 10     11     12       13     14     15     CANCER REHAB TREATMENT   11:15 AM   (45 min.)   Ela Paredes PT   [facility] 16     17     18     19       20     21     22     [facility] DRAW    8:30 AM   (15 min.)    [facility] DRAW   [facility]     [facility] ONC INFUSION 180    9:00 AM   (180 min.)    ONCOLOGY INFUSION   [facility] 23     24     25     26       27     28      29 30 31 June 2018 Sunday Monday Tuesday Wednesday Thursday Friday Saturday                            1     CT CHEST ABDOMEN PELVIS WWO    9:40 AM   (20 min.)   UCCT2   [facility] CT 2       3     4     [facility] DRAW   10:15 AM   (15 min.)    [facility] LAB DRAW   [facility] Lab Draw     UMP RETURN   10:30 AM   (30 min.)   Vinny Yu MD   [facility] ONC INFUSION 180   12:30 PM   (180 min.)    ONCOLOGY INFUSION   [facility] 5     6     7     8     9       10     11     12     13     14     15     16       17     18     19     20     21     22     23       24     25     26     27     28     29     30                  Lab Results:  Recent Results (from the past 12 hour(s))   CBC with platelets differential    Collection Time: 05/08/18  9:17 AM   Result Value Ref Range    WBC 6.7 4.0 - 11.0 10e9/L    RBC Count 3.27 (L) 3.8 - 5.2 10e12/L    Hemoglobin 10.2 (L) 11.7 - 15.7 g/dL    Hematocrit 31.5 (L) 35.0 - 47.0 %    MCV 96 78 - 100 fl    MCH 31.2 26.5 - 33.0 pg    MCHC 32.4 31.5 - 36.5 g/dL    RDW 15.8 (H) 10.0 - 15.0 %    Platelet Count 271 150 - 450 10e9/L    Diff Method Automated Method     % Neutrophils 51.4 %    % Lymphocytes 22.1 %    % Monocytes 11.3 %    % Eosinophils 14.0 %    % Basophils 0.9 %    % Immature Granulocytes 0.3 %    Nucleated RBCs 0 0 /100    Absolute Neutrophil 3.4 1.6 - 8.3 10e9/L    Absolute Lymphocytes 1.5 0.8 - 5.3 10e9/L    Absolute Monocytes 0.8 0.0 - 1.3 10e9/L    Absolute Eosinophils 0.9 (H) 0.0 - 0.7 10e9/L    Absolute Basophils 0.1 0.0 - 0.2 10e9/L    Abs Immature Granulocytes 0.0 0 - 0.4 10e9/L    Absolute Nucleated RBC 0.0    Comprehensive metabolic panel    Collection Time: 05/08/18  9:17 AM   Result Value Ref Range    Sodium 137 133 - 144 mmol/L    Potassium 3.6 3.4 - 5.3 mmol/L    Chloride 105 94 - 109 mmol/L    Carbon Dioxide 26 20 - 32 mmol/L    Anion Gap 6 3  - 14 mmol/L    Glucose 78 70 - 99 mg/dL    Urea Nitrogen 10 7 - 30 mg/dL    Creatinine 0.64 0.52 - 1.04 mg/dL    GFR Estimate >90 >60 mL/min/1.7m2    GFR Estimate If Black >90 >60 mL/min/1.7m2    Calcium 8.5 8.5 - 10.1 mg/dL    Bilirubin Total 0.4 0.2 - 1.3 mg/dL    Albumin 2.7 (L) 3.4 - 5.0 g/dL    Protein Total 6.2 (L) 6.8 - 8.8 g/dL    Alkaline Phosphatase 77 40 - 150 U/L    ALT 10 0 - 50 U/L    AST 12 0 - 45 U/L               Follow-ups after your visit        Your next 10 appointments already scheduled     May 09, 2018 10:30 AM CDT   (Arrive by 10:15 AM)   Return Visit with José Antonio Ann MD   [facility] ([facility])    [address] [place] [address]  [address]  [facility] [id number] [phone]            May 15, 2018 11:15 AM CDT   Cancer Rehab Treatment with Ela Paredes, SANJANA   [facility] ([facility])    [address] [facility] [id number] [phone]            May 22, 2018  8:30 AM CDT   [facility] Lab Draw with  [facility] DRAW   [facility] Lab Draw ([facility])    [address] [place] [address]  [address]  [facility] [id number] [phone]            May 22, 2018  9:00 AM CDT   Infusion 180 with  ONCOLOGY INFUSION, UC 24 ATC   [facility] ([facility])    [address]  [address]  [facility] [address] [phone]            Jun 01, 2018  9:40 AM CDT   CT CHEST ABDOMEN PELVIS W/O & W CONTRAST with UCCT2   [facility] [place] CT ([facility])    [address] [place] [address]  1st Floor  [facility] [address] [phone]           Please bring any scans or X-rays taken at other hospitals, if similar tests were done. Also bring a list of your medicines, including vitamins, minerals and over-the-counter drugs. It is safest to leave personal items at home.  Be sure to tell your doctor:   If you  have any allergies.   If there s any chance you are pregnant.   If you are breastfeeding.  How to prepare:   Do not eat or drink for 2 hours before your exam. If you need to take medicine, you may take it with small sips of water. (We may ask you to take liquid medicine as well.)   Please wear loose clothing, such as a sweat suit or jogging clothes. Avoid snaps, zippers and other metal. We may ask you to undress and put on a hospital gown.  Please arrive 30 minutes early for your CT. Once in the department you might be asked to drink water 15-20 minutes prior to your exam.  If indicated you may be asked to drink an oral contrast in advance of your CT.  If this is the case, the imaging team will let you know or be in contact with you prior to your appointment  Patients over 70 or patients with diabetes or kidney problems:   If you haven t had a blood test (creatinine test) within the last 30 days, the Cardiologist/Radiologist may require you to get this test prior to your exam.  If you have diabetes:   Continue to take your metformin medication on the day of your exam  If you have any questions, please call the Imaging Department where you will have your exam.            Jun 04, 2018 10:15 AM CDT   [facility] Lab Draw with  [facility] LAB DRAW   [facility] Lab Draw ([facility])    [address] [place] [address]  [address]  [facility] [address]   [phone]            Jun 04, 2018 10:45 AM CDT   (Arrive by 10:30 AM)   Return Visit with Vinny Yu MD   [facility] ([facility])    [address] [place] [address]  [address]  [facility] [address]   [phone]            Jun 04, 2018 12:30 PM CDT   Infusion 180 with  ONCOLOGY INFUSION, UC 30 ATC   [facility] ([facility])    [address] [place] [address]  [address]  [facility] [address]   [phone]              Who to contact     If you have questions or  need follow up information about today's clinic visit or your schedule please contact [facility] directly at [phone].  Normal or non-critical lab and imaging results will be communicated to you by [employer], letter or phone within 4 business days after the clinic has received the results. If you do not hear from us within 7 days, please contact the clinic through [employer] or phone. If you have a critical or abnormal lab result, we will notify you by phone as soon as possible.  Submit refill requests through [employer] or call your pharmacy and they will forward the refill request to us. Please allow 3 business days for your refill to be completed.          Additional Information About Your Visit        [employer] Information     [employer] gives you secure access to your electronic health record. If you see a primary care provider, you can also send messages to your care team and make appointments. If you have questions, please call your primary care clinic.  If you do not have a primary care provider, please call [phone] and they will assist you.        Care EveryWhere ID     This is your Care EveryWhere ID. This could be used by other organizations to access your [facility] medical records  [id number]        Your Vitals Were     Pulse Temperature Respirations Pulse Oximetry BMI (Body Mass Index)       63 97.6  F (36.4  C) 18 97% 18.16 kg/m2        Blood Pressure from Last 3 Encounters:   05/08/18 127/69   04/26/18 110/61   04/24/18 125/72    Weight from Last 3 Encounters:   05/08/18 45 kg (99 lb 4.8 oz)   04/24/18 46.9 kg (103 lb 8 oz)   04/10/18 45.2 kg (99 lb 9.6 oz)              We Performed the Following     CBC with platelets differential     Comprehensive metabolic panel        Primary Care Provider Office Phone # Fax #    Neri Gipson [phone]       [address] [address]        Equal Access to Services     NESSA NICHOLS AH: Shaun wallace  Blaneali, lawrenceda luqadaha, qacheyta kacam correia, lisa shresthasamira fernanda. So [facility] [phone].    ATENCIÓN: Si shahbaz alfaro, tiene a nava disposición servicios gratuitos de asistencia lingüística. Indu al [phone].    We comply with applicable federal civil rights laws and [place] laws. We do not discriminate on the basis of race, color, national origin, age, disability, sex, sexual orientation, or gender identity.            Thank you!     Thank you for choosing [facility]  for your care. Our goal is always to provide you with excellent care. Hearing back from our patients is one way we can continue to improve our services. Please take a few minutes to complete the written survey that you may receive in the mail after your visit with us. Thank you!             Your Updated Medication List - Protect others around you: Learn how to safely use, store and throw away your medicines at www.disposemymeds.org.          This list is accurate as of 5/8/18 11:50 AM.  Always use your most recent med list.                   Brand Name Dispense Instructions for use Diagnosis    albuterol 108 (90 Base) MCG/ACT Inhaler    PROAIR HFA/PROVENTIL HFA/VENTOLIN HFA    1 Inhaler    Inhale 2 puffs into the lungs 4 times daily    Mild asthma, unspecified whether complicated, unspecified whether persistent       amylase-lipase-protease 65742 units Cpep    CREON    180 capsule    Take 2 capsules (72,000 Units) by mouth 3 times daily (with meals)    Malignant neoplasm of head of pancreas (H)       CALCIUM PO      Take by mouth every morning Form/strength unknown. Powder formulation. Add to water and fruits/vegetables daily to promote bone health.        CARTIA  MG 24 hr capsule   Generic drug:  diltiazem     10 capsule    TK 1 C PO QD    Benign essential hypertension       cholecalciferol 1000 UNIT tablet    vitamin D3     Take 1 tablet by mouth 2 times daily        cyanocolbalamin 500  MCG tablet    vitamin  B-12     Take 500 mcg by mouth every morning        * enoxaparin 60 MG/0.6ML injection    LOVENOX     35 mg        * LOVENOX 40 MG/0.4ML injection   Generic drug:  enoxaparin     60 Syringe    Inject 0.35 mLs (35 mg) Subcutaneous every 12 hours    Malignant neoplasm of pancreas, unspecified location of malignancy (H), Other chronic 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 cor pulmonale (H)       fluticasone-vilanterol 100-25 MCG/INH oral inhaler    BREO ELLIPTA    1 Inhaler    Inhale 1 puff into the lungs daily    Cough       folic acid 400 MCG tablet    FOLVITE     Take 1 tablet by mouth every morning        hydrOXYzine 25 MG tablet    ATARAX    10 tablet    Take 1-2 tablets (25-50 mg) by mouth every 6 hours as needed for itching (adjuvant pain)    Obstructive jaundice       levothyroxine 125 MCG tablet    SYNTHROID/LEVOTHROID    90 tablet    Take 1 tablet (125 mcg) by mouth daily    Hypothyroidism, unspecified type       loperamide 2 MG capsule    IMODIUM    60 capsule    2 caps at 1st sign of diarrhea & 1 cap every 2hrs until 12hrs diarrhea free. During night, 2 caps at bedtime & 2 caps every 4hrs until AM    Malignant neoplasm of head of pancreas (H)       LORazepam 0.5 MG tablet    ATIVAN    30 tablet    Take 1 tablet (0.5 mg) by mouth every 4 hours as needed (Anxiety, Nausea/Vomiting or Sleep)    Malignant neoplasm of head of pancreas (H)       nystatin 748511 UNIT/ML suspension    MYCOSTATIN          omeprazole 40 MG capsule    priLOSEC    20 capsule    Take 1 capsule (40 mg) by mouth 2 times daily Take 30-60 minutes before a meal.    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 Please dispense 2 pairs of knee high 20-30 mmHg    Lymphedema       pantoprazole 20 MG EC tablet    PROTONIX    30 tablet    Take 1 tablet (20 mg) by mouth daily    Other acute gastritis without hemorrhage       * prochlorperazine 10 MG tablet    COMPAZINE          * prochlorperazine 5 MG tablet    COMPAZINE    30 tablet    Take 1 tablet (5 mg) by mouth every 6 hours as needed for nausea or vomiting    Malignant neoplasm of head of pancreas (H)       timolol maleate 0.5 % opthalmic solution    ISTALOL    1 Bottle    Place 1 drop into both eyes every morning Before placing contact lenses    Cataract, unspecified cataract type, unspecified laterality       traMADol 50 MG tablet    ULTRAM    60 tablet    Take 1-2 tablets ( mg) by mouth every 4 hours as needed for breakthrough pain (No more than 8 tablets in a day)    Malignant neoplasm of head of pancreas (H)       triamterene-hydrochlorothiazide 37.5-25 MG per tablet    MAXZIDE-25     Take 1 tablet by mouth every morning        TYLENOL PO      Take 325 mg by mouth every 4 hours as needed for mild pain or fever        * Notice:  This list has 8 medication(s) that are the same as other medications prescribed for you. Read the directions carefully, and ask your doctor or other care provider to review them with you.

## 2018-05-08 NOTE — PROGRESS NOTES
Infusion Nursing Note:  Iris Lewis presents today for cycle 4, day 1 Irinotecan Liposome, Leucovorin, and Fluorouracil pump connect.    Patient seen by provider today: No   present during visit today: Not Applicable.    Note: Patient arrived to infusion center with complaints of slightly decreased appetite but reports adequate oral intake, some mouth sores that do not interfere with swallowing, baseline unchanged numbness and tingling to bilateral fingertips and bilateral feet, weakness, and fatigue. Denies complaints of nausea, diarrhea, constipation, fever, chills, dyspnea, pain, and bloody stools. Will proceed with treatment.    Leucovorin given despite no Fluorouracil bolus due to insurance coverage issues per Heraclio Mattson PharmD in infusion note from 3/28/2018.     Intravenous Access:  Implanted Port.    Treatment Conditions:  Lab Results   Component Value Date    HGB 10.2 05/08/2018     Lab Results   Component Value Date    WBC 6.7 05/08/2018      Lab Results   Component Value Date    ANEU 3.4 05/08/2018     Lab Results   Component Value Date     05/08/2018      Lab Results   Component Value Date     05/08/2018                   Lab Results   Component Value Date    POTASSIUM 3.6 05/08/2018        Lab Results   Component Value Date    CR 0.64 05/08/2018                   Lab Results   Component Value Date    EMMY 8.5 05/08/2018                Lab Results   Component Value Date    BILITOTAL 0.4 05/08/2018           Lab Results   Component Value Date    ALBUMIN 2.7 05/08/2018                    Lab Results   Component Value Date    ALT 10 05/08/2018           Lab Results   Component Value Date    AST 12 05/08/2018     Results reviewed, labs MET treatment parameters, ok to proceed with treatment.    Post Infusion Assessment:  Patient tolerated infusion without incident.  Blood return noted pre and post infusion.  Site patent and intact, free from redness, edema or discomfort.  No  "evidence of extravasations.    Prior to discharge: Port is secured in place with tegaderm and flushed with 10cc NS with positive blood return noted.  Continuous home infusion CADD pump connected.    All connectors secured in place and clamps taped open and verified by Kelin Pepper RN.    Pump started, \"running\" noted on display (CADD): YES.  Patient instructed to call our clinic or [facility] with any questions or concerns at home.  Patient verbalized understanding.    Patient set up for pump disconnect at our clinic on 5/10/2018 at 1100.      Discharge Plan:   Prescription refills given for hydroxyzine.  Discharge instructions reviewed with: Patient and Family.  Patient and/or family verbalized understanding of discharge instructions and all questions answered.  Copy of AVS reviewed with patient and/or family.  Patient will return 5/10/2018 for next appointment.  Patient discharged in stable condition accompanied by: son.  Departure Mode: Ambulatory.    Rene Ramon RN  "

## 2018-05-08 NOTE — PATIENT INSTRUCTIONS
Contact Numbers    [facility] [place]: [phone]  [facility] Triage:  [phone]    Call triage with chills and/or temperature greater than or equal to 100.5, uncontrolled nausea/vomiting, diarrhea, constipation, dizziness, shortness of breath, chest pain, bleeding, unexplained bruising, or any new/concerning symptoms, questions/concerns.     If you are having any concerning symptoms or wish to speak to a provider before your next infusion visit, please call your care coordinator or triage to notify them so we can adequately serve you.       After Hours: [phone]    If after hours, weekends, or holidays, call main hospital  and ask for Oncology doctor on call.           May 2018   Don Monday Tuesday Wednesday Thursday Friday Saturday             1     2     3     4     5       6     7     8     [facility] DRAW    8:30 AM   (15 min.)    [facility]   [facility]     [facility] ONC INFUSION 180    9:00 AM   (180 min.)   UC ONCOLOGY INFUSION   [facility] 9     UMP RETURN   10:15 AM   (30 min.)   José Antonio Ann MD   [facility] 10     11     12       13     14     15     CANCER REHAB TREATMENT   11:15 AM   (45 min.)   Ela Paredes PT   [facility] 16     17     18     19       20     21     22     [facility] DRAW    8:30 AM   (15 min.)    [facility]RAW   [facility]     [facility] ONC INFUSION 180    9:00 AM   (180 min.)    ONCOLOGY INFUSION   [facility] 23     24     25     26       27     28     29     30     31 June 2018 Sunday Monday Tuesday Wednesday Thursday Friday Saturday                            1     CT CHEST ABDOMEN PELVIS WWO    9:40 AM   (20 min.)   UCCT2   [facility] CT 2       3     4     [facility] DRAW   10:15 AM   (15 min.)    [facility] LAB DRAW   [facility] Lab Draw     UMP RETURN   10:30 AM   (30 min.)   Gigi  Vinny Adames MD   [facility]     [facility] ONC INFUSION 180   12:30 PM   (180 min.)   UC ONCOLOGY INFUSION   [facility] 5     6     7     8     9       10     11     12     13     14     15     16       17     18     19     20     21     22     23       24     25     26     27     28     29     30                  Lab Results:  Recent Results (from the past 12 hour(s))   CBC with platelets differential    Collection Time: 05/08/18  9:17 AM   Result Value Ref Range    WBC 6.7 4.0 - 11.0 10e9/L    RBC Count 3.27 (L) 3.8 - 5.2 10e12/L    Hemoglobin 10.2 (L) 11.7 - 15.7 g/dL    Hematocrit 31.5 (L) 35.0 - 47.0 %    MCV 96 78 - 100 fl    MCH 31.2 26.5 - 33.0 pg    MCHC 32.4 31.5 - 36.5 g/dL    RDW 15.8 (H) 10.0 - 15.0 %    Platelet Count 271 150 - 450 10e9/L    Diff Method Automated Method     % Neutrophils 51.4 %    % Lymphocytes 22.1 %    % Monocytes 11.3 %    % Eosinophils 14.0 %    % Basophils 0.9 %    % Immature Granulocytes 0.3 %    Nucleated RBCs 0 0 /100    Absolute Neutrophil 3.4 1.6 - 8.3 10e9/L    Absolute Lymphocytes 1.5 0.8 - 5.3 10e9/L    Absolute Monocytes 0.8 0.0 - 1.3 10e9/L    Absolute Eosinophils 0.9 (H) 0.0 - 0.7 10e9/L    Absolute Basophils 0.1 0.0 - 0.2 10e9/L    Abs Immature Granulocytes 0.0 0 - 0.4 10e9/L    Absolute Nucleated RBC 0.0    Comprehensive metabolic panel    Collection Time: 05/08/18  9:17 AM   Result Value Ref Range    Sodium 137 133 - 144 mmol/L    Potassium 3.6 3.4 - 5.3 mmol/L    Chloride 105 94 - 109 mmol/L    Carbon Dioxide 26 20 - 32 mmol/L    Anion Gap 6 3 - 14 mmol/L    Glucose 78 70 - 99 mg/dL    Urea Nitrogen 10 7 - 30 mg/dL    Creatinine 0.64 0.52 - 1.04 mg/dL    GFR Estimate >90 >60 mL/min/1.7m2    GFR Estimate If Black >90 >60 mL/min/1.7m2    Calcium 8.5 8.5 - 10.1 mg/dL    Bilirubin Total 0.4 0.2 - 1.3 mg/dL    Albumin 2.7 (L) 3.4 - 5.0 g/dL    Protein Total 6.2 (L) 6.8 - 8.8 g/dL    Alkaline Phosphatase 77 40 - 150 U/L    ALT 10 0 -  50 U/L    AST 12 0 - 45 U/L

## 2018-05-09 NOTE — MR AVS SNAPSHOT
After Visit Summary   5/9/2018    Iris Lewis    MRN: [id number]           Patient Information     Date Of Birth          1940        Visit Information        Provider Department      5/9/2018 10:30 AM José Antonio Ann MD [facility]        Today's Diagnoses     Malignant neoplasm of head of pancreas (H)    -  1    Stomatitis and mucositis           Follow-ups after your visit        Your next 10 appointments already scheduled     May 10, 2018 11:00 AM CDT   Infusion 60 with UC ONCOLOGY INFUSION, UC 12 ATC   [facility] ([facility])    [address]  [address]  [facility] [address]   [phone]            May 15, 2018 11:15 AM CDT   Cancer Rehab Treatment with Ela Paredes, SANJANA   [facility] ([facility]    [address] [facility] [address]   [phone]            May 22, 2018  8:30 AM CDT   Masonic Lab Draw with UC MASONIC LAB DRAW   [facility] Lab Draw ([facility])    [address]  [address]  [facility] [address]   [phone]            May 22, 2018  9:00 AM CDT   Infusion 180 with UC ONCOLOGY INFUSION, UC 24 ATC   [facility] ([facility])    [address]  [address]  [facility] [address]   [phone]            Jun 01, 2018  9:40 AM CDT   CT CHEST ABDOMEN PELVIS W/O & W CONTRAST with UCCT2   [facility] ([facility])    [address] [place] [address]  1st Floor  [facility] [address] [phone]           Please bring any scans or X-rays taken at other hospitals, if similar tests were done. Also bring a list of your medicines, including vitamins, minerals and over-the-counter drugs. It is safest to leave personal items at home.  Be sure to tell your doctor:   If you have any allergies.   If there s any chance you are  pregnant.   If you are breastfeeding.  How to prepare:   Do not eat or drink for 2 hours before your exam. If you need to take medicine, you may take it with small sips of water. (We may ask you to take liquid medicine as well.)   Please wear loose clothing, such as a sweat suit or jogging clothes. Avoid snaps, zippers and other metal. We may ask you to undress and put on a hospital gown.  Please arrive 30 minutes early for your CT. Once in the department you might be asked to drink water 15-20 minutes prior to your exam.  If indicated you may be asked to drink an oral contrast in advance of your CT.  If this is the case, the imaging team will let you know or be in contact with you prior to your appointment  Patients over 70 or patients with diabetes or kidney problems:   If you haven t had a blood test (creatinine test) within the last 30 days, the Cardiologist/Radiologist may require you to get this test prior to your exam.  If you have diabetes:   Continue to take your metformin medication on the day of your exam  If you have any questions, please call the Imaging Department where you will have your exam.            Jun 04, 2018 10:15 AM CDT   Masonic Lab Draw with  MASONIC LAB DRAW   [facility] Lab Draw ([facility])    [address] [place] [address]  [address]  [facility] [address] [phone]            Jun 04, 2018 10:45 AM CDT   (Arrive by 10:30 AM)   Return Visit with Vinny Yu MD   [facility] ([facility])    [address] [place] [address]  [address]  [facility] [address]   [phone]            Jun 04, 2018 12:30 PM CDT   Infusion 180 with  ONCOLOGY INFUSION   [facility] ([facility])    [address] [place] [address]  [address]  [facility] [address]   [phone]              Who to contact     If you have questions or need follow up information about today's clinic visit or your  "schedule please contact [facility] directly at [phone].  Normal or non-critical lab and imaging results will be communicated to you by MyChart, letter or phone within 4 business days after the clinic has received the results. If you do not hear from us within 7 days, please contact the clinic through [employer] or phone. If you have a critical or abnormal lab result, we will notify you by phone as soon as possible.  Submit refill requests through [employer] or call your pharmacy and they will forward the refill request to us. Please allow 3 business days for your refill to be completed.          Additional Information About Your Visit        [employer] Information     [employer] gives you secure access to your electronic health record. If you see a primary care provider, you can also send messages to your care team and make appointments. If you have questions, please call your primary care clinic.  If you do not have a primary care provider, please call [phone] and they will assist you.        Care EveryWhere ID     This is your Care EveryWhere ID. This could be used by other organizations to access your [facility] medical records  [id number]        Your Vitals Were     Pulse Temperature Respirations Height Pulse Oximetry BMI (Body Mass Index)    67 97  F (36.1  C) (Oral) 16 1.575 m (5' 2.01\") 98% 18.1 kg/m2       Blood Pressure from Last 3 Encounters:   05/09/18 105/60   05/08/18 127/69   04/26/18 110/61    Weight from Last 3 Encounters:   05/09/18 44.9 kg (99 lb)   05/08/18 45 kg (99 lb 4.8 oz)   04/24/18 46.9 kg (103 lb 8 oz)              We Performed the Following     DNR/DNI        Primary Care Provider Office Phone # Fax #    Neri Gipson [phone]       [address] [address]        Equal Access to Services     NESSA NICHOLS AH: Shaun wallace Sothom, waaxda luqadaha, qaybta kaallisa murray. So " [facility] [phone].    ATENCIÓN: Si shahbaz alfaro, tiene a nava disposición servicios gratuitos de asistencia lingüística. Indu humphries [phone].    We comply with applicable federal civil rights laws and [place] laws. We do not discriminate on the basis of race, color, national origin, age, disability, sex, sexual orientation, or gender identity.            Thank you!     Thank you for choosing [facility]  for your care. Our goal is always to provide you with excellent care. Hearing back from our patients is one way we can continue to improve our services. Please take a few minutes to complete the written survey that you may receive in the mail after your visit with us. Thank you!             Your Updated Medication List - Protect others around you: Learn how to safely use, store and throw away your medicines at www.disposemymeds.org.          This list is accurate as of 5/9/18 12:08 PM.  Always use your most recent med list.                   Brand Name Dispense Instructions for use Diagnosis    albuterol 108 (90 Base) MCG/ACT Inhaler    PROAIR HFA/PROVENTIL HFA/VENTOLIN HFA    1 Inhaler    Inhale 2 puffs into the lungs 4 times daily    Mild asthma, unspecified whether complicated, unspecified whether persistent       amylase-lipase-protease 69179 units Cpep    CREON    180 capsule    Take 2 capsules (72,000 Units) by mouth 3 times daily (with meals)    Malignant neoplasm of head of pancreas (H)       CALCIUM PO      Take by mouth every morning Form/strength unknown. Powder formulation. Add to water and fruits/vegetables daily to promote bone health.        CARTIA  MG 24 hr capsule   Generic drug:  diltiazem     10 capsule    TK 1 C PO QD    Benign essential hypertension       cholecalciferol 1000 UNIT tablet    vitamin D3     Take 1 tablet by mouth 2 times daily        cyanocolbalamin 500 MCG tablet    vitamin  B-12     Take 500 mcg by mouth every morning        * enoxaparin 60 MG/0.6ML  injection    LOVENOX     35 mg        * LOVENOX 40 MG/0.4ML injection   Generic drug:  enoxaparin     60 Syringe    Inject 0.35 mLs (35 mg) Subcutaneous every 12 hours    Malignant neoplasm of pancreas, unspecified location of malignancy (H), Other chronic 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 cor pulmonale (H)       fluticasone-vilanterol 100-25 MCG/INH oral inhaler    BREO ELLIPTA    1 Inhaler    Inhale 1 puff into the lungs daily    Cough       folic acid 400 MCG tablet    FOLVITE     Take 1 tablet by mouth every morning        hydrOXYzine 25 MG tablet    ATARAX    10 tablet    Take 1-2 tablets (25-50 mg) by mouth every 6 hours as needed for itching (adjuvant pain)    Obstructive jaundice       levothyroxine 125 MCG tablet    SYNTHROID/LEVOTHROID    90 tablet    Take 1 tablet (125 mcg) by mouth daily    Hypothyroidism, unspecified type       loperamide 2 MG capsule    IMODIUM    60 capsule    2 caps at 1st sign of diarrhea & 1 cap every 2hrs until 12hrs diarrhea free. During night, 2 caps at bedtime & 2 caps every 4hrs until AM    Malignant neoplasm of head of pancreas (H)       LORazepam 0.5 MG tablet    ATIVAN    30 tablet    Take 1 tablet (0.5 mg) by mouth every 4 hours as needed (Anxiety, Nausea/Vomiting or Sleep)    Malignant neoplasm of head of pancreas (H)       nystatin 882498 UNIT/ML suspension    MYCOSTATIN          omeprazole 40 MG capsule    priLOSEC    20 capsule    Take 1 capsule (40 mg) by mouth 2 times daily Take 30-60 minutes before a meal.    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 Please dispense 2 pairs of knee high 20-30 mmHg    Lymphedema       pantoprazole 20 MG EC tablet    PROTONIX    30 tablet    Take 1 tablet (20 mg) by mouth daily    Other acute gastritis without hemorrhage       * prochlorperazine 10 MG tablet    COMPAZINE          * prochlorperazine 5 MG tablet    COMPAZINE    30 tablet    Take 1 tablet (5 mg) by mouth every 6 hours as needed for nausea or vomiting    Malignant neoplasm of head of pancreas (H)       timolol maleate 0.5 % opthalmic solution    ISTALOL    1 Bottle    Place 1 drop into both eyes every morning Before placing contact lenses    Cataract, unspecified cataract type, unspecified laterality       traMADol 50 MG tablet    ULTRAM    60 tablet    Take 1-2 tablets ( mg) by mouth every 4 hours as needed for breakthrough pain (No more than 8 tablets in a day)    Malignant neoplasm of head of pancreas (H)       triamterene-hydrochlorothiazide 37.5-25 MG per tablet    MAXZIDE-25     Take 1 tablet by mouth every morning        TYLENOL PO      Take 325 mg by mouth every 4 hours as needed for mild pain or fever        * Notice:  This list has 8 medication(s) that are the same as other medications prescribed for you. Read the directions carefully, and ask your doctor or other care provider to review them with you.

## 2018-05-09 NOTE — NURSING NOTE
"Oncology Rooming Note    May 9, 2018 10:25 AM   Iris Lewis is a 78 year old female who presents for:    Chief Complaint   Patient presents with     Oncology Clinic Visit     Return: Malignant neoplasm of head of pancreas     Initial Vitals: /60 (BP Location: Right arm, Patient Position: Chair, Cuff Size: Adult Small)  Pulse 67  Temp 97  F (36.1  C) (Oral)  Resp 16  Ht 1.575 m (5' 2.01\")  Wt 44.9 kg (99 lb)  SpO2 98%  BMI 18.1 kg/m2 Estimated body mass index is 18.1 kg/(m^2) as calculated from the following:    Height as of this encounter: 1.575 m (5' 2.01\").    Weight as of this encounter: 44.9 kg (99 lb). Body surface area is 1.4 meters squared.  No Pain (0) Comment: Data Unavailable   No LMP recorded. Patient is postmenopausal.  Allergies reviewed: YES  Medications reviewed: YES    Medications: Medication refills not needed today.  Pharmacy name entered into [employer]:    [facility] - [facility] [address] [facility] [address] [place] [address] [facility]    Clinical concerns: no new concerns.  Pt received flu shot elsewhere. See Immunizations     6 minutes for nursing intake (face to face time)     Patricia Renee CMA                "

## 2018-05-09 NOTE — LETTER
5/9/2018       RE: Iris Lewis  [address]  [place] [address]     Dear Colleague,    Thank you for referring your patient, Iris Lewis, to the [facility] at [facility]. Please see a copy of my visit note below.    Palliative Staff/Outpatient Clinic    (This note was transcribed using voice recognition software. While I review and edit the transcription, I may miss errors. Also, the software capitalizes words strangely sometimes. Please let me know of any serious mis-transcriptions and I will addend this note.)    CC/Patient ID:  She has metastatic pancreatic cancer to the liver, lung, and retroperitoneal lymph nodes on gemcitabine and nab paclitaxel.  Important past medical history includes Nissen fundoplication in 2006.  She was diagnosed with her cancer in November of last year. First f/u scan showed some response to her chemotherapy. 3/18 showed progression & started 5FU+liposomal irinotecan.      +HCD which names  then daughter as decision makers     History:  She is with her son today who supplements the history.  He is visiting from [place].  Overall reports she is doing great.  With the new chemo she is having pretty minimal side effects-overall mild diarrhea but nothing like her prior chemo.  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  She has some gait imbalance and is doing physical therapy.  She has had a minor fall recently but only one.  Her son reports she is more active than a couple months ago-taking steps, going outside and gardening, more energy and mood much improved and is very grateful for that.    She has mouth pain and says she has an ulcer on her right lateral tongue which is very painful.    She occasionally has back spasms and take tramadol once or twice a day with good effect and no side effects.  She takes Tylenol most days for her abdominal and back pain.    She  "is on Lovenox for DVT and a cost $200 a month and she is hoping there is an alternative that is more affordable.    When I met her we talked about care planning activities and she is continuing to do those.  She is doing her financial planning and disposition of possessions and she feels good about that.  She has completed her anatomic donation application.  We spoke about CODE STATUS last time and she thought about it and completed a DNR/DNI POLST with Dr. Yu.  I reviewed that with her today and she is well-informed and feels good about that decision.    SH:   Reviewed, as above, unchanged    ROS:  Swallowing okay. Wears edema stockings most days which have helped her L>R edema. Comprehensive review of systems is negative otherwise    PE: /60 (BP Location: Right arm, Patient Position: Chair, Cuff Size: Adult Small)  Pulse 67  Temp 97  F (36.1  C) (Oral)  Resp 16  Ht 1.575 m (5' 2.01\")  Wt 44.9 kg (99 lb)  SpO2 98%  BMI 18.1 kg/m2  Alert, comfortable appearing, NAD. Frail  Head NCAT.  Eyes anicteric without injection  Face symmetric, eyes conjugate  Mouth pink, moist, superficial ulcer margin of tongue on R  Neck supple without masses, thyromegaly, LAD  Lungs unlabored, CTAB  CV rrr s1s2  Abd soft, ntnd, benign  Back kyphotic, no masses, tenderness  Small amount of L>R pitting LE edema  Skin warm, dry, without lesions  MSK joints of hand normal; sarcopenic  Neuro Face symmetric,   Neuropsych exam normal including affect, sensorium, gross memory, thought processes, and fund of knowledge.     Impression & Recommendations:  78-year-old woman with metastatic pancreatic cancer-overall globally improved probably from a change in her chemotherapy.  I will talk with her oncology team about alternatives to Lovenox.  Lovenox is somewhat more effective than warfarin and obviously more convenient but is also much more expensive out of pocket for most.  I am glad she is doing physical therapy.  We celebrated her " improvements.  Reviewed care planning and CODE STATUS as above.  I recommended Anusol for her tongue ulcer.  Follow-up in a few months or if anything changes.      Chart data reviewed today:    Family History   Problem Relation Age of Onset     C.A.D. 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     Arthritis      Colon polyp 2009,2015    no polyps - f/u in 5 yrs      Esophageal reflux      Glaucoma      Hypertension      Malignant neoplasm of head of pancreas (H) 11/6/2017     Osteoporosis      Postsurgical hypothyroidism      Scoliosis (and kyphoscoliosis), idiopathic      Thyroid cancer (H)     papillary carcinoma age 33     Uncomplicated asthma      Past Surgical History:   Procedure Laterality Date     ARTHRODESIS FOOT Right 11/9/2016    Procedure: ARTHRODESIS FOOT;  Surgeon: Janes Mcelroy MD;  [facility]     C STOMACH SURGERY PROCEDURE UNLISTED       COLONOSCOPY   2009, 3/2015    no polyps in 2015 told to return 10 yrs.      ENDOSCOPIC RETROGRADE CHOLANGIOPANCREATOGRAM N/A 11/2/2017    Procedure: COMBINED ENDOSCOPIC RETROGRADE CHOLANGIOPANCREATOGRAPHY, PLACE TUBE/STENT;  Endoscopic Retrograde Cholangiopancreatogram with billary sphincterotomy and billary stent placement;  Surgeon: Rito Mcneil MD;  [facility]     ENDOSCOPIC RETROGRADE CHOLANGIOPANCREATOGRAM N/A 1/10/2018    Procedure: ENDOSCOPIC RETROGRADE CHOLANGIOPANCREATOGRAM;  Endoscopic Retrograde Cholangiopancreatogram ;  Surgeon: Felix Izaguirre MD;  [facility]     ENDOSCOPIC RETROGRADE CHOLANGIOPANCREATOGRAPHY, EXCHANGE TUBE/STENT  N/A 11/22/2017    Procedure: ENDOSCOPIC RETROGRADE CHOLANGIOPANCREATOGRAPHY, EXCHANGE TUBE/STENT;  Endoscopic Retrograde Cholangiopancreatogram with Biliary Stent Exchange and pancreatic stent placement;  Surgeon: Felix Izaguirre MD;  [facility] OR     ESOPHAGOSCOPY, GASTROSCOPY, DUODENOSCOPY (EGD), COMBINED  2/17/2012    Procedure:COMBINED ESOPHAGOSCOPY, GASTROSCOPY, DUODENOSCOPY (EGD); COMBINED ESOPHAGOSCOPY, GASTROSCOPY, DUODENOSCOPY POSSIBLE DILATION; Surgeon:NICHOLE MCCLAIN; Location[facility]     ESOPHAGOSCOPY, GASTROSCOPY, DUODENOSCOPY (EGD), COMBINED N/A 11/2/2017    Procedure: COMBINED ENDOSCOPIC ULTRASOUND, ESOPHAGOSCOPY, GASTROSCOPY, DUODENOSCOPY (EGD), FINE NEEDLE ASPIRATE/BIOPSY;  Upper Endoscopic Ultrasound with fine needle biopsy, upper endoscopy with biopsies, Endoscopic Retrograde Cholangiopancreatogram with billary sphincterotomy and billary stent placement;  Surgeon: Hadley Bailey MD;  Location[facility] OR     FOOT SURGERY  2008    hammertoe left     INSERT PORT VASCULAR ACCESS Right 1/24/2018    Procedure: INSERT PORT VASCULAR ACCESS;  Chest Port Placement;  Surgeon: Ventura Villarreal PA-C;  [facility]     LAPAROSCOPIC CHOLECYSTECTOMY N/A 1/5/2015    Procedure: LAPAROSCOPIC CHOLECYSTECTOMY;  Surgeon: Cruz Pearson MD;  [facility]     NISSEN FUNDOPLICATION       ORTHOPEDIC SURGERY  2009    left hammertoe      REPAIR HAMMER TOE Right 11/9/2016    Procedure: REPAIR HAMMER TOE;  Surgeon: Janes Mcelroy MD;  [facility]     SALPINGO OOPHORECTOMY,R/L/SERENA  1974    left, for tubal pregnancy     THYROIDECTOMY      for thyroid cancer     Allergies   Allergen Reactions     Nkda [No Known Drug Allergies]           Medications:   I have reviewed this patient's medication profile.  MNPMP: reviewed      Data reviewed:  I reviewed recent labs and imaging, comments on pertinent findings:  Cr 0.64  Alb 2.7    CT March 26  IMPRESSION:   1. Multiple new peripherally  enhancing, predominantly hypodense mass  lesions in the liver compatible with new metastases.  2. Retroperitoneal and mesenteric nodularity/lymphadenopathy is stable  to mildly increased in size, overall additionally suggestive of  progression of metastatic disease.  3. No substantial change in size of the primary tumor in the  pancreatic head/uncinate process which abuts the SMA and its distal  branches.  4. New small volume ascites in the pelvis and new small left pleural  effusion.       Again, thank you for allowing me to participate in the care of your patient.      Sincerely,    José Antonio Ann MD

## 2018-05-09 NOTE — PROGRESS NOTES
Palliative Staff/Outpatient Clinic    (This note was transcribed using voice recognition software. While I review and edit the transcription, I may miss errors. Also, the software capitalizes words strangely sometimes. Please let me know of any serious mis-transcriptions and I will addend this note.)    CC/Patient ID:  She has metastatic pancreatic cancer to the liver, lung, and retroperitoneal lymph nodes on gemcitabine and nab paclitaxel.  Important past medical history includes Nissen fundoplication in 2006.  She was diagnosed with her cancer in November of last year. First f/u scan showed some response to her chemotherapy. 3/18 showed progression & started 5FU+liposomal irinotecan.      +HCD which names  then daughter as decision makers     History:  She is with her son today who supplements the history.  He is visiting from [place].  Overall reports she is doing great.  With the new chemo she is having pretty minimal side effects-overall mild diarrhea but nothing like her prior chemo.  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  She has some gait imbalance and is doing physical therapy.  She has had a minor fall recently but only one.  Her son reports she is more active than a couple months ago-taking steps, going outside and gardening, more energy and mood much improved and is very grateful for that.    She has mouth pain and says she has an ulcer on her right lateral tongue which is very painful.    She occasionally has back spasms and take tramadol once or twice a day with good effect and no side effects.  She takes Tylenol most days for her abdominal and back pain.    She is on Lovenox for DVT and a cost $200 a month and she is hoping there is an alternative that is more affordable.    When I met her we talked about care planning activities and she is continuing to do those.  She is doing her financial planning and disposition of possessions and she feels  "good about that.  She has completed her anatomic donation application.  We spoke about CODE STATUS last time and she thought about it and completed a DNR/DNI POLST with Dr. Yu.  I reviewed that with her today and she is well-informed and feels good about that decision.    SH:   Reviewed, as above, unchanged    ROS:  Swallowing okay. Wears edema stockings most days which have helped her L>R edema. Comprehensive review of systems is negative otherwise    PE: /60 (BP Location: Right arm, Patient Position: Chair, Cuff Size: Adult Small)  Pulse 67  Temp 97  F (36.1  C) (Oral)  Resp 16  Ht 1.575 m (5' 2.01\")  Wt 44.9 kg (99 lb)  SpO2 98%  BMI 18.1 kg/m2  Alert, comfortable appearing, NAD. Frail  Head NCAT.  Eyes anicteric without injection  Face symmetric, eyes conjugate  Mouth pink, moist, superficial ulcer margin of tongue on R  Neck supple without masses, thyromegaly, LAD  Lungs unlabored, CTAB  CV rrr s1s2  Abd soft, ntnd, benign  Back kyphotic, no masses, tenderness  Small amount of L>R pitting LE edema  Skin warm, dry, without lesions  MSK joints of hand normal; sarcopenic  Neuro Face symmetric,   Neuropsych exam normal including affect, sensorium, gross memory, thought processes, and fund of knowledge.     Impression & Recommendations:  78-year-old woman with metastatic pancreatic cancer-overall globally improved probably from a change in her chemotherapy.  I will talk with her oncology team about alternatives to Lovenox.  Lovenox is somewhat more effective than warfarin and obviously more convenient but is also much more expensive out of pocket for most.  I am glad she is doing physical therapy.  We celebrated her improvements.  Reviewed care planning and CODE STATUS as above.  I recommended Anusol for her tongue ulcer.  Follow-up in a few months or if anything changes.      Chart data reviewed today:    Family History   Problem Relation Age of Onset     C.A.D. Father      MI at age 65     Asthma " Father      Coronary Artery Disease Father      Alzheimer Disease Mother      OSTEOPOROSIS Mother      Depression Sister 80     Asthma Sister      Prostate Cancer Brother      Depression Son      Depression Sister      Coronary Artery Disease Brother 67     Skin Cancer No family hx of      no skin cancer     Past Medical History:   Diagnosis Date     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 12/18/2017     Arthritis      Colon polyp 2009,2015    no polyps - f/u in 5 yrs      Esophageal reflux      Glaucoma      Hypertension      Malignant neoplasm of head of pancreas (H) 11/6/2017     Osteoporosis      Postsurgical hypothyroidism      Scoliosis (and kyphoscoliosis), idiopathic      Thyroid cancer (H)     papillary carcinoma age 33     Uncomplicated asthma      Past Surgical History:   Procedure Laterality Date     ARTHRODESIS FOOT Right 11/9/2016    Procedure: ARTHRODESIS FOOT;  Surgeon: Janes Mcelroy MD;  [facility]     C STOMACH SURGERY PROCEDURE UNLISTED       COLONOSCOPY   2009, 3/2015    no polyps in 2015 told to return 10 yrs.      ENDOSCOPIC RETROGRADE CHOLANGIOPANCREATOGRAM N/A 11/2/2017    Procedure: COMBINED ENDOSCOPIC RETROGRADE CHOLANGIOPANCREATOGRAPHY, PLACE TUBE/STENT;  Endoscopic Retrograde Cholangiopancreatogram with billary sphincterotomy and billary stent placement;  Surgeon: Rito Mcneil MD;  [facility]     ENDOSCOPIC RETROGRADE CHOLANGIOPANCREATOGRAM N/A 1/10/2018    Procedure: ENDOSCOPIC RETROGRADE CHOLANGIOPANCREATOGRAM;  Endoscopic Retrograde Cholangiopancreatogram ;  Surgeon: Felix Izaguirre MD;  [facility]     ENDOSCOPIC RETROGRADE CHOLANGIOPANCREATOGRAPHY, EXCHANGE TUBE/STENT N/A 11/22/2017    Procedure: ENDOSCOPIC RETROGRADE CHOLANGIOPANCREATOGRAPHY, EXCHANGE TUBE/STENT;  Endoscopic Retrograde Cholangiopancreatogram with Biliary Stent Exchange and pancreatic stent placement;  Surgeon: Felix Izaguirre MD;  [facility]     ESOPHAGOSCOPY, GASTROSCOPY,  DUODENOSCOPY (EGD), COMBINED  2/17/2012    Procedure:COMBINED ESOPHAGOSCOPY, GASTROSCOPY, DUODENOSCOPY (EGD); COMBINED ESOPHAGOSCOPY, GASTROSCOPY, DUODENOSCOPY POSSIBLE DILATION; Surgeon:NICHOLE MCCLAIN; [facility]     ESOPHAGOSCOPY, GASTROSCOPY, DUODENOSCOPY (EGD), COMBINED N/A 11/2/2017    Procedure: COMBINED ENDOSCOPIC ULTRASOUND, ESOPHAGOSCOPY, GASTROSCOPY, DUODENOSCOPY (EGD), FINE NEEDLE ASPIRATE/BIOPSY;  Upper Endoscopic Ultrasound with fine needle biopsy, upper endoscopy with biopsies, Endoscopic Retrograde Cholangiopancreatogram with billary sphincterotomy and billary stent placement;  Surgeon: Hadley Bailey MD;  [facility]     FOOT SURGERY  2008    hammertoe left     INSERT PORT VASCULAR ACCESS Right 1/24/2018    Procedure: INSERT PORT VASCULAR ACCESS;  Chest Port Placement;  Surgeon: Ventura Villarreal PA-C;  [facility]     LAPAROSCOPIC CHOLECYSTECTOMY N/A 1/5/2015    Procedure: LAPAROSCOPIC CHOLECYSTECTOMY;  Surgeon: Cruz Pearson MD;  [facility]     NISSEN FUNDOPLICATION       ORTHOPEDIC SURGERY  2009    left hammertoe      REPAIR HAMMER TOE Right 11/9/2016    Procedure: REPAIR HAMMER TOE;  Surgeon: Janes Mcelroy MD;  [facility]     SALPINGO OOPHORECTOMY,R/L/SERENA  1974    left, for tubal pregnancy     THYROIDECTOMY      for thyroid cancer     Allergies   Allergen Reactions     Nkda [No Known Drug Allergies]           Medications:   I have reviewed this patient's medication profile.  MNPMP: reviewed      Data reviewed:  I reviewed recent labs and imaging, comments on pertinent findings:  Cr 0.64  Alb 2.7    CT March 26  IMPRESSION:   1. Multiple new peripherally enhancing, predominantly hypodense mass  lesions in the liver compatible with new metastases.  2. Retroperitoneal and mesenteric nodularity/lymphadenopathy is stable  to mildly increased in size, overall additionally suggestive of  progression of metastatic disease.  3. No substantial  change in size of the primary tumor in the  pancreatic head/uncinate process which abuts the SMA and its distal  branches.  4. New small volume ascites in the pelvis and new small left pleural  effusion.       Thank you for involving us in the patient's care.   José Antonio Ann MD / Palliative Medicine / Pager [phone] / [facility] Inpatient Team Consult Pager [phone] (answered 8am-430pm M-F) - ok to text page via [employer] / After-Hours Answering Service [phone] / Palliative Clinic in the [facility] at the [facility] - [phone] (scheduling); [phone] (triage).

## 2018-05-10 NOTE — PROGRESS NOTES
Infusion Nursing Note:  Iris Lewis presents today for fluorouracil pump disconnect.    Patient seen by provider today: No   present during visit today: Not Applicable.    Note: Patient states that she has started having some diarrhea, she usually has it for 2-3 days after her pump gets disconnected.  She is taking anti-diarrheal pills which usually help.  She states that she is pushing oral fluids and denies any symptoms of dehydration. She will call if it worsens or if she is unable to keep up with her oral fluid intake.      Intravenous Access:  Implanted Port.    Treatment Conditions:  Not Applicable.      Post Infusion Assessment:  Fluorouracil pump started beeping empty on arrival in infusion  Blood return noted on pump disconnect  Site patent and intact, free from redness, edema or discomfort.  No evidence of extravasations.  Access discontinued per protocol.    Discharge Plan:   Patient declined prescription refills.  Discharge instructions reviewed with: Patient.  Patient and/or family verbalized understanding of discharge instructions and all questions answered.  AVS to patient via [employer].  Patient will return 5/22 for next appointment.   Patient discharged in stable condition accompanied by: self.  Departure Mode: Ambulatory.  Face to Face time: 0.    Era Randall RN

## 2018-05-10 NOTE — PATIENT INSTRUCTIONS
Contact Numbers    [facility] Main Line: [phone]  [facility] Triage:  [phone]    Call triage with chills and/or temperature greater than or equal to 100.5, uncontrolled nausea/vomiting, diarrhea, constipation, dizziness, shortness of breath, chest pain, bleeding, unexplained bruising, or any new/concerning symptoms, questions/concerns.     If you are having any concerning symptoms or wish to speak to a provider before your next infusion visit, please call your care coordinator or triage to notify them so we can adequately serve you.       After Hours: [phone]    If after hours, weekends, or holidays, call main hospital  and ask for Oncology doctor on call.           May 2018   Don Monday Tuesday Wednesday Thursday Friday Saturday             1     2     3     4     5       6     7     8     [facility] D[facility]    8:30 AM   (15 min.)    [facility]   [facility] Draw     [facility] INF[facility] 180    9:00 AM   (180 min.)   [facility]   [facility] 9     [facility] RETURN   10:15 AM   (30 min.)   José Antonio Ann MD   [facility] 10     [facility] INFUSION 60   11:00 AM   (60 min.)   [facility]   [facility] 11     12       13     14     15     CANCER REHAB TREATMENT   11:15 AM   (45 min.)   Ela Paredes PT [facility] 16     17     18     19       20     21     22     [facility] DRAW    8:30 AM   (15 min.)    [facility] D[facility]   [facility] Draw     [facility] INFUSION 180    9:00 AM   (180 min.)    ONCOLOGY INFUSION   [facility] 23     24     25     26       27     28     29     30     31 June 2018 Sunday Monday Tuesday Wednesday Thursday Friday Saturday                            1     CT CHEST ABDOMEN PELVIS WWO    9:40 AM   (20 min.)   [facility]   [facility] CT 2       3     4     [facility] DRAW   10:15 AM   (15  min.)   [facility] DRAW   [facility] Lab Draw     [facility] RETURN   10:30 AM   (30 min.)   Vinny Yu MD   [facility] ONC INFUSION 180   12:30 PM   (180 min.)    ONCOLOGY INFUSION   [facility] 5     6     7     8     9       10     11     12     13     14     15     16       17     18     19     20     21     22     23       24     25     26     27     28     29     30               No results found for this or any previous visit (from the past 24 hour(s)).

## 2018-05-10 NOTE — MR AVS SNAPSHOT
After Visit Summary   5/10/2018    Iris Lewis    MRN: [id number]           Patient Information     Date Of Birth          1940        Visit Information        Provider Department      5/10/2018 11:00 AM UC 12 ATC;  ONCOLOGY INFUSION [facility]        Today's Diagnoses     Malignant neoplasm of head of pancreas (H)    -  1      Care Instructions    Contact Numbers    [facility] Main Line: [phone]  [facility] Triage:  [phone]    Call triage with chills and/or temperature greater than or equal to 100.5, uncontrolled nausea/vomiting, diarrhea, constipation, dizziness, shortness of breath, chest pain, bleeding, unexplained bruising, or any new/concerning symptoms, questions/concerns.     If you are having any concerning symptoms or wish to speak to a provider before your next infusion visit, please call your care coordinator or triage to notify them so we can adequately serve you.       After Hours: [phone]    If after hours, weekends, or holidays, call main hospital  and ask for Oncology doctor on call.           May 2018   Don Monday Tuesday Wednesday Thursday Friday Saturday             1     2     3     4     5       6     7     8     [facility] DRAW    8:30 AM   (15 min.)    [facility] LAB DRAW   [facility] Lab Draw     [facility] ONC INFUSION 180    9:00 AM   (180 min.)    ONCOLOGY INFUSION   [facility] 9     UMP RETURN   10:15 AM   (30 min.)   José Antonio Ann MD   [facility] 10     [facility] ONC INFUSION 60   11:00 AM   (60 min.)    ONCOLOGY INFUSION   [facility] 11     12       13     14     15     CANCER REHAB TREATMENT   11:15 AM   (45 min.)   Ela Paredes, SANJANA   [facility] 16     17     18     19       20     21     22     [facility] DRAW    8:30 AM   (15 min.)    [facility] LAB DRAW   [facility] Lab Draw     [facility] ONC INFUSION 180    9:00 AM   (180  min.)   UC ONCOLOGY INFUSION   [facility] 23     24     25     26       27     28     29     30     31 June 2018 Sunday Monday Tuesday Wednesday Thursday Friday Saturday                            1     CT CHEST ABDOMEN PELVIS WWO    9:40 AM   (20 min.)   UCCT2   [facility] CT 2       3     4     [facility] DRAW   10:15 AM   (15 min.)   [facility] DRAW   [facility] Lab Draw     UMP RETURN   10:30 AM   (30 min.)   Vinny Yu MD   [facility]     [facility] ONC INFUSION 180   12:30 PM   (180 min.)   UC ONCOLOGY INFUSION   [facility] 5     6     7     8     9       10     11     12     13     14     15     16       17     18     19     20     21     22     23       24     25     26     27     28     29     30               No results found for this or any previous visit (from the past 24 hour(s)).              Follow-ups after your visit        Your next 10 appointments already scheduled     May 15, 2018 11:15 AM CDT   Cancer Rehab Treatment with Ela Paredes PT   [facility] ([facility])    [address] [facility] [address]   [phone]            May 22, 2018  8:30 AM CDT   [facility] Lab Draw with  [facility] LAB DRAW   [facility] ([facility])    [address]  [address]  [facility] [address] [phone]            May 22, 2018  9:00 AM CDT   Infusion 180 with UC ONCOLOGY INFUSION, UC 24 ATC   [facility] ([facility])    [address]  [address]  [facility] [address] [phone]            Jun 01, 2018  9:40 AM CDT   CT CHEST ABDOMEN PELVIS W/O & W CONTRAST with UCCT2   [facility] CT ([facility])    [address]  1st Floor  [facility] [address] [phone]           Please bring  any scans or X-rays taken at other hospitals, if similar tests were done. Also bring a list of your medicines, including vitamins, minerals and over-the-counter drugs. It is safest to leave personal items at home.  Be sure to tell your doctor:   If you have any allergies.   If there s any chance you are pregnant.   If you are breastfeeding.  How to prepare:   Do not eat or drink for 2 hours before your exam. If you need to take medicine, you may take it with small sips of water. (We may ask you to take liquid medicine as well.)   Please wear loose clothing, such as a sweat suit or jogging clothes. Avoid snaps, zippers and other metal. We may ask you to undress and put on a hospital gown.  Please arrive 30 minutes early for your CT. Once in the department you might be asked to drink water 15-20 minutes prior to your exam.  If indicated you may be asked to drink an oral contrast in advance of your CT.  If this is the case, the imaging team will let you know or be in contact with you prior to your appointment  Patients over 70 or patients with diabetes or kidney problems:   If you haven t had a blood test (creatinine test) within the last 30 days, the Cardiologist/Radiologist may require you to get this test prior to your exam.  If you have diabetes:   Continue to take your metformin medication on the day of your exam  If you have any questions, please call the Imaging Department where you will have your exam.            Jun 04, 2018 10:15 AM CDT   [facility] Lab Draw with  [facility] LAB DRAW   [facility] Lab Draw ([facility])    [address] [place] [address]  [address]  [facility] [address]   [phone]            Jun 04, 2018 10:45 AM CDT   (Arrive by 10:30 AM)   Return Visit with Vinny Yu MD   [facility] ([facility])    [address] [place] [address]  [address]  [facility] [address]   [phone]            Jun 04, 2018 12:30 PM CDT    Infusion 180 with UC ONCOLOGY INFUSION, UC 30 ATC   [facility] ([facility])    [address]  [address]  [facility] [address] [phone]            Aug 08, 2018  9:30 AM CDT   (Arrive by 9:15 AM)   Return Visit with José Antonio Ann MD   [facility] ([facility])    [address]  [address]  [facility] [address] [phone]              Who to contact     If you have questions or need follow up information about today's clinic visit or your schedule please contact [facility] directly at [phone].  Normal or non-critical lab and imaging results will be communicated to you by WeStorehart, letter or phone within 4 business days after the clinic has received the results. If you do not hear from us within 7 days, please contact the clinic through DripDropt or phone. If you have a critical or abnormal lab result, we will notify you by phone as soon as possible.  Submit refill requests through Kereos or call your pharmacy and they will forward the refill request to us. Please allow 3 business days for your refill to be completed.          Additional Information About Your Visit        [employer] Information     [employer] gives you secure access to your electronic health record. If you see a primary care provider, you can also send messages to your care team and make appointments. If you have questions, please call your primary care clinic.  If you do not have a primary care provider, please call [phone] and they will assist you.        Care EveryWhere ID     This is your Care EveryWhere ID. This could be used by other organizations to access your [place] medical records  [id number]        Your Vitals Were     Pulse Temperature Respirations Pulse Oximetry          70 98  F (36.7  C) (Oral) 16 97%         Blood Pressure from Last 3 Encounters:   05/10/18 127/81   05/09/18 105/60    05/08/18 127/69    Weight from Last 3 Encounters:   05/09/18 44.9 kg (99 lb)   05/08/18 45 kg (99 lb 4.8 oz)   04/24/18 46.9 kg (103 lb 8 oz)              Today, you had the following     No orders found for display       Primary Care Provider Office Phone # Fax #    Neri Gipson [phone] [address] [address]        Equal Access to Services     NESSA NICHOLS : Hadii aad ku hadasho Soomaali, waaxda luqadaha, qaybta kaalmada adeegyada, waxay idiin hayaan adetobi adams . So [facility] [phone].    ATENCIÓN: Si habla español, tiene a nava disposición servicios gratuitos de asistencia lingüística. [facility] [phone].    We comply with applicable federal civil rights laws and [place] laws. We do not discriminate on the basis of race, color, national origin, age, disability, sex, sexual orientation, or gender identity.            Thank you!     Thank you for choosing [facility]  for your care. Our goal is always to provide you with excellent care. Hearing back from our patients is one way we can continue to improve our services. Please take a few minutes to complete the written survey that you may receive in the mail after your visit with us. Thank you!             Your Updated Medication List - Protect others around you: Learn how to safely use, store and throw away your medicines at www.disposemymeds.org.          This list is accurate as of 5/10/18 11:56 AM.  Always use your most recent med list.                   Brand Name Dispense Instructions for use Diagnosis    albuterol 108 (90 Base) MCG/ACT Inhaler    PROAIR HFA/PROVENTIL HFA/VENTOLIN HFA    1 Inhaler    Inhale 2 puffs into the lungs 4 times daily    Mild asthma, unspecified whether complicated, unspecified whether persistent       amylase-lipase-protease 37650 units Cpep    CREON    180 capsule    Take 2 capsules (72,000 Units) by mouth 3 times daily (with meals)    Malignant  neoplasm of head of pancreas (H)       CALCIUM PO      Take by mouth every morning Form/strength unknown. Powder formulation. Add to water and fruits/vegetables daily to promote bone health.        CARTIA  MG 24 hr capsule   Generic drug:  diltiazem     10 capsule    TK 1 C PO QD    Benign essential hypertension       cholecalciferol 1000 UNIT tablet    vitamin D3     Take 1 tablet by mouth 2 times daily        cyanocolbalamin 500 MCG tablet    vitamin  B-12     Take 500 mcg by mouth every morning        * enoxaparin 60 MG/0.6ML injection    LOVENOX     35 mg        * LOVENOX 40 MG/0.4ML injection   Generic drug:  enoxaparin     60 Syringe    Inject 0.35 mLs (35 mg) Subcutaneous every 12 hours    Malignant neoplasm of pancreas, unspecified location of malignancy (H), Other chronic 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 cor pulmonale (H)       fluticasone-vilanterol 100-25 MCG/INH oral inhaler    BREO ELLIPTA    1 Inhaler    Inhale 1 puff into the lungs daily    Cough       folic acid 400 MCG tablet    FOLVITE     Take 1 tablet by mouth every morning        hydrOXYzine 25 MG tablet    ATARAX    10 tablet    Take 1-2 tablets (25-50 mg) by mouth every 6 hours as needed for itching (adjuvant pain)    Obstructive jaundice       levothyroxine 125 MCG tablet    SYNTHROID/LEVOTHROID    90 tablet    Take 1 tablet (125 mcg) by mouth daily    Hypothyroidism, unspecified type       loperamide 2 MG capsule    IMODIUM    60 capsule    2 caps at 1st sign of diarrhea & 1 cap every 2hrs until 12hrs diarrhea free. During night, 2 caps at bedtime & 2 caps every 4hrs until AM    Malignant neoplasm of head of pancreas (H)       LORazepam 0.5 MG tablet    ATIVAN    30 tablet    Take 1 tablet (0.5 mg) by mouth every 4 hours as needed (Anxiety, Nausea/Vomiting or Sleep)    Malignant neoplasm of head of pancreas (H)       nystatin 101587 UNIT/ML suspension    MYCOSTATIN          omeprazole 40 MG capsule    priLOSEC    20 capsule    Take 1  capsule (40 mg) by mouth 2 times daily Take 30-60 minutes before a meal.    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 Please dispense 2 pairs of knee high 20-30 mmHg    Lymphedema       pantoprazole 20 MG EC tablet    PROTONIX    30 tablet    Take 1 tablet (20 mg) by mouth daily    Other acute gastritis without hemorrhage       * prochlorperazine 10 MG tablet    COMPAZINE          * prochlorperazine 5 MG tablet    COMPAZINE    30 tablet    Take 1 tablet (5 mg) by mouth every 6 hours as needed for nausea or vomiting    Malignant neoplasm of head of pancreas (H)       timolol maleate 0.5 % opthalmic solution    ISTALOL    1 Bottle    Place 1 drop into both eyes every morning Before placing contact lenses    Cataract, unspecified cataract type, unspecified laterality       traMADol 50 MG tablet    ULTRAM    60 tablet    Take 1-2 tablets ( mg) by mouth every 4 hours as needed for breakthrough pain (No more than 8 tablets in a day)    Malignant neoplasm of head of pancreas (H)       triamterene-hydrochlorothiazide 37.5-25 MG per tablet    MAXZIDE-25     Take 1 tablet by mouth every morning        TYLENOL PO      Take 325 mg by mouth every 4 hours as needed for mild pain or fever        * Notice:  This list has 8 medication(s) that are the same as other medications prescribed for you. Read the directions carefully, and ask your doctor or other care provider to review them with you.

## 2018-05-22 NOTE — NURSING NOTE
Chief Complaint   Patient presents with     Chemotherapy     Cycle 5, day 1 Irinotecan Liposome, Leucovorin, and Fluorouracil pump connect     Port Draw     Port labs collected by RN.

## 2018-05-22 NOTE — PATIENT INSTRUCTIONS
Contact Numbers    [facility] [place]: [phone]  [facility] Triage and after hours / weekends / holidays:  [phone]      Please call the triage or after hours line if you experience a temperature greater than or equal to 100.5, shaking chills, have uncontrolled nausea, vomiting and/or diarrhea, dizziness, shortness of breath, chest pain, bleeding, unexplained bruising, or if you have any other new/concerning symptoms, questions or concerns.      If you are having any concerning symptoms or wish to speak to a provider before your next infusion visit, please call your care coordinator or triage to notify them so we can adequately serve you.     If you need a refill on a narcotic prescription or other medication, please call before your infusion appointment.           May 2018   Don Monday Tuesday Wednesday Thursday Friday Saturday             1     2     3     4     5       6     7     8     [facility] DRAW    8:30 AM   (15 min.)    [facility] LAB DRAW   [facility] Lab Draw     [facility] ONC INFUSION 180    9:00 AM   (180 min.)    ONCOLOGY INFUSION   [facility] 9     UMP RETURN   10:15 AM   (30 min.)   José Antonio Ann MD   [facility] 10     UMP ONC INFUSION 60   11:00 AM   (60 min.)    ONCOLOGY INFUSION   [facility] 11     12       13     14     15     16     17     18     19       20     21     22     [facility] DRAW    8:30 AM   (15 min.)    [facility] LAB DRAW   [facility] Lab Draw     [facility] ONC INFUSION 180    9:00 AM   (180 min.)    ONCOLOGY INFUSION   [facility] 23     24     25     26       27     28     29     30     31 June 2018 Sunday Monday Tuesday Wednesday Thursday Friday Saturday                            1     CT CHEST ABDOMEN PELVIS WWO    9:40 AM   (20 min.)   UCCT2   [facility] CT 2       3     4     [facility] DRAW   10:15 AM   (15 min.)   [facility]  LAB DRAW   [facility] Lab Draw     [facility] RETURN   10:30 AM   (30 min.)   Vinny Yu MD   [facility] ONC INFUSION 180   12:30 PM   (180 min.)   UC ONCOLOGY INFUSION   [facility] 5     6     7     8     9       10     11     12     13     14     15     16       17     18     19     20     21     22     23       24     25     26     27     28     29     30                  Lab Results:  Recent Results (from the past 12 hour(s))   CBC with platelets differential    Collection Time: 05/22/18  8:59 AM   Result Value Ref Range    WBC 5.0 4.0 - 11.0 10e9/L    RBC Count 3.43 (L) 3.8 - 5.2 10e12/L    Hemoglobin 10.8 (L) 11.7 - 15.7 g/dL    Hematocrit 33.5 (L) 35.0 - 47.0 %    MCV 98 78 - 100 fl    MCH 31.5 26.5 - 33.0 pg    MCHC 32.2 31.5 - 36.5 g/dL    RDW 17.3 (H) 10.0 - 15.0 %    Platelet Count 266 150 - 450 10e9/L    Diff Method Automated Method     % Neutrophils 39.1 %    % Lymphocytes 37.6 %    % Monocytes 14.3 %    % Eosinophils 7.6 %    % Basophils 1.2 %    % Immature Granulocytes 0.2 %    Nucleated RBCs 0 0 /100    Absolute Neutrophil 2.0 1.6 - 8.3 10e9/L    Absolute Lymphocytes 1.9 0.8 - 5.3 10e9/L    Absolute Monocytes 0.7 0.0 - 1.3 10e9/L    Absolute Eosinophils 0.4 0.0 - 0.7 10e9/L    Absolute Basophils 0.1 0.0 - 0.2 10e9/L    Abs Immature Granulocytes 0.0 0 - 0.4 10e9/L    Absolute Nucleated RBC 0.0     Platelet Estimate Confirming automated cell count    Bilirubin  total    Collection Time: 05/22/18  8:59 AM   Result Value Ref Range    Bilirubin Total 0.3 0.2 - 1.3 mg/dL

## 2018-05-22 NOTE — PROGRESS NOTES
"Infusion Nursing Note:  Iris Lewis presents today for cycle 5, day 1 Irinotecan Liposome, Leucovorin, and Fluorouracil pump connect  Patient seen by provider today: No   present during visit today: Not Applicable.    Note: Prior to discharge: Port is secured in place with tegaderm and flushed with 10cc NS with positive blood return noted.  Continuous home infusion CADD pump connected.    All connectors secured in place and clamps taped open; verified by Nilda Starks RN.    Pump started, \"running\" noted on display (CADD): YES.  Patient instructed to call our clinic or [facility] with any questions or concerns at home.  Patient verbalized understanding.    Patient set up for pump disconnect at our clinic on 5/24/2018 at 1100.      Intravenous Access:  Implanted Port.    Treatment Conditions:  Lab Results   Component Value Date    HGB 10.8 05/22/2018     Lab Results   Component Value Date    WBC 5.0 05/22/2018      Lab Results   Component Value Date    ANEU 2.0 05/22/2018     Lab Results   Component Value Date     05/22/2018      Lab Results   Component Value Date    BILITOTAL 0.3 05/22/2018     Results reviewed, labs MET treatment parameters, ok to proceed with treatment.    Post Infusion Assessment:  Patient tolerated infusion without incident.  Blood return noted pre and post infusion.  Site patent and intact, free from redness, edema or discomfort.  No evidence of extravasations.    Discharge Plan:   Patient declined prescription refills.  Discharge instructions reviewed with: Patient and Family.  Patient and/or family verbalized understanding of discharge instructions and all questions answered.  Copy of AVS reviewed with patient and/or family.  Patient will return 5/24/2018 for next appointment.  Patient discharged in stable condition accompanied by: .  Departure Mode: Ambulatory.    Rene Ramon RN  "

## 2018-05-22 NOTE — MR AVS SNAPSHOT
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      If you are having any concerning symptoms or wish to speak to a provider before your next infusion visit, please call your care coordinator or triage to notify them so we can adequately serve you.     If you need a refill on a narcotic prescription or other medication, please call before your infusion appointment.           May 2018   Don Monday Tuesday Wednesday Thursday Friday Saturday             1     2     3     4     5       6     7     8     [facility] DRAW    8:30 AM   (15 min.)    [facility] DRAW   [facility] Lab Draw     UM ONC INFUSION 180    9:00 AM   (180 min.)    ONCOLOGY INFUSION   [facility] 9     UMP RETURN   10:15 AM   (30 min.)   José Antonio Ann MD   [facility] 10     UMP ONC INFUSION 60   11:00 AM   (60 min.)    ONCOLOGY INFUSION   [facility] 11     12       13     14     15     16     17     18     19       20     21     22     [facility] DRAW    8:30 AM   (15 min.)    [facility] DRAW   [facility] Lab Draw     UMP ONC INFUSION 180    9:00 AM   (180 min.)    ONCOLOGY  INFUSION   [facility] 23     24     25     26       27     28     29     30     31 June 2018 Sunday Monday Tuesday Wednesday Thursday Friday Saturday                            1     CT CHEST ABDOMEN PELVIS WWO    9:40 AM   (20 min.)   UCCT2   [facility] CT 2       3     4     [facility] DRAW   10:15 AM   (15 min.)    [facility] DRAW   [facility] Lab Draw     UMP RETURN   10:30 AM   (30 min.)   Vinny Yu MD   [facility] ONC INFUSION 180   12:30 PM   (180 min.)    ONCOLOGY INFUSION   [facility] 5     6     7     8     9       10     11     12     13     14     15     16       17     18     19     20     21     22     23       24     25     26     27     28     29     30                  Lab Results:  Recent Results (from the past 12 hour(s))   CBC with platelets differential    Collection Time: 05/22/18  8:59 AM   Result Value Ref Range    WBC 5.0 4.0 - 11.0 10e9/L    RBC Count 3.43 (L) 3.8 - 5.2 10e12/L    Hemoglobin 10.8 (L) 11.7 - 15.7 g/dL    Hematocrit 33.5 (L) 35.0 - 47.0 %    MCV 98 78 - 100 fl    MCH 31.5 26.5 - 33.0 pg    MCHC 32.2 31.5 - 36.5 g/dL    RDW 17.3 (H) 10.0 - 15.0 %    Platelet Count 266 150 - 450 10e9/L    Diff Method Automated Method     % Neutrophils 39.1 %    % Lymphocytes 37.6 %    % Monocytes 14.3 %    % Eosinophils 7.6 %    % Basophils 1.2 %    % Immature Granulocytes 0.2 %    Nucleated RBCs 0 0 /100    Absolute Neutrophil 2.0 1.6 - 8.3 10e9/L    Absolute Lymphocytes 1.9 0.8 - 5.3 10e9/L    Absolute Monocytes 0.7 0.0 - 1.3 10e9/L    Absolute Eosinophils 0.4 0.0 - 0.7 10e9/L    Absolute Basophils 0.1 0.0 - 0.2 10e9/L    Abs Immature Granulocytes 0.0 0 - 0.4 10e9/L    Absolute Nucleated RBC 0.0     Platelet Estimate Confirming automated cell count    Bilirubin  total    Collection Time: 05/22/18  8:59 AM   Result Value Ref Range    Bilirubin Total 0.3 0.2  - 1.3 mg/dL               Follow-ups after your visit        Your next 10 appointments already scheduled     Jun 01, 2018  9:40 AM CDT   CT CHEST ABDOMEN PELVIS W/O & W CONTRAST with UCCT2   [facility] ([facility])    [address] [place] [address] [id number] [phone]           Please bring any scans or X-rays taken at other hospitals, if similar tests were done. Also bring a list of your medicines, including vitamins, minerals and over-the-counter drugs. It is safest to leave personal items at home.  Be sure to tell your doctor:   If you have any allergies.   If there s any chance you are pregnant.   If you are breastfeeding.  How to prepare:   Do not eat or drink for 2 hours before your exam. If you need to take medicine, you may take it with small sips of water. (We may ask you to take liquid medicine as well.)   Please wear loose clothing, such as a sweat suit or jogging clothes. Avoid snaps, zippers and other metal. We may ask you to undress and put on a hospital gown.  Please arrive 30 minutes early for your CT. Once in the department you might be asked to drink water 15-20 minutes prior to your exam.  If indicated you may be asked to drink an oral contrast in advance of your CT.  If this is the case, the imaging team will let you know or be in contact with you prior to your appointment  Patients over 70 or patients with diabetes or kidney problems:   If you haven t had a blood test (creatinine test) within the last 30 days, the Cardiologist/Radiologist may require you to get this test prior to your exam.  If you have diabetes:   Continue to take your metformin medication on the day of your exam  If you have any questions, please call the Imaging Department where you will have your exam.            Jun 04, 2018 10:15 AM CDT   [facility] with  [facility] DRAW   [facility] ([facility])    [address] [place] [address]  [address]  [address]  [facility] [address]   [phone]            Jun 04, 2018 10:45 AM CDT   (Arrive by 10:30 AM)   Return Visit with Vinny Yu MD   [facility] ([facility])    [address]  [address]  [facility] [address]   [phone]            Jun 04, 2018 12:30 PM CDT   Infusion 180 with UC ONCOLOGY INFUSION, UC 30 ATC   [facility] ([facility])    [address] [place] [address]  [address]  [facility] [address]   [phone]            Aug 08, 2018  9:30 AM CDT   (Arrive by 9:15 AM)   Return Visit with José Antonio Ann MD   [facility] ([facility])    [address] [place] [address]  [address]  [facility] [address]   [phone]              Who to contact     If you have questions or need follow up information about today's clinic visit or your schedule please contact [facility] directly at [phone].  Normal or non-critical lab and imaging results will be communicated to you by [employer], letter or phone within 4 business days after the clinic has received the results. If you do not hear from us within 7 days, please contact the clinic through [employer] or phone. If you have a critical or abnormal lab result, we will notify you by phone as soon as possible.  Submit refill requests through [employer] or call your pharmacy and they will forward the refill request to us. Please allow 3 business days for your refill to be completed.          Additional Information About Your Visit        MyChart Information     [employer] gives you secure access to your electronic health record. If you see a primary care provider, you can also send messages to your care team and make appointments. If you have questions, please call your primary care clinic.  If you do not have a primary care provider, please call [phone] and they will assist you.         Care EveryWhere ID     This is your Care EveryWhere ID. This could be used by other organizations to access your [facility] medical records  [id number]        Your Vitals Were     Pulse Temperature Respirations Pulse Oximetry BMI (Body Mass Index)       75 97.4  F (36.3  C) (Oral) 16 99% 17.85 kg/m2        Blood Pressure from Last 3 Encounters:   05/22/18 128/81   05/10/18 127/81   05/09/18 105/60    Weight from Last 3 Encounters:   05/22/18 44.3 kg (97 lb 9.6 oz)   05/09/18 44.9 kg (99 lb)   05/08/18 45 kg (99 lb 4.8 oz)              We Performed the Following     Bilirubin  total     CBC with platelets differential        Primary Care Provider Office Phone # Fax #    Neri Gipson [phone] [address] [address]        Equal Access to Services     [facility]: Hadii aad ku hadasho Sothom, waaxda luqadaha, qaybta kaalmada adeegyada, waxrachel adams . So [facility] [phone].    ATENCIÓN: Si habla español, tiene a nava disposición servicios gratuitos de asistencia lingüística. Llame al [phone].    We comply with applicable federal civil rights laws and [place] laws. We do not discriminate on the basis of race, color, national origin, age, disability, sex, sexual orientation, or gender identity.            Thank you!     Thank you for choosing [facility]  for your care. Our goal is always to provide you with excellent care. Hearing back from our patients is one way we can continue to improve our services. Please take a few minutes to complete the written survey that you may receive in the mail after your visit with us. Thank you!             Your Updated Medication List - Protect others around you: Learn how to safely use, store and throw away your medicines at www.disposemymeds.org.          This list is accurate as of 5/22/18  1:19 PM.  Always use your most recent med list.                   Brand Name Dispense  Instructions for use Diagnosis    albuterol 108 (90 Base) MCG/ACT Inhaler    PROAIR HFA/PROVENTIL HFA/VENTOLIN HFA    1 Inhaler    Inhale 2 puffs into the lungs 4 times daily    Mild asthma, unspecified whether complicated, unspecified whether persistent       amylase-lipase-protease 54994 units Cpep    CREON    180 capsule    Take 2 capsules (72,000 Units) by mouth 3 times daily (with meals)    Malignant neoplasm of head of pancreas (H)       CALCIUM PO      Take by mouth every morning Form/strength unknown. Powder formulation. Add to water and fruits/vegetables daily to promote bone health.        CARTIA  MG 24 hr capsule   Generic drug:  diltiazem     10 capsule    TK 1 C PO QD    Benign essential hypertension       cholecalciferol 1000 UNIT tablet    vitamin D3     Take 1 tablet by mouth 2 times daily        cyanocolbalamin 500 MCG tablet    vitamin  B-12     Take 500 mcg by mouth every morning        * enoxaparin 60 MG/0.6ML injection    LOVENOX     35 mg        * LOVENOX 40 MG/0.4ML injection   Generic drug:  enoxaparin     60 Syringe    Inject 0.35 mLs (35 mg) Subcutaneous every 12 hours    Malignant neoplasm of pancreas, unspecified location of malignancy (H), Other chronic 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 cor pulmonale (H)       fluticasone-vilanterol 100-25 MCG/INH oral inhaler    BREO ELLIPTA    1 Inhaler    Inhale 1 puff into the lungs daily    Cough       folic acid 400 MCG tablet    FOLVITE     Take 1 tablet by mouth every morning        hydrOXYzine 25 MG tablet    ATARAX    10 tablet    Take 1-2 tablets (25-50 mg) by mouth every 6 hours as needed for itching (adjuvant pain)    Obstructive jaundice       levothyroxine 125 MCG tablet    SYNTHROID/LEVOTHROID    90 tablet    Take 1 tablet (125 mcg) by mouth daily    Hypothyroidism, unspecified type       loperamide 2 MG capsule    IMODIUM    60 capsule    2 caps at 1st sign of diarrhea & 1 cap every 2hrs until 12hrs diarrhea free. During night, 2  caps at bedtime & 2 caps every 4hrs until AM    Malignant neoplasm of head of pancreas (H)       LORazepam 0.5 MG tablet    ATIVAN    30 tablet    Take 1 tablet (0.5 mg) by mouth every 4 hours as needed (Anxiety, Nausea/Vomiting or Sleep)    Malignant neoplasm of head of pancreas (H)       nystatin 835292 UNIT/ML suspension    MYCOSTATIN          omeprazole 40 MG capsule    priLOSEC    20 capsule    Take 1 capsule (40 mg) by mouth 2 times daily Take 30-60 minutes before a meal.    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 Please dispense 2 pairs of knee high 20-30 mmHg    Lymphedema       pantoprazole 20 MG EC tablet    PROTONIX    30 tablet    Take 1 tablet (20 mg) by mouth daily    Other acute gastritis without hemorrhage       * prochlorperazine 10 MG tablet    COMPAZINE          * prochlorperazine 5 MG tablet    COMPAZINE    30 tablet    Take 1 tablet (5 mg) by mouth every 6 hours as needed for nausea or vomiting    Malignant neoplasm of head of pancreas (H)       timolol maleate 0.5 % opthalmic solution    ISTALOL    1 Bottle    Place 1 drop into both eyes every morning Before placing contact lenses    Cataract, unspecified cataract type, unspecified laterality       traMADol 50 MG tablet    ULTRAM    60 tablet    Take 1-2 tablets ( mg) by mouth every 4 hours as needed for breakthrough pain (No more than 8 tablets in a day)    Malignant neoplasm of head of pancreas (H)       triamterene-hydrochlorothiazide 37.5-25 MG per tablet    MAXZIDE-25     Take 1 tablet by mouth every  morning        TYLENOL PO      Take 325 mg by mouth every 4 hours as needed for mild pain or fever        * Notice:  This list has 8 medication(s) that are the same as other medications prescribed for you. Read the directions carefully, and ask your doctor or other care provider to review them with you.

## 2018-05-24 NOTE — PATIENT INSTRUCTIONS
Contact Numbers    [facility] Main Line: [phone]  [facility] Triage and after hours / weekends / holidays:  [phone]      Please call the triage or after hours line if you experience a temperature greater than or equal to 100.5, shaking chills, have uncontrolled nausea, vomiting and/or diarrhea, dizziness, shortness of breath, chest pain, bleeding, unexplained bruising, or if you have any other new/concerning symptoms, questions or concerns.      If you are having any concerning symptoms or wish to speak to a provider before your next infusion visit, please call your care coordinator or triage to notify them so we can adequately serve you.     If you need a refill on a narcotic prescription or other medication, please call before your infusion appointment.             May 2018   Don Monday Tuesday Wednesday Thursday Friday Saturday             1     2     3     4     5       6     7     8     [facility] D[facility]    8:30 AM   (15 min.)    [facility] DRAW   [facility] Lab Draw     UMP ONC INFUSION 180    9:00 AM   (180 min.)    ONCOLOGY INFUSION   [facility] 9     UMP RETURN   10:15 AM   (30 min.)   José Antonio Ann MD   [facility] 10     UMP ONC INFUSION 60   11:00 AM   (60 min.)   UC ONCOLOGY INFUSION   [facility] 11     12       13     14     15     16     17     18     19       20     21     22     [facility] DRAW    8:30 AM   (15 min.)    [facility] DRAW   [facility] Lab Draw     [facility] ONC INFUSION 180    9:00 AM   (180 min.)   UC ONCOLOGY INFUSION   [facility] 23     24     UMP ONC INFUSION 60   11:00 AM   (60 min.)    ONCOLOGY INFUSION   [facility] 25     26       27     28     29     30     31 June 2018 Sunday Monday Tuesday Wednesday Thursday Friday Saturday                            1     CT CHEST ABDOMEN PELVIS WWO    9:40 AM   (20 min.)   UCCT2     [facility] CT 2       3     4     [facility] LAB DRAW   10:15 AM   (15 min.)   [facility] LAB DRAW   [facility] Lab Draw     [facility] RETURN   10:30 AM   (30 min.)   Vinny Yu MD   [facility] ONC INFUSION 180   12:30 PM   (180 min.)    ONCOLOGY INFUSION   [facility] 5     6     7     8     9       10     11     12     13     14     15     16       17     18     19     20     21     22     23       24     25     26     27     28     29     30               No results found for this or any previous visit (from the past 24 hour(s)).

## 2018-05-24 NOTE — MR AVS SNAPSHOT
After Visit Summary   5/24/2018    Iris Lewis    MRN: [id number]           Patient Information     Date Of Birth          1940        Visit Information        Provider Department      5/24/2018 11:00 AM  30 ATC;  ONCOLOGY INFUSION [facility]        Care Instructions    Contact Numbers    [facility] Main Line: [phone]  [facility] Triage and after hours / weekends / holidays:  [phone]      Please call the triage or after hours line if you experience a temperature greater than or equal to 100.5, shaking chills, have uncontrolled nausea, vomiting and/or diarrhea, dizziness, shortness of breath, chest pain, bleeding, unexplained bruising, or if you have any other new/concerning symptoms, questions or concerns.      If you are having any concerning symptoms or wish to speak to a provider before your next infusion visit, please call your care coordinator or triage to notify them so we can adequately serve you.     If you need a refill on a narcotic prescription or other medication, please call before your infusion appointment.             May 2018   Don Monday Tuesday Wednesday Thursday Friday Saturday             1     2     3     4     5       6     7     8     P [facility] DRAW    8:30 AM   (15 min.)    [facility] DRAW   [facility] Lab Draw     UMP ONC INFUSION 180    9:00 AM   (180 min.)    ONCOLOGY INFUSION   [facility] 9     UMP RETURN   10:15 AM   (30 min.)   José Antonio Ann MD   [facility] 10     UMP ONC INFUSION 60   11:00 AM   (60 min.)    ONCOLOGY INFUSION   [facility] 11     12       13     14     15     16     17     18     19       20     21     22     [facility] DRAW    8:30 AM   (15 min.)    [facility] LAB DRAW   [facility] Lab Draw     UMP ONC INFUSION 180    9:00 AM   (180 min.)    ONCOLOGY INFUSION   [facility] 23     24     UMP ONC INFUSION  60   11:00 AM   (60 min.)    ONCOLOGY INFUSION   [facility] 25     26       27     28     29     30     31 June 2018 Sunday Monday Tuesday Wednesday Thursday Friday Saturday                            1     CT CHEST ABDOMEN PELVIS WWO    9:40 AM   (20 min.)   [address] 2       3     4     [facility] LAB DRAW   10:15 AM   (15 min.)   [facility] LAB DRAW   [facility] Lab Draw     UMP RETURN   10:30 AM   (30 min.)   Vinny Yu MD   [facility]     UMP ONC INFUSION 180   12:30 PM   (180 min.)    ONCOLOGY INFUSION   [facility] 5     6     7     8     9       10     11     12     13     14     15     16       17     18     19     20     21     22     23       24     25     26     27     28     29     30               No results found for this or any previous visit (from the past 24 hour(s)).            Follow-ups after your visit        Your next 10 appointments already scheduled     Jun 01, 2018  9:40 AM CDT   CT CHEST ABDOMEN PELVIS W/O & W CONTRAST with [address] ([facility])    [address] [address] [phone]           Please bring any scans or X-rays taken at other hospitals, if similar tests were done. Also bring a list of your medicines, including vitamins, minerals and over-the-counter drugs. It is safest to leave personal items at home.  Be sure to tell your doctor:   If you have any allergies.   If there s any chance you are pregnant.   If you are breastfeeding.  How to prepare:   Do not eat or drink for 2 hours before your exam. If you need to take medicine, you may take it with small sips of water. (We may ask you to take liquid medicine as well.)   Please wear loose clothing, such as a sweat suit or jogging clothes. Avoid snaps, zippers and other metal. We may ask you to undress and put  on a hospital gown.  Please arrive 30 minutes early for your CT. Once in the department you might be asked to drink water 15-20 minutes prior to your exam.  If indicated you may be asked to drink an oral contrast in advance of your CT.  If this is the case, the imaging team will let you know or be in contact with you prior to your appointment  Patients over 70 or patients with diabetes or kidney problems:   If you haven t had a blood test (creatinine test) within the last 30 days, the Cardiologist/Radiologist may require you to get this test prior to your exam.  If you have diabetes:   Continue to take your metformin medication on the day of your exam  If you have any questions, please call the Imaging Department where you will have your exam.            Jun 04, 2018 10:15 AM CDT   [facility] Lab Draw with  [facility] LAB DRAW   [facility] Lab Draw ([facility])    [address] [place] [address]  [address]  [facility] [address]   [phone]            Jun 04, 2018 10:45 AM CDT   (Arrive by 10:30 AM)   Return Visit with Vinny Yu MD   [facility] ([facility])    [address] [place] [address]  [address]  [facility] [address]   [phone]            Jun 04, 2018 12:30 PM CDT   Infusion 180 with  ONCOLOGY INFUSION, UC 30 ATC   [facility] ([facility])    [address] [place] [address]  [address]  [facility] [address]   [phone]            Aug 08, 2018  9:30 AM CDT   (Arrive by 9:15 AM)   Return Visit with José Antonio Ann MD   [facility] ([facility])    [address] [place] [address]  [address]  [facility] [address]   [phone]              Who to contact     If you have questions or need follow up information about today's clinic visit or your schedule please contact [employer] [facility] directly at [phone].  Normal or non-critical lab  and imaging results will be communicated to you by MyChart, letter or phone within 4 business days after the clinic has received the results. If you do not hear from us within 7 days, please contact the clinic through [employer] or phone. If you have a critical or abnormal lab result, we will notify you by phone as soon as possible.  Submit refill requests through bizsol or call your pharmacy and they will forward the refill request to us. Please allow 3 business days for your refill to be completed.          Additional Information About Your Visit        [employer] Information     bizsol gives you secure access to your electronic health record. If you see a primary care provider, you can also send messages to your care team and make appointments. If you have questions, please call your primary care clinic.  If you do not have a primary care provider, please call [phone] and they will assist you.        Care EveryWhere ID     This is your Care EveryWhere ID. This could be used by other organizations to access your [place] medical records  [id number]         Blood Pressure from Last 3 Encounters:   05/22/18 128/81   05/10/18 127/81   05/09/18 105/60    Weight from Last 3 Encounters:   05/22/18 44.3 kg (97 lb 9.6 oz)   05/09/18 44.9 kg (99 lb)   05/08/18 45 kg (99 lb 4.8 oz)              Today, you had the following     No orders found for display       Primary Care Provider Office Phone # Fax #    Neri Gipson [phone]       3 [address] [address]        Equal Access to Services     NATASHA [facility]NA : Hadii aad ku hadasho Soomaali, waaxda luqadaha, qaybta kaalmada adeegyada, lisa adams . So [facility] [place] [phone].    ATENCIÓN: Si habla español, tiene a nava disposición servicios gratuitos de asistencia lingüística. Llame al [phone].    We comply with applicable federal civil rights laws and [place] laws. We do not discriminate on the basis  of race, color, national origin, age, disability, sex, sexual orientation, or gender identity.            Thank you!     Thank you for choosing [facility]  for your care. Our goal is always to provide you with excellent care. Hearing back from our patients is one way we can continue to improve our services. Please take a few minutes to complete the written survey that you may receive in the mail after your visit with us. Thank you!             Your Updated Medication List - Protect others around you: Learn how to safely use, store and throw away your medicines at www.disposemymeds.org.          This list is accurate as of 5/24/18 11:13 AM.  Always use your most recent med list.                   Brand Name Dispense Instructions for use Diagnosis    albuterol 108 (90 Base) MCG/ACT Inhaler    PROAIR HFA/PROVENTIL HFA/VENTOLIN HFA    1 Inhaler    Inhale 2 puffs into the lungs 4 times daily    Mild asthma, unspecified whether complicated, unspecified whether persistent       amylase-lipase-protease 29930 units Cpep    CREON    180 capsule    Take 2 capsules (72,000 Units) by mouth 3 times daily (with meals)    Malignant neoplasm of head of pancreas (H)       CALCIUM PO      Take by mouth every morning Form/strength unknown. Powder formulation. Add to water and fruits/vegetables daily to promote bone health.        CARTIA  MG 24 hr capsule   Generic drug:  diltiazem     10 capsule    TK 1 C PO QD    Benign essential hypertension       cholecalciferol 1000 UNIT tablet    vitamin D3     Take 1 tablet by mouth 2 times daily        cyanocolbalamin 500 MCG tablet    vitamin  B-12     Take 500 mcg by mouth every morning        fluticasone-vilanterol 100-25 MCG/INH oral inhaler    BREO ELLIPTA    1 Inhaler    Inhale 1 puff into the lungs daily    Cough       folic acid 400 MCG tablet    FOLVITE     Take 1 tablet by mouth every morning        hydrOXYzine 25 MG tablet    ATARAX    10 tablet    Take 1-2  tablets (25-50 mg) by mouth every 6 hours as needed for itching (adjuvant pain)    Obstructive jaundice       levothyroxine 125 MCG tablet    SYNTHROID/LEVOTHROID    90 tablet    Take 1 tablet (125 mcg) by mouth daily    Hypothyroidism, unspecified type       loperamide 2 MG capsule    IMODIUM    60 capsule    2 caps at 1st sign of diarrhea & 1 cap every 2hrs until 12hrs diarrhea free. During night, 2 caps at bedtime & 2 caps every 4hrs until AM    Malignant neoplasm of head of pancreas (H)       LORazepam 0.5 MG tablet    ATIVAN    30 tablet    Take 1 tablet (0.5 mg) by mouth every 4 hours as needed (Anxiety, Nausea/Vomiting or Sleep)    Malignant neoplasm of head of pancreas (H)       * LOVENOX 40 MG/0.4ML injection   Generic drug:  enoxaparin     60 Syringe    Inject 0.35 mLs (35 mg) Subcutaneous every 12 hours    Malignant neoplasm of pancreas, unspecified location of malignancy (H), Other chronic 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 cor pulmonale (H)       * enoxaparin 60 MG/0.6ML injection    LOVENOX    10 Syringe    Inject 0.35 mLs (35 mg) Subcutaneous every 12 hours    History of pulmonary embolism       nystatin 235377 UNIT/ML suspension    MYCOSTATIN          omeprazole 40 MG capsule    priLOSEC    20 capsule    Take 1 capsule (40 mg) by mouth 2 times daily Take 30-60 minutes before a meal.    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 Please dispense 2 pairs of knee high 20-30 mmHg    Lymphedema       pantoprazole 20 MG EC tablet    PROTONIX    30 tablet    Take 1 tablet (20 mg)  by mouth daily    Other acute gastritis without hemorrhage       * prochlorperazine 10 MG tablet    COMPAZINE          * prochlorperazine 5 MG tablet    COMPAZINE    30 tablet    Take 1 tablet (5 mg) by mouth every 6 hours as needed for nausea or vomiting    Malignant neoplasm of head of pancreas (H)       timolol maleate 0.5 % opthalmic solution    ISTALOL    1 Bottle    Place 1 drop into both eyes every morning Before placing contact lenses    Cataract, unspecified cataract type, unspecified laterality       traMADol 50 MG tablet    ULTRAM    60 tablet    Take 1-2 tablets ( mg) by mouth every 4 hours as needed for breakthrough pain (No more than 8 tablets in a day)    Malignant neoplasm of head of pancreas (H)       triamterene-hydrochlorothiazide 37.5-25 MG per tablet    MAXZIDE-25     Take 1 tablet by mouth every morning        TYLENOL PO      Take 325 mg by mouth every 4 hours as needed for mild pain or fever        * Notice:  This list has 8 medication(s) that are the same as other medications prescribed for you. Read the directions carefully, and ask your doctor or other care provider to review them with you.

## 2018-05-24 NOTE — PROGRESS NOTES
Infusion Nursing Note:  Iris Lewis presents today for Fluorouracil pump disconnect.    Patient seen by provider today: No    Treatment Conditions:  Lab Results   Component Value Date    HGB 10.8 05/22/2018     Lab Results   Component Value Date    WBC 5.0 05/22/2018      Lab Results   Component Value Date    ANEU 2.0 05/22/2018     Lab Results   Component Value Date     05/22/2018      Lab Results   Component Value Date     05/08/2018                   Lab Results   Component Value Date    POTASSIUM 3.6 05/08/2018           Lab Results   Component Value Date    MAG 1.9 11/15/2017            Lab Results   Component Value Date    CR 0.64 05/08/2018                   Lab Results   Component Value Date    EMMY 8.5 05/08/2018                Lab Results   Component Value Date    BILITOTAL 0.3 05/22/2018           Lab Results   Component Value Date    ALBUMIN 2.7 05/08/2018                    Lab Results   Component Value Date    ALT 10 05/08/2018           Lab Results   Component Value Date    AST 12 05/08/2018         Intravenous Access:  Implanted Port.    Note: Pt reports occasional diarrhea, controlled with lomotil. Pt also reports mouth sores. Encouraged salt and soda rinses.  Pt instructed to call with worsening symptoms.       Post Infusion Assessment:  Blood return noted pre and post infusion.  Access discontinued per protocol.    Discharge Plan:   Patient declined prescription refills.  Discharge instructions reviewed with: Patient.  Patient and/or family verbalized understanding of discharge instructions and all questions answered.  Copy of AVS reviewed with patient and/or family.  Patient will return 6/4/18 for next appointment.  Patient discharged in stable condition accompanied by: self.  Departure Mode: Ambulatory.  Face to Face time: 0.    Fallon Elise RN

## 2018-05-24 NOTE — TELEPHONE ENCOUNTER
Central Prior Authorization Team   Phone: [phone]      PA Initiation    Medication: enoxaparin (LOVENOX) 40 MG/0.4ML injection-PA initiated  Insurance Company: [employer] - Phone [phone] Fax [phone]  Pharmacy Filling the Rx: [facility] [address] [place] [address] AT [place] [address]  Filling Pharmacy Phone: [phone]  Filling Pharmacy Fax:    Start Date: 5/24/2018

## 2018-05-25 NOTE — TELEPHONE ENCOUNTER
Prior Authorization Approval    Authorization Effective Date: 5/25/2018  Authorization Expiration Date: 5/25/2019  Medication: enoxaparin (LOVENOX) 40 MG/0.4ML injection-PA approved  Approved Dose/Quantity:    Reference #:     Insurance Company: [employer] - Phone [phone] Fax [phone]  Expected CoPay:       CoPay Card Available:      Foundation Assistance Needed:    Which Pharmacy is filling the prescription (Not needed for infusion/clinic administered): [facility] [address] [place] [address] AT [place] [address]  Pharmacy Notified: Yes  Patient Notified: No

## 2018-06-01 NOTE — DISCHARGE INSTRUCTIONS

## 2018-06-04 NOTE — NURSING NOTE
"Oncology Rooming Note    June 4, 2018 10:46 AM   Iris Lewis is a 78 year old female who presents for:    Chief Complaint   Patient presents with     Port Draw     labs drawn via port by RN     Oncology Clinic Visit     Adenocarcinoma     Initial Vitals: /72 (BP Location: Right arm, Patient Position: Chair, Cuff Size: Adult Small)  Pulse 93  Temp 98  F (36.7  C) (Oral)  Ht 1.575 m (5' 2\")  Wt 44 kg (97 lb)  SpO2 97%  BMI 17.74 kg/m2 Estimated body mass index is 17.74 kg/(m^2) as calculated from the following:    Height as of this encounter: 1.575 m (5' 2\").    Weight as of this encounter: 44 kg (97 lb). Body surface area is 1.39 meters squared.  Severe Pain (7) Comment: Data Unavailable   No LMP recorded. Patient is postmenopausal.  Allergies reviewed: Yes  Medications reviewed: Yes    Medications: Medication refills not needed today.  Pharmacy name entered into Imagiin.:    [facility] - [facility] [address] [facility] [address] [place] [address] [address] AT [facility] OF [facility]    Clinical concerns: No New Concerns    5 minutes for nursing intake (face to face time)     RORY Osborne    "

## 2018-06-04 NOTE — PATIENT INSTRUCTIONS
Contact Numbers  [facility]: [phone]    After Hours:  [phone]  Triage: [phone]    Please call the [facility] Triage line if you experience a temperature greater than or equal to 100.5, shaking chills, have uncontrolled nausea, vomiting and/or diarrhea, dizziness, shortness of breath, chest pain, bleeding, unexplained bruising, or if you have any other new/concerning symptoms, questions or concerns.     If it is after hours, weekends, or holidays, please call the main hospital  at  [phone] and ask to speak to the Oncology doctor on call.     If you are having any concerning symptoms or wish to speak to a provider before your next infusion visit, please call your care coordinator or triage to notify them so we can adequately serve you.     If you need a refill on a narcotic prescription or other medication, please call triage before your infusion appointment.           June 2018 Sunday Monday Tuesday Wednesday Thursday Friday Saturday                            1     CT CHEST ABDOMEN PELVIS WWO    9:40 AM   (20 min.)   UCCT2   [facility] CT 2       3     4     [facility] DRAW   10:15 AM   (15 min.)   [facility] DRAW   [facility] Lab Draw     UMP RETURN   10:30 AM   (30 min.)   Vinny Yu MD   [facility]     UMP ONC INFUSION 180   12:30 PM   (180 min.)    ONCOLOGY INFUSION   [facility] 5     6     UMP ONC INFUSION 60    1:00 PM   (60 min.)    ONCOLOGY INFUSION   [facility] 7     8     9       10     11     12     13     14     15     16       17     18     19     [facility] DRAW   12:00 PM   (15 min.)    [facility] LAB DRAW   [facility] Lab Draw     UMP ONC INFUSION 180   12:30 PM   (180 min.)    ONCOLOGY INFUSION   [facility] 20     21     22     23       24     25     26     27     28     29     30                July 2018 Sunday Monday  Tuesday Wednesday Thursday Friday Saturday   1     2     [facility] DRAW   10:15 AM   (15 min.)    [facility]     [facility] ONC INFUSION 180   11:00 AM   (180 min.)   UC ONCOLOGY INFUSION   [facility] 3     4     5     6     7       8     9     10     11     12     13     14       15     16     [facility] LAB DRAW   12:00 PM   (15 min.)    [facility] LAB DRAW   [facility] Lab Draw     UMP ONC INFUSION 180   12:30 PM   (180 min.)    ONCOLOGY INFUSION   [facility] 17     18     19     20     21       22     23     24     25     26     27     28       29     30     [facility] DRAW   12:00 PM   (15 min.)    [facility] LAB DRAW   [facility] Lab Draw     UMP ONC INFUSION 180   12:30 PM   (180 min.)    ONCOLOGY INFUSION   [facility] 31                                      Lab Results:  Recent Results (from the past 12 hour(s))   CBC with platelets differential    Collection Time: 06/04/18 10:38 AM   Result Value Ref Range    WBC 5.3 4.0 - 11.0 10e9/L    RBC Count 2.64 (L) 3.8 - 5.2 10e12/L    Hemoglobin 8.4 (L) 11.7 - 15.7 g/dL    Hematocrit 25.7 (L) 35.0 - 47.0 %    MCV 97 78 - 100 fl    MCH 31.8 26.5 - 33.0 pg    MCHC 32.7 31.5 - 36.5 g/dL    RDW 18.3 (H) 10.0 - 15.0 %    Platelet Count 221 150 - 450 10e9/L    Diff Method Automated Method     % Neutrophils 52.4 %    % Lymphocytes 30.4 %    % Monocytes 12.9 %    % Eosinophils 2.8 %    % Basophils 0.9 %    % Immature Granulocytes 0.6 %    Nucleated RBCs 0 0 /100    Absolute Neutrophil 2.8 1.6 - 8.3 10e9/L    Absolute Lymphocytes 1.6 0.8 - 5.3 10e9/L    Absolute Monocytes 0.7 0.0 - 1.3 10e9/L    Absolute Eosinophils 0.2 0.0 - 0.7 10e9/L    Absolute Basophils 0.1 0.0 - 0.2 10e9/L    Abs Immature Granulocytes 0.0 0 - 0.4 10e9/L    Absolute Nucleated RBC 0.0    Bilirubin  total    Collection Time: 06/04/18 10:38 AM   Result Value Ref Range    Bilirubin Total 0.4  0.2 - 1.3 mg/dL

## 2018-06-04 NOTE — PROGRESS NOTES
Iris Trinidad is here today in follow-up of metastatic pancreatic cancer.    She had disease metastatic to liver presentation and initially had an excellent response to gemcitabine plus Abraxane. 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 She has now had 2 months of treatment with 5-FU plus liposomal irinotecan and is here for her first response assessment. She feels her chemotherapy is gone surprisingly well. She does get some degree of diarrhea that is mostly only a problem for a couple days out of the cycle. She's had fairly severe mouth sores but does get better with nystatin mouth rinses. She's been eating well. She continues on with regular physical activity. The remainder of her complete review of systems is positive for some ongoing modest peripheral numbness and tingling which has not yet started to get better. She continues on Arixtra for her pulmonary embolism and has had no bleeding problems.    On exam she appears well but quite slender as always. Her vital signs are unremarkable.  She has no icterus. She has modest erythema and one small ulceration in the oropharynx.  She has no adenopathy palpable in the neck or supraclavicular spaces. Her port site is unremarkable.  Her lungs are clear to auscultation without dullness to percussion.  Her heart rate and rhythm are regular.  Her abdomen is soft and nontender without palpable mass or organomegaly.  Her speech is full and her cranial nerves are grossly intact.    I reviewed with her her CT scan which shows her liver metastasis in her primary tumor to be improving. Her blood counts are unremarkable.    Assessment/plan: Metastatic pancreatic cancer. The patient is responding to her new chemotherapy regimen with modest toxicity. I refilled her prescription for nystatin that she finds it quite effective at managing her mouth sores. I encouraged her in making liberal use  of Lomotil when she has problems with the diarrhea. We'll continue on with her treatment regimen as previous, and reevaluate her disease status again after another 2 months of treatment.

## 2018-06-04 NOTE — PROGRESS NOTES
"Infusion Nursing Note:  Iris Lewis presents today for C6D1 Onivyde/Leucovorin/Fluorouracil pump.    Patient seen by provider today: Yes: Vinny Yu MD   present during visit today: Not Applicable.    Note: Patient feels well. No complaint made. Otherwise well.    Intravenous Access:  Implanted Port.    Treatment Conditions:  Lab Results   Component Value Date    HGB 8.4 06/04/2018     Lab Results   Component Value Date    WBC 5.3 06/04/2018      Lab Results   Component Value Date    ANEU 2.8 06/04/2018     Lab Results   Component Value Date     06/04/2018      Lab Results   Component Value Date     05/08/2018                   Lab Results   Component Value Date    POTASSIUM 3.6 05/08/2018           Lab Results   Component Value Date    MAG 1.9 11/15/2017            Lab Results   Component Value Date    CR 0.64 05/08/2018                   Lab Results   Component Value Date    EMMY 8.5 05/08/2018                Lab Results   Component Value Date    BILITOTAL 0.4 06/04/2018           Lab Results   Component Value Date    ALBUMIN 2.7 05/08/2018                    Lab Results   Component Value Date    ALT 10 05/08/2018           Lab Results   Component Value Date    AST 12 05/08/2018       Results reviewed, labs MET treatment parameters, ok to proceed with treatment.    Infusion:  Continous Infusion of Fluorouracil at 5ml/hour for 46 hours, double checked with Anna Butcher RN.    Post Infusion Assessment:    Results reviewed, copy given to patient.  Proceed with treatment.    Prior to discharge: Port is secured in place with tegaderm and flushed with 10cc NS with positive blood return noted.  Continuous home infusion CADD pump connected.    All connectors secured in place and clamps taped open.    Pump started, \"running\" noted on display (CADD): *YES. Checked by Anna Butcher RN  Patient instructed to call our clinic or [facility] with any questions or concerns at home.  " Patient verbalized understanding.    Patient set up for pump disconnect at our clinic on 6/6/18@1300H.        Post Infusion Assessment:  Patient tolerated infusion without incident.  Blood return noted pre and post infusion.  Site patent and intact, free from redness, edema or discomfort.  No evidence of extravasations.    Discharge Plan:   Patient declined prescription refills.  Discharge instructions reviewed with: Patient and Family.  Patient and/or family verbalized understanding of discharge instructions and all questions answered.  Copy of AVS reviewed with patient and/or family.  Patient will return 6/19/18 for next infusion appointment.  Patient discharged in stable condition accompanied by: self and .  Departure Mode: Ambulatory.    HARIKA CRAWFORD RN

## 2018-06-04 NOTE — MR AVS SNAPSHOT
After Visit Summary   6/4/2018    Iris Lewis    MRN: [id number]           Patient Information     Date Of Birth          1940        Visit Information        Provider Department      6/4/2018 12:30 PM  30 ATC;  ONCOLOGY INFUSION [facility]        Today's Diagnoses     Malignant neoplasm of head of pancreas (H)    -  1      Care Instructions    Contact Numbers  [facility]: [phone]    After Hours:  [phone]  Triage: [phone]    Please call the [facility] Triage line if you experience a temperature greater than or equal to 100.5, shaking chills, have uncontrolled nausea, vomiting and/or diarrhea, dizziness, shortness of breath, chest pain, bleeding, unexplained bruising, or if you have any other new/concerning symptoms, questions or concerns.     If it is after hours, weekends, or holidays, please call the main hospital  at  [phone] and ask to speak to the Oncology doctor on call.     If you are having any concerning symptoms or wish to speak to a provider before your next infusion visit, please call your care coordinator or triage to notify them so we can adequately serve you.     If you need a refill on a narcotic prescription or other medication, please call triage before your infusion appointment.           June 2018 Sunday Monday Tuesday Wednesday Thursday Friday Saturday                            1     CT CHEST ABDOMEN PELVIS WWO    9:40 AM   (20 min.)   UCCT2   [facility] CT 2       3     4     [facility] LAB DRAW   10:15 AM   (15 min.)   [facility] LAB DRAW   [facility] Lab Draw     P RETURN   10:30 AM   (30 min.)   Vinny Yu MD   [facility] ONC INFUSION 180   12:30 PM   (180 min.)    ONCOLOGY INFUSION   [facility] 5     6     [facility] ONC INFUSION 60    1:00 PM   (60 min.)    ONCOLOGY INFUSION   [facility] 7     8     9        10     11     12     13     14     15     16       17     18     19     [facility] DRAW   12:00 PM   (15 min.)    [facility] LAB DRAW   [facility] Lab Draw     [facility] ONC INFUSION 180   12:30 PM   (180 min.)    ONCOLOGY INFUSION   [facility] 20     21     22     23       24     25     26     27     28     29     30                July 2018 Sunday Monday Tuesday Wednesday Thursday Friday Saturday   1     2     [facility] DRAW   10:15 AM   (15 min.)    [facility] LAB DRAW   [facility] Lab Draw     [facility] ONC INFUSION 180   11:00 AM   (180 min.)    ONCOLOGY INFUSION   [facility] 3     4     5     6     7       8     9     10     11     12     13     14       15     16     [facility] DRAW   12:00 PM   (15 min.)    [facility]RAW   [facility]     [facility] ONC INFUSION 180   12:30 PM   (180 min.)    ONCOLOGY INFUSION   [facility] 17     18     19     20     21       22     23     24     25     26     27     28       29     30     [facility] DRAW   12:00 PM   (15 min.)    [facility] LAB DRAW   [facility]     [facility] ONC INFUSION 180   12:30 PM   (180 min.)    ONCOLOGY INFUSION   [facility] 31                                      Lab Results:  Recent Results (from the past 12 hour(s))   CBC with platelets differential    Collection Time: 06/04/18 10:38 AM   Result Value Ref Range    WBC 5.3 4.0 - 11.0 10e9/L    RBC Count 2.64 (L) 3.8 - 5.2 10e12/L    Hemoglobin 8.4 (L) 11.7 - 15.7 g/dL    Hematocrit 25.7 (L) 35.0 - 47.0 %    MCV 97 78 - 100 fl    MCH 31.8 26.5 - 33.0 pg    MCHC 32.7 31.5 - 36.5 g/dL    RDW 18.3 (H) 10.0 - 15.0 %    Platelet Count 221 150 - 450 10e9/L    Diff Method Automated Method     % Neutrophils 52.4 %    % Lymphocytes 30.4 %    % Monocytes 12.9 %    % Eosinophils 2.8 %    % Basophils 0.9 %    % Immature Granulocytes 0.6 %    Nucleated RBCs 0 0 /100    Absolute  Neutrophil 2.8 1.6 - 8.3 10e9/L    Absolute Lymphocytes 1.6 0.8 - 5.3 10e9/L    Absolute Monocytes 0.7 0.0 - 1.3 10e9/L    Absolute Eosinophils 0.2 0.0 - 0.7 10e9/L    Absolute Basophils 0.1 0.0 - 0.2 10e9/L    Abs Immature Granulocytes 0.0 0 - 0.4 10e9/L    Absolute Nucleated RBC 0.0    Bilirubin  total    Collection Time: 06/04/18 10:38 AM   Result Value Ref Range    Bilirubin Total 0.4 0.2 - 1.3 mg/dL               Follow-ups after your visit        Your next 10 appointments already scheduled     Jun 06, 2018  1:00 PM CDT   Infusion 60 with UC ONCOLOGY INFUSION, UC 26 ATC   [facility]    [address] [place] [address]  [address]  [facility] [address]   [phone]            Jun 19, 2018 12:00 PM CDT   [facility] Lab Draw with UC [facility] LAB DRAW   [facility] Lab Draw ([facility])    [address] [place] [address]  [address]  [facility] [address]   [phone]            Jun 19, 2018 12:30 PM CDT   Infusion 180 with UC ONCOLOGY INFUSION, UC 30 ATC   [facility]    [address] [place] [address]  [address]  [facility] [address]   [phone]            Jul 02, 2018 10:15 AM CDT   [facility] Lab Draw with UC [facility] LAB DRAW   [facility] Lab Draw ([facility])    [address] [place] [address]  [address]  [facility] [address]   [phone]            Jul 02, 2018 11:00 AM CDT   Infusion 180 with UC ONCOLOGY INFUSION, UC 20 ATC   [facility]    [address] [place] [address]  [address]  [facility] [address]   [phone]            Jul 16, 2018 12:00 PM CDT   [facility] Lab Draw with UC [facility] LAB DRAW   [facility] Lab Draw ([facility])    [address] [place] [address]  [address]  [facility] [address]   [phone]            Jul 16, 2018 12:30 PM CDT   Infusion 180 with UC ONCOLOGY  INFUSION   [facility] ([facility])    [address]  [address]  [facility] [place] [address] [phone]              Future tests that were ordered for you today     Open Future Orders        Priority Expected Expires Ordered    CT Chest Abdomen Pelvis w/o & w Contrast Routine 8/27/2018 9/24/2018 6/4/2018    Comprehensive metabolic panel Routine 8/27/2018 9/24/2018 6/4/2018    CBC with platelets differential Routine 8/27/2018 9/24/2018 6/4/2018    Cancer antigen 19-9 Routine 8/27/2018 9/24/2018 6/4/2018            Who to contact     If you have questions or need follow up information about today's clinic visit or your schedule please contact [facility] directly at [phone].  Normal or non-critical lab and imaging results will be communicated to you by MyChart, letter or phone within 4 business days after the clinic has received the results. If you do not hear from us within 7 days, please contact the clinic through [employer] or phone. If you have a critical or abnormal lab result, we will notify you by phone as soon as possible.  Submit refill requests through [employer] or call your pharmacy and they will forward the refill request to us. Please allow 3 business days for your refill to be completed.          Additional Information About Your Visit        [employer] Information     [employer] gives you secure access to your electronic health record. If you see a primary care provider, you can also send messages to your care team and make appointments. If you have questions, please call your primary care clinic.  If you do not have a primary care provider, please call [phone] and they will assist you.        Care EveryWhere ID     This is your Care EveryWhere ID. This could be used by other organizations to access your [place] medical records  [id number]        Your Vitals Were     Respirations                   18            Blood Pressure  from Last 3 Encounters:   06/04/18 135/72   05/24/18 124/56   05/22/18 128/81    Weight from Last 3 Encounters:   06/04/18 44 kg (97 lb)   05/22/18 44.3 kg (97 lb 9.6 oz)   05/09/18 44.9 kg (99 lb)              We Performed the Following     Bilirubin  total     Cancer antigen 19-9     CBC with platelets differential          Today's Medication Changes          These changes are accurate as of 6/4/18  1:14 PM.  If you have any questions, ask your nurse or doctor.               These medicines have changed or have updated prescriptions.        Dose/Directions    nystatin 236989 UNIT/ML suspension   Commonly known as:  MYCOSTATIN   This may have changed:    - how much to take  - how to take this  - when to take this   Used for:  Malignant neoplasm of head of pancreas (H), History of pulmonary embolism   Changed by:  Vinny Yu MD        Dose:  387147 Units   Take 5 mLs (500,000 Units) by mouth 4 times daily   Quantity:  473 mL   Refills:  3            Where to get your medicines      These medications were sent to [facility] - [address] [place] [address], [facility] [address]     Phone:  [phone]     nystatin 356519 UNIT/ML suspension         Some of these will need a paper prescription and others can be bought over the counter.  Ask your nurse if you have questions.     Bring a paper prescription for each of these medications     LORazepam 0.5 MG tablet                Primary Care Provider Office Phone # Fax #    Neri Gipson [phone]       3 [address] [address]        Equal Access to Services     NESSA NICHOLS AH: Haddiana Martinez, waaxda luqadaha, qaybta kaalmada lisa correia. So [facility] [phone].    ATENCIÓN: Si habla español, tiene a nava disposición servicios gratuitos de asistencia lingüística. Llame al [phone].    We  comply with applicable federal civil rights laws and [place] laws. We do not discriminate on the basis of race, color, national origin, age, disability, sex, sexual orientation, or gender identity.            Thank you!     Thank you for choosing [facility]  for your care. Our goal is always to provide you with excellent care. Hearing back from our patients is one way we can continue to improve our services. Please take a few minutes to complete the written survey that you may receive in the mail after your visit with us. Thank you!             Your Updated Medication List - Protect others around you: Learn how to safely use, store and throw away your medicines at www.disposemymeds.org.          This list is accurate as of 6/4/18  1:14 PM.  Always use your most recent med list.                   Brand Name Dispense Instructions for use Diagnosis    albuterol 108 (90 Base) MCG/ACT Inhaler    PROAIR HFA/PROVENTIL HFA/VENTOLIN HFA    1 Inhaler    Inhale 2 puffs into the lungs 4 times daily    Mild asthma, unspecified whether complicated, unspecified whether persistent       amylase-lipase-protease 19289 units Cpep    CREON    180 capsule    Take 2 capsules (72,000 Units) by mouth 3 times daily (with meals)    Malignant neoplasm of head of pancreas (H)       CALCIUM PO      Take by mouth every morning Form/strength unknown. Powder formulation. Add to water and fruits/vegetables daily to promote bone health.        CARTIA  MG 24 hr capsule   Generic drug:  diltiazem     10 capsule    TK 1 C PO QD    Benign essential hypertension       cholecalciferol 1000 UNIT tablet    vitamin D3     Take 1 tablet by mouth 2 times daily        cyanocolbalamin 500 MCG tablet    vitamin  B-12     Take 500 mcg by mouth every morning        fluticasone-vilanterol 100-25 MCG/INH oral inhaler    BREO ELLIPTA    1 Inhaler    Inhale 1 puff into the lungs daily    Cough       folic acid 400 MCG tablet    FOLVITE      Take 1 tablet by mouth every morning        hydrOXYzine 25 MG tablet    ATARAX    10 tablet    Take 1-2 tablets (25-50 mg) by mouth every 6 hours as needed for itching (adjuvant pain)    Obstructive jaundice       levothyroxine 125 MCG tablet    SYNTHROID/LEVOTHROID    90 tablet    Take 1 tablet (125 mcg) by mouth daily    Hypothyroidism, unspecified type       loperamide 2 MG capsule    IMODIUM    60 capsule    2 caps at 1st sign of diarrhea & 1 cap every 2hrs until 12hrs diarrhea free. During night, 2 caps at bedtime & 2 caps every 4hrs until AM    Malignant neoplasm of head of pancreas (H)       LORazepam 0.5 MG tablet    ATIVAN    30 tablet    Take 1 tablet (0.5 mg) by mouth every 4 hours as needed (Anxiety, Nausea/Vomiting or Sleep)    Malignant neoplasm of head of pancreas (H)       * LOVENOX 40 MG/0.4ML injection   Generic drug:  enoxaparin     60 Syringe    Inject 0.35 mLs (35 mg) Subcutaneous every 12 hours    Malignant neoplasm of pancreas, unspecified location of malignancy (H), Other chronic 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 cor pulmonale (H)       * enoxaparin 60 MG/0.6ML injection    LOVENOX    10 Syringe    Inject 0.35 mLs (35 mg) Subcutaneous every 12 hours    History of pulmonary embolism       nystatin 001940 UNIT/ML suspension    MYCOSTATIN    473 mL    Take 5 mLs (500,000 Units) by mouth 4 times daily    Malignant neoplasm of head of pancreas (H), History of pulmonary embolism       omeprazole 40 MG capsule    priLOSEC    20 capsule    Take 1 capsule (40 mg) by mouth 2 times daily Take 30-60 minutes before a meal.    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 Please dispense 2 pairs of knee high 20-30 mmHg    Lymphedema       pantoprazole 20 MG EC tablet    PROTONIX    30 tablet    Take 1 tablet (20 mg) by mouth daily    Other acute gastritis without hemorrhage       * prochlorperazine 5 MG tablet    COMPAZINE    30 tablet    Take 1 tablet (5 mg) by mouth every 6 hours as needed for nausea or vomiting    Malignant neoplasm of head of pancreas (H)       * prochlorperazine 10 MG tablet    COMPAZINE    30 tablet    Take 1 tablet (10 mg) by mouth every 6 hours as needed for nausea or vomiting    Malignant neoplasm of head of pancreas (H)       * timolol maleate 0.5 % opthalmic solution    ISTALOL    1 Bottle    Place 1 drop into both eyes every morning Before placing contact lenses    Cataract, unspecified cataract type, unspecified laterality       * timolol 0.5 % ophthalmic solution    TIMOPTIC     INT 1 GTT OU IN THE MORNING        traMADol 50 MG tablet    ULTRAM    60 tablet    Take 1-2 tablets ( mg) by mouth every 4 hours as needed for breakthrough pain (No more than 8 tablets in a day)    Malignant neoplasm of head of pancreas (H)       triamterene-hydrochlorothiazide 37.5-25 MG per tablet    MAXZIDE-25     Take 1 tablet by mouth every morning        TYLENOL PO      Take 325 mg by mouth every 4 hours as needed for mild pain or fever        * Notice:  This list has 10 medication(s) that are the same as other medications prescribed for you. Read the directions carefully, and ask your doctor or other care provider to review them with you.

## 2018-06-04 NOTE — LETTER
6/4/2018      RE: Iris Lewis  [address]  [place] [address]       Iris Trinidad is here today in follow-up of metastatic pancreatic cancer.    She had disease metastatic to liver presentation and initially had an excellent response to gemcitabine plus Abraxane. 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 She has now had 2 months of treatment with 5-FU plus liposomal irinotecan and is here for her first response assessment. She feels her chemotherapy is gone surprisingly well. She does get some degree of diarrhea that is mostly only a problem for a couple days out of the cycle. She's had fairly severe mouth sores but does get better with nystatin mouth rinses. She's been eating well. She continues on with regular physical activity. The remainder of her complete review of systems is positive for some ongoing modest peripheral numbness and tingling which has not yet started to get better. She continues on Arixtra for her pulmonary embolism and has had no bleeding problems.    On exam she appears well but quite slender as always. Her vital signs are unremarkable.  She has no icterus. She has modest erythema and one small ulceration in the oropharynx.  She has no adenopathy palpable in the neck or supraclavicular spaces. Her port site is unremarkable.  Her lungs are clear to auscultation without dullness to percussion.  Her heart rate and rhythm are regular.  Her abdomen is soft and nontender without palpable mass or organomegaly.  Her speech is full and her cranial nerves are grossly intact.    I reviewed with her her CT scan which shows her liver metastasis in her primary tumor to be improving. Her blood counts are unremarkable.    Assessment/plan: Metastatic pancreatic cancer. The patient is responding to her new chemotherapy regimen with modest toxicity. I refilled her prescription for nystatin that she finds it  quite effective at managing her mouth sores. I encouraged her in making liberal use of Lomotil when she has problems with the diarrhea. We'll continue on with her treatment regimen as previous, and reevaluate her disease status again after another 2 months of treatment.    Vinny Yu MD

## 2018-06-04 NOTE — NURSING NOTE
Chief Complaint   Patient presents with     Port Draw     labs drawn via port by RN     /72 (BP Location: Right arm, Patient Position: Chair, Cuff Size: Adult Small)  Pulse 93  Temp 98  F (36.7  C) (Oral)  Wt 44 kg (97 lb)  SpO2 97%  BMI 17.74 kg/m2    Vitals taken. Port accessed by RN. Labs collected and sent. Line flushed with NS & Heparin. Pt tolerated well. Pt checked in for next appointment.    Marilynn Stratton RN

## 2018-06-04 NOTE — MR AVS SNAPSHOT
After Visit Summary   6/4/2018    Iris Lewis    MRN: [id number]           Patient Information     Date Of Birth          1940        Visit Information        Provider Department      6/4/2018 10:45 AM Vinny Yu MD [facility]        Today's Diagnoses     Malignant neoplasm of head of pancreas (H)    -  1    History of pulmonary embolism           Follow-ups after your visit        Follow-up notes from your care team     Return in about 3 months (around 8/27/2018) for MD visit with CT and labs.      Your next 10 appointments already scheduled     Jun 04, 2018 12:30 PM CDT   Infusion 180 with UC ONCOLOGY INFUSION, UC 30 ATC   [facility] ([facility])    [address] [place] [address] [address]  [address]  [facility] [id number] [phone]            Aug 08, 2018  9:30 AM CDT   (Arrive by 9:15 AM)   Return Visit with José Antonio Ann MD   [facility] ([facility])    [address] [place] [address] Se  [address]  [facility] [id number] [phone]              Future tests that were ordered for you today     Open Future Orders        Priority Expected Expires Ordered    CT Chest Abdomen Pelvis w/o & w Contrast Routine 8/27/2018 9/24/2018 6/4/2018    Comprehensive metabolic panel Routine 8/27/2018 9/24/2018 6/4/2018    CBC with platelets differential Routine 8/27/2018 9/24/2018 6/4/2018    Cancer antigen 19-9 Routine 8/27/2018 9/24/2018 6/4/2018            Who to contact     If you have questions or need follow up information about today's clinic visit or your schedule please contact [facility] directly at [phone].  Normal or non-critical lab and imaging results will be communicated to you by MyChart, letter or phone within 4 business days after the clinic has received the results. If you do not hear from us within 7 days, please contact the clinic through [employer] Skylabshart or  "phone. If you have a critical or abnormal lab result, we will notify you by phone as soon as possible.  Submit refill requests through [employer] or call your pharmacy and they will forward the refill request to us. Please allow 3 business days for your refill to be completed.          Additional Information About Your Visit        [employer] Information     [employer] gives you secure access to your electronic health record. If you see a primary care provider, you can also send messages to your care team and make appointments. If you have questions, please call your primary care clinic.  If you do not have a primary care provider, please call [phone] and they will assist you.        Care EveryWhere ID     This is your Care EveryWhere ID. This could be used by other organizations to access your [facility] medical records  [id number]        Your Vitals Were     Pulse Temperature Height Pulse Oximetry BMI (Body Mass Index)       93 98  F (36.7  C) (Oral) 1.575 m (5' 2\") 97% 17.74 kg/m2        Blood Pressure from Last 3 Encounters:   06/04/18 135/72   05/24/18 124/56   05/22/18 128/81    Weight from Last 3 Encounters:   06/04/18 44 kg (97 lb)   05/22/18 44.3 kg (97 lb 9.6 oz)   05/09/18 44.9 kg (99 lb)                 Today's Medication Changes          These changes are accurate as of 6/4/18 11:39 AM.  If you have any questions, ask your nurse or doctor.               These medicines have changed or have updated prescriptions.        Dose/Directions    nystatin 921751 UNIT/ML suspension   Commonly known as:  MYCOSTATIN   This may have changed:    - how much to take  - how to take this  - when to take this   Used for:  Malignant neoplasm of head of pancreas (H), History of pulmonary embolism   Changed by:  Vinny Yu MD        Dose:  338561 Units   Take 5 mLs (500,000 Units) by mouth 4 times daily   Quantity:  473 mL   Refills:  3            Where to get your medicines      These medications [facility] " [facility] - [place], [place] - [address] [place] [address]  [address], [place] [address]     Phone:  [phone]     nystatin 853757 UNIT/ML suspension         Some of these will need a paper prescription and others can be bought over the counter.  Ask your nurse if you have questions.     Bring a paper prescription for each of these medications     LORazepam 0.5 MG tablet                Primary Care Provider Office Phone # Fax #    Neri Gipson [phone] [address] [address]        Equal Access to Services     NESSA NICHOLS : Hadii mary wallace Sothom, waaxda luqadaha, qaybta kaalmada adetobiyada, lisa adams . So [facility] [phone].    ATENCIÓN: Si habla español, tiene a nava disposición servicios gratuitos de asistencia lingüística. Llame al [phone].    We comply with applicable federal civil rights laws and [place] laws. We do not discriminate on the basis of race, color, national origin, age, disability, sex, sexual orientation, or gender identity.            Thank you!     Thank you for choosing [facility]  for your care. Our goal is always to provide you with excellent care. Hearing back from our patients is one way we can continue to improve our services. Please take a few minutes to complete the written survey that you may receive in the mail after your visit with us. Thank you!             Your Updated Medication List - Protect others around you: Learn how to safely use, store and throw away your medicines at www.disposemymeds.org.          This list is accurate as of 6/4/18 11:39 AM.  Always use your most recent med list.                   Brand Name Dispense Instructions for use Diagnosis    albuterol 108 (90 Base) MCG/ACT Inhaler    PROAIR HFA/PROVENTIL HFA/VENTOLIN HFA    1 Inhaler    Inhale 2 puffs into the lungs 4 times daily    Mild asthma,  unspecified whether complicated, unspecified whether persistent       amylase-lipase-protease 34005 units Cpep    CREON    180 capsule    Take 2 capsules (72,000 Units) by mouth 3 times daily (with meals)    Malignant neoplasm of head of pancreas (H)       CALCIUM PO      Take by mouth every morning Form/strength unknown. Powder formulation. Add to water and fruits/vegetables daily to promote bone health.        CARTIA  MG 24 hr capsule   Generic drug:  diltiazem     10 capsule    TK 1 C PO QD    Benign essential hypertension       cholecalciferol 1000 UNIT tablet    vitamin D3     Take 1 tablet by mouth 2 times daily        cyanocolbalamin 500 MCG tablet    vitamin  B-12     Take 500 mcg by mouth every morning        fluticasone-vilanterol 100-25 MCG/INH oral inhaler    BREO ELLIPTA    1 Inhaler    Inhale 1 puff into the lungs daily    Cough       folic acid 400 MCG tablet    FOLVITE     Take 1 tablet by mouth every morning        hydrOXYzine 25 MG tablet    ATARAX    10 tablet    Take 1-2 tablets (25-50 mg) by mouth every 6 hours as needed for itching (adjuvant pain)    Obstructive jaundice       levothyroxine 125 MCG tablet    SYNTHROID/LEVOTHROID    90 tablet    Take 1 tablet (125 mcg) by mouth daily    Hypothyroidism, unspecified type       loperamide 2 MG capsule    IMODIUM    60 capsule    2 caps at 1st sign of diarrhea & 1 cap every 2hrs until 12hrs diarrhea free. During night, 2 caps at bedtime & 2 caps every 4hrs until AM    Malignant neoplasm of head of pancreas (H)       LORazepam 0.5 MG tablet    ATIVAN    30 tablet    Take 1 tablet (0.5 mg) by mouth every 4 hours as needed (Anxiety, Nausea/Vomiting or Sleep)    Malignant neoplasm of head of pancreas (H)       * LOVENOX 40 MG/0.4ML injection   Generic drug:  enoxaparin     60 Syringe    Inject 0.35 mLs (35 mg) Subcutaneous every 12 hours    Malignant neoplasm of pancreas, unspecified location of malignancy (H), Other chronic pulmonary embolism  without acute cor pulmonale (H)       * enoxaparin 60 MG/0.6ML injection    LOVENOX    10 Syringe    Inject 0.35 mLs (35 mg) Subcutaneous every 12 hours    History of pulmonary embolism       nystatin 311692 UNIT/ML suspension    MYCOSTATIN    473 mL    Take 5 mLs (500,000 Units) by mouth 4 times daily    Malignant neoplasm of head of pancreas (H), History of pulmonary embolism       omeprazole 40 MG capsule    priLOSEC    20 capsule    Take 1 capsule (40 mg) by mouth 2 times daily Take 30-60 minutes before a meal.    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 Please dispense 2 pairs of knee high 20-30 mmHg    Lymphedema       pantoprazole 20 MG EC tablet    PROTONIX    30 tablet    Take 1 tablet (20 mg) by mouth daily    Other acute gastritis without hemorrhage       * prochlorperazine 5 MG tablet    COMPAZINE    30 tablet    Take 1 tablet (5 mg) by mouth every 6 hours as needed for nausea or vomiting    Malignant neoplasm of head of pancreas (H)       * prochlorperazine 10 MG tablet    COMPAZINE    30 tablet    Take 1 tablet (10 mg) by mouth every 6 hours as needed for nausea or vomiting    Malignant neoplasm of head of pancreas (H)       * timolol maleate 0.5 % opthalmic solution    ISTALOL    1 Bottle    Place 1 drop into both eyes every morning Before placing contact lenses    Cataract, unspecified cataract type, unspecified laterality       * timolol 0.5 % ophthalmic solution    TIMOPTIC     INT 1 GTT OU IN THE MORNING        traMADol 50 MG tablet    ULTRAM    60 tablet    Take  1-2 tablets ( mg) by mouth every 4 hours as needed for breakthrough pain (No more than 8 tablets in a day)    Malignant neoplasm of head of pancreas (H)       triamterene-hydrochlorothiazide 37.5-25 MG per tablet    MAXZIDE-25     Take 1 tablet by mouth every morning        TYLENOL PO      Take 325 mg by mouth every 4 hours as needed for mild pain or fever        * Notice:  This list has 10 medication(s) that are the same as other medications prescribed for you. Read the directions carefully, and ask your doctor or other care provider to review them with you.

## 2018-06-06 NOTE — PROGRESS NOTES
Infusion Nursing Note:  Iris Lewis presents today for Fluorouracil pump d/c .    Patient seen by provider today: No   present during visit today: Not Applicable.    Note: Fluorouracil pump completed and intact upon patient's arrival. Patient stated she is feeling well today. She is having her normal mild diarrhea and occasional dizziness when standing. Patient is eating and drinking OK.     Intravenous Access:  Implanted Port.    Post Infusion Assessment:  Patient tolerated infusion without incident.  Blood return noted pre and post infusion.  Site patent and intact, free from redness, edema or discomfort.  No evidence of extravasations.  Access discontinued per protocol.    Discharge Plan:   Patient declined prescription refills.  AVS to patient via BizzingoT. Patient will return 6/19/18 for next appointment.   Patient discharged in stable condition accompanied by: self.  Departure Mode: Ambulatory.    Marylu Cordoba RN

## 2018-06-06 NOTE — MR AVS SNAPSHOT
After Visit Summary   6/6/2018    Iris Lewis    MRN: [id number]           Patient Information     Date Of Birth          1940        Visit Information        Provider Department      6/6/2018 1:00 PM UC 26 ATC; UC ONCOLOGY INFUSION [facility]        Today's Diagnoses     Malignant neoplasm of head of pancreas (H)    -  1       Follow-ups after your visit        Your next 10 appointments already scheduled     Jun 19, 2018 12:00 PM CDT   [facility] Lab Draw with UC MASONIC LAB DRAW   [facility]    [address] [place] [address]  [address]  [facility] [address]   [phone]            Jun 19, 2018 12:30 PM CDT   Infusion 180 with UC ONCOLOGY INFUSION, UC 30 ATC   [facility] ([facility])    [address] [place] [address]  [address]  [facility] [address]   [phone]            Jul 02, 2018 10:15 AM CDT   Masonic Lab Draw with UC MASONIC LAB DRAW   [facility] Masonic Lab Draw ([facility])    [address] [place] [address]  [address]  [facility] [address]   [phone]            Jul 02, 2018 11:00 AM CDT   Infusion 180 with UC ONCOLOGY INFUSION, UC 20 ATC   [facility] ([facility])    [address] [place] [address]  [address]  [facility] [address]   [phone]            Jul 16, 2018 12:00 PM CDT   Masonic Lab Draw with UC MASONIC LAB DRAW   [facility] Mason[facility] Lab Draw ([facility])    [address] [place] [address]  [address]  [facility] [address]   [phone]            Jul 16, 2018 12:30 PM CDT   Infusion 180 with UC ONCOLOGY INFUSION, UC 15 ATC   [facility] ([facility])    [address] [place] [address]  [address]  [facility] [address]   [phone]            Jul 30, 2018 12:00 PM CDT   Masonic Lab Draw with UC MASONIC LAB DRAW   [facility] Masonic Lab Draw ([facility]  [facility])    [address] [facility]  [address]  [facility] [address] [phone]              Who to contact     If you have questions or need follow up information about today's clinic visit or your schedule please contact [facility] directly at [phone].  Normal or non-critical lab and imaging results will be communicated to you by MyChart, letter or phone within 4 business days after the clinic has received the results. If you do not hear from us within 7 days, please contact the clinic through KartMehart or phone. If you have a critical or abnormal lab result, we will notify you by phone as soon as possible.  Submit refill requests through [employer] or call your pharmacy and they will forward the refill request to us. Please allow 3 business days for your refill to be completed.          Additional Information About Your Visit        [employer] Information     [employer] gives you secure access to your electronic health record. If you see a primary care provider, you can also send messages to your care team and make appointments. If you have questions, please call your primary care clinic.  If you do not have a primary care provider, please call [phone] and they will assist you.        Care EveryWhere ID     This is your Care EveryWhere ID. This could be used by other organizations to access your [place] medical records  [id number]        Your Vitals Were     Pulse Temperature Respirations Pulse Oximetry          72 98.2  F (36.8  C) (Oral) 16 100%         Blood Pressure from Last 3 Encounters:   06/06/18 124/61   06/04/18 135/72   05/24/18 124/56    Weight from Last 3 Encounters:   06/04/18 44 kg (97 lb)   05/22/18 44.3 kg (97 lb 9.6 oz)   05/09/18 44.9 kg (99 lb)              Today, you had the following     No orders found for display         Today's Medication Changes          These changes are accurate as of 6/6/18  1:49 PM.  If you have any questions, ask your  nurse or doctor.               These medicines have changed or have updated prescriptions.        Dose/Directions    LOVENOX 40 MG/0.4ML injection   This may have changed:  Another medication with the same name was removed. Continue taking this medication, and follow the directions you see here.   Used for:  Malignant neoplasm of pancreas, unspecified location of malignancy (H), Other chronic 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 cor pulmonale (H)   Generic drug:  enoxaparin        Dose:  35 mg   Inject 0.35 mLs (35 mg) Subcutaneous every 12 hours   Quantity:  60 Syringe   Refills:  3                Primary Care Provider Office Phone # Fax #    Neri Gipson [phone] [address] [address]        Equal Access to Services     NATASHA [facility] : Shaun marsho Michelle, waaxda russeladaha, qaybta kaalmada roseyyameera, lisa michael. So [facility] [phone].    ATENCIÓN: Si habla español, tiene a nava disposición servicios gratuitos de asistencia lingüística. Llame al [phone].    We comply with applicable federal civil rights laws and [place] laws. We do not discriminate on the basis of race, color, national origin, age, disability, sex, sexual orientation, or gender identity.            Thank you!     Thank you for choosing [facility]  for your care. Our goal is always to provide you with excellent care. Hearing back from our patients is one way we can continue to improve our services. Please take a few minutes to complete the written survey that you may receive in the mail after your visit with us. Thank you!             Your Updated Medication List - Protect others around you: Learn how to safely use, store and throw away your medicines at www.disposemymeds.org.          This list is accurate as of 6/6/18  1:49 PM.  Always use your most recent med list.                   Brand Name Dispense Instructions for use Diagnosis     albuterol 108 (90 Base) MCG/ACT Inhaler    PROAIR HFA/PROVENTIL HFA/VENTOLIN HFA    1 Inhaler    Inhale 2 puffs into the lungs 4 times daily    Mild asthma, unspecified whether complicated, unspecified whether persistent       amylase-lipase-protease 38807 units Cpep    CREON    180 capsule    Take 2 capsules (72,000 Units) by mouth 3 times daily (with meals)    Malignant neoplasm of head of pancreas (H)       CALCIUM PO      Take by mouth every morning Form/strength unknown. Powder formulation. Add to water and fruits/vegetables daily to promote bone health.        CARTIA  MG 24 hr capsule   Generic drug:  diltiazem     10 capsule    TK 1 C PO QD    Benign essential hypertension       cholecalciferol 1000 UNIT tablet    vitamin D3     Take 1 tablet by mouth 2 times daily        cyanocolbalamin 500 MCG tablet    vitamin  B-12     Take 500 mcg by mouth every morning        fluticasone-vilanterol 100-25 MCG/INH oral inhaler    BREO ELLIPTA    1 Inhaler    Inhale 1 puff into the lungs daily    Cough       folic acid 400 MCG tablet    FOLVITE     Take 1 tablet by mouth every morning        hydrOXYzine 25 MG tablet    ATARAX    10 tablet    Take 1-2 tablets (25-50 mg) by mouth every 6 hours as needed for itching (adjuvant pain)    Obstructive jaundice       levothyroxine 125 MCG tablet    SYNTHROID/LEVOTHROID    90 tablet    Take 1 tablet (125 mcg) by mouth daily    Hypothyroidism, unspecified type       loperamide 2 MG capsule    IMODIUM    60 capsule    2 caps at 1st sign of diarrhea & 1 cap every 2hrs until 12hrs diarrhea free. During night, 2 caps at bedtime & 2 caps every 4hrs until AM    Malignant neoplasm of head of pancreas (H)       LORazepam 0.5 MG tablet    ATIVAN    30 tablet    Take 1 tablet (0.5 mg) by mouth every 4 hours as needed (Anxiety, Nausea/Vomiting or Sleep)    Malignant neoplasm of head of pancreas (H)       LOVENOX 40 MG/0.4ML injection   Generic drug:  enoxaparin     60 Syringe     Inject 0.35 mLs (35 mg) Subcutaneous every 12 hours    Malignant neoplasm of pancreas, unspecified location of malignancy (H), Other chronic 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 cor pulmonale (H)       nystatin 512923 UNIT/ML suspension    MYCOSTATIN    473 mL    Take 5 mLs (500,000 Units) by mouth 4 times daily    Malignant neoplasm of head of pancreas (H), History of pulmonary embolism       omeprazole 40 MG capsule    priLOSEC    20 capsule    Take 1 capsule (40 mg) by mouth 2 times daily Take 30-60 minutes before a meal.    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 Please dispense 2 pairs of knee high 20-30 mmHg    Lymphedema       pantoprazole 20 MG EC tablet    PROTONIX    30 tablet    Take 1 tablet (20 mg) by mouth daily    Other acute gastritis without hemorrhage       * prochlorperazine 5 MG tablet    COMPAZINE    30 tablet    Take 1 tablet (5 mg) by mouth every 6 hours as needed for nausea or vomiting    Malignant neoplasm of head of pancreas (H)       * prochlorperazine 10 MG tablet    COMPAZINE    30 tablet    Take 1 tablet (10 mg) by mouth every 6 hours as needed for nausea or vomiting    Malignant neoplasm of head of pancreas (H)       * timolol maleate 0.5 % opthalmic solution    ISTALOL    1 Bottle    Place 1 drop into both eyes every morning Before placing contact lenses    Cataract, unspecified cataract type, unspecified laterality       * timolol 0.5 % ophthalmic solution    TIMOPTIC     INT 1 GTT OU IN THE MORNING        traMADol 50 MG tablet    ULTRAM    60 tablet     Take 1-2 tablets ( mg) by mouth every 4 hours as needed for breakthrough pain (No more than 8 tablets in a day)    Malignant neoplasm of head of pancreas (H)       triamterene-hydrochlorothiazide 37.5-25 MG per tablet    MAXZIDE-25     Take 1 tablet by mouth every morning        TYLENOL PO      Take 325 mg by mouth every 4 hours as needed for mild pain or fever        * Notice:  This list has 8 medication(s) that are the same as other medications prescribed for you. Read the directions carefully, and ask your doctor or other care provider to review them with you.

## 2018-06-11 NOTE — TELEPHONE ENCOUNTER
Oncology Distress Screening Follow-up  Clinical Social Work  [facility]    Identified Concern and Score From Distress Screening: Patient triggered oncology distress screening for concern regarding work and home life issues (8), knowing what resources are available to help (8) and request for SW follow up    Date of Distress Screenin2018    Data: Patient is a 78 year old female with metastatic pancreatic cancer.    Intervention: SW attempted to contact patient by phone.  No response to attempted call, voicemail message left with SW contact information and request for return phone call.    Education Provided: SW availability    Follow-up Required: SW is available to assist with identified needs, questions or concerns.  SW will await return phone call from patient.      Soo Yeon Han, MSW, LICSW  Pager: [phone]  Phone: [phone]

## 2018-06-19 NOTE — MR AVS SNAPSHOT
After Visit Summary   6/19/2018    Iris Lewis    MRN: [id number]           Patient Information     Date Of Birth          1940        Visit Information        Provider Department      6/19/2018 12:30 PM  30 ATC;  ONCOLOGY INFUSION [facility]        Today's Diagnoses     Malignant neoplasm of head of pancreas (H)    -  1      Care Instructions    Contact Numbers  [facility]: [phone]    After Hours:  [phone]  Triage: [phone]    Please call the [facility] Triage line if you experience a temperature greater than or equal to 100.5, shaking chills, have uncontrolled nausea, vomiting and/or diarrhea, dizziness, shortness of breath, chest pain, bleeding, unexplained bruising, or if you have any other new/concerning symptoms, questions or concerns.     If it is after hours, weekends, or holidays, please call the main hospital  at  [phone] and ask to speak to the Oncology doctor on call.     If you are having any concerning symptoms or wish to speak to a provider before your next infusion visit, please call your care coordinator or triage to notify them so we can adequately serve you.     If you need a refill on a narcotic prescription or other medication, please call triage before your infusion appointment.           June 2018 Sunday Monday Tuesday Wednesday Thursday Friday Saturday                            1     CT CHEST ABDOMEN PELVIS WWO    9:40 AM   (20 min.)   UCCT2   [facility] CT 2       3     4     [facility] MASON[facility] LAB DRAW   10:15 AM   (15 min.)   [facility] LAB DRAW   [facility] Lab Draw     UMP RETURN   10:30 AM   (30 min.)   Vinny Yu MD   [facility] ONC INFUSION 180   12:30 PM   (180 min.)    ONCOLOGY INFUSION   [facility] 5     6     [facility] ONC INFUSION 60    1:00 PM   (60 min.)    ONCOLOGY INFUSION   [facility] 7     8      9       10     11     12     13     14     15     16       17     18     19     UMP MAS[facility] LAB DRAW   12:00 PM   (15 min.)    MASONIC LAB DRAW   [facility] Mason[facility] Lab Draw     UMP ONC INFUSION 180   12:30 PM   (180 min.)    ONCOLOGY INFUSION   [facility] 20     21     22     23       24     25     26     27     28     29     30                July 2018 Sunday Monday Tuesday Wednesday Thursday Friday Saturday   1     2     UMP [facility] LAB DRAW   10:15 AM   (15 min.)    MASONIC LAB DRAW   [facility] Lab Draw     UMP ONC INFUSION 180   11:00 AM   (180 min.)    ONCOLOGY INFUSION   [facility] 3     4     5     6     7       8     9     10     11     12     13     14       15     16     UMP MAS[facility] LAB DRAW   12:00 PM   (15 min.)    MASONIC LAB DRAW   [facility] Mason[facility] Lab Draw     UMP ONC INFUSION 180   12:30 PM   (180 min.)    ONCOLOGY INFUSION   [facility] 17     18     19     20     21       22     23     24     25     26     27     28       29     30     [facility] LAB DRAW   12:00 PM   (15 min.)    [facility] LAB [facility]RAW   [facility]     UMP ONC INFUSION 180   12:30 PM   (180 min.)    ONCOLOGY INFUSION   [facility] 31                                      Lab Results:  Recent Results (from the past 12 hour(s))   CBC with platelets differential    Collection Time: 06/19/18 12:55 PM   Result Value Ref Range    WBC 6.5 4.0 - 11.0 10e9/L    RBC Count 3.26 (L) 3.8 - 5.2 10e12/L    Hemoglobin 10.5 (L) 11.7 - 15.7 g/dL    Hematocrit 32.1 (L) 35.0 - 47.0 %    MCV 99 78 - 100 fl    MCH 32.2 26.5 - 33.0 pg    MCHC 32.7 31.5 - 36.5 g/dL    RDW 19.9 (H) 10.0 - 15.0 %    Platelet Count 405 150 - 450 10e9/L    Diff Method Automated Method     % Neutrophils 59.2 %    % Lymphocytes 24.2 %    % Monocytes 13.3 %    % Eosinophils 2.2 %    % Basophils 0.6 %    % Immature Granulocytes 0.5 %    Nucleated RBCs 0 0 /100     Absolute Neutrophil 3.9 1.6 - 8.3 10e9/L    Absolute Lymphocytes 1.6 0.8 - 5.3 10e9/L    Absolute Monocytes 0.9 0.0 - 1.3 10e9/L    Absolute Eosinophils 0.1 0.0 - 0.7 10e9/L    Absolute Basophils 0.0 0.0 - 0.2 10e9/L    Abs Immature Granulocytes 0.0 0 - 0.4 10e9/L    Absolute Nucleated RBC 0.0    Bilirubin  total    Collection Time: 06/19/18 12:55 PM   Result Value Ref Range    Bilirubin Total 0.5 0.2 - 1.3 mg/dL               Follow-ups after your visit        Your next 10 appointments already scheduled     Jun 21, 2018  1:30 PM CDT   Infusion 60 with UC ONCOLOGY INFUSION, UC 23 ATC   [facility] ([facility])    [address] [place] [address]  [address]  [facility] [address]   [phone]            Jul 02, 2018 10:15 AM CDT   Masonic Lab Draw with UC MASONIC LAB DRAW   [facility] Lab Draw ([facility])    [address] [place] [address]  [address]  [facility] [address]   [phone]            Jul 02, 2018 11:00 AM CDT   Infusion 180 with UC ONCOLOGY INFUSION, UC 20 ATC   [facility] ([facility])    [address] [place] [address]  [address]  [facility] [address]   [phone]            Jul 16, 2018 12:00 PM CDT   Masonic Lab Draw with UC MASONIC LAB DRAW   [facility] Lab Draw ([facility])    [address] [place] [address]  [address]  [facility] [address]   [phone]            Jul 16, 2018 12:30 PM CDT   Infusion 180 with UC ONCOLOGY INFUSION, UC 15 ATC   [facility] ([facility])    [address] [place] [address]  [address]  [facility] [address]   [phone]            Jul 30, 2018 12:00 PM CDT   Masonic Lab Draw with UC MASONIC LAB DRAW   [facility] Draw ([facility])    [address] [place] [address]  [address]  [facility] [address]   [phone]            Jul 30, 2018 12:30 PM CDT   Infusion 180 with UC  ONCOLOGY INFUSION   [facility] ([facility])    [address] [address]  [address]  [facility] [address] [phone]              Who to contact     If you have questions or need follow up information about today's clinic visit or your schedule please contact [facility] directly at [phone].  Normal or non-critical lab and imaging results will be communicated to you by MyChart, letter or phone within 4 business days after the clinic has received the results. If you do not hear from us within 7 days, please contact the clinic through [employer] or phone. If you have a critical or abnormal lab result, we will notify you by phone as soon as possible.  Submit refill requests through [employer] or call your pharmacy and they will forward the refill request to us. Please allow 3 business days for your refill to be completed.          Additional Information About Your Visit        [employer] Information     [employer] gives you secure access to your electronic health record. If you see a primary care provider, you can also send messages to your care team and make appointments. If you have questions, please call your primary care clinic.  If you do not have a primary care provider, please call [phone] and they will assist you.        Care EveryWhere ID     This is your Care EveryWhere ID. This could be used by other organizations to access your [facility] medical records  [id number]        Your Vitals Were     Pulse Temperature Respirations Pulse Oximetry BMI (Body Mass Index)       62 97.8  F (36.6  C) (Oral) 16 93% 17.5 kg/m2        Blood Pressure from Last 3 Encounters:   06/19/18 117/62   06/06/18 124/61   06/04/18 135/72    Weight from Last 3 Encounters:   06/19/18 43.4 kg (95 lb 11.2 oz)   06/04/18 44 kg (97 lb)   05/22/18 44.3 kg (97 lb 9.6 oz)              We Performed the Following     Bilirubin  total     CBC with platelets differential         Primary Care Provider Office Phone # Fax #    Neri Gipson [phone] [address] [address]        Equal Access to Services     NESSA NICHOLS : Haddiana mary ku salmao Sobrunildaali, waaxda luqadaha, qaybta kaalmada adesusana, lisa lopezjeanne michael. So [facility] [phone].    ATENCIÓN: Si habla español, tiene a nava disposición servicios gratuitos de asistencia lingüística. Llame al [phone].    We comply with applicable federal civil rights laws and [place] laws. We do not discriminate on the basis of race, color, national origin, age, disability, sex, sexual orientation, or gender identity.            Thank you!     Thank you for choosing [facility]  for your care. Our goal is always to provide you with excellent care. Hearing back from our patients is one way we can continue to improve our services. Please take a few minutes to complete the written survey that you may receive in the mail after your visit with us. Thank you!             Your Updated Medication List - Protect others around you: Learn how to safely use, store and throw away your medicines at www.disposemymeds.org.          This list is accurate as of 6/19/18  4:17 PM.  Always use your most recent med list.                   Brand Name Dispense Instructions for use Diagnosis    albuterol 108 (90 Base) MCG/ACT Inhaler    PROAIR HFA/PROVENTIL HFA/VENTOLIN HFA    1 Inhaler    Inhale 2 puffs into the lungs 4 times daily    Mild asthma, unspecified whether complicated, unspecified whether persistent       amylase-lipase-protease 29314 units Cpep    CREON    180 capsule    Take 2 capsules (72,000 Units) by mouth 3 times daily (with meals)    Malignant neoplasm of head of pancreas (H)       CALCIUM PO      Take by mouth every morning Form/strength unknown. Powder formulation. Add to water and fruits/vegetables daily to promote bone health.        CARTIA  MG 24 hr  capsule   Generic drug:  diltiazem     10 capsule    TK 1 C PO QD    Benign essential hypertension       cholecalciferol 1000 UNIT tablet    vitamin D3     Take 1 tablet by mouth 2 times daily        cyanocolbalamin 500 MCG tablet    vitamin  B-12     Take 500 mcg by mouth every morning        fluticasone-vilanterol 100-25 MCG/INH oral inhaler    BREO ELLIPTA    1 Inhaler    Inhale 1 puff into the lungs daily    Cough       folic acid 400 MCG tablet    FOLVITE     Take 1 tablet by mouth every morning        hydrOXYzine 25 MG tablet    ATARAX    10 tablet    Take 1-2 tablets (25-50 mg) by mouth every 6 hours as needed for itching (adjuvant pain)    Obstructive jaundice       levothyroxine 125 MCG tablet    SYNTHROID/LEVOTHROID    90 tablet    Take 1 tablet (125 mcg) by mouth daily    Hypothyroidism, unspecified type       loperamide 2 MG capsule    IMODIUM    60 capsule    2 caps at 1st sign of diarrhea & 1 cap every 2hrs until 12hrs diarrhea free. During night, 2 caps at bedtime & 2 caps every 4hrs until AM    Malignant neoplasm of head of pancreas (H)       LORazepam 0.5 MG tablet    ATIVAN    30 tablet    Take 1 tablet (0.5 mg) by mouth every 4 hours as needed (Anxiety, Nausea/Vomiting or Sleep)    Malignant neoplasm of head of pancreas (H)       LOVENOX 40 MG/0.4ML injection   Generic drug:  enoxaparin     60 Syringe    Inject 0.35 mLs (35 mg) Subcutaneous every 12 hours    Malignant neoplasm of pancreas, unspecified location of malignancy (H), Other chronic 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 cor pulmonale (H)       nystatin 742228 UNIT/ML suspension    MYCOSTATIN    473 mL    Take 5 mLs (500,000 Units) by mouth 4 times daily    Malignant neoplasm of head of pancreas (H), History of pulmonary embolism       omeprazole 40 MG capsule    priLOSEC    20 capsule    Take 1 capsule (40 mg) by mouth 2 times daily Take 30-60 minutes before a meal.    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 Please dispense 2 pairs of knee high 20-30 mmHg    Lymphedema       pantoprazole 20 MG EC tablet    PROTONIX    30 tablet    Take 1 tablet (20 mg) by mouth daily    Other acute gastritis without hemorrhage       * prochlorperazine 5 MG tablet    COMPAZINE    30 tablet    Take 1 tablet (5 mg) by mouth every 6 hours as needed for nausea or vomiting    Malignant neoplasm of head of pancreas (H)       * prochlorperazine 10 MG tablet    COMPAZINE    30 tablet    Take 1 tablet (10 mg) by mouth every 6 hours as needed for nausea or vomiting    Malignant neoplasm of head of pancreas (H)       * timolol maleate 0.5 % opthalmic solution    ISTALOL    1 Bottle    Place 1 drop into both eyes every morning Before placing contact lenses    Cataract, unspecified cataract type, unspecified laterality       * timolol 0.5 % ophthalmic solution    TIMOPTIC     INT 1 GTT OU IN THE MORNING        traMADol 50 MG tablet    ULTRAM    60 tablet    TAKE 1 TO 2 TABLETS BY MOUTH EVERY 4 HOURS AS NEEDED FOR BREAKTHROUGH PAIN(NO MORE THAN 8 TABLETS IN A DAY)    Malignant neoplasm of head of pancreas (H)       triamterene-hydrochlorothiazide 37.5-25 MG per tablet    MAXZIDE-25     Take 1 tablet by mouth every morning        TYLENOL PO      Take 325 mg by mouth every 4 hours as needed for mild pain or fever        * Notice:  This list has 8 medication(s) that are the same as other medications prescribed for you. Read the directions carefully, and ask your doctor or other care provider to review them with you.

## 2018-06-19 NOTE — PROGRESS NOTES
Infusion Nursing Note:  Iris Lewis presents today for Day 1 Cycle 7 of Irinotecan Liposome and Fluorouracil pump connect.    Patient seen by provider today: No   present during visit today: Not Applicable.    Note: Patient presents to infusion feeling generally well. States that her mouth sores are improving and she eats multiple small meals throughout the day. Reports some cramping in her low stomach at times, but no pain. The cramping is relieved with PRN antiemetics at home. Additionally, patient states that she has periods of dizziness with standing, but moves around slowly and carefully. RN educated pt on changing positions slowly. Patient still having about 3 episodes of diarrhea in the morning, but states that it is controlled with the lomotil. Per patient numbness and tingling improving in hands, and baseline in legs. Reports using hot packs on ankle and legs at night to help numbness. Patient instructed to call if any symptoms worsen.    Intravenous Access:  Implanted Port.    Treatment Conditions:  Lab Results   Component Value Date    HGB 10.5 06/19/2018     Lab Results   Component Value Date    WBC 6.5 06/19/2018      Lab Results   Component Value Date    ANEU 3.9 06/19/2018     Lab Results   Component Value Date     06/19/2018      Lab Results   Component Value Date     05/08/2018                   Lab Results   Component Value Date    POTASSIUM 3.6 05/08/2018           Lab Results   Component Value Date    MAG 1.9 11/15/2017            Lab Results   Component Value Date    CR 0.64 05/08/2018                   Lab Results   Component Value Date    EMMY 8.5 05/08/2018                Lab Results   Component Value Date    BILITOTAL 0.5 06/19/2018           Lab Results   Component Value Date    ALBUMIN 2.7 05/08/2018                    Lab Results   Component Value Date    ALT 10 05/08/2018           Lab Results   Component Value Date    AST 12 05/08/2018       Results reviewed,  "labs MET treatment parameters, ok to proceed with treatment.      Post Infusion Assessment:  Patient tolerated infusion without incident.  Blood return noted pre and post infusion.  Site patent and intact, free from redness, edema or discomfort.  No evidence of extravasations.     Prior to discharge: Port is secured in place with tegaderm and flushed with 10cc NS with positive blood return noted.  Continuous home infusion CADD pump connected.    All connectors secured in place and clamps taped open.    Pump started, \"running\" noted on display (CADD): YES.  Patient instructed to call our clinic or [facility] with any questions or concerns at home.  Patient verbalized understanding.    Patient set up for pump disconnect at our clinic on 6/21/18 at 1:30PM.      Discharge Plan:   Patient instructed to  prescription refills for Tramadol and Ativan at outside pharmacy.   Discharge instructions reviewed with: Patient and .  Patient and/or family verbalized understanding of discharge instructions and all questions answered.  AVS to patient via [employer].  Patient will return 6/21/2018 for next appointment.   Patient discharged in stable condition accompanied by: .  Departure Mode: Ambulatory.    Bhakti Castaneda RN                        "

## 2018-06-19 NOTE — NURSING NOTE
"Chief Complaint   Patient presents with     Port Draw     labs drawn from port by rn.  vs taken     Port accessed with 20 gauge 3/4\" Power needle and labs drawn by rn.  Port flushed with NS and heparin.  Pt tolerated well.  VS taken.  Pt checked in for next appt.  Nehal Dorado RN      "

## 2018-06-19 NOTE — PATIENT INSTRUCTIONS
Contact Numbers  [facility]: [phone]    After Hours:  [phone]  Triage: [phone]    Please call the [facility] Triage line if you experience a temperature greater than or equal to 100.5, shaking chills, have uncontrolled nausea, vomiting and/or diarrhea, dizziness, shortness of breath, chest pain, bleeding, unexplained bruising, or if you have any other new/concerning symptoms, questions or concerns.     If it is after hours, weekends, or holidays, please call the main hospital  at  [phone] and ask to speak to the Oncology doctor on call.     If you are having any concerning symptoms or wish to speak to a provider before your next infusion visit, please call your care coordinator or triage to notify them so we can adequately serve you.     If you need a refill on a narcotic prescription or other medication, please call triage before your infusion appointment.           June 2018 Sunday Monday Tuesday Wednesday Thursday Friday Saturday                            1     CT CHEST ABDOMEN PELVIS WWO    9:40 AM   (20 min.)   UCCT2   [facility] CT 2       3     4     UMP [facility] LAB DRAW   10:15 AM   (15 min.)   UC [facility] LAB DRAW   [facility] Lab Draw     UMP RETURN   10:30 AM   (30 min.)   Vinny Yu MD   [facility]     UMP ONC INFUSION 180   12:30 PM   (180 min.)    ONCOLOGY INFUSION   [facility] 5     6     UMP ONC INFUSION 60    1:00 PM   (60 min.)    ONCOLOGY INFUSION   [facility] 7     8     9       10     11     12     13     14     15     16       17     18     19     UMP MASON[facility] LAB DRAW   12:00 PM   (15 min.)    MASONIC LAB DRAW   [facility] Lab Draw     UMP ONC INFUSION 180   12:30 PM   (180 min.)    ONCOLOGY INFUSION   [facility] 20     21     22     23       24     25     26     27     28     29     30                July 2018 Sunday Monday  Tuesday Wednesday Thursday Friday Saturday   1     2     UMP MASON[facility] LAB DRAW   10:15 AM   (15 min.)    [facility] LAB [facility]     UMP ONC INFUSION 180   11:00 AM   (180 min.)   UC ONCOLOGY INFUSION   [facility] 3     4     5     6     7       8     9     10     11     12     13     14       15     16     UMP MASON[facility] LAB DRAW   12:00 PM   (15 min.)    [facility] LAB DRAW   [facility]     UMP ONC INFUSION 180   12:30 PM   (180 min.)    ONCOLOGY INFUSION   [facility] 17     18     19     20     21       22     23     24     25     26     27     28       29     30     UMP MASON[facility] LAB DRAW   12:00 PM   (15 min.)    MASONIC LAB DRAW   [facility] Lab Draw     UMP ONC INFUSION 180   12:30 PM   (180 min.)    ONCOLOGY INFUSION   [facility] 31                                      Lab Results:  Recent Results (from the past 12 hour(s))   CBC with platelets differential    Collection Time: 06/19/18 12:55 PM   Result Value Ref Range    WBC 6.5 4.0 - 11.0 10e9/L    RBC Count 3.26 (L) 3.8 - 5.2 10e12/L    Hemoglobin 10.5 (L) 11.7 - 15.7 g/dL    Hematocrit 32.1 (L) 35.0 - 47.0 %    MCV 99 78 - 100 fl    MCH 32.2 26.5 - 33.0 pg    MCHC 32.7 31.5 - 36.5 g/dL    RDW 19.9 (H) 10.0 - 15.0 %    Platelet Count 405 150 - 450 10e9/L    Diff Method Automated Method     % Neutrophils 59.2 %    % Lymphocytes 24.2 %    % Monocytes 13.3 %    % Eosinophils 2.2 %    % Basophils 0.6 %    % Immature Granulocytes 0.5 %    Nucleated RBCs 0 0 /100    Absolute Neutrophil 3.9 1.6 - 8.3 10e9/L    Absolute Lymphocytes 1.6 0.8 - 5.3 10e9/L    Absolute Monocytes 0.9 0.0 - 1.3 10e9/L    Absolute Eosinophils 0.1 0.0 - 0.7 10e9/L    Absolute Basophils 0.0 0.0 - 0.2 10e9/L    Abs Immature Granulocytes 0.0 0 - 0.4 10e9/L    Absolute Nucleated RBC 0.0    Bilirubin  total    Collection Time: 06/19/18 12:55 PM   Result Value Ref Range    Bilirubin Total 0.5  0.2 - 1.3 mg/dL

## 2018-06-19 NOTE — PROGRESS NOTES
"Infusion Nursing Note:  Iris Lewis presents today for Cycle 7 Day 1 Irinotecan Liposomal, Fluorouracil pump connect.    Patient seen by provider today: No   present during visit today: [facility]32[facility]    Note: {Not Applicable or free text:688115:s:\"N/A\"}.    Intravenous Access:  {UMHIVACCESS:118015}    Treatment Conditions:  {UMHTXCONDITIONS:816923}      Post Infusion Assessment:  {UMHPOSTINFUSION:396664}    Discharge Plan:   {UMHDISCHARGE:529969}    Era Man RN  "

## 2018-06-21 NOTE — PROGRESS NOTES
Pt arrived for Fluorouracil pump d/c.   Pt arrived with no concerns other than that she is having to be super careful walking because her neuropathy is constant and makes her feel unsteady.  Neuropathy in her hands is intermittent. IB sent to [facility] to update on worsening neuropathy.     Fluorouracil pump completely infused upon arrival. Port with + blood return. Heparin locked and needle discontinued intact.   D/C in care of self and aware of future appointments.

## 2018-06-21 NOTE — MR AVS SNAPSHOT
After Visit Summary   6/21/2018    Iris Lewis    MRN: [id number]           Patient Information     Date Of Birth          1940        Visit Information        Provider Department      6/21/2018 1:30 PM UC 23 ATC; UC ONCOLOGY INFUSION [facility]        Today's Diagnoses     Malignant neoplasm of head of pancreas (H)    -  1       Follow-ups after your visit        Your next 10 appointments already scheduled     Jul 02, 2018 10:15 AM CDT   Masonic Lab Draw with UC MASONIC LAB DRAW   [facility]    [address] [place] [address]  [address]  [facility] [address]   [phone]            Jul 02, 2018 11:00 AM CDT   Infusion 180 with UC ONCOLOGY INFUSION, UC 20 ATC   [facility] ([facility])    [address] [place] [address]  [address]  [facility] [address]   [phone]            Jul 16, 2018 12:00 PM CDT   Masonic Lab Draw with UC MASONIC LAB DRAW   [facility]    [address] [place] [address]  [address]  [facility] [address]   [phone]            Jul 16, 2018 12:30 PM CDT   Infusion 180 with UC ONCOLOGY INFUSION, UC 15 ATC   [facility] ([facility])    [address] [place] [address]  [address]  [facility] [address]   [phone]            Jul 30, 2018 12:00 PM CDT   Masonic Lab Draw with UC MASONIC LAB DRAW   [facility]    [address] [place] [address]  [address]  [facility] [address]   [phone]            Jul 30, 2018 12:30 PM CDT   Infusion 180 with UC ONCOLOGY INFUSION, UC 30 ATC   [facility] ([facility])    [address] [place] [address]  [address]  [facility] [address]   [phone]            Aug 08, 2018  9:30 AM CDT   (Arrive by 9:15 AM)   Return Visit with José Antonio Ann MD   [facility]  [facility] ([facility])    [address]  [address]  [facility] [address] [phone]              Who to contact     If you have questions or need follow up information about today's clinic visit or your schedule please contact [facility] directly at [phone].  Normal or non-critical lab and imaging results will be communicated to you by MyChart, letter or phone within 4 business days after the clinic has received the results. If you do not hear from us within 7 days, please contact the clinic through [employer] or phone. If you have a critical or abnormal lab result, we will notify you by phone as soon as possible.  Submit refill requests through [employer] or call your pharmacy and they will forward the refill request to us. Please allow 3 business days for your refill to be completed.          Additional Information About Your Visit        [employer] Information     [employer] gives you secure access to your electronic health record. If you see a primary care provider, you can also send messages to your care team and make appointments. If you have questions, please call your primary care clinic.  If you do not have a primary care provider, please call [phone] and they will assist you.        Care EveryWhere ID     This is your Care EveryWhere ID. This could be used by other organizations to access your Agency medical records  [id number]        Your Vitals Were     Pulse Temperature Respirations Pulse Oximetry          82 98.6  F (37  C) (Oral) 18 97%         Blood Pressure from Last 3 Encounters:   06/21/18 136/73   06/19/18 117/62   06/06/18 124/61    Weight from Last 3 Encounters:   06/19/18 43.4 kg (95 lb 11.2 oz)   06/04/18 44 kg (97 lb)   05/22/18 44.3 kg (97 lb 9.6 oz)              Today, you had the following     No orders found for display       Primary Care Provider Office Phone # Fax #    Neri Gipson [phone]  [phone]       [address] [address]        Equal Access to Services     NESSA NICHOLS : Hadii mary simms madison Sothom, waaxda luqadaha, qaybta kaalmada masood, lisa skylarin hayaasamira riostobi romero bryan michael. So [facility] [phone].    ATENCIÓN: Si habla español, tiene a nava disposición servicios gratuitos de asistencia lingüística. Llame al [phone].    We comply with applicable federal civil rights laws and [place] laws. We do not discriminate on the basis of race, color, national origin, age, disability, sex, sexual orientation, or gender identity.            Thank you!     Thank you for choosing [facility]  for your care. Our goal is always to provide you with excellent care. Hearing back from our patients is one way we can continue to improve our services. Please take a few minutes to complete the written survey that you may receive in the mail after your visit with us. Thank you!             Your Updated Medication List - Protect others around you: Learn how to safely use, store and throw away your medicines at www.disposemymeds.org.          This list is accurate as of 6/21/18  2:23 PM.  Always use your most recent med list.                   Brand Name Dispense Instructions for use Diagnosis    albuterol 108 (90 Base) MCG/ACT Inhaler    PROAIR HFA/PROVENTIL HFA/VENTOLIN HFA    1 Inhaler    Inhale 2 puffs into the lungs 4 times daily    Mild asthma, unspecified whether complicated, unspecified whether persistent       amylase-lipase-protease 62961 units Cpep    CREON    180 capsule    Take 2 capsules (72,000 Units) by mouth 3 times daily (with meals)    Malignant neoplasm of head of pancreas (H)       CALCIUM PO      Take by mouth every morning Form/strength unknown. Powder formulation. Add to water and fruits/vegetables daily to promote bone health.        CARTIA  MG 24 hr capsule   Generic drug:  diltiazem     10 capsule    TK 1 C PO QD    Benign  essential hypertension       cholecalciferol 1000 UNIT tablet    vitamin D3     Take 1 tablet by mouth 2 times daily        cyanocolbalamin 500 MCG tablet    vitamin  B-12     Take 500 mcg by mouth every morning        fluticasone-vilanterol 100-25 MCG/INH oral inhaler    BREO ELLIPTA    1 Inhaler    Inhale 1 puff into the lungs daily    Cough       folic acid 400 MCG tablet    FOLVITE     Take 1 tablet by mouth every morning        hydrOXYzine 25 MG tablet    ATARAX    10 tablet    Take 1-2 tablets (25-50 mg) by mouth every 6 hours as needed for itching (adjuvant pain)    Obstructive jaundice       levothyroxine 125 MCG tablet    SYNTHROID/LEVOTHROID    90 tablet    Take 1 tablet (125 mcg) by mouth daily    Hypothyroidism, unspecified type       loperamide 2 MG capsule    IMODIUM    60 capsule    2 caps at 1st sign of diarrhea & 1 cap every 2hrs until 12hrs diarrhea free. During night, 2 caps at bedtime & 2 caps every 4hrs until AM    Malignant neoplasm of head of pancreas (H)       LORazepam 0.5 MG tablet    ATIVAN    30 tablet    Take 1 tablet (0.5 mg) by mouth every 4 hours as needed (Anxiety, Nausea/Vomiting or Sleep)    Malignant neoplasm of head of pancreas (H)       LOVENOX 40 MG/0.4ML injection   Generic drug:  enoxaparin     60 Syringe    Inject 0.35 mLs (35 mg) Subcutaneous every 12 hours    Malignant neoplasm of pancreas, unspecified location of malignancy (H), Other chronic 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 cor pulmonale (H)       nystatin 237739 UNIT/ML suspension    MYCOSTATIN    473 mL    Take 5 mLs (500,000 Units) by mouth 4 times daily    Malignant neoplasm of head of pancreas (H), History of pulmonary embolism       omeprazole 40 MG capsule    priLOSEC    20 capsule    Take 1 capsule (40 mg) by mouth 2 times daily Take 30-60 minutes before a meal.    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 Please dispense 2 pairs of knee high 20-30 mmHg    Lymphedema       pantoprazole 20 MG EC tablet    PROTONIX    30 tablet    Take 1 tablet (20 mg) by mouth daily    Other acute gastritis without hemorrhage       * prochlorperazine 5 MG tablet    COMPAZINE    30 tablet    Take 1 tablet (5 mg) by mouth every 6 hours as needed for nausea or vomiting    Malignant neoplasm of head of pancreas (H)       * prochlorperazine 10 MG tablet    COMPAZINE    30 tablet    Take 1 tablet (10 mg) by mouth every 6 hours as needed for nausea or vomiting    Malignant neoplasm of head of pancreas (H)       * timolol maleate 0.5 % opthalmic solution    ISTALOL    1 Bottle    Place 1 drop into both eyes every morning Before placing contact lenses    Cataract, unspecified cataract type, unspecified laterality       * timolol 0.5 % ophthalmic solution    TIMOPTIC     INT 1 GTT OU IN THE MORNING        traMADol 50 MG tablet    ULTRAM    60 tablet    TAKE 1 TO 2 TABLETS BY MOUTH EVERY 4 HOURS AS NEEDED FOR BREAKTHROUGH PAIN(NO MORE THAN 8 TABLETS IN A DAY)    Malignant neoplasm of head of pancreas (H)       triamterene-hydrochlorothiazide 37.5-25 MG per tablet    MAXZIDE-25     Take 1 tablet by mouth every morning        TYLENOL PO      Take 325 mg by mouth every 4 hours as needed for mild pain or fever        * Notice:  This list has 8 medication(s) that are the same as other medications prescribed for you. Read the directions carefully, and ask your doctor or other care provider to review them with you.

## 2018-06-25 NOTE — TELEPHONE ENCOUNTER
Patient called for refill on her Tramadol.  She takes 1 tab daily to help with pain from mouth sores and stomach pain.  Chart reviewed- order on 6/12/18 for 60 tablets noted.   reviewed and confirmed with pt's preferred pharmacies, order from 6/12/18 never filled.  Discussed with Quyen Mazariegos NP.  Verbal ok given to writer for tramadol refill.   Called in to pt's preferred pharmacy and VM left for patient letting her know.  Provided triage number for her to call back with any questions or concerns.

## 2018-06-26 NOTE — PROGRESS NOTES
Called patient to discuss changing from lovenox to edoxiban. Dr. Yu rx'd to [facility] in [place]. Patient is to take last pm lovemox and start oral agent next morning. Also called to discuss neuropathy. Per Dr. Yu, there is not much we can do about numbness. However, current chemo regimen should not be making numbness worse.     Unable to reach patient. Left detailed message with above information. Asked patient to return call with any questions or concerns.

## 2018-06-29 NOTE — TELEPHONE ENCOUNTER
Central Prior Authorization Team   Phone: [phone]      PA Initiation    Medication: SAVAYSA 30MG TAB-PA initiated  Insurance Company: [employer] - Phone [phone] Fax [phone]  Pharmacy Filling the Rx: [facility] [address] [place] [address] [address] AT [place] [address]  Filling Pharmacy Phone: [phone]  Filling Pharmacy Fax:    Start Date: 6/29/2018

## 2018-06-29 NOTE — TELEPHONE ENCOUNTER
Insurance needs additional information below that I am unable to answer. Routing to nurse for assistance.

## 2018-07-02 NOTE — MR AVS SNAPSHOT
After Visit Summary   7/2/2018    Iris Lewis    MRN: [id number]           Patient Information     Date Of Birth          1940        Visit Information        Provider Department      7/2/2018 11:00 AM UC 20 ATC; UC ONCOLOGY INFUSION [facility] [place]        Today's Diagnoses     Malignant neoplasm of head of pancreas (H)    -  1       Follow-ups after your visit        Your next 10 appointments already scheduled     Jul 05, 2018  7:15 AM CDT   Masonic Lab Draw with UC MASONIC LAB DRAW   [facility] ([facility])    [address] [place] [address]  [address]  [facility] [address]   [phone]            Jul 05, 2018  8:20 AM CDT   (Arrive by 8:05 AM)   Return Visit with LEONOR White   [facility] [place] ([facility])    [address] [place] [address]  [address]  [facility] [address]   [phone]            Jul 09, 2018 12:00 PM CDT   Masonic Lab Draw with UC MASONIC LAB DRAW   [facility] Masonic Lab Draw ([facility])    [address] [place] [address]  [address]  [facility] [address]   [phone]            Jul 09, 2018 12:30 PM CDT   Infusion 180 with UC ONCOLOGY INFUSION, UC 20 ATC   [facility] [place] ([facility])    [address] [place] [address]  [address]  [facility] [address]   [phone]            Jul 16, 2018 12:00 PM CDT   Masonic Lab Draw with UC MASONIC LAB DRAW   [facility] ([facility])    [address] [place] [address]  [address]  [facility] [address]   [phone]            Jul 16, 2018 12:30 PM CDT   Infusion 180 with UC ONCOLOGY INFUSION, UC 15 ATC   [facility] [place] ([facility])    [address] [place] [address]  [address]  [facility] [address]   [phone]            Jul 30, 2018 12:00 PM CDT   Masonic Lab Draw with UC MASONIC LAB DRAW   [facility]  Draw ([facility])    [address]  [address]  [facility] [address] [phone]            Jul 30, 2018 12:30 PM CDT   Infusion 180 with UC ONCOLOGY INFUSION   [facility] ([facility])    [address]  [address]  [facility] [address] [phone]              Future tests that were ordered for you today     Open Future Orders        Priority Expected Expires Ordered    Comprehensive metabolic panel Routine 7/5/2018 7/20/2018 7/2/2018    CBC with platelets differential Routine 7/5/2018 7/20/2018 7/2/2018    INR Routine 7/5/2018 7/20/2018 7/2/2018            Who to contact     If you have questions or need follow up information about today's clinic visit or your schedule please contact [facility] directly at [phone].  Normal or non-critical lab and imaging results will be communicated to you by [employer], letter or phone within 4 business days after the clinic has received the results. If you do not hear from us within 7 days, please contact the clinic through [employer] or phone. If you have a critical or abnormal lab result, we will notify you by phone as soon as possible.  Submit refill requests through [employer] or call your pharmacy and they will forward the refill request to us. Please allow 3 business days for your refill to be completed.          Additional Information About Your Visit        [employer] Information     [employer] gives you secure access to your electronic health record. If you see a primary care provider, you can also send messages to your care team and make appointments. If you have questions, please call your primary care clinic.  If you do not have a primary care provider, please call [phone] and they will assist you.        Care EveryWhere ID     This is your Care EveryWhere ID. This could be used by other organizations to access your [place] medical records  [id number]         Your Vitals Were     Pulse Temperature Respirations Pulse Oximetry BMI (Body Mass Index)       92 100.7  F (38.2  C) (Tympanic) 24 95% 17.08 kg/m2        Blood Pressure from Last 3 Encounters:   07/02/18 93/54   06/21/18 136/73   06/19/18 117/62    Weight from Last 3 Encounters:   07/02/18 42.4 kg (93 lb 6.4 oz)   06/19/18 43.4 kg (95 lb 11.2 oz)   06/04/18 44 kg (97 lb)              We Performed the Following     Bilirubin  total     Blood culture     Blood culture     CBC with platelets differential     Comprehensive metabolic panel        Primary Care Provider Office Phone # Fax #    Neri Gipson [phone]       [address] [address]        Equal Access to Services     [facility]: Hadii aad ku hadasho Sothom, waaxda luqadaha, qaybta kaalmada adetobiyameera, lisa adams . So [facility] [phone].    ATENCIÓN: Si habla español, tiene a nava disposición servicios gratuitos de asistencia lingüística. Indu al [phone].    We comply with applicable federal civil rights laws and [place] laws. We do not discriminate on the basis of race, color, national origin, age, disability, sex, sexual orientation, or gender identity.            Thank you!     Thank you for choosing [facility]  for your care. Our goal is always to provide you with excellent care. Hearing back from our patients is one way we can continue to improve our services. Please take a few minutes to complete the written survey that you may receive in the mail after your visit with us. Thank you!             Your Updated Medication List - Protect others around you: Learn how to safely use, store and throw away your medicines at www.disposemymeds.org.          This list is accurate as of 7/2/18  2:54 PM.  Always use your most recent med list.                   Brand Name Dispense Instructions for use Diagnosis    albuterol 108 (90 Base) MCG/ACT Inhaler     PROAIR HFA/PROVENTIL HFA/VENTOLIN HFA    1 Inhaler    Inhale 2 puffs into the lungs 4 times daily    Mild asthma, unspecified whether complicated, unspecified whether persistent       amylase-lipase-protease 94921 units Cpep    CREON    180 capsule    Take 2 capsules (72,000 Units) by mouth 3 times daily (with meals)    Malignant neoplasm of head of pancreas (H)       CALCIUM PO      Take by mouth every morning Form/strength unknown. Powder formulation. Add to water and fruits/vegetables daily to promote bone health.        CARTIA  MG 24 hr capsule   Generic drug:  diltiazem     10 capsule    TK 1 C PO QD    Benign essential hypertension       cholecalciferol 1000 UNIT tablet    vitamin D3     Take 1 tablet by mouth 2 times daily        cyanocolbalamin 500 MCG tablet    vitamin  B-12     Take 500 mcg by mouth every morning        edoxaban ANTICOAGULANT 30 MG Tabs tablet    SAVAYSA    30 tablet    Take 1 tablet (30 mg) by mouth daily    Other chronic 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 cor pulmonale (H), Malignant neoplasm of head of pancreas (H)       fluticasone-vilanterol 100-25 MCG/INH oral inhaler    BREO ELLIPTA    1 Inhaler    Inhale 1 puff into the lungs daily    Cough       folic acid 400 MCG tablet    FOLVITE     Take 1 tablet by mouth every morning        hydrOXYzine 25 MG tablet    ATARAX    10 tablet    Take 1-2 tablets (25-50 mg) by mouth every 6 hours as needed for itching (adjuvant pain)    Obstructive jaundice       levothyroxine 125 MCG tablet    SYNTHROID/LEVOTHROID    90 tablet    Take 1 tablet (125 mcg) by mouth daily    Hypothyroidism, unspecified type       loperamide 2 MG capsule    IMODIUM    60 capsule    2 caps at 1st sign of diarrhea & 1 cap every 2hrs until 12hrs diarrhea free. During night, 2 caps at bedtime & 2 caps every 4hrs until AM    Malignant neoplasm of head of pancreas (H)       LORazepam 0.5 MG tablet    ATIVAN    30 tablet    Take 1 tablet (0.5 mg) by mouth every 4 hours as  needed (Anxiety, Nausea/Vomiting or Sleep)    Malignant neoplasm of head of pancreas (H)       LOVENOX 40 MG/0.4ML injection   Generic drug:  enoxaparin     60 Syringe    Inject 0.35 mLs (35 mg) Subcutaneous every 12 hours    Malignant neoplasm of pancreas, unspecified location of malignancy (H), Other chronic 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 cor pulmonale (H)       nystatin 756867 UNIT/ML suspension    MYCOSTATIN    473 mL    Take 5 mLs (500,000 Units) by mouth 4 times daily    Malignant neoplasm of head of pancreas (H), History of pulmonary embolism       omeprazole 40 MG capsule    priLOSEC    20 capsule    Take 1 capsule (40 mg) by mouth 2 times daily Take 30-60 minutes before a meal.    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 Please dispense 2 pairs of knee high 20-30 mmHg    Lymphedema       pantoprazole 20 MG EC tablet    PROTONIX    30 tablet    Take 1 tablet (20 mg) by mouth daily    Other acute gastritis without hemorrhage       * prochlorperazine 5 MG tablet    COMPAZINE    30 tablet    Take 1 tablet (5 mg) by mouth every 6 hours as needed for nausea or vomiting    Malignant neoplasm of head of pancreas (H)       * prochlorperazine 10 MG tablet    COMPAZINE    30 tablet    Take 1 tablet (10 mg) by mouth every 6 hours as needed for nausea or vomiting    Malignant neoplasm of head of pancreas (H)       * timolol maleate 0.5 % opthalmic solution    ISTALOL    1 Bottle    Place 1 drop into both eyes every morning Before placing contact lenses    Cataract, unspecified cataract  type, unspecified laterality       * timolol 0.5 % ophthalmic solution    TIMOPTIC     INT 1 GTT OU IN THE MORNING        traMADol 50 MG tablet    ULTRAM    60 tablet    Take 1-2 tablets ( mg) by mouth every 4 hours as needed for breakthrough pain Maximum of 8 tablets daily.    Malignant neoplasm of head of pancreas (H)       triamterene-hydrochlorothiazide 37.5-25 MG per tablet    MAXZIDE-25     Take 1 tablet by mouth every morning        TYLENOL PO      Take 325 mg by mouth every 4 hours as needed for mild pain or fever        * Notice:  This list has 8 medication(s) that are the same as other medications prescribed for you. Read the directions carefully, and ask your doctor or other care provider to review them with you.

## 2018-07-02 NOTE — MR AVS SNAPSHOT
After Visit Summary   7/2/2018    Iris Lewis    MRN: [id number]           Patient Information     Date Of Birth          1940        Visit Information        Provider Department      7/2/2018 1:55 PM Vinny Yu MD [facility]        Today's Diagnoses     Malignant neoplasm of head of pancreas (H)    -  1       Follow-ups after your visit        Follow-up notes from your care team     Return in about 3 days (around 7/5/2018) for NP Visit with labs.      Your next 10 appointments already scheduled     Jul 16, 2018 12:00 PM CDT   M[facility] Lab Draw with UC MASONIC LAB DRAW   [facility] ([facility])    [address] [place] [address]  [address]  [facility] [address]   [phone]            Jul 16, 2018 12:30 PM CDT   Infusion 180 with UC ONCOLOGY INFUSION, UC 15 ATC   [facility] ([facility])    [address] [place] [address]  [address]  [facility] [address]   [phone]            Jul 30, 2018 12:00 PM CDT   Masonic Lab Draw with UC MASONIC LAB DRAW   [facility] Lab Draw ([facility])    [address]  [address]  [facility] [address]   [phone]            Jul 30, 2018 12:30 PM CDT   Infusion 180 with UC ONCOLOGY INFUSION, UC 30 ATC   [facility] ([facility])    [address] [place] [address] Se  [address]  [facility] [address]   [phone]            Aug 08, 2018  9:30 AM CDT   (Arrive by 9:15 AM)   Return Visit with José Antonio Ann MD   [facility] ([facility])    [address] [place] [address] Se  [address]  [facility] [address]   [phone]            Aug 13, 2018 12:00 PM CDT   Masonic Lab Draw with UC MASONIC LAB DRAW   [facility] Lab Draw ([facility])    [address] [place] [address] Se  [address]  [facility] [address]   [phone]             Aug 13, 2018 12:30 PM CDT   Infusion 180 with UC ONCOLOGY INFUSION, UC 30 ATC   [facility] ([facility])    [address]  [address]  [facility] [address] [phone]              Future tests that were ordered for you today     Open Future Orders        Priority Expected Expires Ordered    Comprehensive metabolic panel Routine 7/5/2018 7/20/2018 7/2/2018    CBC with platelets differential Routine 7/5/2018 7/20/2018 7/2/2018    INR Routine 7/5/2018 7/20/2018 7/2/2018            Who to contact     If you have questions or need follow up information about today's clinic visit or your schedule please contact [facility] directly at [phone].  Normal or non-critical lab and imaging results will be communicated to you by MyChart, letter or phone within 4 business days after the clinic has received the results. If you do not hear from us within 7 days, please contact the clinic through localbaconhart or phone. If you have a critical or abnormal lab result, we will notify you by phone as soon as possible.  Submit refill requests through [employer] or call your pharmacy and they will forward the refill request to us. Please allow 3 business days for your refill to be completed.          Additional Information About Your Visit        localbaconhart Information     [employer] gives you secure access to your electronic health record. If you see a primary care provider, you can also send messages to your care team and make appointments. If you have questions, please call your primary care clinic.  If you do not have a primary care provider, please call [phone] and they will assist you.        Care EveryWhere ID     This is your Care EveryWhere ID. This could be used by other organizations to access your [place] medical records  [id number]         Blood Pressure from Last 3 Encounters:   07/02/18 94/52   06/21/18 136/73   06/19/18 117/62    Weight  from Last 3 Encounters:   07/02/18 42.4 kg (93 lb 6.4 oz)   06/19/18 43.4 kg (95 lb 11.2 oz)   06/04/18 44 kg (97 lb)               Primary Care Provider Office Phone # Fax #    Neri Gipson [phone] [address] [address]        Equal Access to Services     NESSA NICHOLS : Hadii aad ku hadasho Soomaali, waaxda luqadaha, qaybta kaalmada adeegyada, waxay idiin hayaan adeeg heather lanayanasamira . So [facility] [phone].    ATENCIÓN: Si habla español, tiene a nava disposición servicios gratuitos de asistencia lingüística. Llame al [phone].    We comply with applicable federal civil rights laws and [place] laws. We do not discriminate on the basis of race, color, national origin, age, disability, sex, sexual orientation, or gender identity.            Thank you!     Thank you for choosing [facility]  for your care. Our goal is always to provide you with excellent care. Hearing back from our patients is one way we can continue to improve our services. Please take a few minutes to complete the written survey that you may receive in the mail after your visit with us. Thank you!             Your Updated Medication List - Protect others around you: Learn how to safely use, store and throw away your medicines at www.disposemymeds.org.          This list is accurate as of 7/2/18  2:08 PM.  Always use your most recent med list.                   Brand Name Dispense Instructions for use Diagnosis    albuterol 108 (90 Base) MCG/ACT Inhaler    PROAIR HFA/PROVENTIL HFA/VENTOLIN HFA    1 Inhaler    Inhale 2 puffs into the lungs 4 times daily    Mild asthma, unspecified whether complicated, unspecified whether persistent       amylase-lipase-protease 23188 units Cpep    CREON    180 capsule    Take 2 capsules (72,000 Units) by mouth 3 times daily (with meals)    Malignant neoplasm of head of pancreas (H)       CALCIUM PO      Take by mouth every morning  Form/strength unknown. Powder formulation. Add to water and fruits/vegetables daily to promote bone health.        CARTIA  MG 24 hr capsule   Generic drug:  diltiazem     10 capsule    TK 1 C PO QD    Benign essential hypertension       cholecalciferol 1000 UNIT tablet    vitamin D3     Take 1 tablet by mouth 2 times daily        cyanocolbalamin 500 MCG tablet    vitamin  B-12     Take 500 mcg by mouth every morning        edoxaban ANTICOAGULANT 30 MG Tabs tablet    SAVAYSA    30 tablet    Take 1 tablet (30 mg) by mouth daily    Other chronic 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 cor pulmonale (H), Malignant neoplasm of head of pancreas (H)       fluticasone-vilanterol 100-25 MCG/INH oral inhaler    BREO ELLIPTA    1 Inhaler    Inhale 1 puff into the lungs daily    Cough       folic acid 400 MCG tablet    FOLVITE     Take 1 tablet by mouth every morning        hydrOXYzine 25 MG tablet    ATARAX    10 tablet    Take 1-2 tablets (25-50 mg) by mouth every 6 hours as needed for itching (adjuvant pain)    Obstructive jaundice       levothyroxine 125 MCG tablet    SYNTHROID/LEVOTHROID    90 tablet    Take 1 tablet (125 mcg) by mouth daily    Hypothyroidism, unspecified type       loperamide 2 MG capsule    IMODIUM    60 capsule    2 caps at 1st sign of diarrhea & 1 cap every 2hrs until 12hrs diarrhea free. During night, 2 caps at bedtime & 2 caps every 4hrs until AM    Malignant neoplasm of head of pancreas (H)       LORazepam 0.5 MG tablet    ATIVAN    30 tablet    Take 1 tablet (0.5 mg) by mouth every 4 hours as needed (Anxiety, Nausea/Vomiting or Sleep)    Malignant neoplasm of head of pancreas (H)       LOVENOX 40 MG/0.4ML injection   Generic drug:  enoxaparin     60 Syringe    Inject 0.35 mLs (35 mg) Subcutaneous every 12 hours    Malignant neoplasm of pancreas, unspecified location of malignancy (H), Other chronic 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 cor pulmonale (H)       nystatin 586940 UNIT/ML suspension     MYCOSTATIN    473 mL    Take 5 mLs (500,000 Units) by mouth 4 times daily    Malignant neoplasm of head of pancreas (H), History of pulmonary embolism       omeprazole 40 MG capsule    priLOSEC    20 capsule    Take 1 capsule (40 mg) by mouth 2 times daily Take 30-60 minutes before a meal.    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 Please dispense 2 pairs of knee high 20-30 mmHg    Lymphedema       pantoprazole 20 MG EC tablet    PROTONIX    30 tablet    Take 1 tablet (20 mg) by mouth daily    Other acute gastritis without hemorrhage       * prochlorperazine 5 MG tablet    COMPAZINE    30 tablet    Take 1 tablet (5 mg) by mouth every 6 hours as needed for nausea or vomiting    Malignant neoplasm of head of pancreas (H)       * prochlorperazine 10 MG tablet    COMPAZINE    30 tablet    Take 1 tablet (10 mg) by mouth every 6 hours as needed for nausea or vomiting    Malignant neoplasm of head of pancreas (H)       * timolol maleate 0.5 % opthalmic solution    ISTALOL    1 Bottle    Place 1 drop into both eyes every morning Before placing contact lenses    Cataract, unspecified cataract type, unspecified laterality       * timolol 0.5 % ophthalmic solution    TIMOPTIC     INT 1 GTT OU IN THE MORNING        traMADol 50 MG tablet    ULTRAM    60 tablet    Take 1-2 tablets ( mg) by mouth every 4 hours as needed for breakthrough pain Maximum of 8 tablets daily.    Malignant neoplasm of head of pancreas (H)       triamterene-hydrochlorothiazide 37.5-25 MG per tablet    MAXZIDE-25      Take 1 tablet by mouth every morning        TYLENOL PO      Take 325 mg by mouth every 4 hours as needed for mild pain or fever        * Notice:  This list has 8 medication(s) that are the same as other medications prescribed for you. Read the directions carefully, and ask your doctor or other care provider to review them with you.

## 2018-07-02 NOTE — PROGRESS NOTES
I was asked to see the patient in the infusion room for abnormal labs.    She is here for planned chemotherapy (5FU/Onivyde) which she has been tolerating well and with evidence of response at her last visit with me. She tells me she was having alto of abdominal cramping overnight but that has resolved. She has taken no new meds or done anything unusual in the past few weeks. She has had no fevers of ill contacts. The remainder of a 10-point review of systems is otherwise unremarkable.    On exam she appears well and her vital signs are unremarkable.  She has no icterus  Her abdomane is soft with no tenderness or organomegaly.    Her labs are notable for an AST 20 times normal with lesser elevation of her other enzymes and a normal bilirubin. The rest of her labs are unremarkable.    A/P:   Pancreatic cancer on liposomal irinotecan, now with acutley abnormal liver enzymes. The pattern is most consistent with an acute insult to the liver rather than biliary obstruction or viral infection, but I can't identify what that might be.. As she is not acutely ill, for now we will hold her chemotherapy and re-evaluate later in the week. If her labs are looking better, we can go ahead, otherwise she'll need further evaluation.

## 2018-07-02 NOTE — TELEPHONE ENCOUNTER
PRIOR AUTHORIZATION DENIED    Medication: SAVAYSA 30MG TAB-PA denied    Denial Date: 7/1/2018    Denial Rational:          Appeal Information:

## 2018-07-02 NOTE — PROGRESS NOTES
Social Work Follow-Up Encounter Visit  Oncology Clinic    Data/Intervention:  Patient Name:  Iris Lewis  /Age:  1940 (78 year old)    Reason for Follow-Up:  Paperwork questions    Collaborated With:    -infusion RN  -patient    Assessment:  Met with pt during her infusion appt.  Pt wanting to verify the paperwork she has received in the mail is just copies and not anything that she needs to complete.  SW reviewed paperwork, after review, paperwork states that is is copy for patient's records of what insurance is requesting of [employer].  SW reassured pt that she should keep the paperwork for her records but does not appear that she needs to submit anything.     Plan:  SW available as necessary.     Addressed patient's distress through today. No other concerns at this time.  SW available as necessary.       Cira Vásquez (Martens), DARRIONSW, MSW   - [facility]  Phone: [phone]  Pager: [phone]  2018

## 2018-07-02 NOTE — LETTER
7/2/2018       RE: Iris Lewis  [address]  [place] [address]     Dear Colleague,    Thank you for referring your patient, Iris Lewis, to the [facility]. Please see a copy of my visit note below.    I was asked to see the patient in the infusion room for abnormal labs.    She is here for planned chemotherapy (5FU/Onivyde) which she has been tolerating well and with evidence of response at her last visit with me. She tells me she was having alto of abdominal cramping overnight but that has resolved. She has taken no new meds or done anything unusual in the past few weeks. She has had no fevers of ill contacts. The remainder of a 10-point review of systems is otherwise unremarkable.    On exam she appears well and her vital signs are unremarkable.  She has no icterus  Her abdomane is soft with no tenderness or organomegaly.    Her labs are notable for an AST 20 times normal with lesser elevation of her other enzymes and a normal bilirubin. The rest of her labs are unremarkable.    A/P:   Pancreatic cancer on liposomal irinotecan, now with acutley abnormal liver enzymes. The pattern is most consistent with an acute insult to the liver rather than biliary obstruction or viral infection, but I can't identify what that might be.. As she is not acutely ill, for now we will hold her chemotherapy and re-evaluate later in the week. If her labs are looking better, we can go ahead, otherwise she'll need further evaluation.      Sincerely,    Vinny Yu MD

## 2018-07-02 NOTE — PROGRESS NOTES
Infusion Nursing Note:/  Iris Lewis presents today for Cycle 8 Day 1 Irinotecan, Fluorouracil. *CHEMO HELD, SEE BELOW*  Patient seen by provider today: No      Treatment Conditions:  Lab Results   Component Value Date    HGB 10.0 07/02/2018     Lab Results   Component Value Date    WBC 13.6 07/02/2018      Lab Results   Component Value Date    ANEU 12.3 07/02/2018     Lab Results   Component Value Date     07/02/2018      Lab Results   Component Value Date     07/02/2018                   Lab Results   Component Value Date    POTASSIUM 3.5 07/02/2018           Lab Results   Component Value Date    MAG 1.9 11/15/2017            Lab Results   Component Value Date    CR 0.65 07/02/2018                   Lab Results   Component Value Date    EMMY 8.2 07/02/2018                Lab Results   Component Value Date    BILITOTAL 1.8 07/02/2018    BILITOTAL 1.7 07/02/2018           Lab Results   Component Value Date    ALBUMIN 2.9 07/02/2018                    Lab Results   Component Value Date     07/02/2018           Lab Results   Component Value Date     07/02/2018           Intravenous Access:  Implanted Port.    Note: Pt reports constipation and abdominal cramping  that started last night with nausea.  Pt reports not sleeping well due to the abdominal cramping last night. Soft stool x 1 this AM which was light in color, but this is not abnormal for her. Pt reports abdominal cramping has since resolved after her BM this morning. Denies fever/chills at home.  Also reports ongoing N/T in feet, which makes her unsteady at times. Denies falls at home.   BP: 94/52 on arrival.    Quyen Mazariegos NP notified of elevated bilirubin of 1.8, WBC 13.6 and above symptoms.     7/2/18: 1130 CLARISS[address] FLORES Mazariegos NP / Fallon Elise RN  Blood cultures x 2 (peripheral and port)  Add on CMP   NS 1000 ml IV   Dr. Yu will come and assess pt.     7/2/18: 1400 TORB Dr. Yu / Fallon Elise RN  OK for pt to discharge  home today.  Pt should return Thursday or Friday (7/5-7/6) for repeat labs and to see PA.  OK to schedule possible chemotherapy on Monday 7/9/18    At 1405 (time of discharge), VS rechecked.  BP: 93/54 Temp 100.7.  Dr. Yu notified of temp 100.7    7/2/18: 1420 TORB Dr. Yu / Fallon Elise RN  OK for pt to discharge home    Pt instructed to call with worsening abdominal cramping, N/V, temp >101 or questions/concerns.  Pt provided with thermometer and stated of plan.     Post Infusion Assessment:  Blood return noted pre and post infusion.    Discharge Plan:   Patient declined prescription refills.  Discharge instructions reviewed with: Patient.  Patient and/or family verbalized understanding of discharge instructions and all questions answered.  Copy of AVS reviewed with patient and/or family.  Patient will return 7/5/18 for next appointment.  Patient discharged in stable condition accompanied by: self.  Departure Mode: Ambulatory.  Face to Face time: 15 min.    Fallon Elise RN

## 2018-07-02 NOTE — MR AVS SNAPSHOT
After Visit Summary   7/2/2018    Iris Lewis    MRN: [id number]           Patient Information     Date Of Birth          1940        Visit Information        Provider Department      7/2/2018 1:44 PM Cira Vásquez MSW [facility]        Today's Diagnoses     Visit for counseling    -  1       Follow-ups after your visit        Your next 10 appointments already scheduled     Jul 05, 2018  7:15 AM CDT   [facility] Lab Draw with [facility] LAB DRAW   [facility] ([facility])    [address] [place] [address]  [address]  [facility] [address]   [phone]            Jul 05, 2018  8:20 AM CDT   (Arrive by 8:05 AM)   Return Visit with LEONOR White   [facility] ([facility])    [address] [place] [address]  [address]  [facility] [address]   [phone]            Jul 09, 2018 12:00 PM CDT   M[facility] Lab Draw with [facility] LAB DRAW   [facility] Draw ([facility])    [address] [place] [address]  [address]  [facility] [address]   [phone]            Jul 09, 2018 12:30 PM CDT   Infusion 180 with UC ONCOLOGY INFUSION, UC 20 ATC   [facility] ([facility])    [address] [place] [address]  [address]  [facility] [address]   [phone]            Jul 16, 2018 12:00 PM CDT   [facility] Lab Draw with [facility] LAB DRAW   [facility] ([facility])    [address] [place] [address]  [address]  [facility] [address]   [phone]            Jul 16, 2018 12:30 PM CDT   Infusion 180 with UC ONCOLOGY INFUSION, UC 15 ATC   [facility] ([facility])    [address] [place] [address]  [address]  [facility] [address]   [phone]            Jul 30, 2018 12:00 PM CDT   [facility] Lab Draw with [facility]ON[facility] LAB DRAW   [facility] Lab Draw ([facility])    [address]  [address]  [facility] [place] [address] [phone]            Jul 30, 2018 12:30 PM CDT   Infusion 180 with UC ONCOLOGY INFUSION   [facility] ([facility])    [address]  [address]  [facility] [place] [address] [phone]              Future tests that were ordered for you today     Open Future Orders        Priority Expected Expires Ordered    Comprehensive metabolic panel Routine 7/5/2018 7/20/2018 7/2/2018    CBC with platelets differential Routine 7/5/2018 7/20/2018 7/2/2018    INR Routine 7/5/2018 7/20/2018 7/2/2018            Who to contact     If you have questions or need follow up information about today's clinic visit or your schedule please contact [facility] directly at [phone].  Normal or non-critical lab and imaging results will be communicated to you by MyChart, letter or phone within 4 business days after the clinic has received the results. If you do not hear from us within 7 days, please contact the clinic through "[employer]hart or phone. If you have a critical or abnormal lab result, we will notify you by phone as soon as possible.  Submit refill requests through [employer] or call your pharmacy and they will forward the refill request to us. Please allow 3 business days for your refill to be completed.          Additional Information About Your Visit        MyChart Information     [employer] gives you secure access to your electronic health record. If you see a primary care provider, you can also send messages to your care team and make appointments. If you have questions, please call your primary care clinic.  If you do not have a primary care provider, please call [phone] and they will assist you.        Care EveryWhere ID     This is your Care EveryWhere ID. This could be used by other organizations to access your [facility] medical records  [id number]         Blood Pressure from Last 3  Encounters:   07/02/18 93/54   06/21/18 136/73   06/19/18 117/62    Weight from Last 3 Encounters:   07/02/18 42.4 kg (93 lb 6.4 oz)   06/19/18 43.4 kg (95 lb 11.2 oz)   06/04/18 44 kg (97 lb)              Today, you had the following     No orders found for display       Primary Care Provider Office Phone # Fax #    Neri Gipson [phone] [address] [address]        Equal Access to Services     [facility]: Hadii aad ku hadasho Soomaali, waaxda luqadaha, qaybta kaalmada adesusnaa, lisa adams . So [facility] [phone].    ATENCIÓN: Si habla español, tiene a nava disposición servicios gratuitos de asistencia lingüística. [facility] [phone].    We comply with applicable federal civil rights laws and [place] laws. We do not discriminate on the basis of race, color, national origin, age, disability, sex, sexual orientation, or gender identity.            Thank you!     Thank you for choosing [facility]  for your care. Our goal is always to provide you with excellent care. Hearing back from our patients is one way we can continue to improve our services. Please take a few minutes to complete the written survey that you may receive in the mail after your visit with us. Thank you!             Your Updated Medication List - Protect others around you: Learn how to safely use, store and throw away your medicines at www.disposemymeds.org.          This list is accurate as of 7/2/18 11:59 PM.  Always use your most recent med list.                   Brand Name Dispense Instructions for use Diagnosis    albuterol 108 (90 Base) MCG/ACT Inhaler    PROAIR HFA/PROVENTIL HFA/VENTOLIN HFA    1 Inhaler    Inhale 2 puffs into the lungs 4 times daily    Mild asthma, unspecified whether complicated, unspecified whether persistent       amylase-lipase-protease 03281 units Cpep    CREON    180 capsule    Take 2 capsules (72,000 Units)  by mouth 3 times daily (with meals)    Malignant neoplasm of head of pancreas (H)       CALCIUM PO      Take by mouth every morning Form/strength unknown. Powder formulation. Add to water and fruits/vegetables daily to promote bone health.        CARTIA  MG 24 hr capsule   Generic drug:  diltiazem     10 capsule    TK 1 C PO QD    Benign essential hypertension       cholecalciferol 1000 UNIT tablet    vitamin D3     Take 1 tablet by mouth 2 times daily        cyanocolbalamin 500 MCG tablet    vitamin  B-12     Take 500 mcg by mouth every morning        edoxaban ANTICOAGULANT 30 MG Tabs tablet    SAVAYSA    30 tablet    Take 1 tablet (30 mg) by mouth daily    Other chronic 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 cor pulmonale (H), Malignant neoplasm of head of pancreas (H)       fluticasone-vilanterol 100-25 MCG/INH oral inhaler    BREO ELLIPTA    1 Inhaler    Inhale 1 puff into the lungs daily    Cough       folic acid 400 MCG tablet    FOLVITE     Take 1 tablet by mouth every morning        hydrOXYzine 25 MG tablet    ATARAX    10 tablet    Take 1-2 tablets (25-50 mg) by mouth every 6 hours as needed for itching (adjuvant pain)    Obstructive jaundice       levothyroxine 125 MCG tablet    SYNTHROID/LEVOTHROID    90 tablet    Take 1 tablet (125 mcg) by mouth daily    Hypothyroidism, unspecified type       loperamide 2 MG capsule    IMODIUM    60 capsule    2 caps at 1st sign of diarrhea & 1 cap every 2hrs until 12hrs diarrhea free. During night, 2 caps at bedtime & 2 caps every 4hrs until AM    Malignant neoplasm of head of pancreas (H)       LORazepam 0.5 MG tablet    ATIVAN    30 tablet    Take 1 tablet (0.5 mg) by mouth every 4 hours as needed (Anxiety, Nausea/Vomiting or Sleep)    Malignant neoplasm of head of pancreas (H)       LOVENOX 40 MG/0.4ML injection   Generic drug:  enoxaparin     60 Syringe    Inject 0.35 mLs (35 mg) Subcutaneous every 12 hours    Malignant neoplasm of pancreas, unspecified location of  malignancy (H), Other chronic 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 cor pulmonale (H)       nystatin 118608 UNIT/ML suspension    MYCOSTATIN    473 mL    Take 5 mLs (500,000 Units) by mouth 4 times daily    Malignant neoplasm of head of pancreas (H), History of pulmonary embolism       omeprazole 40 MG capsule    priLOSEC    20 capsule    Take 1 capsule (40 mg) by mouth 2 times daily Take 30-60 minutes before a meal.    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 Please dispense 2 pairs of knee high 20-30 mmHg    Lymphedema       pantoprazole 20 MG EC tablet    PROTONIX    30 tablet    Take 1 tablet (20 mg) by mouth daily    Other acute gastritis without hemorrhage       * prochlorperazine 5 MG tablet    COMPAZINE    30 tablet    Take 1 tablet (5 mg) by mouth every 6 hours as needed for nausea or vomiting    Malignant neoplasm of head of pancreas (H)       * prochlorperazine 10 MG tablet    COMPAZINE    30 tablet    Take 1 tablet (10 mg) by mouth every 6 hours as needed for nausea or vomiting    Malignant neoplasm of head of pancreas (H)       * timolol maleate 0.5 % opthalmic solution    ISTALOL    1 Bottle    Place 1 drop into both eyes every morning Before placing contact lenses    Cataract, unspecified cataract type, unspecified laterality       * timolol 0.5 % ophthalmic solution    TIMOPTIC     INT 1 GTT OU IN THE MORNING        traMADol 50 MG tablet    ULTRAM    60 tablet    Take 1-2 tablets ( mg) by mouth every 4 hours as needed for breakthrough pain Maximum of 8 tablets daily.     Malignant neoplasm of head of pancreas (H)       triamterene-hydrochlorothiazide 37.5-25 MG per tablet    MAXZIDE-25     Take 1 tablet by mouth every morning        TYLENOL PO      Take 325 mg by mouth every 4 hours as needed for mild pain or fever        * Notice:  This list has 8 medication(s) that are the same as other medications prescribed for you. Read the directions carefully, and ask your doctor or other care provider to review them with you.

## 2018-07-02 NOTE — TELEPHONE ENCOUNTER
DATE:  7/2/2018   TIME OF RECEIPT FROM LAB:  1:12pm  LAB TEST:  AST  LAB VALUE:  726  RESULTS Paged to (PROVIDER):  QUYEN WOO  TIME LAB VALUE REPORTED TO PROVIDER:   Paged Quyen Woo with results at 1:20pm,    Noted Quyen Woo has been working with infusion RN's and ordering cultures etc.      Patient was seen by provider.

## 2018-07-05 NOTE — PROGRESS NOTES
Oncology/Hematology Visit Note  Jul 5, 2018    Reason for Visit: Follow up of metastatic pancreatic cancer, follow-up abdominal pain and abnormal labs    History of Present Illness: Iris Lewis is a 78 year old woman with a hx significant for thyroid cancer s/p thyroidectomy in the 1980s, cholelithiasis s/p CCY 2015, GERD s/p Nissen fundoplication in 2006 and HTN. She was dx with metastatic pancreatic cancer involving the liver, lung and lymph nodes in October 2017 after presenting with obstructive jaundice. She met with  an did not qualify for the HALOZYME study. She was initiated on [place]/Abraxane on 11/28/17. Restaging showed a positive response to treatment after 2 cycles. Prior to cycle 4, she developed acute hypoxia and weakness. She was found to have influenza and pneumonia. 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 At f/u with Dr. Yu on 3/26/18, she was found to have progression and recommended to start 5FU plus liposomal irinotecan. Day 1 Cycle 1 3/28/18. Imagining after 5 cycles with positive response to treatment. Was due for cycle 8 7/2/18 but this was delayed due to acute elevation in liver enzymes with abdominal pain and low grade temp. I was asked to see her today for close follow-up and lab recheck.    Interval History:  Ms. Lewis returns to clinic today with her son. She states that overall she feels much better today than earlier this week. On Monday when she was seen she was having abdominal cramping, constipation, nausea without vomiting, and poor oral intake along with a low grade temp in clinic. That evening she started to improve and over the past few days has had complete resolution of her symptoms. She denies any fevers or chills. No longer having any abdominal cramping and she has been able to have soft, formed bowel movements. No nausea and no vomiting. No urinary concerns. She has been able to eat and drink well, though admits her mouth sores  and dysgeusia make it harder to keep up with her oral intake.     She denies headaches or dizziness. No chest pain, SOB, cough, or URI symptoms. No peripheral edema, rashes, or bleeding issues though she does bruise easily. She is planning on switching to oral anticoagulation but needs to finish up her Lovenox boxes first. She admits she has a hard time dosing her Lovenox as there are no markings on how to get to 0.35 on the syringes. She is sleeping well. She uses Tramadol with two Tylenol 1-2 times per day for her back pain with good relief. She also drinks on average 1-2 glasses of wine with dinner each evening.     Current Outpatient Prescriptions   Medication Sig Dispense Refill     Acetaminophen (TYLENOL PO) Take 325 mg by mouth every 4 hours as needed for mild pain or fever       amylase-lipase-protease (CREON) 54699 UNITS CPEP Take 2 capsules (72,000 Units) by mouth 3 times daily (with meals) 180 capsule 3     CALCIUM PO Take by mouth every morning Form/strength unknown. Powder formulation. Add to water and fruits/vegetables daily to promote bone health.        cholecalciferol (VITAMIN D) 1000 UNIT tablet Take 1 tablet by mouth 2 times daily        cyanocolbalamin (VITAMIN B-12) 500 MCG tablet Take 500 mcg by mouth every morning        enoxaparin (LOVENOX) 40 MG/0.4ML injection Inject 0.35 mLs (35 mg) Subcutaneous every 12 hours 60 Syringe 3     folic acid (FOLVITE) 400 MCG tablet Take 1 tablet by mouth every morning        hydrOXYzine (ATARAX) 25 MG tablet Take 1-2 tablets (25-50 mg) by mouth every 6 hours as needed for itching (adjuvant pain) 10 tablet 0     levothyroxine (SYNTHROID/LEVOTHROID) 125 MCG tablet Take 1 tablet (125 mcg) by mouth daily 90 tablet 1     loperamide (IMODIUM) 2 MG capsule 2 caps at 1st sign of diarrhea & 1 cap every 2hrs until 12hrs diarrhea free. During night, 2 caps at bedtime & 2 caps every 4hrs until AM 60 capsule 3     LORazepam (ATIVAN) 0.5 MG tablet Take 1 tablet (0.5 mg) by  mouth every 4 hours as needed (Anxiety, Nausea/Vomiting or Sleep) 30 tablet 3     magic mouthwash (FIRST-MOUTHWASH BLM) suspension Swish and swallow 5-10 mLs in mouth every 6 hours as needed for mouth sores 237 mL 3     nystatin (MYCOSTATIN) 708090 UNIT/ML suspension Take 5 mLs (500,000 Units) by mouth 4 times daily 473 mL 3     ondansetron (ZOFRAN) 4 MG tablet        ondansetron (ZOFRAN) 8 MG tablet Take 1 tablet (8 mg) by mouth every 8 hours as needed (nausea/vomiting) 30 tablet 2     order for DME Equipment being ordered: Compression Stockings. Please dispense 2 pairs of knee high 20-30 mmHg 2 Units 3     pantoprazole (PROTONIX) 20 MG EC tablet Take 1 tablet (20 mg) by mouth daily 30 tablet 2     prochlorperazine (COMPAZINE) 10 MG tablet Take 1 tablet (10 mg) by mouth every 6 hours as needed for nausea or vomiting 30 tablet 3     prochlorperazine (COMPAZINE) 5 MG tablet Take 1 tablet (5 mg) by mouth every 6 hours as needed for nausea or vomiting 30 tablet 0     timolol (TIMOPTIC) 0.5 % ophthalmic solution INT 1 GTT OU IN THE MORNING  3     timolol maleate (ISTALOL) 0.5 % opthalmic solution Place 1 drop into both eyes every morning Before placing contact lenses 1 Bottle 0     traMADol (ULTRAM) 50 MG tablet Take 1-2 tablets ( mg) by mouth every 4 hours as needed for breakthrough pain Maximum of 8 tablets daily. 60 tablet 0     triamterene-hydrochlorothiazide (MAXZIDE-25) 37.5-25 MG per tablet Take 1 tablet by mouth every morning        albuterol (PROAIR HFA/PROVENTIL HFA/VENTOLIN HFA) 108 (90 BASE) MCG/ACT Inhaler Inhale 2 puffs into the lungs 4 times daily (Patient not taking: Reported on 5/22/2018) 1 Inhaler 1     CARTIA  MG 24 hr capsule TK 1 C PO QD (Patient not taking: Reported on 7/2/2018) 10 capsule 0     edoxaban ANTICOAGULANT (SAVAYSA) 30 MG TABS tablet Take 1 tablet (30 mg) by mouth daily (Patient not taking: Reported on 7/2/2018) 30 tablet 11       Physical Examination:  /72  Pulse 78  " Temp 98.2  F (36.8  C)  Resp 16  Ht 1.575 m (5' 2.01\")  Wt 44 kg (97 lb)  SpO2 96%  BMI 17.74 kg/m2  Wt Readings from Last 10 Encounters:   07/05/18 44 kg (97 lb)   07/02/18 42.4 kg (93 lb 6.4 oz)   06/19/18 43.4 kg (95 lb 11.2 oz)   06/04/18 44 kg (97 lb)   05/22/18 44.3 kg (97 lb 9.6 oz)   05/09/18 44.9 kg (99 lb)   05/08/18 45 kg (99 lb 4.8 oz)   04/24/18 46.9 kg (103 lb 8 oz)   04/10/18 45.2 kg (99 lb 9.6 oz)   03/28/18 48.2 kg (106 lb 4.8 oz)     Constitutional: Well-appearing thin female in no acute distress.  Eyes: EOMI, PERRL. No scleral icterus.  ENT: Oral mucosa is moist without lesions or thrush.   Lymphatic: Neck is supple without cervical or supraclavicular lymphadenopathy.   Cardiovascular: Regular rate and rhythm. No murmurs, gallops, or rubs. No peripheral edema except for trace edema in bilateral ankles.  Respiratory: Clear to auscultation bilaterally. No wheezes or crackles.  Gastrointestinal: Bowel sounds present. Abdomen soft, non-tender. No palpable hepatosplenomegaly or masses.   Neurologic: Cranial nerves II through XII are grossly intact.  Skin: Few bruises on arms. No rashes, petechiae, or bruising noted on exposed skin.    Laboratory Data:  Results for JONA SHETTY (MRN [id number]) as of 7/5/2018 12:08   7/5/2018 07:52   Sodium 142   Potassium 3.6   Chloride 110 (H)   Carbon Dioxide 25   Urea Nitrogen 7   Creatinine 0.73   GFR Estimate 78   GFR Estimate If Black >90   Calcium 8.2 (L)   Anion Gap 7   Albumin 2.6 (L)   Protein Total 6.1 (L)   Bilirubin Total 0.7   Alkaline Phosphatase 277 (H)    (H)   AST 81 (H)   Glucose 98   WBC 4.6   Hemoglobin 9.4 (L)   Hematocrit 28.7 (L)   Platelet Count 250   RBC Count 2.88 (L)      MCH 32.6   MCHC 32.8   RDW 20.8 (H)   Diff Method Automated Method   % Neutrophils 56.7   % Lymphocytes 17.9   % Monocytes 17.9   % Eosinophils 6.0   % Basophils 0.6   % Immature Granulocytes 0.9   Nucleated RBCs 0   Absolute Neutrophil 2.6 "   Absolute Lymphocytes 0.8   Absolute Monocytes 0.8   Absolute Eosinophils 0.3   Absolute Basophils 0.0   Abs Immature Granulocytes 0.0   Absolute Nucleated RBC 0.0   INR 0.94       Assessment and Plan:  1. GI  Previously with acute elevation in LFTs, specifically AST, on 7/2 associated with abdominal cramping, nausea, constipation, low grade temp, leukocytosis to 13.6 with unclear etiology. Blood cultures NGTD. Her symptoms resolved on their own and today her LFTs are markedly improved with AST down to 81 and normal bilirubin. Leukocytosis also resolved. Per my conversation most likely etiology was combination of alcohol and Tylenol use. Discussed the dangers of this and recommend cutting both out completely until LFTs resolve. In future will need to minimize alcohol use and not use both Tylenol and alcohol on the same day. She is agreeable to this.    Can continue antiemetics and Lomotil PRN with treatment.    2. 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 Has been tolerating treatment well other than looser stools and mucositis. Positive response to treatment after 5 cycles. Cycle 8 held 7/2 for LFT abnormalities as above.     Since symptoms and labs are improving, will plan for doing delayed Cycle 8 Monday 7/9. Will still plan to recheck all labs on Monday prior to chemo. Will also schedule her to see TARA prior to next infusion. Will adjust schedule by one week and keep Dr. Yu with scans the end of August.    3. FEN  Admits to decreased oral intake 2/2 mucositis and dysgeusia. Discussed salt/soda swishes and will prescribe magic mouth wash today as well. Creat and electrolytes overall stable. Albumin slightly low. Discussed using Miracle berry for taste changes and focusing on more flavorful foods.     4. Cancer-related pain  Continue Tramadol PRN. Discussed  holding Tylenol due to LFT changes. Can resume once LFTs resolve and as above cannot use Tylenol and alcohol due to risk of hepatotoxicity    5. Vascular  History of bilateral PE diagnosed Decemeber 2017. On Lovenox 35mg BID. Having issues with dosing this due to no clear lines on syringe. Also wants to switch to oral anticoagulant. Dr. Yu previously recommended Edoxaban 30mg daily (lower creat clearance and low weight) but there is issues with getting this covered. Sent message to care coordinator to help get this sorted out so she can switch.      Benoit Molina PA-C  [facility]  [address], [place] [id number] [phone]

## 2018-07-05 NOTE — NURSING NOTE
"Oncology Rooming Note    July 5, 2018 8:16 AM   Iris Lewis is a 78 year old female who presents for:    Chief Complaint   Patient presents with     Port Draw     accessed with power needle, heparin locked, vitals checked     RECHECK     ONC poorly differentiated adenocarcinoma      Initial Vitals: /72  Pulse 78  Temp 98.2  F (36.8  C)  Resp 16  Ht 1.575 m (5' 2.01\")  Wt 44 kg (97 lb)  SpO2 96%  BMI 17.74 kg/m2 Estimated body mass index is 17.74 kg/(m^2) as calculated from the following:    Height as of this encounter: 1.575 m (5' 2.01\").    Weight as of this encounter: 44 kg (97 lb). Body surface area is 1.39 meters squared.  No Pain (0) Comment: Data Unavailable   No LMP recorded. Patient is postmenopausal.  Allergies reviewed: Yes  Medications reviewed: Yes    Medications: Medication refills not needed today.  Pharmacy name entered into [employer]:    [facility] [address] [facility] [address] [place] [address] [facility] [place] [address]    Clinical concerns: none      6 minutes for nursing intake (face to face time)     Janey MIAN Maldonado              "

## 2018-07-05 NOTE — MR AVS SNAPSHOT
After Visit Summary   7/5/2018    Iris Lewis    MRN: [id number]           Patient Information     Date Of Birth          1940        Visit Information        Provider Department      7/5/2018 8:20 AM Benoit Molina PA [facility]        Today's Diagnoses     Malignant neoplasm of head of pancreas (H)    -  1       Follow-ups after your visit        Your next 10 appointments already scheduled     Jul 09, 2018 12:00 PM CDT   [facility] Lab Draw with [facility] LAB DRAW   [facility] Lab Draw ([facility])    [address] [place] [address]  [address]  [facility] [address]   [phone]            Jul 09, 2018 12:30 PM CDT   Infusion 180 with UC ONCOLOGY INFUSION, UC 20 ATC   [facility] ([facility])    [address] [place] [address]  [address]  [facility] [address]   [phone]            Jul 24, 2018  1:15 PM CDT   [facility] Lab Draw with [facility] LAB DRAW   [facility] ([facility])    [address] [place] [address]  [address]  [facility] [address]   [phone]            Jul 24, 2018  1:50 PM CDT   (Arrive by 1:35 PM)   Return Visit with CAT Dang CNP   [facility] ([facility])    [address] [place] [address]  [address]  [facility] [address]   [phone]            Jul 24, 2018  2:30 PM CDT   Infusion 180 with UC ONCOLOGY INFUSION, UC 19 ATC   [facility] ([facility])    [address] [place] [address]  [address]  [facility] [address]   [phone]            Aug 06, 2018  8:30 AM CDT   [facility] Lab Draw with [facility] LAB DRAW   [facility] ([facility])    [address] [place] [address]  [address]  [facility] [address]   [phone]            Aug 06, 2018  9:00 AM CDT   Infusion 180 with UC ONCOLOGY INFUSION, UC 24 ATC   [facility]  "Cancer Clinic ([facility])    [address]  [address]  [facility] [address] [phone]              Who to contact     If you have questions or need follow up information about today's clinic visit or your schedule please contact [facility] directly at [phone].  Normal or non-critical lab and imaging results will be communicated to you by MyChart, letter or phone within 4 business days after the clinic has received the results. If you do not hear from us within 7 days, please contact the clinic through Omeroshart or phone. If you have a critical or abnormal lab result, we will notify you by phone as soon as possible.  Submit refill requests through MYOMO or call your pharmacy and they will forward the refill request to us. Please allow 3 business days for your refill to be completed.          Additional Information About Your Visit        Omeroshart Information     MYOMO gives you secure access to your electronic health record. If you see a primary care provider, you can also send messages to your care team and make appointments. If you have questions, please call your primary care clinic.  If you do not have a primary care provider, please call [phone] and they will assist you.        Care EveryWhere ID     This is your Care EveryWhere ID. This could be used by other organizations to access your [facility] medical records  [id number]        Your Vitals Were     Pulse Temperature Respirations Height Pulse Oximetry BMI (Body Mass Index)    78 98.2  F (36.8  C) 16 1.575 m (5' 2.01\") 96% 17.74 kg/m2       Blood Pressure from Last 3 Encounters:   07/05/18 115/72   07/02/18 93/54   06/21/18 136/73    Weight from Last 3 Encounters:   07/05/18 44 kg (97 lb)   07/02/18 42.4 kg (93 lb 6.4 oz)   06/19/18 43.4 kg (95 lb 11.2 oz)              We Performed the Following     CBC with platelets differential     Comprehensive metabolic panel     INR        "   Today's Medication Changes          These changes are accurate as of 7/5/18 12:27 PM.  If you have any questions, ask your nurse or doctor.               Start taking these medicines.        Dose/Directions    magic mouthwash suspension (diphenhydrAMINE, lidocaine, aluminum-magnesium & simethicone) Susp compounding kit   Used for:  Malignant neoplasm of head of pancreas (H)   Started by:  Benoit Molina PA        Dose:  5-10 mL   Swish and swallow 5-10 mLs in mouth every 6 hours as needed for mouth sores   Quantity:  237 mL   Refills:  3         These medicines have changed or have updated prescriptions.        Dose/Directions    LOVENOX 40 MG/0.4ML injection   This may have changed:  Another medication with the same name was removed. Continue taking this medication, and follow the directions you see here.   Used for:  Malignant neoplasm of pancreas, unspecified location of malignancy (H), Other chronic 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 cor pulmonale (H)   Generic drug:  enoxaparin   Changed by:  Benoit Molina PA        Dose:  35 mg   Inject 0.35 mLs (35 mg) Subcutaneous every 12 hours   Quantity:  60 Syringe   Refills:  3       order for DME   This may have changed:  Another medication with the same name was removed. Continue taking this medication, and follow the directions you see here.   Used for:  Lymphedema   Changed by:  Benoit Molina PA        Equipment being ordered: Compression Stockings. Please dispense 2 pairs of knee high 20-30 mmHg   Quantity:  2 Units   Refills:  3         Stop taking these medicines if you haven't already. Please contact your care team if you have questions.     fluticasone-vilanterol 100-25 MCG/INH oral inhaler   Commonly known as:  BREO ELLIPTA   Stopped by:  Benoit Molina PA           omeprazole 40 MG capsule   Commonly known as:  priLOSEC   Stopped by:  Benoit Molina PA                Where to get your medicines      These medications were sent  to [place], [place] - [address]  [address], [facility] [address]    Hours:  TRANSPLANT PHONE NUMBER [phone] Phone:  [phone]     magic mouthwash suspension (diphenhydrAMINE, lidocaine, aluminum-magnesium & simethicone) Susp compounding kit                Primary Care Provider Office Phone # Fax #    Neri Gipson [phone]       [address] [facility] [address]        Equal Access to Services     NESSA NICHOLS : Hadii aad ku hadasho Soomaali, waaxda luqadaha, qaybta kaalmada adeegyada, waxay idiin hayaan adeeg heather adams . So [facility] [phone].    ATENCIÓN: Si habla español, tiene a nava disposición servicios gratuitos de asistencia lingüística. Llame al [phone].    We comply with applicable federal civil rights laws and [place] laws. We do not discriminate on the basis of race, color, national origin, age, disability, sex, sexual orientation, or gender identity.            Thank you!     Thank you for choosing [facility]  for your care. Our goal is always to provide you with excellent care. Hearing back from our patients is one way we can continue to improve our services. Please take a few minutes to complete the written survey that you may receive in the mail after your visit with us. Thank you!             Your Updated Medication List - Protect others around you: Learn how to safely use, store and throw away your medicines at www.disposemymeds.org.          This list is accurate as of 7/5/18 12:27 PM.  Always use your most recent med list.                   Brand Name Dispense Instructions for use Diagnosis    albuterol 108 (90 Base) MCG/ACT Inhaler    PROAIR HFA/PROVENTIL HFA/VENTOLIN HFA    1 Inhaler    Inhale 2 puffs into the lungs 4 times daily    Mild asthma, unspecified whether complicated, unspecified whether persistent       amylase-lipase-protease 71030 units  Cpep    CREON    180 capsule    Take 2 capsules (72,000 Units) by mouth 3 times daily (with meals)    Malignant neoplasm of head of pancreas (H)       CALCIUM PO      Take by mouth every morning Form/strength unknown. Powder formulation. Add to water and fruits/vegetables daily to promote bone health.        CARTIA  MG 24 hr capsule   Generic drug:  diltiazem     10 capsule    TK 1 C PO QD    Benign essential hypertension       cholecalciferol 1000 UNIT tablet    vitamin D3     Take 1 tablet by mouth 2 times daily        cyanocolbalamin 500 MCG tablet    vitamin  B-12     Take 500 mcg by mouth every morning        edoxaban ANTICOAGULANT 30 MG Tabs tablet    SAVAYSA    30 tablet    Take 1 tablet (30 mg) by mouth daily    Other chronic 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 cor pulmonale (H), Malignant neoplasm of head of pancreas (H)       folic acid 400 MCG tablet    FOLVITE     Take 1 tablet by mouth every morning        hydrOXYzine 25 MG tablet    ATARAX    10 tablet    Take 1-2 tablets (25-50 mg) by mouth every 6 hours as needed for itching (adjuvant pain)    Obstructive jaundice       levothyroxine 125 MCG tablet    SYNTHROID/LEVOTHROID    90 tablet    Take 1 tablet (125 mcg) by mouth daily    Hypothyroidism, unspecified type       loperamide 2 MG capsule    IMODIUM    60 capsule    2 caps at 1st sign of diarrhea & 1 cap every 2hrs until 12hrs diarrhea free. During night, 2 caps at bedtime & 2 caps every 4hrs until AM    Malignant neoplasm of head of pancreas (H)       LORazepam 0.5 MG tablet    ATIVAN    30 tablet    Take 1 tablet (0.5 mg) by mouth every 4 hours as needed (Anxiety, Nausea/Vomiting or Sleep)    Malignant neoplasm of head of pancreas (H)       LOVENOX 40 MG/0.4ML injection   Generic drug:  enoxaparin     60 Syringe    Inject 0.35 mLs (35 mg) Subcutaneous every 12 hours    Malignant neoplasm of pancreas, unspecified location of malignancy (H), Other chronic 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 cor  pulmonale (H)       magic mouthwash suspension (diphenhydrAMINE, lidocaine, aluminum-magnesium & simethicone) Susp compounding kit     237 mL    Swish and swallow 5-10 mLs in mouth every 6 hours as needed for mouth sores    Malignant neoplasm of head of pancreas (H)       nystatin 258777 UNIT/ML suspension    MYCOSTATIN    473 mL    Take 5 mLs (500,000 Units) by mouth 4 times daily    Malignant neoplasm of head of pancreas (H), History of pulmonary embolism       * ondansetron 4 MG tablet    ZOFRAN          * ondansetron 8 MG tablet    ZOFRAN    30 tablet    Take 1 tablet (8 mg) by mouth every 8 hours as needed (nausea/vomiting)    Malignant neoplasm of head of pancreas (H)       order for DME     2 Units    Equipment being ordered: Compression Stockings. Please dispense 2 pairs of knee high 20-30 mmHg    Lymphedema       pantoprazole 20 MG EC tablet    PROTONIX    30 tablet    Take 1 tablet (20 mg) by mouth daily    Other acute gastritis without hemorrhage       * prochlorperazine 5 MG tablet    COMPAZINE    30 tablet    Take 1 tablet (5 mg) by mouth every 6 hours as needed for nausea or vomiting    Malignant neoplasm of head of pancreas (H)       * prochlorperazine 10 MG tablet    COMPAZINE    30 tablet    Take 1 tablet (10 mg) by mouth every 6 hours as needed for nausea or vomiting    Malignant neoplasm of head of pancreas (H)       * timolol maleate 0.5 % opthalmic solution    ISTALOL    1 Bottle    Place 1 drop into both eyes every morning Before placing contact lenses    Cataract, unspecified cataract type, unspecified laterality       * timolol 0.5 % ophthalmic solution    TIMOPTIC     INT 1 GTT OU IN THE MORNING        traMADol 50 MG tablet    ULTRAM    60 tablet    Take 1-2 tablets ( mg) by mouth every 4 hours as needed for breakthrough pain Maximum of 8 tablets daily.    Malignant neoplasm of head of pancreas (H)       triamterene-hydrochlorothiazide 37.5-25 MG per tablet    MAXZIDE-25     Take 1  tablet by mouth every morning        TYLENOL PO      Take 325 mg by mouth every 4 hours as needed for mild pain or fever        * Notice:  This list has 6 medication(s) that are the same as other medications prescribed for you. Read the directions carefully, and ask your doctor or other care provider to review them with you.

## 2018-07-05 NOTE — LETTER
7/5/2018      RE: Iris Lewis  [address]  [place] [address]       Oncology/Hematology Visit Note  Jul 5, 2018    Reason for Visit: Follow up of metastatic pancreatic cancer, follow-up abdominal pain and abnormal labs    History of Present Illness: Iris Lewis is a 78 year old woman with a hx significant for thyroid cancer s/p thyroidectomy in the 1980s, cholelithiasis s/p CCY 2015, GERD s/p Nissen fundoplication in 2006 and HTN. She was dx with metastatic pancreatic cancer involving the liver, lung and lymph nodes in October 2017 after presenting with obstructive jaundice. She met with  an did not qualify for the HALOZYME study. She was initiated on [place]/Abraxane on 11/28/17. Restaging showed a positive response to treatment after 2 cycles. Prior to cycle 4, she developed acute hypoxia and weakness. She was found to have influenza and pneumonia. 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 At f/u with Dr. Yu on 3/26/18, she was found to have progression and recommended to start 5FU plus liposomal irinotecan. Day 1 Cycle 1 3/28/18. Imagining after 5 cycles with positive response to treatment. Was due for cycle 8 7/2/18 but this was delayed due to acute elevation in liver enzymes with abdominal pain and low grade temp. I was asked to see her today for close follow-up and lab recheck.    Interval History:  Ms. Lewis returns to clinic today with her son. She states that overall she feels much better today than earlier this week. On Monday when she was seen she was having abdominal cramping, constipation, nausea without vomiting, and poor oral intake along with a low grade temp in clinic. That evening she started to improve and over the past few days has had complete resolution of her symptoms. She denies any fevers or chills. No longer having any abdominal cramping and she has been able to have soft, formed bowel movements. No nausea and no vomiting. No urinary  concerns. She has been able to eat and drink well, though admits her mouth sores and dysgeusia make it harder to keep up with her oral intake.     She denies headaches or dizziness. No chest pain, SOB, cough, or URI symptoms. No peripheral edema, rashes, or bleeding issues though she does bruise easily. She is planning on switching to oral anticoagulation but needs to finish up her Lovenox boxes first. She admits she has a hard time dosing her Lovenox as there are no markings on how to get to 0.35 on the syringes. She is sleeping well. She uses Tramadol with two Tylenol 1-2 times per day for her back pain with good relief. She also drinks on average 1-2 glasses of wine with dinner each evening.     Current Outpatient Prescriptions   Medication Sig Dispense Refill     Acetaminophen (TYLENOL PO) Take 325 mg by mouth every 4 hours as needed for mild pain or fever       amylase-lipase-protease (CREON) 31333 UNITS CPEP Take 2 capsules (72,000 Units) by mouth 3 times daily (with meals) 180 capsule 3     CALCIUM PO Take by mouth every morning Form/strength unknown. Powder formulation. Add to water and fruits/vegetables daily to promote bone health.        cholecalciferol (VITAMIN D) 1000 UNIT tablet Take 1 tablet by mouth 2 times daily        cyanocolbalamin (VITAMIN B-12) 500 MCG tablet Take 500 mcg by mouth every morning        enoxaparin (LOVENOX) 40 MG/0.4ML injection Inject 0.35 mLs (35 mg) Subcutaneous every 12 hours 60 Syringe 3     folic acid (FOLVITE) 400 MCG tablet Take 1 tablet by mouth every morning        hydrOXYzine (ATARAX) 25 MG tablet Take 1-2 tablets (25-50 mg) by mouth every 6 hours as needed for itching (adjuvant pain) 10 tablet 0     levothyroxine (SYNTHROID/LEVOTHROID) 125 MCG tablet Take 1 tablet (125 mcg) by mouth daily 90 tablet 1     loperamide (IMODIUM) 2 MG capsule 2 caps at 1st sign of diarrhea & 1 cap every 2hrs until 12hrs diarrhea free. During night, 2 caps at bedtime & 2 caps every 4hrs  until AM 60 capsule 3     LORazepam (ATIVAN) 0.5 MG tablet Take 1 tablet (0.5 mg) by mouth every 4 hours as needed (Anxiety, Nausea/Vomiting or Sleep) 30 tablet 3     magic mouthwash (FIRST-MOUTHWASH BLM) suspension Swish and swallow 5-10 mLs in mouth every 6 hours as needed for mouth sores 237 mL 3     nystatin (MYCOSTATIN) 975495 UNIT/ML suspension Take 5 mLs (500,000 Units) by mouth 4 times daily 473 mL 3     ondansetron (ZOFRAN) 4 MG tablet        ondansetron (ZOFRAN) 8 MG tablet Take 1 tablet (8 mg) by mouth every 8 hours as needed (nausea/vomiting) 30 tablet 2     order for DME Equipment being ordered: Compression Stockings. Please dispense 2 pairs of knee high 20-30 mmHg 2 Units 3     pantoprazole (PROTONIX) 20 MG EC tablet Take 1 tablet (20 mg) by mouth daily 30 tablet 2     prochlorperazine (COMPAZINE) 10 MG tablet Take 1 tablet (10 mg) by mouth every 6 hours as needed for nausea or vomiting 30 tablet 3     prochlorperazine (COMPAZINE) 5 MG tablet Take 1 tablet (5 mg) by mouth every 6 hours as needed for nausea or vomiting 30 tablet 0     timolol (TIMOPTIC) 0.5 % ophthalmic solution INT 1 GTT OU IN THE MORNING  3     timolol maleate (ISTALOL) 0.5 % opthalmic solution Place 1 drop into both eyes every morning Before placing contact lenses 1 Bottle 0     traMADol (ULTRAM) 50 MG tablet Take 1-2 tablets ( mg) by mouth every 4 hours as needed for breakthrough pain Maximum of 8 tablets daily. 60 tablet 0     triamterene-hydrochlorothiazide (MAXZIDE-25) 37.5-25 MG per tablet Take 1 tablet by mouth every morning        albuterol (PROAIR HFA/PROVENTIL HFA/VENTOLIN HFA) 108 (90 BASE) MCG/ACT Inhaler Inhale 2 puffs into the lungs 4 times daily (Patient not taking: Reported on 5/22/2018) 1 Inhaler 1     CARTIA  MG 24 hr capsule TK 1 C PO QD (Patient not taking: Reported on 7/2/2018) 10 capsule 0     edoxaban ANTICOAGULANT (SAVAYSA) 30 MG TABS tablet Take 1 tablet (30 mg) by mouth daily (Patient not taking:  "Reported on 7/2/2018) 30 tablet 11       Physical Examination:  /72  Pulse 78  Temp 98.2  F (36.8  C)  Resp 16  Ht 1.575 m (5' 2.01\")  Wt 44 kg (97 lb)  SpO2 96%  BMI 17.74 kg/m2  Wt Readings from Last 10 Encounters:   07/05/18 44 kg (97 lb)   07/02/18 42.4 kg (93 lb 6.4 oz)   06/19/18 43.4 kg (95 lb 11.2 oz)   06/04/18 44 kg (97 lb)   05/22/18 44.3 kg (97 lb 9.6 oz)   05/09/18 44.9 kg (99 lb)   05/08/18 45 kg (99 lb 4.8 oz)   04/24/18 46.9 kg (103 lb 8 oz)   04/10/18 45.2 kg (99 lb 9.6 oz)   03/28/18 48.2 kg (106 lb 4.8 oz)     Constitutional: Well-appearing thin female in no acute distress.  Eyes: EOMI, PERRL. No scleral icterus.  ENT: Oral mucosa is moist without lesions or thrush.   Lymphatic: Neck is supple without cervical or supraclavicular lymphadenopathy.   Cardiovascular: Regular rate and rhythm. No murmurs, gallops, or rubs. No peripheral edema except for trace edema in bilateral ankles.  Respiratory: Clear to auscultation bilaterally. No wheezes or crackles.  Gastrointestinal: Bowel sounds present. Abdomen soft, non-tender. No palpable hepatosplenomegaly or masses.   Neurologic: Cranial nerves II through XII are grossly intact.  Skin: Few bruises on arms. No rashes, petechiae, or bruising noted on exposed skin.    Laboratory Data:  Results for JONA SHETTY (MRN [id number]) as of 7/5/2018 12:08   7/5/2018 07:52   Sodium 142   Potassium 3.6   Chloride 110 (H)   Carbon Dioxide 25   Urea Nitrogen 7   Creatinine 0.73   GFR Estimate 78   GFR Estimate If Black >90   Calcium 8.2 (L)   Anion Gap 7   Albumin 2.6 (L)   Protein Total 6.1 (L)   Bilirubin Total 0.7   Alkaline Phosphatase 277 (H)    (H)   AST 81 (H)   Glucose 98   WBC 4.6   Hemoglobin 9.4 (L)   Hematocrit 28.7 (L)   Platelet Count 250   RBC Count 2.88 (L)      MCH 32.6   MCHC 32.8   RDW 20.8 (H)   Diff Method Automated Method   % Neutrophils 56.7   % Lymphocytes 17.9   % Monocytes 17.9   % Eosinophils 6.0   % Basophils " 0.6   % Immature Granulocytes 0.9   Nucleated RBCs 0   Absolute Neutrophil 2.6   Absolute Lymphocytes 0.8   Absolute Monocytes 0.8   Absolute Eosinophils 0.3   Absolute Basophils 0.0   Abs Immature Granulocytes 0.0   Absolute Nucleated RBC 0.0   INR 0.94       Assessment and Plan:  1. GI  Previously with acute elevation in LFTs, specifically AST, on 7/2 associated with abdominal cramping, nausea, constipation, low grade temp, leukocytosis to 13.6 with unclear etiology. Blood cultures NGTD. Her symptoms resolved on their own and today her LFTs are markedly improved with AST down to 81 and normal bilirubin. Leukocytosis also resolved. Per my conversation most likely etiology was combination of alcohol and Tylenol use. Discussed the dangers of this and recommend cutting both out completely until LFTs resolve. In future will need to minimize alcohol use and not use both Tylenol and alcohol on the same day. She is agreeable to this.    Can continue antiemetics and Lomotil PRN with treatment.    2. 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 Has been tolerating treatment well other than looser stools and mucositis. Positive response to treatment after 5 cycles. Cycle 8 held 7/2 for LFT abnormalities as above.     Since symptoms and labs are improving, will plan for doing delayed Cycle 8 Monday 7/9. Will still plan to recheck all labs on Monday prior to chemo. Will also schedule her to see TARA prior to next infusion. Will adjust schedule by one week and keep Dr. Yu with scans the end of August.    3. FEN  Admits to decreased oral intake 2/2 mucositis and dysgeusia. Discussed salt/soda swishes and will prescribe magic mouth wash today as well. Creat and electrolytes overall stable. Albumin slightly low. Discussed using Miracle berry for taste changes and focusing on more  flavorful foods.     4. Cancer-related pain  Continue Tramadol PRN. Discussed holding Tylenol due to LFT changes. Can resume once LFTs resolve and as above cannot use Tylenol and alcohol due to risk of hepatotoxicity    5. Vascular  History of bilateral PE diagnosed Decemeber 2017. On Lovenox 35mg BID. Having issues with dosing this due to no clear lines on syringe. Also wants to switch to oral anticoagulant. Dr. Yu previously recommended Edoxaban 30mg daily (lower creat clearance and low weight) but there is issues with getting this covered. Sent message to care coordinator to help get this sorted out so she can switch.      Benoit Molina PA-C  [facility]  [address] [place] [address] [phone]

## 2018-07-05 NOTE — NURSING NOTE
Port de-accessed. Patient tolerated well.  Funmi Crawford CMA ([facility])

## 2018-07-09 NOTE — PROGRESS NOTES
Infusion Nursing Note:  Iris Lewis presents today for C8D1 Irinotecan Liposome/Fluorouracil pump connect.    Patient seen by provider today: No   present during visit today: Not Applicable.    Note: Patient presented to clinic today with no new complaints. Patient is feeling well and is in good spirits.  IB sent to scheduling pool about pump disconnect on 7/11/18 at 1400.    Intravenous Access:  Implanted Port.    Treatment Conditions:  Lab Results   Component Value Date    HGB 9.7 07/09/2018     Lab Results   Component Value Date    WBC 6.1 07/09/2018      Lab Results   Component Value Date    ANEU 4.3 07/09/2018     Lab Results   Component Value Date     07/09/2018      Lab Results   Component Value Date     07/09/2018                   Lab Results   Component Value Date    POTASSIUM 3.8 07/09/2018           Lab Results   Component Value Date    MAG 1.9 11/15/2017            Lab Results   Component Value Date    CR 0.60 07/09/2018                   Lab Results   Component Value Date    EMMY 9.0 07/09/2018                Lab Results   Component Value Date    BILITOTAL 0.4 07/09/2018           Lab Results   Component Value Date    ALBUMIN 2.6 07/09/2018                    Lab Results   Component Value Date    ALT 60 07/09/2018           Lab Results   Component Value Date    AST 18 07/09/2018       Results reviewed, labs MET treatment parameters, ok to proceed with treatment.      Post Infusion Assessment:  Patient tolerated infusion without incident.  Blood return noted pre and post infusion.  Site patent and intact, free from redness, edema or discomfort.  No evidence of extravasations.  Port needle left intact for home infusion.    Discharge Plan:   Patient declined prescription refills.  Discharge instructions reviewed with: Patient.  Patient and/or family verbalized understanding of discharge instructions and all questions answered.  AVS to patient via [employer]  Patient will return  "7/11/18 pump disconnect for next appointment.   Patient discharged in stable condition accompanied by: self.  Departure Mode: Ambulatory with cane.  Face to Face time: 0 minutes.  Prior to discharge: Port is secured in place with tegaderm and flushed with 10cc NS with positive blood return noted.  Continuous home infusion CADD pump connected.    All connectors secured in place and clamps taped open, checked by Zeny Gruber RN.    Pump started, \"running\" noted on display (CADD).  Patient instructed to call our clinic or [facility] with any questions or concerns at home.  Patient verbalized understanding.    Patient set up for pump disconnect at our clinic on 7/11/18 at 1400.     Jacob Gonzales RN                        "

## 2018-07-09 NOTE — PATIENT INSTRUCTIONS
[facility] Main Line: [phone]    Call triage nurse with chills and/or temperature greater than or equal to 100.4, uncontrolled nausea/vomiting, diarrhea, constipation, dizziness, shortness of breath, chest pain, bleeding, unexplained bruising, or any new/concerning symptoms, questions/concerns.   If you are having any concerning symptoms or wish to speak to a provider before your next infusion visit, please call your care coordinator or triage to notify them so we can adequately serve you.   Triage Nurse Line: [phone]    If after hours, weekends, or holidays [phone]               July 2018 Sunday Monday Tuesday Wednesday Thursday Friday Saturday   1     2     [facility]   10:15 AM   (15 min.)   [facility]   [facility] Draw     [facility] ONC INFUSION 180   11:00 AM   (180 min.)    ONCOLOGY INFUSION   [facility] 3     4     5     [facility] DRAW    7:15 AM   (15 min.)   [facility]   [facility] Lab Draw     UMP RETURN    8:05 AM   (50 min.)   Benoit Molina PA   [facility] 6     7       8     9     [facility]   12:00 PM   (15 min.)   [facility]   [facility] Lab Draw     [facility] ONC INFUSION 180   12:30 PM   (180 min.)    ONCOLOGY INFUSION   [facility] 10     11     12     13     14       15     16     17     18     19     20     21       22     23     24     [facility]    1:15 PM   (15 min.)   [facility]   [facility] Lab Draw     [facility] RETURN    1:35 PM   (50 min.)   Quyen Mazariegos APRN CNP   [facility]     [facility] ONC INFUSION 180    2:30 PM   (180 min.)    ONCOLOGY INFUSION   [facility] 25     26     27     28       29     30     31                                    August 2018 Sunday Monday Tuesday Wednesday Thursday Friday Saturday                  1     2     3     4       5     6      [facility]    8:30 AM   (15 min.)    [facility] LAB DRAW   [facility] Lab Draw     [facility] ONC INFUSION 180    9:00 AM   (180 min.)    ONCOLOGY INFUSION   [facility] 7     8     UMP RETURN    9:15 AM   (30 min.)   José Antonio Ann MD   [facility] 9     10     11       12     13     14     15     16     17     18       19     20     [facility] DRAW   10:00 AM   (15 min.)    [facility] LAB DRAW   [facility] Lab Draw     [facility] ONC INFUSION 180   10:30 AM   (180 min.)    ONCOLOGY INFUSION   [facility] 21     22     23     24     CT CHEST ABDOMEN PELVIS WWO    9:40 AM   (20 min.)   UCCT2   [facility] CT 25       26     27     [facility] DRAW   10:15 AM   (15 min.)    [facility] LAB DRAW   [facility] Lab Draw     UMP RETURN   10:30 AM   (30 min.)   Vinny Yu MD   [facility]     [facility] ONC INFUSION 180   11:30 AM   (180 min.)    ONCOLOGY INFUSION   [facility] 28     29     30     31                       Lab Results:  Recent Results (from the past 12 hour(s))   Comprehensive metabolic panel    Collection Time: 07/09/18 12:30 PM   Result Value Ref Range    Sodium 141 133 - 144 mmol/L    Potassium 3.8 3.4 - 5.3 mmol/L    Chloride 105 94 - 109 mmol/L    Carbon Dioxide 26 20 - 32 mmol/L    Anion Gap 10 3 - 14 mmol/L    Glucose 159 (H) 70 - 99 mg/dL    Urea Nitrogen 6 (L) 7 - 30 mg/dL    Creatinine 0.60 0.52 - 1.04 mg/dL    GFR Estimate >90 >60 mL/min/1.7m2    GFR Estimate If Black >90 >60 mL/min/1.7m2    Calcium 9.0 8.5 - 10.1 mg/dL    Bilirubin Total 0.4 0.2 - 1.3 mg/dL    Albumin 2.6 (L) 3.4 - 5.0 g/dL    Protein Total 6.5 (L) 6.8 - 8.8 g/dL    Alkaline Phosphatase 252 (H) 40 - 150 U/L    ALT 60 (H) 0 - 50 U/L    AST 18 0 - 45 U/L   CBC with platelets differential    Collection Time: 07/09/18 12:30 PM   Result Value Ref Range    WBC 6.1 4.0 - 11.0 10e9/L    RBC  Count 2.90 (L) 3.8 - 5.2 10e12/L    Hemoglobin 9.7 (L) 11.7 - 15.7 g/dL    Hematocrit 29.9 (L) 35.0 - 47.0 %     (H) 78 - 100 fl    MCH 33.4 (H) 26.5 - 33.0 pg    MCHC 32.4 31.5 - 36.5 g/dL    RDW 20.2 (H) 10.0 - 15.0 %    Platelet Count 357 150 - 450 10e9/L    Diff Method Automated Method     % Neutrophils 70.2 %    % Lymphocytes 14.0 %    % Monocytes 13.2 %    % Eosinophils 0.5 %    % Basophils 0.5 %    % Immature Granulocytes 1.6 %    Nucleated RBCs 0 0 /100    Absolute Neutrophil 4.3 1.6 - 8.3 10e9/L    Absolute Lymphocytes 0.9 0.8 - 5.3 10e9/L    Absolute Monocytes 0.8 0.0 - 1.3 10e9/L    Absolute Eosinophils 0.0 0.0 - 0.7 10e9/L    Absolute Basophils 0.0 0.0 - 0.2 10e9/L    Abs Immature Granulocytes 0.1 0 - 0.4 10e9/L    Absolute Nucleated RBC 0.0

## 2018-07-09 NOTE — MR AVS SNAPSHOT
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   If you are having any concerning symptoms or wish to speak to a provider before your next infusion visit, please call your care coordinator or triage to notify them so we can adequately serve you.   Triage Nurse Line: [phone]    If after hours, weekends, or holidays [phone]               July 2018 Sunday Monday Tuesday Wednesday Thursday Friday Saturday   1     2     [facility] D[facility]   10:15 AM   (15 min.)    [facility]     [facility] ONC INFUSION 180   11:00 AM   (180 min.)    ONCOLOGY INFUSION   [facility] 3     4     5     [facility] DRAW    7:15 AM   (15 min.)    [facility] LAB DRAW   [facility] Lab Draw     UMP RETURN    8:05 AM   (50 min.)   Benoit Molina PA   [facility] 6     7       8     9     [facility] DRAW   12:00 PM   (15 min.)    [facility] LAB DRAW   [facility] Lab Draw     [facility] ONC INFUSION 180   12:30 PM   (180 min.)    ONCOLOGY INFUSION   [facility] 10     11     12     13     14       15     16     17     18     19     20     21       22     23     24     [facility]    1:15 PM   (15 min.)    [facility]   [facility]     [facility] RETURN     1:35 PM   (50 min.)   Quyen Mazariegos APRN CNP   [facility]     [facility] ONC INFUSION 180    2:30 PM   (180 min.)   UC ONCOLOGY INFUSION   [facility] 25     26     27     28       29     30     31                                    August 2018 Sunday Monday Tuesday Wednesday Thursday Friday Saturday                  1     2     3     4       5     6     [facility]    8:30 AM   (15 min.)   [facility] DRAW   [facility] Lab Draw     [facility] ONC INFUSION 180    9:00 AM   (180 min.)   UC ONCOLOGY INFUSION   [facility] 7     8     UMP RETURN    9:15 AM   (30 min.)   José Antonio Ann MD   [facility] 9     10     11       12     13     14     15     16     17     18       19     20     [facility]   10:00 AM   (15 min.)   [facility]   [facility]     [facility] ONC INFUSION 180   10:30 AM   (180 min.)    ONCOLOGY INFUSION   [facility] 21     22     23     24     CT CHEST ABDOMEN PELVIS WWO    9:40 AM   (20 min.)   UCCT2   [facility] CT 25       26     27     [facility] DRAW   10:15 AM   (15 min.)    [facility] LAB DRAW   [facility] Lab Draw     UMP RETURN   10:30 AM   (30 min.)   Vinny Yu MD   [facility]     [facility] ONC INFUSION 180   11:30 AM   (180 min.)    ONCOLOGY INFUSION   [facility] 28     29     30     31                       Lab Results:  Recent Results (from the past 12 hour(s))   Comprehensive metabolic panel    Collection Time: 07/09/18 12:30 PM   Result Value Ref Range    Sodium 141 133 - 144 mmol/L    Potassium 3.8 3.4 - 5.3 mmol/L    Chloride 105 94 - 109 mmol/L    Carbon Dioxide 26 20 - 32 mmol/L    Anion Gap 10 3 - 14 mmol/L    Glucose 159 (H) 70 - 99 mg/dL    Urea Nitrogen 6 (L) 7 - 30 mg/dL    Creatinine 0.60 0.52 - 1.04 mg/dL    GFR Estimate >90 >60 mL/min/1.7m2    GFR Estimate If  Black >90 >60 mL/min/1.7m2    Calcium 9.0 8.5 - 10.1 mg/dL    Bilirubin Total 0.4 0.2 - 1.3 mg/dL    Albumin 2.6 (L) 3.4 - 5.0 g/dL    Protein Total 6.5 (L) 6.8 - 8.8 g/dL    Alkaline Phosphatase 252 (H) 40 - 150 U/L    ALT 60 (H) 0 - 50 U/L    AST 18 0 - 45 U/L   CBC with platelets differential    Collection Time: 07/09/18 12:30 PM   Result Value Ref Range    WBC 6.1 4.0 - 11.0 10e9/L    RBC Count 2.90 (L) 3.8 - 5.2 10e12/L    Hemoglobin 9.7 (L) 11.7 - 15.7 g/dL    Hematocrit 29.9 (L) 35.0 - 47.0 %     (H) 78 - 100 fl    MCH 33.4 (H) 26.5 - 33.0 pg    MCHC 32.4 31.5 - 36.5 g/dL    RDW 20.2 (H) 10.0 - 15.0 %    Platelet Count 357 150 - 450 10e9/L    Diff Method Automated Method     % Neutrophils 70.2 %    % Lymphocytes 14.0 %    % Monocytes 13.2 %    % Eosinophils 0.5 %    % Basophils 0.5 %    % Immature Granulocytes 1.6 %    Nucleated RBCs 0 0 /100    Absolute Neutrophil 4.3 1.6 - 8.3 10e9/L    Absolute Lymphocytes 0.9 0.8 - 5.3 10e9/L    Absolute Monocytes 0.8 0.0 - 1.3 10e9/L    Absolute Eosinophils 0.0 0.0 - 0.7 10e9/L    Absolute Basophils 0.0 0.0 - 0.2 10e9/L    Abs Immature Granulocytes 0.1 0 - 0.4 10e9/L    Absolute Nucleated RBC 0.0                  Follow-ups after your visit        Your next 10 appointments already scheduled     Jul 11, 2018  2:00 PM CDT   Infusion 60 with  ONCOLOGY INFUSION,  15 ATC   [facility] ([facility])    [address] [place] [address] [phone]            Jul 24, 2018  1:15 PM CDT   [facility] LAB DRAW   [facility] Lab Draw ([facility])    [address] [place] [address]  [address]  [facility] [address]   [phone]            Jul 24, 2018  1:50 PM CDT   (Arrive by 1:35 PM)   Return Visit with CAT Dang CNP   [facility] ([facility])    [address] [place] [address]  [address]  [facility]  [address]   [phone]            Jul 24, 2018  2:30 PM CDT   Infusion 180 with UC ONCOLOGY INFUSION, UC 19 ATC   [facility] ([facility])    [address] [place] [address]  [address]  [facility] [address]   [phone]            Aug 06, 2018  8:30 AM CDT   [facility] Lab Draw with [facility] LAB DRAW   [facility] Lab Draw ([facility])    [address] [place] [address]  [address]  [facility] [address]   [phone]            Aug 06, 2018  9:00 AM CDT   Infusion 180 with UC ONCOLOGY INFUSION, UC 24 ATC   [facility] ([facility])    [address] [place] [address]  [address]  [facility] [address]   [phone]            Aug 08, 2018  9:30 AM CDT   (Arrive by 9:15 AM)   Return Visit with José Antonio Ann MD   [facility] ([facility])    [address] [place] [address]  [address]  [facility] [address]   [phone]              Who to contact     If you have questions or need follow up information about today's clinic visit or your schedule please contact [facility] directly at [phone].  Normal or non-critical lab and imaging results will be communicated to you by MyChart, letter or phone within 4 business days after the clinic has received the results. If you do not hear from us within 7 days, please contact the clinic through MyChart or phone. If you have a critical or abnormal lab result, we will notify you by phone as soon as possible.  Submit refill requests through [employer] or call your pharmacy and they will forward the refill request to us. Please allow 3 business days for your refill to be completed.          Additional Information About Your Visit        [employer] Information     [employer] gives you secure access to your electronic health record. If you see a primary care provider, you can also send messages to your care team and make  appointments. If you have questions, please call your primary care clinic.  If you do not have a primary care provider, please call [phone] and they will assist you.        Care EveryWhere ID     This is your Care EveryWhere ID. This could be used by other organizations to access your [place] medical records  [id number]        Your Vitals Were     Pulse Temperature Respirations Pulse Oximetry BMI (Body Mass Index)       85 99.4  F (37.4  C) (Oral) 16 98% 17.81 kg/m2        Blood Pressure from Last 3 Encounters:   07/09/18 107/65   07/05/18 115/72   07/02/18 93/54    Weight from Last 3 Encounters:   07/09/18 44.2 kg (97 lb 6.4 oz)   07/05/18 44 kg (97 lb)   07/02/18 42.4 kg (93 lb 6.4 oz)              We Performed the Following     CBC with platelets differential     Comprehensive metabolic panel        Primary Care Provider Office Phone # Fax #    Neri Gipson [phone]       0 [address] [address]        Equal Access to Services     [facility]: Hadii mary ku hadasho Sothom, waaxda luqestelita, qaybta kaalmameera correia, lisa adams . So [facility] [phone].    ATENCIÓN: Si habla español, tiene a nava disposición servicios gratuitos de asistencia lingüística. [facility] [phone].    We comply with applicable federal civil rights laws and [place] laws. We do not discriminate on the basis of race, color, national origin, age, disability, sex, sexual orientation, or gender identity.            Thank you!     Thank you for choosing [facility]  for your care. Our goal is always to provide you with excellent care. Hearing back from our patients is one way we can continue to improve our services. Please take a few minutes to complete the written survey that you may receive in the mail after your visit with us. Thank you!             Your Updated Medication List - Protect others around you: Learn how to safely use, store  and throw away your medicines at www.disposemymeds.org.          This list is accurate as of 7/9/18  5:22 PM.  Always use your most recent med list.                   Brand Name Dispense Instructions for use Diagnosis    albuterol 108 (90 Base) MCG/ACT Inhaler    PROAIR HFA/PROVENTIL HFA/VENTOLIN HFA    1 Inhaler    Inhale 2 puffs into the lungs 4 times daily    Mild asthma, unspecified whether complicated, unspecified whether persistent       amylase-lipase-protease 12298 units Cpep    CREON    180 capsule    Take 2 capsules (72,000 Units) by mouth 3 times daily (with meals)    Malignant neoplasm of head of pancreas (H)       CALCIUM PO      Take by mouth every morning Form/strength unknown. Powder formulation. Add to water and fruits/vegetables daily to promote bone health.        CARTIA  MG 24 hr capsule   Generic drug:  diltiazem     10 capsule    TK 1 C PO QD    Benign essential hypertension       cholecalciferol 1000 UNIT tablet    vitamin D3     Take 1 tablet by mouth 2 times daily        cyanocolbalamin 500 MCG tablet    vitamin  B-12     Take 500 mcg by mouth every morning        edoxaban ANTICOAGULANT 30 MG Tabs tablet    SAVAYSA    30 tablet    Take 1 tablet (30 mg) by mouth daily    Other chronic 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 cor pulmonale (H), Malignant neoplasm of head of pancreas (H)       folic acid 400 MCG tablet    FOLVITE     Take 1 tablet by mouth every morning        hydrOXYzine 25 MG tablet    ATARAX    10 tablet    Take 1-2 tablets (25-50 mg) by mouth every 6 hours as needed for itching (adjuvant pain)    Obstructive jaundice       levothyroxine 125 MCG tablet    SYNTHROID/LEVOTHROID    90 tablet    Take 1 tablet (125 mcg) by mouth daily    Hypothyroidism, unspecified type       loperamide 2 MG capsule    IMODIUM    60 capsule    2 caps at 1st sign of diarrhea & 1 cap every 2hrs until 12hrs diarrhea free. During night, 2 caps at bedtime & 2 caps every 4hrs until AM    Malignant  neoplasm of head of pancreas (H)       LORazepam 0.5 MG tablet    ATIVAN    30 tablet    Take 1 tablet (0.5 mg) by mouth every 4 hours as needed (Anxiety, Nausea/Vomiting or Sleep)    Malignant neoplasm of head of pancreas (H)       LOVENOX 40 MG/0.4ML injection   Generic drug:  enoxaparin     60 Syringe    Inject 0.35 mLs (35 mg) Subcutaneous every 12 hours    Malignant neoplasm of pancreas, unspecified location of malignancy (H), Other chronic 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 cor pulmonale (H)       magic mouthwash suspension (diphenhydrAMINE, lidocaine, aluminum-magnesium & simethicone) Susp compounding kit     237 mL    Swish and swallow 5-10 mLs in mouth every 6 hours as needed for mouth sores    Malignant neoplasm of head of pancreas (H)       nystatin 049293 UNIT/ML suspension    MYCOSTATIN    473 mL    Take 5 mLs (500,000 Units) by mouth 4 times daily    Malignant neoplasm of head of pancreas (H), History of pulmonary embolism       * ondansetron 4 MG tablet    ZOFRAN          * ondansetron 8 MG tablet    ZOFRAN    30 tablet    Take 1 tablet (8 mg) by mouth every 8 hours as needed (nausea/vomiting)    Malignant neoplasm of head of pancreas (H)       order for DME     2 Units    Equipment being ordered: Compression Stockings. Please dispense 2 pairs of knee high 20-30 mmHg    Lymphedema       pantoprazole 20 MG EC tablet    PROTONIX    30 tablet    Take 1 tablet (20 mg) by mouth daily    Other acute gastritis without hemorrhage       * prochlorperazine 5 MG tablet    COMPAZINE    30 tablet    Take 1 tablet (5 mg) by mouth every 6 hours as needed for nausea or vomiting    Malignant neoplasm of head of pancreas (H)       * prochlorperazine 10 MG tablet    COMPAZINE    30 tablet    Take 1 tablet (10 mg) by mouth every 6 hours as needed for nausea or vomiting    Malignant neoplasm of head of pancreas (H)       * timolol maleate 0.5 % opthalmic solution    ISTALOL    1 Bottle    Place 1 drop into both eyes every  morning Before placing contact lenses    Cataract, unspecified cataract type, unspecified laterality       * timolol 0.5 % ophthalmic solution    TIMOPTIC     INT 1 GTT OU IN THE MORNING        traMADol 50 MG tablet    ULTRAM    60 tablet    Take 1-2 tablets ( mg) by mouth every 4 hours as needed for breakthrough pain Maximum of 8 tablets daily.    Malignant neoplasm of head of pancreas (H)       triamterene-hydrochlorothiazide 37.5-25 MG per tablet    MAXZIDE-25     Take 1 tablet by mouth every morning        TYLENOL PO      Take 325 mg by mouth every 4 hours as needed for mild pain or fever        * Notice:  This list has 6 medication(s) that are the same as other medications prescribed for you. Read the directions carefully, and ask your doctor or other care provider to review them with you.

## 2018-07-11 NOTE — PATIENT INSTRUCTIONS
Contact Numbers  [facility]: [phone]    After Hours:  [phone]  Triage: [phone]    Please call the [facility] Triage line if you experience a temperature greater than or equal to 100.5, shaking chills, have uncontrolled nausea, vomiting and/or diarrhea, dizziness, shortness of breath, chest pain, bleeding, unexplained bruising, or if you have any other new/concerning symptoms, questions or concerns.     If it is after hours, weekends, or holidays, please call the main hospital  at  [phone] and ask to speak to the Oncology doctor on call.     If you are having any concerning symptoms or wish to speak to a provider before your next infusion visit, please call your care coordinator or triage to notify them so we can adequately serve you.     If you need a refill on a narcotic prescription or other medication, please call triage before your infusion appointment.         July 2018 Sunday Monday Tuesday Wednesday Thursday Friday Saturday   1     2     [facility]   10:15 AM   (15 min.)    [facility] DRAW   [facility] Lab Draw     [facility] ONC INFUSION 180   11:00 AM   (180 min.)    ONCOLOGY INFUSION   [facility] 3     4     5     [facility]    7:15 AM   (15 min.)    [facility] DRAW   [facility] Lab Draw     [facility] RETURN    8:05 AM   (50 min.)   Benoit Molina PA   [facility] 6     7       8     9     [facility]   12:00 PM   (15 min.)    [facility]     [facility] ONC INFUSION 180   12:30 PM   (180 min.)    ONCOLOGY INFUSION   [facility] 10     11     [facility] ONC INFUSION 60    2:00 PM   (60 min.)    ONCOLOGY INFUSION   [facility] 12     13     14       15     16     17     18     19     20     21       22     23     24     [facility]    1:15 PM   (15 min.)    [facility] DRAW   [facility] Lab Draw     [facility] RETURN     1:35 PM   (50 min.)   Quyen Mazariegos APRN CNP   [facility]     [facility] ONC INFUSION 180    2:30 PM   (180 min.)    ONCOLOGY INFUSION   [facility] 25     26     27     28       29     30     31 August 2018 Sunday Monday Tuesday Wednesday Thursday Friday Saturday                  1     2     3     4       5     6     [facility] D[facility]    8:30 AM   (15 min.)    [facility] DRAW   [facility] Lab Draw     [facility] ONC INFUSION 180    9:00 AM   (180 min.)    ONCOLOGY INFUSION   [facility] 7     8     [facility] RETURN    9:15 AM   (30 min.)   José Antonio Ann MD   [facility] 9     10     11       12     13     14     15     16     17     18       19     20     [facility] D[facility]   10:00 AM   (15 min.)    [facility] LAB DRAW   [facility] Lab Draw     [facility] ONC INFUSION 180   10:30 AM   (180 min.)    ONCOLOGY INFUSION   [facility] 21     22     23     24     CT CHEST ABDOMEN PELVIS WWO    9:40 AM   (20 min.)   UCCT2   [facility] CT 25       26     27     [facility] DRAW   10:15 AM   (15 min.)    [facility] LAB DRAW   [facility] Lab Draw     [facility] RETURN   10:30 AM   (30 min.)   Vinny Yu MD   [facility]     [facility] ONC INFUSION 180   11:30 AM   (180 min.)    ONCOLOGY INFUSION   [facility] 28     29     30     31                    No results found for this or any previous visit (from the past 24 hour(s)).

## 2018-07-11 NOTE — MR AVS SNAPSHOT
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 Triage line if you experience a temperature greater than or equal to 100.5, shaking chills, have uncontrolled nausea, vomiting and/or diarrhea, dizziness, shortness of breath, chest pain, bleeding, unexplained bruising, or if you have any other new/concerning symptoms, questions or concerns.     If it is after hours, weekends, or holidays, please call the main hospital  at  [phone] and ask to speak to the Oncology doctor on call.     If you are having any concerning symptoms or wish to speak to a provider before your next infusion visit, please call your care coordinator or triage to notify them so we can adequately serve you.     If you need a refill on a narcotic prescription or other medication, please call triage before your infusion appointment.         July 2018 Sunday Monday Tuesday Wednesday Thursday Friday Saturday   1     2     [facility] DRAW   10:15 AM   (15 min.)    [facility]   [facility]     [facility] ONC INFUSION 180   11:00 AM   (180 min.)    ONCOLOGY INFUSION   [facility] 3     4     5     [facility] DRAW    7:15 AM   (15 min.)    [facility]   [facility]     [facility] RETURN    8:05 AM   (50 min.)   Benoit Molina PA   [facility] 6     7       8     9     P [facility] LAB DRAW   12:00 PM   (15 min.)    [facility] LAB DRAW   [facility] Lab Draw     [facility] ONC INFUSION 180    12:30 PM   (180 min.)   [facility] ONCOLOGY INFUSION   [facility] 10     11     [facility] ONC INFUSION 60    2:00 PM   (60 min.)   [facility] ONCOLOGY INFUSION   [facility] 12     13     14       15     16     17     18     19     20     21       22     23     24     [facility] DRAW    1:15 PM   (15 min.)   [facility] DRAW   [facility] Lab Draw     UMP RETURN    1:35 PM   (50 min.)   Quyen Mazariegos APRN CNP   [facility]     [facility] ONC INFUSION 180    2:30 PM   (180 min.)   UC ONCOLOGY INFUSION   [facility] 25     26     27     28       29     30     31 August 2018 Sunday Monday Tuesday Wednesday Thursday Friday Saturday                  1     2     3     4       5     6     [facility] DRAW    8:30 AM   (15 min.)    [facility] LAB DRAW   [facility] Lab Draw     [facility] ONC INFUSION 180    9:00 AM   (180 min.)    ONCOLOGY INFUSION   [facility] 7     8     [facility] RETURN    9:15 AM   (30 min.)   José Antonio Ann MD   [facility] 9     10     11       12     13     14     15     16     17     18       19     20     [facility] DRAW   10:00 AM   (15 min.)    [facility]   [facility]     [facility] ONC INFUSION 180   10:30 AM   (180 min.)    ONCOLOGY INFUSION   [facility] 21     22     23     24     CT CHEST ABDOMEN PELVIS WWO    9:40 AM   (20 min.)   UCCT2   [facility] CT 25       26     27     [facility] DRAW   10:15 AM   (15 min.)    [facility] LAB DRAW   [facility] Lab Draw     UMP RETURN   10:30 AM   (30 min.)   Vinny Yu MD   [facility]     [facility] ONC INFUSION 180   11:30 AM   (180 min.)   [facility] ONCOLOGY INFUSION   [facility] 28     29     30     31                    No results found for this or any previous visit (from the past 24  hour(s)).              Follow-ups after your visit        Your next 10 appointments already scheduled     Jul 24, 2018  1:15 PM CDT   [facility] Draw with [facility] DRAW   [facility] ([facility])    [address] [place] [address]  [address]  [facility] [address]   [phone]            Jul 24, 2018  1:50 PM CDT   (Arrive by 1:35 PM)   Return Visit with CAT Dang CNP   [facility] ([facility])    [address] [place] [address]  [address]  [facility] [address]   [phone]            Jul 24, 2018  2:30 PM CDT   Infusion 180 with UC ONCOLOGY INFUSION, UC 19 ATC   [facility] ([facility])    [address] [place] [address]  [address]  [facility] [address]   [phone]            Aug 06, 2018  8:30 AM CDT   [facility] Lab Draw with [facility] DRAW   [facility] ([facility])    [address] [place] [address]  [address]  [facility] [address]   [phone]            Aug 06, 2018  9:00 AM CDT   Infusion 180 with UC ONCOLOGY INFUSION, UC 24 ATC   [facility] ([facility])    [address] [place] [address]  [address]  [facility] [address]   [phone]            Aug 08, 2018  9:30 AM CDT   (Arrive by 9:15 AM)   Return Visit with José Antonio Ann MD   [facility] ([facility])    [address] [place] [address]  [address]  [facility] [address]   [phone]            Aug 20, 2018 10:00 AM CDT   [facility] Lab Draw with UC [facility] LAB DRAW   [facility] Lab Draw ([facility])    [address] [place] [address]  [address]  [facility] [address]   [phone]            Aug 20, 2018 10:30 AM CDT   Infusion 180 with UC ONCOLOGY INFUSION   [facility] ([facility])    [address] [place] [address]  [address]  [facility]  [address] [phone]              Who to contact     If you have questions or need follow up information about today's clinic visit or your schedule please contact [facility] directly at [phone].  Normal or non-critical lab and imaging results will be communicated to you by MyChart, letter or phone within 4 business days after the clinic has received the results. If you do not hear from us within 7 days, please contact the clinic through Phobious[employer] or phone. If you have a critical or abnormal lab result, we will notify you by phone as soon as possible.  Submit refill requests through [employer]light or call your pharmacy and they will forward the refill request to us. Please allow 3 business days for your refill to be completed.          Additional Information About Your Visit        Floodlight Information     Fl[employer]light gives you secure access to your electronic health record. If you see a primary care provider, you can also send messages to your care team and make appointments. If you have questions, please call your primary care clinic.  If you do not have a primary care provider, please call [phone] and they will assist you.        Care EveryWhere ID     This is your Care EveryWhere ID. This could be used by other organizations to access your [facility] medical records  [id number]        Your Vitals Were     Pulse Temperature Respirations Pulse Oximetry          84 97.3  F (36.3  C) (Tympanic) 16 98%         Blood Pressure from Last 3 Encounters:   07/11/18 105/57   07/09/18 107/65   07/05/18 115/72    Weight from Last 3 Encounters:   07/09/18 44.2 kg (97 lb 6.4 oz)   07/05/18 44 kg (97 lb)   07/02/18 42.4 kg (93 lb 6.4 oz)              Today, you had the following     No orders found for display       Primary Care Provider Office Phone # Fax #    Neri Gipson [phone] [address] [address]        Equal Access to Services     NESSA NICHOLS AH:  Hadii aad mendez canasmarko Sobrunildaali, waaxda luqadaha, qaybta kaalerickson correia, lisa skylardesiree michael. So [facility] [phone].    ATENCIÓN: Si shahbaz alfaro, tiene a nava disposición servicios gratuitos de asistencia lingüística. Llame al [phone].    We comply with applicable federal civil rights laws and [place] laws. We do not discriminate on the basis of race, color, national origin, age, disability, sex, sexual orientation, or gender identity.            Thank you!     Thank you for choosing [facility]  for your care. Our goal is always to provide you with excellent care. Hearing back from our patients is one way we can continue to improve our services. Please take a few minutes to complete the written survey that you may receive in the mail after your visit with us. Thank you!             Your Updated Medication List - Protect others around you: Learn how to safely use, store and throw away your medicines at www.disposemymeds.org.          This list is accurate as of 7/11/18  2:49 PM.  Always use your most recent med list.                   Brand Name Dispense Instructions for use Diagnosis    albuterol 108 (90 Base) MCG/ACT Inhaler    PROAIR HFA/PROVENTIL HFA/VENTOLIN HFA    1 Inhaler    Inhale 2 puffs into the lungs 4 times daily    Mild asthma, unspecified whether complicated, unspecified whether persistent       amylase-lipase-protease 97455 units Cpep    CREON    180 capsule    Take 2 capsules (72,000 Units) by mouth 3 times daily (with meals)    Malignant neoplasm of head of pancreas (H)       CALCIUM PO      Take by mouth every morning Form/strength unknown. Powder formulation. Add to water and fruits/vegetables daily to promote bone health.        CARTIA  MG 24 hr capsule   Generic drug:  diltiazem     10 capsule    TK 1 C PO QD    Benign essential hypertension       cholecalciferol 1000 UNIT tablet    vitamin D3     Take 1 tablet by mouth 2 times daily         cyanocolbalamin 500 MCG tablet    vitamin  B-12     Take 500 mcg by mouth every morning        edoxaban ANTICOAGULANT 30 MG Tabs tablet    SAVAYSA    30 tablet    Take 1 tablet (30 mg) by mouth daily    Other chronic 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 cor pulmonale (H), Malignant neoplasm of head of pancreas (H)       folic acid 400 MCG tablet    FOLVITE     Take 1 tablet by mouth every morning        hydrOXYzine 25 MG tablet    ATARAX    10 tablet    Take 1-2 tablets (25-50 mg) by mouth every 6 hours as needed for itching (adjuvant pain)    Obstructive jaundice       levothyroxine 125 MCG tablet    SYNTHROID/LEVOTHROID    90 tablet    Take 1 tablet (125 mcg) by mouth daily    Hypothyroidism, unspecified type       loperamide 2 MG capsule    IMODIUM    60 capsule    2 caps at 1st sign of diarrhea & 1 cap every 2hrs until 12hrs diarrhea free. During night, 2 caps at bedtime & 2 caps every 4hrs until AM    Malignant neoplasm of head of pancreas (H)       LORazepam 0.5 MG tablet    ATIVAN    30 tablet    Take 1 tablet (0.5 mg) by mouth every 4 hours as needed (Anxiety, Nausea/Vomiting or Sleep)    Malignant neoplasm of head of pancreas (H)       LOVENOX 40 MG/0.4ML injection   Generic drug:  enoxaparin     60 Syringe    Inject 0.35 mLs (35 mg) Subcutaneous every 12 hours    Malignant neoplasm of pancreas, unspecified location of malignancy (H), Other chronic 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 cor pulmonale (H)       magic mouthwash suspension (diphenhydrAMINE, lidocaine, aluminum-magnesium & simethicone) Susp compounding kit     237 mL    Swish and swallow 5-10 mLs in mouth every 6 hours as needed for mouth sores    Malignant neoplasm of head of pancreas (H)       nystatin 646885 UNIT/ML suspension    MYCOSTATIN    473 mL    Take 5 mLs (500,000 Units) by mouth 4 times daily    Malignant neoplasm of head of pancreas (H), History of pulmonary embolism       * ondansetron 4 MG tablet    ZOFRAN          * ondansetron 8 MG  tablet    ZOFRAN    30 tablet    Take 1 tablet (8 mg) by mouth every 8 hours as needed (nausea/vomiting)    Malignant neoplasm of head of pancreas (H)       order for DME     2 Units    Equipment being ordered: Compression Stockings. Please dispense 2 pairs of knee high 20-30 mmHg    Lymphedema       pantoprazole 20 MG EC tablet    PROTONIX    30 tablet    Take 1 tablet (20 mg) by mouth daily    Other acute gastritis without hemorrhage       * prochlorperazine 5 MG tablet    COMPAZINE    30 tablet    Take 1 tablet (5 mg) by mouth every 6 hours as needed for nausea or vomiting    Malignant neoplasm of head of pancreas (H)       * prochlorperazine 10 MG tablet    COMPAZINE    30 tablet    Take 1 tablet (10 mg) by mouth every 6 hours as needed for nausea or vomiting    Malignant neoplasm of head of pancreas (H)       * timolol maleate 0.5 % opthalmic solution    ISTALOL    1 Bottle    Place 1 drop into both eyes every morning Before placing contact lenses    Cataract, unspecified cataract type, unspecified laterality       * timolol 0.5 % ophthalmic solution    TIMOPTIC     INT 1 GTT OU IN THE MORNING        traMADol 50 MG tablet    ULTRAM    60 tablet    Take 1-2 tablets ( mg) by mouth every 4 hours as needed for breakthrough pain Maximum of 8 tablets daily.    Malignant neoplasm of head of pancreas (H)       triamterene-hydrochlorothiazide 37.5-25 MG per tablet    MAXZIDE-25     Take 1 tablet by mouth every morning        TYLENOL PO      Take 325 mg by mouth every 4 hours as needed for mild pain or fever        * Notice:  This list has 6 medication(s) that are the same as other medications prescribed for you. Read the directions carefully, and ask your doctor or other care provider to review them with you.

## 2018-07-11 NOTE — PROGRESS NOTES
Infusion Nursing Note:  Iris Lewis presents today for PUMP DC.    Patient seen by provider today: No    Treatment Conditions:  Not Applicable.    Intravenous Access:  Implanted Port.  Access dc'd at time of discharge.      Note:   Fluorouracil pump empty upon arrival.  + Blood return from PORT pre and post infusion.  Tolerated infusion without incident. No Prescriptions filled today.   D/C in care of self.  Pt will return 7/24 for next appointment.       Kelin Pepper RN

## 2018-07-12 NOTE — PROGRESS NOTES
Placed call to patient to inform we are still working to get Edoxaban approved through insurance. Currently waiting on completion of LMN by MD. Reviewed with patient when medication is approved and she is ready to transition, take evening dose of Lovenox and start Edoxaban next morning. Take daily. She voiced understanding and was appreciative for follow up.

## 2018-07-24 NOTE — PROGRESS NOTES
Per Dr. Yu, will switch patient to Xarelto. Patient to take morning dose of lovenox, then start Xarelto this evening and not take evening lovenox. Placed call to patient and discussed. She voiced understanding of all instructions.

## 2018-07-24 NOTE — MR AVS SNAPSHOT
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      If you are having any concerning symptoms or wish to speak to a provider before your next infusion visit, please call your care coordinator or triage to notify them so we can adequately serve you.     If you need a refill on a narcotic prescription or other medication, please call before your infusion appointment.           July 2018 Sunday Monday Tuesday Wednesday Thursday Friday Saturday   1     2     [facility]   10:15 AM   (15 min.)   [facility]   [facility] Lab Draw     [facility] ONC INFUSION 180   11:00 AM   (180 min.)    ONCOLOGY INFUSION   [facility] 3     4     5     [facility]    7:15 AM   (15 min.)    [facility]   [facility] Lab Draw     [facility] RETURN    8:05 AM   (50 min.)   Benoit Molina PA   [facility] 6     7       8     9     [facility]   12:00 PM   (15 min.)    [facility]   [facility] Lab Draw     [facility] ON[facility] INFUSION 180   12:30 PM   (180 min.)    ONCOLOGY INFUSION   [facility] 10     11     [facility] ONC INFUSION 60    2:00 PM   (60 min.)     ONCOLOGY INFUSION   [facility] 12     13     14       15     16     17     18     19     20     21       22     23     24     [facility]    1:15 PM   (15 min.)   [facility]   [facility]     [facility] RETURN    1:35 PM   (50 min.)   Quyen Mazariegos APRN CNP   [facility]     [facility] ONC INFUSION 180    2:30 PM   (180 min.)    ONCOLOGY INFUSION   [facility] 25     26     27     28       29     30     31 August 2018 Sunday Monday Tuesday Wednesday Thursday Friday Saturday                  1     2     3     4       5     6     [facility]    8:30 AM   (15 min.)    [facility]   [facility]     [facility] ON[facility] INFUSION 180    9:00 AM   (180 min.)    ONCOLOGY INFUSION   [facility] 7     8     [facility] RETURN    9:15 AM   (30 min.)   José Antonio Ann MD   [facility] 9     10     11       12     13     14     15     16     17     18       19     20     [facility]   10:00 AM   (15 min.)    [facility]   [facility]     [facility] ONC INFUSION 180   10:30 AM   (180 min.)    ONCOLOGY INFUSION   [facility] 21     22     23     24     CT CHEST ABDOMEN PELVIS WWO    9:40 AM   (20 min.)   CT2   [facility] CT 25       26     27     [facility]   10:15 AM   (15 min.)    [facility]   [facility]     [facility] RETURN   10:30 AM   (30 min.)   Vinny Yu MD   [facility]     [facility] ONC INFUSION 180   11:30 AM   (180 min.)    ONCOLOGY INFUSION   [facility] 28     29     30     31                      Recent Results (from the past 24 hour(s))   Comprehensive metabolic panel    Collection Time: 07/24/18  1:30 PM   Result Value Ref Range    Sodium 138 133 - 144 mmol/L    Potassium 4.2 3.4 - 5.3 mmol/L    Chloride 104 94 -  109 mmol/L    Carbon Dioxide 25 20 - 32 mmol/L    Anion Gap 8 3 - 14 mmol/L    Glucose 116 (H) 70 - 99 mg/dL    Urea Nitrogen 12 7 - 30 mg/dL    Creatinine 0.67 0.52 - 1.04 mg/dL    GFR Estimate 85 >60 mL/min/1.7m2    GFR Estimate If Black >90 >60 mL/min/1.7m2    Calcium 8.4 (L) 8.5 - 10.1 mg/dL    Bilirubin Total 0.5 0.2 - 1.3 mg/dL    Albumin 2.8 (L) 3.4 - 5.0 g/dL    Protein Total 6.7 (L) 6.8 - 8.8 g/dL    Alkaline Phosphatase 422 (H) 40 - 150 U/L    ALT 45 0 - 50 U/L    AST 35 0 - 45 U/L   CBC with platelets differential    Collection Time: 07/24/18  1:30 PM   Result Value Ref Range    WBC 5.2 4.0 - 11.0 10e9/L    RBC Count 2.73 (L) 3.8 - 5.2 10e12/L    Hemoglobin 9.3 (L) 11.7 - 15.7 g/dL    Hematocrit 28.9 (L) 35.0 - 47.0 %     (H) 78 - 100 fl    MCH 34.1 (H) 26.5 - 33.0 pg    MCHC 32.2 31.5 - 36.5 g/dL    RDW 17.6 (H) 10.0 - 15.0 %    Platelet Count 397 150 - 450 10e9/L    Diff Method Automated Method     % Neutrophils 61.5 %    % Lymphocytes 20.6 %    % Monocytes 13.4 %    % Eosinophils 3.3 %    % Basophils 0.6 %    % Immature Granulocytes 0.6 %    Nucleated RBCs 0 0 /100    Absolute Neutrophil 3.2 1.6 - 8.3 10e9/L    Absolute Lymphocytes 1.1 0.8 - 5.3 10e9/L    Absolute Monocytes 0.7 0.0 - 1.3 10e9/L    Absolute Eosinophils 0.2 0.0 - 0.7 10e9/L    Absolute Basophils 0.0 0.0 - 0.2 10e9/L    Abs Immature Granulocytes 0.0 0 - 0.4 10e9/L    Absolute Nucleated RBC 0.0    TSH with free T4 reflex    Collection Time: 07/24/18  1:30 PM   Result Value Ref Range    TSH 15.49 (H) 0.40 - 4.00 mU/L               Follow-ups after your visit        Your next 10 appointments already scheduled     Aug 06, 2018  8:30 AM CDT   [facility] LAB DRAW   [facility] Lab Draw ([facility])    [address]  [address]  [facility] [place] [facility] [address]   [phone]            Aug 06, 2018  9:00 AM CDT   Infusion 180 with  ONCOLOGY INFUSION,  24 ATC   [facility] ([facility])    [address]  [address]  [facility] [address]   [phone]            Aug 08, 2018  9:30 AM CDT   (Arrive by 9:15 AM)   Return Visit with José Antonio Ann MD   [facility] ([facility])    [address]  [address]  [facility] [address]   [phone]            Aug 20, 2018 10:00 AM CDT   [facility] with  [facility] DRAW   [facility] Draw ([facility])    [address]  [address]  [facility] [address]   [phone]            Aug 20, 2018 10:30 AM CDT   Infusion 180 with [facility] ONCOLOGY INFUSION, UC 22 ATC   [facility] ([facility])    [address]  [address]  [facility] [address]   [phone]            Aug 24, 2018  9:40 AM CDT   CT CHEST ABDOMEN PELVIS W/O & W CONTRAST with UCCT2   [facility] CT ([facility])    [address]  1st [address]  [facility] [address]   [phone]           Please bring any scans or X-rays taken at other hospitals, if similar tests were done. Also bring a list of your medicines, including vitamins, minerals and over-the-counter drugs. It is safest to leave personal items at home.  Be sure to tell your doctor:   If you have any allergies.   If there s any chance you are pregnant.   If you are breastfeeding.  How to prepare:   Do not eat or drink for 2 hours before your exam. If you need to take medicine, you may take it with small sips of water. (We may ask you to take liquid medicine as well.)   Please wear loose clothing, such as a sweat suit or jogging clothes. Avoid snaps, zippers and other metal. We may ask you to undress and put on a hospital gown.  Please arrive 30 minutes early for your CT. Once in the department you might be asked to drink water 15-20 minutes prior to your exam.  If indicated you may  be asked to drink an oral contrast in advance of your CT.  If this is the case, the imaging team will let you know or be in contact with you prior to your appointment  Patients over 70 or patients with diabetes or kidney problems:   If you haven t had a blood test (creatinine test) within the last 30 days, the Cardiologist/Radiologist may require you to get this test prior to your exam.  If you have diabetes:   Continue to take your metformin medication on the day of your exam  If you have any questions, please call the Imaging Department where you will have your exam.            Aug 27, 2018 10:15 AM CDT   [facility] Draw with  [employer] LAB DRAW   [facility] Lab Draw ([facility])    [address] [place] [place] [address]  [address]  [facility] [address] [phone]            Aug 27, 2018 10:45 AM CDT   (Arrive by 10:30 AM)   Return Visit with Vinny Yu MD   [facility] ([facility])    [address] [place] [place] [address]  [address]  [facility] [address] [phone]              Who to contact     If you have questions or need follow up information about today's clinic visit or your schedule please contact [facility] directly at [phone].  Normal or non-critical lab and imaging results will be communicated to you by MyChart, letter or phone within 4 business days after the clinic has received the results. If you do not hear from us within 7 days, please contact the clinic through [employer] or phone. If you have a critical or abnormal lab result, we will notify you by phone as soon as possible.  Submit refill requests through [employer] or call your pharmacy and they will forward the refill request to us. Please allow 3 business days for your refill to be completed.          Additional Information About Your Visit        [employer] Information     [employer] gives you secure access to your electronic health record. If you see a  primary care provider, you can also send messages to your care team and make appointments. If you have questions, please call your primary care clinic.  If you do not have a primary care provider, please call [phone] and they will assist you.        Care EveryWhere ID     This is your Care EveryWhere ID. This could be used by other organizations to access your [facility] medical records  [id number]         Blood Pressure from Last 3 Encounters:   07/24/18 104/58   07/11/18 105/57   07/09/18 107/65    Weight from Last 3 Encounters:   07/24/18 43.6 kg (96 lb 1.6 oz)   07/09/18 44.2 kg (97 lb 6.4 oz)   07/05/18 44 kg (97 lb)              We Performed the Following     CBC with platelets differential     Comprehensive metabolic panel     T4 free     TSH with free T4 reflex          Today's Medication Changes          These changes are accurate as of 7/24/18  2:43 PM.  If you have any questions, ask your nurse or doctor.               Start taking these medicines.        Dose/Directions    rivaroxaban ANTICOAGULANT 20 MG Tabs tablet   Commonly known as:  XARELTO   Used for:  Malignant neoplasm of head of pancreas (H), History of pulmonary embolism   Started by:  Vinny Yu MD        Dose:  20 mg   Take 1 tablet (20 mg) by mouth daily (with dinner)   Quantity:  30 tablet   Refills:  3         Stop taking these medicines if you haven't already. Please contact your care team if you have questions.     edoxaban ANTICOAGULANT 30 MG Tabs tablet   Commonly known as:  SAVAYSA   Stopped by:  Quyen Mazariegos APRN CNP           LOVENOX 40 MG/0.4ML injection   Generic drug:  enoxaparin   Stopped by:  Vinny Yu MD                Where to get your medicines      These medications were sent to [id number] [place] - [address] [place] [address]  [address], [place] [facility] [address]     Phone:  [phone]     hydrOXYzine 25 MG tablet    rivaroxaban  ANTICOAGULANT 20 MG Tabs tablet                Primary Care Provider Office Phone # Fax #    Neri Gipson [phone] [address] [address]        Equal Access to Services     NESSA NICHOLS : Shaun mary ku salmao Sobrunildaali, waaxda luqadaha, qaybta kaalmada adeegyada, lisa romero laLatonyajeanne michael. So [facility] [phone].    ATENCIÓN: Si habla español, tiene a nava disposición servicios gratuitos de asistencia lingüística. Llame al [phone].    We comply with applicable federal civil rights laws and [place] laws. We do not discriminate on the basis of race, color, national origin, age, disability, sex, sexual orientation, or gender identity.            Thank you!     Thank you for choosing [facility]  for your care. Our goal is always to provide you with excellent care. Hearing back from our patients is one way we can continue to improve our services. Please take a few minutes to complete the written survey that you may receive in the mail after your visit with us. Thank you!             Your Updated Medication List - Protect others around you: Learn how to safely use, store and throw away your medicines at www.disposemymeds.org.          This list is accurate as of 7/24/18  2:43 PM.  Always use your most recent med list.                   Brand Name Dispense Instructions for use Diagnosis    albuterol 108 (90 Base) MCG/ACT Inhaler    PROAIR HFA/PROVENTIL HFA/VENTOLIN HFA    1 Inhaler    Inhale 2 puffs into the lungs 4 times daily    Mild asthma, unspecified whether complicated, unspecified whether persistent       amylase-lipase-protease 68990 units Cpep    CREON    180 capsule    Take 2 capsules (72,000 Units) by mouth 3 times daily (with meals)    Malignant neoplasm of head of pancreas (H)       BREO ELLIPTA 100-25 MCG/INH oral inhaler   Generic drug:  fluticasone-vilanterol           CALCIUM PO      Take by mouth every morning  Form/strength unknown. Powder formulation. Add to water and fruits/vegetables daily to promote bone health.        CARTIA  MG 24 hr capsule   Generic drug:  diltiazem     10 capsule    TK 1 C PO QD    Benign essential hypertension       cholecalciferol 1000 UNIT tablet    vitamin D3     Take 1 tablet by mouth 2 times daily        cyanocolbalamin 500 MCG tablet    vitamin  B-12     Take 500 mcg by mouth every morning        folic acid 400 MCG tablet    FOLVITE     Take 1 tablet by mouth every morning        hydrOXYzine 25 MG tablet    ATARAX    60 tablet    Take 1-2 tablets (25-50 mg) by mouth every 6 hours as needed for itching (adjuvant pain)    Obstructive jaundice       levothyroxine 125 MCG tablet    SYNTHROID/LEVOTHROID    90 tablet    Take 1 tablet (125 mcg) by mouth daily    Hypothyroidism, unspecified type       loperamide 2 MG capsule    IMODIUM    60 capsule    2 caps at 1st sign of diarrhea & 1 cap every 2hrs until 12hrs diarrhea free. During night, 2 caps at bedtime & 2 caps every 4hrs until AM    Malignant neoplasm of head of pancreas (H)       LORazepam 0.5 MG tablet    ATIVAN    30 tablet    Take 1 tablet (0.5 mg) by mouth every 4 hours as needed (Anxiety, Nausea/Vomiting or Sleep)    Malignant neoplasm of head of pancreas (H)       magic mouthwash suspension (diphenhydrAMINE, lidocaine, aluminum-magnesium & simethicone) Susp compounding kit     237 mL    Swish and swallow 5-10 mLs in mouth every 6 hours as needed for mouth sores    Malignant neoplasm of head of pancreas (H)       nystatin 285647 UNIT/ML suspension    MYCOSTATIN    473 mL    Take 5 mLs (500,000 Units) by mouth 4 times daily    Malignant neoplasm of head of pancreas (H), History of pulmonary embolism       omeprazole 40 MG capsule    priLOSEC          * ondansetron 4 MG tablet    ZOFRAN          * ondansetron 8 MG tablet    ZOFRAN    30 tablet    Take 1 tablet (8 mg) by mouth every 8 hours as needed (nausea/vomiting)    Malignant  neoplasm of head of pancreas (H)       order for DME     2 Units    Equipment being ordered: Compression Stockings. Please dispense 2 pairs of knee high 20-30 mmHg    Lymphedema       pantoprazole 20 MG EC tablet    PROTONIX    30 tablet    Take 1 tablet (20 mg) by mouth daily    Other acute gastritis without hemorrhage       * prochlorperazine 5 MG tablet    COMPAZINE    30 tablet    Take 1 tablet (5 mg) by mouth every 6 hours as needed for nausea or vomiting    Malignant neoplasm of head of pancreas (H)       * prochlorperazine 10 MG tablet    COMPAZINE    30 tablet    Take 1 tablet (10 mg) by mouth every 6 hours as needed for nausea or vomiting    Malignant neoplasm of head of pancreas (H)       rivaroxaban ANTICOAGULANT 20 MG Tabs tablet    XARELTO    30 tablet    Take 1 tablet (20 mg) by mouth daily (with dinner)    Malignant neoplasm of head of pancreas (H), History of pulmonary embolism       * timolol maleate 0.5 % opthalmic solution    ISTALOL    1 Bottle    Place 1 drop into both eyes every morning Before placing contact lenses    Cataract, unspecified cataract type, unspecified laterality       * timolol 0.5 % ophthalmic solution    TIMOPTIC     INT 1 GTT OU IN THE MORNING        traMADol 50 MG tablet    ULTRAM    60 tablet    Take 1-2 tablets ( mg) by mouth every 4 hours as needed for breakthrough pain Maximum of 8 tablets daily.    Malignant neoplasm of head of pancreas (H)       triamterene-hydrochlorothiazide 37.5-25 MG per tablet    MAXZIDE-25     Take 1 tablet by mouth every morning        TYLENOL PO      Take 325 mg by mouth every 4 hours as needed for mild pain or fever        * Notice:  This list has 6 medication(s) that are the same as other medications prescribed for you. Read the directions carefully, and ask your doctor or other care provider to review them with you.

## 2018-07-24 NOTE — PROGRESS NOTES
Reason for Visit: seen in f/u of metastatic pancreatic cancer    Oncology HPI:  Iris Lewis is a 78 year old woman with a hx significant for thyroid cancer s/p thyroidectomy in the 1980s, cholelithiasis s/p CCY 2015, GERD s/p Nissen fundoplication in 2006 and HTN. She was dx with metastatic pancreatic cancer involving the liver, lung and lymph nodes in October 2017 after presenting with obstructive jaundice. She met with  an did not qualify for the HALOZYME study. She was initiated on [place]/Abraxane on 11/28/17. Restaging showed a positive response to treatment after 2 cycles. Prior to cycle 4, she developed acute hypoxia and weakness. She was found to have influenza and pneumonia. 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 At f/u with Dr. Yu on 3/26/18, she was found to have progression and recommended to start 5FU plus liposomal irinotecan. She initiated therapy on 3/28/18. Imaging after 5 cycles showed a positive response. Treatment on 7/2/18 was delayed due to acute LFT elevation, abdominal pain and low grade fevers. LFT elevation was thought to be related to tylenol and alcohol use.    Interval history: Iris has been feeling well. Eats frequently, but smaller amounts. Is frustrated that she has difficulty gaining weight. Energy is stable. Strength has improved from a few months ago, but seems to have reached a plateau. Able to climb stairs in her house. At the end of the day, this becomes more difficult. She remains active with household tasks during the day. Sleeping well. No napping. No abdominal pain, N/V. Has noted increased bloating and gas. Intermittently has loose stools. Stopped using her creon a while back as she is averse to taking the capsules. Urination wnl. No fevers/chills. No cough, sob, cp, palpitation, wheezing. No headaches, vision changes. 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 Feels it is improving on the current  chemotherapy.    Current Outpatient Prescriptions   Medication Sig Dispense Refill     Acetaminophen (TYLENOL PO) Take 325 mg by mouth every 4 hours as needed for mild pain or fever       albuterol (PROAIR HFA/PROVENTIL HFA/VENTOLIN HFA) 108 (90 BASE) MCG/ACT Inhaler Inhale 2 puffs into the lungs 4 times daily (Patient not taking: Reported on 5/22/2018) 1 Inhaler 1     amylase-lipase-protease (CREON) 11153 UNITS CPEP Take 2 capsules (72,000 Units) by mouth 3 times daily (with meals) (Patient not taking: Reported on 7/9/2018) 180 capsule 3     CALCIUM PO Take by mouth every morning Form/strength unknown. Powder formulation. Add to water and fruits/vegetables daily to promote bone health.        CARTIA  MG 24 hr capsule TK 1 C PO QD 10 capsule 0     cholecalciferol (VITAMIN D) 1000 UNIT tablet Take 1 tablet by mouth 2 times daily        cyanocolbalamin (VITAMIN B-12) 500 MCG tablet Take 500 mcg by mouth every morning        edoxaban ANTICOAGULANT (SAVAYSA) 30 MG TABS tablet Take 1 tablet (30 mg) by mouth daily (Patient not taking: Reported on 7/2/2018) 30 tablet 11     folic acid (FOLVITE) 400 MCG tablet Take 1 tablet by mouth every morning        hydrOXYzine (ATARAX) 25 MG tablet Take 1-2 tablets (25-50 mg) by mouth every 6 hours as needed for itching (adjuvant pain) 10 tablet 0     levothyroxine (SYNTHROID/LEVOTHROID) 125 MCG tablet Take 1 tablet (125 mcg) by mouth daily 90 tablet 1     loperamide (IMODIUM) 2 MG capsule 2 caps at 1st sign of diarrhea & 1 cap every 2hrs until 12hrs diarrhea free. During night, 2 caps at bedtime & 2 caps every 4hrs until AM (Patient not taking: Reported on 7/9/2018) 60 capsule 3     LORazepam (ATIVAN) 0.5 MG tablet Take 1 tablet (0.5 mg) by mouth every 4 hours as needed (Anxiety, Nausea/Vomiting or Sleep) 30 tablet 3     magic mouthwash (FIRST-MOUTHWASH BLM) suspension Swish and swallow 5-10 mLs in mouth every 6 hours as needed for mouth sores 237 mL 3     nystatin (MYCOSTATIN)  "557389 UNIT/ML suspension Take 5 mLs (500,000 Units) by mouth 4 times daily 473 mL 3     ondansetron (ZOFRAN) 4 MG tablet        ondansetron (ZOFRAN) 8 MG tablet Take 1 tablet (8 mg) by mouth every 8 hours as needed (nausea/vomiting) 30 tablet 2     order for DME Equipment being ordered: Compression Stockings. Please dispense 2 pairs of knee high 20-30 mmHg (Patient not taking: Reported on 7/9/2018) 2 Units 3     pantoprazole (PROTONIX) 20 MG EC tablet Take 1 tablet (20 mg) by mouth daily 30 tablet 2     prochlorperazine (COMPAZINE) 10 MG tablet Take 1 tablet (10 mg) by mouth every 6 hours as needed for nausea or vomiting 30 tablet 3     prochlorperazine (COMPAZINE) 5 MG tablet Take 1 tablet (5 mg) by mouth every 6 hours as needed for nausea or vomiting 30 tablet 0     rivaroxaban ANTICOAGULANT (XARELTO) 20 MG TABS tablet Take 1 tablet (20 mg) by mouth daily (with dinner) 30 tablet 3     timolol (TIMOPTIC) 0.5 % ophthalmic solution INT 1 GTT OU IN THE MORNING  3     timolol maleate (ISTALOL) 0.5 % opthalmic solution Place 1 drop into both eyes every morning Before placing contact lenses 1 Bottle 0     traMADol (ULTRAM) 50 MG tablet Take 1-2 tablets ( mg) by mouth every 4 hours as needed for breakthrough pain Maximum of 8 tablets daily. 60 tablet 0     triamterene-hydrochlorothiazide (MAXZIDE-25) 37.5-25 MG per tablet Take 1 tablet by mouth every morning             Allergies   Allergen Reactions     Nkda [No Known Drug Allergies]          Exam: alert, appears slim, but well. Blood pressure 104/58, pulse 77, temperature 97.9  F (36.6  C), temperature source Oral, resp. rate 16, height 1.575 m (5' 2.01\"), weight 43.6 kg (96 lb 1.6 oz), SpO2 97 %, not currently breastfeeding.  Wt Readings from Last 4 Encounters:   07/24/18 43.6 kg (96 lb 1.6 oz)   07/09/18 44.2 kg (97 lb 6.4 oz)   07/05/18 44 kg (97 lb)   07/02/18 42.4 kg (93 lb 6.4 oz)     Oropharynx is moist, no focal lesion. No icterus. Neck supple and without " adenopathy. Lungs:CTA. Heart:RRR, no murmur or rub. Abdomen: soft, distended, nontender, BS active. No masses or organomegaly. Extremities: trace ankle edema. No calf pain or tenderness.    Labs: Results for JONA SHETTY (MRN [id number]) as of 7/24/2018 17:57   Ref. Range 7/24/2018 13:30   Sodium Latest Ref Range: 133 - 144 mmol/L 138   Potassium Latest Ref Range: 3.4 - 5.3 mmol/L 4.2   Chloride Latest Ref Range: 94 - 109 mmol/L 104   Carbon Dioxide Latest Ref Range: 20 - 32 mmol/L 25   Urea Nitrogen Latest Ref Range: 7 - 30 mg/dL 12   Creatinine Latest Ref Range: 0.52 - 1.04 mg/dL 0.67   GFR Estimate Latest Ref Range: >60 mL/min/1.7m2 85   GFR Estimate If Black Latest Ref Range: >60 mL/min/1.7m2 >90   Calcium Latest Ref Range: 8.5 - 10.1 mg/dL 8.4 (L)   Anion Gap Latest Ref Range: 3 - 14 mmol/L 8   Albumin Latest Ref Range: 3.4 - 5.0 g/dL 2.8 (L)   Protein Total Latest Ref Range: 6.8 - 8.8 g/dL 6.7 (L)   Bilirubin Total Latest Ref Range: 0.2 - 1.3 mg/dL 0.5   Alkaline Phosphatase Latest Ref Range: 40 - 150 U/L 422 (H)   ALT Latest Ref Range: 0 - 50 U/L 45   AST Latest Ref Range: 0 - 45 U/L 35   T4 Free Latest Ref Range: 0.76 - 1.46 ng/dL 1.20   TSH Latest Ref Range: 0.40 - 4.00 mU/L 15.49 (H)   Glucose Latest Ref Range: 70 - 99 mg/dL 116 (H)   WBC Latest Ref Range: 4.0 - 11.0 10e9/L 5.2   Hemoglobin Latest Ref Range: 11.7 - 15.7 g/dL 9.3 (L)   Hematocrit Latest Ref Range: 35.0 - 47.0 % 28.9 (L)   Platelet Count Latest Ref Range: 150 - 450 10e9/L 397   RBC Count Latest Ref Range: 3.8 - 5.2 10e12/L 2.73 (L)   MCV Latest Ref Range: 78 - 100 fl 106 (H)   MCH Latest Ref Range: 26.5 - 33.0 pg 34.1 (H)   MCHC Latest Ref Range: 31.5 - 36.5 g/dL 32.2   RDW Latest Ref Range: 10.0 - 15.0 % 17.6 (H)   Diff Method Unknown Automated Method   % Neutrophils Latest Units: % 61.5   % Lymphocytes Latest Units: % 20.6   % Monocytes Latest Units: % 13.4   % Eosinophils Latest Units: % 3.3   % Basophils Latest Units: % 0.6   %  Immature Granulocytes Latest Units: % 0.6   Nucleated RBCs Latest Ref Range: 0 /100 0   Absolute Neutrophil Latest Ref Range: 1.6 - 8.3 10e9/L 3.2   Absolute Lymphocytes Latest Ref Range: 0.8 - 5.3 10e9/L 1.1   Absolute Monocytes Latest Ref Range: 0.0 - 1.3 10e9/L 0.7   Absolute Eosinophils Latest Ref Range: 0.0 - 0.7 10e9/L 0.2   Absolute Basophils Latest Ref Range: 0.0 - 0.2 10e9/L 0.0   Abs Immature Granulocytes Latest Ref Range: 0 - 0.4 10e9/L 0.0   Absolute Nucleated RBC Unknown 0.0         Impression/plan:   1. Metastatic pancreatic cancer involving the liver, lung, lymph nodes, currently on 5FU and liposomal irinotecan  -had improving disease on currently therapy and is tolerating well.   -LFTs show rising Alk phos, but bili and transaminases are stable.  -proceed with cycle 9 today. WIll continue treatment every 2 weeks and return with restaging scans and f/u with Dr. Yu on 8/27/18    2. FEN-eating OK, but having difficulty gaining weight.   -some of this relates to malabsorption. Encouraged her to resume Creon. OK to open the capsules and swallow the granules whole in applesauce  -monitor weight.    3. Bilateral PE, dx Dec 2017  -previously on enoxaparin, starting rivaroxaban today    4. Hypothyroidism s/t thyroid resection and ablative therapy for thyroid cancer  -TSH elevated, but T4 wnl. Will hold on adjustment of levothyroxine    5. Musocitis: none today, but appears to have grade 1 by symptoms during treatment. Likely s/t 5FU. No s/s of thrush  -continue S/S swishes and MMW  -OK to stop nystatin

## 2018-07-24 NOTE — NURSING NOTE
"Oncology Rooming Note    July 24, 2018 1:40 PM   Iris Lewis is a 78 year old female who presents for:    Chief Complaint   Patient presents with     Oncology Clinic Visit     Return viait related to Poorly Differentiated Adenocarcinoma     Port Draw     port accessed and labs drawn by rn.  vs taken.     Initial Vitals: /58 (BP Location: Right arm, Patient Position: Sitting, Cuff Size: Adult Small)  Pulse 77  Temp 97.9  F (36.6  C) (Oral)  Resp 16  Ht 1.575 m (5' 2.01\")  Wt 43.6 kg (96 lb 1.6 oz)  SpO2 97%  BMI 17.57 kg/m2 Estimated body mass index is 17.57 kg/(m^2) as calculated from the following:    Height as of this encounter: 1.575 m (5' 2.01\").    Weight as of this encounter: 43.6 kg (96 lb 1.6 oz). Body surface area is 1.38 meters squared.  No Pain (0) Comment: Data Unavailable   No LMP recorded. Patient is postmenopausal.  Allergies reviewed: Yes  Medications reviewed: Yes    Medications: MEDICATION REFILLS NEEDED TODAY. Provider was notified.  Pharmacy name entered into [employer]:    [facility] - [facility] [address] [facility] [address] [place] [address] 10 AT [facility] 10    Clinical concerns: Refills needed. Provider was notified.    10 minutes for nursing intake (face to face time)     Donna Munguia LPN            "

## 2018-07-24 NOTE — MR AVS SNAPSHOT
After Visit Summary   7/24/2018    Iris Lweis    MRN: [id number]           Patient Information     Date Of Birth          1940        Visit Information        Provider Department      7/24/2018 1:50 PM Quyen Mazariegos APRN CNP [facility]        Today's Diagnoses     Postablative hypothyroidism    -  1    Obstructive jaundice        Malignant neoplasm of head of pancreas (H)           Follow-ups after your visit        Your next 10 appointments already scheduled     Aug 06, 2018  8:30 AM CDT   Masonic Lab Draw with UC MASONIC LAB DRAW   [facility] Masonic Lab Draw ([facility])    [address]  [address]  [facility] [address]   [phone]            Aug 06, 2018  9:00 AM CDT   Infusion 180 with UC ONCOLOGY INFUSION, UC 24 ATC   [facility] ([facility])    [address]  [address]  [facility] [address]   [phone]            Aug 08, 2018  9:30 AM CDT   (Arrive by 9:15 AM)   Return Visit with José Antonio Ann MD   [facility] ([facility])    [address] [place] [address]  [address]  [facility] [address]   [phone]            Aug 20, 2018 10:00 AM CDT   Masonic Lab Draw with UC MASONIC LAB DRAW   [facility] Masonic Lab Draw ([facility])    [address]  [address]  [facility] [address]   [phone]            Aug 20, 2018 10:30 AM CDT   Infusion 180 with UC ONCOLOGY INFUSION, UC 22 ATC   [facility] ([facility])    [address] [place] [address]  [address]  [facility] [address]   [phone]            Aug 24, 2018  9:40 AM CDT   CT CHEST ABDOMEN PELVIS W/O & W CONTRAST with UCCT2   [facility] ([facility])    [address]  1st Floor  [facility] [address]   [phone]           Please bring any scans or  X-rays taken at other hospitals, if similar tests were done. Also bring a list of your medicines, including vitamins, minerals and over-the-counter drugs. It is safest to leave personal items at home.  Be sure to tell your doctor:   If you have any allergies.   If there s any chance you are pregnant.   If you are breastfeeding.  How to prepare:   Do not eat or drink for 2 hours before your exam. If you need to take medicine, you may take it with small sips of water. (We may ask you to take liquid medicine as well.)   Please wear loose clothing, such as a sweat suit or jogging clothes. Avoid snaps, zippers and other metal. We may ask you to undress and put on a hospital gown.  Please arrive 30 minutes early for your CT. Once in the department you might be asked to drink water 15-20 minutes prior to your exam.  If indicated you may be asked to drink an oral contrast in advance of your CT.  If this is the case, the imaging team will let you know or be in contact with you prior to your appointment  Patients over 70 or patients with diabetes or kidney problems:   If you haven t had a blood test (creatinine test) within the last 30 days, the Cardiologist/Radiologist may require you to get this test prior to your exam.  If you have diabetes:   Continue to take your metformin medication on the day of your exam  If you have any questions, please call the Imaging Department where you will have your exam.            Aug 27, 2018 10:15 AM CDT   [facility] Lab Draw with  Plat[facility]us Platform LAB DRAW   [facility] Lab Draw ([facility])    [address] [place] [address]  [address]  [facility] [id number] [phone]            Aug 27, 2018 10:45 AM CDT   (Arrive by 10:30 AM)   Return Visit with Vinny Yu MD   [facility] ([facility])    [address] [place] [address]  [address]  [facility] [id number] [phone]              Who to contact     If you have questions  "or need follow up information about today's clinic visit or your schedule please contact [facility] directly at [phone].  Normal or non-critical lab and imaging results will be communicated to you by [employer], letter or phone within 4 business days after the clinic has received the results. If you do not hear from us within 7 days, please contact the clinic through [employer] or phone. If you have a critical or abnormal lab result, we will notify you by phone as soon as possible.  Submit refill requests through [employer] or call your pharmacy and they will forward the refill request to us. Please allow 3 business days for your refill to be completed.          Additional Information About Your Visit        [employer] Information     [employer] gives you secure access to your electronic health record. If you see a primary care provider, you can also send messages to your care team and make appointments. If you have questions, please call your primary care clinic.  If you do not have a primary care provider, please call [phone] and they will assist you.        Care EveryWhere ID     This is your Care EveryWhere ID. This could be used by other organizations to access your [place] medical records  [id number]        Your Vitals Were     Pulse Temperature Respirations Height Pulse Oximetry BMI (Body Mass Index)    77 97.9  F (36.6  C) (Oral) 16 1.575 m (5' 2.01\") 97% 17.57 kg/m2       Blood Pressure from Last 3 Encounters:   07/24/18 104/58   07/11/18 105/57   07/09/18 107/65    Weight from Last 3 Encounters:   07/24/18 43.6 kg (96 lb 1.6 oz)   07/09/18 44.2 kg (97 lb 6.4 oz)   07/05/18 44 kg (97 lb)              Today, you had the following     No orders found for display         Today's Medication Changes          These changes are accurate as of 7/24/18  6:04 PM.  If you have any questions, ask your nurse or doctor.               Start taking these medicines.        Dose/Directions    rivaroxaban " ANTICOAGULANT 20 MG Tabs tablet   Commonly known as:  XARELTO   Used for:  Malignant neoplasm of head of pancreas (H), History of pulmonary embolism   Started by:  Vinny Yu MD        Dose:  20 mg   Take 1 tablet (20 mg) by mouth daily (with dinner)   Quantity:  30 tablet   Refills:  3         Stop taking these medicines if you haven't already. Please contact your care team if you have questions.     edoxaban ANTICOAGULANT 30 MG Tabs tablet   Commonly known as:  SAVAYSA   Stopped by:  Quyen Mazariegos APRN CNP           LOVENOX 40 MG/0.4ML injection   Generic drug:  enoxaparin   Stopped by:  Vinny Yu MD           triamterene-hydrochlorothiazide 37.5-25 MG per tablet   Commonly known as:  MAXZIDE-25   Stopped by:  Quyen Mazariegos APRN CNP                Where to get your medicines      These medications were sent to [facility] [place] [address] AT [place] [address], [facility] [address]     Phone:  [phone]     hydrOXYzine 25 MG tablet    rivaroxaban ANTICOAGULANT 20 MG Tabs tablet                Primary Care Provider Office Phone # Fax #    Neri W MD Brandan [phone]       0 [address] [address]        Equal Access to Services     [facility]: Hadii aad ku hadasho Soomaali, waaxda luqadaha, qaybta kaalmada adeegyada, lisa michael. So [facility] [phone].    ATENCIÓN: Si habla español, tiene a nava disposición servicios gratuitos de asistencia lingüística. Indu al [phone].    We comply with applicable federal civil rights laws and [place] laws. We do not discriminate on the basis of race, color, national origin, age, disability, sex, sexual orientation, or gender identity.            Thank you!     Thank you for choosing [facility]  for your care. Our goal is always to provide you with excellent care. Hearing back from our  patients is one way we can continue to improve our services. Please take a few minutes to complete the written survey that you may receive in the mail after your visit with us. Thank you!             Your Updated Medication List - Protect others around you: Learn how to safely use, store and throw away your medicines at www.disposemymeds.org.          This list is accurate as of 7/24/18  6:04 PM.  Always use your most recent med list.                   Brand Name Dispense Instructions for use Diagnosis    albuterol 108 (90 Base) MCG/ACT Inhaler    PROAIR HFA/PROVENTIL HFA/VENTOLIN HFA    1 Inhaler    Inhale 2 puffs into the lungs 4 times daily    Mild asthma, unspecified whether complicated, unspecified whether persistent       amylase-lipase-protease 71071 units Cpep    CREON    180 capsule    Take 2 capsules (72,000 Units) by mouth 3 times daily (with meals)    Malignant neoplasm of head of pancreas (H)       BREO ELLIPTA 100-25 MCG/INH oral inhaler   Generic drug:  fluticasone-vilanterol           CALCIUM PO      Take by mouth every morning Form/strength unknown. Powder formulation. Add to water and fruits/vegetables daily to promote bone health.        CARTIA  MG 24 hr capsule   Generic drug:  diltiazem     10 capsule    TK 1 C PO QD    Benign essential hypertension       cholecalciferol 1000 UNIT tablet    vitamin D3     Take 1 tablet by mouth 2 times daily        cyanocolbalamin 500 MCG tablet    vitamin  B-12     Take 500 mcg by mouth every morning        folic acid 400 MCG tablet    FOLVITE     Take 1 tablet by mouth every morning        hydrOXYzine 25 MG tablet    ATARAX    60 tablet    Take 1-2 tablets (25-50 mg) by mouth every 6 hours as needed for itching (adjuvant pain)    Obstructive jaundice       levothyroxine 125 MCG tablet    SYNTHROID/LEVOTHROID    90 tablet    Take 1 tablet (125 mcg) by mouth daily    Hypothyroidism, unspecified type       loperamide 2 MG capsule    IMODIUM    60 capsule     2 caps at 1st sign of diarrhea & 1 cap every 2hrs until 12hrs diarrhea free. During night, 2 caps at bedtime & 2 caps every 4hrs until AM    Malignant neoplasm of head of pancreas (H)       LORazepam 0.5 MG tablet    ATIVAN    30 tablet    Take 1 tablet (0.5 mg) by mouth every 4 hours as needed (Anxiety, Nausea/Vomiting or Sleep)    Malignant neoplasm of head of pancreas (H)       magic mouthwash suspension (diphenhydrAMINE, lidocaine, aluminum-magnesium & simethicone) Susp compounding kit     237 mL    Swish and swallow 5-10 mLs in mouth every 6 hours as needed for mouth sores    Malignant neoplasm of head of pancreas (H)       nystatin 323341 UNIT/ML suspension    MYCOSTATIN    473 mL    Take 5 mLs (500,000 Units) by mouth 4 times daily    Malignant neoplasm of head of pancreas (H), History of pulmonary embolism       omeprazole 40 MG capsule    priLOSEC          * ondansetron 4 MG tablet    ZOFRAN          * ondansetron 8 MG tablet    ZOFRAN    30 tablet    Take 1 tablet (8 mg) by mouth every 8 hours as needed (nausea/vomiting)    Malignant neoplasm of head of pancreas (H)       order for DME     2 Units    Equipment being ordered: Compression Stockings. Please dispense 2 pairs of knee high 20-30 mmHg    Lymphedema       pantoprazole 20 MG EC tablet    PROTONIX    30 tablet    Take 1 tablet (20 mg) by mouth daily    Other acute gastritis without hemorrhage       * prochlorperazine 5 MG tablet    COMPAZINE    30 tablet    Take 1 tablet (5 mg) by mouth every 6 hours as needed for nausea or vomiting    Malignant neoplasm of head of pancreas (H)       * prochlorperazine 10 MG tablet    COMPAZINE    30 tablet    Take 1 tablet (10 mg) by mouth every 6 hours as needed for nausea or vomiting    Malignant neoplasm of head of pancreas (H)       rivaroxaban ANTICOAGULANT 20 MG Tabs tablet    XARELTO    30 tablet    Take 1 tablet (20 mg) by mouth daily (with dinner)    Malignant neoplasm of head of pancreas (H), History of  pulmonary embolism       * timolol maleate 0.5 % opthalmic solution    ISTALOL    1 Bottle    Place 1 drop into both eyes every morning Before placing contact lenses    Cataract, unspecified cataract type, unspecified laterality       * timolol 0.5 % ophthalmic solution    TIMOPTIC     INT 1 GTT OU IN THE MORNING        traMADol 50 MG tablet    ULTRAM    60 tablet    Take 1-2 tablets ( mg) by mouth every 4 hours as needed for breakthrough pain Maximum of 8 tablets daily.    Malignant neoplasm of head of pancreas (H)       TYLENOL PO      Take 325 mg by mouth every 4 hours as needed for mild pain or fever        * Notice:  This list has 6 medication(s) that are the same as other medications prescribed for you. Read the directions carefully, and ask your doctor or other care provider to review them with you.

## 2018-07-24 NOTE — LETTER
7/24/2018       RE: Iris Lewis  [address]  [place] [address]     Dear Colleague,    Thank you for referring your patient, Iris Lewis, to the [facility]. Please see a copy of my visit note below.    Reason for Visit: seen in f/u of metastatic pancreatic cancer    Oncology HPI:  Iris Lewis is a 78 year old woman with a hx significant for thyroid cancer s/p thyroidectomy in the 1980s, cholelithiasis s/p CCY 2015, GERD s/p Nissen fundoplication in 2006 and HTN. She was dx with metastatic pancreatic cancer involving the liver, lung and lymph nodes in October 2017 after presenting with obstructive jaundice. She met with  an did not qualify for the HALOZYME study. She was initiated on [place]/Abraxane on 11/28/17. Restaging showed a positive response to treatment after 2 cycles. Prior to cycle 4, she developed acute hypoxia and weakness. She was found to have influenza and pneumonia. 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 At f/u with Dr. Yu on 3/26/18, she was found to have progression and recommended to start 5FU plus liposomal irinotecan. She initiated therapy on 3/28/18. Imaging after 5 cycles showed a positive response. Treatment on 7/2/18 was delayed due to acute LFT elevation, abdominal pain and low grade fevers. LFT elevation was thought to be related to tylenol and alcohol use.    Interval history: Iris has been feeling well. Eats frequently, but smaller amounts. Is frustrated that she has difficulty gaining weight. Energy is stable. Strength has improved from a few months ago, but seems to have reached a plateau. Able to climb stairs in her house. At the end of the day, this becomes more difficult. She remains active with household tasks during the day. Sleeping well. No napping. No abdominal pain, N/V. Has noted increased bloating and gas. Intermittently has loose stools. Stopped using her creon a while back as she is averse to  taking the capsules. Urination wnl. No fevers/chills. No cough, sob, cp, palpitation, wheezing. No headaches, vision changes. 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 Feels it is improving on the current chemotherapy.    Current Outpatient Prescriptions   Medication Sig Dispense Refill     Acetaminophen (TYLENOL PO) Take 325 mg by mouth every 4 hours as needed for mild pain or fever       albuterol (PROAIR HFA/PROVENTIL HFA/VENTOLIN HFA) 108 (90 BASE) MCG/ACT Inhaler Inhale 2 puffs into the lungs 4 times daily (Patient not taking: Reported on 5/22/2018) 1 Inhaler 1     amylase-lipase-protease (CREON) 35177 UNITS CPEP Take 2 capsules (72,000 Units) by mouth 3 times daily (with meals) (Patient not taking: Reported on 7/9/2018) 180 capsule 3     CALCIUM PO Take by mouth every morning Form/strength unknown. Powder formulation. Add to water and fruits/vegetables daily to promote bone health.        CARTIA  MG 24 hr capsule TK 1 C PO QD 10 capsule 0     cholecalciferol (VITAMIN D) 1000 UNIT tablet Take 1 tablet by mouth 2 times daily        cyanocolbalamin (VITAMIN B-12) 500 MCG tablet Take 500 mcg by mouth every morning        edoxaban ANTICOAGULANT (SAVAYSA) 30 MG TABS tablet Take 1 tablet (30 mg) by mouth daily (Patient not taking: Reported on 7/2/2018) 30 tablet 11     folic acid (FOLVITE) 400 MCG tablet Take 1 tablet by mouth every morning        hydrOXYzine (ATARAX) 25 MG tablet Take 1-2 tablets (25-50 mg) by mouth every 6 hours as needed for itching (adjuvant pain) 10 tablet 0     levothyroxine (SYNTHROID/LEVOTHROID) 125 MCG tablet Take 1 tablet (125 mcg) by mouth daily 90 tablet 1     loperamide (IMODIUM) 2 MG capsule 2 caps at 1st sign of diarrhea & 1 cap every 2hrs until 12hrs diarrhea free. During night, 2 caps at bedtime & 2 caps every 4hrs until AM (Patient not taking: Reported on 7/9/2018) 60 capsule 3     LORazepam (ATIVAN) 0.5 MG tablet Take 1 tablet (0.5 mg) by mouth  "every 4 hours as needed (Anxiety, Nausea/Vomiting or Sleep) 30 tablet 3     magic mouthwash (FIRST-MOUTHWASH BLM) suspension Swish and swallow 5-10 mLs in mouth every 6 hours as needed for mouth sores 237 mL 3     nystatin (MYCOSTATIN) 199423 UNIT/ML suspension Take 5 mLs (500,000 Units) by mouth 4 times daily 473 mL 3     ondansetron (ZOFRAN) 4 MG tablet        ondansetron (ZOFRAN) 8 MG tablet Take 1 tablet (8 mg) by mouth every 8 hours as needed (nausea/vomiting) 30 tablet 2     order for DME Equipment being ordered: Compression Stockings. Please dispense 2 pairs of knee high 20-30 mmHg (Patient not taking: Reported on 7/9/2018) 2 Units 3     pantoprazole (PROTONIX) 20 MG EC tablet Take 1 tablet (20 mg) by mouth daily 30 tablet 2     prochlorperazine (COMPAZINE) 10 MG tablet Take 1 tablet (10 mg) by mouth every 6 hours as needed for nausea or vomiting 30 tablet 3     prochlorperazine (COMPAZINE) 5 MG tablet Take 1 tablet (5 mg) by mouth every 6 hours as needed for nausea or vomiting 30 tablet 0     rivaroxaban ANTICOAGULANT (XARELTO) 20 MG TABS tablet Take 1 tablet (20 mg) by mouth daily (with dinner) 30 tablet 3     timolol (TIMOPTIC) 0.5 % ophthalmic solution INT 1 GTT OU IN THE MORNING  3     timolol maleate (ISTALOL) 0.5 % opthalmic solution Place 1 drop into both eyes every morning Before placing contact lenses 1 Bottle 0     traMADol (ULTRAM) 50 MG tablet Take 1-2 tablets ( mg) by mouth every 4 hours as needed for breakthrough pain Maximum of 8 tablets daily. 60 tablet 0     triamterene-hydrochlorothiazide (MAXZIDE-25) 37.5-25 MG per tablet Take 1 tablet by mouth every morning             Allergies   Allergen Reactions     Nkda [No Known Drug Allergies]          Exam: alert, appears slim, but well. Blood pressure 104/58, pulse 77, temperature 97.9  F (36.6  C), temperature source Oral, resp. rate 16, height 1.575 m (5' 2.01\"), weight 43.6 kg (96 lb 1.6 oz), SpO2 97 %, not currently breastfeeding.  Wt " Readings from Last 4 Encounters:   07/24/18 43.6 kg (96 lb 1.6 oz)   07/09/18 44.2 kg (97 lb 6.4 oz)   07/05/18 44 kg (97 lb)   07/02/18 42.4 kg (93 lb 6.4 oz)     Oropharynx is moist, no focal lesion. No icterus. Neck supple and without adenopathy. Lungs:CTA. Heart:RRR, no murmur or rub. Abdomen: soft, distended, nontender, BS active. No masses or organomegaly. Extremities: trace ankle edema. No calf pain or tenderness.    Labs: Results for JONA SHETTY (MRN [id number]) as of 7/24/2018 17:57   Ref. Range 7/24/2018 13:30   Sodium Latest Ref Range: 133 - 144 mmol/L 138   Potassium Latest Ref Range: 3.4 - 5.3 mmol/L 4.2   Chloride Latest Ref Range: 94 - 109 mmol/L 104   Carbon Dioxide Latest Ref Range: 20 - 32 mmol/L 25   Urea Nitrogen Latest Ref Range: 7 - 30 mg/dL 12   Creatinine Latest Ref Range: 0.52 - 1.04 mg/dL 0.67   GFR Estimate Latest Ref Range: >60 mL/min/1.7m2 85   GFR Estimate If Black Latest Ref Range: >60 mL/min/1.7m2 >90   Calcium Latest Ref Range: 8.5 - 10.1 mg/dL 8.4 (L)   Anion Gap Latest Ref Range: 3 - 14 mmol/L 8   Albumin Latest Ref Range: 3.4 - 5.0 g/dL 2.8 (L)   Protein Total Latest Ref Range: 6.8 - 8.8 g/dL 6.7 (L)   Bilirubin Total Latest Ref Range: 0.2 - 1.3 mg/dL 0.5   Alkaline Phosphatase Latest Ref Range: 40 - 150 U/L 422 (H)   ALT Latest Ref Range: 0 - 50 U/L 45   AST Latest Ref Range: 0 - 45 U/L 35   T4 Free Latest Ref Range: 0.76 - 1.46 ng/dL 1.20   TSH Latest Ref Range: 0.40 - 4.00 mU/L 15.49 (H)   Glucose Latest Ref Range: 70 - 99 mg/dL 116 (H)   WBC Latest Ref Range: 4.0 - 11.0 10e9/L 5.2   Hemoglobin Latest Ref Range: 11.7 - 15.7 g/dL 9.3 (L)   Hematocrit Latest Ref Range: 35.0 - 47.0 % 28.9 (L)   Platelet Count Latest Ref Range: 150 - 450 10e9/L 397   RBC Count Latest Ref Range: 3.8 - 5.2 10e12/L 2.73 (L)   MCV Latest Ref Range: 78 - 100 fl 106 (H)   MCH Latest Ref Range: 26.5 - 33.0 pg 34.1 (H)   MCHC Latest Ref Range: 31.5 - 36.5 g/dL 32.2   RDW Latest Ref Range: 10.0 - 15.0  % 17.6 (H)   Diff Method Unknown Automated Method   % Neutrophils Latest Units: % 61.5   % Lymphocytes Latest Units: % 20.6   % Monocytes Latest Units: % 13.4   % Eosinophils Latest Units: % 3.3   % Basophils Latest Units: % 0.6   % Immature Granulocytes Latest Units: % 0.6   Nucleated RBCs Latest Ref Range: 0 /100 0   Absolute Neutrophil Latest Ref Range: 1.6 - 8.3 10e9/L 3.2   Absolute Lymphocytes Latest Ref Range: 0.8 - 5.3 10e9/L 1.1   Absolute Monocytes Latest Ref Range: 0.0 - 1.3 10e9/L 0.7   Absolute Eosinophils Latest Ref Range: 0.0 - 0.7 10e9/L 0.2   Absolute Basophils Latest Ref Range: 0.0 - 0.2 10e9/L 0.0   Abs Immature Granulocytes Latest Ref Range: 0 - 0.4 10e9/L 0.0   Absolute Nucleated RBC Unknown 0.0         Impression/plan:   1. Metastatic pancreatic cancer involving the liver, lung, lymph nodes, currently on 5FU and liposomal irinotecan  -had improving disease on currently therapy and is tolerating well.   -LFTs show rising Alk phos, but bili and transaminases are stable.  -proceed with cycle 9 today. WIll continue treatment every 2 weeks and return with restaging scans and f/u with Dr. Yu on 8/27/18    2. FEN-eating OK, but having difficulty gaining weight.   -some of this relates to malabsorption. Encouraged her to resume Creon. OK to open the capsules and swallow the granules whole in applesauce  -monitor weight.    3. Bilateral PE, dx Dec 2017  -previously on enoxaparin, starting rivaroxaban today    4. Hypothyroidism s/t thyroid resection and ablative therapy for thyroid cancer  -TSH elevated, but T4 wnl. Will hold on adjustment of levothyroxine    5. Musocitis: none today, but appears to have grade 1 by symptoms during treatment. Likely s/t 5FU. No s/s of thrush  -continue S/S swishes and MMW  -OK to stop nystatin    Again, thank you for allowing me to participate in the care of your patient.      Sincerely,    Quyen Mazariegos, CAT CNP

## 2018-07-24 NOTE — NURSING NOTE
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  vs taken.     Port accessed with 20g 3/4\" power needle, labs drawn, port flushed with saline and heparin, vitals checked, pt checked in for next appointment.  Alicia Cordova RN    "

## 2018-07-24 NOTE — PROGRESS NOTES
Infusion Nursing Note:  Iris Lewis presents today for Cycle 9 Day 1 Irinotecan Liposome and Fluorouracil pump.    Patient seen by provider today: Yes: Quyen Mazariegos NP    Treatment Conditions:  Lab Results   Component Value Date    HGB 9.3 07/24/2018     Lab Results   Component Value Date    WBC 5.2 07/24/2018      Lab Results   Component Value Date    ANEU 3.2 07/24/2018     Lab Results   Component Value Date     07/24/2018      Lab Results   Component Value Date     07/24/2018                   Lab Results   Component Value Date    POTASSIUM 4.2 07/24/2018           Lab Results   Component Value Date                  Lab Results   Component Value Date    CR 0.67 07/24/2018                   Lab Results   Component Value Date    EMMY 8.4 07/24/2018                Lab Results   Component Value Date    BILITOTAL 0.5 07/24/2018           Lab Results   Component Value Date    ALBUMIN 2.8 07/24/2018                    Lab Results   Component Value Date    ALT 45 07/24/2018           Lab Results   Component Value Date    AST 35 07/24/2018       Results reviewed, labs MET treatment parameters, ok to proceed with treatment.    Pain: No complaints of pain.     Note:   7/24/18 1430 TYRA Mazariegos NP / Fallon Elise RN:  OK to give chemo today with Alk Phos: 422.    Prior to discharge: Port is secured in place with tegaderm and flushed with 10cc NS with positive blood return noted.  Fluorouracil CADD pump connected at 1630, to infuse at 5 ml/hr for 46 hours.    All connectors secured in place and clamps taped open.  Pt will be disconnected at [facility] on Thursday 7/26/18 at 1430.       Intravenous Access:  Implanted Port.    Post Infusion Assessment:  Patient tolerated infusion without incident.  Blood return noted pre and post infusion.    Discharge Plan:   Prescription filled for magic mouthwash- Pt will  on Thursday.  Discharge instructions reviewed with: Patient.  Patient and/or family  verbalized understanding of discharge instructions and all questions answered.  Copy of AVS reviewed with patient and/or family.  Patient will return Thursday 7/26/18 for pump disconnect and 8/6/18 for next chemotherapy appointment.  Patient discharged in stable condition accompanied by: .  Departure Mode: Ambulatory.  Face to Face time: 0.    Fallon Elise RN

## 2018-07-24 NOTE — PATIENT INSTRUCTIONS
Contact Numbers    [facility] [place]: [phone]  [facility] Triage and after hours / weekends / holidays:  [phone]      Please call the triage or after hours line if you experience a temperature greater than or equal to 100.5, shaking chills, have uncontrolled nausea, vomiting and/or diarrhea, dizziness, shortness of breath, chest pain, bleeding, unexplained bruising, or if you have any other new/concerning symptoms, questions or concerns.      If you are having any concerning symptoms or wish to speak to a provider before your next infusion visit, please call your care coordinator or triage to notify them so we can adequately serve you.     If you need a refill on a narcotic prescription or other medication, please call before your infusion appointment.           July 2018 Sunday Monday Tuesday Wednesday Thursday Friday Saturday   1     2     [facility]   10:15 AM   (15 min.)    [facility]   [facility]     [facility] 180   11:00 AM   (180 min.)    ONCOLOGY INFUSION   [facility] 3     4     5     [facility]    7:15 AM   (15 min.)   [facility]   [facility] Draw     [facility] RETURN    8:05 AM   (50 min.)   Benoit Molina PA   [facility] 6     7       8     9     [facility]   12:00 PM   (15 min.)    [facility]   [facility] Draw     [facility] 180   12:30 PM   (180 min.)    ONCOLOGY INFUSION   [facility] 10     11     [facility] 60    2:00 PM   (60 min.)    ONCOLOGY INFUSION   [facility] 12     13     14       15     16     17     18     19     20     21       22     23     24     [facility]    1:15 PM   (15 min.)    [facility]   [facility] Draw     [facility] RETURN    1:35 PM   (50 min.)   Quyen Mazariegos APRN CNP   [facility]     [facility] 180    2:30 PM   (180 min.)     ONCOLOGY INFUSION   [facility] 25     26     27     28       29     30     31                                    August 2018 Sunday Monday Tuesday Wednesday Thursday Friday Saturday                  1     2     3     4       5     6     [facility]    8:30 AM   (15 min.)    [facility]   [facility]     [facility] 180    9:00 AM   (180 min.)    ONCOLOGY INFUSION   [facility] 7     8     [facility] RETURN    9:15 AM   (30 min.)   José Antonio Ann MD   [facility] 9     10     11       12     13     14     15     16     17     18       19     20     [facility]   10:00 AM   (15 min.)    [facility]   [facility]     [facility] 180   10:30 AM   (180 min.)    ONCOLOGY INFUSION   [facility] 21     22     23     24     CT CHEST ABDOMEN PELVIS WWO    9:40 AM   (20 min.)   [facility]   [facility] CT 25       26     27     [facility]   10:15 AM   (15 min.)    [facility]     [facility] RETURN   10:30 AM   (30 min.)   Vinny Yu MD   [facility]     [facility] INFUSION 180   11:30 AM   (180 min.)    ONCOLOGY INFUSION   [facility] 28     29     30     31                      Recent Results (from the past 24 hour(s))   Comprehensive metabolic panel    Collection Time: 07/24/18  1:30 PM   Result Value Ref Range    Sodium 138 133 - 144 mmol/L    Potassium 4.2 3.4 - 5.3 mmol/L    Chloride 104 94 - 109 mmol/L    Carbon Dioxide 25 20 - 32 mmol/L    Anion Gap 8 3 - 14 mmol/L    Glucose 116 (H) 70 - 99 mg/dL    Urea Nitrogen 12 7 - 30 mg/dL    Creatinine 0.67 0.52 - 1.04 mg/dL    GFR Estimate 85 >60 mL/min/1.7m2    GFR Estimate If Black >90 >60 mL/min/1.7m2    Calcium 8.4 (L) 8.5 - 10.1 mg/dL    Bilirubin Total 0.5 0.2 - 1.3 mg/dL    Albumin 2.8 (L) 3.4 - 5.0 g/dL    Protein Total 6.7  (L) 6.8 - 8.8 g/dL    Alkaline Phosphatase 422 (H) 40 - 150 U/L    ALT 45 0 - 50 U/L    AST 35 0 - 45 U/L   CBC with platelets differential    Collection Time: 07/24/18  1:30 PM   Result Value Ref Range    WBC 5.2 4.0 - 11.0 10e9/L    RBC Count 2.73 (L) 3.8 - 5.2 10e12/L    Hemoglobin 9.3 (L) 11.7 - 15.7 g/dL    Hematocrit 28.9 (L) 35.0 - 47.0 %     (H) 78 - 100 fl    MCH 34.1 (H) 26.5 - 33.0 pg    MCHC 32.2 31.5 - 36.5 g/dL    RDW 17.6 (H) 10.0 - 15.0 %    Platelet Count 397 150 - 450 10e9/L    Diff Method Automated Method     % Neutrophils 61.5 %    % Lymphocytes 20.6 %    % Monocytes 13.4 %    % Eosinophils 3.3 %    % Basophils 0.6 %    % Immature Granulocytes 0.6 %    Nucleated RBCs 0 0 /100    Absolute Neutrophil 3.2 1.6 - 8.3 10e9/L    Absolute Lymphocytes 1.1 0.8 - 5.3 10e9/L    Absolute Monocytes 0.7 0.0 - 1.3 10e9/L    Absolute Eosinophils 0.2 0.0 - 0.7 10e9/L    Absolute Basophils 0.0 0.0 - 0.2 10e9/L    Abs Immature Granulocytes 0.0 0 - 0.4 10e9/L    Absolute Nucleated RBC 0.0    TSH with free T4 reflex    Collection Time: 07/24/18  1:30 PM   Result Value Ref Range    TSH 15.49 (H) 0.40 - 4.00 mU/L

## 2018-07-26 NOTE — PROGRESS NOTES
Infusion Nursing Note:  Iris Lewis presents today for pump disconnection.    Patient seen by provider today: No    Note: Patient presents to clinic today feeling well with no questions.  CADD pump registered as empty and chemotherapy bag appeared empty.    Intravenous Access:  Implanted Port.    Treatment Conditions:  Not Applicable.    Post Infusion Assessment:  Blood return noted post infusion.  Site patent and intact, free from redness, edema or discomfort.  No evidence of extravasations.  Access discontinued per protocol.    Discharge Plan:   Patient declined prescription refills.  Discharge instructions reviewed with: Patient.  Patient and/or family verbalized understanding of discharge instructions and all questions answered.  AVS to patient via [employer].  Patient will return 8/6/2018 for next appointment.   Departure Mode: Ambulatory.    Mine Avila RN

## 2018-07-26 NOTE — MR AVS SNAPSHOT
After Visit Summary   7/26/2018    Iris Lewis    MRN: [id number]           Patient Information     Date Of Birth          1940        Visit Information        Provider Department      7/26/2018 2:30 PM  28 ATC;  ONCOLOGY INFUSION [facility]        Today's Diagnoses     Malignant neoplasm of head of pancreas (H)    -  1      Care Instructions    Contact Numbers    [facility] Main Line: [phone]  [facility] Triage:  [phone]    Call triage with chills and/or temperature greater than or equal to 100.5, uncontrolled nausea/vomiting, diarrhea, constipation, dizziness, shortness of breath, chest pain, bleeding, unexplained bruising, or any new/concerning symptoms, questions/concerns.     If you are having any concerning symptoms or wish to speak to a provider before your next infusion visit, please call your care coordinator or triage to notify them so we can adequately serve you.       After Hours: [phone]    If after hours, weekends, or holidays, call main hospital  and ask for Oncology doctor on call.         July 2018 Sunday Monday Tuesday Wednesday Thursday Friday Saturday   1     2     [facility] LAB DRAW   10:15 AM   (15 min.)    [facility] LAB DRAW   [facility] Lab Draw     UMP ONC INFUSION 180   11:00 AM   (180 min.)    ONCOLOGY INFUSION   [facility] 3     4     5     [facility] DRAW    7:15 AM   (15 min.)    [facility] LAB DRAW   [facility] Lab Draw     UMP RETURN    8:05 AM   (50 min.)   Benoit Molina PA   [facility] 6     7       8     9     [facility] DRAW   12:00 PM   (15 min.)    [facility] LAB DRAW   [facility] Lab Draw     UMP ONC INFUSION 180   12:30 PM   (180 min.)    ONCOLOGY INFUSION   [facility] 10     11     UMP ONC INFUSION 60    2:00 PM   (60 min.)    ONCOLOGY INFUSION   [facility] 12     13     14       15     16      17     18     19     20     21       22     23     24     [facility] DRAW    1:15 PM   (15 min.)    [facility]     [facility] RETURN    1:35 PM   (50 min.)   Quyen Mazariegos APRN CNP   [facility]     UMP ONC INFUSION 180    2:30 PM   (180 min.)    ONCOLOGY INFUSION   [facility] 25     26     UMP ONC INFUSION 60    2:30 PM   (60 min.)    ONCOLOGY INFUSION   [facility] 27     28       29     30     31 August 2018 Sunday Monday Tuesday Wednesday Thursday Friday Saturday                  1     2     3     4       5     6     [facility] DRAW    8:30 AM   (15 min.)    [facility] LAB DRAW   [facility]     UMP ONC INFUSION 180    9:00 AM   (180 min.)    ONCOLOGY INFUSION   [facility] 7     8     UMP RETURN    9:15 AM   (30 min.)   José Antonio Ann MD   [facility] 9     10     11       12     13     14     15     16     17     18       19     20     [facility] DRAW   10:00 AM   (15 min.)    [facility] LAB DRAW   [facility] Lab Draw     UMP ONC INFUSION 180   10:30 AM   (180 min.)    ONCOLOGY INFUSION   [facility] 21     22     23     24     CT CHEST ABDOMEN PELVIS WWO    9:40 AM   (20 min.)   CT2   [facility] CT 25       26     27     [facility] DRAW   10:15 AM   (15 min.)    [facility] LAB DRAW   [facility] Lab Draw     UMP RETURN   10:30 AM   (30 min.)   Vinny Yu MD   [facility]     UMP ONC INFUSION 180   11:30 AM   (180 min.)    ONCOLOGY INFUSION   [facility] 28     29     30     31                       Lab Results:  No results found for this or any previous visit (from the past 12 hour(s)).              Follow-ups after your visit        Your next 10 appointments already scheduled     Aug 06, 2018  8:30 AM  CDT   [facility] Lab Draw with UC [facility] LAB DRAW   [facility] Lab Draw ([facility])    [address]  [address]  [facility] [address]   [phone]            Aug 06, 2018  9:00 AM CDT   Infusion 180 with UC ONCOLOGY INFUSION, UC 24 ATC   [facility] ([facility])    [address]  [address]  [place] [facility] [address]   [phone]            Aug 08, 2018  9:30 AM CDT   (Arrive by 9:15 AM)   Return Visit with José Antonio Ann MD   [facility] ([facility])    [address]  [address]  [place] [facility] [address]   [phone]            Aug 20, 2018 10:00 AM CDT   [facility] Lab Draw with UC [facility] LAB DRAW   [facility] Lab Draw ([facility])    [address]  [address]  [place] [facility] [address]   [phone]            Aug 20, 2018 10:30 AM CDT   Infusion 180 with UC ONCOLOGY INFUSION, UC 22 ATC   [facility] ([facility])    [address]  [address]  [place] [facility] [address]   [phone]            Aug 24, 2018  9:40 AM CDT   CT CHEST ABDOMEN PELVIS W/O & W CONTRAST with UCCT2   [facility] ([facility])    [address]  [address]  [place] [address]   [phone]           Please bring any scans or X-rays taken at other hospitals, if similar tests were done. Also bring a list of your medicines, including vitamins, minerals and over-the-counter drugs. It is safest to leave personal items at home.  Be sure to tell your doctor:   If you have any allergies.   If there s any chance you are pregnant.   If you are breastfeeding.  How to prepare:   Do not eat or drink for 2 hours before your exam. If you need to take medicine, you may take it with small sips of water. (We may ask you to take liquid medicine as well.)   Please wear  loose clothing, such as a sweat suit or jogging clothes. Avoid snaps, zippers and other metal. We may ask you to undress and put on a hospital gown.  Please arrive 30 minutes early for your CT. Once in the department you might be asked to drink water 15-20 minutes prior to your exam.  If indicated you may be asked to drink an oral contrast in advance of your CT.  If this is the case, the imaging team will let you know or be in contact with you prior to your appointment  Patients over 70 or patients with diabetes or kidney problems:   If you haven t had a blood test (creatinine test) within the last 30 days, the Cardiologist/Radiologist may require you to get this test prior to your exam.  If you have diabetes:   Continue to take your metformin medication on the day of your exam  If you have any questions, please call the Imaging Department where you will have your exam.            Aug 27, 2018 10:15 AM CDT   [email or web address] Lab Draw with  [facility] LAB DRAW   [facility] Lab Draw ([facility])    [address] [place] [address]  [address] [place] [address] [phone]            Aug 27, 2018 10:45 AM CDT   (Arrive by 10:30 AM)   Return Visit with Vinny Yu MD   [facility] ([facility])    [address] [place] [address]  [address] [place] [address] [phone]              Who to contact     If you have questions or need follow up information about today's clinic visit or your schedule please contact [facility] directly at [phone].  Normal or non-critical lab and imaging results will be communicated to you by MyChart, letter or phone within 4 business days after the clinic has received the results. If you do not hear from us within 7 days, please contact the clinic through MyChart or phone. If you have a critical or abnormal lab result, we will notify you by phone as soon as possible.  Submit refill requests  through [employer] or call your pharmacy and they will forward the refill request to us. Please allow 3 business days for your refill to be completed.          Additional Information About Your Visit        RARE[employer]hart Information     [facility] gives you secure access to your electronic health record. If you see a primary care provider, you can also send messages to your care team and make appointments. If you have questions, please call your primary care clinic.  If you do not have a primary care provider, please call [phone] and they will assist you.        Care EveryWhere ID     This is your Care EveryWhere ID. This could be used by other organizations to access your [facility] medical records  [id number]        Your Vitals Were     Pulse Temperature Respirations Pulse Oximetry          60 98.3  F (36.8  C) (Oral) 16 96%         Blood Pressure from Last 3 Encounters:   07/26/18 120/62   07/24/18 104/58   07/11/18 105/57    Weight from Last 3 Encounters:   07/24/18 43.6 kg (96 lb 1.6 oz)   07/09/18 44.2 kg (97 lb 6.4 oz)   07/05/18 44 kg (97 lb)              Today, you had the following     No orders found for display       Primary Care Provider Office Phone # Fax #    Neri Gipson [phone] [address] [address]        Equal Access to Services     NESSA NICHOLS : Hadii mary ku hadasho Sobrunildaali, waaxda luqadaha, qaybta kaalmada adetobiyada, lisa michael. So [facility] [phone].    ATENCIÓN: Si habla español, tiene a nava disposición servicios gratuitos de asistencia lingüística. Llame al [phone].    We comply with applicable federal civil rights laws and [place] laws. We do not discriminate on the basis of race, color, national origin, age, disability, sex, sexual orientation, or gender identity.            Thank you!     Thank you for choosing [facility]  for your care. Our goal is always to provide you with  excellent care. Hearing back from our patients is one way we can continue to improve our services. Please take a few minutes to complete the written survey that you may receive in the mail after your visit with us. Thank you!             Your Updated Medication List - Protect others around you: Learn how to safely use, store and throw away your medicines at www.disposemymeds.org.          This list is accurate as of 7/26/18  3:54 PM.  Always use your most recent med list.                   Brand Name Dispense Instructions for use Diagnosis    albuterol 108 (90 Base) MCG/ACT Inhaler    PROAIR HFA/PROVENTIL HFA/VENTOLIN HFA    1 Inhaler    Inhale 2 puffs into the lungs 4 times daily    Mild asthma, unspecified whether complicated, unspecified whether persistent       amylase-lipase-protease 54110 units Cpep    CREON    180 capsule    Take 2 capsules (72,000 Units) by mouth 3 times daily (with meals)    Malignant neoplasm of head of pancreas (H)       BREO ELLIPTA 100-25 MCG/INH oral inhaler   Generic drug:  fluticasone-vilanterol           CALCIUM PO      Take by mouth every morning Form/strength unknown. Powder formulation. Add to water and fruits/vegetables daily to promote bone health.        CARTIA  MG 24 hr capsule   Generic drug:  diltiazem     10 capsule    TK 1 C PO QD    Benign essential hypertension       cholecalciferol 1000 UNIT tablet    vitamin D3     Take 1 tablet by mouth 2 times daily        cyanocolbalamin 500 MCG tablet    vitamin  B-12     Take 500 mcg by mouth every morning        folic acid 400 MCG tablet    FOLVITE     Take 1 tablet by mouth every morning        hydrOXYzine 25 MG tablet    ATARAX    60 tablet    Take 1-2 tablets (25-50 mg) by mouth every 6 hours as needed for itching (adjuvant pain)    Obstructive jaundice       levothyroxine 125 MCG tablet    SYNTHROID/LEVOTHROID    90 tablet    Take 1 tablet (125 mcg) by mouth daily    Hypothyroidism, unspecified type       loperamide 2  MG capsule    IMODIUM    60 capsule    2 caps at 1st sign of diarrhea & 1 cap every 2hrs until 12hrs diarrhea free. During night, 2 caps at bedtime & 2 caps every 4hrs until AM    Malignant neoplasm of head of pancreas (H)       LORazepam 0.5 MG tablet    ATIVAN    30 tablet    Take 1 tablet (0.5 mg) by mouth every 4 hours as needed (Anxiety, Nausea/Vomiting or Sleep)    Malignant neoplasm of head of pancreas (H)       magic mouthwash suspension (diphenhydrAMINE, lidocaine, aluminum-magnesium & simethicone) Susp compounding kit     237 mL    Swish and swallow 5-10 mLs in mouth every 6 hours as needed for mouth sores    Malignant neoplasm of head of pancreas (H)       nystatin 167196 UNIT/ML suspension    MYCOSTATIN    473 mL    Take 5 mLs (500,000 Units) by mouth 4 times daily    Malignant neoplasm of head of pancreas (H), History of pulmonary embolism       omeprazole 40 MG capsule    priLOSEC          * ondansetron 4 MG tablet    ZOFRAN          * ondansetron 8 MG tablet    ZOFRAN    30 tablet    Take 1 tablet (8 mg) by mouth every 8 hours as needed (nausea/vomiting)    Malignant neoplasm of head of pancreas (H)       order for DME     2 Units    Equipment being ordered: Compression Stockings. Please dispense 2 pairs of knee high 20-30 mmHg    Lymphedema       pantoprazole 20 MG EC tablet    PROTONIX    30 tablet    Take 1 tablet (20 mg) by mouth daily    Other acute gastritis without hemorrhage       * prochlorperazine 5 MG tablet    COMPAZINE    30 tablet    Take 1 tablet (5 mg) by mouth every 6 hours as needed for nausea or vomiting    Malignant neoplasm of head of pancreas (H)       * prochlorperazine 10 MG tablet    COMPAZINE    30 tablet    Take 1 tablet (10 mg) by mouth every 6 hours as needed for nausea or vomiting    Malignant neoplasm of head of pancreas (H)       rivaroxaban ANTICOAGULANT 20 MG Tabs tablet    XARELTO    30 tablet    Take 1 tablet (20 mg) by mouth daily (with dinner)    Malignant  neoplasm of head of pancreas (H), History of pulmonary embolism       * timolol maleate 0.5 % opthalmic solution    ISTALOL    1 Bottle    Place 1 drop into both eyes every morning Before placing contact lenses    Cataract, unspecified cataract type, unspecified laterality       * timolol 0.5 % ophthalmic solution    TIMOPTIC     INT 1 GTT OU IN THE MORNING        traMADol 50 MG tablet    ULTRAM    60 tablet    Take 1-2 tablets ( mg) by mouth every 4 hours as needed for breakthrough pain Maximum of 8 tablets daily.    Malignant neoplasm of head of pancreas (H)       TYLENOL PO      Take 325 mg by mouth every 4 hours as needed for mild pain or fever        * Notice:  This list has 6 medication(s) that are the same as other medications prescribed for you. Read the directions carefully, and ask your doctor or other care provider to review them with you.

## 2018-07-26 NOTE — PATIENT INSTRUCTIONS
Contact Numbers    [facility] Main Line: [phone]  [facility] Triage:  [phone]    Call triage with chills and/or temperature greater than or equal to 100.5, uncontrolled nausea/vomiting, diarrhea, constipation, dizziness, shortness of breath, chest pain, bleeding, unexplained bruising, or any new/concerning symptoms, questions/concerns.     If you are having any concerning symptoms or wish to speak to a provider before your next infusion visit, please call your care coordinator or triage to notify them so we can adequately serve you.       After Hours: [phone]    If after hours, weekends, or holidays, call main hospital  and ask for Oncology doctor on call.         July 2018 Sunday Monday Tuesday Wednesday Thursday Friday Saturday   1     2     [facility] DRAW   10:15 AM   (15 min.)   [facility] LAB DRAW   [facility] Lab Draw     UMP ONC INFUSION 180   11:00 AM   (180 min.)    ONCOLOGY INFUSION   [facility] 3     4     5     [facility] DRAW    7:15 AM   (15 min.)    [facility]IC LAB DRAW   [facility] Lab Draw     UMP RETURN    8:05 AM   (50 min.)   Benoit Molina PA   [facility] 6     7       8     9     [facility] DRAW   12:00 PM   (15 min.)    [facility] LAB DRAW   [facility] Lab Draw     UMP ONC INFUSION 180   12:30 PM   (180 min.)   UC ONCOLOGY INFUSION   [facility] 10     11     UMP ONC INFUSION 60    2:00 PM   (60 min.)    ONCOLOGY INFUSION   [facility] 12     13     14       15     16     17     18     19     20     21       22     23     24     [facility] DRAW    1:15 PM   (15 min.)   [facility] DRAW   [facility] Lab Draw     UMP RETURN    1:35 PM   (50 min.)   Quyen Mazariegos APRN CNP   [facility]     UMP ONC INFUSION 180    2:30 PM   (180 min.)    ONCOLOGY INFUSION   [facility] 25     26     UMP ONC INFUSION 60    2:30  PM   (60 min.)    ONCOLOGY INFUSION   [facility] 27     28       29     30     31                                    August 2018 Sunday Monday Tuesday Wednesday Thursday Friday Saturday                  1     2     3     4       5     6     [facility] DRAW    8:30 AM   (15 min.)    [facility]   [facility]     [facility] ONC INFUSION 180    9:00 AM   (180 min.)    ONCOLOGY INFUSION   [facility] 7     8     UMP RETURN    9:15 AM   (30 min.)   José Antonio Ann MD   [facility] 9     10     11       12     13     14     15     16     17     18       19     20     [facility] LAB DRAW   10:00 AM   (15 min.)    [facility] LAB DRAW   [facility] Lab Draw     UMP ONC INFUSION 180   10:30 AM   (180 min.)    ONCOLOGY INFUSION   [facility] 21     22     23     24     CT CHEST ABDOMEN PELVIS WWO    9:40 AM   (20 min.)   UCCT2   [facility] CT 25       26     27     [facility] LAB DRAW   10:15 AM   (15 min.)    [facility] LAB DRAW   [facility] Lab Draw     UMP RETURN   10:30 AM   (30 min.)   Vinny Yu MD   [facility]     UMP ONC INFUSION 180   11:30 AM   (180 min.)    ONCOLOGY INFUSION   [facility] 28     29     30     31                       Lab Results:  No results found for this or any previous visit (from the past 12 hour(s)).

## 2018-08-06 NOTE — PROGRESS NOTES
"SPIRITUAL HEALTH SERVICES  SPIRITUAL ASSESSMENT Progress Note  [facility]    REASON FOR ENCOUNTER: Introduce Chaplaincy Care    Reviewed documentation. Short interaction with Iris. She declined chaplaincy care, stating, \"I'm not very [employer]ism.\" l have no plan for follow-up with Iris as she receives ongoing therapies at the [facility].    Juan Allen MDiv, [facility]  Staff    "

## 2018-08-06 NOTE — PROGRESS NOTES
Infusion Nursing Note:  Iris Lewis presents today for Cycle 10 Day 1 of Irinotecan Liposomal and Fluorouracil pump connect.    Patient seen by provider today: No   present during visit today: Not Applicable.    Note: Patient presents to infusion with a stomach ache rated at a 4/10. Patient says she starting getting nauseous yesterday and took a Zofran last night, then again at 2AM with some relief. Denies having any vomiting, constipation, or diarrhea. Reports having some mouth sores that are improving with the Magic Mouthwash she uses. No other issues or complaints. Writer educated patient to try the Compazine and Ativan if her Zofran was not taking away the nausea. Writer told patient to call if antiemetics were not helping at home, or if symptoms worsened.     Intravenous Access:  Implanted Port.    Treatment Conditions:  Lab Results   Component Value Date    HGB 9.7 08/06/2018     Lab Results   Component Value Date    WBC 6.1 08/06/2018      Lab Results   Component Value Date    ANEU 4.1 08/06/2018     Lab Results   Component Value Date     08/06/2018      Lab Results   Component Value Date     08/06/2018                   Lab Results   Component Value Date    POTASSIUM 4.1 08/06/2018           Lab Results   Component Value Date    MAG 1.9 11/15/2017            Lab Results   Component Value Date    CR 0.64 08/06/2018                   Lab Results   Component Value Date    EMMY 8.3 08/06/2018                Lab Results   Component Value Date    BILITOTAL 0.7 08/06/2018           Lab Results   Component Value Date    ALBUMIN 2.7 08/06/2018                    Lab Results   Component Value Date    ALT 76 08/06/2018           Lab Results   Component Value Date    AST 82 08/06/2018       Results reviewed, labs MET treatment parameters, ok to proceed with treatment.    TORB: Bhakti Castaneda RN/ Dr. Yu @ 10:22 on 8/6/18. Patient with elevated Alkaline Phosphatase of 518, ALT of 76, and AST of  "82. Per MD, okay to proceed with today's treatment.      Post Infusion Assessment:  Patient tolerated infusion without incident.  Blood return noted pre and post infusion.  Site patent and intact, free from redness, edema or discomfort.  No evidence of extravasations.  Access intact per protocol.    Prior to discharge: Port is secured in place with tegaderm and flushed with 10cc NS with positive blood return noted.  Continuous home infusion CADD pump connected.    All connectors secured in place and clamps taped open. Connections double checked with Era Man RN.  Pump started, \"running\" noted on display (CADD): YES.  Patient instructed to call our clinic or [facility] with any questions or concerns at home.  Patient verbalized understanding.    Patient set up for pump disconnect at our clinic on 8/8/18.     Discharge Plan:   Patient declined prescription refills.  Discharge instructions reviewed with: Patient.  Patient and/or family verbalized understanding of discharge instructions and all questions answered.  AVS to patient via Live ShuttleHART.  Patient will return 8/8/18 for next appointment.   Patient discharged in stable condition accompanied by: self.  Departure Mode: Ambulatory.    Bhakti Castaneda RN                                "

## 2018-08-06 NOTE — PATIENT INSTRUCTIONS
Contact Numbers  [facility]: [phone]    After Hours:  [phone]  Triage: [phone]    Please call the [facility] Triage line if you experience a temperature greater than or equal to 100.5, shaking chills, have uncontrolled nausea, vomiting and/or diarrhea, dizziness, shortness of breath, chest pain, bleeding, unexplained bruising, or if you have any other new/concerning symptoms, questions or concerns.     If it is after hours, weekends, or holidays, please call the main hospital  at  [phone] and ask to speak to the Oncology doctor on call.     If you are having any concerning symptoms or wish to speak to a provider before your next infusion visit, please call your care coordinator or triage to notify them so we can adequately serve you.     If you need a refill on a narcotic prescription or other medication, please call triage before your infusion appointment.           August 2018 Sunday Monday Tuesday Wednesday Thursday Friday Saturday                  1     2     3     4       5     6     [facility] DRAW    8:30 AM   (15 min.)   [facility] LAB DRAW   [facility] Lab Draw     [facility] ONC INFUSION 180    9:00 AM   (180 min.)    ONCOLOGY INFUSION   [facility] 7     8     [facility] RETURN    9:15 AM   (30 min.)   José Antonio Ann MD   [facility] 9     10     11       12     13     14     15     16     17     18       19     20     [facility] LAB DRAW   10:00 AM   (15 min.)   [facility] D[facility]   [facility] Lab Draw     [facility] ONC INFUSION 180   10:30 AM   (180 min.)    ONCOLOGY INFUSION   [facility] 21     22     23     24     CT CHEST ABDOMEN PELVIS WWO    9:40 AM   (20 min.)   UCCT2   [facility] CT 25       26     27     [facility] LAB DRAW   10:15 AM   (15 min.)    [facility] LAB DRAW   [facility] Lab Draw     [facility] RETURN   10:30 AM   (30 min.)   Vinny Yu MD   [facility]   11:30 AM   (180 min.)    ONCOLOGY INFUSION   [facility] 28 29 30 31 September 2018 Sunday Monday Tuesday Wednesday Thursday Friday Saturday                                 1       2     3     4     5     6     7     8       9     10     11     12     13     14     15       16     17     18     19     20     21     22       23     24     25     26     27     28     29       30                                                Lab Results:  Recent Results (from the past 12 hour(s))   Comprehensive metabolic panel    Collection Time: 08/06/18  9:44 AM   Result Value Ref Range    Sodium 140 133 - 144 mmol/L    Potassium 4.1 3.4 - 5.3 mmol/L    Chloride 107 94 - 109 mmol/L    Carbon Dioxide 24 20 - 32 mmol/L    Anion Gap 9 3 - 14 mmol/L    Glucose 79 70 - 99 mg/dL    Urea Nitrogen 8 7 - 30 mg/dL    Creatinine 0.64 0.52 - 1.04 mg/dL    GFR Estimate 89 >60 mL/min/1.7m2    GFR Estimate If Black >90 >60 mL/min/1.7m2    Calcium 8.3 (L) 8.5 - 10.1 mg/dL    Bilirubin Total 0.7 0.2 - 1.3 mg/dL    Albumin 2.7 (L) 3.4 - 5.0 g/dL    Protein Total 6.4 (L) 6.8 - 8.8 g/dL    Alkaline Phosphatase 518 (H) 40 - 150 U/L    ALT 76 (H) 0 - 50 U/L    AST 82 (H) 0 - 45 U/L   CBC with platelets differential    Collection Time: 08/06/18  9:44 AM   Result Value Ref Range    WBC 6.1 4.0 - 11.0 10e9/L    RBC Count 2.90 (L) 3.8 - 5.2 10e12/L    Hemoglobin 9.7 (L) 11.7 - 15.7 g/dL    Hematocrit 30.3 (L) 35.0 - 47.0 %     (H) 78 - 100 fl    MCH 33.4 (H) 26.5 - 33.0 pg    MCHC 32.0 31.5 - 36.5 g/dL    RDW 16.4 (H) 10.0 - 15.0 %    Platelet Count 301 150 - 450 10e9/L    Diff Method Automated Method     % Neutrophils 67.2 %    % Lymphocytes 16.6 %    % Monocytes 13.2 %    % Eosinophils 1.8 %    % Basophils 0.7 %    % Immature Granulocytes 0.5 %    Nucleated RBCs 0 0 /100    Absolute Neutrophil 4.1 1.6 - 8.3 10e9/L    Absolute Lymphocytes 1.0 0.8  - 5.3 10e9/L    Absolute Monocytes 0.8 0.0 - 1.3 10e9/L    Absolute Eosinophils 0.1 0.0 - 0.7 10e9/L    Absolute Basophils 0.0 0.0 - 0.2 10e9/L    Abs Immature Granulocytes 0.0 0 - 0.4 10e9/L    Absolute Nucleated RBC 0.0

## 2018-08-06 NOTE — MR AVS SNAPSHOT
After Visit Summary   8/6/2018    Iris Lewis    MRN: [id number]           Patient Information     Date Of Birth          1940        Visit Information        Provider Department      8/6/2018 9:00 AM  24 ATC;  ONCOLOGY INFUSION [facility]        Today's Diagnoses     Malignant neoplasm of head of pancreas (H)    -  1      Care Instructions    Contact Numbers  [facility]: [phone]    After Hours:  [phone]  Triage: [phone]    Please call the [facility] Triage line if you experience a temperature greater than or equal to 100.5, shaking chills, have uncontrolled nausea, vomiting and/or diarrhea, dizziness, shortness of breath, chest pain, bleeding, unexplained bruising, or if you have any other new/concerning symptoms, questions or concerns.     If it is after hours, weekends, or holidays, please call the main hospital  at  [phone] and ask to speak to the Oncology doctor on call.     If you are having any concerning symptoms or wish to speak to a provider before your next infusion visit, please call your care coordinator or triage to notify them so we can adequately serve you.     If you need a refill on a narcotic prescription or other medication, please call triage before your infusion appointment.           August 2018 Sunday Monday Tuesday Wednesday Thursday Friday Saturday                  1     2     3     4       5     6     [facility] D[facility]    8:30 AM   (15 min.)    [facility] LAB DRAW   [facility] Lab Draw     [facility] ONC INFUSION 180    9:00 AM   (180 min.)    ONCOLOGY INFUSION   [facility] 7     8     UMP RETURN    9:15 AM   (30 min.)   José Antonio Ann MD   [facility] 9     10     11       12     13     14     15     16     17     18       19     20     [facility] LAB DRAW   10:00 AM   (15 min.)    [facility] LAB DRAW   [facility] Lab Draw     [facility] ONC INFUSION 180    10:30 AM   (180 min.)    ONCOLOGY INFUSION   [facility] 21     22     23     24     CT CHEST ABDOMEN PELVIS WWO    9:40 AM   (20 min.)   UCCT2   [facility] CT 25       26     27     [facility] LAB DRAW   10:15 AM   (15 min.)    [facility] LAB DRAW   [facility] Lab Draw     [facility] RETURN   10:30 AM   (30 min.)   Vinny Yu MD   [facility] ONC INFUSION 180   11:30 AM   (180 min.)    ONCOLOGY INFUSION   [facility] 28     29     30     31 September 2018 Sunday Monday Tuesday Wednesday Thursday Friday Saturday                                 1       2     3     4     5     6     7     8       9     10     11     12     13     14     15       16     17     18     19     20     21     22       23     24     25     26     27     28     29       30                                                Lab Results:  Recent Results (from the past 12 hour(s))   Comprehensive metabolic panel    Collection Time: 08/06/18  9:44 AM   Result Value Ref Range    Sodium 140 133 - 144 mmol/L    Potassium 4.1 3.4 - 5.3 mmol/L    Chloride 107 94 - 109 mmol/L    Carbon Dioxide 24 20 - 32 mmol/L    Anion Gap 9 3 - 14 mmol/L    Glucose 79 70 - 99 mg/dL    Urea Nitrogen 8 7 - 30 mg/dL    Creatinine 0.64 0.52 - 1.04 mg/dL    GFR Estimate 89 >60 mL/min/1.7m2    GFR Estimate If Black >90 >60 mL/min/1.7m2    Calcium 8.3 (L) 8.5 - 10.1 mg/dL    Bilirubin Total 0.7 0.2 - 1.3 mg/dL    Albumin 2.7 (L) 3.4 - 5.0 g/dL    Protein Total 6.4 (L) 6.8 - 8.8 g/dL    Alkaline Phosphatase 518 (H) 40 - 150 U/L    ALT 76 (H) 0 - 50 U/L    AST 82 (H) 0 - 45 U/L   CBC with platelets differential    Collection Time: 08/06/18  9:44 AM   Result Value Ref Range    WBC 6.1 4.0 - 11.0 10e9/L    RBC Count 2.90 (L) 3.8 - 5.2 10e12/L    Hemoglobin 9.7 (L) 11.7 - 15.7 g/dL    Hematocrit 30.3 (L) 35.0 - 47.0 %     (H) 78 - 100 fl    MCH 33.4 (H) 26.5 -  33.0 pg    MCHC 32.0 31.5 - 36.5 g/dL    RDW 16.4 (H) 10.0 - 15.0 %    Platelet Count 301 150 - 450 10e9/L    Diff Method Automated Method     % Neutrophils 67.2 %    % Lymphocytes 16.6 %    % Monocytes 13.2 %    % Eosinophils 1.8 %    % Basophils 0.7 %    % Immature Granulocytes 0.5 %    Nucleated RBCs 0 0 /100    Absolute Neutrophil 4.1 1.6 - 8.3 10e9/L    Absolute Lymphocytes 1.0 0.8 - 5.3 10e9/L    Absolute Monocytes 0.8 0.0 - 1.3 10e9/L    Absolute Eosinophils 0.1 0.0 - 0.7 10e9/L    Absolute Basophils 0.0 0.0 - 0.2 10e9/L    Abs Immature Granulocytes 0.0 0 - 0.4 10e9/L    Absolute Nucleated RBC 0.0                Follow-ups after your visit        Your next 10 appointments already scheduled     Aug 08, 2018  9:30 AM CDT   (Arrive by 9:15 AM)   Return Visit with José Antonio Ann MD   [facility] ([facility])    [address]  [address]  [facility] [id number] [phone]            Aug 08, 2018 10:30 AM CDT   Infusion 60 with UC ONCOLOGY INFUSION, UC 24 ATC   [facility] ([facility])    [address]  [address]  [facility] [address] [phone]            Aug 20, 2018 10:00 AM CDT   [facility] Draw with [facility] LAB DRAW   [facility] Lab Draw ([facility])    [address]  [address]  [facility] [address] [phone]            Aug 20, 2018 10:30 AM CDT   Infusion 180 with UC ONCOLOGY INFUSION, UC 22 ATC   [facility] ([facility])    [address] [place] [address]  [address]  [facility] [address] [phone]            Aug 24, 2018  9:40 AM CDT   CT CHEST ABDOMEN PELVIS W/O & W CONTRAST with UCCT2   [facility] [place] [facility] ([facility])    [address] [place] [address]  1st Floor  [facility] [address] [phone]           Please bring any scans or X-rays taken at other  [facility], if similar tests were done. Also bring a list of your medicines, including vitamins, minerals and over-the-counter drugs. It is safest to leave personal items at home.  Be sure to tell your doctor:   If you have any allergies.   If there s any chance you are pregnant.   If you are breastfeeding.  How to prepare:   Do not eat or drink for 2 hours before your exam. If you need to take medicine, you may take it with small sips of water. (We may ask you to take liquid medicine as well.)   Please wear loose clothing, such as a sweat suit or jogging clothes. Avoid snaps, zippers and other metal. We may ask you to undress and put on a hospital gown.  Please arrive 30 minutes early for your CT. Once in the department you might be asked to drink water 15-20 minutes prior to your exam.  If indicated you may be asked to drink an oral contrast in advance of your CT.  If this is the case, the imaging team will let you know or be in contact with you prior to your appointment  Patients over 70 or patients with diabetes or kidney problems:   If you haven t had a blood test (creatinine test) within the last 30 days, the Cardiologist/Radiologist may require you to get this test prior to your exam.  If you have diabetes:   Continue to take your metformin medication on the day of your exam  If you have any questions, please call the Imaging Department where you will have your exam.            Aug 27, 2018 10:15 AM CDT   [facility] Lab Draw with  [facility] LAB DRAW   [facility] Lab Draw ([facility])    [address]  [address]  [facility] [address]   [phone]            Aug 27, 2018 10:45 AM CDT   (Arrive by 10:30 AM)   Return Visit with Vinny Yu MD   [facility] ([facility])    [address]  [address]  [facility] [address]   [phone]            Aug 27, 2018 11:30 AM CDT   Infusion 180 with  ONCOLOGY  INFUSION   [facility])    [address]  [address]  [facility] [address] [phone]              Who to contact     If you have questions or need follow up information about today's clinic visit or your schedule please contact [facility]ER [facility] directly at [phone].  Normal or non-critical lab and imaging results will be communicated to you by MyChart, letter or phone within 4 business days after the clinic has received the results. If you do not hear from us within 7 days, please contact the clinic through [facility] or phone. If you have a critical or abnormal lab result, we will notify you by phone as soon as possible.  Submit refill requests through [facility] or call your pharmacy and they will forward the refill request to us. Please allow 3 business days for your refill to be completed.          Additional Information About Your Visit        [facility] Information     [facility] gives you secure access to your electronic health record. If you see a primary care provider, you can also send messages to your care team and make appointments. If you have questions, please call your primary care clinic.  If you do not have a primary care provider, please call [phone] and they will assist you.        Care EveryWhere ID     This is your Care EveryWhere ID. This could be used by other organizations to access your [place] medical records  [id number]        Your Vitals Were     Pulse Temperature Respirations Pulse Oximetry BMI (Body Mass Index)       60 98.1  F (36.7  C) 16 98% 17.85 kg/m2        Blood Pressure from Last 3 Encounters:   08/06/18 121/74   07/26/18 120/62   07/24/18 104/58    Weight from Last 3 Encounters:   08/06/18 44.3 kg (97 lb 9.6 oz)   07/24/18 43.6 kg (96 lb 1.6 oz)   07/09/18 44.2 kg (97 lb 6.4 oz)              We Performed the Following     CBC with platelets differential     Comprehensive metabolic panel         Primary Care Provider Office Phone # Fax #    Neri Gipson [phone] [address] [address]        Equal Access to Services     NESSA NICHOLS : Hadii mary ku salmao Sobrunildaali, waaxda luqadaha, qaybta kaalmada adesusana, lisa romero laLatonyajeanne michael. So [facility] [phone].    ATENCIÓN: Si habla español, tiene a nava disposición servicios gratuitos de asistencia lingüística. Llame al [phone].    We comply with applicable federal civil rights laws and [place] laws. We do not discriminate on the basis of race, color, national origin, age, disability, sex, sexual orientation, or gender identity.            Thank you!     Thank you for choosing [facility] CLINIC  for your care. Our goal is always to provide you with excellent care. Hearing back from our patients is one way we can continue to improve our services. Please take a few minutes to complete the written survey that you may receive in the mail after your visit with us. Thank you!             Your Updated Medication List - Protect others around you: Learn how to safely use, store and throw away your medicines at www.disposemymeds.org.          This list is accurate as of 8/6/18  2:01 PM.  Always use your most recent med list.                   Brand Name Dispense Instructions for use Diagnosis    albuterol 108 (90 Base) MCG/ACT Inhaler    PROAIR HFA/PROVENTIL HFA/VENTOLIN HFA    1 Inhaler    Inhale 2 puffs into the lungs 4 times daily    Mild asthma, unspecified whether complicated, unspecified whether persistent       amylase-lipase-protease 39752 units Cpep    CREON    180 capsule    Take 2 capsules (72,000 Units) by mouth 3 times daily (with meals)    Malignant neoplasm of head of pancreas (H)       BREO ELLIPTA 100-25 MCG/INH oral inhaler   Generic drug:  fluticasone-vilanterol           CALCIUM PO      Take by mouth every morning Form/strength unknown. Powder formulation. Add to  water and fruits/vegetables daily to promote bone health.        CARTIA  MG 24 hr capsule   Generic drug:  diltiazem     10 capsule    TK 1 C PO QD    Benign essential hypertension       cholecalciferol 1000 UNIT tablet    vitamin D3     Take 1 tablet by mouth 2 times daily        cyanocolbalamin 500 MCG tablet    vitamin  B-12     Take 500 mcg by mouth every morning        folic acid 400 MCG tablet    FOLVITE     Take 1 tablet by mouth every morning        hydrOXYzine 25 MG tablet    ATARAX    60 tablet    Take 1-2 tablets (25-50 mg) by mouth every 6 hours as needed for itching (adjuvant pain)    Obstructive jaundice       levothyroxine 125 MCG tablet    SYNTHROID/LEVOTHROID    90 tablet    Take 1 tablet (125 mcg) by mouth daily    Hypothyroidism, unspecified type       loperamide 2 MG capsule    IMODIUM    60 capsule    2 caps at 1st sign of diarrhea & 1 cap every 2hrs until 12hrs diarrhea free. During night, 2 caps at bedtime & 2 caps every 4hrs until AM    Malignant neoplasm of head of pancreas (H)       LORazepam 0.5 MG tablet    ATIVAN    30 tablet    Take 1 tablet (0.5 mg) by mouth every 4 hours as needed (Anxiety, Nausea/Vomiting or Sleep)    Malignant neoplasm of head of pancreas (H)       magic mouthwash suspension (diphenhydrAMINE, lidocaine, aluminum-magnesium & simethicone) Susp compounding kit     237 mL    Swish and swallow 5-10 mLs in mouth every 6 hours as needed for mouth sores    Malignant neoplasm of head of pancreas (H)       nystatin 813571 UNIT/ML suspension    MYCOSTATIN    473 mL    Take 5 mLs (500,000 Units) by mouth 4 times daily    Malignant neoplasm of head of pancreas (H), History of pulmonary embolism       omeprazole 40 MG capsule    priLOSEC          * ondansetron 4 MG tablet    ZOFRAN          * ondansetron 8 MG tablet    ZOFRAN    30 tablet    Take 1 tablet (8 mg) by mouth every 8 hours as needed (nausea/vomiting)    Malignant neoplasm of head of pancreas (H)       order for  DME     2 Units    Equipment being ordered: Compression Stockings. Please dispense 2 pairs of knee high 20-30 mmHg    Lymphedema       pantoprazole 20 MG EC tablet    PROTONIX    30 tablet    Take 1 tablet (20 mg) by mouth daily    Other acute gastritis without hemorrhage       * prochlorperazine 5 MG tablet    COMPAZINE    30 tablet    Take 1 tablet (5 mg) by mouth every 6 hours as needed for nausea or vomiting    Malignant neoplasm of head of pancreas (H)       * prochlorperazine 10 MG tablet    COMPAZINE    30 tablet    Take 1 tablet (10 mg) by mouth every 6 hours as needed for nausea or vomiting    Malignant neoplasm of head of pancreas (H)       rivaroxaban ANTICOAGULANT 20 MG Tabs tablet    XARELTO    30 tablet    Take 1 tablet (20 mg) by mouth daily (with dinner)    Malignant neoplasm of head of pancreas (H), History of pulmonary embolism       * timolol maleate 0.5 % opthalmic solution    ISTALOL    1 Bottle    Place 1 drop into both eyes every morning Before placing contact lenses    Cataract, unspecified cataract type, unspecified laterality       * timolol 0.5 % ophthalmic solution    TIMOPTIC     INT 1 GTT OU IN THE MORNING        traMADol 50 MG tablet    ULTRAM    60 tablet    Take 1-2 tablets ( mg) by mouth every 4 hours as needed for breakthrough pain Maximum of 8 tablets daily.    Malignant neoplasm of head of pancreas (H)       TYLENOL PO      Take 325 mg by mouth every 4 hours as needed for mild pain or fever        * Notice:  This list has 6 medication(s) that are the same as other medications prescribed for you. Read the directions carefully, and ask your doctor or other care provider to review them with you.

## 2018-08-08 NOTE — PATIENT INSTRUCTIONS
Contact Numbers  [facility]: [phone]    After Hours:  [phone]  Triage: [phone]     Please call the [facility] Triage line if you experience a temperature greater than or equal to 100.5, shaking chills, have uncontrolled nausea, vomiting and/or diarrhea, dizziness, shortness of breath, chest pain, bleeding, unexplained bruising, or if you have any other new/concerning symptoms, questions or concerns.      If it is after hours, weekends, or holidays, please call the [phone] to speak to a nurse.      If you are having any concerning symptoms or wish to speak to a provider before your next infusion visit, please call your care coordinator or triage to notify them so we can adequately serve you.      If you need a refill on a narcotic prescription or other medication, please call triage before your infusion appointment.               August 2018 Sunday Monday Tuesday Wednesday Thursday Friday Saturday                  1     2     3     4       5     6     [facility] LAB DRAW    8:30 AM   (15 min.)    [facility] LAB DRAW   [facility] Lab Draw     [facility] ONC INFUSION 180    9:00 AM   (180 min.)    ONCOLOGY INFUSION   [facility] 7     8     UMP RETURN    9:15 AM   (30 min.)   José Antonio Ann MD   [facility]   10:30 AM   (60 min.)    ONCOLOGY INFUSION   [facility] 9     10     11       12     13     14     15     16     17     18       19     20     [facility] LAB DRAW   10:00 AM   (15 min.)    [facility]     [facility] ONC INFUSION 180   10:30 AM   (180 min.)    ONCOLOGY INFUSION   [facility] 21     22     23     24     CT CHEST ABDOMEN PELVIS WWO    9:40 AM   (20 min.)   UCCT2   [facility] CT 25       26     27     [facility] LAB DRAW   10:15 AM   (15 min.)    [facility] LAB DRAW   [facility] Lab Draw     UMP RETURN   10:30  AM   (30 min.)   Vinny Yu MD   [facility]     [facility] ONC INFUSION 180   11:30 AM   (180 min.)    ONCOLOGY INFUSION   [facility] 28 29 30 31 September 2018 Sunday Monday Tuesday Wednesday Thursday Friday Saturday                                 1       2     3     4     5     6     7     8       9     10     11     12     13     14     15       16     17     18     19     20     21     22       23     24     25     26     27     28     29       30                                                Lab Results:  No results found for this or any previous visit (from the past 12 hour(s)).

## 2018-08-08 NOTE — LETTER
8/8/2018       RE: Iris Lewis  [address]  [place] [address]     Dear Colleague,    Thank you for referring your patient, Iris Lewis, to the [facility]INIC at [facility]. Please see a copy of my visit note below.    Palliative Staff/Outpatient Clinic    (This note was transcribed using voice recognition software. While I review and edit the transcription, I may miss errors, and the software sometimes does unexpected capitalizations and formatting that I miss. Please let me know of any serious mistranscriptions and I will addend this note.)    Patient ID:  She has metastatic pancreatic cancer to the liver, lung, and retroperitoneal lymph nodes on gemcitabine and nab paclitaxel.  Important past medical history includes Nissen fundoplication in 2006.  She was diagnosed with her cancer in November of last year. First f/u scan showed some response to her chemotherapy. 3/18 showed progression & started 5FU+liposomal irinotecan.      +HCD which names  then daughter as decision makers. Has a DNR/DNI POLST     History:  She is with her  today who supplements history.  Globally she is doing very well.  She reports her appetite is stable and she is forcing herself to eat and is gaining a little bit of weight.  Minimal nausea and vomiting.  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  She is tolerating her chemotherapy very well.  She is sleeping well with melatonin and a single Ativan most nights.  She says her mood and spirits are excellent and she finds herself in a place of acceptance and tries not to worry about the cancer too much.  She is continuing to do end-of-life planning and preparation with her assets.  She is going to participate in the body donation program here at the [facility].    She continues to have mucositis with in particular a couple large ulcers in her left tongue.  She is now taking Magic mouthwash which she says  "is quite helpful.    She had an episode of elevated LFTs.  When I ask about that she does say she took quite a bit of Tylenol.  She tells me she drinks alcohol nightly and is somewhat vague with how much she actually does drink.    PE: /68  Pulse 86  Temp 95.7  F (35.4  C) (Oral)  Resp 16  Ht 1.575 m (5' 2\")  Wt 45.9 kg (101 lb 3 oz)  SpO2 98%  Breastfeeding? No  BMI 18.51 kg/m2   Wt Readings from Last 3 Encounters:   08/08/18 45.9 kg (101 lb 3 oz)   08/06/18 44.3 kg (97 lb 9.6 oz)   07/24/18 43.6 kg (96 lb 1.6 oz)     Alert thin chronically ill woman in no acute distress.  Mouth is moist with a couple large ulcers on the left tongue.  Affect is full pleasant interactive with a clear sensorium fluent speech normal memory and thought processes.    Impression & Recommendations:  78-year-old woman with metastatic pancreatic cancer who is doing really well on palliative chemotherapy with stable and shrinking disease and global improvement in her quality of life.  Minimal ongoing distressing symptoms other than things like her mucositis which are being well managed.  We celebrated all her gains.  I told her she did not need to see me just to tell me she is doing well and I am happy to see her at any time I could be helpful in the future.    I encouraged her to continue to try gentle aerobic exercise such as walking around her block once or twice a day.    It is apparent that she does consume alcohol, and I do worry that at times consumes too much and she more or less admitted that to me today along with mixing it with the Tylenol.  We shall be aware of that going forward as we monitor her for safety.    She has a DNR/DNI order and is well informed and prepared for her terminal illness.    Chart data reviewed today:    Family History   Problem Relation Age of Onset     C.A.D. Father      MI at age 65     Asthma Father      Coronary Artery Disease Father      Alzheimer Disease Mother      Osteoperosis Mother  "     Depression Sister 80     Asthma Sister      Prostate Cancer Brother      Depression Son      Depression Sister      Coronary Artery Disease Brother 67     Skin Cancer No family hx of      no skin cancer     Past Medical History:   Diagnosis Date     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 12/18/2017     Arthritis      Colon polyp 2009,2015    no polyps - f/u in 5 yrs      Esophageal reflux      Glaucoma      Hypertension      Malignant neoplasm of head of pancreas (H) 11/6/2017     Osteoporosis      Postsurgical hypothyroidism      Scoliosis (and kyphoscoliosis), idiopathic      Thyroid cancer (H)     papillary carcinoma age 33     Uncomplicated asthma      Past Surgical History:   Procedure Laterality Date     ARTHRODESIS FOOT Right 11/9/2016    Procedure: ARTHRODESIS FOOT;  Surgeon: Janes Mcelroy MD;  [facility]     C STOMACH SURGERY PROCEDURE UNLISTED       COLONOSCOPY   2009, 3/2015    no polyps in 2015 told to return 10 yrs.      ENDOSCOPIC RETROGRADE CHOLANGIOPANCREATOGRAM N/A 11/2/2017    Procedure: COMBINED ENDOSCOPIC RETROGRADE CHOLANGIOPANCREATOGRAPHY, PLACE TUBE/STENT;  Endoscopic Retrograde Cholangiopancreatogram with billary sphincterotomy and billary stent placement;  Surgeon: Rito Mcneil MD;  [facility]     ENDOSCOPIC RETROGRADE CHOLANGIOPANCREATOGRAM N/A 1/10/2018    Procedure: ENDOSCOPIC RETROGRADE CHOLANGIOPANCREATOGRAM;  Endoscopic Retrograde Cholangiopancreatogram ;  Surgeon: Felix Izaguirre MD;  [facility]     ENDOSCOPIC RETROGRADE CHOLANGIOPANCREATOGRAPHY, EXCHANGE TUBE/STENT N/A 11/22/2017    Procedure: ENDOSCOPIC RETROGRADE CHOLANGIOPANCREATOGRAPHY, EXCHANGE TUBE/STENT;  Endoscopic Retrograde Cholangiopancreatogram with Biliary Stent Exchange and pancreatic stent placement;  Surgeon: Felix Izaguirre MD;  [facility]     ESOPHAGOSCOPY, GASTROSCOPY, DUODENOSCOPY (EGD), COMBINED  2/17/2012    Procedure:COMBINED ESOPHAGOSCOPY, GASTROSCOPY,  DUODENOSCOPY (EGD); COMBINED ESOPHAGOSCOPY, GASTROSCOPY, DUODENOSCOPY POSSIBLE DILATION; Surgeon:NICHOLE MCCLAIN; [facility]     ESOPHAGOSCOPY, GASTROSCOPY, DUODENOSCOPY (EGD), COMBINED N/A 11/2/2017    Procedure: COMBINED ENDOSCOPIC ULTRASOUND, ESOPHAGOSCOPY, GASTROSCOPY, DUODENOSCOPY (EGD), FINE NEEDLE ASPIRATE/BIOPSY;  Upper Endoscopic Ultrasound with fine needle biopsy, upper endoscopy with biopsies, Endoscopic Retrograde Cholangiopancreatogram with billary sphincterotomy and billary stent placement;  Surgeon: Hadley Bailey MD;  [facility]     FOOT SURGERY  2008    hammertoe left     INSERT PORT VASCULAR ACCESS Right 1/24/2018    Procedure: INSERT PORT VASCULAR ACCESS;  Chest Port Placement;  Surgeon: Ventura Villarreal PA-C;  [facility]     LAPAROSCOPIC CHOLECYSTECTOMY N/A 1/5/2015    Procedure: LAPAROSCOPIC CHOLECYSTECTOMY;  Surgeon: Cruz Pearson MD;  [facility]     NISSEN FUNDOPLICATION       ORTHOPEDIC SURGERY  2009    left hammertoe      REPAIR HAMMER TOE Right 11/9/2016    Procedure: REPAIR HAMMER TOE;  Surgeon: Janes Mcelroy MD;  [facility]     SALPINGO OOPHORECTOMY,R/L/SERENA  1974    left, for tubal pregnancy     THYROIDECTOMY      for thyroid cancer     Allergies   Allergen Reactions     Nkda [No Known Drug Allergies]    Medications: I have reviewed the patient's medication profile. MNPMP: reviewed    Data reviewed:  I reviewed recent labs and imaging, my comments:  CT June  IMPRESSION: In this patient with a history of pancreatic cancer:  1. Slightly decreased ill-defined uncinate process mass consistent  with known pancreatic cancer with stable vascular relationships, as  described above. Stable adjacent soft tissue density in the  retroperitoneum as well as prominent lymph nodes.  2. Significant improvement of hepatic metastases, as described above.  No new or enlarging liver lesions.  3. Interval resolution of the previously described small  volume of  ascites and small left pleural effusion.    LFTs now normal      Again, thank you for allowing me to participate in the care of your patient.      Sincerely,    José Antonio Ann MD

## 2018-08-08 NOTE — PROGRESS NOTES
Infusion Nursing Note:  Pt presents today for Fluorouracil home infusion pump disconnect.     present during visit today: Not Applicable.  Patient seen by Dr. Ann prior to infusion.    No labs today.    Note: Pt denies any fever, chill or others changes. Pt feeling well and offers no complaints. Infusion appeared to be complete/bag empty.     Port flushed, blood return noted, and hep locked.    Discharge Plan:   Patient declined prescription refills.  Discharge instructions reviewed with: Patient.  Patient and/or family verbalized understanding of discharge instructions and all questions answered.  AVS to patient via [employer].  Pt will return 8/20 for next infusion appt.

## 2018-08-08 NOTE — NURSING NOTE
"Oncology Rooming Note    August 8, 2018 9:18 AM   Iris Lewis is a 78 year old female who presents for:    Chief Complaint   Patient presents with     Oncology Clinic Visit     Return: Palliative     Initial Vitals: /68  Pulse 86  Temp 95.7  F (35.4  C) (Oral)  Resp 16  Ht 1.575 m (5' 2\")  Wt 45.9 kg (101 lb 3 oz)  SpO2 98%  Breastfeeding? No  BMI 18.51 kg/m2 Estimated body mass index is 18.51 kg/(m^2) as calculated from the following:    Height as of this encounter: 1.575 m (5' 2\").    Weight as of this encounter: 45.9 kg (101 lb 3 oz). Body surface area is 1.42 meters squared.  No Pain (0) Comment: Data Unavailable   No LMP recorded. Patient is postmenopausal.  Allergies reviewed: Yes  Medications reviewed: Yes    Medications: Medication refills not needed today.  Pharmacy name entered into For Your Imagination:    [facility] - [facility] [address] [facility] [address] [address] [place] [address] [address] [facility] [address]    Clinical concerns: no new concerns today Dr. Ann was notified.    10 minutes for nursing intake (face to face time)     Katerin Fernandez CMA              "

## 2018-08-08 NOTE — MR AVS SNAPSHOT
After Visit Summary   8/8/2018    Iris Lewis    MRN: [id number]           Patient Information     Date Of Birth          1940        Visit Information        Provider Department      8/8/2018 9:30 AM José Antonio Ann MD [facility] [place]        Today's Diagnoses     Malignant neoplasm of head of pancreas (H)    -  1       Follow-ups after your visit        Your next 10 appointments already scheduled     Aug 08, 2018 10:30 AM CDT   Infusion 60 with [facility] ONCOLOGY INFUSION, UC 24 ATC   [facility] ([facility])    [address]  [address]  [facility] [address]   [phone]            Aug 20, 2018 10:00 AM CDT   [facility] Lab Draw with [facility] LAB DRAW   [facility] Lab Draw ([facility])    [address]  [address]  [facility] [address]   [phone]            Aug 20, 2018 10:30 AM CDT   Infusion 180 with [facility] ONCOLOGY INFUSION, UC 22 ATC   [facility] [place] ([facility])    [address]  [address]  [facility] [address]   [phone]            Aug 24, 2018  9:40 AM CDT   CT CHEST ABDOMEN PELVIS W/O & W CONTRAST with UCCT2   [facility] [place] [facility] ([facility])    [address]  1st Floor  [facility] [address]   [phone]           Please bring any scans or X-rays taken at other hospitals, if similar tests were done. Also bring a list of your medicines, including vitamins, minerals and over-the-counter drugs. It is safest to leave personal items at home.  Be sure to tell your doctor:   If you have any allergies.   If there s any chance you are pregnant.   If you are breastfeeding.  How to prepare:   Do not eat or drink for 2 hours before your exam. If you need to take medicine, you may take it with small sips of water. (We may ask you to take liquid medicine as well.)   Please wear loose  clothing, such as a sweat suit or jogging clothes. Avoid snaps, zippers and other metal. We may ask you to undress and put on a hospital gown.  Please arrive 30 minutes early for your CT. Once in the department you might be asked to drink water 15-20 minutes prior to your exam.  If indicated you may be asked to drink an oral contrast in advance of your CT.  If this is the case, the imaging team will let you know or be in contact with you prior to your appointment  Patients over 70 or patients with diabetes or kidney problems:   If you haven t had a blood test (creatinine test) within the last 30 days, the Cardiologist/Radiologist may require you to get this test prior to your exam.  If you have diabetes:   Continue to take your metformin medication on the day of your exam  If you have any questions, please call the Imaging Department where you will have your exam.            Aug 27, 2018 10:15 AM CDT   [facility] Lab Draw with  [facility] LAB DRAW   [facility] Lab Draw ([facility])    [address] [place] [address]  [address]  [facility] [address] [phone]            Aug 27, 2018 10:45 AM CDT   (Arrive by 10:30 AM)   Return Visit with Vinny Yu MD   [facility] ([facility])    [address] [place] [address]  [address]  [facility] [address] [phone]            Aug 27, 2018 11:30 AM CDT   Infusion 180 with  ONCOLOGY INFUSION, UC 31 ATC   [facility] ([facility])    [address] [place] [address]  [address]  [facility] [address] [phone]              Who to contact     If you have questions or need follow up information about today's clinic visit or your schedule please contact [facility] directly at [phone].  Normal or non-critical lab and imaging results will be communicated to you by MyChart, letter or phone within 4 business days after the clinic has received  "the results. If you do not hear from us within 7 days, please contact the clinic through [facility] or phone. If you have a critical or abnormal lab result, we will notify you by phone as soon as possible.  Submit refill requests through [facility] or call your pharmacy and they will forward the refill request to us. Please allow 3 business days for your refill to be completed.          Additional Information About Your Visit        [facility] Information     [facility] gives you secure access to your electronic health record. If you see a primary care provider, you can also send messages to your care team and make appointments. If you have questions, please call your primary care clinic.  If you do not have a primary care provider, please call [phone] and they will assist you.        Care EveryWhere ID     This is your Care EveryWhere ID. This could be used by other organizations to access your [place] medical records  [id number]        Your Vitals Were     Pulse Temperature Respirations Height Pulse Oximetry Breastfeeding?    86 95.7  F (35.4  C) (Oral) 16 1.575 m (5' 2\") 98% No    BMI (Body Mass Index)                   18.51 kg/m2            Blood Pressure from Last 3 Encounters:   08/08/18 118/68   08/06/18 121/74   07/26/18 120/62    Weight from Last 3 Encounters:   08/08/18 45.9 kg (101 lb 3 oz)   08/06/18 44.3 kg (97 lb 9.6 oz)   07/24/18 43.6 kg (96 lb 1.6 oz)              Today, you had the following     No orders found for display       Primary Care Provider Office Phone # Fax #    Neri Gipson [phone] [address] [address]        Equal Access to Services     NATASHA NICHOLS : Hadii mary Martinez, merle gar, qacheyta kaalmalisa francois. So [facility] [phone].    ATENCIÓN: Si habla español, tiene a nava disposición servicios gratuitos de asistencia lingüística. Llame al [phone].    We comply with " applicable federal civil rights laws and [place] laws. We do not discriminate on the basis of race, color, national origin, age, disability, sex, sexual orientation, or gender identity.            Thank you!     Thank you for choosing [facility] CANCER CLINIC  for your care. Our goal is always to provide you with excellent care. Hearing back from our patients is one way we can continue to improve our services. Please take a few minutes to complete the written survey that you may receive in the mail after your visit with us. Thank you!             Your Updated Medication List - Protect others around you: Learn how to safely use, store and throw away your medicines at www.disposemymeds.org.          This list is accurate as of 8/8/18  9:58 AM.  Always use your most recent med list.                   Brand Name Dispense Instructions for use Diagnosis    albuterol 108 (90 Base) MCG/ACT Inhaler    PROAIR HFA/PROVENTIL HFA/VENTOLIN HFA    1 Inhaler    Inhale 2 puffs into the lungs 4 times daily    Mild asthma, unspecified whether complicated, unspecified whether persistent       amylase-lipase-protease 61912 units Cpep    CREON    180 capsule    Take 2 capsules (72,000 Units) by mouth 3 times daily (with meals)    Malignant neoplasm of head of pancreas (H)       BREO ELLIPTA 100-25 MCG/INH oral inhaler   Generic drug:  fluticasone-vilanterol           CALCIUM PO      Take by mouth every morning Form/strength unknown. Powder formulation. Add to water and fruits/vegetables daily to promote bone health.        CARTIA  MG 24 hr capsule   Generic drug:  diltiazem     10 capsule    TK 1 C PO QD    Benign essential hypertension       cholecalciferol 1000 UNIT tablet    vitamin D3     Take 1 tablet by mouth 2 times daily        cyanocolbalamin 500 MCG tablet    vitamin  B-12     Take 500 mcg by mouth every morning        folic acid 400 MCG tablet    FOLVITE     Take 1 tablet by mouth every morning        hydrOXYzine  25 MG tablet    ATARAX    60 tablet    Take 1-2 tablets (25-50 mg) by mouth every 6 hours as needed for itching (adjuvant pain)    Obstructive jaundice       levothyroxine 125 MCG tablet    SYNTHROID/LEVOTHROID    90 tablet    Take 1 tablet (125 mcg) by mouth daily    Hypothyroidism, unspecified type       loperamide 2 MG capsule    IMODIUM    60 capsule    2 caps at 1st sign of diarrhea & 1 cap every 2hrs until 12hrs diarrhea free. During night, 2 caps at bedtime & 2 caps every 4hrs until AM    Malignant neoplasm of head of pancreas (H)       LORazepam 0.5 MG tablet    ATIVAN    30 tablet    Take 1 tablet (0.5 mg) by mouth every 4 hours as needed (Anxiety, Nausea/Vomiting or Sleep)    Malignant neoplasm of head of pancreas (H)       magic mouthwash suspension (diphenhydrAMINE, lidocaine, aluminum-magnesium & simethicone) Susp compounding kit     237 mL    Swish and swallow 5-10 mLs in mouth every 6 hours as needed for mouth sores    Malignant neoplasm of head of pancreas (H)       nystatin 975631 UNIT/ML suspension    MYCOSTATIN    473 mL    Take 5 mLs (500,000 Units) by mouth 4 times daily    Malignant neoplasm of head of pancreas (H), History of pulmonary embolism       omeprazole 40 MG capsule    priLOSEC          * ondansetron 4 MG tablet    ZOFRAN          * ondansetron 8 MG tablet    ZOFRAN    30 tablet    Take 1 tablet (8 mg) by mouth every 8 hours as needed (nausea/vomiting)    Malignant neoplasm of head of pancreas (H)       order for DME     2 Units    Equipment being ordered: Compression Stockings. Please dispense 2 pairs of knee high 20-30 mmHg    Lymphedema       pantoprazole 20 MG EC tablet    PROTONIX    30 tablet    Take 1 tablet (20 mg) by mouth daily    Other acute gastritis without hemorrhage       * prochlorperazine 5 MG tablet    COMPAZINE    30 tablet    Take 1 tablet (5 mg) by mouth every 6 hours as needed for nausea or vomiting    Malignant neoplasm of head of pancreas (H)       *  prochlorperazine 10 MG tablet    COMPAZINE    30 tablet    Take 1 tablet (10 mg) by mouth every 6 hours as needed for nausea or vomiting    Malignant neoplasm of head of pancreas (H)       rivaroxaban ANTICOAGULANT 20 MG Tabs tablet    XARELTO    30 tablet    Take 1 tablet (20 mg) by mouth daily (with dinner)    Malignant neoplasm of head of pancreas (H), History of pulmonary embolism       * timolol maleate 0.5 % opthalmic solution    ISTALOL    1 Bottle    Place 1 drop into both eyes every morning Before placing contact lenses    Cataract, unspecified cataract type, unspecified laterality       * timolol 0.5 % ophthalmic solution    TIMOPTIC     INT 1 GTT OU IN THE MORNING        traMADol 50 MG tablet    ULTRAM    60 tablet    Take 1-2 tablets ( mg) by mouth every 4 hours as needed for breakthrough pain Maximum of 8 tablets daily.    Malignant neoplasm of head of pancreas (H)       TYLENOL PO      Take 325 mg by mouth every 4 hours as needed for mild pain or fever        * Notice:  This list has 6 medication(s) that are the same as other medications prescribed for you. Read the directions carefully, and ask your doctor or other care provider to review them with you.

## 2018-08-08 NOTE — PROGRESS NOTES
Palliative Staff/Outpatient Clinic    (This note was transcribed using voice recognition software. While I review and edit the transcription, I may miss errors, and the software sometimes does unexpected capitalizations and formatting that I miss. Please let me know of any serious mistranscriptions and I will addend this note.)    Patient ID:  She has metastatic pancreatic cancer to the liver, lung, and retroperitoneal lymph nodes on gemcitabine and nab paclitaxel.  Important past medical history includes Nissen fundoplication in 2006.  She was diagnosed with her cancer in November of last year. First f/u scan showed some response to her chemotherapy. 3/18 showed progression & started 5FU+liposomal irinotecan.      +HCD which names  then daughter as decision makers. Has a DNR/DNI POLST     History:  She is with her  today who supplements history.  Globally she is doing very well.  She reports her appetite is stable and she is forcing herself to eat and is gaining a little bit of weight.  Minimal nausea and vomiting.  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  She is tolerating her chemotherapy very well.  She is sleeping well with melatonin and a single Ativan most nights.  She says her mood and spirits are excellent and she finds herself in a place of acceptance and tries not to worry about the cancer too much.  She is continuing to do end-of-life planning and preparation with her assets.  She is going to participate in the body donation program here at the [place].    She continues to have mucositis with in particular a couple large ulcers in her left tongue.  She is now taking Magic mouthwash which she says is quite helpful.    She had an episode of elevated LFTs.  When I ask about that she does say she took quite a bit of Tylenol.  She tells me she drinks alcohol nightly and is somewhat vague with how much she actually does drink.    PE: /68  Pulse 86  Temp 95.7  F (35.4  C) (Oral)   "Resp 16  Ht 1.575 m (5' 2\")  Wt 45.9 kg (101 lb 3 oz)  SpO2 98%  Breastfeeding? No  BMI 18.51 kg/m2   Wt Readings from Last 3 Encounters:   08/08/18 45.9 kg (101 lb 3 oz)   08/06/18 44.3 kg (97 lb 9.6 oz)   07/24/18 43.6 kg (96 lb 1.6 oz)     Alert thin chronically ill woman in no acute distress.  Mouth is moist with a couple large ulcers on the left tongue.  Affect is full pleasant interactive with a clear sensorium fluent speech normal memory and thought processes.    Impression & Recommendations:  78-year-old woman with metastatic pancreatic cancer who is doing really well on palliative chemotherapy with stable and shrinking disease and global improvement in her quality of life.  Minimal ongoing distressing symptoms other than things like her mucositis which are being well managed.  We celebrated all her gains.  I told her she did not need to see me just to tell me she is doing well and I am happy to see her at any time I could be helpful in the future.    I encouraged her to continue to try gentle aerobic exercise such as walking around her block once or twice a day.    It is apparent that she does consume alcohol, and I do worry that at times consumes too much and she more or less admitted that to me today along with mixing it with the Tylenol.  We shall be aware of that going forward as we monitor her for safety.    She has a DNR/DNI order and is well informed and prepared for her terminal illness.    Chart data reviewed today:    Family History   Problem Relation Age of Onset     C.A.D. 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 " 12/18/2017     Arthritis      Colon polyp 2009,2015    no polyps - f/u in 5 yrs      Esophageal reflux      Glaucoma      Hypertension      Malignant neoplasm of head of pancreas (H) 11/6/2017     Osteoporosis      Postsurgical hypothyroidism      Scoliosis (and kyphoscoliosis), idiopathic      Thyroid cancer (H)     papillary carcinoma age 33     Uncomplicated asthma      Past Surgical History:   Procedure Laterality Date     ARTHRODESIS FOOT Right 11/9/2016    Procedure: ARTHRODESIS FOOT;  Surgeon: Janes Mcelroy MD;  [facility]     C STOMACH SURGERY PROCEDURE UNLISTED       COLONOSCOPY   2009, 3/2015    no polyps in 2015 told to return 10 yrs.      ENDOSCOPIC RETROGRADE CHOLANGIOPANCREATOGRAM N/A 11/2/2017    Procedure: COMBINED ENDOSCOPIC RETROGRADE CHOLANGIOPANCREATOGRAPHY, PLACE TUBE/STENT;  Endoscopic Retrograde Cholangiopancreatogram with billary sphincterotomy and billary stent placement;  Surgeon: Rito Mcneil MD;  [facility]: [facility]     ENDOSCOPIC RETROGRADE CHOLANGIOPANCREATOGRAM N/A 1/10/2018    Procedure: ENDOSCOPIC RETROGRADE CHOLANGIOPANCREATOGRAM;  Endoscopic Retrograde Cholangiopancreatogram ;  Surgeon: Felix Izaguirre MD;  [facility]: [facility]     ENDOSCOPIC RETROGRADE CHOLANGIOPANCREATOGRAPHY, EXCHANGE TUBE/STENT N/A 11/22/2017    Procedure: ENDOSCOPIC RETROGRADE CHOLANGIOPANCREATOGRAPHY, EXCHANGE TUBE/STENT;  Endoscopic Retrograde Cholangiopancreatogram with Biliary Stent Exchange and pancreatic stent placement;  Surgeon: Felix Izaguirre MD;  [facility]     ESOPHAGOSCOPY, GASTROSCOPY, DUODENOSCOPY (EGD), COMBINED  2/17/2012    Procedure:COMBINED ESOPHAGOSCOPY, GASTROSCOPY, DUODENOSCOPY (EGD); COMBINED ESOPHAGOSCOPY, GASTROSCOPY, DUODENOSCOPY POSSIBLE DILATION; Surgeon:NICHOLE MCCLAIN; [facility]     ESOPHAGOSCOPY, GASTROSCOPY, DUODENOSCOPY (EGD), COMBINED N/A 11/2/2017    Procedure: COMBINED ENDOSCOPIC ULTRASOUND, ESOPHAGOSCOPY, GASTROSCOPY,  DUODENOSCOPY (EGD), FINE NEEDLE ASPIRATE/BIOPSY;  Upper Endoscopic Ultrasound with fine needle biopsy, upper endoscopy with biopsies, Endoscopic Retrograde Cholangiopancreatogram with billary sphincterotomy and billary stent placement;  Surgeon: Hadley Bailey MD;  [facility]     FOOT SURGERY  2008    hammertoe left     INSERT PORT VASCULAR ACCESS Right 1/24/2018    Procedure: INSERT PORT VASCULAR ACCESS;  Chest Port Placement;  Surgeon: Ventura Villarreal PA-C;  [facility]     LAPAROSCOPIC CHOLECYSTECTOMY N/A 1/5/2015    Procedure: LAPAROSCOPIC CHOLECYSTECTOMY;  Surgeon: Cruz Pearson MD;  [facility]     NISSEN FUNDOPLICATION       ORTHOPEDIC SURGERY  2009    left hammertoe      REPAIR HAMMER TOE Right 11/9/2016    Procedure: REPAIR HAMMER TOE;  Surgeon: Janes Mcelroy MD;  [facility]     SALPINGO OOPHORECTOMY,R/L/SERENA  1974    left, for tubal pregnancy     THYROIDECTOMY      for thyroid cancer     Allergies   Allergen Reactions     Nkda [No Known Drug Allergies]    Medications: I have reviewed the patient's medication profile. MNPMP: reviewed    Data reviewed:  I reviewed recent labs and imaging, my comments:  CT June  IMPRESSION: In this patient with a history of pancreatic cancer:  1. Slightly decreased ill-defined uncinate process mass consistent  with known pancreatic cancer with stable vascular relationships, as  described above. Stable adjacent soft tissue density in the  retroperitoneum as well as prominent lymph nodes.  2. Significant improvement of hepatic metastases, as described above.  No new or enlarging liver lesions.  3. Interval resolution of the previously described small volume of  ascites and small left pleural effusion.    LFTs now normal    Thank you for involving us in the patient's care.   José Antonio Ann MD / Palliative Medicine / Pager [phone] / [facility] Inpatient Team Consult Pager [phone] (answered 8am-430pm M-F) - ok to text page via  andres / After-Hours Answering Service [phone] / Palliative Clinic in the [facility] at the [facility] - [phone] (scheduling); [phone] (triage).  .

## 2018-08-15 NOTE — TELEPHONE ENCOUNTER
Pt called back and stated she only uses tramadol for neuropathy of feet, maybe 1 tab at bedtime as needed. State this is most bothersome to her now, no other acute pain to report. Has been out of medication for a few days and asks if someone can fill since Dr. Yu/Quyen out of the office.    Narcotic Refill Request    Medication(s) Requested: tramadol 50 mg     Last refilled date and by whom: 6/25/18 for #60 by Quyen Mazariegos APRN CNP     reviewed: 8/15/2018    Next appointment: 8/27 with Dr. Yu    What pain is the medication treating: neuropathy    How is the medication being taken: 1 tab at bedtime as needed    Is pain being adequately controlled on the current regimen: yes, state pain level decreases significantly with one tab    Experiencing any side effects from medication: no    Paged Benoit GALVEZ for approval: approved and called in to [facility].

## 2018-08-20 NOTE — PROGRESS NOTES
Infusion Nursing Note:  Iris Lewis presents today for cycle 11, day 1 liposomal irinotecan, fluorouracil pump.    Patient seen by provider today: No   present during visit today: Not Applicable.    Note: Patient states that she is feeling well today.  She took a road trip to [place] by herself over the weekend.  She reports a little swelling in her bilateral ankles/feet following the long car ride yesterday, right a little more than left.  She did wear compression stockings in the car and is on blood thinners. No redness, warmth, or pain in either leg.  She reports that her neuropathy symptoms are stable, possibly slightly improved in her hands and feet.  She continues with some mouth sores, but she thinks they are improving. She is using magic mouthwash prn which is helping her to eat more.  She does have a blister on her right big toe from an ill fitting pair of shoes that she wore a couple of weeks ago.  She is keeping it covered and applying bacitracin.  She will continue to monitor and call if it is getting more red, swollen, painful, or having any increased drainage.     Intravenous Access:  Implanted Port.    Treatment Conditions:  Lab Results   Component Value Date    HGB 9.9 08/20/2018     Lab Results   Component Value Date    WBC 7.2 08/20/2018      Lab Results   Component Value Date    ANEU 4.8 08/20/2018     Lab Results   Component Value Date     08/20/2018      Lab Results   Component Value Date     08/20/2018                   Lab Results   Component Value Date    POTASSIUM 3.6 08/20/2018           Lab Results   Component Value Date    MAG 1.9 11/15/2017            Lab Results   Component Value Date    CR 0.63 08/20/2018                   Lab Results   Component Value Date    EMMY 8.3 08/20/2018                Lab Results   Component Value Date    BILITOTAL 0.5 08/20/2018           Lab Results   Component Value Date    ALBUMIN 2.8 08/20/2018                    Lab Results  "  Component Value Date    ALT 52 08/20/2018           Lab Results   Component Value Date    AST 40 08/20/2018       Results reviewed, labs MET treatment parameters, ok to proceed with treatment.  LFT's elevated but consistent with recent results      Post Infusion Assessment:  Patient tolerated infusion without incident.  Blood return noted pre and post infusion.  Site patent and intact, free from redness, edema or discomfort.  No evidence of extravasations.    Prior to discharge: Port is secured in place with tegaderm and flushed with 10cc NS with positive blood return noted.  Continuous home infusion CADD pump connected.    All connectors secured in place and clamps taped open and double checked by Luzma Ambrosio RN  Pump started, \"running\" noted on display (CADD): YES.  Patient instructed to call our clinic or [facility] with any questions or concerns at home.  Patient verbalized understanding.    Patient set up for pump disconnect at our clinic on Wednesday 8/22 at 1200.        Discharge Plan:   Patient declined prescription refills.  Discharge instructions reviewed with: Patient.  Patient and/or family verbalized understanding of discharge instructions and all questions answered.  Copy of AVS reviewed with patient and/or family.  Patient will return 8/27 for next appointment with Dr Yu, 9/5 for next infusion appointment.   Patient discharged in stable condition accompanied by: self.  Departure Mode: Ambulatory.  Face to Face time: 2 minutes.    Era Randall RN                        "

## 2018-08-20 NOTE — PATIENT INSTRUCTIONS
Contact Numbers    [facility] [place]: [phone]  [facility] [place] and after hours / weekends / holidays:  [phone]      Please call the triage or after hours line if you experience a temperature greater than or equal to 100.5, shaking chills, have uncontrolled nausea, vomiting and/or diarrhea, dizziness, shortness of breath, chest pain, bleeding, unexplained bruising, or if you have any other new/concerning symptoms, questions or concerns.      If you are having any concerning symptoms or wish to speak to a provider before your next infusion visit, please call your care coordinator or triage to notify them so we can adequately serve you.     If you need a refill on a narcotic prescription or other medication, please call before your infusion appointment.           August 2018 Sunday Monday Tuesday Wednesday Thursday Friday Saturday                  1     2     3     4       5     6     [facility]    8:30 AM   (15 min.)   [facility] LAB DRAW   [facility] Lab Draw     [facility] ONC INFUSION 180    9:00 AM   (180 min.)    ONCOLOGY INFUSION   [facility] 7     8     [facility] RETURN    9:15 AM   (30 min.)   José Antonio Ann MD   [facility]     [facility] ONC INFUSION 60   10:30 AM   (60 min.)    ONCOLOGY INFUSION   [facility] 9     10     11       12     13     14     15     16     17     18       19     20     [facility] D[facility]   10:00 AM   (15 min.)   [facility]   [facility] Lab Draw     [facility] ONC INFUSION 180   10:30 AM   (180 min.)    ONCOLOGY INFUSION   [facility] 21     22    1200 pump dc at [facility] 23     24     CT CHEST ABDOMEN PELVIS WWO    9:40 AM   (20 min.)   [facility]   [facility] CT 25       26     27     [facility] DRAW   10:15 AM   (15 min.)   [facility] DRAW   [facility] Lab Draw     [facility] RETURN   10:30 AM   (30 min.)   Vinny Yu MD   [facility]  28     29 30 31 September 2018 Sunday Monday Tuesday Wednesday Thursday Friday Saturday                                 1       2     3     4     5     [facility] DRAW   12:45 PM   (15 min.)    [facility] DRAW   [facility] Lab Draw     [facility] ONC INFUSION 180    1:30 PM   (180 min.)    ONCOLOGY INFUSION   [facility] 6     7     8       9     10     11     12     13     14     15       16     17     18     19     20     21     22       23     24     25     26     27     28     29       30                                               Recent Results (from the past 24 hour(s))   Comprehensive metabolic panel    Collection Time: 08/20/18 10:45 AM   Result Value Ref Range    Sodium 141 133 - 144 mmol/L    Potassium 3.6 3.4 - 5.3 mmol/L    Chloride 108 94 - 109 mmol/L    Carbon Dioxide 24 20 - 32 mmol/L    Anion Gap 9 3 - 14 mmol/L    Glucose 116 (H) 70 - 99 mg/dL    Urea Nitrogen 11 7 - 30 mg/dL    Creatinine 0.63 0.52 - 1.04 mg/dL    GFR Estimate >90 >60 mL/min/1.7m2    GFR Estimate If Black >90 >60 mL/min/1.7m2    Calcium 8.3 (L) 8.5 - 10.1 mg/dL    Bilirubin Total 0.5 0.2 - 1.3 mg/dL    Albumin 2.8 (L) 3.4 - 5.0 g/dL    Protein Total 6.6 (L) 6.8 - 8.8 g/dL    Alkaline Phosphatase 431 (H) 40 - 150 U/L    ALT 52 (H) 0 - 50 U/L    AST 40 0 - 45 U/L   CBC with platelets differential    Collection Time: 08/20/18 10:45 AM   Result Value Ref Range    WBC 7.2 4.0 - 11.0 10e9/L    RBC Count 2.93 (L) 3.8 - 5.2 10e12/L    Hemoglobin 9.9 (L) 11.7 - 15.7 g/dL    Hematocrit 31.1 (L) 35.0 - 47.0 %     (H) 78 - 100 fl    MCH 33.8 (H) 26.5 - 33.0 pg    MCHC 31.8 31.5 - 36.5 g/dL    RDW 16.0 (H) 10.0 - 15.0 %    Platelet Count 334 150 - 450 10e9/L    Diff Method Automated Method     % Neutrophils 67.0 %    % Lymphocytes 15.5 %    % Monocytes 13.0 %    % Eosinophils 3.0 %    % Basophils 0.8 %    % Immature Granulocytes 0.7 %    Nucleated RBCs 0 0 /100    Absolute  Neutrophil 4.8 1.6 - 8.3 10e9/L    Absolute Lymphocytes 1.1 0.8 - 5.3 10e9/L    Absolute Monocytes 0.9 0.0 - 1.3 10e9/L    Absolute Eosinophils 0.2 0.0 - 0.7 10e9/L    Absolute Basophils 0.1 0.0 - 0.2 10e9/L    Abs Immature Granulocytes 0.1 0 - 0.4 10e9/L    Absolute Nucleated RBC 0.0

## 2018-08-20 NOTE — NURSING NOTE
"Chief Complaint   Patient presents with     Port Draw     Labs drawn from port by RN. Line flushed with saline and heparin. Vs taken and pt checked in for appt.     Port accessed with 20g 3/4\" flat needle by RN, labs collected, line flushed with saline and heparin.  Vitals taken. Pt checked in for appointment(s).    Ana Daigle RN  "

## 2018-08-20 NOTE — MR AVS SNAPSHOT
After Visit Summary   8/20/2018    Iris Lewis    MRN: [id number]           Patient Information     Date Of Birth          1940        Visit Information        Provider Department      8/20/2018 10:30 AM  22 ATC;  ONCOLOGY INFUSION [facility]        Today's Diagnoses     Malignant neoplasm of head of pancreas (H)    -  1      Care Instructions    Contact Numbers    [facility] [place]: [phone]  [facility] Triage and after hours / weekends / holidays:  [phone]      Please call the triage or after hours line if you experience a temperature greater than or equal to 100.5, shaking chills, have uncontrolled nausea, vomiting and/or diarrhea, dizziness, shortness of breath, chest pain, bleeding, unexplained bruising, or if you have any other new/concerning symptoms, questions or concerns.      If you are having any concerning symptoms or wish to speak to a provider before your next infusion visit, please call your care coordinator or triage to notify them so we can adequately serve you.     If you need a refill on a narcotic prescription or other medication, please call before your infusion appointment.           August 2018 Sunday Monday Tuesday Wednesday Thursday Friday Saturday                  1     2     3     4       5     6     [facility]    8:30 AM   (15 min.)    [facility]   [facility] Draw     [facility] ONC INFUSION 180    9:00 AM   (180 min.)    ONCOLOGY INFUSION   [facility] 7     8     [facility] RETURN    9:15 AM   (30 min.)   José Antonio Ann MD   [facility] ONC INFUSION 60   10:30 AM   (60 min.)    ONCOLOGY INFUSION   [facility] 9     10     11       12     13     14     15     16     17     18       19     20     [facility] DRAW   10:00 AM   (15 min.)    [facility] D[facility]   [facility] Draw     [facility] ONC INFUSION 180   10:30 AM   (180 min.)    ONCOLOGY  INFUSION   [facility] 21     22    [address] at [facility] 23     24     CT CHEST ABDOMEN PELVIS WWO    9:40 AM   (20 min.)   UCCT2   [facility] CT 25       26     27     [facility]   10:15 AM   (15 min.)    [facility]   [facility]     [facility] RETURN   10:30 AM   (30 min.)   Vinny Yu MD   [facility] 28     29 30 31 September 2018 Sunday Monday Tuesday Wednesday Thursday Friday Saturday                                 1       2     3     4     5     [facility] D[facility]   12:45 PM   (15 min.)    [facility] DRAW   [facility] Lab Draw     P ONC INFUSION 180    1:30 PM   (180 min.)   UC ONCOLOGY INFUSION   [facility] 6     7     8       9     10     11     12     13     14     15       16     17     18     19     20     21     22       23     24     25     26     27     28     29       30                                               Recent Results (from the past 24 hour(s))   Comprehensive metabolic panel    Collection Time: 08/20/18 10:45 AM   Result Value Ref Range    Sodium 141 133 - 144 mmol/L    Potassium 3.6 3.4 - 5.3 mmol/L    Chloride 108 94 - 109 mmol/L    Carbon Dioxide 24 20 - 32 mmol/L    Anion Gap 9 3 - 14 mmol/L    Glucose 116 (H) 70 - 99 mg/dL    Urea Nitrogen 11 7 - 30 mg/dL    Creatinine 0.63 0.52 - 1.04 mg/dL    GFR Estimate >90 >60 mL/min/1.7m2    GFR Estimate If Black >90 >60 mL/min/1.7m2    Calcium 8.3 (L) 8.5 - 10.1 mg/dL    Bilirubin Total 0.5 0.2 - 1.3 mg/dL    Albumin 2.8 (L) 3.4 - 5.0 g/dL    Protein Total 6.6 (L) 6.8 - 8.8 g/dL    Alkaline Phosphatase 431 (H) 40 - 150 U/L    ALT 52 (H) 0 - 50 U/L    AST 40 0 - 45 U/L   CBC with platelets differential    Collection Time: 08/20/18 10:45 AM   Result Value Ref Range    WBC 7.2 4.0 - 11.0 10e9/L    RBC Count 2.93 (L) 3.8 - 5.2 10e12/L    Hemoglobin 9.9 (L) 11.7 - 15.7 g/dL    Hematocrit 31.1  (L) 35.0 - 47.0 %     (H) 78 - 100 fl    MCH 33.8 (H) 26.5 - 33.0 pg    MCHC 31.8 31.5 - 36.5 g/dL    RDW 16.0 (H) 10.0 - 15.0 %    Platelet Count 334 150 - 450 10e9/L    Diff Method Automated Method     % Neutrophils 67.0 %    % Lymphocytes 15.5 %    % Monocytes 13.0 %    % Eosinophils 3.0 %    % Basophils 0.8 %    % Immature Granulocytes 0.7 %    Nucleated RBCs 0 0 /100    Absolute Neutrophil 4.8 1.6 - 8.3 10e9/L    Absolute Lymphocytes 1.1 0.8 - 5.3 10e9/L    Absolute Monocytes 0.9 0.0 - 1.3 10e9/L    Absolute Eosinophils 0.2 0.0 - 0.7 10e9/L    Absolute Basophils 0.1 0.0 - 0.2 10e9/L    Abs Immature Granulocytes 0.1 0 - 0.4 10e9/L    Absolute Nucleated RBC 0.0                            Follow-ups after your visit        Your next 10 appointments already scheduled     Aug 24, 2018  9:40 AM CDT   CT CHEST ABDOMEN PELVIS W/O & W CONTRAST with UCCT2   [facility] [place] ([facility])    [address] [address] [phone]           Please bring any scans or X-rays taken at other hospitals, if similar tests were done. Also bring a list of your medicines, including vitamins, minerals and over-the-counter drugs. It is safest to leave personal items at home.  Be sure to tell your doctor:   If you have any allergies.   If there s any chance you are pregnant.   If you are breastfeeding.  How to prepare:   Do not eat or drink for 2 hours before your exam. If you need to take medicine, you may take it with small sips of water. (We may ask you to take liquid medicine as well.)   Please wear loose clothing, such as a sweat suit or jogging clothes. Avoid snaps, zippers and other metal. We may ask you to undress and put on a hospital gown.  Please arrive 30 minutes early for your CT. Once in the department you might be asked to drink water 15-20 minutes prior to your exam.  If indicated you may be asked to drink an oral contrast in advance of your CT.   If this is the case, the imaging team will let you know or be in contact with you prior to your appointment  Patients over 70 or patients with diabetes or kidney problems:   If you haven t had a blood test (creatinine test) within the last 30 days, the Cardiologist/Radiologist may require you to get this test prior to your exam.  If you have diabetes:   Continue to take your metformin medication on the day of your exam  If you have any questions, please call the Imaging Department where you will have your exam.            Aug 27, 2018 10:15 AM CDT   [facility] with [facility]   [facility])    [address]  [address]  [facility] [address]   [phone]            Aug 27, 2018 10:45 AM CDT   (Arrive by 10:30 AM)   Return Visit with Vinny Yu MD   [facility] ([facility])    [address] [place] [address]  [address]  [facility] [address] [phone]            Sep 05, 2018 12:45 PM CDT   [facility] Draw with [facility]   [facility])    [address]  [address]  [facility] [address]   [phone]            Sep 05, 2018  1:30 PM CDT   Infusion 180 with UC ONCOLOGY INFUSION, UC 24 ATC   [facility] ([facility])    [address]  [address]  [facility] [address] [phone]              Who to contact     If you have questions or need follow up information about today's clinic visit or your schedule please contact [facility] directly at [phone].  Normal or non-critical lab and imaging results will be communicated to you by MyChart, letter or phone within 4 business days after the clinic has received the results. If you do not hear from us within 7 days, please contact the clinic through MyChart or phone. If you have a critical or  abnormal lab result, we will notify you by phone as soon as possible.  Submit refill requests through [employer] or call your pharmacy and they will forward the refill request to us. Please allow 3 business days for your refill to be completed.          Additional Information About Your Visit        [employer] Information     [employer] gives you secure access to your electronic health record. If you see a primary care provider, you can also send messages to your care team and make appointments. If you have questions, please call your primary care clinic.  If you do not have a primary care provider, please call [phone] and they will assist you.        Care EveryWhere ID     This is your Care EveryWhere ID. This could be used by other organizations to access your [place] medical records  [id number]        Your Vitals Were     Pulse Temperature Respirations Pulse Oximetry BMI (Body Mass Index)       74 97.6  F (36.4  C) (Oral) 16 98% 17.8 kg/m2        Blood Pressure from Last 3 Encounters:   08/20/18 106/63   08/08/18 118/68   08/06/18 121/74    Weight from Last 3 Encounters:   08/20/18 44.1 kg (97 lb 4.8 oz)   08/08/18 45.9 kg (101 lb 3 oz)   08/06/18 44.3 kg (97 lb 9.6 oz)              We Performed the Following     CBC with platelets differential     Comprehensive metabolic panel        Primary Care Provider Office Phone # Fax #    Neri Gipson [phone] [address] [address]        Equal Access to Services     NESSA NICHOLS : Hadii aad ku hadasho Soomaali, waaxda luqadaha, qaybta kaalmada adeegyada, waxay rodrick adams . So [facility] [phone].    ATENCIÓN: Si habla español, tiene a nava disposición servicios gratuitos de asistencia lingüística. Llame al [phone].    We comply with applicable federal civil rights laws and [place] laws. We do not discriminate on the basis of race, color, national origin, age, disability, sex, sexual  orientation, or gender identity.            Thank you!     Thank you for choosing [facility]  for your care. Our goal is always to provide you with excellent care. Hearing back from our patients is one way we can continue to improve our services. Please take a few minutes to complete the written survey that you may receive in the mail after your visit with us. Thank you!             Your Updated Medication List - Protect others around you: Learn how to safely use, store and throw away your medicines at www.disposemymeds.org.          This list is accurate as of 8/20/18  2:07 PM.  Always use your most recent med list.                   Brand Name Dispense Instructions for use Diagnosis    albuterol 108 (90 Base) MCG/ACT inhaler    PROAIR HFA/PROVENTIL HFA/VENTOLIN HFA    1 Inhaler    Inhale 2 puffs into the lungs 4 times daily    Mild asthma, unspecified whether complicated, unspecified whether persistent       amylase-lipase-protease 55568 units Cpep    CREON    180 capsule    Take 2 capsules (72,000 Units) by mouth 3 times daily (with meals)    Malignant neoplasm of head of pancreas (H)       BREO ELLIPTA 100-25 MCG/INH inhaler   Generic drug:  fluticasone-vilanterol           CALCIUM PO      Take by mouth every morning Form/strength unknown. Powder formulation. Add to water and fruits/vegetables daily to promote bone health.        CARTIA  MG 24 hr capsule   Generic drug:  diltiazem     10 capsule    TK 1 C PO QD    Benign essential hypertension       CHLORPHENIRAMINE MALEATE PO      Take 4 mg by mouth daily as needed        cholecalciferol 1000 UNIT tablet    vitamin D3     Take 1 tablet by mouth 2 times daily        cyanocolbalamin 500 MCG tablet    vitamin  B-12     Take 500 mcg by mouth every morning        folic acid 400 MCG tablet    FOLVITE     Take 1 tablet by mouth every morning        hydrOXYzine 25 MG tablet    ATARAX    60 tablet    Take 1-2 tablets (25-50 mg) by mouth every 6  hours as needed for itching (adjuvant pain)    Obstructive jaundice       levothyroxine 125 MCG tablet    SYNTHROID/LEVOTHROID    90 tablet    Take 1 tablet (125 mcg) by mouth daily    Hypothyroidism, unspecified type       loperamide 2 MG capsule    IMODIUM    60 capsule    2 caps at 1st sign of diarrhea & 1 cap every 2hrs until 12hrs diarrhea free. During night, 2 caps at bedtime & 2 caps every 4hrs until AM    Malignant neoplasm of head of pancreas (H)       LORazepam 0.5 MG tablet    ATIVAN    30 tablet    Take 1 tablet (0.5 mg) by mouth every 4 hours as needed (Anxiety, Nausea/Vomiting or Sleep)    Malignant neoplasm of head of pancreas (H)       magic mouthwash suspension (diphenhydrAMINE, lidocaine, aluminum-magnesium & simethicone) Susp compounding kit     237 mL    Swish and swallow 5-10 mLs in mouth every 6 hours as needed for mouth sores    Malignant neoplasm of head of pancreas (H)       nystatin 021365 UNIT/ML suspension    MYCOSTATIN    473 mL    Take 5 mLs (500,000 Units) by mouth 4 times daily    Malignant neoplasm of head of pancreas (H), History of pulmonary embolism       omeprazole 40 MG capsule    priLOSEC          * ondansetron 4 MG tablet    ZOFRAN          * ondansetron 8 MG tablet    ZOFRAN    30 tablet    Take 1 tablet (8 mg) by mouth every 8 hours as needed (nausea/vomiting)    Malignant neoplasm of head of pancreas (H)       order for DME     2 Units    Equipment being ordered: Compression Stockings. Please dispense 2 pairs of knee high 20-30 mmHg    Lymphedema       pantoprazole 20 MG EC tablet    PROTONIX    30 tablet    Take 1 tablet (20 mg) by mouth daily    Other acute gastritis without hemorrhage       PRO-BIOTIC BLEND PO      Take 1 Tablespoonful by mouth daily as needed        * prochlorperazine 5 MG tablet    COMPAZINE    30 tablet    Take 1 tablet (5 mg) by mouth every 6 hours as needed for nausea or vomiting    Malignant neoplasm of head of pancreas (H)       * prochlorperazine  10 MG tablet    COMPAZINE    30 tablet    Take 1 tablet (10 mg) by mouth every 6 hours as needed for nausea or vomiting    Malignant neoplasm of head of pancreas (H)       rivaroxaban ANTICOAGULANT 20 MG Tabs tablet    XARELTO    30 tablet    Take 1 tablet (20 mg) by mouth daily (with dinner)    Malignant neoplasm of head of pancreas (H), History of pulmonary embolism       * timolol maleate 0.5 % opthalmic solution    ISTALOL    1 Bottle    Place 1 drop into both eyes every morning Before placing contact lenses    Cataract, unspecified cataract type, unspecified laterality       * timolol 0.5 % ophthalmic solution    TIMOPTIC     INT 1 GTT OU IN THE MORNING        traMADol 50 MG tablet    ULTRAM    60 tablet    TAKE 1 TO 2 TABLETS BY MOUTH EVERY 4 HOURS AS NEEDED FOR BREAKTHROUGH PAIN. MAX 8 TABLETS PER DAY.    Malignant neoplasm of head of pancreas (H)       TYLENOL PO      Take 325 mg by mouth every 4 hours as needed for mild pain or fever        * Notice:  This list has 6 medication(s) that are the same as other medications prescribed for you. Read the directions carefully, and ask your doctor or other care provider to review them with you.

## 2018-08-22 NOTE — MR AVS SNAPSHOT
After Visit Summary   8/22/2018    Iris Lewis    MRN: [id number]           Patient Information     Date Of Birth          1940        Visit Information        Provider Department      8/22/2018 12:00 PM UC 28 ATC; UC ONCOLOGY INFUSION [facility]        Today's Diagnoses     Malignant neoplasm of head of pancreas (H)    -  1       Follow-ups after your visit        Your next 10 appointments already scheduled     Aug 24, 2018  9:40 AM CDT   CT CHEST ABDOMEN PELVIS W/O & W CONTRAST with UCCT2   [facility] ([facility])    [address] [address] [phone]           Please bring any scans or X-rays taken at other hospitals, if similar tests were done. Also bring a list of your medicines, including vitamins, minerals and over-the-counter drugs. It is safest to leave personal items at home.  Be sure to tell your doctor:   If you have any allergies.   If there s any chance you are pregnant.   If you are breastfeeding.  How to prepare:   Do not eat or drink for 2 hours before your exam. If you need to take medicine, you may take it with small sips of water. (We may ask you to take liquid medicine as well.)   Please wear loose clothing, such as a sweat suit or jogging clothes. Avoid snaps, zippers and other metal. We may ask you to undress and put on a hospital gown.  Please arrive 30 minutes early for your CT. Once in the department you might be asked to drink water 15-20 minutes prior to your exam.  If indicated you may be asked to drink an oral contrast in advance of your CT.  If this is the case, the imaging team will let you know or be in contact with you prior to your appointment  Patients over 70 or patients with diabetes or kidney problems:   If you haven t had a blood test (creatinine test) within the last 30 days, the Cardiologist/Radiologist may require you to get this test prior to your  exam.  If you have diabetes:   Continue to take your metformin medication on the day of your exam  If you have any questions, please call the Imaging Department where you will have your exam.            Aug 27, 2018 10:15 AM CDT   [facility] Lab Draw with  [facility] LAB DRAW   [facility]    [address]  [address]  [facility] [address]   [phone]            Aug 27, 2018 10:45 AM CDT   (Arrive by 10:30 AM)   Return Visit with Vinny Yu MD   [facility] ([facility])    [address] [place] [address]  [address]  [facility] [address]   [phone]            Sep 05, 2018 12:45 PM CDT   [facility] Lab Draw with  M[facility] LAB DRAW   [facility]    [address] [place] [address]  [address]  [facility] [address]   [phone]            Sep 05, 2018  1:30 PM CDT   Infusion 180 with  ONCOLOGY INFUSION, UC 24 ATC   [facility] ([facility])    [address] [place] [address]  [address]  [facility] [address]   [phone]              Who to contact     If you have questions or need follow up information about today's clinic visit or your schedule please contact [facility] directly at [phone].  Normal or non-critical lab and imaging results will be communicated to you by MyChart, letter or phone within 4 business days after the clinic has received the results. If you do not hear from us within 7 days, please contact the clinic through MyChart or phone. If you have a critical or abnormal lab result, we will notify you by phone as soon as possible.  Submit refill requests through [place] or call your pharmacy and they will forward the refill request to us. Please allow 3 business days for your refill to be completed.          Additional Information About Your Visit        MyChart Information      [employer] gives you secure access to your electronic health record. If you see a primary care provider, you can also send messages to your care team and make appointments. If you have questions, please call your primary care clinic.  If you do not have a primary care provider, please call [phone] and they will assist you.        Care EveryWhere ID     This is your Care EveryWhere ID. This could be used by other organizations to access your [place] medical records  [id number]        Your Vitals Were     Pulse Temperature Respirations Pulse Oximetry          92 98  F (36.7  C) 16 98%         Blood Pressure from Last 3 Encounters:   08/22/18 129/75   08/20/18 106/63   08/08/18 118/68    Weight from Last 3 Encounters:   08/20/18 44.1 kg (97 lb 4.8 oz)   08/08/18 45.9 kg (101 lb 3 oz)   08/06/18 44.3 kg (97 lb 9.6 oz)              Today, you had the following     No orders found for display       Primary Care Provider Office Phone # Fax #    Neri Gipson [phone] [address] [address]        Equal Access to Services     NATASHA NICHOLS : Hadii aad ku hadasho Sobrunildaali, waaxda luqadaha, qaybta kaalmada adeegyada, lisa michael. So [facility] [phone].    ATENCIÓN: Si habla español, tiene a nava disposición servicios gratuitos de asistencia lingüística. [facility] [phone].    We comply with applicable federal civil rights laws and [place] laws. We do not discriminate on the basis of race, color, national origin, age, disability, sex, sexual orientation, or gender identity.            Thank you!     Thank you for choosing [facility]  for your care. Our goal is always to provide you with excellent care. Hearing back from our patients is one way we can continue to improve our services. Please take a few minutes to complete the written survey that you may receive in the mail after your visit with us. Thank you!              Your Updated Medication List - Protect others around you: Learn how to safely use, store and throw away your medicines at www.disposemymeds.org.          This list is accurate as of 8/22/18 12:29 PM.  Always use your most recent med list.                   Brand Name Dispense Instructions for use Diagnosis    albuterol 108 (90 Base) MCG/ACT inhaler    PROAIR HFA/PROVENTIL HFA/VENTOLIN HFA    1 Inhaler    Inhale 2 puffs into the lungs 4 times daily    Mild asthma, unspecified whether complicated, unspecified whether persistent       amylase-lipase-protease 11584 units Cpep    CREON    180 capsule    Take 2 capsules (72,000 Units) by mouth 3 times daily (with meals)    Malignant neoplasm of head of pancreas (H)       BREO ELLIPTA 100-25 MCG/INH inhaler   Generic drug:  fluticasone-vilanterol           CALCIUM PO      Take by mouth every morning Form/strength unknown. Powder formulation. Add to water and fruits/vegetables daily to promote bone health.        CARTIA  MG 24 hr capsule   Generic drug:  diltiazem     10 capsule    TK 1 C PO QD    Benign essential hypertension       CHLORPHENIRAMINE MALEATE PO      Take 4 mg by mouth daily as needed        cholecalciferol 1000 UNIT tablet    vitamin D3     Take 1 tablet by mouth 2 times daily        cyanocolbalamin 500 MCG tablet    vitamin  B-12     Take 500 mcg by mouth every morning        folic acid 400 MCG tablet    FOLVITE     Take 1 tablet by mouth every morning        hydrOXYzine 25 MG tablet    ATARAX    60 tablet    Take 1-2 tablets (25-50 mg) by mouth every 6 hours as needed for itching (adjuvant pain)    Obstructive jaundice       levothyroxine 125 MCG tablet    SYNTHROID/LEVOTHROID    90 tablet    Take 1 tablet (125 mcg) by mouth daily    Hypothyroidism, unspecified type       loperamide 2 MG capsule    IMODIUM    60 capsule    2 caps at 1st sign of diarrhea & 1 cap every 2hrs until 12hrs diarrhea free. During night, 2 caps at bedtime & 2 caps every  4hrs until AM    Malignant neoplasm of head of pancreas (H)       LORazepam 0.5 MG tablet    ATIVAN    30 tablet    Take 1 tablet (0.5 mg) by mouth every 4 hours as needed (Anxiety, Nausea/Vomiting or Sleep)    Malignant neoplasm of head of pancreas (H)       magic mouthwash suspension (diphenhydrAMINE, lidocaine, aluminum-magnesium & simethicone) Susp compounding kit     237 mL    Swish and swallow 5-10 mLs in mouth every 6 hours as needed for mouth sores    Malignant neoplasm of head of pancreas (H)       nystatin 670939 UNIT/ML suspension    MYCOSTATIN    473 mL    Take 5 mLs (500,000 Units) by mouth 4 times daily    Malignant neoplasm of head of pancreas (H), History of pulmonary embolism       omeprazole 40 MG capsule    priLOSEC          * ondansetron 4 MG tablet    ZOFRAN          * ondansetron 8 MG tablet    ZOFRAN    30 tablet    Take 1 tablet (8 mg) by mouth every 8 hours as needed (nausea/vomiting)    Malignant neoplasm of head of pancreas (H)       order for DME     2 Units    Equipment being ordered: Compression Stockings. Please dispense 2 pairs of knee high 20-30 mmHg    Lymphedema       pantoprazole 20 MG EC tablet    PROTONIX    30 tablet    Take 1 tablet (20 mg) by mouth daily    Other acute gastritis without hemorrhage       PRO-BIOTIC BLEND PO      Take 1 Tablespoonful by mouth daily as needed        * prochlorperazine 5 MG tablet    COMPAZINE    30 tablet    Take 1 tablet (5 mg) by mouth every 6 hours as needed for nausea or vomiting    Malignant neoplasm of head of pancreas (H)       * prochlorperazine 10 MG tablet    COMPAZINE    30 tablet    Take 1 tablet (10 mg) by mouth every 6 hours as needed for nausea or vomiting    Malignant neoplasm of head of pancreas (H)       rivaroxaban ANTICOAGULANT 20 MG Tabs tablet    XARELTO    30 tablet    Take 1 tablet (20 mg) by mouth daily (with dinner)    Malignant neoplasm of head of pancreas (H), History of pulmonary embolism       * timolol maleate 0.5  % opthalmic solution    ISTALOL    1 Bottle    Place 1 drop into both eyes every morning Before placing contact lenses    Cataract, unspecified cataract type, unspecified laterality       * timolol 0.5 % ophthalmic solution    TIMOPTIC     INT 1 GTT OU IN THE MORNING        traMADol 50 MG tablet    ULTRAM    60 tablet    TAKE 1 TO 2 TABLETS BY MOUTH EVERY 4 HOURS AS NEEDED FOR BREAKTHROUGH PAIN. MAX 8 TABLETS PER DAY.    Malignant neoplasm of head of pancreas (H)       TYLENOL PO      Take 325 mg by mouth every 4 hours as needed for mild pain or fever        * Notice:  This list has 6 medication(s) that are the same as other medications prescribed for you. Read the directions carefully, and ask your doctor or other care provider to review them with you.

## 2018-08-22 NOTE — PROGRESS NOTES
Patient presents for pump discontinue.   Patient is feeling fine and vital signs are stable.   Infusion complete with no complications.   Blood return checked and  port heparin locked and de accessed.   Patient aware of future appointments and discharged in stable condition.     Karen Mcclellan RN

## 2018-08-24 NOTE — DISCHARGE INSTRUCTIONS

## 2018-08-27 NOTE — NURSING NOTE
"Oncology Rooming Note    August 27, 2018 10:51 AM   Iris Lewis is a 78 year old female who presents for:    Chief Complaint   Patient presents with     Port Draw     port accessed and labs drawn by rn. vs taken.     RECHECK     ONC poorly differentiated adenocarcinoma      Initial Vitals: /53 (BP Location: Right arm, Patient Position: Sitting, Cuff Size: Adult Small)  Pulse 68  Temp 97.3  F (36.3  C) (Oral)  Resp 16  Ht 1.575 m (5' 2.01\")  Wt 43.3 kg (95 lb 6.4 oz)  SpO2 95%  BMI 17.44 kg/m2 Estimated body mass index is 17.44 kg/(m^2) as calculated from the following:    Height as of this encounter: 1.575 m (5' 2.01\").    Weight as of this encounter: 43.3 kg (95 lb 6.4 oz). Body surface area is 1.38 meters squared.  No Pain (0) Comment: Data Unavailable   No LMP recorded. Patient is postmenopausal.  Allergies reviewed: Yes  Medications reviewed: Yes    Medications: Medication refills not needed today.  Pharmacy name entered into [facility]:    [facility] [address] [facility] [place] [address] [address] [facility] [address] [facility] [place] [facility] [address]    Clinical concerns: none      6 minutes for nursing intake (face to face time)     Janey MIAN Maldonado              "

## 2018-08-27 NOTE — LETTER
8/27/2018    RE: Iris Lewis  [address]  [place] [address]       Iris Trinidad is her today in follow-up of metastatic pancreatic cancer.    She is currently on active treatment with 5-FU plus liposomal irinotecan since March of this year having previously responded to then progressed on gem/Abraxane.  She reports she is feeling quite well these days.  She has a good energy level and her appetite is okay. She is careful about what she eats as some foods do not appeal to her and she is having more problems with mouth sores which make it uncomfortable to eat spicy things.  She is well enough that she was able to drive herself to [place] for a reunion with some old friends.  She is having no significant pain at this point. She is having relatively little in the way of problems with her bowels.  She is tolerating the rivaroxaban for her pulmonary embolism quite well with no bleeding complications.  The remainder of a 10 point review of systems is otherwise negative.    On physical exam she appears well.  Her vital signs are unremarkable.  She has no icterus and no visible lesion in the oropharynx.  She has no adenopathy palpable in her neck or supra the spaces.  Her lungs are clear to auscultation with dullness to percussion she has severe kyphoscoliosis.  Her heart rate and rhythm are regular.  Her abdomen is soft and nontender without palpable mass organomegaly.  She has no peripheral edema and no tenderness in her calves or thighs.  Her speech is fluent and her cranial nerves are grossly intact.    I reviewed with her her CT scan and lab work.  Her CT scan shows her pancreatic tumor to be unchanged with no new sites of disease apparent.  She has fatty change in her liver.  She has normal electrolytes and renal function.  Her albumin is 2.8 and she has mild abnormalities of her liver enzymes.  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    Assessment/plan: Advanced pancreatic  cancer currently with excellent disease control with 5-FU and liposomal irinotecan.  She has a little bit of liver toxicity but not enough yet to make me want to dramatically change her treatment.  We will continue on with the same schedule but reduce her dose by 20%. We will see her back for another response assessment in about 2 months.  Vinny Yu MD

## 2018-08-27 NOTE — Clinical Note
8/27/2018       RE: Iris Lewis  [address]  [place] [address]     Dear Colleague,    Thank you for referring your patient, Iris Lewis, to the [facility]. Please see a copy of my visit note below.    Iris Trinidad is her today in follow-up of metastatic pancreatic cancer.    She is currently on active treatment with 5-FU plus liposomal irinotecan since March of this year having previously responded to then progressed on gem/Abraxane.  She reports she is feeling quite well these days.  She has good energy level and her appetite is okay as she is careful about what she eats some foods do not appeal to her and she is having more problems with mouth sores which make it uncomfortable to eat spicy things.  She is well enough that she is able to drive herself to [place] for reunion with some old friends.  She is having no significant pain at this point she is having relatively little in the way of problems with her bowels.  She is tolerating the rivaroxaban for her pulmonary embolism quite well with no bleeding complications.  The remainder of a 10 point review of systems is otherwise negative.    On physical exam she appears well.  Her vital signs are unremarkable.  She has no icterus and no visible lesion in the oropharynx.  She has no adenopathy palpable in her neck or supra the spaces.  Her lungs are clear to auscultation with dullness to percussion she has severe kyphoscoliosis.  Her heart rate and rhythm are regular.  Her abdomen is soft and nontender without palpable mass organomegaly.  She has no peripheral edema and no tenderness in her calves or thighs.  Her speech is fluent and her cranial nerves are grossly intact.    I reviewed with her her CT scan and lab work.  Her CT scan shows her pancreatic tumor to be unchanged with no new sites of disease apparent.  She has fatty change in her liver.  She has normal electro lites and renal function.  Her albumin is 2.8 and she  has mild abnormalities of her liver enzymes.  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    Assessment/plan: Advanced pancreatic cancer currently with excellent disease control with 5-FU and liposomal irinotecan.  She has a little bit of liver toxicity but not enough yet to make me want to dramatically change her treatment.  We will continue on with the same schedule to reduce her dose by 20%. we will see her back for another response assessment in about 2 months.    Again, thank you for allowing me to participate in the care of your patient.      Sincerely,    Vinny Yu MD

## 2018-08-27 NOTE — MR AVS SNAPSHOT
After Visit Summary   8/27/2018    Iris Lewis    MRN: [id number]           Patient Information     Date Of Birth          1940        Visit Information        Provider Department      8/27/2018 10:45 AM Vinny Yu MD [facility]        Today's Diagnoses     Malignant neoplasm of head of pancreas (H)        History of pulmonary embolism           Follow-ups after your visit        Follow-up notes from your care team     Return in about 2 months (around 10/29/2018) for MD visit with CT and labs.      Your next 10 appointments already scheduled     Sep 05, 2018 12:45 PM CDT   Masonic Lab Draw with UC MASONIC LAB DRAW   [facility] Draw ([facility])    [address]  [address]  [facility] [address]   [phone]            Sep 05, 2018  1:30 PM CDT   Infusion 180 with UC ONCOLOGY INFUSION, UC 24 ATC   [facility] ([facility])    [address] [place] [address]  [address]  [facility] [address]   [phone]            Sep 18, 2018  7:30 AM CDT   Masonic Lab Draw with UC MASONIC LAB DRAW   [facility] ([facility])    [address]  [address]  [facility] [place] [address]   [phone]            Sep 18, 2018  8:00 AM CDT   Infusion 180 with UC ONCOLOGY INFUSION, UC 18 ATC   [facility] ([facility])    [address]  [address]  [place] [address]   [phone]            Oct 01, 2018  8:30 AM CDT   Masonic Lab Draw with UC MASONIC LAB DRAW   [facility] Draw ([facility])    [address] [place] [address] [address]  [address]  [facility] [address]   [phone]            Oct 01, 2018  9:00 AM CDT   Infusion 180 with UC ONCOLOGY INFUSION, UC 24 ATC   [facility] ([facility])    [address] [place] [address]  [address]  [facility] [address] [phone]            Oct 15, 2018 10:00 AM CDT   Masonic Lab Draw with  MASONIC LAB DRAW   [facility]    [address]  [address]  [facility] [id number] [phone]              Who to contact     If you have questions or need follow up information about today's clinic visit or your schedule please contact [facility] directly at [phone].  Normal or non-critical lab and imaging results will be communicated to you by [employer], letter or phone within 4 business days after the clinic has received the results. If you do not hear from us within 7 days, please contact the clinic through [employer] or phone. If you have a critical or abnormal lab result, we will notify you by phone as soon as possible.  Submit refill requests through [employer] or call your pharmacy and they will forward the refill request to us. Please allow 3 business days for your refill to be completed.          Additional Information About Your Visit        [employer] Information     [employer] gives you secure access to your electronic health record. If you see a primary care provider, you can also send messages to your care team and make appointments. If you have questions, please call your primary care clinic.  If you do not have a primary care provider, please call [phone] and they will assist you.        Care EveryWhere ID     This is your Care EveryWhere ID. This could be used by other organizations to access your [place] medical records  [id number]        Your Vitals Were     Pulse Temperature Respirations Height Pulse Oximetry BMI (Body Mass Index)    68 97.3  F (36.3  C) (Oral) 16 1.575 m (5' 2.01\") 95% 17.44 kg/m2       Blood Pressure from Last 3 Encounters:   08/27/18 102/53   08/22/18 129/75   08/20/18 106/63    Weight from Last 3 Encounters:   08/27/18 43.3 kg (95 lb 6.4 oz)   08/20/18 44.1 kg (97 lb 4.8 oz) "   08/08/18 45.9 kg (101 lb 3 oz)              We Performed the Following     Cancer antigen 19-9     CBC with platelets differential     Comprehensive metabolic panel        Primary Care Provider Office Phone # Fax #    Neri Gipson [phone] [address] [address]        Equal Access to Services     [facility] : Hadii aad ku hadasho Soomaali, waaxda luqadaha, qaybta kaalmada adeegyada, waxay idiin hayaan adeeg kharash la'aan . So [facility] [phone].    ATENCIÓN: Si habla español, tiene a nava disposición servicios gratuitos de asistencia lingüística. Llame al [phone].    We comply with applicable federal civil rights laws and [place] laws. We do not discriminate on the basis of race, color, national origin, age, disability, sex, sexual orientation, or gender identity.            Thank you!     Thank you for choosing [facility]  for your care. Our goal is always to provide you with excellent care. Hearing back from our patients is one way we can continue to improve our services. Please take a few minutes to complete the written survey that you may receive in the mail after your visit with us. Thank you!             Your Updated Medication List - Protect others around you: Learn how to safely use, store and throw away your medicines at www.disposemymeds.org.          This list is accurate as of 8/27/18 11:59 PM.  Always use your most recent med list.                   Brand Name Dispense Instructions for use Diagnosis    albuterol 108 (90 Base) MCG/ACT inhaler    PROAIR HFA/PROVENTIL HFA/VENTOLIN HFA    1 Inhaler    Inhale 2 puffs into the lungs 4 times daily    Mild asthma, unspecified whether complicated, unspecified whether persistent       amylase-lipase-protease 91683 units Cpep    CREON    180 capsule    Take 2 capsules (72,000 Units) by mouth 3 times daily (with meals)    Malignant neoplasm of head of pancreas (H)       BREO  ELLIPTA 100-25 MCG/INH inhaler   Generic drug:  fluticasone-vilanterol           CALCIUM PO      Take by mouth every morning Form/strength unknown. Powder formulation. Add to water and fruits/vegetables daily to promote bone health.        CARTIA  MG 24 hr capsule   Generic drug:  diltiazem     10 capsule    TK 1 C PO QD    Benign essential hypertension       CHLORPHENIRAMINE MALEATE PO      Take 4 mg by mouth daily as needed        cholecalciferol 1000 UNIT tablet    vitamin D3     Take 1 tablet by mouth 2 times daily        cyanocolbalamin 500 MCG tablet    vitamin  B-12     Take 500 mcg by mouth every morning        folic acid 400 MCG tablet    FOLVITE     Take 1 tablet by mouth every morning        hydrOXYzine 25 MG tablet    ATARAX    60 tablet    Take 1-2 tablets (25-50 mg) by mouth every 6 hours as needed for itching (adjuvant pain)    Obstructive jaundice       levothyroxine 125 MCG tablet    SYNTHROID/LEVOTHROID    90 tablet    Take 1 tablet (125 mcg) by mouth daily    Hypothyroidism, unspecified type       loperamide 2 MG capsule    IMODIUM    60 capsule    2 caps at 1st sign of diarrhea & 1 cap every 2hrs until 12hrs diarrhea free. During night, 2 caps at bedtime & 2 caps every 4hrs until AM    Malignant neoplasm of head of pancreas (H)       LORazepam 0.5 MG tablet    ATIVAN    30 tablet    Take 1 tablet (0.5 mg) by mouth every 4 hours as needed (Anxiety, Nausea/Vomiting or Sleep)    Malignant neoplasm of head of pancreas (H)       magic mouthwash suspension (diphenhydrAMINE, lidocaine, aluminum-magnesium & simethicone) Susp compounding kit     237 mL    Swish and swallow 5-10 mLs in mouth every 6 hours as needed for mouth sores    Malignant neoplasm of head of pancreas (H)       nystatin 124133 UNIT/ML suspension    MYCOSTATIN    473 mL    Take 5 mLs (500,000 Units) by mouth 4 times daily    Malignant neoplasm of head of pancreas (H), History of pulmonary embolism       omeprazole 40 MG capsule     priLOSEC          * ondansetron 4 MG tablet    ZOFRAN          * ondansetron 8 MG tablet    ZOFRAN    30 tablet    Take 1 tablet (8 mg) by mouth every 8 hours as needed (nausea/vomiting)    Malignant neoplasm of head of pancreas (H)       order for DME     2 Units    Equipment being ordered: Compression Stockings. Please dispense 2 pairs of knee high 20-30 mmHg    Lymphedema       pantoprazole 20 MG EC tablet    PROTONIX    30 tablet    Take 1 tablet (20 mg) by mouth daily    Other acute gastritis without hemorrhage       PRO-BIOTIC BLEND PO      Take 1 Tablespoonful by mouth daily as needed        * prochlorperazine 5 MG tablet    COMPAZINE    30 tablet    Take 1 tablet (5 mg) by mouth every 6 hours as needed for nausea or vomiting    Malignant neoplasm of head of pancreas (H)       * prochlorperazine 10 MG tablet    COMPAZINE    30 tablet    Take 1 tablet (10 mg) by mouth every 6 hours as needed for nausea or vomiting    Malignant neoplasm of head of pancreas (H)       rivaroxaban ANTICOAGULANT 20 MG Tabs tablet    XARELTO    30 tablet    Take 1 tablet (20 mg) by mouth daily (with dinner)    Malignant neoplasm of head of pancreas (H), History of pulmonary embolism       * timolol maleate 0.5 % opthalmic solution    ISTALOL    1 Bottle    Place 1 drop into both eyes every morning Before placing contact lenses    Cataract, unspecified cataract type, unspecified laterality       * timolol 0.5 % ophthalmic solution    TIMOPTIC     INT 1 GTT OU IN THE MORNING        traMADol 50 MG tablet    ULTRAM    60 tablet    TAKE 1 TO 2 TABLETS BY MOUTH EVERY 4 HOURS AS NEEDED FOR BREAKTHROUGH PAIN. MAX 8 TABLETS PER DAY.    Malignant neoplasm of head of pancreas (H)       TYLENOL PO      Take 325 mg by mouth every 4 hours as needed for mild pain or fever        * Notice:  This list has 6 medication(s) that are the same as other medications prescribed for you. Read the directions carefully, and ask your doctor or other care  provider to review them with you.

## 2018-08-27 NOTE — NURSING NOTE
"Chief Complaint   Patient presents with     Port Draw     port accessed and labs drawn by rn. vs taken.     Port accessed with 20g 3/4\" gripper needle, labs drawn, port flushed with saline and heparin, port de-accessed.  vitals checked, pt checked in for next appointment.`  Alicia Cordova RN    "

## 2018-08-27 NOTE — PROGRESS NOTES
Iris Trinidad is her today in follow-up of metastatic pancreatic cancer.    She is currently on active treatment with 5-FU plus liposomal irinotecan since March of this year having previously responded to then progressed on gem/Abraxane.  She reports she is feeling quite well these days.  She has a good energy level and her appetite is okay. She is careful about what she eats as some foods do not appeal to her and she is having more problems with mouth sores which make it uncomfortable to eat spicy things.  She is well enough that she was able to drive herself to [place] for a reunion with some old friends.  She is having no significant pain at this point. She is having relatively little in the way of problems with her bowels.  She is tolerating the rivaroxaban for her pulmonary embolism quite well with no bleeding complications.  The remainder of a 10 point review of systems is otherwise negative.    On physical exam she appears well.  Her vital signs are unremarkable.  She has no icterus and no visible lesion in the oropharynx.  She has no adenopathy palpable in her neck or supra the spaces.  Her lungs are clear to auscultation with dullness to percussion she has severe kyphoscoliosis.  Her heart rate and rhythm are regular.  Her abdomen is soft and nontender without palpable mass organomegaly.  She has no peripheral edema and no tenderness in her calves or thighs.  Her speech is fluent and her cranial nerves are grossly intact.    I reviewed with her her CT scan and lab work.  Her CT scan shows her pancreatic tumor to be unchanged with no new sites of disease apparent.  She has fatty change in her liver.  She has normal electrolytes and renal function.  Her albumin is 2.8 and she has mild abnormalities of her liver enzymes.  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    Assessment/plan: Advanced pancreatic cancer currently with excellent disease control with 5-FU and liposomal irinotecan.   She has a little bit of liver toxicity but not enough yet to make me want to dramatically change her treatment.  We will continue on with the same schedule but reduce her dose by 20%. We will see her back for another response assessment in about 2 months.

## 2018-09-05 NOTE — MR AVS SNAPSHOT
After Visit Summary   9/5/2018    Iris Lewis    MRN: [id number]           Patient Information     Date Of Birth          1940        Visit Information        Provider Department      9/5/2018 1:30 PM  24 ATC;  ONCOLOGY INFUSION [facility]        Today's Diagnoses     Malignant neoplasm of head of pancreas (H)    -  1      Care Instructions    Contact Numbers  [facility] Nurse Triage: [phone]  After Hours Nurse Line:  [phone]    Please call the [facility] Triage line if you experience a temperature greater than or equal to 100.5, shaking chills, have uncontrolled nausea, vomiting and/or diarrhea, dizziness, shortness of breath, chest pain, bleeding, unexplained bruising, or if you have any other new/concerning symptoms, questions or concerns.     If it is after hours, weekends, or holidays, please call either the after hours nurse line listed above.     If you are having any concerning symptoms or wish to speak to a provider before your next infusion visit, please call your care coordinator or triage to notify them so we can adequately serve you.     If you need a refill on a narcotic prescription or other medication, please call triage before your infusion appointment.         September 2018 Sunday Monday Tuesday Wednesday Thursday Friday Saturday                                 1       2     3     4     5     [facility] LAB DRAW   12:45 PM   (15 min.)    MASONIC LAB DRAW   [facility] Lab Draw     [facility] ONC INFUSION 180    1:30 PM   (180 min.)    ONCOLOGY INFUSION   [facility] 6     7     8       9     10     11     12     13     14     15       16     17     18     [facility] LAB DRAW    7:30 AM   (15 min.)   [facility] DRAW   [facility] Lab Draw     [facility] ONC INFUSION 180    8:00 AM   (180 min.)    ONCOLOGY INFUSION   [facility] 19     20     21     22       23     24     25     26      27     28     29       30 October 2018 Sunday Monday Tuesday Wednesday Thursday Friday Saturday        1     [facility] DRAW    8:30 AM   (15 min.)    MASONIC LAB DRAW   [facility] Lab Draw     [facility] ONC INFUSION 180    9:00 AM   (180 min.)    ONCOLOGY INFUSION   [facility] 2     3     4     5     6       7     8     9     10     11     12     13       14     15     [facility] LAB DRAW   10:00 AM   (15 min.)    MASONIC LAB DRAW   [facility] Lab Draw     [facility] ONC INFUSION 180   10:30 AM   (180 min.)    ONCOLOGY INFUSION   [facility] 16     17     18     19     20       21     22     23     24     25     26     CT CHEST ABDOMEN PELVIS WWO   10:00 AM   (20 min.)   UCCT1   [facility] CT 27       28     29     [facility] LAB DRAW    7:15 AM   (15 min.)    MASONIC LAB DRAW   [facility] Lab Draw     [facility] RETURN    7:30 AM   (30 min.)   Vinny Yu MD   [facility]     [facility] ONC INFUSION 180    9:00 AM   (180 min.)    ONCOLOGY INFUSION   [facility] 30     31                                 Lab Results:  Recent Results (from the past 12 hour(s))   Comprehensive metabolic panel    Collection Time: 09/05/18  1:18 PM   Result Value Ref Range    Sodium 137 133 - 144 mmol/L    Potassium 3.9 3.4 - 5.3 mmol/L    Chloride 104 94 - 109 mmol/L    Carbon Dioxide 27 20 - 32 mmol/L    Anion Gap 6 3 - 14 mmol/L    Glucose 82 70 - 99 mg/dL    Urea Nitrogen 9 7 - 30 mg/dL    Creatinine 0.74 0.52 - 1.04 mg/dL    GFR Estimate 76 >60 mL/min/1.7m2    GFR Estimate If Black >90 >60 mL/min/1.7m2    Calcium 8.3 (L) 8.5 - 10.1 mg/dL    Bilirubin Total 0.5 0.2 - 1.3 mg/dL    Albumin 2.7 (L) 3.4 - 5.0 g/dL    Protein Total 6.6 (L) 6.8 - 8.8 g/dL    Alkaline Phosphatase 446 (H) 40 - 150 U/L    ALT 76 (H) 0 - 50 U/L    AST 60 (H) 0 - 45 U/L   CBC with platelets differential     Collection Time: 09/05/18  1:18 PM   Result Value Ref Range    WBC 4.3 4.0 - 11.0 10e9/L    RBC Count 2.96 (L) 3.8 - 5.2 10e12/L    Hemoglobin 9.8 (L) 11.7 - 15.7 g/dL    Hematocrit 30.8 (L) 35.0 - 47.0 %     (H) 78 - 100 fl    MCH 33.1 (H) 26.5 - 33.0 pg    MCHC 31.8 31.5 - 36.5 g/dL    RDW 15.9 (H) 10.0 - 15.0 %    Platelet Count 336 150 - 450 10e9/L    Diff Method Automated Method     % Neutrophils 30.7 %    % Lymphocytes 28.3 %    % Monocytes 28.3 %    % Eosinophils 10.3 %    % Basophils 1.9 %    % Immature Granulocytes 0.5 %    Nucleated RBCs 0 0 /100    Absolute Neutrophil 1.3 (L) 1.6 - 8.3 10e9/L    Absolute Lymphocytes 1.2 0.8 - 5.3 10e9/L    Absolute Monocytes 1.2 0.0 - 1.3 10e9/L    Absolute Eosinophils 0.4 0.0 - 0.7 10e9/L    Absolute Basophils 0.1 0.0 - 0.2 10e9/L    Abs Immature Granulocytes 0.0 0 - 0.4 10e9/L    Absolute Nucleated RBC 0.0     Anisocytosis Slight     Poikilocytosis Slight     Hector Cells Slight     Macrocytes Present     Platelet Estimate Confirming automated cell count                Follow-ups after your visit        Your next 10 appointments already scheduled     Sep 18, 2018  7:30 AM CDT   [facility] Lab Draw with  [facility] LAB DRAW   [facility] ([facility])    [address] [place] [address]  [address]  [facility] [id number] [phone]            Sep 18, 2018  8:00 AM CDT   Infusion 180 with  ONCOLOGY INFUSION, UC 18 ATC   [facility] ([facility])    [address] [place] [address]  [address]  [facility] [id number] [phone]            Oct 01, 2018  8:30 AM CDT   [facility] Lab Draw with  MASONIC LAB DRAW   [facility] ([facility])    [address] [place] [address]  [address]  [facility] [id number] [phone]            Oct 01, 2018  9:00 AM CDT   Infusion 180 with UC ONCOLOGY INFUSION, UC 24 ATC   [facility]    [address]  [address]  [facility] [address]   [phone]            Oct 15, 2018 10:00 AM CDT   Masonic Lab Draw with  MASONIC LAB DRAW   [facility] Lab Draw ([facility])    [address]  [address]  [facility] [address]   [phone]            Oct 15, 2018 10:30 AM CDT   Infusion 180 with  ONCOLOGY INFUSION, UC 22 ATC   [facility] ([facility])    [address]  [address]  [facility] [address]   [phone]            Oct 26, 2018 10:00 AM CDT   CT CHEST ABDOMEN PELVIS W/O & W CONTRAST with UCCT1   [facility] CT ([facility])    [address]  1st Floor  [facility] [address]   [phone]           Please bring any scans or X-rays taken at other hospitals, if similar tests were done. Also bring a list of your medicines, including vitamins, minerals and over-the-counter drugs. It is safest to leave personal items at home.  Be sure to tell your doctor:   If you have any allergies.   If there s any chance you are pregnant.   If you are breastfeeding.  How to prepare:   Do not eat or drink for 2 hours before your exam. If you need to take medicine, you may take it with small sips of water. (We may ask you to take liquid medicine as well.)   Please wear loose clothing, such as a sweat suit or jogging clothes. Avoid snaps, zippers and other metal. We may ask you to undress and put on a hospital gown.  Please arrive 30 minutes early for your CT. Once in the department you might be asked to drink water 15-20 minutes prior to your exam.  If indicated you may be asked to drink an oral contrast in advance of your CT.  If this is the case, the imaging team will let you know or be in contact with you prior to your appointment  Patients over 70 or patients with diabetes or kidney problems:   If you haven t had a blood test (creatinine test) within  the last 30 days, the Cardiologist/Radiologist may require you to get this test prior to your exam.  If you have diabetes:   Continue to take your metformin medication on the day of your exam  If you have any questions, please call the Imaging Department where you will have your exam.              Who to contact     If you have questions or need follow up information about today's clinic visit or your schedule please contact [facility] directly at [phone].  Normal or non-critical lab and imaging results will be communicated to you by [employer], letter or phone within 4 business days after the clinic has received the results. If you do not hear from us within 7 days, please contact the clinic through [employer] or phone. If you have a critical or abnormal lab result, we will notify you by phone as soon as possible.  Submit refill requests through [employer] or call your pharmacy and they will forward the refill request to us. Please allow 3 business days for your refill to be completed.          Additional Information About Your Visit        [employer] Information     [employer] gives you secure access to your electronic health record. If you see a primary care provider, you can also send messages to your care team and make appointments. If you have questions, please call your primary care clinic.  If you do not have a primary care provider, please call [phone] and they will assist you.        Care EveryWhere ID     This is your Care EveryWhere ID. This could be used by other organizations to access your [place] medical records  [id number]        Your Vitals Were     Pulse Temperature Respirations Pulse Oximetry BMI (Body Mass Index)       68 98.1  F (36.7  C) (Oral) 18 97% 18.08 kg/m2        Blood Pressure from Last 3 Encounters:   09/05/18 127/63   08/27/18 102/53   08/22/18 129/75    Weight from Last 3 Encounters:   09/05/18 44.9 kg (98 lb 14.4 oz)   08/27/18 43.3 kg (95 lb 6.4 oz)   08/20/18  44.1 kg (97 lb 4.8 oz)              We Performed the Following     CBC with platelets differential     Comprehensive metabolic panel        Primary Care Provider Office Phone # Fax #    Neri Gipson [phone] [address] [address]        Equal Access to Services     NESSA NICHOLS : Hadii aad ku hadasho Soomaali, waaxda luqadaha, qaybta kaalmada adeegyada, waxay idiin hayaan adeeg khdelphine laLatonyalevyn fernanda. So [facility] [phone].    ATENCIÓN: Si habla español, tiene a nava disposición servicios gratuitos de asistencia lingüística. [facility] [phone].    We comply with applicable federal civil rights laws and [place] laws. We do not discriminate on the basis of race, color, national origin, age, disability, sex, sexual orientation, or gender identity.            Thank you!     Thank you for choosing [facility]  for your care. Our goal is always to provide you with excellent care. Hearing back from our patients is one way we can continue to improve our services. Please take a few minutes to complete the written survey that you may receive in the mail after your visit with us. Thank you!             Your Updated Medication List - Protect others around you: Learn how to safely use, store and throw away your medicines at www.disposemymeds.org.          This list is accurate as of 9/5/18  6:17 PM.  Always use your most recent med list.                   Brand Name Dispense Instructions for use Diagnosis    albuterol 108 (90 Base) MCG/ACT inhaler    PROAIR HFA/PROVENTIL HFA/VENTOLIN HFA    1 Inhaler    Inhale 2 puffs into the lungs 4 times daily    Mild asthma, unspecified whether complicated, unspecified whether persistent       amylase-lipase-protease 47437 units Cpep    CREON    180 capsule    Take 2 capsules (72,000 Units) by mouth 3 times daily (with meals)    Malignant neoplasm of head of pancreas (H)       BREO ELLIPTA 100-25 MCG/INH inhaler   Generic  drug:  fluticasone-vilanterol           CALCIUM PO      Take by mouth every morning Form/strength unknown. Powder formulation. Add to water and fruits/vegetables daily to promote bone health.        CARTIA  MG 24 hr capsule   Generic drug:  diltiazem     10 capsule    TK 1 C PO QD    Benign essential hypertension       CHLORPHENIRAMINE MALEATE PO      Take 4 mg by mouth daily as needed        cholecalciferol 1000 UNIT tablet    vitamin D3     Take 1 tablet by mouth 2 times daily        cyanocolbalamin 500 MCG tablet    vitamin  B-12     Take 500 mcg by mouth every morning        folic acid 400 MCG tablet    FOLVITE     Take 1 tablet by mouth every morning        hydrOXYzine 25 MG tablet    ATARAX    60 tablet    Take 1-2 tablets (25-50 mg) by mouth every 6 hours as needed for itching (adjuvant pain)    Obstructive jaundice       levothyroxine 125 MCG tablet    SYNTHROID/LEVOTHROID    90 tablet    Take 1 tablet (125 mcg) by mouth daily    Hypothyroidism, unspecified type       loperamide 2 MG capsule    IMODIUM    60 capsule    2 caps at 1st sign of diarrhea & 1 cap every 2hrs until 12hrs diarrhea free. During night, 2 caps at bedtime & 2 caps every 4hrs until AM    Malignant neoplasm of head of pancreas (H)       LORazepam 0.5 MG tablet    ATIVAN    30 tablet    Take 1 tablet (0.5 mg) by mouth every 4 hours as needed (Anxiety, Nausea/Vomiting or Sleep)    Malignant neoplasm of head of pancreas (H)       magic mouthwash suspension (diphenhydrAMINE, lidocaine, aluminum-magnesium & simethicone) Susp compounding kit     237 mL    Swish and swallow 5-10 mLs in mouth every 6 hours as needed for mouth sores    Malignant neoplasm of head of pancreas (H)       MELATONIN PO      Take by mouth nightly as needed        nystatin 237839 UNIT/ML suspension    MYCOSTATIN    473 mL    Take 5 mLs (500,000 Units) by mouth 4 times daily    Malignant neoplasm of head of pancreas (H), History of pulmonary embolism       omeprazole  40 MG capsule    priLOSEC          * ondansetron 4 MG tablet    ZOFRAN          * ondansetron 8 MG tablet    ZOFRAN    30 tablet    Take 1 tablet (8 mg) by mouth every 8 hours as needed (nausea/vomiting)    Malignant neoplasm of head of pancreas (H)       order for DME     2 Units    Equipment being ordered: Compression Stockings. Please dispense 2 pairs of knee high 20-30 mmHg    Lymphedema       pantoprazole 20 MG EC tablet    PROTONIX    30 tablet    Take 1 tablet (20 mg) by mouth daily    Other acute gastritis without hemorrhage       PRO-BIOTIC BLEND PO      Take 1 Tablespoonful by mouth daily as needed        * prochlorperazine 5 MG tablet    COMPAZINE    30 tablet    Take 1 tablet (5 mg) by mouth every 6 hours as needed for nausea or vomiting    Malignant neoplasm of head of pancreas (H)       * prochlorperazine 10 MG tablet    COMPAZINE    30 tablet    Take 1 tablet (10 mg) by mouth every 6 hours as needed for nausea or vomiting    Malignant neoplasm of head of pancreas (H)       rivaroxaban ANTICOAGULANT 20 MG Tabs tablet    XARELTO    30 tablet    Take 1 tablet (20 mg) by mouth daily (with dinner)    Malignant neoplasm of head of pancreas (H), History of pulmonary embolism       * timolol maleate 0.5 % opthalmic solution    ISTALOL    1 Bottle    Place 1 drop into both eyes every morning Before placing contact lenses    Cataract, unspecified cataract type, unspecified laterality       * timolol 0.5 % ophthalmic solution    TIMOPTIC     INT 1 GTT OU IN THE MORNING        traMADol 50 MG tablet    ULTRAM    60 tablet    TAKE 1 TO 2 TABLETS BY MOUTH EVERY 4 HOURS AS NEEDED FOR BREAKTHROUGH PAIN. MAX 8 TABLETS PER DAY.    Malignant neoplasm of head of pancreas (H)       TYLENOL PO      Take 325 mg by mouth every 4 hours as needed for mild pain or fever        * Notice:  This list has 6 medication(s) that are the same as other medications prescribed for you. Read the directions carefully, and ask your doctor or  other care provider to review them with you.

## 2018-09-05 NOTE — PATIENT INSTRUCTIONS
Contact Numbers  [facility] Nurse Triage: [phone]  After Hours Nurse Line:  [phone]    Please call the [facility] Triage line if you experience a temperature greater than or equal to 100.5, shaking chills, have uncontrolled nausea, vomiting and/or diarrhea, dizziness, shortness of breath, chest pain, bleeding, unexplained bruising, or if you have any other new/concerning symptoms, questions or concerns.     If it is after hours, weekends, or holidays, please call either the after hours nurse line listed above.     If you are having any concerning symptoms or wish to speak to a provider before your next infusion visit, please call your care coordinator or triage to notify them so we can adequately serve you.     If you need a refill on a narcotic prescription or other medication, please call triage before your infusion appointment.         September 2018 Sunday Monday Tuesday Wednesday Thursday Friday Saturday                                 1       2     3     4     5     [facility] DRAW   12:45 PM   (15 min.)    MASONIC LAB DRAW   [facility] Lab Draw     [facility] ONC INFUSION 180    1:30 PM   (180 min.)    ONCOLOGY INFUSION   [facility] 6     7     8       9     10     11     12     13     14     15       16     17     18     [facility] LAB DRAW    7:30 AM   (15 min.)    MASONIC LAB DRAW   [facility] Lab Draw     [facility] ONC INFUSION 180    8:00 AM   (180 min.)    ONCOLOGY INFUSION   [facility] 19     20     21     22       23     24     25     26     27     28     29       30                                              October 2018 Sunday Monday Tuesday Wednesday Thursday Friday Saturday        1     [facility] LAB DRAW    8:30 AM   (15 min.)    MASONIC LAB DRAW   [facility] Lab Draw     [facility] ONC INFUSION 180    9:00 AM   (180 min.)    ONCOLOGY INFUSION   [facility] 2     3     4     5     6       7      8     9     10     11     12     13       14     15     [facility] MASONIC LAB DRAW   10:00 AM   (15 min.)    MASONIC LAB DRAW   [facility] Lab Draw     P ONC INFUSION 180   10:30 AM   (180 min.)    ONCOLOGY INFUSION   [facility] 16     17     18     19     20       21     22     23     24     25     26     CT CHEST ABDOMEN PELVIS WWO   10:00 AM   (20 min.)   UCCT1   [facility] CT 27       28     29     [facility] MASONIC LAB DRAW    7:15 AM   (15 min.)    MASONIC LAB DRAW   [facility] Lab Draw     P RETURN    7:30 AM   (30 min.)   Vinny Yu MD   [facility] ONC INFUSION 180    9:00 AM   (180 min.)    ONCOLOGY INFUSION   [facility] 30     31                                 Lab Results:  Recent Results (from the past 12 hour(s))   Comprehensive metabolic panel    Collection Time: 09/05/18  1:18 PM   Result Value Ref Range    Sodium 137 133 - 144 mmol/L    Potassium 3.9 3.4 - 5.3 mmol/L    Chloride 104 94 - 109 mmol/L    Carbon Dioxide 27 20 - 32 mmol/L    Anion Gap 6 3 - 14 mmol/L    Glucose 82 70 - 99 mg/dL    Urea Nitrogen 9 7 - 30 mg/dL    Creatinine 0.74 0.52 - 1.04 mg/dL    GFR Estimate 76 >60 mL/min/1.7m2    GFR Estimate If Black >90 >60 mL/min/1.7m2    Calcium 8.3 (L) 8.5 - 10.1 mg/dL    Bilirubin Total 0.5 0.2 - 1.3 mg/dL    Albumin 2.7 (L) 3.4 - 5.0 g/dL    Protein Total 6.6 (L) 6.8 - 8.8 g/dL    Alkaline Phosphatase 446 (H) 40 - 150 U/L    ALT 76 (H) 0 - 50 U/L    AST 60 (H) 0 - 45 U/L   CBC with platelets differential    Collection Time: 09/05/18  1:18 PM   Result Value Ref Range    WBC 4.3 4.0 - 11.0 10e9/L    RBC Count 2.96 (L) 3.8 - 5.2 10e12/L    Hemoglobin 9.8 (L) 11.7 - 15.7 g/dL    Hematocrit 30.8 (L) 35.0 - 47.0 %     (H) 78 - 100 fl    MCH 33.1 (H) 26.5 - 33.0 pg    MCHC 31.8 31.5 - 36.5 g/dL    RDW 15.9 (H) 10.0 - 15.0 %    Platelet Count 336 150 - 450 10e9/L    Diff Method Automated  Method     % Neutrophils 30.7 %    % Lymphocytes 28.3 %    % Monocytes 28.3 %    % Eosinophils 10.3 %    % Basophils 1.9 %    % Immature Granulocytes 0.5 %    Nucleated RBCs 0 0 /100    Absolute Neutrophil 1.3 (L) 1.6 - 8.3 10e9/L    Absolute Lymphocytes 1.2 0.8 - 5.3 10e9/L    Absolute Monocytes 1.2 0.0 - 1.3 10e9/L    Absolute Eosinophils 0.4 0.0 - 0.7 10e9/L    Absolute Basophils 0.1 0.0 - 0.2 10e9/L    Abs Immature Granulocytes 0.0 0 - 0.4 10e9/L    Absolute Nucleated RBC 0.0     Anisocytosis Slight     Poikilocytosis Slight     Hector Cells Slight     Macrocytes Present     Platelet Estimate Confirming automated cell count

## 2018-09-05 NOTE — PROGRESS NOTES
"Infusion Nursing Note:  Iris Lewis presents today for D1 C12 Irinotecan Liposome, Fluorouracil pump connect.    Patient seen by provider today: No    Intravenous Access:  Implanted Port.    Treatment Conditions:  Lab Results   Component Value Date    HGB 9.8 09/05/2018     Lab Results   Component Value Date    WBC 4.3 09/05/2018      Lab Results   Component Value Date    ANEU 1.3 09/05/2018     Lab Results   Component Value Date     09/05/2018      Lab Results   Component Value Date     09/05/2018                   Lab Results   Component Value Date    POTASSIUM 3.9 09/05/2018           Lab Results   Component Value Date    MAG 1.9 11/15/2017            Lab Results   Component Value Date    CR 0.74 09/05/2018                   Lab Results   Component Value Date    EMMY 8.3 09/05/2018                Lab Results   Component Value Date    BILITOTAL 0.5 09/05/2018           Lab Results   Component Value Date    ALBUMIN 2.7 09/05/2018                    Lab Results   Component Value Date    ALT 76 09/05/2018           Lab Results   Component Value Date    AST 60 09/05/2018       Results reviewed, labs MET treatment parameters, ok to proceed with treatment.    Note: Patient reports feeling well overall. States her mouth sores are improving. Patient denied fever, chills or other s/s of infection. It was noted ANC 1.3, parameter for treatment: 1.5. Dr. Yu messaged.    TORB: Dr. Yu/Renee See RN, 9/5/18 @ 1443: OK to proceed with treatment today with ANC 1.3.    Fluorouracil CADD infusion pump connected at 1730.  Positive blood return from port at time of pump hook up. Connections open and taped; verified with Diane Pepper RN. CADD pump display states \"Running;\" verified with MARNI Contreras. Pump to infuse over 46 hours at 5cc/hour. Pump will be disconnected on Friday 9/7/18 @ 1530 by writewith.  Patient aware of pump disconnect date and time. Message sent to [employer] " SURGERY PROGRESS HPI:  87y year old Female seen and examined at bedside. On vent resting comfortably.       Vital Signs Last 24 Hrs  T(C): 36.5, Max: 37.3 (04-28 @ 15:34)  T(F): 97.7, Max: 99.1 (04-28 @ 15:34)  HR: 104 (76 - 109)  BP: 150/72 (120/50 - 175/80)  BP(mean): 91 (66 - 104)  RR: 20 (15 - 26)  SpO2: 97% (95% - 100%)      PHYSICAL EXAM:    GENERAL: Alert, NAD  NECK: Tracheostomy in tact, on vent. Tracheostomy dressing clean/dry/intact with small amount of dried serosang stain  CHEST/LUNG: Clear to auscultation bilaterally, respirations nonlabored  HEART: S1S2, Regular rate and rhythm  ABDOMEN: Colostomy pink and viable and colostomy bag with stool and air. PEG Dressing C/D/I; + Bowel sounds, soft, nontender, Nondistended  EXTREMITIES:  no calf tenderness, edema, intermittent compression devices in place bilaterally    I&O's Detail  I & Os for 24h ending 28 Apr 2017 07:00  =============================================  IN:    Jevity: 960 ml    Enteral Tube Flush: 410 ml    sodium chloride 0.9%: 400 ml    Solution: 250 ml    Solution: 150 ml    Total IN: 2170 ml  ---------------------------------------------  OUT:    Indwelling Catheter - Urethral: 1955 ml    Rectal Tube: 200 ml    Total OUT: 2155 ml  ---------------------------------------------  Total NET: 15 ml    I & Os for current day (as of 29 Apr 2017 04:33)  =============================================  IN:    sodium chloride 0.9%: 350 ml    Solution: 200 ml    Jevity: 175 ml    Enteral Tube Flush: 60 ml    Total IN: 785 ml  ---------------------------------------------  OUT:    Indwelling Catheter - Urethral: 950 ml    Rectal Tube: 150 ml    Total OUT: 1100 ml  ---------------------------------------------  Total NET: -315 ml      LABS:                        8.5    11.8  )-----------( 326      ( 29 Apr 2017 03:14 )             24.5     04-29    134<L>  |  97  |  14  ----------------------------<  67<L>  4.1   |  26  |  0.32<L>    Ca    8.3<L>      29 Apr 2017 03:14  Phos  5.3     04-29  Mg     2.3     04-29      PT/INR - ( 28 Apr 2017 04:21 )   PT: 15.0 sec;   INR: 1.37 ratio         PTT - ( 28 Apr 2017 04:21 )  PTT:34.6 sec      Impression: 87F hx of parkinsons, hx right hip fracture with medullary pins, sepsis secondary to multifocal pna, respiratory failure, and underlying colovesicular fistula S/P Loop colostomy, Tracheostomy, PEG POD#1    Plan:  -continue present CCU management/supportive care, vent support, abx per ID  -colostomy care and monitor output, PEG care, trache care  -c/w shoemaker catheter per Dr Bahena  -will discuss with Dr. Blood and Dr. Bahena scheduling.    Post Infusion Assessment:  Patient tolerated infusion without incident.  Blood return noted pre and post infusion.  Site patent and intact, free from redness, edema or discomfort.  No evidence of extravasations.    Discharge Plan:   Patient declined prescription refills.  Discharge instructions reviewed with: Patient and Family.  Patient and/or family verbalized understanding of discharge instructions and all questions answered.  AVS to patient via [facility].  Patient will return 9/7/18 for next appointment.   Patient discharged in stable condition accompanied by: self and .  Departure Mode: Ambulatory.    Renee See RN

## 2018-09-07 NOTE — PATIENT INSTRUCTIONS
Contact Numbers    [facility] [place]: [phone]  [facility] Triage and after hours / weekends / holidays:  [phone]      Please call the triage or after hours line if you experience a temperature greater than or equal to 100.5, shaking chills, have uncontrolled nausea, vomiting and/or diarrhea, dizziness, shortness of breath, chest pain, bleeding, unexplained bruising, or if you have any other new/concerning symptoms, questions or concerns.      If you are having any concerning symptoms or wish to speak to a provider before your next infusion visit, please call your care coordinator or triage to notify them so we can adequately serve you.     If you need a refill on a narcotic prescription or other medication, please call before your infusion appointment.                   September 2018 Sunday Monday Tuesday Wednesday Thursday Friday Saturday                                 1       2     3     4     5     [facility]   12:45 PM   (15 min.)    [facility] DRAW   [facility] Lab Draw     [facility] ONC INFUSION 180    1:30 PM   (180 min.)    ONCOLOGY INFUSION   [facility] 6     7     [facility] ONC INFUSION 60    3:30 PM   (60 min.)    ONCOLOGY INFUSION   [facility] 8       9     10     11     12     13     14     15       16     17     18     [facility]    7:30 AM   (15 min.)    [facility] DRAW   [facility] Lab Draw     [facility] ONC INFUSION 180    8:00 AM   (180 min.)    ONCOLOGY INFUSION   [facility] 19     20     21     22       23     24     25     26     27     28     29       30                                              October 2018 Sunday Monday Tuesday Wednesday Thursday Friday Saturday        1     [facility]    8:30 AM   (15 min.)    [facility]   [facility]     [facility] ONC INFUSION 180    9:00 AM   (180 min.)    ONCOLOGY INFUSION   [facility] 2     3     4     5     6        7     8     9     10     11     12     13       14     15     [facility] LAB DRAW   10:00 AM   (15 min.)    [facility] LAB DRAW   [facility] Lab Draw     [facility] ONC INFUSION 180   10:30 AM   (180 min.)    ONCOLOGY INFUSION   [facility] 16     17     18     19     20       21     22     23     24     25     26     CT CHEST ABDOMEN PELVIS WWO   10:00 AM   (20 min.)   UCCT1   [facility] CT 27       28     29     [facility] DRAW    7:15 AM   (15 min.)   [facility]   [facility] Lab Draw     P RETURN    7:30 AM   (30 min.)   Vinny Yu MD   [facility] ONC INFUSION 180    9:00 AM   (180 min.)    ONCOLOGY INFUSION   [facility] 30     31                                 Lab Results:  No results found for this or any previous visit (from the past 12 hour(s)).

## 2018-09-07 NOTE — MR AVS SNAPSHOT
After Visit Summary   9/7/2018    Iris Lewis    MRN: [id number]           Patient Information     Date Of Birth          1940        Visit Information        Provider Department      9/7/2018 3:30 PM  17 ATC;  ONCOLOGY INFUSION [facility]        Today's Diagnoses     Malignant neoplasm of head of pancreas (H)    -  1      Care Instructions    Contact Numbers    [facility] [phone]  [facility] Triage and after hours / weekends / holidays:  [phone]      Please call the triage or after hours line if you experience a temperature greater than or equal to 100.5, shaking chills, have uncontrolled nausea, vomiting and/or diarrhea, dizziness, shortness of breath, chest pain, bleeding, unexplained bruising, or if you have any other new/concerning symptoms, questions or concerns.      If you are having any concerning symptoms or wish to speak to a provider before your next infusion visit, please call your care coordinator or triage to notify them so we can adequately serve you.     If you need a refill on a narcotic prescription or other medication, please call before your infusion appointment.                   September 2018 Sunday Monday Tuesday Wednesday Thursday Friday Saturday                                 1       2     3     4     5     [facility]   12:45 PM   (15 min.)    [facility] LAB DRAW   [facility] Lab Draw     [facility] ONC INFUSION 180    1:30 PM   (180 min.)    ONCOLOGY INFUSION   [facility] 6     7     [facility] ONC INFUSION 60    3:30 PM   (60 min.)    ONCOLOGY INFUSION   [facility] 8       9     10     11     12     13     14     15       16     17     18     [facility]RAW    7:30 AM   (15 min.)    [facility] LAB DRAW   [facility] Lab Draw     [facility] ONC INFUSION 180    8:00 AM   (180 min.)    ONCOLOGY INFUSION   [facility] 19     20     21     22       23     24      25     26     27     28     29       30 October 2018 Sunday Monday Tuesday Wednesday Thursday Friday Saturday        1     [facility]    8:30 AM   (15 min.)    [facility] LAB DRAW   [facility] Lab Draw     [facility] ONC INFUSION 180    9:00 AM   (180 min.)   UC ONCOLOGY INFUSION   [facility] 2     3     4     5     6       7     8     9     10     11     12     13       14     15     [facility] DRAW   10:00 AM   (15 min.)    [facility] LAB DRAW   [facility] Lab Draw     [facility] ONC INFUSION 180   10:30 AM   (180 min.)    ONCOLOGY INFUSION   [facility] 16     17     18     19     20       21     22     23     24     25     26     CT CHEST ABDOMEN PELVIS WWO   10:00 AM   (20 min.)   UCCT1   [facility] CT 27       28     29     [facility] DRAW    7:15 AM   (15 min.)    [facility] DRAW   [facility]     [facility] RETURN    7:30 AM   (30 min.)   Vinny Yu MD   [facility]     [facility] ONC INFUSION 180    9:00 AM   (180 min.)    ONCOLOGY INFUSION   [facility] 30     31                                 Lab Results:  No results found for this or any previous visit (from the past 12 hour(s)).            Follow-ups after your visit        Your next 10 appointments already scheduled     Sep 18, 2018  7:30 AM CDT   [facility] Lab Draw with [facility] DRAW   [facility] ([facility])    [address] [place] [address]  [address]  [facility] [address]   [phone]            Sep 18, 2018  8:00 AM CDT   Infusion 180 with UC ONCOLOGY INFUSION, UC 18 ATC   [facility] ([facility])    [address] [place] [address] [address]  [address]  [facility] [address]   [phone]            Oct 01, 2018  8:30 AM CDT   [facility] Lab Draw with  [facility] LAB DRAW   [facility] Lab Draw ([facility])    [address]  [address]  [facility] [address]   [phone]            Oct 01, 2018  9:00 AM CDT   Infusion 180 with UC ONCOLOGY INFUSION, UC 24 ATC   [facility] ([facility])    [address]  [address]  [facility] [address]   [phone]            Oct 15, 2018 10:00 AM CDT   [facility] Lab Draw with [facility] LAB DRAW   [facility] Lab Draw ([facility])    [address]  [address]  [facility] [address]   [phone]            Oct 15, 2018 10:30 AM CDT   Infusion 180 with UC ONCOLOGY INFUSION, UC 22 ATC   [facility] ([facility])    [address] [place] [address]  [address]  [facility] [address]   [phone]            Oct 26, 2018 10:00 AM CDT   CT CHEST ABDOMEN PELVIS W/O & W CONTRAST with UCCT1   [facility] CT ([facility])    [address]  [address]  [facility] [address]   [phone]           How do I prepare for my exam? (Food and drink instructions) To prepare: Do not eat or drink for 2 hours before your exam. If you need to take medicine, you may take it with small sips of water. (We may ask you to take liquid medicine as well.)  How do I prepare for my exam? (Other instructions) Please arrive 30 minutes early for your CT.  Once in the department you might be asked to drink water 15-20 minutes prior to your exam.  If indicated you may be asked to drink an oral contrast in advance of your CT.  If this is the case, the imaging team will let you know or be in contact with you prior to your appointment  Patients over 70 or patients with diabetes or kidney problems: If you haven t had a blood test (creatinine test) within the last 30 days, the Cardiologist/Radiologist may require you to get this test prior to your exam.  If you have diabetes:  Continue to take your metformin  medication on the day of your exam  What should I wear: Please wear loose clothing, such as a sweat suit or jogging clothes. Avoid snaps, zippers and other metal. We may ask you to undress and put on a hospital gown.  How long does the exam take: Most scans take less than 20 minutes.  What should I bring: Please bring any scans or X-rays taken at other hospitals, if similar tests were done. Also bring a list of your medicines, including vitamins, minerals and over-the-counter drugs. It is safest to leave personal items at home.  Do I need a : No  is needed.  What do I need to tell my doctor? Be sure to tell your doctor: * If you have any allergies. * If there s any chance you are pregnant. * If you are breastfeeding.  What should I do after the exam: No restrictions, You may resume normal activities.  What is this test: A CT (computed tomography) scan is a series of pictures that allows us to look inside your body. The scanner creates images of the body in cross sections, much like slices of bread. This helps us see any problems more clearly. You may receive contrast (X-ray dye) before or during your scan. You will be asked to drink the contrast.  Who should I call with questions: If you have any questions, please call the Imaging Department where you will have your exam. Directions, parking instructions, and other information is available on our website, [email or web address].              Who to contact     If you have questions or need follow up information about today's clinic visit or your schedule please contact [facility] directly at [phone].  Normal or non-critical lab and imaging results will be communicated to you by MyChart, letter or phone within 4 business days after the clinic has received the results. If you do not hear from us within 7 days, please contact the clinic through MyChart or phone. If you have a critical or abnormal lab result, we will notify you by  phone as soon as possible.  Submit refill requests through [employer] or call your pharmacy and they will forward the refill request to us. Please allow 3 business days for your refill to be completed.          Additional Information About Your Visit        [facility] Information     [employer] gives you secure access to your electronic health record. If you see a primary care provider, you can also send messages to your care team and make appointments. If you have questions, please call your primary care clinic.  If you do not have a primary care provider, please call [phone] and they will assist you.        Care EveryWhere ID     This is your Care EveryWhere ID. This could be used by other organizations to access your [place] medical records  [id number]        Your Vitals Were     Pulse Temperature Respirations Pulse Oximetry          72 98  F (36.7  C) (Oral) 18 96%         Blood Pressure from Last 3 Encounters:   09/07/18 120/62   09/05/18 127/63   08/27/18 102/53    Weight from Last 3 Encounters:   09/05/18 44.9 kg (98 lb 14.4 oz)   08/27/18 43.3 kg (95 lb 6.4 oz)   08/20/18 44.1 kg (97 lb 4.8 oz)              Today, you had the following     No orders found for display       Primary Care Provider Office Phone # Fax #    Neri W MD Brandan [phone] [address] [address]        Equal Access to Services     [facility]: Hadii mary ku hadasho Soomaali, waaxda luqadaha, qaybta kaalmada adetobiyada, lisa adams . So [facility] [place] [phone].    ATENCIÓN: Si habla español, tiene a nava disposición servicios gratuitos de asistencia lingüística. Llame al [phone].    We comply with applicable federal civil rights laws and [place] laws. We do not discriminate on the basis of race, color, national origin, age, disability, sex, sexual orientation, or gender identity.            Thank you!     Thank you for choosing [facility]  for  your care. Our goal is always to provide you with excellent care. Hearing back from our patients is one way we can continue to improve our services. Please take a few minutes to complete the written survey that you may receive in the mail after your visit with us. Thank you!             Your Updated Medication List - Protect others around you: Learn how to safely use, store and throw away your medicines at www.disposemymeds.org.          This list is accurate as of 9/7/18  3:38 PM.  Always use your most recent med list.                   Brand Name Dispense Instructions for use Diagnosis    albuterol 108 (90 Base) MCG/ACT inhaler    PROAIR HFA/PROVENTIL HFA/VENTOLIN HFA    1 Inhaler    Inhale 2 puffs into the lungs 4 times daily    Mild asthma, unspecified whether complicated, unspecified whether persistent       amylase-lipase-protease 08648 units Cpep    CREON    180 capsule    Take 2 capsules (72,000 Units) by mouth 3 times daily (with meals)    Malignant neoplasm of head of pancreas (H)       BREO ELLIPTA 100-25 MCG/INH inhaler   Generic drug:  fluticasone-vilanterol           CALCIUM PO      Take by mouth every morning Form/strength unknown. Powder formulation. Add to water and fruits/vegetables daily to promote bone health.        CARTIA  MG 24 hr capsule   Generic drug:  diltiazem     10 capsule    TK 1 C PO QD    Benign essential hypertension       CHLORPHENIRAMINE MALEATE PO      Take 4 mg by mouth daily as needed        cholecalciferol 1000 UNIT tablet    vitamin D3     Take 1 tablet by mouth 2 times daily        cyanocolbalamin 500 MCG tablet    vitamin  B-12     Take 500 mcg by mouth every morning        folic acid 400 MCG tablet    FOLVITE     Take 1 tablet by mouth every morning        hydrOXYzine 25 MG tablet    ATARAX    60 tablet    Take 1-2 tablets (25-50 mg) by mouth every 6 hours as needed for itching (adjuvant pain)    Obstructive jaundice       levothyroxine 125 MCG tablet     SYNTHROID/LEVOTHROID    90 tablet    Take 1 tablet (125 mcg) by mouth daily    Hypothyroidism, unspecified type       loperamide 2 MG capsule    IMODIUM    60 capsule    2 caps at 1st sign of diarrhea & 1 cap every 2hrs until 12hrs diarrhea free. During night, 2 caps at bedtime & 2 caps every 4hrs until AM    Malignant neoplasm of head of pancreas (H)       LORazepam 0.5 MG tablet    ATIVAN    30 tablet    Take 1 tablet (0.5 mg) by mouth every 4 hours as needed (Anxiety, Nausea/Vomiting or Sleep)    Malignant neoplasm of head of pancreas (H)       magic mouthwash suspension (diphenhydrAMINE, lidocaine, aluminum-magnesium & simethicone) Susp compounding kit     237 mL    Swish and swallow 5-10 mLs in mouth every 6 hours as needed for mouth sores    Malignant neoplasm of head of pancreas (H)       MELATONIN PO      Take by mouth nightly as needed        nystatin 992514 UNIT/ML suspension    MYCOSTATIN    473 mL    Take 5 mLs (500,000 Units) by mouth 4 times daily    Malignant neoplasm of head of pancreas (H), History of pulmonary embolism       omeprazole 40 MG capsule    priLOSEC          * ondansetron 4 MG tablet    ZOFRAN          * ondansetron 8 MG tablet    ZOFRAN    30 tablet    Take 1 tablet (8 mg) by mouth every 8 hours as needed (nausea/vomiting)    Malignant neoplasm of head of pancreas (H)       order for DME     2 Units    Equipment being ordered: Compression Stockings. Please dispense 2 pairs of knee high 20-30 mmHg    Lymphedema       pantoprazole 20 MG EC tablet    PROTONIX    30 tablet    Take 1 tablet (20 mg) by mouth daily    Other acute gastritis without hemorrhage       PRO-BIOTIC BLEND PO      Take 1 Tablespoonful by mouth daily as needed        * prochlorperazine 5 MG tablet    COMPAZINE    30 tablet    Take 1 tablet (5 mg) by mouth every 6 hours as needed for nausea or vomiting    Malignant neoplasm of head of pancreas (H)       * prochlorperazine 10 MG tablet    COMPAZINE    30 tablet    Take 1  tablet (10 mg) by mouth every 6 hours as needed for nausea or vomiting    Malignant neoplasm of head of pancreas (H)       rivaroxaban ANTICOAGULANT 20 MG Tabs tablet    XARELTO    30 tablet    Take 1 tablet (20 mg) by mouth daily (with dinner)    Malignant neoplasm of head of pancreas (H), History of pulmonary embolism       * timolol maleate 0.5 % opthalmic solution    ISTALOL    1 Bottle    Place 1 drop into both eyes every morning Before placing contact lenses    Cataract, unspecified cataract type, unspecified laterality       * timolol 0.5 % ophthalmic solution    TIMOPTIC     INT 1 GTT OU IN THE MORNING        traMADol 50 MG tablet    ULTRAM    60 tablet    TAKE 1 TO 2 TABLETS BY MOUTH EVERY 4 HOURS AS NEEDED FOR BREAKTHROUGH PAIN. MAX 8 TABLETS PER DAY.    Malignant neoplasm of head of pancreas (H)       TYLENOL PO      Take 325 mg by mouth every 4 hours as needed for mild pain or fever        * Notice:  This list has 6 medication(s) that are the same as other medications prescribed for you. Read the directions carefully, and ask your doctor or other care provider to review them with you.

## 2018-09-07 NOTE — PROGRESS NOTES
"Infusion Nursing Note:  Iris Lewis presents today for Fluorouracil pump disconnect.    Patient seen by provider today: No   present during visit today: Not Applicable.    Note: Patient presents to infusion with CADD pump displaying \"reservoir volume empty\". Patient reports that she has been feeling well since her last infusion visit on 9/5/18 and has experienced no nausea. Denies any fevers or chills. Reports that her mouth sores have been improving and picked up her Magic Mouthwash from pharmacy before arriving to infusion today. Reports intermittent dizziness at night that quickly resolves. Baseline neuropathy to hands and feet remains unchanged. Denies pain today.    Intravenous Access:  Implanted Port.    Treatment Conditions:  Not Applicable.    Post Infusion Assessment:  Site patent and intact, free from redness, edema or discomfort.  No evidence of extravasations.  Access discontinued per protocol.    Discharge Plan:   Patient declined prescription refills.  Discharge instructions reviewed with: Patient.  Patient and/or family verbalized understanding of discharge instructions and all questions answered.  AVS to patient via [employer].  Patient will return 9/18/18 for next appointment.   Patient discharged in stable condition accompanied by: self.  Departure Mode: Ambulatory.  Face to Face time: 0 minutes.    Kenan Lindsay RN                          "

## 2018-09-18 NOTE — PATIENT INSTRUCTIONS
Contact Numbers    [facility] [place]: [phone]  [facility] [place] and after hours / weekends / holidays:  [phone]      Please call the triage or after hours line if you experience a temperature greater than or equal to 100.5, shaking chills, have uncontrolled nausea, vomiting and/or diarrhea, dizziness, shortness of breath, chest pain, bleeding, unexplained bruising, or if you have any other new/concerning symptoms, questions or concerns.      If you are having any concerning symptoms or wish to speak to a provider before your next infusion visit, please call your care coordinator or triage to notify them so we can adequately serve you.     If you need a refill on a narcotic prescription or other medication, please call before your infusion appointment.                   September 2018 Sunday Monday Tuesday Wednesday Thursday Friday Saturday                                 1       2     3     4     5     [facility]   12:45 PM   (15 min.)    [facility]   [facility] Draw     [facility] INFUS[facility] 180    1:30 PM   (180 min.)    ONCOLOGY INFUSION   [facility] 6     7     [facility] INFUSION 60    3:30 PM   (60 min.)    ONCOLOGY INFUSION   [facility] 8       9     10     11     12     13     14     15       16     17     18     [facility]    7:30 AM   (15 min.)    [facility]   [facility] Draw     [facility] INFUSION 180    8:00 AM   (180 min.)    ONCOLOGY INFUSION   [facility] 19     20     21     22       23     24     25     26     27     28     29       30                                              October 2018 Sunday Monday Tuesday Wednesday Thursday Friday Saturday        1     [facility]    8:30 AM   (15 min.)    [facility]     [facility] INF[facility] 180    9:00 AM   (180 min.)    ONCOLOGY INFUSION   [facility] 2     3     4     5     6        7     8     9     10     11     12     13       14     15     [facility] DRAW   10:00 AM   (15 min.)    [facility] DRAW   [facility] Lab Draw     P ONC INFUSION 180   10:30 AM   (180 min.)    ONCOLOGY INFUSION   [facility] 16     17     18     19     20       21     22     23     24     25     26     CT CHEST ABDOMEN PELVIS WWO   10:00 AM   (20 min.)   UCCT1   [facility] CT 27       28     29     [facility] DRAW    7:15 AM   (15 min.)    [facility] DRAW   [facility] Lab Draw     P RETURN    7:30 AM   (30 min.)   Vinny Yu MD   [facility] ONC INFUSION 180    9:00 AM   (180 min.)    ONCOLOGY INFUSION   [facility] 30     31                                Recent Results (from the past 24 hour(s))   Comprehensive metabolic panel    Collection Time: 09/18/18  8:28 AM   Result Value Ref Range    Sodium 140 133 - 144 mmol/L    Potassium 3.8 3.4 - 5.3 mmol/L    Chloride 107 94 - 109 mmol/L    Carbon Dioxide 26 20 - 32 mmol/L    Anion Gap 7 3 - 14 mmol/L    Glucose 104 (H) 70 - 99 mg/dL    Urea Nitrogen 10 7 - 30 mg/dL    Creatinine 0.69 0.52 - 1.04 mg/dL    GFR Estimate 83 >60 mL/min/1.7m2    GFR Estimate If Black >90 >60 mL/min/1.7m2    Calcium 8.3 (L) 8.5 - 10.1 mg/dL    Bilirubin Total 0.5 0.2 - 1.3 mg/dL    Albumin 2.7 (L) 3.4 - 5.0 g/dL    Protein Total 6.6 (L) 6.8 - 8.8 g/dL    Alkaline Phosphatase 420 (H) 40 - 150 U/L    ALT 54 (H) 0 - 50 U/L    AST 40 0 - 45 U/L   CBC with platelets differential    Collection Time: 09/18/18  8:28 AM   Result Value Ref Range    WBC 8.7 4.0 - 11.0 10e9/L    RBC Count 2.92 (L) 3.8 - 5.2 10e12/L    Hemoglobin 9.5 (L) 11.7 - 15.7 g/dL    Hematocrit 29.8 (L) 35.0 - 47.0 %     (H) 78 - 100 fl    MCH 32.5 26.5 - 33.0 pg    MCHC 31.9 31.5 - 36.5 g/dL    RDW 16.9 (H) 10.0 - 15.0 %    Platelet Count 273 150 - 450 10e9/L    Diff Method Automated Method     %  Neutrophils 69.4 %    % Lymphocytes 12.4 %    % Monocytes 9.4 %    % Eosinophils 7.3 %    % Basophils 0.8 %    % Immature Granulocytes 0.7 %    Nucleated RBCs 0 0 /100    Absolute Neutrophil 6.0 1.6 - 8.3 10e9/L    Absolute Lymphocytes 1.1 0.8 - 5.3 10e9/L    Absolute Monocytes 0.8 0.0 - 1.3 10e9/L    Absolute Eosinophils 0.6 0.0 - 0.7 10e9/L    Absolute Basophils 0.1 0.0 - 0.2 10e9/L    Abs Immature Granulocytes 0.1 0 - 0.4 10e9/L    Absolute Nucleated RBC 0.0

## 2018-09-18 NOTE — PROGRESS NOTES
"Infusion Nursing Note:  Iris Lewis presents today for Cycle 13 Day 1 irinotecan liposome/fluorouracil pump connect.    Patient seen by provider today: No   present during visit today: Not Applicable.    Note: pt presents to infusion room feeling generally well. Pt did have some mild \"indigestion\" this morning that was relieved by Tums. Pt's mouth sores have continued to improve. Pt reports no new complaints or symptoms.     Intravenous Access:  Implanted Port.    Treatment Conditions:  Lab Results   Component Value Date    HGB 9.5 09/18/2018     Lab Results   Component Value Date    WBC 8.7 09/18/2018      Lab Results   Component Value Date    ANEU 6.0 09/18/2018     Lab Results   Component Value Date     09/18/2018      Lab Results   Component Value Date     09/18/2018                   Lab Results   Component Value Date    POTASSIUM 3.8 09/18/2018           Lab Results   Component Value Date                  Lab Results   Component Value Date    CR 0.69 09/18/2018                   Lab Results   Component Value Date    EMMY 8.3 09/18/2018                Lab Results   Component Value Date    BILITOTAL 0.5 09/18/2018           Lab Results   Component Value Date    ALBUMIN 2.7 09/18/2018                    Lab Results   Component Value Date    ALT 54 09/18/2018           Lab Results   Component Value Date    AST 40 09/18/2018       Results reviewed, labs MET treatment parameters, ok to proceed with treatment.      Post Infusion Assessment:  Patient tolerated infusion without incident.  Blood return noted pre and post infusion.  Site patent and intact, free from redness, edema or discomfort.  No evidence of extravasations.    Prior to discharge: Port is secured in place with tegaderm and flushed with 10cc NS with positive blood return noted.  Continuous home infusion CADD pump connected.    All connectors secured in place and clamps taped open. Verified with Nitza Zurita RN.   Pump " "started, \"running\" noted on display (CADD): YES.  Patient instructed to call our clinic or [facility] with any questions or concerns at home.  Patient verbalized understanding.    Patient set up for pump disconnect at our clinic on Thursday 9/20 at 0930.        Discharge Plan:   Patient declined prescription refills.  AVS to patient via [employer].  Patient will return 10/1 for next appointment.   Patient discharged in stable condition accompanied by: .  Departure Mode: Ambulatory.    EVANGELINA MENEZES RN                        "

## 2018-09-18 NOTE — MR AVS SNAPSHOT
After Visit Summary   9/18/2018    Iris Lewis    MRN: [id number]           Patient Information     Date Of Birth          1940        Visit Information        Provider Department      9/18/2018 8:00 AM  18 ATC;  ONCOLOGY INFUSION [facility]        Today's Diagnoses     Malignant neoplasm of head of pancreas (H)    -  1      Care Instructions    Contact Numbers    [facility] [place]: [phone]  [facility] Triage and after hours / weekends / holidays:  [phone]      Please call the triage or after hours line if you experience a temperature greater than or equal to 100.5, shaking chills, have uncontrolled nausea, vomiting and/or diarrhea, dizziness, shortness of breath, chest pain, bleeding, unexplained bruising, or if you have any other new/concerning symptoms, questions or concerns.      If you are having any concerning symptoms or wish to speak to a provider before your next infusion visit, please call your care coordinator or triage to notify them so we can adequately serve you.     If you need a refill on a narcotic prescription or other medication, please call before your infusion appointment.                   September 2018 Sunday Monday Tuesday Wednesday Thursday Friday Saturday                                 1       2     3     4     5     [facility]   12:45 PM   (15 min.)    [facility] LAB DRAW   [facility] Lab Draw     [facility] ONC INFUSION 180    1:30 PM   (180 min.)    ONCOLOGY INFUSION   [facility] 6     7     [facility] ONC INFUSION 60    3:30 PM   (60 min.)    ONCOLOGY INFUSION   [facility] 8       9     10     11     12     13     14     15       16     17     18     [facility]    7:30 AM   (15 min.)    [facility] LAB DRAW   [facility] Lab Draw     [facility] ONC INFUSION 180    8:00 AM   (180 min.)    ONCOLOGY INFUSION   [facility] 19     20     21     22       23     24      25     26     27     28     29       30 October 2018 Sunday Monday Tuesday Wednesday Thursday Friday Saturday        1     [facility] DRAW    8:30 AM   (15 min.)    [facility] LAB DRAW   [facility] Lab Draw     [facility] ONC INFUSION 180    9:00 AM   (180 min.)    ONCOLOGY INFUSION   [facility] 2     3     4     5     6       7     8     9     10     11     12     13       14     15     [facility] DRAW   10:00 AM   (15 min.)    [facility] LAB DRAW   [facility] Lab Draw     [facility] ONC INFUSION 180   10:30 AM   (180 min.)    ONCOLOGY INFUSION   [facility] 16     17     18     19     20       21     22     23     24     25     26     CT CHEST ABDOMEN PELVIS WWO   10:00 AM   (20 min.)   CT1   [facility] CT 27       28     29     [facility] DRAW    7:15 AM   (15 min.)    [facility] LAB DRAW   [facility] Lab Draw     [facility] RETURN    7:30 AM   (30 min.)   Vinny Yu MD   [facility]     [facility] ONC INFUSION 180    9:00 AM   (180 min.)    ONCOLOGY INFUSION   [facility] 30     31                                Recent Results (from the past 24 hour(s))   Comprehensive metabolic panel    Collection Time: 09/18/18  8:28 AM   Result Value Ref Range    Sodium 140 133 - 144 mmol/L    Potassium 3.8 3.4 - 5.3 mmol/L    Chloride 107 94 - 109 mmol/L    Carbon Dioxide 26 20 - 32 mmol/L    Anion Gap 7 3 - 14 mmol/L    Glucose 104 (H) 70 - 99 mg/dL    Urea Nitrogen 10 7 - 30 mg/dL    Creatinine 0.69 0.52 - 1.04 mg/dL    GFR Estimate 83 >60 mL/min/1.7m2    GFR Estimate If Black >90 >60 mL/min/1.7m2    Calcium 8.3 (L) 8.5 - 10.1 mg/dL    Bilirubin Total 0.5 0.2 - 1.3 mg/dL    Albumin 2.7 (L) 3.4 - 5.0 g/dL    Protein Total 6.6 (L) 6.8 - 8.8 g/dL    Alkaline Phosphatase 420 (H) 40 - 150 U/L    ALT 54 (H) 0 - 50 U/L    AST 40 0 - 45 U/L   CBC with platelets differential     Collection Time: 09/18/18  8:28 AM   Result Value Ref Range    WBC 8.7 4.0 - 11.0 10e9/L    RBC Count 2.92 (L) 3.8 - 5.2 10e12/L    Hemoglobin 9.5 (L) 11.7 - 15.7 g/dL    Hematocrit 29.8 (L) 35.0 - 47.0 %     (H) 78 - 100 fl    MCH 32.5 26.5 - 33.0 pg    MCHC 31.9 31.5 - 36.5 g/dL    RDW 16.9 (H) 10.0 - 15.0 %    Platelet Count 273 150 - 450 10e9/L    Diff Method Automated Method     % Neutrophils 69.4 %    % Lymphocytes 12.4 %    % Monocytes 9.4 %    % Eosinophils 7.3 %    % Basophils 0.8 %    % Immature Granulocytes 0.7 %    Nucleated RBCs 0 0 /100    Absolute Neutrophil 6.0 1.6 - 8.3 10e9/L    Absolute Lymphocytes 1.1 0.8 - 5.3 10e9/L    Absolute Monocytes 0.8 0.0 - 1.3 10e9/L    Absolute Eosinophils 0.6 0.0 - 0.7 10e9/L    Absolute Basophils 0.1 0.0 - 0.2 10e9/L    Abs Immature Granulocytes 0.1 0 - 0.4 10e9/L    Absolute Nucleated RBC 0.0                  Follow-ups after your visit        Your next 10 appointments already scheduled     Oct 01, 2018  8:30 AM CDT   [facility] Lab Draw with  [facility] LAB DRAW   [facility] Lab Draw ([facility])    [address] [place] [address]  [address]  [facility] [address] [phone]            Oct 01, 2018  9:00 AM CDT   Infusion 180 with UC ONCOLOGY INFUSION, UC 24 ATC   [facility] ([facility])    [address] [place] [address] Se  [address]  [facility] [address] [phone]            Oct 15, 2018 10:00 AM CDT   [facility] Lab Draw with UC [facility] LAB DRAW   [facility] Draw ([facility])    [address] [place] [address] Se  [address]  [facility] [address] [phone]            Oct 15, 2018 10:30 AM CDT   Infusion 180 with UC ONCOLOGY INFUSION, UC 22 ATC   [facility] ([facility])    [address]  [address]  [facility] [address] [phone]            Oct 26, 2018 10:00 AM CDT   CT CHEST ABDOMEN PELVIS W/O & W  CONTRAST with UCCT1   [facility] CT ([facility])    [address]  1st Floor  [facility] [address] [phone]           How do I prepare for my exam? (Food and drink instructions) To prepare: Do not eat or drink for 2 hours before your exam. If you need to take medicine, you may take it with small sips of water. (We may ask you to take liquid medicine as well.)  How do I prepare for my exam? (Other instructions) Please arrive 30 minutes early for your CT.  Once in the department you might be asked to drink water 15-20 minutes prior to your exam.  If indicated you may be asked to drink an oral contrast in advance of your CT.  If this is the case, the imaging team will let you know or be in contact with you prior to your appointment  Patients over 70 or patients with diabetes or kidney problems: If you haven t had a blood test (creatinine test) within the last 30 days, the Cardiologist/Radiologist may require you to get this test prior to your exam.  If you have diabetes:  Continue to take your metformin medication on the day of your exam  What should I wear: Please wear loose clothing, such as a sweat suit or jogging clothes. Avoid snaps, zippers and other metal. We may ask you to undress and put on a hospital gown.  How long does the exam take: Most scans take less than 20 minutes.  What should I bring: Please bring any scans or X-rays taken at other hospitals, if similar tests were done. Also bring a list of your medicines, including vitamins, minerals and over-the-counter drugs. It is safest to leave personal items at home.  Do I need a : No  is needed.  What do I need to tell my doctor? Be sure to tell your doctor: * If you have any allergies. * If there s any chance you are pregnant. * If you are breastfeeding.  What should I do after the exam: No restrictions, You may resume normal activities.  What is this test: A CT (computed tomography) scan  is a series of pictures that allows us to look inside your body. The scanner creates images of the body in cross sections, much like slices of bread. This helps us see any problems more clearly. You may receive contrast (X-ray dye) before or during your scan. You will be asked to drink the contrast.  Who should I call with questions: If you have any questions, please call the Imaging Department where you will have your exam. Directions, parking instructions, and other information is available on our website, [email or web address].            Oct 29, 2018  7:15 AM CDT   [facility] Lab Draw with  MASONIC LAB DRAW   [facility] Lab Draw ([facility])    [address] [place] [address]  [address]  [facility] [address] [phone]            Oct 29, 2018  7:45 AM CDT   (Arrive by 7:30 AM)   Return Visit with Vinny Yu MD   [facility] ([facility])    [address] [place] [address]  [address]  [facility] [address] [phone]            Oct 29, 2018  9:00 AM CDT   Infusion 180 with  ONCOLOGY INFUSION   [facility] ([facility])    [address] [place] [address]  [address]  [facility] [address] [phone]              Who to contact     If you have questions or need follow up information about today's clinic visit or your schedule please contact [facility] directly at [phone].  Normal or non-critical lab and imaging results will be communicated to you by MyChart, letter or phone within 4 business days after the clinic has received the results. If you do not hear from us within 7 days, please contact the clinic through MyChart or phone. If you have a critical or abnormal lab result, we will notify you by phone as soon as possible.  Submit refill requests through [employer] or call your pharmacy and they will forward the refill request to us. Please allow 3 business days for your  refill to be completed.          Additional Information About Your Visit        MyChart Information     [employer] gives you secure access to your electronic health record. If you see a primary care provider, you can also send messages to your care team and make appointments. If you have questions, please call your primary care clinic.  If you do not have a primary care provider, please call [phone] and they will assist you.        Care EveryWhere ID     This is your Care EveryWhere ID. This could be used by other organizations to access your [place] medical records  [id number]        Your Vitals Were     Pulse Temperature Pulse Oximetry BMI (Body Mass Index)          75 98.5  F (36.9  C) (Oral) 98% 18.69 kg/m2         Blood Pressure from Last 3 Encounters:   09/18/18 125/63   09/07/18 120/62   09/05/18 127/63    Weight from Last 3 Encounters:   09/18/18 46.4 kg (102 lb 3.2 oz)   09/05/18 44.9 kg (98 lb 14.4 oz)   08/27/18 43.3 kg (95 lb 6.4 oz)              We Performed the Following     CBC with platelets differential     Comprehensive metabolic panel        Primary Care Provider Office Phone # Fax #    Neri Gipson [phone]       0 [address] [address]        Equal Access to Services     NESSA NICHOLS : Hadii aad ku hadasho Soomaali, waaxda luqadaha, qaybta kaalmada adeegyada, lisa idiin haylevyn rosey adams . So [facility] [phone].    ATENCIÓN: Si habla español, tiene a nava disposición servicios gratuitos de asistencia lingüística. Llame al [phone].    We comply with applicable federal civil rights laws and [place] laws. We do not discriminate on the basis of race, color, national origin, age, disability, sex, sexual orientation, or gender identity.            Thank you!     Thank you for choosing [facility]  for your care. Our goal is always to provide you with excellent care. Hearing back from our patients is one way we can  continue to improve our services. Please take a few minutes to complete the written survey that you may receive in the mail after your visit with us. Thank you!             Your Updated Medication List - Protect others around you: Learn how to safely use, store and throw away your medicines at www.disposemymeds.org.          This list is accurate as of 9/18/18 10:06 AM.  Always use your most recent med list.                   Brand Name Dispense Instructions for use Diagnosis    albuterol 108 (90 Base) MCG/ACT inhaler    PROAIR HFA/PROVENTIL HFA/VENTOLIN HFA    1 Inhaler    Inhale 2 puffs into the lungs 4 times daily    Mild asthma, unspecified whether complicated, unspecified whether persistent       amylase-lipase-protease 60856 units Cpep    CREON    180 capsule    Take 2 capsules (72,000 Units) by mouth 3 times daily (with meals)    Malignant neoplasm of head of pancreas (H)       BREO ELLIPTA 100-25 MCG/INH inhaler   Generic drug:  fluticasone-vilanterol           CALCIUM PO      Take by mouth every morning Form/strength unknown. Powder formulation. Add to water and fruits/vegetables daily to promote bone health.        CARTIA  MG 24 hr capsule   Generic drug:  diltiazem     10 capsule    TK 1 C PO QD    Benign essential hypertension       CHLORPHENIRAMINE MALEATE PO      Take 4 mg by mouth daily as needed        cholecalciferol 1000 UNIT tablet    vitamin D3     Take 1 tablet by mouth 2 times daily        cyanocolbalamin 500 MCG tablet    vitamin  B-12     Take 500 mcg by mouth every morning        folic acid 400 MCG tablet    FOLVITE     Take 1 tablet by mouth every morning        hydrOXYzine 25 MG tablet    ATARAX    60 tablet    Take 1-2 tablets (25-50 mg) by mouth every 6 hours as needed for itching (adjuvant pain)    Obstructive jaundice       levothyroxine 125 MCG tablet    SYNTHROID/LEVOTHROID    90 tablet    Take 1 tablet (125 mcg) by mouth daily    Hypothyroidism, unspecified type        loperamide 2 MG capsule    IMODIUM    60 capsule    2 caps at 1st sign of diarrhea & 1 cap every 2hrs until 12hrs diarrhea free. During night, 2 caps at bedtime & 2 caps every 4hrs until AM    Malignant neoplasm of head of pancreas (H)       LORazepam 0.5 MG tablet    ATIVAN    30 tablet    Take 1 tablet (0.5 mg) by mouth every 4 hours as needed (Anxiety, Nausea/Vomiting or Sleep)    Malignant neoplasm of head of pancreas (H)       magic mouthwash suspension (diphenhydrAMINE, lidocaine, aluminum-magnesium & simethicone) Susp compounding kit     237 mL    Swish and swallow 5-10 mLs in mouth every 6 hours as needed for mouth sores    Malignant neoplasm of head of pancreas (H)       MELATONIN PO      Take by mouth nightly as needed        nystatin 324111 UNIT/ML suspension    MYCOSTATIN    473 mL    Take 5 mLs (500,000 Units) by mouth 4 times daily    Malignant neoplasm of head of pancreas (H), History of pulmonary embolism       omeprazole 40 MG capsule    priLOSEC          * ondansetron 4 MG tablet    ZOFRAN          * ondansetron 8 MG tablet    ZOFRAN    30 tablet    Take 1 tablet (8 mg) by mouth every 8 hours as needed (nausea/vomiting)    Malignant neoplasm of head of pancreas (H)       order for DME     2 Units    Equipment being ordered: Compression Stockings. Please dispense 2 pairs of knee high 20-30 mmHg    Lymphedema       pantoprazole 20 MG EC tablet    PROTONIX    30 tablet    Take 1 tablet (20 mg) by mouth daily    Other acute gastritis without hemorrhage       PRO-BIOTIC BLEND PO      Take 1 Tablespoonful by mouth daily as needed        * prochlorperazine 5 MG tablet    COMPAZINE    30 tablet    Take 1 tablet (5 mg) by mouth every 6 hours as needed for nausea or vomiting    Malignant neoplasm of head of pancreas (H)       * prochlorperazine 10 MG tablet    COMPAZINE    30 tablet    Take 1 tablet (10 mg) by mouth every 6 hours as needed for nausea or vomiting    Malignant neoplasm of head of pancreas (H)        rivaroxaban ANTICOAGULANT 20 MG Tabs tablet    XARELTO    30 tablet    Take 1 tablet (20 mg) by mouth daily (with dinner)    Malignant neoplasm of head of pancreas (H), History of pulmonary embolism       * timolol maleate 0.5 % opthalmic solution    ISTALOL    1 Bottle    Place 1 drop into both eyes every morning Before placing contact lenses    Cataract, unspecified cataract type, unspecified laterality       * timolol 0.5 % ophthalmic solution    TIMOPTIC     INT 1 GTT OU IN THE MORNING        traMADol 50 MG tablet    ULTRAM    60 tablet    TAKE 1 TO 2 TABLETS BY MOUTH EVERY 4 HOURS AS NEEDED FOR BREAKTHROUGH PAIN. MAX 8 TABLETS PER DAY.    Malignant neoplasm of head of pancreas (H)       TYLENOL PO      Take 325 mg by mouth every 4 hours as needed for mild pain or fever        * Notice:  This list has 6 medication(s) that are the same as other medications prescribed for you. Read the directions carefully, and ask your doctor or other care provider to review them with you.

## 2018-09-20 NOTE — PROGRESS NOTES
Infusion Nursing Note:  Iris Lewis presents today for Fluorouracil pump disconnection.   Patient seen by provider today: No   present during visit today: Not Applicable.    Note: Patient presents to disconnect home-going pump. Pump is complete upon patient arrival. Pump disconnected, port flushed, and de-accessed per protocol. .    Intravenous Access:  Implanted Port.    Post Infusion Assessment:  Patient tolerated infusion without incident.  Blood return noted pre and post infusion.  Site patent and intact, free from redness, edema or discomfort.  No evidence of extravasations.  Access discontinued per protocol.    Discharge Plan:   Patient declined prescription refills.  Discharge instructions reviewed with: Patient.  Patient and/or family verbalized understanding of discharge instructions and all questions answered.  AVS to patient via [employer].  Patient will return 10/1/2018 for next appointment.   Patient discharged in stable condition accompanied by: self.  Departure Mode: Ambulatory.    Kleebr Monte RN

## 2018-09-20 NOTE — PATIENT INSTRUCTIONS
[facility] Main Line: [phone]    Call triage nurse with chills and/or temperature greater than or equal to 100.4, uncontrolled nausea/vomiting, diarrhea, constipation, dizziness, shortness of breath, chest pain, bleeding, unexplained bruising, or any new/concerning symptoms, questions/concerns.     If you are having any concerning symptoms or wish to speak to a provider before your next infusion visit, please call your care coordinator or triage to notify them so we can adequately serve you.     For triage nurse, after hours, weekends, and holidays: [phone]          September 2018 Sunday Monday Tuesday Wednesday Thursday Friday Saturday                                 1       2     3     4     5     [facility] DRAW   12:45 PM   (15 min.)    MASONIC LAB DRAW   [facility] Masonic Lab Draw     UMP ONC INFUSION 180    1:30 PM   (180 min.)    ONCOLOGY INFUSION   [facility] 6     7     UMP ONC INFUSION 60    3:30 PM   (60 min.)    ONCOLOGY INFUSION   [facility] 8       9     10     11     12     13     14     15       16     17     18     [facility] DRAW    7:30 AM   (15 min.)    MASONIC LAB DRAW   [facility] Lab Draw     [facility] ONC INFUSION 180    8:00 AM   (180 min.)    ONCOLOGY INFUSION   [facility] 19     20     UMP ONC INFUSION 60    9:30 AM   (60 min.)    ONCOLOGY INFUSION   [facility] 21     22       23     24     25     26     27     28     29       30 October 2018 Sunday Monday Tuesday Wednesday Thursday Friday Saturday        1     [facility] DRAW    8:30 AM   (15 min.)    MASONIC LAB DRAW   [facility] Lab Draw     [facility] ONC INFUSION 180    9:00 AM   (180 min.)    ONCOLOGY INFUSION   [facility] 2     3     4     5     6       7     8     9     10     11     12     13       14     15     [facility]  DRAW   10:00 AM   (15 min.)    MASONIC LAB DRAW   [facility] Lab Draw     [facility] ONC INFUSION 180   10:30 AM   (180 min.)    ONCOLOGY INFUSION   [facility] 16     17     18     19     20       21     22     23     24     25     26     CT CHEST ABDOMEN PELVIS WWO   10:00 AM   (20 min.)   UCCT1   [facility] CT 27       28     29     [facility] MASONIC LAB DRAW    7:15 AM   (15 min.)   [facility] DRAW   [facility] Lab Draw     P RETURN    7:30 AM   (30 min.)   Vinny Yu MD   [facility] ONC INFUSION 180    9:00 AM   (180 min.)    ONCOLOGY INFUSION   [facility] 30     31                                 Lab Results:  No results found for this or any previous visit (from the past 12 hour(s)).

## 2018-09-20 NOTE — MR AVS SNAPSHOT
After Visit Summary   9/20/2018    Iris Lewis    MRN: [id number]           Patient Information     Date Of Birth          1940        Visit Information        Provider Department      9/20/2018 9:30 AM  30 ATC; UC ONCOLOGY INFUSION [facility]        Today's Diagnoses     Malignant neoplasm of head of pancreas (H)    -  1      Care [facility] Main Line: [phone]    Call triage nurse with chills and/or temperature greater than or equal to 100.4, uncontrolled nausea/vomiting, diarrhea, constipation, dizziness, shortness of breath, chest pain, bleeding, unexplained bruising, or any new/concerning symptoms, questions/concerns.     If you are having any concerning symptoms or wish to speak to a provider before your next infusion visit, please call your care coordinator or triage to notify them so we can adequately serve you.     For triage nurse, after hours, weekends, and holidays: [phone]          September 2018 Sunday Monday Tuesday Wednesday Thursday Friday Saturday                                 1       2     3     4     5     [facility]   12:45 PM   (15 min.)    [facility] LAB DRAW   [facility] Lab Draw     [facility] ONC INFUSION 180    1:30 PM   (180 min.)    ONCOLOGY INFUSION   [facility] 6     7     [facility] ONC INFUSION 60    3:30 PM   (60 min.)    ONCOLOGY INFUSION   [facility] 8       9     10     11     12     13     14     15       16     17     18     [facility] D[facility]    7:30 AM   (15 min.)    [facility] LAB DRAW   [facility] Lab Draw     [facility] ONC INFUSION 180    8:00 AM   (180 min.)    ONCOLOGY INFUSION   [facility] 19     20     [facility] ONC INFUSION 60    9:30 AM   (60 min.)    ONCOLOGY INFUSION   [facility] 21     22       23     24     25     26     27     28     29       30                                               October 2018 Sunday Monday Tuesday Wednesday Thursday Friday Saturday        1     [facility] DRAW    8:30 AM   (15 min.)   [facility] DRAW   [facility] Lab Draw     [facility] ONC INFUSION 180    9:00 AM   (180 min.)   UC ONCOLOGY INFUSION   [facility] 2     3     4     5     6       7     8     9     10     11     12     13       14     15     [facility] DRAW   10:00 AM   (15 min.)   [facility] DRAW   [facility] Lab Draw     UMP ONC INFUSION 180   10:30 AM   (180 min.)   UC ONCOLOGY INFUSION   [facility] 16     17     18     19     20       21     22     23     24     25     26     CT CHEST ABDOMEN PELVIS WWO   10:00 AM   (20 min.)   UCCT1   [facility] CT 27       28     29     [facility] DRAW    7:15 AM   (15 min.)    [facility] LAB DRAW   [facility] Lab Draw     UMP RETURN    7:30 AM   (30 min.)   Vinny Yu MD   [facility]     [facility] ONC INFUSION 180    9:00 AM   (180 min.)    ONCOLOGY INFUSION   [facility] 30     31                                 Lab Results:  No results found for this or any previous visit (from the past 12 hour(s)).                Follow-ups after your visit        Your next 10 appointments already scheduled     Oct 01, 2018  8:30 AM CDT   [facility] Lab Draw with [facility] DRAW   [facility] ([facility])    [address] [place] [address]  [address] [place] [id number]   [phone]            Oct 01, 2018  9:00 AM CDT   Infusion 180 with UC ONCOLOGY INFUSION, UC 24 ATC   [facility] ([facility])    [address] [place] [address]  [address]  [facility] [address]   [phone]            Oct 15, 2018 10:00 AM CDT   [facility] Lab Draw with [facility] DRAW   [facility] ([facility])    [address] [place] [address]  [address]  [place]  [place] [address]   [phone]            Oct 15, 2018 10:30 AM CDT   Infusion 180 with UC ONCOLOGY INFUSION, UC 22 ATC   [facility] ([facility] [place])    [address]  [address]  [facility] [address]   [phone]            Oct 26, 2018 10:00 AM CDT   CT CHEST ABDOMEN PELVIS W/O & W CONTRAST with UCCT1   [facility] CT ([facility] [place])    [address]  1st Floor  [facility] [address]   [phone]           How do I prepare for my exam? (Food and drink instructions) To prepare: Do not eat or drink for 2 hours before your exam. If you need to take medicine, you may take it with small sips of water. (We may ask you to take liquid medicine as well.)  How do I prepare for my exam? (Other instructions) Please arrive 30 minutes early for your CT.  Once in the department you might be asked to drink water 15-20 minutes prior to your exam.  If indicated you may be asked to drink an oral contrast in advance of your CT.  If this is the case, the imaging team will let you know or be in contact with you prior to your appointment  Patients over 70 or patients with diabetes or kidney problems: If you haven t had a blood test (creatinine test) within the last 30 days, the Cardiologist/Radiologist may require you to get this test prior to your exam.  If you have diabetes:  Continue to take your metformin medication on the day of your exam  What should I wear: Please wear loose clothing, such as a sweat suit or jogging clothes. Avoid snaps, zippers and other metal. We may ask you to undress and put on a hospital gown.  How long does the exam take: Most scans take less than 20 minutes.  What should I bring: Please bring any scans or X-rays taken at other hospitals, if similar tests were done. Also bring a list of your medicines, including vitamins, minerals and over-the-counter drugs. It is safest to leave personal items at home.  Do I  need a : No  is needed.  What do I need to tell my doctor? Be sure to tell your doctor: * If you have any allergies. * If there s any chance you are pregnant. * If you are breastfeeding.  What should I do after the exam: No restrictions, You may resume normal activities.  What is this test: A CT (computed tomography) scan is a series of pictures that allows us to look inside your body. The scanner creates images of the body in cross sections, much like slices of bread. This helps us see any problems more clearly. You may receive contrast (X-ray dye) before or during your scan. You will be asked to drink the contrast.  Who should I call with questions: If you have any questions, please call the Imaging Department where you will have your exam. Directions, parking instructions, and other information is available on our website, [email or web address].            Oct 29, 2018  7:15 AM CDT   [facility] Lab Draw with  M[facility]IC LAB DRAW   [facility] Lab Draw ([facility])    [address] [place] [address]  [address] [place] [id number] [phone]            Oct 29, 2018  7:45 AM CDT   (Arrive by 7:30 AM)   Return Visit with Vinny uY MD   [facility] ([facility])    [address] [place] [address]  [address]  [facility] [id number] [phone]            Oct 29, 2018  9:00 AM CDT   Infusion 180 with UC ONCOLOGY INFUSION   [facility] ([facility])    [address] [place] [address]  [address] [place] [id number] [phone]              Who to contact     If you have questions or need follow up information about today's clinic visit or your schedule please contact [facility] directly at [phone].  Normal or non-critical lab and imaging results will be communicated to you by MyChart, letter or phone within 4 business days after the clinic has received the results. If  you do not hear from us within 7 days, please contact the clinic through [employer] or phone. If you have a critical or abnormal lab result, we will notify you by phone as soon as possible.  Submit refill requests through [employer] or call your pharmacy and they will forward the refill request to us. Please allow 3 business days for your refill to be completed.          Additional Information About Your Visit        [employer] Information     [employer] gives you secure access to your electronic health record. If you see a primary care provider, you can also send messages to your care team and make appointments. If you have questions, please call your primary care clinic.  If you do not have a primary care provider, please call [phone] and they will assist you.        Care EveryWhere ID     This is your Care EveryWhere ID. This could be used by other organizations to access your [place] medical records  [id number]        Your Vitals Were     Pulse Temperature Respirations Pulse Oximetry          68 97.6  F (36.4  C) (Oral) 16 100%         Blood Pressure from Last 3 Encounters:   09/20/18 134/57   09/18/18 125/63   09/07/18 120/62    Weight from Last 3 Encounters:   09/18/18 46.4 kg (102 lb 3.2 oz)   09/05/18 44.9 kg (98 lb 14.4 oz)   08/27/18 43.3 kg (95 lb 6.4 oz)              Today, you had the following     No orders found for display       Primary Care Provider Office Phone # Fax #    Neri Gipson [phone]       [address] [address]        Equal Access to Services     NESSA NICHOLS : Hadii aad ku hadasho Soomaali, waaxda luqadaha, qaybta kaalmada adeegyameera, lisa adams . So [facility] [phone].    ATENCIÓN: Si habla español, tiene a nava disposición servicios gratuitos de asistencia lingüística. Llame al [phone].    We comply with applicable federal civil rights laws and [place] laws. We do not discriminate on the basis of race, color,  national origin, age, disability, sex, sexual orientation, or gender identity.            Thank you!     Thank you for choosing [facility]  for your care. Our goal is always to provide you with excellent care. Hearing back from our patients is one way we can continue to improve our services. Please take a few minutes to complete the written survey that you may receive in the mail after your visit with us. Thank you!             Your Updated Medication List - Protect others around you: Learn how to safely use, store and throw away your medicines at www.disposemymeds.org.          This list is accurate as of 9/20/18 10:27 AM.  Always use your most recent med list.                   Brand Name Dispense Instructions for use Diagnosis    albuterol 108 (90 Base) MCG/ACT inhaler    PROAIR HFA/PROVENTIL HFA/VENTOLIN HFA    1 Inhaler    Inhale 2 puffs into the lungs 4 times daily    Mild asthma, unspecified whether complicated, unspecified whether persistent       amylase-lipase-protease 55851 units Cpep    CREON    180 capsule    Take 2 capsules (72,000 Units) by mouth 3 times daily (with meals)    Malignant neoplasm of head of pancreas (H)       BREO ELLIPTA 100-25 MCG/INH inhaler   Generic drug:  fluticasone-vilanterol           CALCIUM PO      Take by mouth every morning Form/strength unknown. Powder formulation. Add to water and fruits/vegetables daily to promote bone health.        CARTIA  MG 24 hr capsule   Generic drug:  diltiazem     10 capsule    TK 1 C PO QD    Benign essential hypertension       CHLORPHENIRAMINE MALEATE PO      Take 4 mg by mouth daily as needed        cholecalciferol 1000 UNIT tablet    vitamin D3     Take 1 tablet by mouth 2 times daily        cyanocolbalamin 500 MCG tablet    vitamin  B-12     Take 500 mcg by mouth every morning        folic acid 400 MCG tablet    FOLVITE     Take 1 tablet by mouth every morning        hydrOXYzine 25 MG tablet    ATARAX    60 tablet     Take 1-2 tablets (25-50 mg) by mouth every 6 hours as needed for itching (adjuvant pain)    Obstructive jaundice       levothyroxine 125 MCG tablet    SYNTHROID/LEVOTHROID    90 tablet    Take 1 tablet (125 mcg) by mouth daily    Hypothyroidism, unspecified type       loperamide 2 MG capsule    IMODIUM    60 capsule    2 caps at 1st sign of diarrhea & 1 cap every 2hrs until 12hrs diarrhea free. During night, 2 caps at bedtime & 2 caps every 4hrs until AM    Malignant neoplasm of head of pancreas (H)       LORazepam 0.5 MG tablet    ATIVAN    30 tablet    Take 1 tablet (0.5 mg) by mouth every 4 hours as needed (Anxiety, Nausea/Vomiting or Sleep)    Malignant neoplasm of head of pancreas (H)       magic mouthwash suspension (diphenhydrAMINE, lidocaine, aluminum-magnesium & simethicone) Susp compounding kit     237 mL    Swish and swallow 5-10 mLs in mouth every 6 hours as needed for mouth sores    Malignant neoplasm of head of pancreas (H)       MELATONIN PO      Take by mouth nightly as needed        nystatin 637438 UNIT/ML suspension    MYCOSTATIN    473 mL    Take 5 mLs (500,000 Units) by mouth 4 times daily    Malignant neoplasm of head of pancreas (H), History of pulmonary embolism       omeprazole 40 MG capsule    priLOSEC          * ondansetron 4 MG tablet    ZOFRAN          * ondansetron 8 MG tablet    ZOFRAN    30 tablet    Take 1 tablet (8 mg) by mouth every 8 hours as needed (nausea/vomiting)    Malignant neoplasm of head of pancreas (H)       order for DME     2 Units    Equipment being ordered: Compression Stockings. Please dispense 2 pairs of knee high 20-30 mmHg    Lymphedema       pantoprazole 20 MG EC tablet    PROTONIX    30 tablet    Take 1 tablet (20 mg) by mouth daily    Other acute gastritis without hemorrhage       PRO-BIOTIC BLEND PO      Take 1 Tablespoonful by mouth daily as needed        * prochlorperazine 5 MG tablet    COMPAZINE    30 tablet    Take 1 tablet (5 mg) by mouth every 6  hours as needed for nausea or vomiting    Malignant neoplasm of head of pancreas (H)       * prochlorperazine 10 MG tablet    COMPAZINE    30 tablet    Take 1 tablet (10 mg) by mouth every 6 hours as needed for nausea or vomiting    Malignant neoplasm of head of pancreas (H)       rivaroxaban ANTICOAGULANT 20 MG Tabs tablet    XARELTO    30 tablet    Take 1 tablet (20 mg) by mouth daily (with dinner)    Malignant neoplasm of head of pancreas (H), History of pulmonary embolism       * timolol maleate 0.5 % opthalmic solution    ISTALOL    1 Bottle    Place 1 drop into both eyes every morning Before placing contact lenses    Cataract, unspecified cataract type, unspecified laterality       * timolol 0.5 % ophthalmic solution    TIMOPTIC     INT 1 GTT OU IN THE MORNING        traMADol 50 MG tablet    ULTRAM    60 tablet    TAKE 1 TO 2 TABLETS BY MOUTH EVERY 4 HOURS AS NEEDED FOR BREAKTHROUGH PAIN. MAX 8 TABLETS PER DAY.    Malignant neoplasm of head of pancreas (H)       TYLENOL PO      Take 325 mg by mouth every 4 hours as needed for mild pain or fever        * Notice:  This list has 6 medication(s) that are the same as other medications prescribed for you. Read the directions carefully, and ask your doctor or other care provider to review them with you.

## 2018-10-01 NOTE — PATIENT INSTRUCTIONS
Contact Numbers    [facility] Main Line: [phone]  [facility] Triage and after hours / weekends / holidays:  [phone]      Please call the triage or after hours line if you experience a temperature greater than or equal to 100.5, shaking chills, have uncontrolled nausea, vomiting and/or diarrhea, dizziness, shortness of breath, chest pain, bleeding, unexplained bruising, or if you have any other new/concerning symptoms, questions or concerns.      If you are having any concerning symptoms or wish to speak to a provider before your next infusion visit, please call your care coordinator or triage to notify them so we can adequately serve you.     If you need a refill on a narcotic prescription or other medication, please call before your infusion appointment.                   October 2018 Sunday Monday Tuesday Wednesday Thursday Friday Saturday        1     [facility] DRAW    8:30 AM   (15 min.)    [facility] LAB DRAW   [facility] Lab Draw     UMP ONC INFUSION 180    9:00 AM   (180 min.)    ONCOLOGY INFUSION   [facility] 2     3     UMP ONC INFUSION 60   10:30 AM   (60 min.)    ONCOLOGY INFUSION   [facility] 4     5     6       7     8     9     10     11     12     13       14     15     [facility] DRAW   10:00 AM   (15 min.)    [facility] LAB DRAW   [facility] Lab Draw     [facility] ONC INFUSION 180   10:30 AM   (180 min.)    ONCOLOGY INFUSION   [facility] 16     17     18     19     20       21     22     23     24     25     26     CT CHEST ABDOMEN PELVIS WWO   10:00 AM   (20 min.)   UCCT1   [facility] CT 27       28     29     [facility] DRAW    7:15 AM   (15 min.)    [facility] LAB DRAW   [facility] Lab Draw     UMP RETURN    7:30 AM   (30 min.)   Vinny Yu MD   [facility]     [facility] ONC INFUSION 180    9:00 AM   (180 min.)    ONCOLOGY INFUSION   [facility]  30 31 November 2018 Sunday Monday Tuesday Wednesday Thursday Friday Saturday                       1     2     3       4     5     6     7     8     9     10       11     12     13     14     15     16     17       18     19     20     21     22     23     24       25     26     27     28     29     30                       Lab Results:  Recent Results (from the past 12 hour(s))   Comprehensive metabolic panel    Collection Time: 10/01/18  9:04 AM   Result Value Ref Range    Sodium 139 133 - 144 mmol/L    Potassium 3.8 3.4 - 5.3 mmol/L    Chloride 105 94 - 109 mmol/L    Carbon Dioxide 27 20 - 32 mmol/L    Anion Gap 7 3 - 14 mmol/L    Glucose 88 70 - 99 mg/dL    Urea Nitrogen 10 7 - 30 mg/dL    Creatinine 0.66 0.52 - 1.04 mg/dL    GFR Estimate 87 >60 mL/min/1.7m2    GFR Estimate If Black >90 >60 mL/min/1.7m2    Calcium 8.5 8.5 - 10.1 mg/dL    Bilirubin Total 0.4 0.2 - 1.3 mg/dL    Albumin 2.7 (L) 3.4 - 5.0 g/dL    Protein Total 6.3 (L) 6.8 - 8.8 g/dL    Alkaline Phosphatase 480 (H) 40 - 150 U/L    ALT 43 0 - 50 U/L    AST 38 0 - 45 U/L   CBC with platelets differential    Collection Time: 10/01/18  9:04 AM   Result Value Ref Range    WBC 6.5 4.0 - 11.0 10e9/L    RBC Count 2.91 (L) 3.8 - 5.2 10e12/L    Hemoglobin 9.3 (L) 11.7 - 15.7 g/dL    Hematocrit 29.7 (L) 35.0 - 47.0 %     (H) 78 - 100 fl    MCH 32.0 26.5 - 33.0 pg    MCHC 31.3 (L) 31.5 - 36.5 g/dL    RDW 17.4 (H) 10.0 - 15.0 %    Platelet Count 319 150 - 450 10e9/L    Diff Method Automated Method     % Neutrophils 62.1 %    % Lymphocytes 16.0 %    % Monocytes 16.7 %    % Eosinophils 3.5 %    % Basophils 1.1 %    % Immature Granulocytes 0.6 %    Nucleated RBCs 0 0 /100    Absolute Neutrophil 4.0 1.6 - 8.3 10e9/L    Absolute Lymphocytes 1.0 0.8 - 5.3 10e9/L    Absolute Monocytes 1.1 0.0 - 1.3 10e9/L    Absolute Eosinophils 0.2 0.0 - 0.7 10e9/L    Absolute Basophils 0.1 0.0 - 0.2 10e9/L    Abs Immature Granulocytes 0.0 0 -  0.4 10e9/L    Absolute Nucleated RBC 0.0

## 2018-10-01 NOTE — MR AVS SNAPSHOT
After Visit Summary   10/1/2018    Iris Lewis    MRN: [id number]           Patient Information     Date Of Birth          1940        Visit Information        Provider Department      10/1/2018 9:00 AM  24 ATC;  ONCOLOGY INFUSION [facility]        Today's Diagnoses     Malignant neoplasm of head of pancreas (H)    -  1      Care Instructions    Contact Numbers    [facility] [place] [phone]  [facility] [place] and after hours / weekends / holidays:  [phone]      Please call the triage or after hours line if you experience a temperature greater than or equal to 100.5, shaking chills, have uncontrolled nausea, vomiting and/or diarrhea, dizziness, shortness of breath, chest pain, bleeding, unexplained bruising, or if you have any other new/concerning symptoms, questions or concerns.      If you are having any concerning symptoms or wish to speak to a provider before your next infusion visit, please call your care coordinator or triage to notify them so we can adequately serve you.     If you need a refill on a narcotic prescription or other medication, please call before your infusion appointment.                   October 2018 Sunday Monday Tuesday Wednesday Thursday Friday Saturday        1     [facility]    8:30 AM   (15 min.)    [facility]   [facility] Lab Draw     [facility] ONC INFUSION 180    9:00 AM   (180 min.)    ONCOLOGY INFUSION   [facility] 2     3     [facility] ONC INFUSION 60   10:30 AM   (60 min.)    ONCOLOGY INFUSION   [facility] 4     5     6       7     8     9     10     11     12     13       14     15     [facility]   10:00 AM   (15 min.)    [facility] DRAW   [facility] Lab Draw     [facility] ONC INFUSION 180   10:30 AM   (180 min.)    ONCOLOGY INFUSION   [facility] 16     17     18     19     20       21     22     23     24     25     26     CT CHEST ABDOMEN  PELVIS WWO   10:00 AM   (20 min.)   UCCT1   [facility] CT 27       28     29     [facility] DRAW    7:15 AM   (15 min.)    [facility] DRAW   [facility] Lab Draw     [facility] RETURN    7:30 AM   (30 min.)   Vinny Yu MD   [facility] ONC INFUSION 180    9:00 AM   (180 min.)    ONCOLOGY INFUSION   [facility] 30 31 November 2018 Sunday Monday Tuesday Wednesday Thursday Friday Saturday                       1     2     3       4     5     6     7     8     9     10       11     12     13     14     15     16     17       18     19     20     21     22     23     24       25     26     27     28     29     30                       Lab Results:  Recent Results (from the past 12 hour(s))   Comprehensive metabolic panel    Collection Time: 10/01/18  9:04 AM   Result Value Ref Range    Sodium 139 133 - 144 mmol/L    Potassium 3.8 3.4 - 5.3 mmol/L    Chloride 105 94 - 109 mmol/L    Carbon Dioxide 27 20 - 32 mmol/L    Anion Gap 7 3 - 14 mmol/L    Glucose 88 70 - 99 mg/dL    Urea Nitrogen 10 7 - 30 mg/dL    Creatinine 0.66 0.52 - 1.04 mg/dL    GFR Estimate 87 >60 mL/min/1.7m2    GFR Estimate If Black >90 >60 mL/min/1.7m2    Calcium 8.5 8.5 - 10.1 mg/dL    Bilirubin Total 0.4 0.2 - 1.3 mg/dL    Albumin 2.7 (L) 3.4 - 5.0 g/dL    Protein Total 6.3 (L) 6.8 - 8.8 g/dL    Alkaline Phosphatase 480 (H) 40 - 150 U/L    ALT 43 0 - 50 U/L    AST 38 0 - 45 U/L   CBC with platelets differential    Collection Time: 10/01/18  9:04 AM   Result Value Ref Range    WBC 6.5 4.0 - 11.0 10e9/L    RBC Count 2.91 (L) 3.8 - 5.2 10e12/L    Hemoglobin 9.3 (L) 11.7 - 15.7 g/dL    Hematocrit 29.7 (L) 35.0 - 47.0 %     (H) 78 - 100 fl    MCH 32.0 26.5 - 33.0 pg    MCHC 31.3 (L) 31.5 - 36.5 g/dL    RDW 17.4 (H) 10.0 - 15.0 %    Platelet Count 319 150 - 450 10e9/L    Diff Method Automated Method     % Neutrophils 62.1 %    %  Lymphocytes 16.0 %    % Monocytes 16.7 %    % Eosinophils 3.5 %    % Basophils 1.1 %    % Immature Granulocytes 0.6 %    Nucleated RBCs 0 0 /100    Absolute Neutrophil 4.0 1.6 - 8.3 10e9/L    Absolute Lymphocytes 1.0 0.8 - 5.3 10e9/L    Absolute Monocytes 1.1 0.0 - 1.3 10e9/L    Absolute Eosinophils 0.2 0.0 - 0.7 10e9/L    Absolute Basophils 0.1 0.0 - 0.2 10e9/L    Abs Immature Granulocytes 0.0 0 - 0.4 10e9/L    Absolute Nucleated RBC 0.0                Follow-ups after your visit        Your next 10 appointments already scheduled     Oct 03, 2018 10:30 AM CDT   Infusion 60 with UC ONCOLOGY INFUSION, UC 28 ATC   [facility] ([facility])    [address]  [address]  [place] [address]   [phone]            Oct 15, 2018 10:00 AM CDT   [facility] with [facility] D[facility]   [facility] ([facility])    [address]  [address]  [place] [place] [facility] [address]   [phone]            Oct 15, 2018 10:30 AM CDT   Infusion 180 with UC ONCOLOGY INFUSION, UC 22 ATC   [facility] ([facility])    [address]  [address]  [facility] [address]   [phone]            Oct 26, 2018 10:00 AM CDT   CT CHEST ABDOMEN PELVIS W/O & W CONTRAST with UCCT1   [facility] CT ([facility])    [address]  [address]st [address]  [facility] [address]   [phone]           How do I prepare for my exam? (Food and drink instructions) To prepare: Do not eat or drink for 2 hours before your exam. If you need to take medicine, you may take it with small sips of water. (We may ask you to take liquid medicine as well.)  How do I prepare for my exam? (Other instructions) Please arrive 30 minutes early for your CT.  Once in the department you might be asked to drink water 15-20 minutes prior to your exam.  If indicated you may be asked to drink an  oral contrast in advance of your CT.  If this is the case, the imaging team will let you know or be in contact with you prior to your appointment  Patients over 70 or patients with diabetes or kidney problems: If you haven t had a blood test (creatinine test) within the last 30 days, the Cardiologist/Radiologist may require you to get this test prior to your exam.  If you have diabetes:  Continue to take your metformin medication on the day of your exam  What should I wear: Please wear loose clothing, such as a sweat suit or jogging clothes. Avoid snaps, zippers and other metal. We may ask you to undress and put on a hospital gown.  How long does the exam take: Most scans take less than 20 minutes.  What should I bring: Please bring any scans or X-rays taken at other hospitals, if similar tests were done. Also bring a list of your medicines, including vitamins, minerals and over-the-counter drugs. It is safest to leave personal items at home.  Do I need a : No  is needed.  What do I need to tell my doctor? Be sure to tell your doctor: * If you have any allergies. * If there s any chance you are pregnant. * If you are breastfeeding.  What should I do after the exam: No restrictions, You may resume normal activities.  What is this test: A CT (computed tomography) scan is a series of pictures that allows us to look inside your body. The scanner creates images of the body in cross sections, much like slices of bread. This helps us see any problems more clearly. You may receive contrast (X-ray dye) before or during your scan. You will be asked to drink the contrast.  Who should I call with questions: If you have any questions, please call the Imaging Department where you will have your exam. Directions, parking instructions, and other information is available on our website, [email or web address].            Oct 29, 2018  7:15 AM RITESH   [facility] Draw with  [facility] DRAW   [facility] Draw JAYNE  [facility])    [address]  [address]  [place] [facility] [address] [phone]            Oct 29, 2018  7:45 AM CDT   (Arrive by 7:30 AM)   Return Visit with Vinny Yu MD   [facility] ([facility])    [address]  [address]  [place] [facility] [id number] [phone]            Oct 29, 2018  9:00 AM CDT   Infusion 180 with UC ONCOLOGY INFUSION, UC 24 ATC   [facility] ([facility])    [address]  [address]  [place] [facility] [address] [phone]              Who to contact     If you have questions or need follow up information about today's clinic visit or your schedule please contact [facility] directly at [phone].  Normal or non-critical lab and imaging results will be communicated to you by MyChart, letter or phone within 4 business days after the clinic has received the results. If you do not hear from us within 7 days, please contact the clinic through [email or web address] or phone. If you have a critical or abnormal lab result, we will notify you by phone as soon as possible.  Submit refill requests through [employer] or call your pharmacy and they will forward the refill request to us. Please allow 3 business days for your refill to be completed.          Additional Information About Your Visit        MyChart Information     [employer] gives you secure access to your electronic health record. If you see a primary care provider, you can also send messages to your care team and make appointments. If you have questions, please call your primary care clinic.  If you do not have a primary care provider, please call [phone] and they will assist you.        Care EveryWhere ID     This is your Care EveryWhere ID. This could be used by other organizations to access your [facility] medical records  [id number]        Your Vitals Were     Pulse Temperature  Respirations Pulse Oximetry BMI (Body Mass Index)       70 98.3  F (36.8  C) (Oral) 18 99% 18.76 kg/m2        Blood Pressure from Last 3 Encounters:   10/01/18 111/68   09/20/18 134/57   09/18/18 125/63    Weight from Last 3 Encounters:   10/01/18 46.5 kg (102 lb 9.6 oz)   09/18/18 46.4 kg (102 lb 3.2 oz)   09/05/18 44.9 kg (98 lb 14.4 oz)              We Performed the Following     CBC with platelets differential     Comprehensive metabolic panel          Where to get your medicines      Some of these will need a paper prescription and others can be bought over the counter.  Ask your nurse if you have questions.     You don't need a prescription for these medications     traMADol 50 MG tablet          Primary Care Provider Office Phone # Fax #    Neri Gipson [phone] [address] [address]        Equal Access to Services     [facility]SA [facility] : Hadii mary ku hadasho Soomaali, waaxda luqadaha, qaybta kaalmada adeegyada, waxay rodrick adams . So [facility] [phone].    ATENCIÓN: Si habla español, tiene a nava disposición servicios gratuitos de asistencia lingüística. Llame al [phone].    We comply with applicable federal civil rights laws and [place] laws. We do not discriminate on the basis of race, color, national origin, age, disability, sex, sexual orientation, or gender identity.            Thank you!     Thank you for choosing [facility]  for your care. Our goal is always to provide you with excellent care. Hearing back from our patients is one way we can continue to improve our services. Please take a few minutes to complete the written survey that you may receive in the mail after your visit with us. Thank you!             Your Updated Medication List - Protect others around you: Learn how to safely use, store and throw away your medicines at www.disposemymeds.org.          This list is accurate as of 10/1/18  1:29  PM.  Always use your most recent med list.                   Brand Name Dispense Instructions for use Diagnosis    albuterol 108 (90 Base) MCG/ACT inhaler    PROAIR HFA/PROVENTIL HFA/VENTOLIN HFA    1 Inhaler    Inhale 2 puffs into the lungs 4 times daily    Mild asthma, unspecified whether complicated, unspecified whether persistent       amylase-lipase-protease 09508 units Cpep    CREON    180 capsule    Take 2 capsules (72,000 Units) by mouth 3 times daily (with meals)    Malignant neoplasm of head of pancreas (H)       BREO ELLIPTA 100-25 MCG/INH inhaler   Generic drug:  fluticasone-vilanterol           CALCIUM PO      Take by mouth every morning Form/strength unknown. Powder formulation. Add to water and fruits/vegetables daily to promote bone health.        CARTIA  MG 24 hr capsule   Generic drug:  diltiazem     10 capsule    TK 1 C PO QD    Benign essential hypertension       CHLORPHENIRAMINE MALEATE PO      Take 4 mg by mouth daily as needed        cholecalciferol 1000 UNIT tablet    vitamin D3     Take 1 tablet by mouth 2 times daily        cyanocolbalamin 500 MCG tablet    vitamin  B-12     Take 500 mcg by mouth every morning        folic acid 400 MCG tablet    FOLVITE     Take 1 tablet by mouth every morning        hydrOXYzine 25 MG tablet    ATARAX    60 tablet    Take 1-2 tablets (25-50 mg) by mouth every 6 hours as needed for itching (adjuvant pain)    Obstructive jaundice       levothyroxine 125 MCG tablet    SYNTHROID/LEVOTHROID    90 tablet    Take 1 tablet (125 mcg) by mouth daily    Hypothyroidism, unspecified type       loperamide 2 MG capsule    IMODIUM    60 capsule    2 caps at 1st sign of diarrhea & 1 cap every 2hrs until 12hrs diarrhea free. During night, 2 caps at bedtime & 2 caps every 4hrs until AM    Malignant neoplasm of head of pancreas (H)       LORazepam 0.5 MG tablet    ATIVAN    30 tablet    Take 1 tablet (0.5 mg) by mouth every 4 hours as needed (Anxiety, Nausea/Vomiting or  Sleep)    Malignant neoplasm of head of pancreas (H)       magic mouthwash suspension (diphenhydrAMINE, lidocaine, aluminum-magnesium & simethicone) Susp compounding kit     237 mL    Swish and swallow 5-10 mLs in mouth every 6 hours as needed for mouth sores    Malignant neoplasm of head of pancreas (H)       MELATONIN PO      Take by mouth nightly as needed        nystatin 752718 UNIT/ML suspension    MYCOSTATIN    473 mL    Take 5 mLs (500,000 Units) by mouth 4 times daily    Malignant neoplasm of head of pancreas (H), History of pulmonary embolism       omeprazole 40 MG capsule    priLOSEC          * ondansetron 4 MG tablet    ZOFRAN          * ondansetron 8 MG tablet    ZOFRAN    30 tablet    Take 1 tablet (8 mg) by mouth every 8 hours as needed (nausea/vomiting)    Malignant neoplasm of head of pancreas (H)       order for DME     2 Units    Equipment being ordered: Compression Stockings. Please dispense 2 pairs of knee high 20-30 mmHg    Lymphedema       pantoprazole 20 MG EC tablet    PROTONIX    30 tablet    Take 1 tablet (20 mg) by mouth daily    Other acute gastritis without hemorrhage       PRO-BIOTIC BLEND PO      Take 1 Tablespoonful by mouth daily as needed        * prochlorperazine 5 MG tablet    COMPAZINE    30 tablet    Take 1 tablet (5 mg) by mouth every 6 hours as needed for nausea or vomiting    Malignant neoplasm of head of pancreas (H)       * prochlorperazine 10 MG tablet    COMPAZINE    30 tablet    Take 1 tablet (10 mg) by mouth every 6 hours as needed for nausea or vomiting    Malignant neoplasm of head of pancreas (H)       rivaroxaban ANTICOAGULANT 20 MG Tabs tablet    XARELTO    30 tablet    Take 1 tablet (20 mg) by mouth daily (with dinner)    Malignant neoplasm of head of pancreas (H), History of pulmonary embolism       * timolol maleate 0.5 % opthalmic solution    ISTALOL    1 Bottle    Place 1 drop into both eyes every morning Before placing contact lenses    Cataract, unspecified  cataract type, unspecified laterality       * timolol 0.5 % ophthalmic solution    TIMOPTIC     INT 1 GTT OU IN THE MORNING        traMADol 50 MG tablet    ULTRAM    60 tablet    TAKE 1 TO 2 TABLETS BY MOUTH EVERY 4 HOURS AS NEEDED FOR BREAKTHROUGH PAIN. MAX 8 TABLETS PER DAY.    Malignant neoplasm of head of pancreas (H)       TYLENOL PO      Take 325 mg by mouth every 4 hours as needed for mild pain or fever        * Notice:  This list has 6 medication(s) that are the same as other medications prescribed for you. Read the directions carefully, and ask your doctor or other care provider to review them with you.

## 2018-10-01 NOTE — PROGRESS NOTES
Infusion Nursing Note:  Iris Lewis presents today for Cycle 14 day 1, Irinotecan Liposome, Fluorouracil pump  Patient seen by provider today: No   present during visit today: Not Applicable.    Note: Patient arrived ambulatory with no new complaints or concerns accompanied by .     Intravenous Access:  Implanted Port. Right chest port a cath accessed in the lab today and flushes easy and gives an excellent blood return.    Treatment Conditions:  Lab Results   Component Value Date    HGB 9.3 10/01/2018     Lab Results   Component Value Date    WBC 6.5 10/01/2018      Lab Results   Component Value Date    ANEU 4.0 10/01/2018     Lab Results   Component Value Date     10/01/2018      Lab Results   Component Value Date     10/01/2018                   Lab Results   Component Value Date    POTASSIUM 3.8 10/01/2018           Lab Results   Component Value Date    MAG 1.9 11/15/2017            Lab Results   Component Value Date    CR 0.66 10/01/2018                   Lab Results   Component Value Date    EMMY 8.5 10/01/2018                Lab Results   Component Value Date    BILITOTAL 0.4 10/01/2018           Lab Results   Component Value Date    ALBUMIN 2.7 10/01/2018                    Lab Results   Component Value Date    ALT 43 10/01/2018           Lab Results   Component Value Date    AST 38 10/01/2018       Results reviewed, labs MET treatment parameters, ok to proceed with treatment.      Post Infusion Assessment:  Patient tolerated infusion without incident.  Blood return noted pre and post infusion.  Site patent and intact, free from redness, edema or discomfort.  No evidence of extravasations.  Access discontinued per protocol.    Prior to discharge: Port is secured in place with tegaderm and flushed with 10cc NS with positive blood return noted.  Continuous home infusion CADD pump connected.    All connectors secured in place and clamps taped open. Verified withZney  "Tory GRIDER.   Pump started, \"running\" noted on display (CADD): YES.  Patient instructed to call our clinic or [facility] with any questions or concerns at home.  Patient verbalized understanding.    Patient set up for pump disconnect at our clinic on Wednesday 10/3 at 1030.         Discharge Plan:   Patient requested prescription refills for Zofran, Lorazepam, and Tramadol. Patient informed that she has refills on the Zofran and Lorazepam and I called her pharmacy [facility] in [place] and left a voicemail to let them know she needs these. \"In basket\" message sent to Dr. Yu to let him know patient needs refill on her Tramadol because he will have to call this in. Patient informed of this.    Discharge instructions reviewed with: Patient.  Patient and/or family verbalized understanding of discharge instructions and all questions answered.  AVS to patient via [employer].  Patient will return 10/3/18 for next appointment.   Patient discharged in stable condition accompanied by: .  Departure Mode: Ambulatory.    Eli Mora RN                        "

## 2018-10-01 NOTE — TELEPHONE ENCOUNTER
----- Message from Vinny Yu MD sent at 10/1/2018  6:38 AM CDT -----  Regarding: RE: Tramadol Refill  ok  ----- Message -----     From: Brandyn Adame RN     Sent: 9/28/2018   4:46 PM       To: Vinny Yu MD  Subject: Tramadol Refill                                  Pt requesting refill of Tramadol 50 mg, takes q4h prn. Last filled on 8/15/18 for #60 tablets. If ok to fill will call into [facility].    Thank You,    Trice Adame  [facility] Triage RN  [phone]

## 2018-10-01 NOTE — NURSING NOTE
Chief Complaint   Patient presents with     Port Draw     Labs drawn via PORT by RN. Line flushed with Saline and Heparin. VS taken.       Ciro Williamson RN

## 2018-10-03 NOTE — MR AVS SNAPSHOT
After Visit Summary   10/3/2018    Iris Lewis    MRN: [id number]           Patient Information     Date Of Birth          1940        Visit Information        Provider Department      10/3/2018 10:30 AM UC 28 ATC; UC ONCOLOGY INFUSION [facility]        Today's Diagnoses     ERRONEOUS ENCOUNTER--DISREGARD    -  1       Follow-ups after your visit        Your next 10 appointments already scheduled     Oct 15, 2018 10:00 AM CDT   [facility] Lab Draw with UC [facility] LAB DRAW   [facility] Lab Draw ([facility])    [address]  [address]  [facility] [address]   [phone]            Oct 15, 2018 10:30 AM CDT   Infusion 180 with UC ONCOLOGY INFUSION, UC 22 ATC   [facility] ([facility])    [address]  [address]  [facility] [address]   [phone]            Oct 26, 2018 10:00 AM CDT   CT CHEST ABDOMEN PELVIS W/O & W CONTRAST with UCCT1   [facility] CT ([facility])    [address]  1st [address]  [facility] [address]   [phone]           How do I prepare for my exam? (Food and drink instructions) To prepare: Do not eat or drink for 2 hours before your exam. If you need to take medicine, you may take it with small sips of water. (We may ask you to take liquid medicine as well.)  How do I prepare for my exam? (Other instructions) Please arrive 30 minutes early for your CT.  Once in the department you might be asked to drink water 15-20 minutes prior to your exam.  If indicated you may be asked to drink an oral contrast in advance of your CT.  If this is the case, the imaging team will let you know or be in contact with you prior to your appointment  Patients over 70 or patients with diabetes or kidney problems: If you haven t had a blood test (creatinine test) within the last 30 days, the Cardiologist/Radiologist may require you  to get this test prior to your exam.  If you have diabetes:  Continue to take your metformin medication on the day of your exam  What should I wear: Please wear loose clothing, such as a sweat suit or jogging clothes. Avoid snaps, zippers and other metal. We may ask you to undress and put on a hospital gown.  How long does the exam take: Most scans take less than 20 minutes.  What should I bring: Please bring any scans or X-rays taken at other hospitals, if similar tests were done. Also bring a list of your medicines, including vitamins, minerals and over-the-counter drugs. It is safest to leave personal items at home.  Do I need a : No  is needed.  What do I need to tell my doctor? Be sure to tell your doctor: * If you have any allergies. * If there s any chance you are pregnant. * If you are breastfeeding.  What should I do after the exam: No restrictions, You may resume normal activities.  What is this test: A CT (computed tomography) scan is a series of pictures that allows us to look inside your body. The scanner creates images of the body in cross sections, much like slices of bread. This helps us see any problems more clearly. You may receive contrast (X-ray dye) before or during your scan. You will be asked to drink the contrast.  Who should I call with questions: If you have any questions, please call the Imaging Department where you will have your exam. Directions, parking instructions, and other information is available on our website, [email or web address].            Oct 29, 2018  7:15 AM CDT   [facility] Lab Draw with  MASONIC LAB DRAW   [facility] Lab Draw ([facility])    [address] [place] [address]  [address] [place] [id number] [phone]            Oct 29, 2018  7:45 AM CDT   (Arrive by 7:30 AM)   Return Visit with Vinny Yu MD   [facility] ([facility])    [address] [place] [address]  [address]  [facility] [address] [phone]            Oct 29, 2018  9:00 AM CDT   Infusion 180 with UC ONCOLOGY INFUSION, UC 24 ATC   [facility] ([facility])    [address]  [address]  [facility] [id number] [phone]              Who to contact     If you have questions or need follow up information about today's clinic visit or your schedule please contact [facility] directly at [phone].  Normal or non-critical lab and imaging results will be communicated to you by [employer], letter or phone within 4 business days after the clinic has received the results. If you do not hear from us within 7 days, please contact the clinic through [employer] or phone. If you have a critical or abnormal lab result, we will notify you by phone as soon as possible.  Submit refill requests through [employer] or call your pharmacy and they will forward the refill request to us. Please allow 3 business days for your refill to be completed.          Additional Information About Your Visit        [employer] Information     [employer] gives you secure access to your electronic health record. If you see a primary care provider, you can also send messages to your care team and make appointments. If you have questions, please call your primary care clinic.  If you do not have a primary care provider, please call [phone] and they will assist you.        Care EveryWhere ID     This is your Care EveryWhere ID. This could be used by other organizations to access your [place] medical records  [id number]        Your Vitals Were     Pulse Temperature Respirations Pulse Oximetry          80 97.4  F (36.3  C) (Oral) 18 95%         Blood Pressure from Last 3 Encounters:   10/03/18 117/66   10/01/18 111/68   09/20/18 134/57    Weight from Last 3 Encounters:   10/01/18 46.5 kg (102 lb 9.6 oz)   09/18/18 46.4 kg (102 lb 3.2 oz)   09/05/18 44.9 kg (98 lb 14.4 oz)               Today, you had the following     No orders found for display       Primary Care Provider Office Phone # Fax #    Neri Gipson [phone]       [address] [address]        Equal Access to Services     NESSA NICHOLS : Hadii mary ku hadmarko Soomaali, waaxda luqadaha, qaybta kaalmada adeegyada, lisa lopezn blancatobi romero lanayanasamira . So [facility] [phone].    ATENCIÓN: Si habla español, tiene a nava disposición servicios gratuitos de asistencia lingüística. Llame al [phone].    We comply with applicable federal civil rights laws and [place] laws. We do not discriminate on the basis of race, color, national origin, age, disability, sex, sexual orientation, or gender identity.            Thank you!     Thank you for choosing [facility]  for your care. Our goal is always to provide you with excellent care. Hearing back from our patients is one way we can continue to improve our services. Please take a few minutes to complete the written survey that you may receive in the mail after your visit with us. Thank you!             Your Updated Medication List - Protect others around you: Learn how to safely use, store and throw away your medicines at www.disposemymeds.org.          This list is accurate as of 10/3/18 11:59 PM.  Always use your most recent med list.                   Brand Name Dispense Instructions for use Diagnosis    albuterol 108 (90 Base) MCG/ACT inhaler    PROAIR HFA/PROVENTIL HFA/VENTOLIN HFA    1 Inhaler    Inhale 2 puffs into the lungs 4 times daily    Mild asthma, unspecified whether complicated, unspecified whether persistent       amylase-lipase-protease 74334 units Cpep    CREON    180 capsule    Take 2 capsules (72,000 Units) by mouth 3 times daily (with meals)    Malignant neoplasm of head of pancreas (H)       BREO ELLIPTA 100-25 MCG/INH inhaler   Generic drug:  fluticasone-vilanterol           CALCIUM PO      Take by mouth  every morning Form/strength unknown. Powder formulation. Add to water and fruits/vegetables daily to promote bone health.        CARTIA  MG 24 hr capsule   Generic drug:  diltiazem     10 capsule    TK 1 C PO QD    Benign essential hypertension       CHLORPHENIRAMINE MALEATE PO      Take 4 mg by mouth daily as needed        cholecalciferol 1000 UNIT tablet    vitamin D3     Take 1 tablet by mouth 2 times daily        cyanocolbalamin 500 MCG tablet    vitamin  B-12     Take 500 mcg by mouth every morning        folic acid 400 MCG tablet    FOLVITE     Take 1 tablet by mouth every morning        hydrOXYzine 25 MG tablet    ATARAX    60 tablet    Take 1-2 tablets (25-50 mg) by mouth every 6 hours as needed for itching (adjuvant pain)    Obstructive jaundice       levothyroxine 125 MCG tablet    SYNTHROID/LEVOTHROID    90 tablet    Take 1 tablet (125 mcg) by mouth daily    Hypothyroidism, unspecified type       loperamide 2 MG capsule    IMODIUM    60 capsule    2 caps at 1st sign of diarrhea & 1 cap every 2hrs until 12hrs diarrhea free. During night, 2 caps at bedtime & 2 caps every 4hrs until AM    Malignant neoplasm of head of pancreas (H)       LORazepam 0.5 MG tablet    ATIVAN    30 tablet    Take 1 tablet (0.5 mg) by mouth every 4 hours as needed (Anxiety, Nausea/Vomiting or Sleep)    Malignant neoplasm of head of pancreas (H)       magic mouthwash suspension (diphenhydrAMINE, lidocaine, aluminum-magnesium & simethicone) Susp compounding kit     237 mL    Swish and swallow 5-10 mLs in mouth every 6 hours as needed for mouth sores    Malignant neoplasm of head of pancreas (H)       MELATONIN PO      Take by mouth nightly as needed        nystatin 517312 UNIT/ML suspension    MYCOSTATIN    473 mL    Take 5 mLs (500,000 Units) by mouth 4 times daily    Malignant neoplasm of head of pancreas (H), History of pulmonary embolism       omeprazole 40 MG capsule    priLOSEC          * ondansetron 4 MG tablet    ZOFRAN           * ondansetron 8 MG tablet    ZOFRAN    30 tablet    Take 1 tablet (8 mg) by mouth every 8 hours as needed (nausea/vomiting)    Malignant neoplasm of head of pancreas (H)       order for DME     2 Units    Equipment being ordered: Compression Stockings. Please dispense 2 pairs of knee high 20-30 mmHg    Lymphedema       pantoprazole 20 MG EC tablet    PROTONIX    30 tablet    Take 1 tablet (20 mg) by mouth daily    Other acute gastritis without hemorrhage       PRO-BIOTIC BLEND PO      Take 1 Tablespoonful by mouth daily as needed        * prochlorperazine 5 MG tablet    COMPAZINE    30 tablet    Take 1 tablet (5 mg) by mouth every 6 hours as needed for nausea or vomiting    Malignant neoplasm of head of pancreas (H)       * prochlorperazine 10 MG tablet    COMPAZINE    30 tablet    Take 1 tablet (10 mg) by mouth every 6 hours as needed for nausea or vomiting    Malignant neoplasm of head of pancreas (H)       rivaroxaban ANTICOAGULANT 20 MG Tabs tablet    XARELTO    30 tablet    Take 1 tablet (20 mg) by mouth daily (with dinner)    Malignant neoplasm of head of pancreas (H), History of pulmonary embolism       * timolol maleate 0.5 % opthalmic solution    ISTALOL    1 Bottle    Place 1 drop into both eyes every morning Before placing contact lenses    Cataract, unspecified cataract type, unspecified laterality       * timolol 0.5 % ophthalmic solution    TIMOPTIC     INT 1 GTT OU IN THE MORNING        traMADol 50 MG tablet    ULTRAM    60 tablet    TAKE 1 TO 2 TABLETS BY MOUTH EVERY 4 HOURS AS NEEDED FOR BREAKTHROUGH PAIN. MAX 8 TABLETS PER DAY.    Malignant neoplasm of head of pancreas (H)       TYLENOL PO      Take 325 mg by mouth every 4 hours as needed for mild pain or fever        * Notice:  This list has 6 medication(s) that are the same as other medications prescribed for you. Read the directions carefully, and ask your doctor or other care provider to review them with you.

## 2018-10-15 NOTE — NURSING NOTE
"Chief Complaint   Patient presents with     Port Draw     port accessed and labs drawn by rn.  vs taken.     Port accessed with 20g 3/4\" power needle, labs drawn, port flushed with saline and heparin, vitals checked, pt checked in for next appointment.  Alicia Cordova RN    "

## 2018-10-15 NOTE — MR AVS SNAPSHOT
After Visit Summary   10/15/2018    Iris Lewis    MRN: [id number]           Patient Information     Date Of Birth          1940        Visit Information        Provider Department      10/15/2018 10:30 AM  22 ATC;  ONCOLOGY INFUSION [facility]        Today's Diagnoses     Malignant neoplasm of head of pancreas (H)    -  1      Care Instructions    Contact Numbers  [facility] Main Line: [phone]  [facility] NurseTriage and After Hours:  [phone]    Call triage with chills and/or temperature greater than or equal to 100.5, uncontrolled nausea/vomiting, diarrhea, constipation, dizziness, shortness of breath, chest pain, bleeding, unexplained bruising, or any new/concerning symptoms, questions/concerns.     If after hours, weekends, or holidays, call the nurse triage number. Calls may be forwarded to the hospital , please ask for the adult oncology doctor on call.     If you are having any concerning symptoms or wish to speak to a provider before your next infusion visit, please call your care coordinator or triage to notify them so we can adequately serve you.     If you need a refill on a narcotic prescription, please call triage or your care coordinator before your infusion appointment.             October 2018 Sunday Monday Tuesday Wednesday Thursday Friday Saturday        1     [facility] D[facility]    8:30 AM   (15 min.)    [facility] DRAW   [facility] Lab Draw     UMP ONC INFUSION 180    9:00 AM   (180 min.)    ONCOLOGY INFUSION   [facility] 2     3     UMP ONC INFUSION 60   10:30 AM   (60 min.)    ONCOLOGY INFUSION   [facility] 4     5     6       7     8     9     10     11     12     13       14     15     [facility]   10:00 AM   (15 min.)    [facility]RAW   [facility]     UMP ONC INFUSION 180   10:30 AM   (180 min.)    ONCOLOGY INFUSION   [facility]  16     17     UMP ONC INFUSION 60   12:00 PM   (60 min.)   UC ONCOLOGY INFUSION   [facility] 18     19     20       21     22     23     24     25     26     CT CHEST ABDOMEN PELVIS WWO   10:00 AM   (20 min.)   UCCT1   [facility] CT 27       28     29     [facility] DRAW    7:15 AM   (15 min.)    [facility] DRAW   [facility] Lab Draw     UMP RETURN    7:30 AM   (30 min.)   Vinny Yu MD   [facility]     UMP ONC INFUSION 180    9:00 AM   (180 min.)   UC ONCOLOGY INFUSION   [facility] 30 31 November 2018 Sunday Monday Tuesday Wednesday Thursday Friday Saturday                       1     2     3       4     5     6     7     8     9     10       11     12     13     14     15     16     17       18     19     20     21     22     23     24       25     26     27     28     29     30                       Lab Results:  Recent Results (from the past 12 hour(s))   Comprehensive metabolic panel    Collection Time: 10/15/18 10:50 AM   Result Value Ref Range    Sodium 137 133 - 144 mmol/L    Potassium 4.0 3.4 - 5.3 mmol/L    Chloride 105 94 - 109 mmol/L    Carbon Dioxide 27 20 - 32 mmol/L    Anion Gap 5 3 - 14 mmol/L    Glucose 84 70 - 99 mg/dL    Urea Nitrogen 8 7 - 30 mg/dL    Creatinine 0.73 0.52 - 1.04 mg/dL    GFR Estimate 77 >60 mL/min/1.7m2    GFR Estimate If Black >90 >60 mL/min/1.7m2    Calcium 8.6 8.5 - 10.1 mg/dL    Bilirubin Total 0.6 0.2 - 1.3 mg/dL    Albumin 2.8 (L) 3.4 - 5.0 g/dL    Protein Total 6.6 (L) 6.8 - 8.8 g/dL    Alkaline Phosphatase 344 (H) 40 - 150 U/L    ALT 28 0 - 50 U/L    AST 17 0 - 45 U/L   CBC with platelets differential    Collection Time: 10/15/18 10:50 AM   Result Value Ref Range    WBC 6.0 4.0 - 11.0 10e9/L    RBC Count 2.89 (L) 3.8 - 5.2 10e12/L    Hemoglobin 9.3 (L) 11.7 - 15.7 g/dL    Hematocrit 28.6 (L) 35.0 - 47.0 %    MCV 99 78 - 100 fl    MCH 32.2  26.5 - 33.0 pg    MCHC 32.5 31.5 - 36.5 g/dL    RDW 18.5 (H) 10.0 - 15.0 %    Platelet Count 278 150 - 450 10e9/L    Diff Method Automated Method     % Neutrophils 58.1 %    % Lymphocytes 20.2 %    % Monocytes 15.8 %    % Eosinophils 4.2 %    % Basophils 1.0 %    % Immature Granulocytes 0.7 %    Nucleated RBCs 0 0 /100    Absolute Neutrophil 3.5 1.6 - 8.3 10e9/L    Absolute Lymphocytes 1.2 0.8 - 5.3 10e9/L    Absolute Monocytes 1.0 0.0 - 1.3 10e9/L    Absolute Eosinophils 0.3 0.0 - 0.7 10e9/L    Absolute Basophils 0.1 0.0 - 0.2 10e9/L    Abs Immature Granulocytes 0.0 0 - 0.4 10e9/L    Absolute Nucleated RBC 0.0                Follow-ups after your visit        Your next 10 appointments already scheduled     Oct 17, 2018 12:00 PM CDT   Infusion 60 with  ONCOLOGY INFUSION,  12 ATC   [facility] ([facility])    [address]  [address]  [place] [id number] [phone]            Oct 26, 2018 10:00 AM CDT   CT CHEST ABDOMEN PELVIS W/O & W CONTRAST with UCCT1   [facility] CT ([facility])    [address]  1st Floor  [place] [id number] [phone]           How do I prepare for my exam? (Food and drink instructions) To prepare: Do not eat or drink for 2 hours before your exam. If you need to take medicine, you may take it with small sips of water. (We may ask you to take liquid medicine as well.)  How do I prepare for my exam? (Other instructions) Please arrive 30 minutes early for your CT.  Once in the department you might be asked to drink water 15-20 minutes prior to your exam.  If indicated you may be asked to drink an oral contrast in advance of your CT.  If this is the case, the imaging team will let you know or be in contact with you prior to your appointment  Patients over 70 or patients with diabetes or kidney problems: If you haven t had a blood test (creatinine test) within the last 30 days,  the Cardiologist/Radiologist may require you to get this test prior to your exam.  If you have diabetes:  Continue to take your metformin medication on the day of your exam  What should I wear: Please wear loose clothing, such as a sweat suit or jogging clothes. Avoid snaps, zippers and other metal. We may ask you to undress and put on a hospital gown.  How long does the exam take: Most scans take less than 20 minutes.  What should I bring: Please bring any scans or X-rays taken at other hospitals, if similar tests were done. Also bring a list of your medicines, including vitamins, minerals and over-the-counter drugs. It is safest to leave personal items at home.  Do I need a : No  is needed.  What do I need to tell my doctor? Be sure to tell your doctor: * If you have any allergies. * If there s any chance you are pregnant. * If you are breastfeeding.  What should I do after the exam: No restrictions, You may resume normal activities.  What is this test: A CT (computed tomography) scan is a series of pictures that allows us to look inside your body. The scanner creates images of the body in cross sections, much like slices of bread. This helps us see any problems more clearly. You may receive contrast (X-ray dye) before or during your scan. You will be asked to drink the contrast.  Who should I call with questions: If you have any questions, please call the Imaging Department where you will have your exam. Directions, parking instructions, and other information is available on our website, [email or web address].            Oct 29, 2018  7:15 AM CDT   [facility] [employer] Lab Draw with  [facility] LAB DRAW   [facility] Lab Draw ([facility])    [address]  [address]  [place] [address] [phone]            Oct 29, 2018  7:45 AM CDT   (Arrive by 7:30 AM)   Return Visit with Vinny Yu MD   [facility] ([facility])     [address]  [address]  [place] [address] [phone]            Oct 29, 2018  9:00 AM CDT   Infusion 180 with UC ONCOLOGY INFUSION, UC 24 ATC   [facility] ([facility])    [address]  [address]  [place] [address] [phone]              Who to contact     If you have questions or need follow up information about today's clinic visit or your schedule please contact [facility] directly at [phone].  Normal or non-critical lab and imaging results will be communicated to you by [email or web address], letter or phone within 4 business days after the clinic has received the results. If you do not hear from us within 7 days, please contact the clinic through [employer] or phone. If you have a critical or abnormal lab result, we will notify you by phone as soon as possible.  Submit refill requests through [employer] or call your pharmacy and they will forward the refill request to us. Please allow 3 business days for your refill to be completed.          Additional Information About Your Visit        [email or web address] Information     [employer] gives you secure access to your electronic health record. If you see a primary care provider, you can also send messages to your care team and make appointments. If you have questions, please call your primary care clinic.  If you do not have a primary care provider, please call [phone] and they will assist you.        Care EveryWhere ID     This is your Care EveryWhere ID. This could be used by other organizations to access your [place] medical records  [id number]        Your Vitals Were     Pulse Temperature Respirations Pulse Oximetry BMI (Body Mass Index)       61 97.5  F (36.4  C) (Oral) 16 98% 17.92 kg/m2        Blood Pressure from Last 3 Encounters:   10/15/18 103/55   10/03/18 117/66   10/01/18 111/68    Weight from Last 3 Encounters:   10/15/18 44.5 kg (98 lb)   10/01/18 46.5 kg (102 lb  9.6 oz)   09/18/18 46.4 kg (102 lb 3.2 oz)              We Performed the Following     CBC with platelets differential     Comprehensive metabolic panel        Primary Care Provider Office Phone # Fax #    Neir Gipson [phone] [address] [address]        Equal Access to Services     MARCELONATASHA SIMONE : Hadii aad ku hadasho Soomaali, waaxda luqadaha, qaybta kaalmada adeegyada, waxay idiin hayaan adeeg khlindash lanayanan . So [facility] [phone].    ATENCIÓN: Si habla español, tiene a nava disposición servicios gratuitos de asistencia lingüística. Llame al [phone].    We comply with applicable federal civil rights laws and [place] laws. We do not discriminate on the basis of race, color, national origin, age, disability, sex, sexual orientation, or gender identity.            Thank you!     Thank you for choosing [facility]  for your care. Our goal is always to provide you with excellent care. Hearing back from our patients is one way we can continue to improve our services. Please take a few minutes to complete the written survey that you may receive in the mail after your visit with us. Thank you!             Your Updated Medication List - Protect others around you: Learn how to safely use, store and throw away your medicines at www.disposemymeds.org.          This list is accurate as of 10/15/18  4:23 PM.  Always use your most recent med list.                   Brand Name Dispense Instructions for use Diagnosis    albuterol 108 (90 Base) MCG/ACT inhaler    PROAIR HFA/PROVENTIL HFA/VENTOLIN HFA    1 Inhaler    Inhale 2 puffs into the lungs 4 times daily    Mild asthma, unspecified whether complicated, unspecified whether persistent       amylase-lipase-protease 89321 units Cpep    CREON    180 capsule    Take 2 capsules (72,000 Units) by mouth 3 times daily (with meals)    Malignant neoplasm of head of pancreas (H)       CALCIUM PO      Take by  mouth every morning Form/strength unknown. Powder formulation. Add to water and fruits/vegetables daily to promote bone health.        CARTIA  MG 24 hr capsule   Generic drug:  diltiazem     10 capsule    TK 1 C PO QD    Benign essential hypertension       CHLORPHENIRAMINE MALEATE PO      Take 4 mg by mouth daily as needed        cholecalciferol 1000 UNIT tablet    vitamin D3     Take 1 tablet by mouth 2 times daily        cyanocolbalamin 500 MCG tablet    vitamin  B-12     Take 500 mcg by mouth every morning        folic acid 400 MCG tablet    FOLVITE     Take 1 tablet by mouth every morning        hydrOXYzine 25 MG tablet    ATARAX    60 tablet    Take 1-2 tablets (25-50 mg) by mouth every 6 hours as needed for itching (adjuvant pain)    Obstructive jaundice       levothyroxine 125 MCG tablet    SYNTHROID/LEVOTHROID    90 tablet    Take 1 tablet (125 mcg) by mouth daily    Hypothyroidism, unspecified type       loperamide 2 MG capsule    IMODIUM    60 capsule    2 caps at 1st sign of diarrhea & 1 cap every 2hrs until 12hrs diarrhea free. During night, 2 caps at bedtime & 2 caps every 4hrs until AM    Malignant neoplasm of head of pancreas (H)       LORazepam 0.5 MG tablet    ATIVAN    30 tablet    Take 1 tablet (0.5 mg) by mouth every 4 hours as needed (Anxiety, Nausea/Vomiting or Sleep)    Malignant neoplasm of head of pancreas (H)       magic mouthwash suspension (diphenhydrAMINE, lidocaine, aluminum-magnesium & simethicone) Susp compounding kit     237 mL    Swish and swallow 5-10 mLs in mouth every 6 hours as needed for mouth sores    Malignant neoplasm of head of pancreas (H)       MELATONIN PO      Take by mouth nightly as needed        omeprazole 40 MG capsule    priLOSEC          * ondansetron 4 MG tablet    ZOFRAN          * ondansetron 8 MG tablet    ZOFRAN    30 tablet    Take 1 tablet (8 mg) by mouth every 8 hours as needed (nausea/vomiting)    Malignant neoplasm of head of pancreas (H)        order for DME     2 Units    Equipment being ordered: Compression Stockings. Please dispense 2 pairs of knee high 20-30 mmHg    Lymphedema       pantoprazole 20 MG EC tablet    PROTONIX    30 tablet    Take 1 tablet (20 mg) by mouth daily    Other acute gastritis without hemorrhage       PRO-BIOTIC BLEND PO      Take 1 Tablespoonful by mouth daily as needed        * prochlorperazine 5 MG tablet    COMPAZINE    30 tablet    Take 1 tablet (5 mg) by mouth every 6 hours as needed for nausea or vomiting    Malignant neoplasm of head of pancreas (H)       * prochlorperazine 10 MG tablet    COMPAZINE    30 tablet    Take 1 tablet (10 mg) by mouth every 6 hours as needed for nausea or vomiting    Malignant neoplasm of head of pancreas (H)       rivaroxaban ANTICOAGULANT 20 MG Tabs tablet    XARELTO    30 tablet    Take 1 tablet (20 mg) by mouth daily (with dinner)    Malignant neoplasm of head of pancreas (H), History of pulmonary embolism       * timolol maleate 0.5 % opthalmic solution    ISTALOL    1 Bottle    Place 1 drop into both eyes every morning Before placing contact lenses    Cataract, unspecified cataract type, unspecified laterality       * timolol 0.5 % ophthalmic solution    TIMOPTIC     INT 1 GTT OU IN THE MORNING        traMADol 50 MG tablet    ULTRAM    60 tablet    TAKE 1 TO 2 TABLETS BY MOUTH EVERY 4 HOURS AS NEEDED FOR BREAKTHROUGH PAIN. MAX 8 TABLETS PER DAY.    Malignant neoplasm of head of pancreas (H)       TYLENOL PO      Take 325 mg by mouth every 4 hours as needed for mild pain or fever        * Notice:  This list has 6 medication(s) that are the same as other medications prescribed for you. Read the directions carefully, and ask your doctor or other care provider to review them with you.

## 2018-10-15 NOTE — PROGRESS NOTES
Infusion Nursing Note:  Iris Lewis presents today for Day 1 Cycle 15 Irinotecan, Fluorouracil pump connect.    Patient seen by provider today: No   present during visit today: Not Applicable.    Note: Iris arrives to infusion today alone. She states she is doing well. She has one small mouth sore on the left side of her tongue. She continues to have neuropathy in her feet, but otherwise offers no concerns today.     Intravenous Access:  Implanted Port.    Treatment Conditions:  Lab Results   Component Value Date    HGB 9.3 10/15/2018     Lab Results   Component Value Date    WBC 6.0 10/15/2018      Lab Results   Component Value Date    ANEU 3.5 10/15/2018     Lab Results   Component Value Date     10/15/2018      Lab Results   Component Value Date     10/15/2018                   Lab Results   Component Value Date    POTASSIUM 4.0 10/15/2018           Lab Results   Component Value Date    MAG 1.9 11/15/2017            Lab Results   Component Value Date    CR 0.73 10/15/2018                   Lab Results   Component Value Date    EMMY 8.6 10/15/2018                Lab Results   Component Value Date    BILITOTAL 0.6 10/15/2018           Lab Results   Component Value Date    ALBUMIN 2.8 10/15/2018                    Lab Results   Component Value Date    ALT 28 10/15/2018           Lab Results   Component Value Date    AST 17 10/15/2018       Results reviewed, labs MET treatment parameters, ok to proceed with treatment.    Post Infusion Assessment:  Patient tolerated infusion without incident.  Blood return noted pre and post infusion and prior to pump connect.  Fluorouracil CADD pump connected at 1400 and set to run at 5 ml/hr. Patient set up for pump disconnect at [facility] infusion on 10/17 at 1200.   All connections verified as taped and open and pump display RUNNING by second RN.     Discharge Plan:   Patient declined prescription refills.  Copy of AVS reviewed with patient.  Patient  will return 10/29 for next appointment.  Patient discharged in stable condition accompanied by: self.  Departure Mode: Ambulatory.    Radha Marvin RN

## 2018-10-15 NOTE — PATIENT INSTRUCTIONS
Contact Numbers  [facility] Main Line: [phone]  [facility] NurseTriage and After Hours:  [phone]    Call triage with chills and/or temperature greater than or equal to 100.5, uncontrolled nausea/vomiting, diarrhea, constipation, dizziness, shortness of breath, chest pain, bleeding, unexplained bruising, or any new/concerning symptoms, questions/concerns.     If after hours, weekends, or holidays, call the nurse triage number. Calls may be forwarded to the hospital , please ask for the adult oncology doctor on call.     If you are having any concerning symptoms or wish to speak to a provider before your next infusion visit, please call your care coordinator or triage to notify them so we can adequately serve you.     If you need a refill on a narcotic prescription, please call triage or your care coordinator before your infusion appointment.             October 2018 Sunday Monday Tuesday Wednesday Thursday Friday Saturday        1     [facility] DRAW    8:30 AM   (15 min.)    [facility] LAB DRAW   [facility] Lab Draw     [facility] ONC INFUSION 180    9:00 AM   (180 min.)    ONCOLOGY INFUSION   [facility] 2     3     UMP ONC INFUSION 60   10:30 AM   (60 min.)    ONCOLOGY INFUSION   [facility] 4     5     6       7     8     9     10     11     12     13       14     15     [facility] DRAW   10:00 AM   (15 min.)    [facility] DRAW   [facility] Lab Draw     UMP ONC INFUSION 180   10:30 AM   (180 min.)    ONCOLOGY INFUSION   [facility] 16     17     UMP ONC INFUSION 60   12:00 PM   (60 min.)    ONCOLOGY INFUSION   [facility] 18     19     20       21     22     23     24     25     26     CT CHEST ABDOMEN PELVIS WWO   10:00 AM   (20 min.)   UCCT1   [facility] CT 27       28     29     [facility] DRAW    7:15 AM   (15 min.)    [facility] DRAW   [facility] Lab Draw     UMP RETURN     7:30 AM   (30 min.)   Vinny Yu MD   [facility]     UMP ONC INFUSION 180    9:00 AM   (180 min.)   UC ONCOLOGY INFUSION   [facility] 30 31 November 2018 Sunday Monday Tuesday Wednesday Thursday Friday Saturday                       1     2     3       4     5     6     7     8     9     10       11     12     13     14     15     16     17       18     19     20     21     22     23     24       25     26     27     28     29     30                       Lab Results:  Recent Results (from the past 12 hour(s))   Comprehensive metabolic panel    Collection Time: 10/15/18 10:50 AM   Result Value Ref Range    Sodium 137 133 - 144 mmol/L    Potassium 4.0 3.4 - 5.3 mmol/L    Chloride 105 94 - 109 mmol/L    Carbon Dioxide 27 20 - 32 mmol/L    Anion Gap 5 3 - 14 mmol/L    Glucose 84 70 - 99 mg/dL    Urea Nitrogen 8 7 - 30 mg/dL    Creatinine 0.73 0.52 - 1.04 mg/dL    GFR Estimate 77 >60 mL/min/1.7m2    GFR Estimate If Black >90 >60 mL/min/1.7m2    Calcium 8.6 8.5 - 10.1 mg/dL    Bilirubin Total 0.6 0.2 - 1.3 mg/dL    Albumin 2.8 (L) 3.4 - 5.0 g/dL    Protein Total 6.6 (L) 6.8 - 8.8 g/dL    Alkaline Phosphatase 344 (H) 40 - 150 U/L    ALT 28 0 - 50 U/L    AST 17 0 - 45 U/L   CBC with platelets differential    Collection Time: 10/15/18 10:50 AM   Result Value Ref Range    WBC 6.0 4.0 - 11.0 10e9/L    RBC Count 2.89 (L) 3.8 - 5.2 10e12/L    Hemoglobin 9.3 (L) 11.7 - 15.7 g/dL    Hematocrit 28.6 (L) 35.0 - 47.0 %    MCV 99 78 - 100 fl    MCH 32.2 26.5 - 33.0 pg    MCHC 32.5 31.5 - 36.5 g/dL    RDW 18.5 (H) 10.0 - 15.0 %    Platelet Count 278 150 - 450 10e9/L    Diff Method Automated Method     % Neutrophils 58.1 %    % Lymphocytes 20.2 %    % Monocytes 15.8 %    % Eosinophils 4.2 %    % Basophils 1.0 %    % Immature Granulocytes 0.7 %    Nucleated RBCs 0 0 /100    Absolute Neutrophil 3.5 1.6 - 8.3 10e9/L    Absolute Lymphocytes 1.2 0.8 -  5.3 10e9/L    Absolute Monocytes 1.0 0.0 - 1.3 10e9/L    Absolute Eosinophils 0.3 0.0 - 0.7 10e9/L    Absolute Basophils 0.1 0.0 - 0.2 10e9/L    Abs Immature Granulocytes 0.0 0 - 0.4 10e9/L    Absolute Nucleated RBC 0.0

## 2018-10-17 NOTE — PATIENT INSTRUCTIONS
Contact Numbers    [facility] Main Line: [phone]  [facility] Triage and after hours / weekends / holidays:  [phone]      Please call the triage or after hours line if you experience a temperature greater than or equal to 100.5, shaking chills, have uncontrolled nausea, vomiting and/or diarrhea, dizziness, shortness of breath, chest pain, bleeding, unexplained bruising, or if you have any other new/concerning symptoms, questions or concerns.      If you are having any concerning symptoms or wish to speak to a provider before your next infusion visit, please call your care coordinator or triage to notify them so we can adequately serve you.     If you need a refill on a narcotic prescription or other medication, please call before your infusion appointment.             October 2018 Sunday Monday Tuesday Wednesday Thursday Friday Saturday        1     [facility]    8:30 AM   (15 min.)    [facility] DRAW   [facility] Lab Draw     [facility] ONC INFUSION 180    9:00 AM   (180 min.)    ONCOLOGY INFUSION   [facility] 2     3     [facility] ONC INFUSION 60   10:30 AM   (60 min.)    ONCOLOGY INFUSION   [facility] 4     5     6       7     8     9     10     11     12     13       14     15     [facility]   10:00 AM   (15 min.)    [facility] DRAW   [facility] Lab Draw     [facility] ONC INFUSION 180   10:30 AM   (180 min.)    ONCOLOGY INFUSION   [facility] 16     17     [facility] ONC INFUSION 60   12:00 PM   (60 min.)    ONCOLOGY INFUSION   [facility] 18     19     20       21     22     23     24     25     26     CT CHEST ABDOMEN PELVIS WWO   10:00 AM   (20 min.)   UCCT1   [facility] CT 27       28     29     [facility]    7:15 AM   (15 min.)    [facility] DRAW   [facility] Lab Draw     [facility] RETURN    7:30 AM   (30 min.)   Vinny Yu MD   [facility]  INFUSION 180    9:00 AM   (180 min.)    ONCOLOGY INFUSION   [facility] 30 31 November 2018 Sunday Monday Tuesday Wednesday Thursday Friday Saturday                       1     2     3       4     5     6     7     8     9     10       11     12     13     14     15     16     17       18     19     20     21     22     23     24       25     26     27     28     29     30                    No results found for this or any previous visit (from the past 24 hour(s)).

## 2018-10-17 NOTE — MR AVS SNAPSHOT
After Visit Summary   10/17/2018    Iris Lewis    MRN: [id number]           Patient Information     Date Of Birth          1940        Visit Information        Provider Department      10/17/2018 12:00 PM  12 ATC;  ONCOLOGY INFUSION [facility]        Today's Diagnoses     Malignant neoplasm of head of pancreas (H)    -  1      Care Instructions    Contact Numbers    [facility] [place]: [phone]  [facility] Triage and after hours / weekends / holidays:  [phone]      Please call the triage or after hours line if you experience a temperature greater than or equal to 100.5, shaking chills, have uncontrolled nausea, vomiting and/or diarrhea, dizziness, shortness of breath, chest pain, bleeding, unexplained bruising, or if you have any other new/concerning symptoms, questions or concerns.      If you are having any concerning symptoms or wish to speak to a provider before your next infusion visit, please call your care coordinator or triage to notify them so we can adequately serve you.     If you need a refill on a narcotic prescription or other medication, please call before your infusion appointment.             October 2018 Sunday Monday Tuesday Wednesday Thursday Friday Saturday        1     [facility]    8:30 AM   (15 min.)   [facility]   [facility] Lab Draw     [facility] ONC INFUSION 180    9:00 AM   (180 min.)    ONCOLOGY INFUSION   [facility] 2     3     [facility] ONC INFUSION 60   10:30 AM   (60 min.)    ONCOLOGY INFUSION   [facility] 4     5     6       7     8     9     10     11     12     13       14     15     [facility]   10:00 AM   (15 min.)   [facility]   [facility] Lab Draw     [facility] ONC INFUSION 180   10:30 AM   (180 min.)    ONCOLOGY INFUSION   [facility] 16     17     [facility] ONC INFUSION 60   12:00 PM   (60 min.)    ONCOLOGY INFUSION   [facility]  Cancer Clinic 18     19     20       21     22     23     24     25     26     CT CHEST ABDOMEN PELVIS WWO   10:00 AM   (20 min.)   [address] [place] [address] CT 27       28     29     [facility] DRAW    7:15 AM   (15 min.)    [facility] LAB DRAW   [facility] Lab Draw     [facility] RETURN    7:30 AM   (30 min.)   Vinny Yu MD   [facility] ONC INFUSION 180    9:00 AM   (180 min.)    ONCOLOGY INFUSION   [facility] 30 31 November 2018 Sunday Monday Tuesday Wednesday Thursday Friday Saturday                       1     2     3       4     5     6     7     8     9     10       11     12     13     14     15     16     17       18     19     20     21     22     23     24       25     26     27     28     29     30                    No results found for this or any previous visit (from the past 24 hour(s)).              Follow-ups after your visit        Your next 10 appointments already scheduled     Oct 26, 2018 10:00 AM CDT   CT CHEST ABDOMEN PELVIS W/O & W CONTRAST with [address] ([facility])    [address] [address] [phone]           How do I prepare for my exam? (Food and drink instructions) To prepare: Do not eat or drink for 2 hours before your exam. If you need to take medicine, you may take it with small sips of water. (We may ask you to take liquid medicine as well.)  How do I prepare for my exam? (Other instructions) Please arrive 30 minutes early for your CT.  Once in the department you might be asked to drink water 15-20 minutes prior to your exam.  If indicated you may be asked to drink an oral contrast in advance of your CT.  If this is the case, the imaging team will let you know or be in contact with you prior to your appointment  Patients over 70 or patients with diabetes or kidney problems: If you  haven t had a blood test (creatinine test) within the last 30 days, the Cardiologist/Radiologist may require you to get this test prior to your exam.  If you have diabetes:  Continue to take your metformin medication on the day of your exam  What should I wear: Please wear loose clothing, such as a sweat suit or jogging clothes. Avoid snaps, zippers and other metal. We may ask you to undress and put on a hospital gown.  How long does the exam take: Most scans take less than 20 minutes.  What should I bring: Please bring any scans or X-rays taken at other hospitals, if similar tests were done. Also bring a list of your medicines, including vitamins, minerals and over-the-counter drugs. It is safest to leave personal items at home.  Do I need a : No  is needed.  What do I need to tell my doctor? Be sure to tell your doctor: * If you have any allergies. * If there s any chance you are pregnant. * If you are breastfeeding.  What should I do after the exam: No restrictions, You may resume normal activities.  What is this test: A CT (computed tomography) scan is a series of pictures that allows us to look inside your body. The scanner creates images of the body in cross sections, much like slices of bread. This helps us see any problems more clearly. You may receive contrast (X-ray dye) before or during your scan. You will be asked to drink the contrast.  Who should I call with questions: If you have any questions, please call the Imaging Department where you will have your exam. Directions, parking instructions, and other information is available on our website, [email or web address].            Oct 29, 2018  7:15 AM CDT   [facility] Draw with  [facility] DRAW   [facility] Draw ([facility])    [address]  [address]  [place] [address] [phone]            Oct 29, 2018  7:45 AM CDT   (Arrive by 7:30 AM)   Return Visit with Vinny Yu MD    [facility] ([facility])    [address]  [address]  [place] [id number] [phone]            Oct 29, 2018  9:00 AM CDT   Infusion 180 with [facility] ONCOLOGY INFUSION, UC 24 ATC   [facility] ([facility])    [address]  [address]  [place] [id number] [phone]              Who to contact     If you have questions or need follow up information about today's clinic visit or your schedule please contact [facility] CANCER [facility] directly at [phone].  Normal or non-critical lab and imaging results will be communicated to you by [employer], letter or phone within 4 business days after the clinic has received the results. If you do not hear from us within 7 days, please contact the clinic through [employer] or phone. If you have a critical or abnormal lab result, we will notify you by phone as soon as possible.  Submit refill requests through [employer] or call your pharmacy and they will forward the refill request to us. Please allow 3 business days for your refill to be completed.          Additional Information About Your Visit        [employer] Information     [employer] gives you secure access to your electronic health record. If you see a primary care provider, you can also send messages to your care team and make appointments. If you have questions, please call your primary care clinic.  If you do not have a primary care provider, please call [phone] and they will assist you.        Care EveryWhere ID     This is your Care EveryWhere ID. This could be used by other organizations to access your [place] medical records  [id number]        Your Vitals Were     Pulse Temperature Respirations Pulse Oximetry          85 97.8  F (36.6  C) (Oral) 16 98%         Blood Pressure from Last 3 Encounters:   10/17/18 141/76   10/15/18 103/55   10/03/18 117/66    Weight from Last 3 Encounters:   10/15/18  44.5 kg (98 lb)   10/01/18 46.5 kg (102 lb 9.6 oz)   09/18/18 46.4 kg (102 lb 3.2 oz)              Today, you had the following     No orders found for display       Primary Care Provider Office Phone # Fax #    Neri Gipson [phone] [address] [address]        Equal Access to Services     NESSA NICHOLS : Hadii aad ku hadasho Soomaali, waaxda luqadaha, qaybta kaalmada adeegyada, waxay idiin hayaan adeeg kharash laelana . So [facility] [phone].    ATENCIÓN: Si habla espmarcelo, tiene a nava disposición servicios gratuitos de asistencia lingüística. Llame al [phone].    We comply with applicable federal civil rights laws and [place] laws. We do not discriminate on the basis of race, color, national origin, age, disability, sex, sexual orientation, or gender identity.            Thank you!     Thank you for choosing [facility]ER CLINIC  for your care. Our goal is always to provide you with excellent care. Hearing back from our patients is one way we can continue to improve our services. Please take a few minutes to complete the written survey that you may receive in the mail after your visit with us. Thank you!             Your Updated Medication List - Protect others around you: Learn how to safely use, store and throw away your medicines at www.disposemymeds.org.          This list is accurate as of 10/17/18  1:42 PM.  Always use your most recent med list.                   Brand Name Dispense Instructions for use Diagnosis    albuterol 108 (90 Base) MCG/ACT inhaler    PROAIR HFA/PROVENTIL HFA/VENTOLIN HFA    1 Inhaler    Inhale 2 puffs into the lungs 4 times daily    Mild asthma, unspecified whether complicated, unspecified whether persistent       amylase-lipase-protease 69837 units Cpep    CREON    180 capsule    Take 2 capsules (72,000 Units) by mouth 3 times daily (with meals)    Malignant neoplasm of head of pancreas (H)       CALCIUM PO       Take by mouth every morning Form/strength unknown. Powder formulation. Add to water and fruits/vegetables daily to promote bone health.        CARTIA  MG 24 hr capsule   Generic drug:  diltiazem     10 capsule    TK 1 C PO QD    Benign essential hypertension       CHLORPHENIRAMINE MALEATE PO      Take 4 mg by mouth daily as needed        cholecalciferol 1000 UNIT tablet    vitamin D3     Take 1 tablet by mouth 2 times daily        cyanocolbalamin 500 MCG tablet    vitamin  B-12     Take 500 mcg by mouth every morning        folic acid 400 MCG tablet    FOLVITE     Take 1 tablet by mouth every morning        hydrOXYzine 25 MG tablet    ATARAX    60 tablet    Take 1-2 tablets (25-50 mg) by mouth every 6 hours as needed for itching (adjuvant pain)    Obstructive jaundice       levothyroxine 125 MCG tablet    SYNTHROID/LEVOTHROID    90 tablet    Take 1 tablet (125 mcg) by mouth daily    Hypothyroidism, unspecified type       loperamide 2 MG capsule    IMODIUM    60 capsule    2 caps at 1st sign of diarrhea & 1 cap every 2hrs until 12hrs diarrhea free. During night, 2 caps at bedtime & 2 caps every 4hrs until AM    Malignant neoplasm of head of pancreas (H)       LORazepam 0.5 MG tablet    ATIVAN    30 tablet    Take 1 tablet (0.5 mg) by mouth every 4 hours as needed (Anxiety, Nausea/Vomiting or Sleep)    Malignant neoplasm of head of pancreas (H)       magic mouthwash suspension (diphenhydrAMINE, lidocaine, aluminum-magnesium & simethicone) Susp compounding kit     237 mL    Swish and swallow 5-10 mLs in mouth every 6 hours as needed for mouth sores    Malignant neoplasm of head of pancreas (H)       MELATONIN PO      Take by mouth nightly as needed        omeprazole 40 MG capsule    priLOSEC          * ondansetron 4 MG tablet    ZOFRAN          * ondansetron 8 MG tablet    ZOFRAN    30 tablet    Take 1 tablet (8 mg) by mouth every 8 hours as needed (nausea/vomiting)    Malignant neoplasm of head of pancreas (H)        order for DME     2 Units    Equipment being ordered: Compression Stockings. Please dispense 2 pairs of knee high 20-30 mmHg    Lymphedema       pantoprazole 20 MG EC tablet    PROTONIX    30 tablet    Take 1 tablet (20 mg) by mouth daily    Other acute gastritis without hemorrhage       PRO-BIOTIC BLEND PO      Take 1 Tablespoonful by mouth daily as needed        * prochlorperazine 5 MG tablet    COMPAZINE    30 tablet    Take 1 tablet (5 mg) by mouth every 6 hours as needed for nausea or vomiting    Malignant neoplasm of head of pancreas (H)       * prochlorperazine 10 MG tablet    COMPAZINE    30 tablet    Take 1 tablet (10 mg) by mouth every 6 hours as needed for nausea or vomiting    Malignant neoplasm of head of pancreas (H)       rivaroxaban ANTICOAGULANT 20 MG Tabs tablet    XARELTO    30 tablet    Take 1 tablet (20 mg) by mouth daily (with dinner)    Malignant neoplasm of head of pancreas (H), History of pulmonary embolism       * timolol maleate 0.5 % opthalmic solution    ISTALOL    1 Bottle    Place 1 drop into both eyes every morning Before placing contact lenses    Cataract, unspecified cataract type, unspecified laterality       * timolol 0.5 % ophthalmic solution    TIMOPTIC     INT 1 GTT OU IN THE MORNING        traMADol 50 MG tablet    ULTRAM    60 tablet    TAKE 1 TO 2 TABLETS BY MOUTH EVERY 4 HOURS AS NEEDED FOR BREAKTHROUGH PAIN. MAX 8 TABLETS PER DAY.    Malignant neoplasm of head of pancreas (H)       TYLENOL PO      Take 325 mg by mouth every 4 hours as needed for mild pain or fever        * Notice:  This list has 6 medication(s) that are the same as other medications prescribed for you. Read the directions carefully, and ask your doctor or other care provider to review them with you.

## 2018-10-17 NOTE — PROGRESS NOTES
Infusion Nursing Note:  Iris Lewis presents today for fluorouracil pump disconnect.    Patient seen by provider today: No   present during visit today: Not Applicable.    Note: Patient states that she is feeling well today.  Denies any new complaints.    Intravenous Access:  Implanted Port.    Treatment Conditions:  Not Applicable.      Post Infusion Assessment:  Fluorouracil pump was empty on arrival to infusion  Blood return noted pre and post infusion.  Site patent and intact, free from redness, edema or discomfort.  No evidence of extravasations.  Access discontinued per protocol.    Discharge Plan:   Patient declined prescription refills.  Discharge instructions reviewed with: Patient.  Patient and/or family verbalized understanding of discharge instructions and all questions answered.  AVS to patient via [employer].  Patient will return 10/29 for next appointment.   Patient discharged in stable condition accompanied by: self.  Departure Mode: Ambulatory.  Face to Face time: 0      Era Randall RN

## 2018-10-26 NOTE — DISCHARGE INSTRUCTIONS

## 2018-10-29 NOTE — MR AVS SNAPSHOT
After Visit Summary   10/29/2018    Iris Lewis    MRN: [id number]           Patient Information     Date Of Birth          1940        Visit Information        Provider Department      10/29/2018 7:45 AM Vinny Yu MD [facility]        Today's Diagnoses     Malignant neoplasm of head of pancreas (H)    -  1    History of pulmonary embolism          Care Instructions    Preventive Care:    Breast Cancer Screening: During our visit today, we discussed that it is recommended you receive breast cancer screening. Please call or make an appointment with your primary care provider to discuss this with them. You may also call the [facility] scheduling line ([phone]) to set up a mammography appointment at the Breast Center within the [facility].            Follow-ups after your visit        Your next 10 appointments already scheduled     Nov 28, 2018  2:45 PM CST   (Arrive by 2:30 PM)   Return Visit with Brett Conn MD   [facility] ([facility])    [address] [place] [address]  [address]  [facility] [address] [phone]              Who to contact     If you have questions or need follow up information about today's clinic visit or your schedule please contact [facility] directly at [phone].  Normal or non-critical lab and imaging results will be communicated to you by MyChart, letter or phone within 4 business days after the clinic has received the results. If you do not hear from us within 7 days, please contact the clinic through MyChart or phone. If you have a critical or abnormal lab result, we will notify you by phone as soon as possible.  Submit refill requests through [employer] or call your pharmacy and they will forward the refill request to us. Please allow 3 business days for your refill to be completed.          Additional Information About Your  Visit        MyChart Information     "[employer]hart gives you secure access to your electronic health record. If you see a primary care provider, you can also send messages to your care team and make appointments. If you have questions, please call your primary care clinic.  If you do not have a primary care provider, please call [phone] and they will assist you.        Care EveryWhere ID     This is your Care EveryWhere ID. This could be used by other organizations to access your [place] medical records  [id number]        Your Vitals Were     Pulse Temperature Respirations Pulse Oximetry BMI (Body Mass Index)       69 97.6  F (36.4  C) (Oral) 16 99% 18.63 kg/m2        Blood Pressure from Last 3 Encounters:   10/29/18 97/61   10/17/18 141/76   10/15/18 103/55    Weight from Last 3 Encounters:   10/29/18 46.2 kg (101 lb 14.4 oz)   10/15/18 44.5 kg (98 lb)   10/01/18 46.5 kg (102 lb 9.6 oz)              Today, you had the following     No orders found for display       Primary Care Provider Office Phone # Fax #    Neri Gipson [phone]       [address] [address]        Equal Access to Services     NESSA NICHOLS : Hadii aad ku hadasho Soomaali, waaxda luqadaha, qaybta kaalmada adeegyada, waxay idiin haylevyn rosey romero laelana michael. So [facility] [phone].    ATENCIÓN: Si habla español, tiene a nava disposición servicios gratuitos de asistencia lingüística. Llame al [phone].    We comply with applicable federal civil rights laws and [place] laws. We do not discriminate on the basis of race, color, national origin, age, disability, sex, sexual orientation, or gender identity.            Thank you!     Thank you for choosing [facility]  for your care. Our goal is always to provide you with excellent care. Hearing back from our patients is one way we can continue to improve our services. Please take a few minutes to complete the written survey that you  may receive in the mail after your visit with us. Thank you!             Your Updated Medication List - Protect others around you: Learn how to safely use, store and throw away your medicines at www.disposemymeds.org.          This list is accurate as of 10/29/18 11:59 PM.  Always use your most recent med list.                   Brand Name Dispense Instructions for use Diagnosis    albuterol 108 (90 Base) MCG/ACT inhaler    PROAIR HFA/PROVENTIL HFA/VENTOLIN HFA    1 Inhaler    Inhale 2 puffs into the lungs 4 times daily    Mild asthma, unspecified whether complicated, unspecified whether persistent       amylase-lipase-protease 80476 units Cpep    CREON    180 capsule    Take 2 capsules (72,000 Units) by mouth 3 times daily (with meals)    Malignant neoplasm of head of pancreas (H)       CALCIUM PO      Take by mouth every morning Form/strength unknown. Powder formulation. Add to water and fruits/vegetables daily to promote bone health.        CARTIA  MG 24 hr capsule   Generic drug:  diltiazem     10 capsule    TK 1 C PO QD    Benign essential hypertension       CHLORPHENIRAMINE MALEATE PO      Take 4 mg by mouth daily as needed        cholecalciferol 1000 UNIT tablet    vitamin D3     Take 1 tablet by mouth 2 times daily        cyanocolbalamin 500 MCG tablet    vitamin  B-12     Take 500 mcg by mouth every morning        folic acid 400 MCG tablet    FOLVITE     Take 1 tablet by mouth every morning        hydrOXYzine 25 MG tablet    ATARAX    60 tablet    Take 1-2 tablets (25-50 mg) by mouth every 6 hours as needed for itching (adjuvant pain)    Obstructive jaundice       levothyroxine 125 MCG tablet    SYNTHROID/LEVOTHROID    90 tablet    Take 1 tablet (125 mcg) by mouth daily    Hypothyroidism, unspecified type       loperamide 2 MG capsule    IMODIUM    60 capsule    2 caps at 1st sign of diarrhea & 1 cap every 2hrs until 12hrs diarrhea free. During night, 2 caps at bedtime & 2 caps every 4hrs until AM     Malignant neoplasm of head of pancreas (H)       * LORazepam 0.5 MG tablet    ATIVAN    30 tablet    Take 1 tablet (0.5 mg) by mouth every 4 hours as needed (Anxiety, Nausea/Vomiting or Sleep)    Malignant neoplasm of head of pancreas (H)       * LORazepam 1 MG tablet    ATIVAN          magic mouthwash suspension (diphenhydrAMINE, lidocaine, aluminum-magnesium & simethicone) Susp compounding kit     237 mL    Swish and swallow 5-10 mLs in mouth every 6 hours as needed for mouth sores    Malignant neoplasm of head of pancreas (H)       MELATONIN PO      Take by mouth nightly as needed        omeprazole 40 MG capsule    priLOSEC          * ondansetron 4 MG tablet    ZOFRAN          * ondansetron 8 MG tablet    ZOFRAN    30 tablet    Take 1 tablet (8 mg) by mouth every 8 hours as needed (nausea/vomiting)    Malignant neoplasm of head of pancreas (H)       order for DME     2 Units    Equipment being ordered: Compression Stockings. Please dispense 2 pairs of knee high 20-30 mmHg    Lymphedema       pantoprazole 20 MG EC tablet    PROTONIX    30 tablet    Take 1 tablet (20 mg) by mouth daily    Other acute gastritis without hemorrhage       PRO-BIOTIC BLEND PO      Take 1 Tablespoonful by mouth daily as needed        * prochlorperazine 5 MG tablet    COMPAZINE    30 tablet    Take 1 tablet (5 mg) by mouth every 6 hours as needed for nausea or vomiting    Malignant neoplasm of head of pancreas (H)       * prochlorperazine 10 MG tablet    COMPAZINE    30 tablet    Take 1 tablet (10 mg) by mouth every 6 hours as needed for nausea or vomiting    Malignant neoplasm of head of pancreas (H)       rivaroxaban ANTICOAGULANT 20 MG Tabs tablet    XARELTO    30 tablet    Take 1 tablet (20 mg) by mouth daily (with dinner)    Malignant neoplasm of head of pancreas (H), History of pulmonary embolism       * timolol maleate 0.5 % opthalmic solution    ISTALOL    1 Bottle    Place 1 drop into both eyes every morning Before placing  contact lenses    Cataract, unspecified cataract type, unspecified laterality       * timolol 0.5 % ophthalmic solution    TIMOPTIC     INT 1 GTT OU IN THE MORNING        TYLENOL PO      Take 325 mg by mouth every 4 hours as needed for mild pain or fever        * Notice:  This list has 8 medication(s) that are the same as other medications prescribed for you. Read the directions carefully, and ask your doctor or other care provider to review them with you.

## 2018-10-29 NOTE — NURSING NOTE
"Oncology Rooming Note    October 29, 2018 8:07 AM   Iris Lewis is a 78 year old female who presents for:    Chief Complaint   Patient presents with     Oncology Clinic Visit     F/U Pancreatic Ca     Port Draw     labs drawn via port by RN. VS taken. Patient checked in for appointment.      Initial Vitals: BP 97/61 (BP Location: Right arm, Patient Position: Sitting, Cuff Size: Adult Regular)  Pulse 69  Temp 97.6  F (36.4  C) (Oral)  Resp 16  Wt 46.2 kg (101 lb 14.4 oz)  SpO2 99%  BMI 18.63 kg/m2 Estimated body mass index is 18.63 kg/(m^2) as calculated from the following:    Height as of 8/27/18: 1.575 m (5' 2.01\").    Weight as of this encounter: 46.2 kg (101 lb 14.4 oz). Body surface area is 1.42 meters squared.  No Pain (0) Comment: Data Unavailable   No LMP recorded. Patient is postmenopausal.  Allergies reviewed: Yes  Medications reviewed: Yes    Medications: MEDICATION REFILLS NEEDED TODAY. Provider was notified.  Pharmacy name entered into Metago:    [facility] - [facility] [address] [facility] [place] [address] AT [facility] OF [place] [address] [facility] [address]    Clinical concerns: Yes, Patient states she has back pain at times. Dr Yu was notified.    10 minutes for nursing intake (face to face time)     RHEA ROD LPN            "

## 2018-10-29 NOTE — MR AVS SNAPSHOT
After Visit Summary   10/29/2018    Iris Lewis    MRN: [id number]           Patient Information     Date Of Birth          1940        Visit Information        Provider Department      10/29/2018 4:40 PM Han, Soo Yeon, MSW [facility]        Today's Diagnoses     Visit for counseling    -  1       Follow-ups after your visit        Who to contact     If you have questions or need follow up information about today's clinic visit or your schedule please contact [facility] directly at [phone].  Normal or non-critical lab and imaging results will be communicated to you by [employer], letter or phone within 4 business days after the clinic has received the results. If you do not hear from us within 7 days, please contact the clinic through [employer] or phone. If you have a critical or abnormal lab result, we will notify you by phone as soon as possible.  Submit refill requests through [employer] or call your pharmacy and they will forward the refill request to us. Please allow 3 business days for your refill to be completed.          Additional Information About Your Visit        MyChart Information     [employer] gives you secure access to your electronic health record. If you see a primary care provider, you can also send messages to your care team and make appointments. If you have questions, please call your primary care clinic.  If you do not have a primary care provider, please call [phone] and they will assist you.        Care EveryWhere ID     This is your Care EveryWhere ID. This could be used by other organizations to access your [place] medical records  [id number]         Blood Pressure from Last 3 Encounters:   10/29/18 97/61   10/17/18 141/76   10/15/18 103/55    Weight from Last 3 Encounters:   10/29/18 46.2 kg (101 lb 14.4 oz)   10/15/18 44.5 kg (98 lb)   10/01/18 46.5 kg (102 lb 9.6 oz)              Today, you had the following     No  orders found for display       Primary Care Provider Office Phone # Fax #    Neri Gipson [phone] [address] [address]        Equal Access to Services     NESSA NICHOLS : Haddiana mary simms madison Sothom, waaxda luqadaha, qaybta kaalmada adeegyada, lisa romero bryan michael. So [facility] [phone].    ATENCIÓN: Si habla español, tiene a nava disposición servicios gratuitos de asistencia lingüística. Llame al [phone].    We comply with applicable federal civil rights laws and [place] laws. We do not discriminate on the basis of race, color, national origin, age, disability, sex, sexual orientation, or gender identity.            Thank you!     Thank you for choosing [facility]  for your care. Our goal is always to provide you with excellent care. Hearing back from our patients is one way we can continue to improve our services. Please take a few minutes to complete the written survey that you may receive in the mail after your visit with us. Thank you!             Your Updated Medication List - Protect others around you: Learn how to safely use, store and throw away your medicines at www.disposemymeds.org.          This list is accurate as of 10/29/18 11:59 PM.  Always use your most recent med list.                   Brand Name Dispense Instructions for use Diagnosis    albuterol 108 (90 Base) MCG/ACT inhaler    PROAIR HFA/PROVENTIL HFA/VENTOLIN HFA    1 Inhaler    Inhale 2 puffs into the lungs 4 times daily    Mild asthma, unspecified whether complicated, unspecified whether persistent       amylase-lipase-protease 06920 units Cpep    CREON    180 capsule    Take 2 capsules (72,000 Units) by mouth 3 times daily (with meals)    Malignant neoplasm of head of pancreas (H)       CALCIUM PO      Take by mouth every morning Form/strength unknown. Powder formulation. Add to water and fruits/vegetables daily to promote bone health.         CARTIA  MG 24 hr capsule   Generic drug:  diltiazem     10 capsule    TK 1 C PO QD    Benign essential hypertension       CHLORPHENIRAMINE MALEATE PO      Take 4 mg by mouth daily as needed        cholecalciferol 1000 UNIT tablet    vitamin D3     Take 1 tablet by mouth 2 times daily        cyanocolbalamin 500 MCG tablet    vitamin  B-12     Take 500 mcg by mouth every morning        folic acid 400 MCG tablet    FOLVITE     Take 1 tablet by mouth every morning        hydrOXYzine 25 MG tablet    ATARAX    60 tablet    Take 1-2 tablets (25-50 mg) by mouth every 6 hours as needed for itching (adjuvant pain)    Obstructive jaundice       levothyroxine 125 MCG tablet    SYNTHROID/LEVOTHROID    90 tablet    Take 1 tablet (125 mcg) by mouth daily    Hypothyroidism, unspecified type       loperamide 2 MG capsule    IMODIUM    60 capsule    2 caps at 1st sign of diarrhea & 1 cap every 2hrs until 12hrs diarrhea free. During night, 2 caps at bedtime & 2 caps every 4hrs until AM    Malignant neoplasm of head of pancreas (H)       * LORazepam 0.5 MG tablet    ATIVAN    30 tablet    Take 1 tablet (0.5 mg) by mouth every 4 hours as needed (Anxiety, Nausea/Vomiting or Sleep)    Malignant neoplasm of head of pancreas (H)       * LORazepam 1 MG tablet    ATIVAN          magic mouthwash suspension (diphenhydrAMINE, lidocaine, aluminum-magnesium & simethicone) Susp compounding kit     237 mL    Swish and swallow 5-10 mLs in mouth every 6 hours as needed for mouth sores    Malignant neoplasm of head of pancreas (H)       MELATONIN PO      Take by mouth nightly as needed        omeprazole 40 MG capsule    priLOSEC          * ondansetron 4 MG tablet    ZOFRAN          * ondansetron 8 MG tablet    ZOFRAN    30 tablet    Take 1 tablet (8 mg) by mouth every 8 hours as needed (nausea/vomiting)    Malignant neoplasm of head of pancreas (H)       order for DME     2 Units    Equipment being ordered: Compression Stockings. Please  dispense 2 pairs of knee high 20-30 mmHg    Lymphedema       pantoprazole 20 MG EC tablet    PROTONIX    30 tablet    Take 1 tablet (20 mg) by mouth daily    Other acute gastritis without hemorrhage       PRO-BIOTIC BLEND PO      Take 1 Tablespoonful by mouth daily as needed        * prochlorperazine 5 MG tablet    COMPAZINE    30 tablet    Take 1 tablet (5 mg) by mouth every 6 hours as needed for nausea or vomiting    Malignant neoplasm of head of pancreas (H)       * prochlorperazine 10 MG tablet    COMPAZINE    30 tablet    Take 1 tablet (10 mg) by mouth every 6 hours as needed for nausea or vomiting    Malignant neoplasm of head of pancreas (H)       rivaroxaban ANTICOAGULANT 20 MG Tabs tablet    XARELTO    30 tablet    Take 1 tablet (20 mg) by mouth daily (with dinner)    Malignant neoplasm of head of pancreas (H), History of pulmonary embolism       * timolol maleate 0.5 % opthalmic solution    ISTALOL    1 Bottle    Place 1 drop into both eyes every morning Before placing contact lenses    Cataract, unspecified cataract type, unspecified laterality       * timolol 0.5 % ophthalmic solution    TIMOPTIC     INT 1 GTT OU IN THE MORNING        traMADol 50 MG tablet    ULTRAM    60 tablet    TAKE 1 TO 2 TABLETS BY MOUTH EVERY 4 HOURS AS NEEDED FOR BREAKTHROUGH PAIN. MAX 8 TABLETS PER DAY.    Malignant neoplasm of head of pancreas (H)       TYLENOL PO      Take 325 mg by mouth every 4 hours as needed for mild pain or fever        * Notice:  This list has 8 medication(s) that are the same as other medications prescribed for you. Read the directions carefully, and ask your doctor or other care provider to review them with you.

## 2018-10-29 NOTE — Clinical Note
10/29/2018       RE: Iris Lewis  [address]  [place] [address]     Dear Colleague,    Thank you for referring your patient, Iris Lewis, to the [facility]. Please see a copy of my visit note below.    Iris Trindiad is here today in follow-up of advanced pancreatic cancer.    She is currently on active treatment with 5-FU and liposomal irinotecan with questionable disease progression at her last visit 2 months ago.  She is here today for ongoing response assessment.    Progression--will observe for now, begin end of life planning-to see social work today    Again, thank you for allowing me to participate in the care of your patient.      Sincerely,    Vinny Yu MD

## 2018-10-29 NOTE — PATIENT INSTRUCTIONS
Preventive Care:    Breast Cancer Screening: During our visit today, we discussed that it is recommended you receive breast cancer screening. Please call or make an appointment with your primary care provider to discuss this with them. You may also call the [facility] scheduling line ([phone]) to set up a mammography appointment at the Breast Center within the [facility].

## 2018-10-29 NOTE — PROGRESS NOTES
"Social Work Follow-Up Encounter Visit  Oncology Clinic    Data/Intervention:  Patient Name:  Iris Lewis  /Age:  1940 (78 year old)    Reason for Follow-Up:  SW received referral from MD to meet with patient to discuss community resources and planning for future needs as patient is ceasing therapy.    Collaborated With:    -patient   -     SW met with patient in exam room.   later joined conversation in room.  Patient discussed feeling \"at peace\" with decision to stop therapy, stating \"it is what it is.\" She shared that she currently is independent at home and assists her  as she stated he has \"memory problems.\" She shared that he currently is fairly independent with ADLs at this time, he is still driving.  Patient indicated her  primarily needs prompting or reminders with certain tasks when something is considered \"out of his routine\" or new.  When asked about their support networks, she stated that both her and her 's adult children are \"busy with their lives\" stating they are supportive but often live out of state or are frequently traveling/working.  She also described their friends as being supportive.  During further discussion, patient admitted she is very independent and it has been difficult to ask for assistance with needs from their children or friends.  SW discussed with patient that she and her  will need to start planning for their future needs and encouraged them to also begin sharing updates about patient's decision to stop treatment with family so they can slowly become more involved.  Patient indicated understanding of this and indicated consideration of this action.  SW also provided patient and  with information for Senior Linkage Line to assist with needs in the home. Patient indicated no other needs at this time and was given SW contact information for any other questions/concerns.    Resources Provided:  Senior Linkage " Line    Assessment:  Patient appears to be coping with her decision to stop treatment.  She is struggling with asking for help from her support network as she has generally been very independent.      Plan:  SW provided patient/family with writer's contact information and availability.       Soo Yeon Han, MSW, [facility]  Pager: [phone]  Phone: [phone]

## 2018-10-29 NOTE — NURSING NOTE
Chief Complaint   Patient presents with     Oncology Clinic Visit     F/U Pancreatic Ca     Port Draw     labs drawn via port by RN. VS taken. Patient checked in for appointment.      Port accessed with 20g flat needle by RN, labs collected, line flushed with saline and heparin.  Vitals taken. Pt checked in for appointment(s).    Julissa LARSEN RN PHN BSN  BMT/Oncology Lab

## 2018-10-29 NOTE — LETTER
10/29/2018      RE: Iris Lewis  [address]  [place] [address]       Iris Trinidad is here today in follow-up of advanced pancreatic cancer.    She is currently on active treatment with 5-FU and liposomal irinotecan with questionable disease progression at her last visit 2 months ago.  She is here today for ongoing response assessment.  Since her last visit she reports overall her health seems about the same to her.  Her appetite seems about the same and she is not losing any weight.  She has only minor problems with diarrhea.  She has not had any recent mouth sores.  She has had no bleeding and no respiratory symptoms and continues on her rivaroxaban.  Remainder of her 10 point review of systems is otherwise negative.    Physical exam she appears well with unremarkable vital signs.    She has no scleral icterus and no visible lesions in the oropharynx  No adenopathy is palpable in her neck or supraclavicular spaces.  Lungs are clear to auscultation without dullness to percussion.  Heart rate and rhythm are regular.  Her abdomen is soft and nontender without mass organomegaly.  She has no peripheral edema no tenderness in her calves or thighs.  She has fluent speech and grossly intact cranial nerves.  Her gait is unremarkable she has severe kyphoscoliosis which is unchanged.    I reviewed with her her labs and CT scan.  She has normal electrolytes and renal function.  Her albumin is down to 2.6.  Her liver tests are unremarkable.  She has moderate anemia with a hemoglobin of 8.5 and otherwise unremarkable blood counts.  Her CT scan shows increased both in the size of her primary and some peritoneal disease.    Assessment/plan: Progressive metastatic pancreatic cancer.  We had a long talk about how to proceed at this point.  She has no further standard of care treatments available.  We talked about phase 1 trials and she was not interested in pursuing those.  We talked quite a bit about end-of-life  planning.  At present she does not have a lot of symptomatic needs.  1 of the biggest issues is going to be sorting out ongoing care for her  who is demented.  I asked social work to meet with her to begin talking through some of these issues.  Her final decision was to observe her disease status for now and make a plan to transition to hospice as we work through her social issues.      Vinny Yu MD

## 2018-10-29 NOTE — PROGRESS NOTES
Iris Trinidad is here today in follow-up of advanced pancreatic cancer.    She is currently on active treatment with 5-FU and liposomal irinotecan with questionable disease progression at her last visit 2 months ago.  She is here today for ongoing response assessment.  Since her last visit she reports overall her health seems about the same to her.  Her appetite seems about the same and she is not losing any weight.  She has only minor problems with diarrhea.  She has not had any recent mouth sores.  She has had no bleeding and no respiratory symptoms and continues on her rivaroxaban.  Remainder of her 10 point review of systems is otherwise negative.    Physical exam she appears well with unremarkable vital signs.    She has no scleral icterus and no visible lesions in the oropharynx  No adenopathy is palpable in her neck or supraclavicular spaces.  Lungs are clear to auscultation without dullness to percussion.  Heart rate and rhythm are regular.  Her abdomen is soft and nontender without mass organomegaly.  She has no peripheral edema no tenderness in her calves or thighs.  She has fluent speech and grossly intact cranial nerves.  Her gait is unremarkable she has severe kyphoscoliosis which is unchanged.    I reviewed with her her labs and CT scan.  She has normal electrolytes and renal function.  Her albumin is down to 2.6.  Her liver tests are unremarkable.  She has moderate anemia with a hemoglobin of 8.5 and otherwise unremarkable blood counts.  Her CT scan shows increased both in the size of her primary and some peritoneal disease.    Assessment/plan: Progressive metastatic pancreatic cancer.  We had a long talk about how to proceed at this point.  She has no further standard of care treatments available.  We talked about phase 1 trials and she was not interested in pursuing those.  We talked quite a bit about end-of-life planning.  At present she does not have a lot of symptomatic needs.  1 of the biggest  issues is going to be sorting out ongoing care for her  who is demented.  I asked social work to meet with her to begin talking through some of these issues.  Her final decision was to observe her disease status for now and make a plan to transition to hospice as we work through her social issues.

## 2018-10-31 NOTE — NURSING NOTE
Chief Complaint   Patient presents with     Port Flush     Port flushed and locked with heparin by RN, then de-accessed. Lab only appointment.     Lisa Mcknight RN

## 2018-11-01 NOTE — TELEPHONE ENCOUNTER
----- Message from Vinny Yu MD sent at 10/31/2018  6:47 PM CDT -----  Regarding: RE: Ok to refill Tramadol?  ok  ----- Message -----     From: Geeta Clark RN     Sent: 10/31/2018   2:11 PM       To: Vinny Yu MD, Rand King RN, #  Subject: Ok to refill Tramadol?                           Dr. Elba Simmons said you were going to refill Tramadol at her last visit, but then you forgot. Ok for me to call in refill? And still quantity of #60? Please advise and I can call in if appropriate. Patient states she has 4 pills left. Thank you!     Geeta Clark, MARNI   [facility] in [facility].

## 2018-11-12 NOTE — NURSING NOTE
"Oncology Rooming Note    November 12, 2018 3:41 PM   Iris Lewis is a 78 year old female who presents for:    Chief Complaint   Patient presents with     Port Draw     Port labs     RECHECK     ONC poorly differentiated adenocarcinoma      Initial Vitals: /84 (BP Location: Right arm, Patient Position: Sitting, Cuff Size: Adult Small)  Pulse 77  Temp 97.5  F (36.4  C) (Oral)  Resp 16  Ht 1.575 m (5' 2.01\")  Wt 42.7 kg (94 lb 2.2 oz)  SpO2 97%  BMI 17.21 kg/m2 Estimated body mass index is 17.21 kg/(m^2) as calculated from the following:    Height as of this encounter: 1.575 m (5' 2.01\").    Weight as of this encounter: 42.7 kg (94 lb 2.2 oz). Body surface area is 1.37 meters squared.  Extreme Pain (8) Comment: Data Unavailable   No LMP recorded. Patient is postmenopausal.  Allergies reviewed: Yes  Medications reviewed: Yes    Medications: Medication refills not needed today.  Pharmacy name entered into [employer]:    [facility] - [facility] [address] [facility] [address] - [place] [address] AT [facility] [address]    Clinical concerns: none      6 minutes for nursing intake (face to face time)     Janey MIAN Maldonado              "

## 2018-11-12 NOTE — MR AVS SNAPSHOT
After Visit Summary   11/12/2018    Iris Lewis    MRN: [id number]           Patient Information     Date Of Birth          1940        Visit Information        Provider Department      11/12/2018 3:40 PM Tracy Padorn PA [facility]        Today's Diagnoses     Abdominal pain, left upper quadrant    -  1    Malignant neoplasm of head of pancreas (H)        Constipation, unspecified constipation type           Follow-ups after your visit        Your next 10 appointments already scheduled     Nov 13, 2018 12:30 PM CST   Infusion 120 with UC SPEC INFUSION, UC 42 ATC   [facility] ([facility])    [address]  [address]  [place] [address]   [phone]            Nov 14, 2018  9:30 AM CST   (Arrive by 9:15 AM)   Return Visit with José Antonio Ann MD   [facility] ([facility])    [address]  [address]  [place] [address]   [phone]            Nov 15, 2018  9:00 AM CST   [facility] Lab Draw with UC [facility] LAB DRAW   [facility] Draw ([facility])    [address]  [address]  [place] [address]   [phone]            Nov 15, 2018  9:30 AM CST   (Arrive by 9:15 AM)   Return Visit with CAT Dang CNP   [facility] ([facility])    [address]  [address]  [place] [address]   [phone]            Nov 28, 2018  2:45 PM CST   (Arrive by 2:30 PM)   Return Visit with Brett Conn MD   [facility] ([facility])    [address]  [address]  [place] [address] [phone]              Future tests that were ordered for you today     Open Future Orders        Priority Expected Expires Ordered    *CBC with platelets differential Routine  "11/13/2018 11/12/2019 11/12/2018    Comprehensive metabolic panel Routine 11/13/2018 11/12/2019 11/12/2018    Lipase Routine 11/13/2018 11/12/2019 11/12/2018    XR Abdomen 2 Views Routine 11/12/2018 11/12/2019 11/12/2018            Who to contact     If you have questions or need follow up information about today's clinic visit or your schedule please contact [facility] directly at [phone].  Normal or non-critical lab and imaging results will be communicated to you by Smadexhart, letter or phone within 4 business days after the clinic has received the results. If you do not hear from us within 7 days, please contact the clinic through [email or web address] or phone. If you have a critical or abnormal lab result, we will notify you by phone as soon as possible.  Submit refill requests through [email or web address] or call your pharmacy and they will forward the refill request to us. Please allow 3 business days for your refill to be completed.          Additional Information About Your Visit        SmadexharMD On-Line Information     [email or web address] gives you secure access to your electronic health record. If you see a primary care provider, you can also send messages to your care team and make appointments. If you have questions, please call your primary care clinic.  If you do not have a primary care provider, please call [phone] and they will assist you.        Care EveryWhere ID     This is your Care EveryWhere ID. This could be used by other organizations to access your [facility] medical records  [id number]        Your Vitals Were     Pulse Temperature Respirations Height Pulse Oximetry BMI (Body Mass Index)    77 97.5  F (36.4  C) (Oral) 16 1.575 m (5' 2.01\") 97% 17.21 kg/m2       Blood Pressure from Last 3 Encounters:   11/12/18 (!) 151/92   11/12/18 151/84   11/12/18 147/89    Weight from Last 3 Encounters:   11/12/18 42.7 kg (94 lb 2.2 oz)   10/29/18 46.2 kg (101 lb 14.4 oz)   10/15/18 44.5 kg (98 lb)              We Performed " the Following     CBC with platelets differential     Comprehensive metabolic panel     Lipase          Today's Medication Changes          These changes are accurate as of 11/12/18 11:59 PM.  If you have any questions, ask your nurse or doctor.               Start taking these medicines.        Dose/Directions    oxyCODONE 5 MG capsule   Commonly known as:  OXY-IR   Used for:  Abdominal pain, left upper quadrant   Started by:  Tracy Padron PA        Dose:  5-10 mg   Take 1-2 capsules (5-10 mg) by mouth every 4 hours as needed for severe pain   Quantity:  45 capsule   Refills:  0       polyethylene glycol powder   Commonly known as:  MIRALAX/GLYCOLAX   Used for:  Constipation, unspecified constipation type   Started by:  Tracy Padron PA        Dose:  1 capful   Take 17 g (1 capful) by mouth daily   Quantity:  500 g   Refills:  3       senna-docusate 8.6-50 MG per tablet   Commonly known as:  SENOKOT-S;PERICOLACE   Used for:  Constipation, unspecified constipation type   Started by:  Tracy Padron PA        Dose:  1-2 tablet   Take 1-2 tablets by mouth 2 times daily as needed for constipation   Quantity:  100 tablet   Refills:  0         Stop taking these medicines if you haven't already. Please contact your care team if you have questions.     traMADol 50 MG tablet   Commonly known as:  ULTRAM   Stopped by:  Tracy Padron PA                Where to get your medicines      These medications were sent to [facility] [address] [place] [address]  [address] [place] [address]    Hours:  TRANSPLANT PHONE NUMBER [phone] Phone:  [phone]     polyethylene glycol powder    senna-docusate 8.6-50 MG per tablet         Some of these will need a paper prescription and others can be bought over the counter.  Ask your nurse if you have questions.     Bring a paper prescription for each of these medications     oxyCODONE 5 MG capsule                Information about OPIOIDS     PRESCRIPTION OPIOIDS: WHAT YOU NEED TO KNOW   We gave you an opioid (narcotic) pain medicine. It is important to manage your pain, but opioids are not always the best choice. You should first try all the other options your care team gave you. Take this medicine for as short a time (and as few doses) as possible.    Some activities can increase your pain, such as bandage changes or therapy sessions. It may help to take your pain medicine 30 to 60 minutes before these activities. Reduce your stress by getting enough sleep, working on hobbies you enjoy and practicing relaxation or meditation. Talk to your care team about ways to manage your pain beyond prescription opioids.    These medicines have risks:    DO NOT drive when on new or higher doses of pain medicine. These medicines can affect your alertness and reaction times, and you could be arrested for driving under the influence (DUI). If you need to use opioids long-term, talk to your care team about driving.    DO NOT operate heavy machinery    DO NOT do any other dangerous activities while taking these medicines.    DO NOT drink any alcohol while taking these medicines.     If the opioid prescribed includes acetaminophen, DO NOT take with any other medicines that contain acetaminophen. Read all labels carefully. Look for the word  acetaminophen  or  Tylenol.  Ask your pharmacist if you have questions or are unsure.    You can get addicted to pain medicines, especially if you have a history of addiction (chemical, alcohol or substance dependence). Talk to your care team about ways to reduce this risk.    All opioids tend to cause constipation. Drink plenty of water and eat foods that have a lot of fiber, such as fruits, vegetables, prune juice, apple juice and high-fiber cereal. Take a laxative (Miralax, milk of magnesia, Colace, Senna) if you don t move your bowels at least every other day. Other side effects include upset  stomach, sleepiness, dizziness, throwing up, tolerance (needing more of the medicine to have the same effect), physical dependence and slowed breathing.    Store your pills in a secure place, locked if possible. We will not replace any lost or stolen medicine. If you don t finish your medicine, please throw away (dispose) as directed by your pharmacist. The [place] Pollution Control Agency has more information about safe disposal: https://www.pca.[facility].mn.us/living-green/managing-unwanted-medications         Primary Care Provider Office Phone # Fax #    Neri Gipson [phone]       [address] [address]        Equal Access to Services     NESSA NICHOLS : Shaun Martinez, merle gar, ilda correia, lisa michael. So [facility] [phone].    ATENCIÓN: Si habla español, tiene a nava disposición servicios gratuitos de asistencia lingüística. Llame al [phone].    We comply with applicable federal civil rights laws and [place] laws. We do not discriminate on the basis of race, color, national origin, age, disability, sex, sexual orientation, or gender identity.            Thank you!     Thank you for choosing [facility]  for your care. Our goal is always to provide you with excellent care. Hearing back from our patients is one way we can continue to improve our services. Please take a few minutes to complete the written survey that you may receive in the mail after your visit with us. Thank you!             Your Updated Medication List - Protect others around you: Learn how to safely use, store and throw away your medicines at www.disposemymeds.org.          This list is accurate as of 11/12/18 11:59 PM.  Always use your most recent med list.                   Brand Name Dispense Instructions for use Diagnosis    albuterol 108 (90 Base) MCG/ACT inhaler    PROAIR HFA/PROVENTIL HFA/VENTOLIN HFA     1 Inhaler    Inhale 2 puffs into the lungs 4 times daily    Mild asthma, unspecified whether complicated, unspecified whether persistent       amylase-lipase-protease 30437 units Cpep    CREON    180 capsule    Take 2 capsules (72,000 Units) by mouth 3 times daily (with meals)    Malignant neoplasm of head of pancreas (H)       CALCIUM PO      Take by mouth every morning Form/strength unknown. Powder formulation. Add to water and fruits/vegetables daily to promote bone health.        CARTIA  MG 24 hr capsule   Generic drug:  diltiazem     10 capsule    TK 1 C PO QD    Benign essential hypertension       CHLORPHENIRAMINE MALEATE PO      Take 4 mg by mouth daily as needed        cholecalciferol 1000 UNIT tablet    vitamin D3     Take 1 tablet by mouth 2 times daily        cyanocolbalamin 500 MCG tablet    vitamin  B-12     Take 500 mcg by mouth every morning        folic acid 400 MCG tablet    FOLVITE     Take 1 tablet by mouth every morning        hydrOXYzine 25 MG tablet    ATARAX    60 tablet    Take 1-2 tablets (25-50 mg) by mouth every 6 hours as needed for itching (adjuvant pain)    Obstructive jaundice       levothyroxine 125 MCG tablet    SYNTHROID/LEVOTHROID    90 tablet    Take 1 tablet (125 mcg) by mouth daily    Hypothyroidism, unspecified type       loperamide 2 MG capsule    IMODIUM    60 capsule    2 caps at 1st sign of diarrhea & 1 cap every 2hrs until 12hrs diarrhea free. During night, 2 caps at bedtime & 2 caps every 4hrs until AM    Malignant neoplasm of head of pancreas (H)       * LORazepam 0.5 MG tablet    ATIVAN    30 tablet    Take 1 tablet (0.5 mg) by mouth every 4 hours as needed (Anxiety, Nausea/Vomiting or Sleep)    Malignant neoplasm of head of pancreas (H)       * LORazepam 1 MG tablet    ATIVAN          magic mouthwash suspension (diphenhydrAMINE, lidocaine, aluminum-magnesium & simethicone) Susp compounding kit     237 mL    Swish and swallow 5-10 mLs in mouth every 6 hours as  needed for mouth sores    Malignant neoplasm of head of pancreas (H)       MELATONIN PO      Take by mouth nightly as needed        omeprazole 40 MG capsule    priLOSEC          * ondansetron 4 MG tablet    ZOFRAN          * ondansetron 8 MG tablet    ZOFRAN    30 tablet    Take 1 tablet (8 mg) by mouth every 8 hours as needed (nausea/vomiting)    Malignant neoplasm of head of pancreas (H)       order for DME     2 Units    Equipment being ordered: Compression Stockings. Please dispense 2 pairs of knee high 20-30 mmHg    Lymphedema       oxyCODONE 5 MG capsule    OXY-IR    45 capsule    Take 1-2 capsules (5-10 mg) by mouth every 4 hours as needed for severe pain    Abdominal pain, left upper quadrant       pantoprazole 20 MG EC tablet    PROTONIX    30 tablet    Take 1 tablet (20 mg) by mouth daily    Other acute gastritis without hemorrhage       polyethylene glycol powder    MIRALAX/GLYCOLAX    500 g    Take 17 g (1 capful) by mouth daily    Constipation, unspecified constipation type       PRO-BIOTIC BLEND PO      Take 1 Tablespoonful by mouth daily as needed        * prochlorperazine 5 MG tablet    COMPAZINE    30 tablet    Take 1 tablet (5 mg) by mouth every 6 hours as needed for nausea or vomiting    Malignant neoplasm of head of pancreas (H)       * prochlorperazine 10 MG tablet    COMPAZINE    30 tablet    Take 1 tablet (10 mg) by mouth every 6 hours as needed for nausea or vomiting    Malignant neoplasm of head of pancreas (H)       rivaroxaban ANTICOAGULANT 20 MG Tabs tablet    XARELTO    30 tablet    Take 1 tablet (20 mg) by mouth daily (with dinner)    Malignant neoplasm of head of pancreas (H), History of pulmonary embolism       senna-docusate 8.6-50 MG per tablet    SENOKOT-S;PERICOLACE    100 tablet    Take 1-2 tablets by mouth 2 times daily as needed for constipation    Constipation, unspecified constipation type       * timolol maleate 0.5 % opthalmic solution    ISTALOL    1 Bottle    Place 1 drop  into both eyes every morning Before placing contact lenses    Cataract, unspecified cataract type, unspecified laterality       * timolol 0.5 % ophthalmic solution    TIMOPTIC     INT 1 GTT OU IN THE MORNING        TYLENOL PO      Take 325 mg by mouth every 4 hours as needed for mild pain or fever        * Notice:  This list has 8 medication(s) that are the same as other medications prescribed for you. Read the directions carefully, and ask your doctor or other care provider to review them with you.

## 2018-11-12 NOTE — PROGRESS NOTES
Infusion Nursing Note:  Iris Lewis presents today to [facility] for a IVF/IV pain medicine infusion.  During today's [facility] appointment orders from Tracy Padron were completed.  Frequency: once    Progress note:  ID verified by name and .  Assessment completed. Vitals were stable throughout time in [facility]. Patient education regarding infusion and possible side effects provided.  Patient verbalized understanding. yes    Premedications: were not ordered.    Infusion Rates: Infusion given over approximately 1.25 hours.     Labs were drawn prior to appointment on 18.  Vascular access: Port accessed prior to appointment at [facility] on 18.    Treatment Conditions:   No treatment conditions.    Patient tolerated infusion well.    Discharge Plan:   Follow up plan of care with: care coordinator  Discharge instructions were reviewed with patient.  Patient/representative verbalized understanding of discharge instructions and all questions answered.    [facility] in [facility].    Arabella Armstrong RN    Administrations This Visit     0.9% sodium chloride BOLUS     Admin Date Action Dose Route Administered By             2018 New Bag 1000 mL Intravenous Arabella Armstrong RN

## 2018-11-12 NOTE — NURSING NOTE
Labs drawn via port a cath.  Flushed with saline and heparin.  Covered with gauze dressing.  VS done.  Pt tolerated well.    Melony Trivedi  RN

## 2018-11-12 NOTE — TELEPHONE ENCOUNTER
"Pt called in to triage reporting x1 week of abd pain, located at LUQ, described as \"munches, rolling around\" rated 9/10 and constant. She has constant nausea, denied any vomiting, diarrhea, difficulty urinating or problems with bowels. Had a soft BM this morning with no relief of symptoms. She has been taking Tramadol 50 mg 1-2 times daily along with tums and zofran without relief. Tried warm packs, weighted pillows on abd and rest. Has been drinking water and feels hydrated. Paged Dr. Yu for advice.    Recommend pt see TARA today or tomorrow. Called pt back, she would like to come in today, paged Tracy GALVEZ for availability. Per Tracy, check CBC, CMP labs and availability in infusion for possible IVF/nausea meds, see Tracy at 3:40 pm. Waiting on [facility] charge for availability. Pt contacted and advised labs at 3 pm, see Tracy at 3:40 pm, scheduling messaged.    12:47 PM [facility] infusion can do IVF/nausea meds at 4 pm, scheduled.  "

## 2018-11-12 NOTE — PROGRESS NOTES
Oncology/Hematology Visit Note  Nov 12, 2018    Reason for Visit: add on visit for worsening LUQ pain    History of Present Illness: Iris Lewis is a 78 year old female with a hx significant for thyroid cancer s/p thyroidectomy in the 1980s, cholelithiasis s/p CCY 2015, GERD s/p Nissen fundoplication in 2006 and HTN. She was dx with metastatic pancreatic cancer involving the liver, lung and lymph nodes in October 2017 after presenting with obstructive jaundice. She met with  an did not qualify for the HALOZYME study. She was initiated on [place]/Abraxane on 11/28/17. Restaging showed a positive response to treatment after 2 cycles. Prior to cycle 4, she developed acute hypoxia and weakness. She was found to have influenza and pneumonia. 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 At f/u with Dr. Yu on 3/26/18, she was found to have progression and recommended to start 5FU plus liposomal irinotecan. She initiated therapy on 3/28/18. Imaging after 5 cycles showed a positive response. Treatment on 7/2/18 was delayed due to acute LFT elevation, abdominal pain and low grade fevers. LFT elevation was thought to be related to tylenol and alcohol use. CT CAP on 10/26/18 after cycle 15 with progressive disease. Dr. Yu discussed phase 1 trials at visit on 10/29 per chart but Iris was not interested. Decision to observe disease status for now and make a plan to transition to hospice. Please see Dr. Yu's previous notes for further details on the patient's history. She comes in today as add on visit for worsening LUQ pain.    Interval History:  Iris states in the past she has had intermittent LUQ pain, but has been more severe in the past 1-2 weeks. Previously would wax/wane in intensity, but now more continuous and severe. She hasn't been able to sleep or eat due to pain. She has been taking 1-2 tramadol/day. She does not wish to take more than 1 tablet per dose or more  frequently as she is concerned about addiction. She took a tylenol last night as pain was so bad, but that did not help much. Pain not increased with palpation or deep breath. She was constipated about 1 week ago and took miralax. She stated that resolved and she is now having regular bowel movements, not formed, but no diarrhea. She has not been able to vomit after Nissen surgery, but has increased stomach pain and gas at times which she thinks may be contributing. She takes zofran and compazine, but has not felt improvement in pain so has not taken any recently. Denies any fevers, chills. Denies any pain elsewhere. She states she takes sips of water throughout the day, but cannot drink very much water at one time after her nissen fundoplication surgery. She does take prilosec and creon.    She has had more fatigue recently. Occasionally lightheaded when she gets up at night to use the bathroom. She has a cane she uses. Denies any falls.     Review of Systems:  Patient denies any of the following except if noted above: chest pain, dyspnea, cough rashes or skin lesions, bleeding or bruising issues, including melena, hematochezia.    Current Outpatient Prescriptions   Medication Sig Dispense Refill     Acetaminophen (TYLENOL PO) Take 325 mg by mouth every 4 hours as needed for mild pain or fever       albuterol (PROAIR HFA/PROVENTIL HFA/VENTOLIN HFA) 108 (90 BASE) MCG/ACT Inhaler Inhale 2 puffs into the lungs 4 times daily 1 Inhaler 1     amylase-lipase-protease (CREON) 77648 UNITS CPEP Take 2 capsules (72,000 Units) by mouth 3 times daily (with meals) 180 capsule 3     CALCIUM PO Take by mouth every morning Form/strength unknown. Powder formulation. Add to water and fruits/vegetables daily to promote bone health.        CARTIA  MG 24 hr capsule TK 1 C PO QD 10 capsule 0     CHLORPHENIRAMINE MALEATE PO Take 4 mg by mouth daily as needed       cholecalciferol (VITAMIN D) 1000 UNIT tablet Take 1 tablet by mouth 2  times daily        cyanocolbalamin (VITAMIN B-12) 500 MCG tablet Take 500 mcg by mouth every morning        folic acid (FOLVITE) 400 MCG tablet Take 1 tablet by mouth every morning        hydrOXYzine (ATARAX) 25 MG tablet Take 1-2 tablets (25-50 mg) by mouth every 6 hours as needed for itching (adjuvant pain) 60 tablet 3     levothyroxine (SYNTHROID/LEVOTHROID) 125 MCG tablet Take 1 tablet (125 mcg) by mouth daily 90 tablet 1     loperamide (IMODIUM) 2 MG capsule 2 caps at 1st sign of diarrhea & 1 cap every 2hrs until 12hrs diarrhea free. During night, 2 caps at bedtime & 2 caps every 4hrs until AM 60 capsule 3     LORazepam (ATIVAN) 0.5 MG tablet Take 1 tablet (0.5 mg) by mouth every 4 hours as needed (Anxiety, Nausea/Vomiting or Sleep) 30 tablet 3     LORazepam (ATIVAN) 1 MG tablet   0     magic mouthwash (FIRST-MOUTHWASH BLM) suspension Swish and swallow 5-10 mLs in mouth every 6 hours as needed for mouth sores 237 mL 3     MELATONIN PO Take by mouth nightly as needed       omeprazole (PRILOSEC) 40 MG capsule        ondansetron (ZOFRAN) 4 MG tablet        ondansetron (ZOFRAN) 8 MG tablet Take 1 tablet (8 mg) by mouth every 8 hours as needed (nausea/vomiting) 30 tablet 2     order for DME Equipment being ordered: Compression Stockings. Please dispense 2 pairs of knee high 20-30 mmHg 2 Units 3     pantoprazole (PROTONIX) 20 MG EC tablet Take 1 tablet (20 mg) by mouth daily 30 tablet 2     Probiotic Product (PRO-BIOTIC BLEND PO) Take 1 Tablespoonful by mouth daily as needed       prochlorperazine (COMPAZINE) 10 MG tablet Take 1 tablet (10 mg) by mouth every 6 hours as needed for nausea or vomiting 30 tablet 3     prochlorperazine (COMPAZINE) 5 MG tablet Take 1 tablet (5 mg) by mouth every 6 hours as needed for nausea or vomiting 30 tablet 0     rivaroxaban ANTICOAGULANT (XARELTO) 20 MG TABS tablet Take 1 tablet (20 mg) by mouth daily (with dinner) 30 tablet 3     timolol (TIMOPTIC) 0.5 % ophthalmic solution INT 1  "GTT OU IN THE MORNING  3     timolol maleate (ISTALOL) 0.5 % opthalmic solution Place 1 drop into both eyes every morning Before placing contact lenses 1 Bottle 0     traMADol (ULTRAM) 50 MG tablet TAKE 1 TO 2 TABLETS BY MOUTH EVERY 4 HOURS AS NEEDED FOR BREAKTHROUGH PAIN. MAX 8 TABLETS PER DAY. 60 tablet 0       Physical Examination:  General: The patient is a pleasant female in no acute distress.  /84 (BP Location: Right arm, Patient Position: Sitting, Cuff Size: Adult Small)  Pulse 77  Temp 97.5  F (36.4  C) (Oral)  Resp 16  Ht 1.575 m (5' 2.01\")  Wt 42.7 kg (94 lb 2.2 oz)  SpO2 97%  BMI 17.21 kg/m2  Wt Readings from Last 10 Encounters:   11/12/18 42.7 kg (94 lb 2.2 oz)   10/29/18 46.2 kg (101 lb 14.4 oz)   10/15/18 44.5 kg (98 lb)   10/01/18 46.5 kg (102 lb 9.6 oz)   09/18/18 46.4 kg (102 lb 3.2 oz)   09/05/18 44.9 kg (98 lb 14.4 oz)   08/27/18 43.3 kg (95 lb 6.4 oz)   08/20/18 44.1 kg (97 lb 4.8 oz)   08/08/18 45.9 kg (101 lb 3 oz)   08/06/18 44.3 kg (97 lb 9.6 oz)     HEENT: EOMI, PERRL. Sclerae are anicteric. Oral mucosa is pink and moist with no lesions or thrush.   Lymph: Neck is supple with no lymphadenopathy in the cervical or supraclavicular areas.   Heart: Regular rate and rhythm.   Lungs: Clear to auscultation bilaterally.   Abdomen: Bowel sounds present, soft. Mild tenderness in upper quadrants, nondistended, with no palpable hepatosplenomegaly or masses.   Extremities: No lower extremity edema noted bilaterally.   Neuro: Cranial nerves II through XII are grossly intact.  Skin: No rashes, petechiae, or bruising noted on exposed skin.    Laboratory Data:  Results for orders placed or performed in visit on 11/12/18 (from the past 24 hour(s))   CBC with platelets differential   Result Value Ref Range    WBC 11.1 (H) 4.0 - 11.0 10e9/L    RBC Count 3.03 (L) 3.8 - 5.2 10e12/L    Hemoglobin 8.9 (L) 11.7 - 15.7 g/dL    Hematocrit 28.7 (L) 35.0 - 47.0 %    MCV 95 78 - 100 fl    MCH 29.4 26.5 - " 33.0 pg    MCHC 31.0 (L) 31.5 - 36.5 g/dL    RDW 17.7 (H) 10.0 - 15.0 %    Platelet Count 334 150 - 450 10e9/L    Diff Method Automated Method     % Neutrophils 71.2 %    % Lymphocytes 13.2 %    % Monocytes 12.7 %    % Eosinophils 1.4 %    % Basophils 0.6 %    % Immature Granulocytes 0.9 %    Nucleated RBCs 0 0 /100    Absolute Neutrophil 7.9 1.6 - 8.3 10e9/L    Absolute Lymphocytes 1.5 0.8 - 5.3 10e9/L    Absolute Monocytes 1.4 (H) 0.0 - 1.3 10e9/L    Absolute Eosinophils 0.2 0.0 - 0.7 10e9/L    Absolute Basophils 0.1 0.0 - 0.2 10e9/L    Abs Immature Granulocytes 0.1 0 - 0.4 10e9/L    Absolute Nucleated RBC 0.0    Comprehensive metabolic panel   Result Value Ref Range    Sodium 137 133 - 144 mmol/L    Potassium 3.7 3.4 - 5.3 mmol/L    Chloride 104 94 - 109 mmol/L    Carbon Dioxide 26 20 - 32 mmol/L    Anion Gap 6 3 - 14 mmol/L    Glucose 105 (H) 70 - 99 mg/dL    Urea Nitrogen 8 7 - 30 mg/dL    Creatinine 0.60 0.52 - 1.04 mg/dL    GFR Estimate >90 >60 mL/min/1.7m2    GFR Estimate If Black >90 >60 mL/min/1.7m2    Calcium 8.2 (L) 8.5 - 10.1 mg/dL    Bilirubin Total 0.3 0.2 - 1.3 mg/dL    Albumin 2.6 (L) 3.4 - 5.0 g/dL    Protein Total 7.0 6.8 - 8.8 g/dL    Alkaline Phosphatase 356 (H) 40 - 150 U/L    ALT 28 0 - 50 U/L    AST 43 0 - 45 U/L         Assessment and Plan:  77 yo female with metastatic pancreatic cancer involving the liver, lung, lymph nodes: most recently on 5FU and liposomal irinotecan, last dose 10/15/18. CT CAP 10/26/18 with progressive disease. Here today due to increasing LUQ abdominal pain.    Abdominal pain: Lipase elevated, likely 2/2 pancreatitis.   --1L NS IVF and dilaudid 0.5mg iv x 1 given with pain relief.   Discussed with Dr. Yu. As WBC only marginally elevated and calcium corrects to normal for albumin, will try to manage as outpatient.  --Will make her NPO. Sent oxycodone IR 5-10mg q4h prn for pain.  --Will have her return for labs, infusion tomorrow for IVF/IV pain medication.      Tracy Padron PA-C  [facility]  [address], [place] [address] [phone]

## 2018-11-12 NOTE — LETTER
11/12/2018      RE: Iris Lewis  [address]  [place] [address]       Oncology/Hematology Visit Note  Nov 12, 2018    Reason for Visit: add on visit for worsening LUQ pain    History of Present Illness: Iris Lewis is a 78 year old female with a hx significant for thyroid cancer s/p thyroidectomy in the 1980s, cholelithiasis s/p CCY 2015, GERD s/p Nissen fundoplication in 2006 and HTN. She was dx with metastatic pancreatic cancer involving the liver, lung and lymph nodes in October 2017 after presenting with obstructive jaundice. She met with  an did not qualify for the HALOZYME study. She was initiated on [place]/Abraxane on 11/28/17. Restaging showed a positive response to treatment after 2 cycles. Prior to cycle 4, she developed acute hypoxia and weakness. She was found to have influenza and pneumonia. 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 At f/u with Dr. Yu on 3/26/18, she was found to have progression and recommended to start 5FU plus liposomal irinotecan. She initiated therapy on 3/28/18. Imaging after 5 cycles showed a positive response. Treatment on 7/2/18 was delayed due to acute LFT elevation, abdominal pain and low grade fevers. LFT elevation was thought to be related to tylenol and alcohol use. CT CAP on 10/26/18 after cycle 15 with progressive disease. Dr. Yu discussed phase 1 trials at visit on 10/29 per chart but Iris was not interested. Decision to observe disease status for now and make a plan to transition to hospice. Please see Dr. Yu's previous notes for further details on the patient's history. She comes in today as add on visit for worsening LUQ pain.    Interval History:  Iris states in the past she has had intermittent LUQ pain, but has been more severe in the past 1-2 weeks. Previously would wax/wane in intensity, but now more continuous and severe. She hasn't been able to sleep or eat due to pain. She has been taking 1-2  tramadol/day. She does not wish to take more than 1 tablet per dose or more frequently as she is concerned about addiction. She took a tylenol last night as pain was so bad, but that did not help much. Pain not increased with palpation or deep breath. She was constipated about 1 week ago and took miralax. She stated that resolved and she is now having regular bowel movements, not formed, but no diarrhea. She has not been able to vomit after Nissen surgery, but has increased stomach pain and gas at times which she thinks may be contributing. She takes zofran and compazine, but has not felt improvement in pain so has not taken any recently. Denies any fevers, chills. Denies any pain elsewhere. She states she takes sips of water throughout the day, but cannot drink very much water at one time after her nissen fundoplication surgery. She does take prilosec and creon.    She has had more fatigue recently. Occasionally lightheaded when she gets up at night to use the bathroom. She has a cane she uses. Denies any falls.     Review of Systems:  Patient denies any of the following except if noted above: chest pain, dyspnea, cough rashes or skin lesions, bleeding or bruising issues, including melena, hematochezia.    Current Outpatient Prescriptions   Medication Sig Dispense Refill     Acetaminophen (TYLENOL PO) Take 325 mg by mouth every 4 hours as needed for mild pain or fever       albuterol (PROAIR HFA/PROVENTIL HFA/VENTOLIN HFA) 108 (90 BASE) MCG/ACT Inhaler Inhale 2 puffs into the lungs 4 times daily 1 Inhaler 1     amylase-lipase-protease (CREON) 64045 UNITS CPEP Take 2 capsules (72,000 Units) by mouth 3 times daily (with meals) 180 capsule 3     CALCIUM PO Take by mouth every morning Form/strength unknown. Powder formulation. Add to water and fruits/vegetables daily to promote bone health.        CARTIA  MG 24 hr capsule TK 1 C PO QD 10 capsule 0     CHLORPHENIRAMINE MALEATE PO Take 4 mg by mouth daily as needed        cholecalciferol (VITAMIN D) 1000 UNIT tablet Take 1 tablet by mouth 2 times daily        cyanocolbalamin (VITAMIN B-12) 500 MCG tablet Take 500 mcg by mouth every morning        folic acid (FOLVITE) 400 MCG tablet Take 1 tablet by mouth every morning        hydrOXYzine (ATARAX) 25 MG tablet Take 1-2 tablets (25-50 mg) by mouth every 6 hours as needed for itching (adjuvant pain) 60 tablet 3     levothyroxine (SYNTHROID/LEVOTHROID) 125 MCG tablet Take 1 tablet (125 mcg) by mouth daily 90 tablet 1     loperamide (IMODIUM) 2 MG capsule 2 caps at 1st sign of diarrhea & 1 cap every 2hrs until 12hrs diarrhea free. During night, 2 caps at bedtime & 2 caps every 4hrs until AM 60 capsule 3     LORazepam (ATIVAN) 0.5 MG tablet Take 1 tablet (0.5 mg) by mouth every 4 hours as needed (Anxiety, Nausea/Vomiting or Sleep) 30 tablet 3     LORazepam (ATIVAN) 1 MG tablet   0     magic mouthwash (FIRST-MOUTHWASH BLM) suspension Swish and swallow 5-10 mLs in mouth every 6 hours as needed for mouth sores 237 mL 3     MELATONIN PO Take by mouth nightly as needed       omeprazole (PRILOSEC) 40 MG capsule        ondansetron (ZOFRAN) 4 MG tablet        ondansetron (ZOFRAN) 8 MG tablet Take 1 tablet (8 mg) by mouth every 8 hours as needed (nausea/vomiting) 30 tablet 2     order for DME Equipment being ordered: Compression Stockings. Please dispense 2 pairs of knee high 20-30 mmHg 2 Units 3     pantoprazole (PROTONIX) 20 MG EC tablet Take 1 tablet (20 mg) by mouth daily 30 tablet 2     Probiotic Product (PRO-BIOTIC BLEND PO) Take 1 Tablespoonful by mouth daily as needed       prochlorperazine (COMPAZINE) 10 MG tablet Take 1 tablet (10 mg) by mouth every 6 hours as needed for nausea or vomiting 30 tablet 3     prochlorperazine (COMPAZINE) 5 MG tablet Take 1 tablet (5 mg) by mouth every 6 hours as needed for nausea or vomiting 30 tablet 0     rivaroxaban ANTICOAGULANT (XARELTO) 20 MG TABS tablet Take 1 tablet (20 mg) by mouth daily (with  "dinner) 30 tablet 3     timolol (TIMOPTIC) 0.5 % ophthalmic solution INT 1 GTT OU IN THE MORNING  3     timolol maleate (ISTALOL) 0.5 % opthalmic solution Place 1 drop into both eyes every morning Before placing contact lenses 1 Bottle 0     traMADol (ULTRAM) 50 MG tablet TAKE 1 TO 2 TABLETS BY MOUTH EVERY 4 HOURS AS NEEDED FOR BREAKTHROUGH PAIN. MAX 8 TABLETS PER DAY. 60 tablet 0       Physical Examination:  General: The patient is a pleasant female in no acute distress.  /84 (BP Location: Right arm, Patient Position: Sitting, Cuff Size: Adult Small)  Pulse 77  Temp 97.5  F (36.4  C) (Oral)  Resp 16  Ht 1.575 m (5' 2.01\")  Wt 42.7 kg (94 lb 2.2 oz)  SpO2 97%  BMI 17.21 kg/m2  Wt Readings from Last 10 Encounters:   11/12/18 42.7 kg (94 lb 2.2 oz)   10/29/18 46.2 kg (101 lb 14.4 oz)   10/15/18 44.5 kg (98 lb)   10/01/18 46.5 kg (102 lb 9.6 oz)   09/18/18 46.4 kg (102 lb 3.2 oz)   09/05/18 44.9 kg (98 lb 14.4 oz)   08/27/18 43.3 kg (95 lb 6.4 oz)   08/20/18 44.1 kg (97 lb 4.8 oz)   08/08/18 45.9 kg (101 lb 3 oz)   08/06/18 44.3 kg (97 lb 9.6 oz)     HEENT: EOMI, PERRL. Sclerae are anicteric. Oral mucosa is pink and moist with no lesions or thrush.   Lymph: Neck is supple with no lymphadenopathy in the cervical or supraclavicular areas.   Heart: Regular rate and rhythm.   Lungs: Clear to auscultation bilaterally.   Abdomen: Bowel sounds present, soft. Mild tenderness in upper quadrants, nondistended, with no palpable hepatosplenomegaly or masses.   Extremities: No lower extremity edema noted bilaterally.   Neuro: Cranial nerves II through XII are grossly intact.  Skin: No rashes, petechiae, or bruising noted on exposed skin.    Laboratory Data:  Results for orders placed or performed in visit on 11/12/18 (from the past 24 hour(s))   CBC with platelets differential   Result Value Ref Range    WBC 11.1 (H) 4.0 - 11.0 10e9/L    RBC Count 3.03 (L) 3.8 - 5.2 10e12/L    Hemoglobin 8.9 (L) 11.7 - 15.7 g/dL    " Hematocrit 28.7 (L) 35.0 - 47.0 %    MCV 95 78 - 100 fl    MCH 29.4 26.5 - 33.0 pg    MCHC 31.0 (L) 31.5 - 36.5 g/dL    RDW 17.7 (H) 10.0 - 15.0 %    Platelet Count 334 150 - 450 10e9/L    Diff Method Automated Method     % Neutrophils 71.2 %    % Lymphocytes 13.2 %    % Monocytes 12.7 %    % Eosinophils 1.4 %    % Basophils 0.6 %    % Immature Granulocytes 0.9 %    Nucleated RBCs 0 0 /100    Absolute Neutrophil 7.9 1.6 - 8.3 10e9/L    Absolute Lymphocytes 1.5 0.8 - 5.3 10e9/L    Absolute Monocytes 1.4 (H) 0.0 - 1.3 10e9/L    Absolute Eosinophils 0.2 0.0 - 0.7 10e9/L    Absolute Basophils 0.1 0.0 - 0.2 10e9/L    Abs Immature Granulocytes 0.1 0 - 0.4 10e9/L    Absolute Nucleated RBC 0.0    Comprehensive metabolic panel   Result Value Ref Range    Sodium 137 133 - 144 mmol/L    Potassium 3.7 3.4 - 5.3 mmol/L    Chloride 104 94 - 109 mmol/L    Carbon Dioxide 26 20 - 32 mmol/L    Anion Gap 6 3 - 14 mmol/L    Glucose 105 (H) 70 - 99 mg/dL    Urea Nitrogen 8 7 - 30 mg/dL    Creatinine 0.60 0.52 - 1.04 mg/dL    GFR Estimate >90 >60 mL/min/1.7m2    GFR Estimate If Black >90 >60 mL/min/1.7m2    Calcium 8.2 (L) 8.5 - 10.1 mg/dL    Bilirubin Total 0.3 0.2 - 1.3 mg/dL    Albumin 2.6 (L) 3.4 - 5.0 g/dL    Protein Total 7.0 6.8 - 8.8 g/dL    Alkaline Phosphatase 356 (H) 40 - 150 U/L    ALT 28 0 - 50 U/L    AST 43 0 - 45 U/L         Assessment and Plan:  77 yo female with metastatic pancreatic cancer involving the liver, lung, lymph nodes: most recently on 5FU and liposomal irinotecan, last dose 10/15/18. CT CAP 10/26/18 with progressive disease. Here today due to increasing LUQ abdominal pain.    Abdominal pain: Lipase elevated, likely 2/2 pancreatitis.   --1L NS IVF and dilaudid 0.5mg iv x 1 given with pain relief.   Discussed with Dr. Yu. As WBC only marginally elevated and calcium corrects to normal for albumin, will try to manage as outpatient.  --Will make her NPO. Sent oxycodone IR 5-10mg q4h prn for pain.  --Will have  her return for labs, infusion tomorrow for IVF/IV pain medication.     Tracy Padron PA-C  [facility]  [address] [place] [address] [phone]

## 2018-11-12 NOTE — MR AVS SNAPSHOT
After Visit Summary   11/12/2018    Iris Lewis    MRN: [id number]           Patient Information     Date Of Birth          1940        Visit Information        Provider Department      11/12/2018 4:00 PM UC 45 ATC; UC SPEC INFUSION [facility]        Today's Diagnoses     Malignant neoplasm of head of pancreas (H)    -  1       Follow-ups after your visit        Your next 10 appointments already scheduled     Nov 14, 2018  9:30 AM CST   (Arrive by 9:15 AM)   Return Visit with José Antonio Ann MD   [facility] ([facility])    [address] [place] [address]  [address]  [facility] [address]   [phone]            Nov 14, 2018 11:00 AM CST   Infusion 120 with UC ONCOLOGY INFUSION, UC 25 ATC   Formerly [facility] ([facility])    [address] [place] [address]  [address]  [facility] [address]   [phone]            Nov 14, 2018 11:20 AM CST   (Arrive by 11:05 AM)   Return Visit with LEONOR Gotti   [facility] ([facility])    [address] [place] [address]  [address]  [facility] [address]   [phone]            Nov 15, 2018  9:00 AM CST   [facility] Draw with REJI MASONIC LAB DRAW   [facility] Lab Draw ([facility])    [address] [place] [address]  [address]  [facility] [address]   [phone]            Nov 15, 2018  9:30 AM CST   (Arrive by 9:15 AM)   Return Visit with CAT Dang CNP   [facility] ([facility])    [address] [place] [address]  [address]  [facility] [address]   [phone]            Nov 28, 2018  2:45 PM CST   (Arrive by 2:30 PM)   Return Visit with Brett Conn MD   [facility] ([facility])    [address] [place] [address]  [address]  [facility] [address]   [phone]               Future tests that were ordered for you today     Open Standing Orders        Priority Remaining Interval Expires Ordered    *CBC with platelets differential Routine 2/3 every visit 11/13/2019 11/13/2018    Comprehensive metabolic panel Routine 2/3 every visit 11/13/2019 11/13/2018    Lipase Routine 2/3 every visit 11/13/2019 11/13/2018            Who to contact     If you have questions or need follow up information about today's clinic visit or your schedule please contact [facility] SPECIAL[facility] PROCED[facility] directly at [phone].  Normal or non-critical lab and imaging results will be communicated to you by WeShophart, letter or phone within 4 business days after the clinic has received the results. If you do not hear from us within 7 days, please contact the clinic through [employer]oVax or phone. If you have a critical or abnormal lab result, we will notify you by phone as soon as possible.  Submit refill requests through [employer]oVax or call your pharmacy and they will forward the refill request to us. Please allow 3 business days for your refill to be completed.          Additional Information About Your Visit        WeShopharEdPuzzle Information     [employer] gives you secure access to your electronic health record. If you see a primary care provider, you can also send messages to your care team and make appointments. If you have questions, please call your primary care clinic.  If you do not have a primary care provider, please call [phone] and they will assist you.        Care EveryWhere ID     This is your Care EveryWhere ID. This could be used by other organizations to access your [place] medical records  [id number]        Your Vitals Were     Pulse Respirations                78 16           Blood Pressure from Last 3 Encounters:   11/13/18 140/70   11/12/18 (!) 151/92   11/12/18 151/84    Weight from Last 3 Encounters:   11/13/18 43 kg (94 lb 11.2 oz)   11/12/18 42.7 kg (94 lb 2.2 oz)    10/29/18 46.2 kg (101 lb 14.4 oz)              Today, you had the following     No orders found for display         Today's Medication Changes          These changes are accurate as of 11/12/18 11:59 PM.  If you have any questions, ask your nurse or doctor.               Start taking these medicines.        Dose/Directions    oxyCODONE 5 MG capsule   Commonly known as:  OXY-IR   Used for:  Abdominal pain, left upper quadrant   Started by:  Tracy Padron PA        Dose:  5-10 mg   Take 1-2 capsules (5-10 mg) by mouth every 4 hours as needed for severe pain   Quantity:  45 capsule   Refills:  0       polyethylene glycol powder   Commonly known as:  MIRALAX/GLYCOLAX   Used for:  Constipation, unspecified constipation type   Started by:  Tracy Padron PA        Dose:  1 capful   Take 17 g (1 capful) by mouth daily   Quantity:  500 g   Refills:  3       senna-docusate 8.6-50 MG per tablet   Commonly known as:  SENOKOT-S;PERICOLACE   Used for:  Constipation, unspecified constipation type   Started by:  Tracy Padron PA        Dose:  1-2 tablet   Take 1-2 tablets by mouth 2 times daily as needed for constipation   Quantity:  100 tablet   Refills:  0         Stop taking these medicines if you haven't already. Please contact your care team if you have questions.     traMADol 50 MG tablet   Commonly known as:  ULTRAM   Stopped by:  Tracy Padron PA                Where to get your medicines      These medications were sent to [address] [address]    Hours:  TRANSPLANT PHONE NUMBER [phone] Phone:  [phone]     polyethylene glycol powder    senna-docusate 8.6-50 MG per tablet         Some of these will need a paper prescription and others can be bought over the counter.  Ask your nurse if you have questions.     Bring a paper prescription for each of these medications     oxyCODONE 5 MG  capsule                Primary Care Provider Office Phone # Fax #    Neri Gipson [phone] [address] [address]        Equal Access to Services     NESSA NICHOLS : Shaun mary simms salmao Sobrunildaali, waaxda luqadaha, qaybta kaalmada adesusana, lisa lopezjeanne michael. So [facility] [phone].    ATENCIÓN: Si habla español, tiene a nava disposición servicios gratuitos de asistencia lingüística. Llame al [phone].    We comply with applicable federal civil rights laws and [place] laws. We do not discriminate on the basis of race, color, national origin, age, disability, sex, sexual orientation, or gender identity.            Thank you!     Thank you for choosing [facility] PROCEDURE  for your care. Our goal is always to provide you with excellent care. Hearing back from our patients is one way we can continue to improve our services. Please take a few minutes to complete the written survey that you may receive in the mail after your visit with us. Thank you!             Your Updated Medication List - Protect others around you: Learn how to safely use, store and throw away your medicines at www.disposemymeds.org.          This list is accurate as of 11/12/18 11:59 PM.  Always use your most recent med list.                   Brand Name Dispense Instructions for use Diagnosis    albuterol 108 (90 Base) MCG/ACT inhaler    PROAIR HFA/PROVENTIL HFA/VENTOLIN HFA    1 Inhaler    Inhale 2 puffs into the lungs 4 times daily    Mild asthma, unspecified whether complicated, unspecified whether persistent       amylase-lipase-protease 21323 units Cpep    CREON    180 capsule    Take 2 capsules (72,000 Units) by mouth 3 times daily (with meals)    Malignant neoplasm of head of pancreas (H)       CALCIUM PO      Take by mouth every morning Form/strength unknown. Powder formulation. Add to water and fruits/vegetables daily to  promote bone health.        CARTIA  MG 24 hr capsule   Generic drug:  diltiazem     10 capsule    TK 1 C PO QD    Benign essential hypertension       CHLORPHENIRAMINE MALEATE PO      Take 4 mg by mouth daily as needed        cholecalciferol 1000 UNIT tablet    vitamin D3     Take 1 tablet by mouth 2 times daily        cyanocolbalamin 500 MCG tablet    vitamin  B-12     Take 500 mcg by mouth every morning        folic acid 400 MCG tablet    FOLVITE     Take 1 tablet by mouth every morning        hydrOXYzine 25 MG tablet    ATARAX    60 tablet    Take 1-2 tablets (25-50 mg) by mouth every 6 hours as needed for itching (adjuvant pain)    Obstructive jaundice       levothyroxine 125 MCG tablet    SYNTHROID/LEVOTHROID    90 tablet    Take 1 tablet (125 mcg) by mouth daily    Hypothyroidism, unspecified type       loperamide 2 MG capsule    IMODIUM    60 capsule    2 caps at 1st sign of diarrhea & 1 cap every 2hrs until 12hrs diarrhea free. During night, 2 caps at bedtime & 2 caps every 4hrs until AM    Malignant neoplasm of head of pancreas (H)       * LORazepam 0.5 MG tablet    ATIVAN    30 tablet    Take 1 tablet (0.5 mg) by mouth every 4 hours as needed (Anxiety, Nausea/Vomiting or Sleep)    Malignant neoplasm of head of pancreas (H)       * LORazepam 1 MG tablet    ATIVAN          magic mouthwash suspension (diphenhydrAMINE, lidocaine, aluminum-magnesium & simethicone) Susp compounding kit     237 mL    Swish and swallow 5-10 mLs in mouth every 6 hours as needed for mouth sores    Malignant neoplasm of head of pancreas (H)       MELATONIN PO      Take by mouth nightly as needed        omeprazole 40 MG capsule    priLOSEC          * ondansetron 4 MG tablet    ZOFRAN          * ondansetron 8 MG tablet    ZOFRAN    30 tablet    Take 1 tablet (8 mg) by mouth every 8 hours as needed (nausea/vomiting)    Malignant neoplasm of head of pancreas (H)       order for DME     2 Units    Equipment being ordered: Compression  Stockings. Please dispense 2 pairs of knee high 20-30 mmHg    Lymphedema       oxyCODONE 5 MG capsule    OXY-IR    45 capsule    Take 1-2 capsules (5-10 mg) by mouth every 4 hours as needed for severe pain    Abdominal pain, left upper quadrant       pantoprazole 20 MG EC tablet    PROTONIX    30 tablet    Take 1 tablet (20 mg) by mouth daily    Other acute gastritis without hemorrhage       polyethylene glycol powder    MIRALAX/GLYCOLAX    500 g    Take 17 g (1 capful) by mouth daily    Constipation, unspecified constipation type       PRO-BIOTIC BLEND PO      Take 1 Tablespoonful by mouth daily as needed        * prochlorperazine 5 MG tablet    COMPAZINE    30 tablet    Take 1 tablet (5 mg) by mouth every 6 hours as needed for nausea or vomiting    Malignant neoplasm of head of pancreas (H)       * prochlorperazine 10 MG tablet    COMPAZINE    30 tablet    Take 1 tablet (10 mg) by mouth every 6 hours as needed for nausea or vomiting    Malignant neoplasm of head of pancreas (H)       rivaroxaban ANTICOAGULANT 20 MG Tabs tablet    XARELTO    30 tablet    Take 1 tablet (20 mg) by mouth daily (with dinner)    Malignant neoplasm of head of pancreas (H), History of pulmonary embolism       senna-docusate 8.6-50 MG per tablet    SENOKOT-S;PERICOLACE    100 tablet    Take 1-2 tablets by mouth 2 times daily as needed for constipation    Constipation, unspecified constipation type       * timolol maleate 0.5 % opthalmic solution    ISTALOL    1 Bottle    Place 1 drop into both eyes every morning Before placing contact lenses    Cataract, unspecified cataract type, unspecified laterality       * timolol 0.5 % ophthalmic solution    TIMOPTIC     INT 1 GTT OU IN THE MORNING        TYLENOL PO      Take 325 mg by mouth every 4 hours as needed for mild pain or fever        * Notice:  This list has 8 medication(s) that are the same as other medications prescribed for you. Read the directions carefully, and ask your doctor or  other care provider to review them with you.

## 2018-11-13 NOTE — PROGRESS NOTES
Nursing Note  Iris Lewis [facility] for:   Chief Complaint   Patient presents with     Port Draw     Labs drawn via PORT by RN. Line flushed with saline and heparin. VS taken.      Infusion     FLUIDS AND PAIN MEDICATIONS     During today's [facility] appointment, orders from LEONOR Bah were completed.  Frequency: once    Progress note:  Patient identification verified by name and date of birth.  Assessment completed.  Vitals recorded in Doc Flowsheets.  Patient was provided with education regarding infusion and possible side effects.  Patient verbalized understanding.      needed: No  Premedications: were not ordered.  Infusion Rates: 575 ml/hr for D5 and 0.45 NS with K and 999 mls/hr for LR  Labs: were drawn prior to appointment in 2nd floor lab, Provider reviewed with patient..  Vascular access: port accessed today.  Treatment Conditions: non-applicable.  Patient tolerated infusion: well.    Drug Waste Record? No     Discharge Plan:   Follow up plan of care with: primary medical doctor.  Discharge instructions were reviewed with patient.  Patient/representative verbalized understanding of discharge instructions and all questions answered.  [facility] in stable condition.    Yareli Bell RN    Administrations This Visit     dextrose 5% and 0.45% NaCl + KCl 20 mEq/L BOLUS     Admin Date Action Dose Rate Route Administered By          11/13/2018 New Bag 2000 mL 571.4 mL/hr Intravenous Yareli Bell RN                   heparin 100 UNIT/ML injection 5 mL     Admin Date Action Dose Route Administered By             11/13/2018 Given 5 mL Intracatheter Ciro Williamson RN                    HYDROmorphone (PF) (DILAUDID) injection 0.5-1 mg     Admin Date Action Dose Route Administered By             11/13/2018 Given 0.5 mg Intravenous Yareli Bell  RN                    lactated ringers BOLUS 1,000 mL     Admin Date Action Dose Rate Route Administered By          11/13/2018 New Bag 1000 mL 1,000 mL/hr Intravenous Yareli Bell RN                   sodium chloride (PF) 0.9% PF flush 20 mL     Admin Date Action Dose Route Administered By             11/13/2018 Given 20 mL Intracatheter Ciro Williamson RN                          /76  Pulse 75  Temp 97.6  F (36.4  C) (Oral)  Resp 18  Wt 43 kg (94 lb 11.2 oz)  SpO2 99%  BMI 17.32 kg/m2

## 2018-11-13 NOTE — PROGRESS NOTES
Saw patient in [facility]. She states pain is 4/10 this morning. She took 10 mg oxycodone last night at 10:30pm and slept until 5:30 AM. She then took 2 oxycodone at 5:30 AM and again at 10:30 AM. Only having small sip of water with her medications. She took synthroid, but did not take Xarelto.     Labs reviewed. Lipase now WNL at 390. WBC stable, calcium WNL. She was started on 1L D5 1/2 NS + KCl. Will switch to LR for 2nd liter as K 3.9. Cr at baseline. Will start CLD tonight and tomorrow AM. Will reassess her tomorrow with labs and consider advancing diet if she is tolerating and if lipase stays WNL.    Recommended she resumes Xarelto. She should also take Senna-S due to escalation of opioid pain medications. Return tomorrow to infusion after Dr. Ann and labs for IVF/IV pain meds if needed. Follow up with Quyen Mazariegos on 11/15. Will also arrange infusion afterwards.    Tracy Padron PA-C  [facility]  [address] [place] [address] [phone]

## 2018-11-13 NOTE — PATIENT INSTRUCTIONS
Dear Iris Lewis    Thank you for choosing [facility] ([facility]) for your infusion.  The following information is a summary of our appointment as well as important reminders.        We look forward in seeing you on your next appointment here at [facility].  Please don t hesitate to call us at [phone] to reschedule any of your appointments or to speak with one of the [facility] registered nurses.  It was a pleasure taking care of you today.    Sincerely,    [facility]  [facility]  [address] [place] [address]  [address]  Phone:  [phone]

## 2018-11-13 NOTE — MR AVS SNAPSHOT
After Visit Summary   11/13/2018    Iris Lewis    MRN: [id number]           Patient Information     Date Of Birth          1940        Visit Information        Provider Department      11/13/2018 12:30 PM UC 42 ATC; UC SPEC INFUSION [facility] [place] [facility]        Today's Diagnoses     Malignant neoplasm of head of pancreas (H)    -  1      Care Instructions    Dear Iris Lewis    Thank you for choosing [facility] ([facility]) for your infusion.  The following information is a summary of our appointment as well as important reminders.        We look forward in seeing you on your next appointment here at [facility].  Please don t hesitate to call us at [phone] to reschedule any of your appointments or to speak with one of the [facility] registered nurses.  It was a pleasure taking care of you today.    Sincerely,    [facility]  [facility]  [address] [place] [address]  [address]  Phone:  [phone]              Follow-ups after your visit        Your next 10 appointments already scheduled     Nov 14, 2018  8:45 AM CST   [facility] Lab Draw with [facility] LAB DRAW   [facility]    [address] [place] [address]  [address]  [facility] [address] [phone]            Nov 14, 2018  9:30 AM CST   (Arrive by 9:15 AM)   Return Visit with José Antonio Ann MD   [facility] ([facility])    [address] [place] [address]  [address]  [facility] [id number] [phone]            Nov 14, 2018 11:00 AM CST   Infusion 120 with UC ONCOLOGY INFUSION, UC 25 ATC   [facility] ([facility])    [address] [place] [address]  [address]  [facility] [address] [phone]            Nov 15, 2018  9:00 AM CST   [facility] Lab Draw with  [facility] LAB DRAW   [facility] Lab Draw ([facility])    [address]  [address]  [facility] [address] [phone]            Nov 15, 2018  9:30 AM CST   (Arrive by 9:15 AM)   Return Visit with CAT Dang CNP   [facility] ([facility])    [address]  [address]  [facility] [address] [phone]            Nov 28, 2018  2:45 PM CST   (Arrive by 2:30 PM)   Return Visit with Brett Conn MD   [facility] ([facility])    [address]  [address]  [facility] [address] [phone]              Future tests that were ordered for you today     Open Standing Orders        Priority Remaining Interval Expires Ordered    *CBC with platelets differential Routine 3/3 every visit 11/13/2019 11/13/2018    Comprehensive metabolic panel Routine 3/3 every visit 11/13/2019 11/13/2018    Lipase Routine 3/3 every visit 11/13/2019 11/13/2018          Open Future Orders        Priority Expected Expires Ordered    XR Abdomen 2 Views Routine 11/12/2018 11/12/2019 11/12/2018            Who to contact     If you have questions or need follow up information about today's clinic visit or your schedule please contact [facility] SPECIAL[facility] PROCEDURE directly at [phone].  Normal or non-critical lab and imaging results will be communicated to you by MyChart, letter or phone within 4 business days after the clinic has received the results. If you do not hear from us within 7 days, please contact the clinic through MyChart or phone. If you have a critical or abnormal lab result, we will notify you by phone as soon as possible.  Submit refill requests through [email or web address] or call your pharmacy and they will forward the refill request to us. Please allow 3 business days for your refill to be completed.          Additional Information  About Your Visit        [employer] Information     [employer] gives you secure access to your electronic health record. If you see a primary care provider, you can also send messages to your care team and make appointments. If you have questions, please call your primary care clinic.  If you do not have a primary care provider, please call [phone] and they will assist you.        Care EveryWhere ID     This is your Care EveryWhere ID. This could be used by other organizations to access your [place] medical records  [id number]        Your Vitals Were     Pulse Temperature Respirations Pulse Oximetry BMI (Body Mass Index)       75 97.6  F (36.4  C) (Oral) 18 99% 17.32 kg/m2        Blood Pressure from Last 3 Encounters:   11/13/18 144/76   11/12/18 (!) 151/92   11/12/18 151/84    Weight from Last 3 Encounters:   11/13/18 43 kg (94 lb 11.2 oz)   11/12/18 42.7 kg (94 lb 2.2 oz)   10/29/18 46.2 kg (101 lb 14.4 oz)              We Performed the Following     *CBC with platelets differential     Comprehensive metabolic panel     Lipase        Primary Care Provider Office Phone # Fax #    Neri Gipson [phone]        [address] [address]        Equal Access to Services     NESSA NICHOLS : Hadii mary ku hadasho Soomaali, waaxda luqadaha, qaybta kaalmada adeegyada, waxay idiin haylevyn rosey adams . So [facility] [phone].    ATENCIÓN: Si habla español, tiene a nava disposición servicios gratuitos de asistencia lingüística. Llame al [phone].    We comply with applicable federal civil rights laws and [place] laws. We do not discriminate on the basis of race, color, national origin, age, disability, sex, sexual orientation, or gender identity.            Thank you!     Thank you for choosing [facility] SPECIAL[facility] PROCEDURE  for your care. Our goal is always to provide you with excellent care. Hearing back from our patients is one way we can  continue to improve our services. Please take a few minutes to complete the written survey that you may receive in the mail after your visit with us. Thank you!             Your Updated Medication List - Protect others around you: Learn how to safely use, store and throw away your medicines at www.disposemymeds.org.          This list is accurate as of 11/13/18  2:53 PM.  Always use your most recent med list.                   Brand Name Dispense Instructions for use Diagnosis    albuterol 108 (90 Base) MCG/ACT inhaler    PROAIR HFA/PROVENTIL HFA/VENTOLIN HFA    1 Inhaler    Inhale 2 puffs into the lungs 4 times daily    Mild asthma, unspecified whether complicated, unspecified whether persistent       amylase-lipase-protease 54556 units Cpep    CREON    180 capsule    Take 2 capsules (72,000 Units) by mouth 3 times daily (with meals)    Malignant neoplasm of head of pancreas (H)       CALCIUM PO      Take by mouth every morning Form/strength unknown. Powder formulation. Add to water and fruits/vegetables daily to promote bone health.        CARTIA  MG 24 hr capsule   Generic drug:  diltiazem     10 capsule    TK 1 C PO QD    Benign essential hypertension       CHLORPHENIRAMINE MALEATE PO      Take 4 mg by mouth daily as needed        cholecalciferol 1000 UNIT tablet    vitamin D3     Take 1 tablet by mouth 2 times daily        cyanocolbalamin 500 MCG tablet    vitamin  B-12     Take 500 mcg by mouth every morning        folic acid 400 MCG tablet    FOLVITE     Take 1 tablet by mouth every morning        hydrOXYzine 25 MG tablet    ATARAX    60 tablet    Take 1-2 tablets (25-50 mg) by mouth every 6 hours as needed for itching (adjuvant pain)    Obstructive jaundice       levothyroxine 125 MCG tablet    SYNTHROID/LEVOTHROID    90 tablet    Take 1 tablet (125 mcg) by mouth daily    Hypothyroidism, unspecified type       loperamide 2 MG capsule    IMODIUM    60 capsule    2 caps at 1st sign of diarrhea & 1 cap  every 2hrs until 12hrs diarrhea free. During night, 2 caps at bedtime & 2 caps every 4hrs until AM    Malignant neoplasm of head of pancreas (H)       * LORazepam 0.5 MG tablet    ATIVAN    30 tablet    Take 1 tablet (0.5 mg) by mouth every 4 hours as needed (Anxiety, Nausea/Vomiting or Sleep)    Malignant neoplasm of head of pancreas (H)       * LORazepam 1 MG tablet    ATIVAN          magic mouthwash suspension (diphenhydrAMINE, lidocaine, aluminum-magnesium & simethicone) Susp compounding kit     237 mL    Swish and swallow 5-10 mLs in mouth every 6 hours as needed for mouth sores    Malignant neoplasm of head of pancreas (H)       MELATONIN PO      Take by mouth nightly as needed        omeprazole 40 MG capsule    priLOSEC          * ondansetron 4 MG tablet    ZOFRAN          * ondansetron 8 MG tablet    ZOFRAN    30 tablet    Take 1 tablet (8 mg) by mouth every 8 hours as needed (nausea/vomiting)    Malignant neoplasm of head of pancreas (H)       order for DME     2 Units    Equipment being ordered: Compression Stockings. Please dispense 2 pairs of knee high 20-30 mmHg    Lymphedema       oxyCODONE 5 MG capsule    OXY-IR    45 capsule    Take 1-2 capsules (5-10 mg) by mouth every 4 hours as needed for severe pain    Abdominal pain, left upper quadrant       pantoprazole 20 MG EC tablet    PROTONIX    30 tablet    Take 1 tablet (20 mg) by mouth daily    Other acute gastritis without hemorrhage       polyethylene glycol powder    MIRALAX/GLYCOLAX    500 g    Take 17 g (1 capful) by mouth daily    Constipation, unspecified constipation type       PRO-BIOTIC BLEND PO      Take 1 Tablespoonful by mouth daily as needed        * prochlorperazine 5 MG tablet    COMPAZINE    30 tablet    Take 1 tablet (5 mg) by mouth every 6 hours as needed for nausea or vomiting    Malignant neoplasm of head of pancreas (H)       * prochlorperazine 10 MG tablet    COMPAZINE    30 tablet    Take 1 tablet (10 mg) by mouth every 6 hours  as needed for nausea or vomiting    Malignant neoplasm of head of pancreas (H)       rivaroxaban ANTICOAGULANT 20 MG Tabs tablet    XARELTO    30 tablet    Take 1 tablet (20 mg) by mouth daily (with dinner)    Malignant neoplasm of head of pancreas (H), History of pulmonary embolism       senna-docusate 8.6-50 MG per tablet    SENOKOT-S;PERICOLACE    100 tablet    Take 1-2 tablets by mouth 2 times daily as needed for constipation    Constipation, unspecified constipation type       * timolol maleate 0.5 % opthalmic solution    ISTALOL    1 Bottle    Place 1 drop into both eyes every morning Before placing contact lenses    Cataract, unspecified cataract type, unspecified laterality       * timolol 0.5 % ophthalmic solution    TIMOPTIC     INT 1 GTT OU IN THE MORNING        TYLENOL PO      Take 325 mg by mouth every 4 hours as needed for mild pain or fever        * Notice:  This list has 8 medication(s) that are the same as other medications prescribed for you. Read the directions carefully, and ask your doctor or other care provider to review them with you.

## 2018-11-14 NOTE — MR AVS SNAPSHOT
After Visit Summary   11/14/2018    Iris Lewis    MRN: [id number]           Patient Information     Date Of Birth          1940        Visit Information        Provider Department      11/14/2018 11:20 AM Tracy Padron PA [facility]        Today's Diagnoses     Malignant neoplasm of head of pancreas (H)           Follow-ups after your visit        Your next 10 appointments already scheduled     Nov 15, 2018  9:00 AM CST   [facility] Lab Draw with [facility] LAB DRAW   [facility] Lab Draw ([facility])    [address] [place] [address]  [address]  [facility] [address]   [phone]            Nov 15, 2018  9:30 AM CST   (Arrive by 9:15 AM)   Return Visit with CAT Dang CNP   [facility] ([facility])    [address] [place] [address]  [address]  [facility] [address]   [phone]            Nov 15, 2018 11:00 AM CST   Infusion 120 with  ONCOLOGY INFUSION, UC 28 ATC   [facility] ([facility])    [address] [place] [address]  [address]  [facility] [address]   [phone]            Nov 28, 2018  2:45 PM CST   (Arrive by 2:30 PM)   Return Visit with Brett Conn MD   [facility] ([facility])    [address] [place] [address]  [address]  [facility] [address]   [phone]              Future tests that were ordered for you today     Open Standing Orders        Priority Remaining Interval Expires Ordered    *CBC with platelets differential Routine 1/3 every visit 11/13/2019 11/13/2018    Comprehensive metabolic panel Routine 1/3 every visit 11/13/2019 11/13/2018    Lipase Routine 1/3 every visit 11/13/2019 11/13/2018            Who to contact     If you have questions or need follow up information about today's clinic visit or your schedule please contact [facility] directly at  [phone].  Normal or non-critical lab and imaging results will be communicated to you by MyChart, letter or phone within 4 business days after the clinic has received the results. If you do not hear from us within 7 days, please contact the clinic through [employer] or phone. If you have a critical or abnormal lab result, we will notify you by phone as soon as possible.  Submit refill requests through [employer] or call your pharmacy and they will forward the refill request to us. Please allow 3 business days for your refill to be completed.          Additional Information About Your Visit        [employer] [facility] Information     [employer] gives you secure access to your electronic health record. If you see a primary care provider, you can also send messages to your care team and make appointments. If you have questions, please call your primary care clinic.  If you do not have a primary care provider, please call [phone] and they will assist you.        Care EveryWhere ID     This is your Care EveryWhere ID. This could be used by other organizations to access your [place] medical records  [id number]        Your Vitals Were     Pulse Temperature Respirations Pulse Oximetry BMI (Body Mass Index)       109 98  F (36.7  C) (Oral) 16 96% 17.66 kg/m2        Blood Pressure from Last 3 Encounters:   11/14/18 137/84   11/14/18 137/84   11/13/18 140/70    Weight from Last 3 Encounters:   11/14/18 43.8 kg (96 lb 9.6 oz)   11/14/18 43.8 kg (96 lb 9.6 oz)   11/13/18 43 kg (94 lb 11.2 oz)              We Performed the Following     *CBC with platelets differential     Comprehensive metabolic panel     Lipase        Primary Care Provider Office Phone # Fax #    Neri Gipson [phone] [address] [address]        Equal Access to Services     NESSA NICHOLS : Shaun Martinez, warhett gar, qaybta kaalerickson correia, lisa michael. So [facility]  [phone].    ATENCIÓN: Si shahbaz alfaro, tiene a nava disposición servicios gratuitos de asistencia lingüística. Indu humphries [phone].    We comply with applicable federal civil rights laws and [place] laws. We do not discriminate on the basis of race, color, national origin, age, disability, sex, sexual orientation, or gender identity.            Thank you!     Thank you for choosing [facility]  for your care. Our goal is always to provide you with excellent care. Hearing back from our patients is one way we can continue to improve our services. Please take a few minutes to complete the written survey that you may receive in the mail after your visit with us. Thank you!             Your Updated Medication List - Protect others around you: Learn how to safely use, store and throw away your medicines at www.disposemymeds.org.          This list is accurate as of 11/14/18  1:52 PM.  Always use your most recent med list.                   Brand Name Dispense Instructions for use Diagnosis    albuterol 108 (90 Base) MCG/ACT inhaler    PROAIR HFA/PROVENTIL HFA/VENTOLIN HFA    1 Inhaler    Inhale 2 puffs into the lungs 4 times daily    Mild asthma, unspecified whether complicated, unspecified whether persistent       amylase-lipase-protease 86884 units Cpep    CREON    180 capsule    Take 2 capsules (72,000 Units) by mouth 3 times daily (with meals)    Malignant neoplasm of head of pancreas (H)       CALCIUM PO      Take by mouth every morning Form/strength unknown. Powder formulation. Add to water and fruits/vegetables daily to promote bone health.        CARTIA  MG 24 hr capsule   Generic drug:  diltiazem     10 capsule    TK 1 C PO QD    Benign essential hypertension       CHLORPHENIRAMINE MALEATE PO      Take 4 mg by mouth daily as needed        cholecalciferol 1000 UNIT tablet    vitamin D3     Take 1 tablet by mouth 2 times daily        cyanocolbalamin 500 MCG tablet    vitamin  B-12      Take 500 mcg by mouth every morning        folic acid 400 MCG tablet    FOLVITE     Take 1 tablet by mouth every morning        hydrOXYzine 25 MG tablet    ATARAX    60 tablet    Take 1-2 tablets (25-50 mg) by mouth every 6 hours as needed for itching (adjuvant pain)    Obstructive jaundice       levothyroxine 125 MCG tablet    SYNTHROID/LEVOTHROID    90 tablet    Take 1 tablet (125 mcg) by mouth daily    Hypothyroidism, unspecified type       loperamide 2 MG capsule    IMODIUM    60 capsule    2 caps at 1st sign of diarrhea & 1 cap every 2hrs until 12hrs diarrhea free. During night, 2 caps at bedtime & 2 caps every 4hrs until AM    Malignant neoplasm of head of pancreas (H)       * LORazepam 0.5 MG tablet    ATIVAN    30 tablet    Take 1 tablet (0.5 mg) by mouth every 4 hours as needed (Anxiety, Nausea/Vomiting or Sleep)    Malignant neoplasm of head of pancreas (H)       * LORazepam 1 MG tablet    ATIVAN          magic mouthwash suspension (diphenhydrAMINE, lidocaine, aluminum-magnesium & simethicone) Susp compounding kit     237 mL    Swish and swallow 5-10 mLs in mouth every 6 hours as needed for mouth sores    Malignant neoplasm of head of pancreas (H)       MELATONIN PO      Take by mouth nightly as needed        omeprazole 40 MG capsule    priLOSEC          * ondansetron 4 MG tablet    ZOFRAN          * ondansetron 8 MG tablet    ZOFRAN    30 tablet    Take 1 tablet (8 mg) by mouth every 8 hours as needed (nausea/vomiting)    Malignant neoplasm of head of pancreas (H)       order for DME     2 Units    Equipment being ordered: Compression Stockings. Please dispense 2 pairs of knee high 20-30 mmHg    Lymphedema       oxyCODONE 5 MG capsule    OXY-IR    45 capsule    Take 1-2 capsules (5-10 mg) by mouth every 4 hours as needed for severe pain    Abdominal pain, left upper quadrant       pantoprazole 20 MG EC tablet    PROTONIX    30 tablet    Take 1 tablet (20 mg) by mouth daily    Other acute gastritis  without hemorrhage       polyethylene glycol powder    MIRALAX/GLYCOLAX    500 g    Take 17 g (1 capful) by mouth daily    Constipation, unspecified constipation type       PRO-BIOTIC BLEND PO      Take 1 Tablespoonful by mouth daily as needed        * prochlorperazine 5 MG tablet    COMPAZINE    30 tablet    Take 1 tablet (5 mg) by mouth every 6 hours as needed for nausea or vomiting    Malignant neoplasm of head of pancreas (H)       * prochlorperazine 10 MG tablet    COMPAZINE    30 tablet    Take 1 tablet (10 mg) by mouth every 6 hours as needed for nausea or vomiting    Malignant neoplasm of head of pancreas (H)       rivaroxaban ANTICOAGULANT 20 MG Tabs tablet    XARELTO    30 tablet    Take 1 tablet (20 mg) by mouth daily (with dinner)    Malignant neoplasm of head of pancreas (H), History of pulmonary embolism       senna-docusate 8.6-50 MG per tablet    SENOKOT-S;PERICOLACE    100 tablet    Take 1-2 tablets by mouth 2 times daily as needed for constipation    Constipation, unspecified constipation type       * timolol maleate 0.5 % opthalmic solution    ISTALOL    1 Bottle    Place 1 drop into both eyes every morning Before placing contact lenses    Cataract, unspecified cataract type, unspecified laterality       * timolol 0.5 % ophthalmic solution    TIMOPTIC     INT 1 GTT OU IN THE MORNING        TYLENOL PO      Take 325 mg by mouth every 4 hours as needed for mild pain or fever        * Notice:  This list has 8 medication(s) that are the same as other medications prescribed for you. Read the directions carefully, and ask your doctor or other care provider to review them with you.

## 2018-11-14 NOTE — PROGRESS NOTES
Palliative Care Outpatient Clinic    (This note was transcribed using voice recognition software. While I review and edit the transcription, I may miss errors, and the software sometimes does unexpected capitalizations and formatting that I miss. Please let me know of any serious mistranscriptions and I will addend this note.)    Patient ID:  She has metastatic pancreatic cancer to the liver, lung, and retroperitoneal lymph nodes on gemcitabine and nab paclitaxel.  Important past medical history includes Nissen fundoplication in 2006.  She was diagnosed with her cancer in November of last year. First f/u scan showed some response to her chemotherapy. 3/18 showed progression & started 5FU+liposomal irinotecan.    Progression 10/2018 - chemo stopped and supportive only care & hospice offered      +HCD which names  then daughter as decision makers. Has a DNR/DNI POLST    Neuropathy, mucositis     History:  She is alone today.  Since I saw her last she progressed and antineoplastic treatment was stopped.  She had an episode of abdominal pain and elevated lipase on Monday and was told to be n.p.o., as being getting fluids, and is given oxycodone.    She is taking 5 or 10 mg of oxycodone every 5 hours or so and says it works really well for her pain without side effects including tiredness or constipation.  She is continuing to get fluids and is following up with oncology about her pancreatitis again today.    She is weak and fatigued but overall feels like she is doing well.  She remains independent in her ADLs.  She continues to be the primary caregiver for her  who has dementia.    He had an extensive discussion about advanced care and end-of-life planning in this context.  Her children know that she is terminally ill and they are making plans that perhaps 1 of them will come live with her when need to be.  Her 's children know about her situation and she is trusting that they are going to make  adequate arrangements for their father after she is gone.  She talks about being in denial but is obviously to me that she understands what is going on and is making appropriate plans and is acting really wisely.  I talked with her about that.  She feels like she is happy day-to-day despite the grief and does not have any major concerns about her mood or coping in the context of all this.  We talked a lot about hospice care and what that means and the benefits hospice could provide someone in her situation.      SH: Reviewed and unchanged    ROS: Comprehensive review of systems is negative except as above    PE: /84  Pulse 109  Temp 98  F (36.7  C) (Oral)  Resp 16  Wt 43.8 kg (96 lb 9.6 oz)  SpO2 96%  BMI 17.66 kg/m2  Wt Readings from Last 3 Encounters:   11/13/18 43 kg (94 lb 11.2 oz)   11/12/18 42.7 kg (94 lb 2.2 oz)   10/29/18 46.2 kg (101 lb 14.4 oz)     Alert, comfortable appearing, NAD. Chronically ill appearing  Head NCAT.  Eyes anicteric without injection  Face symmetric, eyes conjugate  Mouth pink, moist, no lesions.  Neck supple without masses, thyromegaly, LAD  Lungs unlabored, CTAB  CV rrr s1s2  Abd soft, ntnd, benign  Back well aligned, no masses, tenderness  + LE edema  Skin warm, dry, without lesions  MSK joints of hand normal. Sarcopenic  Neuro Face symmetric,   Neuropsych exam normal including affect, sensorium, gross memory, thought processes, and fund of knowledge.       Data reviewed:  I reviewed recent labs and imaging, my comments:  Cr stable  IMPRESSION: In this patient with history of metastatic pancreatic  cancer:  1. Ill-defined pancreatic head/uncinate process mass increasing size  from the prior study with progression of invasion off the third  portion of the duodenal with associated soft tissue mass.  2. Enlarged peripancreatic lymph nodes, stable. Concern for invasion  about the pylorus and duodenal bulb.  3. Stable pancreatic mass vascular relations with  encasement/invasion  of the SMV with associated deformity.  4. Slightly increased peritoneal nodularity concerning for peritoneal  carcinomatosis. No ascites.  5. Patchy enhancement of the liver parenchyma likely due to  perfusional changes. No convincing evidence for liver lesions in the  present study.    Impression & Recommendations:  78-year-old woman with metastatic and progressive pancreatic cancer now receiving supportive only care.  Complicated by abdominal pain either from tumor and/or pancreatitis, doing better on modest doses of oral oxycodone which should be managed really well by her oncology team.    Discussed end-of-life care planning extensively as above.  She is eligible for hospice care.  I am recommending a hospice informational visit-I am hoping she enrolls but at the very least she can learn more about the program and how it can help her even before she is in the terminal phase of things.  She was interested in that and definitely is looking for more help.  She was reassured that hospice is not just for people at the very end of life, nor will it cost her much out-of-pocket, if anything.  Emotional support offered.  Follow-up with me as needed.    Chart data reviewed today:    Family History   Problem Relation Age of Onset     C.A.D. Father      MI at age 65     Asthma Father      Coronary Artery Disease Father      Alzheimer Disease Mother      Osteoporosis Mother      Depression Sister 80     Asthma Sister      Prostate Cancer Brother      Depression Son      Depression Sister      Coronary Artery Disease Brother 67     Skin Cancer No family hx of      no skin cancer     Past Medical History:   Diagnosis Date     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 12/18/2017     Arthritis      Colon polyp 2009,2015    no polyps - f/u in 5 yrs      Esophageal reflux      Glaucoma      Hypertension      Malignant neoplasm of head of pancreas (H) 11/6/2017     Osteoporosis      Postsurgical hypothyroidism       Scoliosis (and kyphoscoliosis), idiopathic      Thyroid cancer (H)     papillary carcinoma age 33     Uncomplicated asthma      Past Surgical History:   Procedure Laterality Date     ARTHRODESIS FOOT Right 11/9/2016    Procedure: ARTHRODESIS FOOT;  Surgeon: Janes Mcelroy MD;  [facility]     C STOMACH SURGERY PROCEDURE UNLISTED       COLONOSCOPY   2009, 3/2015    no polyps in 2015 told to return 10 yrs.      ENDOSCOPIC RETROGRADE CHOLANGIOPANCREATOGRAM N/A 11/2/2017    Procedure: COMBINED ENDOSCOPIC RETROGRADE CHOLANGIOPANCREATOGRAPHY, PLACE TUBE/STENT;  Endoscopic Retrograde Cholangiopancreatogram with billary sphincterotomy and billary stent placement;  Surgeon: Rito Mcneil MD;  [facility]     ENDOSCOPIC RETROGRADE CHOLANGIOPANCREATOGRAM N/A 1/10/2018    Procedure: ENDOSCOPIC RETROGRADE CHOLANGIOPANCREATOGRAM;  Endoscopic Retrograde Cholangiopancreatogram ;  Surgeon: Felix Izaguirre MD;  [facility]     ENDOSCOPIC RETROGRADE CHOLANGIOPANCREATOGRAPHY, EXCHANGE TUBE/STENT N/A 11/22/2017    Procedure: ENDOSCOPIC RETROGRADE CHOLANGIOPANCREATOGRAPHY, EXCHANGE TUBE/STENT;  Endoscopic Retrograde Cholangiopancreatogram with Biliary Stent Exchange and pancreatic stent placement;  Surgeon: Felix Izaguirre MD;  [facility]     ESOPHAGOSCOPY, GASTROSCOPY, DUODENOSCOPY (EGD), COMBINED  2/17/2012    Procedure:COMBINED ESOPHAGOSCOPY, GASTROSCOPY, DUODENOSCOPY (EGD); COMBINED ESOPHAGOSCOPY, GASTROSCOPY, DUODENOSCOPY POSSIBLE DILATION; Surgeon:NICHOLE MCCLAIN; [facility]     ESOPHAGOSCOPY, GASTROSCOPY, DUODENOSCOPY (EGD), COMBINED N/A 11/2/2017    Procedure: COMBINED ENDOSCOPIC ULTRASOUND, ESOPHAGOSCOPY, GASTROSCOPY, DUODENOSCOPY (EGD), FINE NEEDLE ASPIRATE/BIOPSY;  Upper Endoscopic Ultrasound with fine needle biopsy, upper endoscopy with biopsies, Endoscopic Retrograde Cholangiopancreatogram with billary sphincterotomy and billary stent placement;  Surgeon: Lynn  Hadley Perez MD;  [facility]     FOOT SURGERY  2008    hammertoe left     INSERT PORT VASCULAR ACCESS Right 1/24/2018    Procedure: INSERT PORT VASCULAR ACCESS;  Chest Port Placement;  Surgeon: Ventura Villarreal PA-C;  [facility]     LAPAROSCOPIC CHOLECYSTECTOMY N/A 1/5/2015    Procedure: LAPAROSCOPIC CHOLECYSTECTOMY;  Surgeon: Cruz Pearson MD;  [facility]     NISSEN FUNDOPLICATION       ORTHOPEDIC SURGERY  2009    left hammertoe      REPAIR HAMMER TOE Right 11/9/2016    Procedure: REPAIR HAMMER TOE;  Surgeon: Janes Mcelroy MD;  [facility]     SALPINGO OOPHORECTOMY,R/L/SERENA  1974    left, for tubal pregnancy     THYROIDECTOMY      for thyroid cancer     Allergies   Allergen Reactions     Nkda [No Known Drug Allergies]      Medications: I have reviewed the patient's medication profile. MNPMP: reviewed    Thank you for involving us in the patient's care.   José Antonio Ann MD / Palliative Medicine / Pager [phone] / [facility] Inpatient Team Consult Pager [phone] (answered 8am-430pm M-F) - ok to text page via [employer] / After-Hours Answering Service [phone] / Palliative Clinic in the [facility] - [phone] (scheduling); [phone] (triage).

## 2018-11-14 NOTE — PROGRESS NOTES
Infusion Nursing Note:  Iris Lewis presents today for possible IVF/pain medication.    Patient seen by provider today: Yes: Tracy Padron PA-C   present during visit today: Not Applicable.    Note: Iris reports to infusion feeling better today than yesterday. She has reintroduced some food today without issue thus far. Tracy sees her in infusion and is satisfied with her oral intake and labs results, so she will NOT receive fluids today. Iris wishes to keep her port accessed for her visit tomorrow.     Intravenous Access:  Implanted Port.    Treatment Conditions:  Lab Results   Component Value Date    HGB 8.7 11/14/2018     Lab Results   Component Value Date    WBC 11.9 11/14/2018      Lab Results   Component Value Date    ANEU 8.1 11/14/2018     Lab Results   Component Value Date     11/14/2018      Lab Results   Component Value Date     11/14/2018                   Lab Results   Component Value Date    POTASSIUM 3.8 11/14/2018           Lab Results   Component Value Date    MAG 1.9 11/15/2017            Lab Results   Component Value Date    CR 0.69 11/14/2018                   Lab Results   Component Value Date    EMMY 8.1 11/14/2018                Lab Results   Component Value Date    BILITOTAL 0.5 11/14/2018           Lab Results   Component Value Date    ALBUMIN 2.5 11/14/2018                    Lab Results   Component Value Date    ALT 23 11/14/2018           Lab Results   Component Value Date    AST 24 11/14/2018     Labs reviewed. Per Tracy Padron PA-C, patient will not receive an infusion today..    Post Infusion Assessment:  Site patent and intact, free from redness, edema or discomfort.  No evidence of extravasations.  Access remains in place for labs and TARA visit tomorrow.    Discharge Plan:   Patient declined prescription refills.  Patient and/or family verbalized understanding of discharge instructions and all questions answered.  AVS to patient via [employer].   Patient will return 11/15/2018 for next appointment.   Patient discharged in stable condition accompanied by: self.  Departure Mode: Ambulatory.    Kleber Monte RN

## 2018-11-14 NOTE — NURSING NOTE
"Oncology Rooming Note    November 14, 2018 9:40 AM   Iris Lewis is a 78 year old female who presents for:    Chief Complaint   Patient presents with     Oncology Clinic Visit     [facility] RETURN- PAIN MANAGEMENT     Initial Vitals: /84  Pulse 109  Temp 98  F (36.7  C) (Oral)  Resp 16  Wt 96 lb 9.6 oz  SpO2 96%  BMI 17.66 kg/m2 Estimated body mass index is 17.66 kg/(m^2) as calculated from the following:    Height as of 11/12/18: 5' 2.01\".    Weight as of this encounter: 96 lb 9.6 oz. Body surface area is 1.38 meters squared.  No Pain (0) Comment: Data Unavailable   No LMP recorded. Patient is postmenopausal.  Allergies reviewed: Yes  Medications reviewed: Yes    Medications: Medication refills not needed today.  Pharmacy name entered into [employer]:    [facility] - [facility] [address] [facility] [address] [place] [address] [facility] [place] [facility] [address]    Clinical concerns: No new concerns. Marissa was notified.    10 minutes for nursing intake (face to face time)     Ezra Saleem LPN            "

## 2018-11-14 NOTE — LETTER
11/14/2018      RE: Iris Lewis  [address]  [place] [address]       Oncology/Hematology Visit Note  Nov 14, 2018    Reason for Visit: follow up of pancreatitis.    History of Present Illness: Iris Lewis is a 78 year old female with a hx significant for thyroid cancer s/p thyroidectomy in the 1980s, cholelithiasis s/p CCY 2015, GERD s/p Nissen fundoplication in 2006 and HTN. She was dx with metastatic pancreatic cancer involving the liver, lung and lymph nodes in October 2017 after presenting with obstructive jaundice. She met with  an did not qualify for the HALOZYME study. She was initiated on [place]/Abraxane on 11/28/17. Restaging showed a positive response to treatment after 2 cycles. Prior to cycle 4, she developed acute hypoxia and weakness. She was found to have influenza and pneumonia. 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 At f/u with Dr. Yu on 3/26/18, she was found to have progression and recommended to start 5FU plus liposomal irinotecan. She initiated therapy on 3/28/18. Imaging after 5 cycles showed a positive response. Treatment on 7/2/18 was delayed due to acute LFT elevation, abdominal pain and low grade fevers. LFT elevation was thought to be related to tylenol and alcohol use. CT CAP on 10/26/18 after cycle 15 with progressive disease. Dr. Yu discussed phase 1 trials at visit on 10/29 per chart but Iris was not interested. Decision to observe disease status for now and make a plan to transition to hospice. Please see Dr. Yu's previous notes for further details on the patient's history. She was seen 11/12 for worsening LUQ pain and found to have acute pancreatitis.    Interval History:  Iris states she started CLD last night. She had some chicken broth and vegetable broth. She also had at least 8oz of water and a glass of orange juice. She denied any increased pain with this. She is still taking about 10 mg of oxycodone every 6  hours. She took last night around 10 and then at 5AM this morning. She did have some return of pain so took another 10 mg at 11:40 today. She did have 1/2 a sandwich this morning though and stated no increased pain or nausea after eating. She also had some blueberry juice. She has been taking her other medications, including levothyroxine, xarelto, creon. She also started Senna-S    She thinks perhaps her ankles are slightly swollen. Denies any dizziness, dyspnea, cough, chest pain. She met with Dr. Ann this morning and has agreed to a hospice consult next week to acquire more information about hospice.     Review of Systems:  Patient denies any of the following except if noted above: fever, chills.    Current Outpatient Prescriptions   Medication Sig Dispense Refill     Acetaminophen (TYLENOL PO) Take 325 mg by mouth every 4 hours as needed for mild pain or fever       albuterol (PROAIR HFA/PROVENTIL HFA/VENTOLIN HFA) 108 (90 BASE) MCG/ACT Inhaler Inhale 2 puffs into the lungs 4 times daily 1 Inhaler 1     amylase-lipase-protease (CREON) 40817 UNITS CPEP Take 2 capsules (72,000 Units) by mouth 3 times daily (with meals) 180 capsule 3     CALCIUM PO Take by mouth every morning Form/strength unknown. Powder formulation. Add to water and fruits/vegetables daily to promote bone health.        CARTIA  MG 24 hr capsule TK 1 C PO QD 10 capsule 0     CHLORPHENIRAMINE MALEATE PO Take 4 mg by mouth daily as needed       cholecalciferol (VITAMIN D) 1000 UNIT tablet Take 1 tablet by mouth 2 times daily        cyanocolbalamin (VITAMIN B-12) 500 MCG tablet Take 500 mcg by mouth every morning        folic acid (FOLVITE) 400 MCG tablet Take 1 tablet by mouth every morning        hydrOXYzine (ATARAX) 25 MG tablet Take 1-2 tablets (25-50 mg) by mouth every 6 hours as needed for itching (adjuvant pain) 60 tablet 3     levothyroxine (SYNTHROID/LEVOTHROID) 125 MCG tablet Take 1 tablet (125 mcg) by mouth daily 90 tablet 1      loperamide (IMODIUM) 2 MG capsule 2 caps at 1st sign of diarrhea & 1 cap every 2hrs until 12hrs diarrhea free. During night, 2 caps at bedtime & 2 caps every 4hrs until AM 60 capsule 3     LORazepam (ATIVAN) 0.5 MG tablet Take 1 tablet (0.5 mg) by mouth every 4 hours as needed (Anxiety, Nausea/Vomiting or Sleep) 30 tablet 3     LORazepam (ATIVAN) 1 MG tablet   0     magic mouthwash (FIRST-MOUTHWASH BLM) suspension Swish and swallow 5-10 mLs in mouth every 6 hours as needed for mouth sores 237 mL 3     MELATONIN PO Take by mouth nightly as needed       omeprazole (PRILOSEC) 40 MG capsule        ondansetron (ZOFRAN) 4 MG tablet        ondansetron (ZOFRAN) 8 MG tablet Take 1 tablet (8 mg) by mouth every 8 hours as needed (nausea/vomiting) 30 tablet 2     order for DME Equipment being ordered: Compression Stockings. Please dispense 2 pairs of knee high 20-30 mmHg 2 Units 3     oxyCODONE (OXY-IR) 5 MG capsule Take 1-2 capsules (5-10 mg) by mouth every 4 hours as needed for severe pain 45 capsule 0     pantoprazole (PROTONIX) 20 MG EC tablet Take 1 tablet (20 mg) by mouth daily 30 tablet 2     polyethylene glycol (MIRALAX/GLYCOLAX) powder Take 17 g (1 capful) by mouth daily 500 g 3     Probiotic Product (PRO-BIOTIC BLEND PO) Take 1 Tablespoonful by mouth daily as needed       prochlorperazine (COMPAZINE) 10 MG tablet Take 1 tablet (10 mg) by mouth every 6 hours as needed for nausea or vomiting 30 tablet 3     prochlorperazine (COMPAZINE) 5 MG tablet Take 1 tablet (5 mg) by mouth every 6 hours as needed for nausea or vomiting 30 tablet 0     rivaroxaban ANTICOAGULANT (XARELTO) 20 MG TABS tablet Take 1 tablet (20 mg) by mouth daily (with dinner) 30 tablet 3     senna-docusate (SENOKOT-S;PERICOLACE) 8.6-50 MG per tablet Take 1-2 tablets by mouth 2 times daily as needed for constipation 100 tablet 0     timolol (TIMOPTIC) 0.5 % ophthalmic solution INT 1 GTT OU IN THE MORNING  3     timolol maleate (ISTALOL) 0.5 % opthalmic  solution Place 1 drop into both eyes every morning Before placing contact lenses 1 Bottle 0       Physical Examination:  General: The patient is a pleasant female in no acute distress.  /84 (BP Location: Right arm, Cuff Size: Adult Small)  Pulse 109  Temp 98  F (36.7  C) (Oral)  Resp 16  Wt 43.8 kg (96 lb 9.6 oz)  SpO2 96%  BMI 17.66 kg/m2  Wt Readings from Last 10 Encounters:   11/14/18 43.8 kg (96 lb 9.6 oz)   11/14/18 43.8 kg (96 lb 9.6 oz)   11/13/18 43 kg (94 lb 11.2 oz)   11/12/18 42.7 kg (94 lb 2.2 oz)   10/29/18 46.2 kg (101 lb 14.4 oz)   10/15/18 44.5 kg (98 lb)   10/01/18 46.5 kg (102 lb 9.6 oz)   09/18/18 46.4 kg (102 lb 3.2 oz)   09/05/18 44.9 kg (98 lb 14.4 oz)   08/27/18 43.3 kg (95 lb 6.4 oz)     HEENT: EOMI, PERRL. Sclerae are anicteric. Oral mucosa is pink and moist with no lesions or thrush.   Heart: Regular rate and rhythm.   Lungs: Clear to auscultation bilaterally.   Abdomen: Bowel sounds present, soft. Mild tenderness in upper quadrants, nondistended, with no palpable hepatosplenomegaly or masses.   Extremities: No pitting lower extremity edema noted bilaterally. Ankles did not appear swollen to me.  Neuro: Cranial nerves II through XII are grossly intact.  Skin: No rashes, petechiae, or bruising noted on exposed skin.    Laboratory Data:  Results for orders placed or performed in visit on 11/14/18 (from the past 24 hour(s))   *CBC with platelets differential   Result Value Ref Range    WBC 11.9 (H) 4.0 - 11.0 10e9/L    RBC Count 2.97 (L) 3.8 - 5.2 10e12/L    Hemoglobin 8.7 (L) 11.7 - 15.7 g/dL    Hematocrit 28.1 (L) 35.0 - 47.0 %    MCV 95 78 - 100 fl    MCH 29.3 26.5 - 33.0 pg    MCHC 31.0 (L) 31.5 - 36.5 g/dL    RDW 17.9 (H) 10.0 - 15.0 %    Platelet Count 326 150 - 450 10e9/L    Diff Method Automated Method     % Neutrophils 68.0 %    % Lymphocytes 12.3 %    % Monocytes 13.7 %    % Eosinophils 4.9 %    % Basophils 0.7 %    % Immature Granulocytes 0.4 %    Nucleated RBCs 0 0 /100     Absolute Neutrophil 8.1 1.6 - 8.3 10e9/L    Absolute Lymphocytes 1.5 0.8 - 5.3 10e9/L    Absolute Monocytes 1.6 (H) 0.0 - 1.3 10e9/L    Absolute Eosinophils 0.6 0.0 - 0.7 10e9/L    Absolute Basophils 0.1 0.0 - 0.2 10e9/L    Abs Immature Granulocytes 0.1 0 - 0.4 10e9/L    Absolute Nucleated RBC 0.0    Comprehensive metabolic panel   Result Value Ref Range    Sodium 137 133 - 144 mmol/L    Potassium 3.8 3.4 - 5.3 mmol/L    Chloride 104 94 - 109 mmol/L    Carbon Dioxide 24 20 - 32 mmol/L    Anion Gap 8 3 - 14 mmol/L    Glucose 160 (H) 70 - 99 mg/dL    Urea Nitrogen 5 (L) 7 - 30 mg/dL    Creatinine 0.69 0.52 - 1.04 mg/dL    GFR Estimate 83 >60 mL/min/1.7m2    GFR Estimate If Black >90 >60 mL/min/1.7m2    Calcium 8.1 (L) 8.5 - 10.1 mg/dL    Bilirubin Total 0.5 0.2 - 1.3 mg/dL    Albumin 2.5 (L) 3.4 - 5.0 g/dL    Protein Total 6.6 (L) 6.8 - 8.8 g/dL    Alkaline Phosphatase 340 (H) 40 - 150 U/L    ALT 23 0 - 50 U/L    AST 24 0 - 45 U/L   Lipase   Result Value Ref Range    Lipase 330 73 - 393 U/L         Assessment and Plan:  79 yo female with metastatic pancreatic cancer involving the liver, lung, lymph nodes: most recently on 5FU and liposomal irinotecan, last dose 10/15/18. CT CAP 10/26/18 with progressive disease. Seen in clinic on 11/12 for increased abdominal pain and found to have acute pancreatitis with lipase 1022    Acute pancreatitis.   --Lipase was down to 390 yesterday. Started CLD last night and has tolerated. She advanced dieet on her own this morning and ate 1/2 sandwich without any increased pain. Cr at baseline. Calcium corrects to normal given low albumin. WBC remains slightly elevated but stable.  --Will defer further IVF as she is tolerating diet. She will continue regular diet but with caution and will avoid greasy, fatty foods for now.   --Continue oxycodone IR 10mg q4-6h prn for pain. She is on Senna-S for bowel regimen  --Will have her return for labs, and Quyen Mazariegos tomorrow. Will keep IVF  "appointment in case she has worsening symptoms on regular diet. Will recheck CBC, CMP and lipase tomorrow    Metastatic pancreatic cancer: Per Dr. Yu's last note, she has no further standard of care treatments available. They discussed phase 1 trials last visit and she was not interested in pursuing those. Decision to \"observe her disease status for now and make a plan to transition to hospice as we work through her social issues\".  --She met with Dr. Ann today and has agreed to hospice consult, likely next week.    Tracy Padron PA-C  [facility]  [address] [place] [address] [phone]      LEONOR Garcia      "

## 2018-11-14 NOTE — LETTER
11/14/2018       RE: Iris Lewis  [address]  [place] [address]     Dear Colleague,    Thank you for referring your patient, Iris Lewis, to the [facility] CANCER [facility] at [facility]. Please see a copy of my visit note below.    Palliative Care Outpatient Clinic    (This note was transcribed using voice recognition software. While I review and edit the transcription, I may miss errors, and the software sometimes does unexpected capitalizations and formatting that I miss. Please let me know of any serious mistranscriptions and I will addend this note.)    Patient ID:  She has metastatic pancreatic cancer to the liver, lung, and retroperitoneal lymph nodes on gemcitabine and nab paclitaxel.  Important past medical history includes Nissen fundoplication in 2006.  She was diagnosed with her cancer in November of last year. First f/u scan showed some response to her chemotherapy. 3/18 showed progression & started 5FU+liposomal irinotecan.    Progression 10/2018 - chemo stopped and supportive only care & hospice offered      +HCD which names  then daughter as decision makers. Has a DNR/DNI POLST    Neuropathy, mucositis     History:  She is alone today.  Since I saw her last she progressed and antineoplastic treatment was stopped.  She had an episode of abdominal pain and elevated lipase on Monday and was told to be n.p.o., as being getting fluids, and is given oxycodone.    She is taking 5 or 10 mg of oxycodone every 5 hours or so and says it works really well for her pain without side effects including tiredness or constipation.  She is continuing to get fluids and is following up with oncology about her pancreatitis again today.    She is weak and fatigued but overall feels like she is doing well.  She remains independent in her ADLs.  She continues to be the primary caregiver for her  who has dementia.    He had an extensive discussion about  advanced care and end-of-life planning in this context.  Her children know that she is terminally ill and they are making plans that perhaps 1 of them will come live with her when need to be.  Her 's children know about her situation and she is trusting that they are going to make adequate arrangements for their father after she is gone.  She talks about being in denial but is obviously to me that she understands what is going on and is making appropriate plans and is acting really wisely.  I talked with her about that.  She feels like she is happy day-to-day despite the grief and does not have any major concerns about her mood or coping in the context of all this.  We talked a lot about hospice care and what that means and the benefits hospice could provide someone in her situation.      SH: Reviewed and unchanged    ROS: Comprehensive review of systems is negative except as above    PE: /84  Pulse 109  Temp 98  F (36.7  C) (Oral)  Resp 16  Wt 43.8 kg (96 lb 9.6 oz)  SpO2 96%  BMI 17.66 kg/m2  Wt Readings from Last 3 Encounters:   11/13/18 43 kg (94 lb 11.2 oz)   11/12/18 42.7 kg (94 lb 2.2 oz)   10/29/18 46.2 kg (101 lb 14.4 oz)     Alert, comfortable appearing, NAD. Chronically ill appearing  Head NCAT.  Eyes anicteric without injection  Face symmetric, eyes conjugate  Mouth pink, moist, no lesions.  Neck supple without masses, thyromegaly, LAD  Lungs unlabored, CTAB  CV rrr s1s2  Abd soft, ntnd, benign  Back well aligned, no masses, tenderness  + LE edema  Skin warm, dry, without lesions  MSK joints of hand normal. Sarcopenic  Neuro Face symmetric,   Neuropsych exam normal including affect, sensorium, gross memory, thought processes, and fund of knowledge.       Data reviewed:  I reviewed recent labs and imaging, my comments:  Cr stable  IMPRESSION: In this patient with history of metastatic pancreatic  cancer:  1. Ill-defined pancreatic head/uncinate process mass increasing size  from the  prior study with progression of invasion off the third  portion of the duodenal with associated soft tissue mass.  2. Enlarged peripancreatic lymph nodes, stable. Concern for invasion  about the pylorus and duodenal bulb.  3. Stable pancreatic mass vascular relations with encasement/invasion  of the SMV with associated deformity.  4. Slightly increased peritoneal nodularity concerning for peritoneal  carcinomatosis. No ascites.  5. Patchy enhancement of the liver parenchyma likely due to  perfusional changes. No convincing evidence for liver lesions in the  present study.    Impression & Recommendations:  78-year-old woman with metastatic and progressive pancreatic cancer now receiving supportive only care.  Complicated by abdominal pain either from tumor and/or pancreatitis, doing better on modest doses of oral oxycodone which should be managed really well by her oncology team.    Discussed end-of-life care planning extensively as above.  She is eligible for hospice care.  I am recommending a hospice informational visit-I am hoping she enrolls but at the very least she can learn more about the program and how it can help her even before she is in the terminal phase of things.  She was interested in that and definitely is looking for more help.  She was reassured that hospice is not just for people at the very end of life, nor will it cost her much out-of-pocket, if anything.  Emotional support offered.  Follow-up with me as needed.    Chart data reviewed today:    Family History   Problem Relation Age of Onset     C.A.D. Father      MI at age 65     Asthma Father      Coronary Artery Disease Father      Alzheimer Disease Mother      Osteoporosis Mother      Depression Sister 80     Asthma Sister      Prostate Cancer Brother      Depression Son      Depression Sister      Coronary Artery Disease Brother 67     Skin Cancer No family hx of      no skin cancer     Past Medical History:   Diagnosis Date     Alopecia due  to cytotoxic drug 12/18/2017     Arthritis      Colon polyp 2009,2015    no polyps - f/u in 5 yrs      Esophageal reflux      Glaucoma      Hypertension      Malignant neoplasm of head of pancreas (H) 11/6/2017     Osteoporosis      Postsurgical hypothyroidism      Scoliosis (and kyphoscoliosis), idiopathic      Thyroid cancer (H)     papillary carcinoma age 33     Uncomplicated asthma      Past Surgical History:   Procedure Laterality Date     ARTHRODESIS FOOT Right 11/9/2016    Procedure: ARTHRODESIS FOOT;  Surgeon: Janes Mcelroy MD;  [facility]     C STOMACH SURGERY PROCEDURE UNLISTED       COLONOSCOPY   2009, 3/2015    no polyps in 2015 told to return 10 yrs.      ENDOSCOPIC RETROGRADE CHOLANGIOPANCREATOGRAM N/A 11/2/2017    Procedure: COMBINED ENDOSCOPIC RETROGRADE CHOLANGIOPANCREATOGRAPHY, PLACE TUBE/STENT;  Endoscopic Retrograde Cholangiopancreatogram with billary sphincterotomy and billary stent placement;  Surgeon: Rito Mcneil MD;  [facility]     ENDOSCOPIC RETROGRADE CHOLANGIOPANCREATOGRAM N/A 1/10/2018    Procedure: ENDOSCOPIC RETROGRADE CHOLANGIOPANCREATOGRAM;  Endoscopic Retrograde Cholangiopancreatogram ;  Surgeon: Felix Izaguirre MD;  [facility]     ENDOSCOPIC RETROGRADE CHOLANGIOPANCREATOGRAPHY, EXCHANGE TUBE/STENT N/A 11/22/2017    Procedure: ENDOSCOPIC RETROGRADE CHOLANGIOPANCREATOGRAPHY, EXCHANGE TUBE/STENT;  Endoscopic Retrograde Cholangiopancreatogram with Biliary Stent Exchange and pancreatic stent placement;  Surgeon: Felix Izaguirre MD;  [facility]     ESOPHAGOSCOPY, GASTROSCOPY, DUODENOSCOPY (EGD), COMBINED  2/17/2012    Procedure:COMBINED ESOPHAGOSCOPY, GASTROSCOPY, DUODENOSCOPY (EGD); COMBINED ESOPHAGOSCOPY, GASTROSCOPY, DUODENOSCOPY POSSIBLE DILATION; Surgeon:NICHOLE MCCLAIN; [facility]     ESOPHAGOSCOPY, GASTROSCOPY, DUODENOSCOPY (EGD), COMBINED N/A 11/2/2017    Procedure: COMBINED ENDOSCOPIC ULTRASOUND, ESOPHAGOSCOPY,  GASTROSCOPY, DUODENOSCOPY (EGD), FINE NEEDLE ASPIRATE/BIOPSY;  Upper Endoscopic Ultrasound with fine needle biopsy, upper endoscopy with biopsies, Endoscopic Retrograde Cholangiopancreatogram with billary sphincterotomy and billary stent placement;  Surgeon: Hadley Bailey MD;  [facility]     FOOT SURGERY  2008    hammertoe left     INSERT PORT VASCULAR ACCESS Right 1/24/2018    Procedure: INSERT PORT VASCULAR ACCESS;  Chest Port Placement;  Surgeon: Ventura Villarreal PA-C;  [facility]     LAPAROSCOPIC CHOLECYSTECTOMY N/A 1/5/2015    Procedure: LAPAROSCOPIC CHOLECYSTECTOMY;  Surgeon: Cruz Pearson MD;  [facility]     NISSEN FUNDOPLICATION       ORTHOPEDIC SURGERY  2009    left hammertoe      REPAIR HAMMER TOE Right 11/9/2016    Procedure: REPAIR HAMMER TOE;  Surgeon: Janes Mcelroy MD;  [facility]     SALPINGO OOPHORECTOMY,R/L/SERENA  1974    left, for tubal pregnancy     THYROIDECTOMY      for thyroid cancer     Allergies   Allergen Reactions     Nkda [No Known Drug Allergies]      Medications: I have reviewed the patient's medication profile. MNPMP: reviewed    Thank you for involving us in the patient's care.   José Antonio Ann MD / Palliative Medicine / Pager [phone] / [facility] Inpatient Team Consult Pager [phone] (answered 8am-430pm M-F) - ok to text page via [place] / After-Hours Answering Service [phone] / Palliative Clinic in the [facility] at the [facility] [place] - [phone] (scheduling); [phone] (triage).        Again, thank you for allowing me to participate in the care of your patient.      Sincerely,    José Antonio Ann MD

## 2018-11-14 NOTE — MR AVS SNAPSHOT
After Visit Summary   11/14/2018    Iris Lewis    MRN: [id number]           Patient Information     Date Of Birth          1940        Visit Information        Provider Department      11/14/2018 9:30 AM José Antonio Ann MD [facility]        Today's Diagnoses     Malignant neoplasm of head of pancreas (H)    -  1       Follow-ups after your visit        Your next 10 appointments already scheduled     Nov 14, 2018 11:00 AM CST   Infusion 120 with [facility], [facility] 25 ATC   [facility] ([facility])    [address] [place] [address]  [address]  [facility] [address]   [phone]            Nov 14, 2018 11:20 AM CST   (Arrive by 11:05 AM)   Return Visit with LEONOR Gotti   [facility] ([facility])    [address] [place] [address]  [address]  [facility] [address]   [phone]            Nov 15, 2018  9:00 AM CST   [facility] Draw with  [facility] DRAW   [facility] Lab Draw ([facility])    [address] [place] [address]  [address]  [facility] [address]   [phone]            Nov 15, 2018  9:30 AM CST   (Arrive by 9:15 AM)   Return Visit with CAT Dang CNP   [facility] ([facility])    [address] [place] [address]  [address]  [facility] [address]   [phone]            Nov 28, 2018  2:45 PM CST   (Arrive by 2:30 PM)   Return Visit with Brett Conn MD   [facility] ([facility])    [address] [place] [address]  [address]  [facility] [address]   [phone]              Future tests that were ordered for you today     Open Standing Orders        Priority Remaining Interval Expires Ordered    *CBC with platelets differential Routine 1/3 every visit 11/13/2019 11/13/2018    Comprehensive metabolic panel Routine 1/3 every visit 11/13/2019  11/13/2018    Lipase Routine 1/3 every visit 11/13/2019 11/13/2018            Who to contact     If you have questions or need follow up information about today's clinic visit or your schedule please contact [facility] CANCER [facility]IN[facility] directly at [phone].  Normal or non-critical lab and imaging results will be communicated to you by MyChart, letter or phone within 4 business days after the clinic has received the results. If you do not hear from us within 7 days, please contact the clinic through [employer] or phone. If you have a critical or abnormal lab result, we will notify you by phone as soon as possible.  Submit refill requests through [facility] [employer] or call your pharmacy and they will forward the refill request to us. Please allow 3 business days for your refill to be completed.          Additional Information About Your Visit        MyChart Information     [employer] [employer] gives you secure access to your electronic health record. If you see a primary care provider, you can also send messages to your care team and make appointments. If you have questions, please call your primary care clinic.  If you do not have a primary care provider, please call [phone] and they will assist you.        Care EveryWhere ID     This is your Care EveryWhere ID. This could be used by other organizations to access your [facility] medical records  [id number]        Your Vitals Were     Pulse Temperature Respirations Pulse Oximetry BMI (Body Mass Index)       109 98  F (36.7  C) (Oral) 16 96% 17.66 kg/m2        Blood Pressure from Last 3 Encounters:   11/14/18 137/84   11/13/18 140/70   11/12/18 (!) 151/92    Weight from Last 3 Encounters:   11/14/18 43.8 kg (96 lb 9.6 oz)   11/13/18 43 kg (94 lb 11.2 oz)   11/12/18 42.7 kg (94 lb 2.2 oz)              Today, you had the following     No orders found for display       Primary Care Provider Office Phone # Fax #    Neri Gipson [phone] [address]  [facility] [address]        Equal Access to Services     NESSA NICHOLS : Hadii mary simms madison Martinez, waronnyda luqadaha, qacheyta kaalmameera correia, lisa michael. So [facility] [phone].    ATENCIÓN: Si habla español, tiene a nava disposición servicios gratuitos de asistencia lingüística. Indu al [phone].    We comply with applicable federal civil rights laws and [place] laws. We do not discriminate on the basis of race, color, national origin, age, disability, sex, sexual orientation, or gender identity.            Thank you!     Thank you for choosing [facility] CANCER CLINIC  for your care. Our goal is always to provide you with excellent care. Hearing back from our patients is one way we can continue to improve our services. Please take a few minutes to complete the written survey that you may receive in the mail after your visit with us. Thank you!             Your Updated Medication List - Protect others around you: Learn how to safely use, store and throw away your medicines at www.disposemymeds.org.          This list is accurate as of 11/14/18 10:43 AM.  Always use your most recent med list.                   Brand Name Dispense Instructions for use Diagnosis    albuterol 108 (90 Base) MCG/ACT inhaler    PROAIR HFA/PROVENTIL HFA/VENTOLIN HFA    1 Inhaler    Inhale 2 puffs into the lungs 4 times daily    Mild asthma, unspecified whether complicated, unspecified whether persistent       amylase-lipase-protease 45730 units Cpep    CREON    180 capsule    Take 2 capsules (72,000 Units) by mouth 3 times daily (with meals)    Malignant neoplasm of head of pancreas (H)       CALCIUM PO      Take by mouth every morning Form/strength unknown. Powder formulation. Add to water and fruits/vegetables daily to promote bone health.        CARTIA  MG 24 hr capsule   Generic drug:  diltiazem     10 capsule    TK 1 C PO QD    Benign essential hypertension       CHLORPHENIRAMINE  MALEATE PO      Take 4 mg by mouth daily as needed        cholecalciferol 1000 UNIT tablet    vitamin D3     Take 1 tablet by mouth 2 times daily        cyanocolbalamin 500 MCG tablet    vitamin  B-12     Take 500 mcg by mouth every morning        folic acid 400 MCG tablet    FOLVITE     Take 1 tablet by mouth every morning        hydrOXYzine 25 MG tablet    ATARAX    60 tablet    Take 1-2 tablets (25-50 mg) by mouth every 6 hours as needed for itching (adjuvant pain)    Obstructive jaundice       levothyroxine 125 MCG tablet    SYNTHROID/LEVOTHROID    90 tablet    Take 1 tablet (125 mcg) by mouth daily    Hypothyroidism, unspecified type       loperamide 2 MG capsule    IMODIUM    60 capsule    2 caps at 1st sign of diarrhea & 1 cap every 2hrs until 12hrs diarrhea free. During night, 2 caps at bedtime & 2 caps every 4hrs until AM    Malignant neoplasm of head of pancreas (H)       * LORazepam 0.5 MG tablet    ATIVAN    30 tablet    Take 1 tablet (0.5 mg) by mouth every 4 hours as needed (Anxiety, Nausea/Vomiting or Sleep)    Malignant neoplasm of head of pancreas (H)       * LORazepam 1 MG tablet    ATIVAN          magic mouthwash suspension (diphenhydrAMINE, lidocaine, aluminum-magnesium & simethicone) Susp compounding kit     237 mL    Swish and swallow 5-10 mLs in mouth every 6 hours as needed for mouth sores    Malignant neoplasm of head of pancreas (H)       MELATONIN PO      Take by mouth nightly as needed        omeprazole 40 MG capsule    priLOSEC          * ondansetron 4 MG tablet    ZOFRAN          * ondansetron 8 MG tablet    ZOFRAN    30 tablet    Take 1 tablet (8 mg) by mouth every 8 hours as needed (nausea/vomiting)    Malignant neoplasm of head of pancreas (H)       order for DME     2 Units    Equipment being ordered: Compression Stockings. Please dispense 2 pairs of knee high 20-30 mmHg    Lymphedema       oxyCODONE 5 MG capsule    OXY-IR    45 capsule    Take 1-2 capsules (5-10 mg) by mouth every  4 hours as needed for severe pain    Abdominal pain, left upper quadrant       pantoprazole 20 MG EC tablet    PROTONIX    30 tablet    Take 1 tablet (20 mg) by mouth daily    Other acute gastritis without hemorrhage       polyethylene glycol powder    MIRALAX/GLYCOLAX    500 g    Take 17 g (1 capful) by mouth daily    Constipation, unspecified constipation type       PRO-BIOTIC BLEND PO      Take 1 Tablespoonful by mouth daily as needed        * prochlorperazine 5 MG tablet    COMPAZINE    30 tablet    Take 1 tablet (5 mg) by mouth every 6 hours as needed for nausea or vomiting    Malignant neoplasm of head of pancreas (H)       * prochlorperazine 10 MG tablet    COMPAZINE    30 tablet    Take 1 tablet (10 mg) by mouth every 6 hours as needed for nausea or vomiting    Malignant neoplasm of head of pancreas (H)       rivaroxaban ANTICOAGULANT 20 MG Tabs tablet    XARELTO    30 tablet    Take 1 tablet (20 mg) by mouth daily (with dinner)    Malignant neoplasm of head of pancreas (H), History of pulmonary embolism       senna-docusate 8.6-50 MG per tablet    SENOKOT-S;PERICOLACE    100 tablet    Take 1-2 tablets by mouth 2 times daily as needed for constipation    Constipation, unspecified constipation type       * timolol maleate 0.5 % opthalmic solution    ISTALOL    1 Bottle    Place 1 drop into both eyes every morning Before placing contact lenses    Cataract, unspecified cataract type, unspecified laterality       * timolol 0.5 % ophthalmic solution    TIMOPTIC     INT 1 GTT OU IN THE MORNING        TYLENOL PO      Take 325 mg by mouth every 4 hours as needed for mild pain or fever        * Notice:  This list has 8 medication(s) that are the same as other medications prescribed for you. Read the directions carefully, and ask your doctor or other care provider to review them with you.

## 2018-11-14 NOTE — NURSING NOTE
Chief Complaint   Patient presents with     Port Draw     Labs drawn from port by RN. Line flushed with saline and heparin. Vs taken and pt checked in for appt.     Labs drawn from port by RN. Line flushed with saline and heparin. Vs taken and pt checked in for appt.    Ana Daigle RN

## 2018-11-14 NOTE — MR AVS SNAPSHOT
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     If you are having any concerning symptoms or wish to speak to a provider before your next infusion visit, please call your care coordinator or triage to notify them so we can adequately serve you.     For triage nurse, after hours, weekends, and holidays: [phone]          November 2018 Sunday Monday Tuesday Wednesday Thursday Friday Saturday                       1     2     3       4     5     6     7     8     9     10       11     12     P [facility] LAB DRAW    3:00 PM   (15 min.)    [facility]     [facility] RETURN    3:25 PM   (50 min.)   Tracy Padron PA   [facility]     UMP SPEC INFUSION 120    4:00 PM   (120 min.)   UC SPEC INFUSION   [facility] 13     [facility] LAB DRAW   12:00 PM   (15 min.)    [facility] LAB DRAW   [facility] Lab Draw     UMP SPEC INFUSION 120   12:30 PM   (120 min.)   UC SPEC INFUSION   [facility] Procedure 14     [facility] DRAW    8:45 AM   (15 min.)    [facility] LAB DRAW   [facility] Lab Draw     UMP RETURN    9:15 AM   (30 min.)   José Antonio Ann MD   [facility]     UMP ONC INFUSION 120    11:00 AM   (120 min.)   UC ONCOLOGY INFUSION   [facility]     UMP RETURN   11:05 AM   (50 min.)   Tracy Padron PA   M [facility] 15     [facility] LAB DRAW    9:00 AM   (15 min.)    [facility] LAB DRAW   [facility] Lab Draw     [facility] RETURN    9:15 AM   (50 min.)   Quyen Mazariegos, APRN CNP   [facility]   11:00 AM   (120 min.)   UC ONCOLOGY INFUSION   [facility] 16     17       18     19     20     21     22     23     24       25     26     27     28     UMP RETURN    2:30 PM   (75 min.)   Brett Conn MD   [facility] 29 30 December 2018 Sunday Monday Tuesday Wednesday Thursday Friday Saturday                                 1       2     3     4     5     6     7     8       9     10     11     12     13     14     15       16     17     18     19     20     21     22       23     24     25     26     27     28     29       30     31                                           Lab Results:  Recent Results (from the past 12 hour(s))   *CBC with platelets differential    Collection Time: 11/14/18  9:32 AM   Result Value Ref Range    WBC 11.9 (H) 4.0 - 11.0 10e9/L    RBC Count 2.97 (L) 3.8 - 5.2 10e12/L    Hemoglobin 8.7 (L) 11.7 - 15.7 g/dL    Hematocrit 28.1 (L) 35.0 - 47.0 %    MCV 95 78 - 100 fl    MCH 29.3 26.5 - 33.0 pg    MCHC 31.0 (L) 31.5 - 36.5 g/dL    RDW 17.9 (H) 10.0 - 15.0 %    Platelet Count 326 150 - 450 10e9/L    Diff Method Automated Method     % Neutrophils 68.0 %    % Lymphocytes 12.3 %    % Monocytes 13.7 %    % Eosinophils 4.9 %    % Basophils 0.7 %    % Immature Granulocytes 0.4 %    Nucleated RBCs 0 0 /100    Absolute Neutrophil 8.1 1.6 - 8.3 10e9/L    Absolute Lymphocytes 1.5 0.8 - 5.3 10e9/L    Absolute Monocytes 1.6 (H) 0.0 - 1.3 10e9/L    Absolute Eosinophils 0.6 0.0 - 0.7 10e9/L    Absolute Basophils 0.1 0.0 - 0.2  10e9/L    Abs Immature Granulocytes 0.1 0 - 0.4 10e9/L    Absolute Nucleated RBC 0.0    Comprehensive metabolic panel    Collection Time: 11/14/18  9:32 AM   Result Value Ref Range    Sodium 137 133 - 144 mmol/L    Potassium 3.8 3.4 - 5.3 mmol/L    Chloride 104 94 - 109 mmol/L    Carbon Dioxide 24 20 - 32 mmol/L    Anion Gap 8 3 - 14 mmol/L    Glucose 160 (H) 70 - 99 mg/dL    Urea Nitrogen 5 (L) 7 - 30 mg/dL    Creatinine 0.69 0.52 - 1.04 mg/dL    GFR Estimate 83 >60 mL/min/1.7m2    GFR Estimate If Black >90 >60 mL/min/1.7m2    Calcium 8.1 (L) 8.5 - 10.1 mg/dL    Bilirubin Total 0.5 0.2 - 1.3 mg/dL    Albumin 2.5 (L) 3.4 - 5.0 g/dL    Protein Total 6.6 (L) 6.8 - 8.8 g/dL    Alkaline Phosphatase 340 (H) 40 - 150 U/L    ALT 23 0 - 50 U/L    AST 24 0 - 45 U/L   Lipase    Collection Time: 11/14/18  9:32 AM   Result Value Ref Range    Lipase 330 73 - 393 U/L                   Follow-ups after your visit        Your next 10 appointments already scheduled     Nov 15, 2018  9:00 AM CST   [facility] Draw with [facility] LAB DRAW   [facility] Lab Draw ([facility])    [address] [place] [address]  [address] [place] [id number] [phone]            Nov 15, 2018  9:30 AM CST   (Arrive by 9:15 AM)   Return Visit with CAT Dang CNP   [facility])    [address] [place] [address]  [address]  [facility] [id number] [phone]            Nov 15, 2018 11:00 AM CST   Infusion 120 with  ONCOLOGY INFUSION, UC 28 ATC   [facility])    [address] [place] [address]  [address]  [facility] [address]   [phone]            Nov 28, 2018  2:45 PM CST   (Arrive by 2:30 PM)   Return Visit with Brett Conn MD   [facility] ([facility])    [address]  [address]  [facility] [address] [phone] future  tests that were ordered for you today     Open Standing Orders        Priority Remaining Interval Expires Ordered    *CBC with platelets differential Routine 1/3 every visit 11/13/2019 11/13/2018    Comprehensive metabolic panel Routine 1/3 every visit 11/13/2019 11/13/2018    Lipase Routine 1/3 every visit 11/13/2019 11/13/2018            Who to contact     If you have questions or need follow up information about today's clinic visit or your schedule please contact [facility] directly at [phone].  Normal or non-critical lab and imaging results will be communicated to you by Intuithart, letter or phone within 4 business days after the clinic has received the results. If you do not hear from us within 7 days, please contact the clinic through Amnis or phone. If you have a critical or abnormal lab result, we will notify you by phone as soon as possible.  Submit refill requests through Amnis or call your pharmacy and they will forward the refill request to us. Please allow 3 business days for your refill to be completed.          Additional Information About Your Visit        Intuithart Information     Amnis gives you secure access to your electronic health record. If you see a primary care provider, you can also send messages to your care team and make appointments. If you have questions, please call your primary care clinic.  If you do not have a primary care provider, please call [phone] and they will assist you.        Care EveryWhere ID     This is your Care EveryWhere ID. This could be used by other organizations to access your [place] medical records  [id number]         Blood Pressure from Last 3 Encounters:   11/14/18 137/84   11/14/18 137/84   11/13/18 140/70    Weight from Last 3 Encounters:   11/14/18 43.8 kg (96 lb 9.6 oz)   11/14/18 43.8 kg (96 lb 9.6 oz)   11/13/18 43 kg (94 lb 11.2 oz)              Today, you had the following     No orders found for display        Primary Care Provider Office Phone # Fax #    Neri Gipson [phone] [address] [address]        Equal Access to Services     NESSA NICHOLS : Haddiana mary ku salmao Sobrunildaali, waaxda luqadaha, qaybta kaalmada adesusana, lisa lopezjeanne michael. So [facility] [phone].    ATENCIÓN: Si habla español, tiene a nava disposición servicios gratuitos de asistencia lingüística. Llame al [phone].    We comply with applicable federal civil rights laws and [place] laws. We do not discriminate on the basis of race, color, national origin, age, disability, sex, sexual orientation, or gender identity.            Thank you!     Thank you for choosing [facility]  for your care. Our goal is always to provide you with excellent care. Hearing back from our patients is one way we can continue to improve our services. Please take a few minutes to complete the written survey that you may receive in the mail after your visit with us. Thank you!             Your Updated Medication List - Protect others around you: Learn how to safely use, store and throw away your medicines at www.disposemymeds.org.          This list is accurate as of 11/14/18 12:09 PM.  Always use your most recent med list.                   Brand Name Dispense Instructions for use Diagnosis    albuterol 108 (90 Base) MCG/ACT inhaler    PROAIR HFA/PROVENTIL HFA/VENTOLIN HFA    1 Inhaler    Inhale 2 puffs into the lungs 4 times daily    Mild asthma, unspecified whether complicated, unspecified whether persistent       amylase-lipase-protease 31569 units Cpep    CREON    180 capsule    Take 2 capsules (72,000 Units) by mouth 3 times daily (with meals)    Malignant neoplasm of head of pancreas (H)       CALCIUM PO      Take by mouth every morning Form/strength unknown. Powder formulation. Add to water and fruits/vegetables daily to promote bone health.        CARTIA  MG 24 hr  capsule   Generic drug:  diltiazem     10 capsule    TK 1 C PO QD    Benign essential hypertension       CHLORPHENIRAMINE MALEATE PO      Take 4 mg by mouth daily as needed        cholecalciferol 1000 UNIT tablet    vitamin D3     Take 1 tablet by mouth 2 times daily        cyanocolbalamin 500 MCG tablet    vitamin  B-12     Take 500 mcg by mouth every morning        folic acid 400 MCG tablet    FOLVITE     Take 1 tablet by mouth every morning        hydrOXYzine 25 MG tablet    ATARAX    60 tablet    Take 1-2 tablets (25-50 mg) by mouth every 6 hours as needed for itching (adjuvant pain)    Obstructive jaundice       levothyroxine 125 MCG tablet    SYNTHROID/LEVOTHROID    90 tablet    Take 1 tablet (125 mcg) by mouth daily    Hypothyroidism, unspecified type       loperamide 2 MG capsule    IMODIUM    60 capsule    2 caps at 1st sign of diarrhea & 1 cap every 2hrs until 12hrs diarrhea free. During night, 2 caps at bedtime & 2 caps every 4hrs until AM    Malignant neoplasm of head of pancreas (H)       * LORazepam 0.5 MG tablet    ATIVAN    30 tablet    Take 1 tablet (0.5 mg) by mouth every 4 hours as needed (Anxiety, Nausea/Vomiting or Sleep)    Malignant neoplasm of head of pancreas (H)       * LORazepam 1 MG tablet    ATIVAN          magic mouthwash suspension (diphenhydrAMINE, lidocaine, aluminum-magnesium & simethicone) Susp compounding kit     237 mL    Swish and swallow 5-10 mLs in mouth every 6 hours as needed for mouth sores    Malignant neoplasm of head of pancreas (H)       MELATONIN PO      Take by mouth nightly as needed        omeprazole 40 MG capsule    priLOSEC          * ondansetron 4 MG tablet    ZOFRAN          * ondansetron 8 MG tablet    ZOFRAN    30 tablet    Take 1 tablet (8 mg) by mouth every 8 hours as needed (nausea/vomiting)    Malignant neoplasm of head of pancreas (H)       order for DME     2 Units    Equipment being ordered: Compression Stockings. Please dispense 2 pairs of knee high  20-30 mmHg    Lymphedema       oxyCODONE 5 MG capsule    OXY-IR    45 capsule    Take 1-2 capsules (5-10 mg) by mouth every 4 hours as needed for severe pain    Abdominal pain, left upper quadrant       pantoprazole 20 MG EC tablet    PROTONIX    30 tablet    Take 1 tablet (20 mg) by mouth daily    Other acute gastritis without hemorrhage       polyethylene glycol powder    MIRALAX/GLYCOLAX    500 g    Take 17 g (1 capful) by mouth daily    Constipation, unspecified constipation type       PRO-BIOTIC BLEND PO      Take 1 Tablespoonful by mouth daily as needed        * prochlorperazine 5 MG tablet    COMPAZINE    30 tablet    Take 1 tablet (5 mg) by mouth every 6 hours as needed for nausea or vomiting    Malignant neoplasm of head of pancreas (H)       * prochlorperazine 10 MG tablet    COMPAZINE    30 tablet    Take 1 tablet (10 mg) by mouth every 6 hours as needed for nausea or vomiting    Malignant neoplasm of head of pancreas (H)       rivaroxaban ANTICOAGULANT 20 MG Tabs tablet    XARELTO    30 tablet    Take 1 tablet (20 mg) by mouth daily (with dinner)    Malignant neoplasm of head of pancreas (H), History of pulmonary embolism       senna-docusate 8.6-50 MG per tablet    SENOKOT-S;PERICOLACE    100 tablet    Take 1-2 tablets by mouth 2 times daily as needed for constipation    Constipation, unspecified constipation type       * timolol maleate 0.5 % opthalmic solution    ISTALOL    1 Bottle    Place 1 drop into both eyes every morning Before placing contact lenses    Cataract, unspecified cataract type, unspecified laterality       * timolol 0.5 % ophthalmic solution    TIMOPTIC     INT 1 GTT OU IN THE MORNING        TYLENOL PO      Take 325 mg by mouth every 4 hours as needed for mild pain or fever        * Notice:  This list has 8 medication(s) that are the same as other medications prescribed for you. Read the directions carefully, and ask your doctor or other care provider to review them with you.

## 2018-11-14 NOTE — PATIENT INSTRUCTIONS
[facility] Main Line: [phone]    Call triage nurse with chills and/or temperature greater than or equal to 100.4, uncontrolled nausea/vomiting, diarrhea, constipation, dizziness, shortness of breath, chest pain, bleeding, unexplained bruising, or any new/concerning symptoms, questions/concerns.     If you are having any concerning symptoms or wish to speak to a provider before your next infusion visit, please call your care coordinator or triage to notify them so we can adequately serve you.     For triage nurse, after hours, weekends, and holidays: [phone]          November 2018 Sunday Monday Tuesday Wednesday Thursday Friday Saturday                       1     2     3       4     5     6     7     8     9     10       11     12     [facility] DRAW    3:00 PM   (15 min.)   [facility] LAB DRAW   [facility] Lab Draw     UMP RETURN    3:25 PM   (50 min.)   [facility] Pad[facility]    4:00 PM   (120 min.)   UC SPEC INFUSION   [facility] Specialty [facility] Procedure 13     [facility] LAB DRAW   12:00 PM   (15 min.)   [facility]     P SPEC INFUSION 120   12:30 PM   (120 min.)   UC SPEC INFUSION   [facility] Specialty [facility] Procedure 14     [facility] LAB DRAW    8:45 AM   (15 min.)   [facility] LAB DRAW   [facility] Lab Draw     UMP RETURN    9:15 AM   (30 min.)   José Antonio Ann [facility]   11:00 AM   (120 min.)   UC ONCOLOGY INFUSION   [facility]     UMP RETURN   11:05 AM   (50 min.)   Tracy Padron PA   [facility] 15     [facility] LAB DRAW    9:00 AM   (15 min.)   [facility] LAB DRAW   [facility] Lab Draw     UMP RETURN    9:15 AM   (50 min.)   Quyen Mazariegos APRN CNP   [facility]     UMP ONC INFUSION 120    11:00 AM   (120 min.)   UC ONCOLOGY INFUSION   [facility] 16     17       18     19     20     21     22     23     24       25     26     27     28     UMP RETURN    2:30 PM   (75 min.)   Brett Conn MD   [facility] 29 30 December 2018 Sunday Monday Tuesday Wednesday Thursday Friday Saturday                                 1       2     3     4     5     6     7     8       9     10     11     12     13     14     15       16     17     18     19     20     21     22       23     24     25     26     27     28     29       30     31                                           Lab Results:  Recent Results (from the past 12 hour(s))   *CBC with platelets differential    Collection Time: 11/14/18  9:32 AM   Result Value Ref Range    WBC 11.9 (H) 4.0 - 11.0 10e9/L    RBC Count 2.97 (L) 3.8 - 5.2 10e12/L    Hemoglobin 8.7 (L) 11.7 - 15.7 g/dL    Hematocrit 28.1 (L) 35.0 - 47.0 %    MCV 95 78 - 100 fl    MCH 29.3 26.5 - 33.0 pg    MCHC 31.0 (L) 31.5 - 36.5 g/dL    RDW 17.9 (H) 10.0 - 15.0 %    Platelet Count 326 150 - 450 10e9/L    Diff Method Automated Method     % Neutrophils 68.0 %    % Lymphocytes 12.3 %    % Monocytes 13.7 %    % Eosinophils 4.9 %    % Basophils 0.7 %    % Immature Granulocytes 0.4 %    Nucleated RBCs 0 0 /100    Absolute Neutrophil 8.1 1.6 - 8.3 10e9/L    Absolute Lymphocytes 1.5 0.8 - 5.3 10e9/L    Absolute Monocytes 1.6 (H) 0.0 - 1.3 10e9/L    Absolute Eosinophils 0.6 0.0 - 0.7 10e9/L    Absolute Basophils 0.1 0.0 - 0.2 10e9/L    Abs Immature Granulocytes 0.1 0 - 0.4 10e9/L    Absolute Nucleated RBC 0.0    Comprehensive metabolic panel    Collection Time: 11/14/18  9:32 AM   Result Value Ref Range    Sodium 137 133 - 144 mmol/L    Potassium 3.8 3.4 - 5.3 mmol/L    Chloride 104 94 - 109 mmol/L    Carbon Dioxide 24 20 - 32 mmol/L    Anion Gap 8 3 - 14 mmol/L    Glucose 160 (H) 70 - 99 mg/dL    Urea Nitrogen 5 (L) 7 - 30  mg/dL    Creatinine 0.69 0.52 - 1.04 mg/dL    GFR Estimate 83 >60 mL/min/1.7m2    GFR Estimate If Black >90 >60 mL/min/1.7m2    Calcium 8.1 (L) 8.5 - 10.1 mg/dL    Bilirubin Total 0.5 0.2 - 1.3 mg/dL    Albumin 2.5 (L) 3.4 - 5.0 g/dL    Protein Total 6.6 (L) 6.8 - 8.8 g/dL    Alkaline Phosphatase 340 (H) 40 - 150 U/L    ALT 23 0 - 50 U/L    AST 24 0 - 45 U/L   Lipase    Collection Time: 11/14/18  9:32 AM   Result Value Ref Range    Lipase 330 73 - 393 U/L

## 2018-11-15 NOTE — LETTER
11/15/2018       RE: Carolyn Lewis  [address]  [place] [address]     Dear Colleague,    Thank you for referring your patient, Carolyn Lewis, to the [facility]. Please see a copy of my visit note below.    Reason for Visit: seen in f/u of pancreatic cancer and pancreatitis    Oncology HPI:    Carolyn Lewis is a 78 year old female with a hx significant for thyroid cancer s/p thyroidectomy in the 1980s, cholelithiasis s/p CCY 2015, GERD s/p Nissen fundoplication in 2006 and HTN. She was dx with metastatic pancreatic cancer involving the liver, lung and lymph nodes in October 2017 after presenting with obstructive jaundice. She met with  an did not qualify for the HALOZYME study. She was initiated on [place]/Abraxane on 11/28/17. Restaging showed a positive response to treatment after 2 cycles. Prior to cycle 4, she developed acute hypoxia and weakness. She was found to have influenza and pneumonia. 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 At f/u with Dr. Yu on 3/26/18, she was found to have progression and recommended to start 5FU plus liposomal irinotecan. She initiated therapy on 3/28/18. Imaging after 5 cycles showed a positive response. Treatment on 7/2/18 was delayed due to acute LFT elevation, abdominal pain and low grade fevers. LFT elevation was thought to be related to tylenol and alcohol use. CT CAP on 10/26/18 after cycle 15 with progressive disease. Dr. Yu discussed phase 1 trials at visit on 10/29 per chart but Carolyn was not interested. Decision to observe disease status for now and make a plan to transition to hospice. Please see Dr. Yu's previous notes for further details on the patient's history. She was seen 11/12 for worsening LUQ pain and found to have acute pancreatitis. She has been supported with IVF and on a clear liquid diet.    Interval history: carolyn is feeling better today than earlier in the week. LUQ pain  is much less. Still mostly having liquids, but starting to try small amounts of regular food. Had 1/2 of a small sandwich last night and tolerated that OK. No N/V. Using oxycodone more proactively and managing the pain better. Is looking forward to Thanksgiving with her family. Met with  and discussed end of life planning. She wasn't expecting to be so tearful, but reflects that she had been trying to avoid planning for this. Her family has been supportive. She is planning on asking them to sort some of her personal items over the holiday. She is sorting through her sewing equipment and planning to sell some items. Has a meeting with hospice on Monday.     Current Outpatient Prescriptions   Medication Sig Dispense Refill     Acetaminophen (TYLENOL PO) Take 325 mg by mouth every 4 hours as needed for mild pain or fever       albuterol (PROAIR HFA/PROVENTIL HFA/VENTOLIN HFA) 108 (90 BASE) MCG/ACT Inhaler Inhale 2 puffs into the lungs 4 times daily 1 Inhaler 1     amylase-lipase-protease (CREON) 52814 UNITS CPEP Take 2 capsules (72,000 Units) by mouth 3 times daily (with meals) 180 capsule 3     CALCIUM PO Take by mouth every morning Form/strength unknown. Powder formulation. Add to water and fruits/vegetables daily to promote bone health.        CARTIA  MG 24 hr capsule TK 1 C PO QD 10 capsule 0     CHLORPHENIRAMINE MALEATE PO Take 4 mg by mouth daily as needed       cholecalciferol (VITAMIN D) 1000 UNIT tablet Take 1 tablet by mouth 2 times daily        cyanocolbalamin (VITAMIN B-12) 500 MCG tablet Take 500 mcg by mouth every morning        folic acid (FOLVITE) 400 MCG tablet Take 1 tablet by mouth every morning        hydrOXYzine (ATARAX) 25 MG tablet Take 1-2 tablets (25-50 mg) by mouth every 6 hours as needed for itching (adjuvant pain) 60 tablet 3     levothyroxine (SYNTHROID/LEVOTHROID) 125 MCG tablet Take 1 tablet (125 mcg) by mouth daily 90 tablet 1     loperamide (IMODIUM) 2 MG capsule 2 caps  at 1st sign of diarrhea & 1 cap every 2hrs until 12hrs diarrhea free. During night, 2 caps at bedtime & 2 caps every 4hrs until AM 60 capsule 3     LORazepam (ATIVAN) 0.5 MG tablet Take 1 tablet (0.5 mg) by mouth every 4 hours as needed (Anxiety, Nausea/Vomiting or Sleep) 30 tablet 3     LORazepam (ATIVAN) 1 MG tablet   0     magic mouthwash (FIRST-MOUTHWASH BLM) suspension Swish and swallow 5-10 mLs in mouth every 6 hours as needed for mouth sores 237 mL 3     MELATONIN PO Take by mouth nightly as needed       omeprazole (PRILOSEC) 40 MG capsule        ondansetron (ZOFRAN) 4 MG tablet        ondansetron (ZOFRAN) 8 MG tablet Take 1 tablet (8 mg) by mouth every 8 hours as needed (nausea/vomiting) 30 tablet 2     order for DME Equipment being ordered: Compression Stockings. Please dispense 2 pairs of knee high 20-30 mmHg 2 Units 3     oxyCODONE (OXY-IR) 5 MG capsule Take 1-2 capsules (5-10 mg) by mouth every 4 hours as needed for severe pain 45 capsule 0     pantoprazole (PROTONIX) 20 MG EC tablet Take 1 tablet (20 mg) by mouth daily 30 tablet 2     polyethylene glycol (MIRALAX/GLYCOLAX) powder Take 17 g (1 capful) by mouth daily 500 g 3     Probiotic Product (PRO-BIOTIC BLEND PO) Take 1 Tablespoonful by mouth daily as needed       prochlorperazine (COMPAZINE) 10 MG tablet Take 1 tablet (10 mg) by mouth every 6 hours as needed for nausea or vomiting 30 tablet 3     prochlorperazine (COMPAZINE) 5 MG tablet Take 1 tablet (5 mg) by mouth every 6 hours as needed for nausea or vomiting 30 tablet 0     rivaroxaban ANTICOAGULANT (XARELTO) 20 MG TABS tablet Take 1 tablet (20 mg) by mouth daily (with dinner) 30 tablet 3     senna-docusate (SENOKOT-S;PERICOLACE) 8.6-50 MG per tablet Take 1-2 tablets by mouth 2 times daily as needed for constipation 100 tablet 0     timolol (TIMOPTIC) 0.5 % ophthalmic solution INT 1 GTT OU IN THE MORNING  3     timolol maleate (ISTALOL) 0.5 % opthalmic solution Place 1 drop into both eyes every  morning Before placing contact lenses 1 Bottle 0          Allergies   Allergen Reactions     Nkda [No Known Drug Allergies]          Exam: alert, slim, appears well. Blood pressure 122/72, pulse 98, temperature 98  F (36.7  C), resp. rate 16, weight 44.5 kg (98 lb 3.2 oz), SpO2 96 %, not currently breastfeeding.  Wt Readings from Last 4 Encounters:   11/15/18 44.5 kg (98 lb 3.2 oz)   11/14/18 43.8 kg (96 lb 9.6 oz)   11/14/18 43.8 kg (96 lb 9.6 oz)   11/13/18 43 kg (94 lb 11.2 oz)   No icterus. Mouth is moist and without lesion. Neck supple and without adenopathy. Lungs:CTA. Heart;RRR, no murmur or rub. Abdomen: soft, nontender, BS active. No masses or organomegaly. Extremities: warm. Speech is clear. CN wnl.    Labs: Results for JONA SHETTY (MRN [id number]) as of 11/16/2018 17:40   Ref. Range 11/15/2018 09:55   Sodium Latest Ref Range: 133 - 144 mmol/L 136   Potassium Latest Ref Range: 3.4 - 5.3 mmol/L 3.6   Chloride Latest Ref Range: 94 - 109 mmol/L 103   Carbon Dioxide Latest Ref Range: 20 - 32 mmol/L 25   Urea Nitrogen Latest Ref Range: 7 - 30 mg/dL 5 (L)   Creatinine Latest Ref Range: 0.52 - 1.04 mg/dL 0.68   GFR Estimate Latest Ref Range: >60 mL/min/1.7m2 83   GFR Estimate If Black Latest Ref Range: >60 mL/min/1.7m2 >90   Calcium Latest Ref Range: 8.5 - 10.1 mg/dL 8.1 (L)   Anion Gap Latest Ref Range: 3 - 14 mmol/L 8   Albumin Latest Ref Range: 3.4 - 5.0 g/dL 2.7 (L)   Protein Total Latest Ref Range: 6.8 - 8.8 g/dL 6.8   Bilirubin Total Latest Ref Range: 0.2 - 1.3 mg/dL 0.5   Alkaline Phosphatase Latest Ref Range: 40 - 150 U/L 350 (H)   ALT Latest Ref Range: 0 - 50 U/L 21   AST Latest Ref Range: 0 - 45 U/L 28   Cancer Antigen 19-9 Latest Ref Range: 0 - 37 U/mL 2434 (H)   Lipase Latest Ref Range: 73 - 393 U/L 277   Glucose Latest Ref Range: 70 - 99 mg/dL 104 (H)   WBC Latest Ref Range: 4.0 - 11.0 10e9/L 13.6 (H)   Hemoglobin Latest Ref Range: 11.7 - 15.7 g/dL 8.4 (L)   Hematocrit Latest Ref Range: 35.0 -  47.0 % 26.6 (L)   Platelet Count Latest Ref Range: 150 - 450 10e9/L 316   RBC Count Latest Ref Range: 3.8 - 5.2 10e12/L 2.86 (L)   MCV Latest Ref Range: 78 - 100 fl 93   MCH Latest Ref Range: 26.5 - 33.0 pg 29.4   MCHC Latest Ref Range: 31.5 - 36.5 g/dL 31.6   RDW Latest Ref Range: 10.0 - 15.0 % 18.0 (H)   Diff Method Unknown Automated Method   % Neutrophils Latest Units: % 72.1   % Lymphocytes Latest Units: % 9.3   % Monocytes Latest Units: % 14.0   % Eosinophils Latest Units: % 3.2   % Basophils Latest Units: % 0.7   % Immature Granulocytes Latest Units: % 0.7   Nucleated RBCs Latest Ref Range: 0 /100 0   Absolute Neutrophil Latest Ref Range: 1.6 - 8.3 10e9/L 9.8 (H)   Absolute Lymphocytes Latest Ref Range: 0.8 - 5.3 10e9/L 1.3   Absolute Monocytes Latest Ref Range: 0.0 - 1.3 10e9/L 1.9 (H)   Absolute Eosinophils Latest Ref Range: 0.0 - 0.7 10e9/L 0.4   Absolute Basophils Latest Ref Range: 0.0 - 0.2 10e9/L 0.1   Abs Immature Granulocytes Latest Ref Range: 0 - 0.4 10e9/L 0.1   Absolute Nucleated RBC Unknown 0.0       Imaging: n/a    Impression/plan:   Metastatic pancreatic cancer involving the liver, lung, lymph nodes. Not on active treatment after progressing on chemotherapy  -followed recently for pancreatitis. Enzymes improved with bowel rest. Pain much improved. WBC elevated, but no s/s of infection. Continue oxycodone 5-10 mg Q 4 hour prn pain. OK to advance diet as tolerated.  -she will be meeting with hospice on Monday. She will call us if she has further concerns but doesn't feel the need for additional f/u at this time.      Again, thank you for allowing me to participate in the care of your patient.      Sincerely,    Quyen Mazariegos, CAT CNP

## 2018-11-15 NOTE — MR AVS SNAPSHOT
After Visit Summary   11/15/2018    Iris Lewis    MRN: [id number]           Patient Information     Date Of Birth          1940        Visit Information        Provider Department      11/15/2018 9:30 AM Quyen Mazariegos APRN CNP [facility]        Today's Diagnoses     Malignant neoplasm of head of pancreas (H)        History of pulmonary embolism           Follow-ups after your visit        Your next 10 appointments already scheduled     Nov 28, 2018  2:45 PM CST   (Arrive by 2:30 PM)   Return Visit with Brett Conn MD   [facility] ([facility])    [address]  [address]  [facility] [address] [phone]              Who to contact     If you have questions or need follow up information about today's clinic visit or your schedule please contact [facility] directly at [phone].  Normal or non-critical lab and imaging results will be communicated to you by MyChart, letter or phone within 4 business days after the clinic has received the results. If you do not hear from us within 7 days, please contact the clinic through [employer] or phone. If you have a critical or abnormal lab result, we will notify you by phone as soon as possible.  Submit refill requests through [employer] or call your pharmacy and they will forward the refill request to us. Please allow 3 business days for your refill to be completed.          Additional Information About Your Visit        MyChart Information     [employer] gives you secure access to your electronic health record. If you see a primary care provider, you can also send messages to your care team and make appointments. If you have questions, please call your primary care clinic.  If you do not have a primary care provider, please call [phone] and they will assist you.        Care EveryWhere ID     This is your Care EveryWhere ID. This could be  used by other organizations to access your [facility] medical records  [id number]        Your Vitals Were     Pulse Temperature Respirations Pulse Oximetry BMI (Body Mass Index)       98 98  F (36.7  C) 16 96% 17.96 kg/m2        Blood Pressure from Last 3 Encounters:   11/15/18 122/72   11/14/18 137/84   11/14/18 137/84    Weight from Last 3 Encounters:   11/15/18 44.5 kg (98 lb 3.2 oz)   11/14/18 43.8 kg (96 lb 9.6 oz)   11/14/18 43.8 kg (96 lb 9.6 oz)              We Performed the Following     *CBC with platelets differential     Cancer antigen 19-9     Comprehensive metabolic panel     Lipase        Primary Care Provider Office Phone # Fax #    Neri Gipson [phone] [address] [address]        Equal Access to Services     [facility]: Hadii aad ku hadasho Sothom, waaxda luqadaha, qaybta kaalmada adeegyada, lisa adams . So [facility] [phone].    ATENCIÓN: Si habla español, tiene a nava disposición servicios gratuitos de asistencia lingüística. Indu al [phone].    We comply with applicable federal civil rights laws and [place] laws. We do not discriminate on the basis of race, color, national origin, age, disability, sex, sexual orientation, or gender identity.            Thank you!     Thank you for choosing [facility]  for your care. Our goal is always to provide you with excellent care. Hearing back from our patients is one way we can continue to improve our services. Please take a few minutes to complete the written survey that you may receive in the mail after your visit with us. Thank you!             Your Updated Medication List - Protect others around you: Learn how to safely use, store and throw away your medicines at www.disposemymeds.org.          This list is accurate as of 11/15/18 11:59 PM.  Always use your most recent med list.                   Brand Name Dispense Instructions for use  Diagnosis    albuterol 108 (90 Base) MCG/ACT inhaler    PROAIR HFA/PROVENTIL HFA/VENTOLIN HFA    1 Inhaler    Inhale 2 puffs into the lungs 4 times daily    Mild asthma, unspecified whether complicated, unspecified whether persistent       amylase-lipase-protease 71609 units Cpep    CREON    180 capsule    Take 2 capsules (72,000 Units) by mouth 3 times daily (with meals)    Malignant neoplasm of head of pancreas (H)       CALCIUM PO      Take by mouth every morning Form/strength unknown. Powder formulation. Add to water and fruits/vegetables daily to promote bone health.        CARTIA  MG 24 hr capsule   Generic drug:  diltiazem     10 capsule    TK 1 C PO QD    Benign essential hypertension       CHLORPHENIRAMINE MALEATE PO      Take 4 mg by mouth daily as needed        cholecalciferol 1000 UNIT tablet    vitamin D3     Take 1 tablet by mouth 2 times daily        cyanocolbalamin 500 MCG tablet    vitamin  B-12     Take 500 mcg by mouth every morning        folic acid 400 MCG tablet    FOLVITE     Take 1 tablet by mouth every morning        hydrOXYzine 25 MG tablet    ATARAX    60 tablet    Take 1-2 tablets (25-50 mg) by mouth every 6 hours as needed for itching (adjuvant pain)    Obstructive jaundice       levothyroxine 125 MCG tablet    SYNTHROID/LEVOTHROID    90 tablet    Take 1 tablet (125 mcg) by mouth daily    Hypothyroidism, unspecified type       loperamide 2 MG capsule    IMODIUM    60 capsule    2 caps at 1st sign of diarrhea & 1 cap every 2hrs until 12hrs diarrhea free. During night, 2 caps at bedtime & 2 caps every 4hrs until AM    Malignant neoplasm of head of pancreas (H)       * LORazepam 0.5 MG tablet    ATIVAN    30 tablet    Take 1 tablet (0.5 mg) by mouth every 4 hours as needed (Anxiety, Nausea/Vomiting or Sleep)    Malignant neoplasm of head of pancreas (H)       * LORazepam 1 MG tablet    ATIVAN          magic mouthwash suspension (diphenhydrAMINE, lidocaine, aluminum-magnesium &  simethicone) Susp compounding kit     237 mL    Swish and swallow 5-10 mLs in mouth every 6 hours as needed for mouth sores    Malignant neoplasm of head of pancreas (H)       MELATONIN PO      Take by mouth nightly as needed        omeprazole 40 MG capsule    priLOSEC          * ondansetron 4 MG tablet    ZOFRAN          * ondansetron 8 MG tablet    ZOFRAN    30 tablet    Take 1 tablet (8 mg) by mouth every 8 hours as needed (nausea/vomiting)    Malignant neoplasm of head of pancreas (H)       order for DME     2 Units    Equipment being ordered: Compression Stockings. Please dispense 2 pairs of knee high 20-30 mmHg    Lymphedema       oxyCODONE 5 MG capsule    OXY-IR    45 capsule    Take 1-2 capsules (5-10 mg) by mouth every 4 hours as needed for severe pain    Abdominal pain, left upper quadrant       pantoprazole 20 MG EC tablet    PROTONIX    30 tablet    Take 1 tablet (20 mg) by mouth daily    Other acute gastritis without hemorrhage       polyethylene glycol powder    MIRALAX/GLYCOLAX    500 g    Take 17 g (1 capful) by mouth daily    Constipation, unspecified constipation type       PRO-BIOTIC BLEND PO      Take 1 Tablespoonful by mouth daily as needed        * prochlorperazine 5 MG tablet    COMPAZINE    30 tablet    Take 1 tablet (5 mg) by mouth every 6 hours as needed for nausea or vomiting    Malignant neoplasm of head of pancreas (H)       * prochlorperazine 10 MG tablet    COMPAZINE    30 tablet    Take 1 tablet (10 mg) by mouth every 6 hours as needed for nausea or vomiting    Malignant neoplasm of head of pancreas (H)       rivaroxaban ANTICOAGULANT 20 MG Tabs tablet    XARELTO    30 tablet    Take 1 tablet (20 mg) by mouth daily (with dinner)    Malignant neoplasm of head of pancreas (H), History of pulmonary embolism       senna-docusate 8.6-50 MG per tablet    SENOKOT-S;PERICOLACE    100 tablet    Take 1-2 tablets by mouth 2 times daily as needed for constipation    Constipation, unspecified  constipation type       * timolol maleate 0.5 % opthalmic solution    ISTALOL    1 Bottle    Place 1 drop into both eyes every morning Before placing contact lenses    Cataract, unspecified cataract type, unspecified laterality       * timolol 0.5 % ophthalmic solution    TIMOPTIC     INT 1 GTT OU IN THE MORNING        TYLENOL PO      Take 325 mg by mouth every 4 hours as needed for mild pain or fever        * Notice:  This list has 8 medication(s) that are the same as other medications prescribed for you. Read the directions carefully, and ask your doctor or other care provider to review them with you.

## 2018-11-15 NOTE — NURSING NOTE
Chief Complaint   Patient presents with     Port Draw     port, heparin locked, vitals checked

## 2018-11-15 NOTE — PROGRESS NOTES
Reason for Visit: seen in f/u of pancreatic cancer and pancreatitis    Oncology HPI:    Carolyn Lewis is a 78 year old female with a hx significant for thyroid cancer s/p thyroidectomy in the 1980s, cholelithiasis s/p CCY 2015, GERD s/p Nissen fundoplication in 2006 and HTN. She was dx with metastatic pancreatic cancer involving the liver, lung and lymph nodes in October 2017 after presenting with obstructive jaundice. She met with  an did not qualify for the HALOZYME study. She was initiated on Mariposa/Abraxane on 11/28/17. Restaging showed a positive response to treatment after 2 cycles. Prior to cycle 4, she developed acute hypoxia and weakness. She was found to have influenza and pneumonia. 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 At f/u with Dr. Yu on 3/26/18, she was found to have progression and recommended to start 5FU plus liposomal irinotecan. She initiated therapy on 3/28/18. Imaging after 5 cycles showed a positive response. Treatment on 7/2/18 was delayed due to acute LFT elevation, abdominal pain and low grade fevers. LFT elevation was thought to be related to tylenol and alcohol use. CT CAP on 10/26/18 after cycle 15 with progressive disease. Dr. Yu discussed phase 1 trials at visit on 10/29 per chart but Carolyn was not interested. Decision to observe disease status for now and make a plan to transition to hospice. Please see Dr. Yu's previous notes for further details on the patient's history. She was seen 11/12 for worsening LUQ pain and found to have acute pancreatitis. She has been supported with IVF and on a clear liquid diet.    Interval history: carolyn is feeling better today than earlier in the week. LUQ pain is much less. Still mostly having liquids, but starting to try small amounts of regular food. Had 1/2 of a small sandwich last night and tolerated that OK. No N/V. Using oxycodone more proactively and managing the pain better. Is looking  forward to Thanksgiving with her family. Met with  and discussed end of life planning. She wasn't expecting to be so tearful, but reflects that she had been trying to avoid planning for this. Her family has been supportive. She is planning on asking them to sort some of her personal items over the holiday. She is sorting through her sewing equipment and planning to sell some items. Has a meeting with hospice on Monday.     Current Outpatient Prescriptions   Medication Sig Dispense Refill     Acetaminophen (TYLENOL PO) Take 325 mg by mouth every 4 hours as needed for mild pain or fever       albuterol (PROAIR HFA/PROVENTIL HFA/VENTOLIN HFA) 108 (90 BASE) MCG/ACT Inhaler Inhale 2 puffs into the lungs 4 times daily 1 Inhaler 1     amylase-lipase-protease (CREON) 75083 UNITS CPEP Take 2 capsules (72,000 Units) by mouth 3 times daily (with meals) 180 capsule 3     CALCIUM PO Take by mouth every morning Form/strength unknown. Powder formulation. Add to water and fruits/vegetables daily to promote bone health.        CARTIA  MG 24 hr capsule TK 1 C PO QD 10 capsule 0     CHLORPHENIRAMINE MALEATE PO Take 4 mg by mouth daily as needed       cholecalciferol (VITAMIN D) 1000 UNIT tablet Take 1 tablet by mouth 2 times daily        cyanocolbalamin (VITAMIN B-12) 500 MCG tablet Take 500 mcg by mouth every morning        folic acid (FOLVITE) 400 MCG tablet Take 1 tablet by mouth every morning        hydrOXYzine (ATARAX) 25 MG tablet Take 1-2 tablets (25-50 mg) by mouth every 6 hours as needed for itching (adjuvant pain) 60 tablet 3     levothyroxine (SYNTHROID/LEVOTHROID) 125 MCG tablet Take 1 tablet (125 mcg) by mouth daily 90 tablet 1     loperamide (IMODIUM) 2 MG capsule 2 caps at 1st sign of diarrhea & 1 cap every 2hrs until 12hrs diarrhea free. During night, 2 caps at bedtime & 2 caps every 4hrs until AM 60 capsule 3     LORazepam (ATIVAN) 0.5 MG tablet Take 1 tablet (0.5 mg) by mouth every 4 hours as needed  (Anxiety, Nausea/Vomiting or Sleep) 30 tablet 3     LORazepam (ATIVAN) 1 MG tablet   0     magic mouthwash (FIRST-MOUTHWASH BLM) suspension Swish and swallow 5-10 mLs in mouth every 6 hours as needed for mouth sores 237 mL 3     MELATONIN PO Take by mouth nightly as needed       omeprazole (PRILOSEC) 40 MG capsule        ondansetron (ZOFRAN) 4 MG tablet        ondansetron (ZOFRAN) 8 MG tablet Take 1 tablet (8 mg) by mouth every 8 hours as needed (nausea/vomiting) 30 tablet 2     order for DME Equipment being ordered: Compression Stockings. Please dispense 2 pairs of knee high 20-30 mmHg 2 Units 3     oxyCODONE (OXY-IR) 5 MG capsule Take 1-2 capsules (5-10 mg) by mouth every 4 hours as needed for severe pain 45 capsule 0     pantoprazole (PROTONIX) 20 MG EC tablet Take 1 tablet (20 mg) by mouth daily 30 tablet 2     polyethylene glycol (MIRALAX/GLYCOLAX) powder Take 17 g (1 capful) by mouth daily 500 g 3     Probiotic Product (PRO-BIOTIC BLEND PO) Take 1 Tablespoonful by mouth daily as needed       prochlorperazine (COMPAZINE) 10 MG tablet Take 1 tablet (10 mg) by mouth every 6 hours as needed for nausea or vomiting 30 tablet 3     prochlorperazine (COMPAZINE) 5 MG tablet Take 1 tablet (5 mg) by mouth every 6 hours as needed for nausea or vomiting 30 tablet 0     rivaroxaban ANTICOAGULANT (XARELTO) 20 MG TABS tablet Take 1 tablet (20 mg) by mouth daily (with dinner) 30 tablet 3     senna-docusate (SENOKOT-S;PERICOLACE) 8.6-50 MG per tablet Take 1-2 tablets by mouth 2 times daily as needed for constipation 100 tablet 0     timolol (TIMOPTIC) 0.5 % ophthalmic solution INT 1 GTT OU IN THE MORNING  3     timolol maleate (ISTALOL) 0.5 % opthalmic solution Place 1 drop into both eyes every morning Before placing contact lenses 1 Bottle 0          Allergies   Allergen Reactions     Nkda [No Known Drug Allergies]          Exam: alert, slim, appears well. Blood pressure 122/72, pulse 98, temperature 98  F (36.7  C), resp.  rate 16, weight 44.5 kg (98 lb 3.2 oz), SpO2 96 %, not currently breastfeeding.  Wt Readings from Last 4 Encounters:   11/15/18 44.5 kg (98 lb 3.2 oz)   11/14/18 43.8 kg (96 lb 9.6 oz)   11/14/18 43.8 kg (96 lb 9.6 oz)   11/13/18 43 kg (94 lb 11.2 oz)   No icterus. Mouth is moist and without lesion. Neck supple and without adenopathy. Lungs:CTA. Heart;RRR, no murmur or rub. Abdomen: soft, nontender, BS active. No masses or organomegaly. Extremities: warm. Speech is clear. CN wnl.    Labs: Results for JONA SHETTY (MRN [id number]) as of 11/16/2018 17:40   Ref. Range 11/15/2018 09:55   Sodium Latest Ref Range: 133 - 144 mmol/L 136   Potassium Latest Ref Range: 3.4 - 5.3 mmol/L 3.6   Chloride Latest Ref Range: 94 - 109 mmol/L 103   Carbon Dioxide Latest Ref Range: 20 - 32 mmol/L 25   Urea Nitrogen Latest Ref Range: 7 - 30 mg/dL 5 (L)   Creatinine Latest Ref Range: 0.52 - 1.04 mg/dL 0.68   GFR Estimate Latest Ref Range: >60 mL/min/1.7m2 83   GFR Estimate If Black Latest Ref Range: >60 mL/min/1.7m2 >90   Calcium Latest Ref Range: 8.5 - 10.1 mg/dL 8.1 (L)   Anion Gap Latest Ref Range: 3 - 14 mmol/L 8   Albumin Latest Ref Range: 3.4 - 5.0 g/dL 2.7 (L)   Protein Total Latest Ref Range: 6.8 - 8.8 g/dL 6.8   Bilirubin Total Latest Ref Range: 0.2 - 1.3 mg/dL 0.5   Alkaline Phosphatase Latest Ref Range: 40 - 150 U/L 350 (H)   ALT Latest Ref Range: 0 - 50 U/L 21   AST Latest Ref Range: 0 - 45 U/L 28   Cancer Antigen 19-9 Latest Ref Range: 0 - 37 U/mL 2434 (H)   Lipase Latest Ref Range: 73 - 393 U/L 277   Glucose Latest Ref Range: 70 - 99 mg/dL 104 (H)   WBC Latest Ref Range: 4.0 - 11.0 10e9/L 13.6 (H)   Hemoglobin Latest Ref Range: 11.7 - 15.7 g/dL 8.4 (L)   Hematocrit Latest Ref Range: 35.0 - 47.0 % 26.6 (L)   Platelet Count Latest Ref Range: 150 - 450 10e9/L 316   RBC Count Latest Ref Range: 3.8 - 5.2 10e12/L 2.86 (L)   MCV Latest Ref Range: 78 - 100 fl 93   MCH Latest Ref Range: 26.5 - 33.0 pg 29.4   MCHC Latest Ref  Range: 31.5 - 36.5 g/dL 31.6   RDW Latest Ref Range: 10.0 - 15.0 % 18.0 (H)   Diff Method Unknown Automated Method   % Neutrophils Latest Units: % 72.1   % Lymphocytes Latest Units: % 9.3   % Monocytes Latest Units: % 14.0   % Eosinophils Latest Units: % 3.2   % Basophils Latest Units: % 0.7   % Immature Granulocytes Latest Units: % 0.7   Nucleated RBCs Latest Ref Range: 0 /100 0   Absolute Neutrophil Latest Ref Range: 1.6 - 8.3 10e9/L 9.8 (H)   Absolute Lymphocytes Latest Ref Range: 0.8 - 5.3 10e9/L 1.3   Absolute Monocytes Latest Ref Range: 0.0 - 1.3 10e9/L 1.9 (H)   Absolute Eosinophils Latest Ref Range: 0.0 - 0.7 10e9/L 0.4   Absolute Basophils Latest Ref Range: 0.0 - 0.2 10e9/L 0.1   Abs Immature Granulocytes Latest Ref Range: 0 - 0.4 10e9/L 0.1   Absolute Nucleated RBC Unknown 0.0       Imaging: n/a    Impression/plan:   Metastatic pancreatic cancer involving the liver, lung, lymph nodes. Not on active treatment after progressing on chemotherapy  -followed recently for pancreatitis. Enzymes improved with bowel rest. Pain much improved. WBC elevated, but no s/s of infection. Continue oxycodone 5-10 mg Q 4 hour prn pain. OK to advance diet as tolerated.  -she will be meeting with hospice on Monday. She will call us if she has further concerns but doesn't feel the need for additional f/u at this time.

## 2018-11-15 NOTE — NURSING NOTE
"Oncology Rooming Note    November 15, 2018 10:16 AM   Iris Lewis is a 78 year old female who presents for:    Chief Complaint   Patient presents with     Port Draw     port, heparin locked, vitals checked     Oncology Clinic Visit     Return for Adenocarcinoma , poss Tx, Labs      Initial Vitals: /72  Pulse 98  Temp 98  F (36.7  C)  Resp 16  Wt 44.5 kg (98 lb 3.2 oz)  SpO2 96%  BMI 17.96 kg/m2 Estimated body mass index is 17.96 kg/(m^2) as calculated from the following:    Height as of 11/12/18: 1.575 m (5' 2.01\").    Weight as of this encounter: 44.5 kg (98 lb 3.2 oz). Body surface area is 1.4 meters squared.  No Pain (0) Comment: Data Unavailable   No LMP recorded. Patient is postmenopausal.  Allergies reviewed: Yes  Medications reviewed: Yes    Medications: Medication refills not needed today.  Pharmacy name entered into [employer]:    [facility] [address] [facility] [address] [place] [address] 10 AT [facility] [address]    Clinical concerns: Yair Adam  was notified.    5 minutes for nursing intake (face to face time)     Bisi Ramsay MA              "

## 2018-11-19 NOTE — TELEPHONE ENCOUNTER
Patient calling triage department requesting refill for Oxycodone 5mg tablets. States she is taking 2 tablets ~q6hrs. Patient reports the Oxycodone was controlling her pain well at first, but she reports it doesn't seem to be helping as much as it did at first. Last filled 11/12. Patient also states she had diarrhea today and plans to take Imodium. Abyz message routed to Tracy Padron for recommendations/refill request.     Recommendations: Will refill Oxycodone at 10-15mg q6hrs PRN. Advise patient to hold Senna if experiencing diarrhea. Writer informed patient of recommendations. Patient reports she has been taking one senna HS. She will hold dose this evening and see if diarrhea improves. Patient will call triage if increased Oxy dose is not helping with her abd pain, or if pain increases. Does not wish to be seen for visit at this time. No further questions or concerns. Rx tubed to [facility] pharmacy.      Geeta Clark RN   [facility] Triage

## 2018-11-23 NOTE — PROGRESS NOTES
This is a recent snapshot of the patient's [facility] medical record.  For current drug dose and complete information and questions, call [phone] or In Basket pool, [facility] home infusion ([id number])  CSN Number:  [id number]

## 2018-11-27 NOTE — TELEPHONE ENCOUNTER
NP Quyen Watson called me to discuss Iris. She advised the following:     Is stomach pain different from pancreatitis pain to LUQ previously treated? (Need more info on pain characteristics)     OK to restart B12. OK to start gabapentin for neuropathy, start on 100mg daily titrate up to 100mg TID    Also wondering if patient has made a decision on hospice enrollment.     Called Iris back - no answer - [facility] to call triage back at [phone].

## 2018-11-27 NOTE — TELEPHONE ENCOUNTER
[facility] Telephone Triage Note    Assessment: Patient called in to triage reporting the following symptoms: ..stomach pain x 1 week since starting Oxycodone 5mg. She is taking about 4 tablets in 24 hours. It is helpful for her pain. She is able to eat breakfast and lunch but does not have an appetite for dinner. She is drinking less fluids d/t her sore mouth. She has been trying to eat before taking oxycodone. She was only nauseous yesterday. Reports sore mouth x 1 week. Mouth feels very dry. Does not see any sores or white patches to her gums or tongue. No fever or chills. She does have Biotene at home but has not been using. She also reports ongoing neuropathy that has been worsening. She stopped taking her B12 - unclear for how long.     Recommendations: .  Will need to discuss Oxycodone and stomach ache/nausea with Dr. Yu as well as neuropathy. Advised for sore/dry mouth to use Biotene several times daily and use 1/2 tsp salt, 1/2 tsp baking soda in 8oz. Swish and spit for 30 sec - 1 min. Notify us if sores or white patches develops or fever occurs. .    Follow-Up: Patient voiced understanding of advice and/or instructions given.     Paged Dr. Yu on stomach upset and neuropathy

## 2018-11-28 NOTE — DISCHARGE INSTRUCTIONS
[facility] will come to your home on Friday, 11/30 at 9:30 am. If you need to make any changes to this appointment, please call them (p) [phone]

## 2018-11-28 NOTE — MR AVS SNAPSHOT
After Visit Summary   11/28/2018    Iris Lewis    MRN: [id number]           Patient Information     Date Of Birth          1940        Visit Information        Provider Department      11/28/2018 4:43 PM Quyen Mazariegos APRN CNP [facility]        Today's Diagnoses     Malignant neoplasm of head of pancreas (H)    -  1       Follow-ups after your visit        Who to contact     If you have questions or need follow up information about today's clinic visit or your schedule please contact [facility] directly at [phone].  Normal or non-critical lab and imaging results will be communicated to you by sli.dohart, letter or phone within 4 business days after the clinic has received the results. If you do not hear from us within 7 days, please contact the clinic through sli.[email or web address] or phone. If you have a critical or abnormal lab result, we will notify you by phone as soon as possible.  Submit refill requests through [employer] or call your pharmacy and they will forward the refill request to us. Please allow 3 business days for your refill to be completed.          Additional Information About Your Visit        MyChart Information     [employer] gives you secure access to your electronic health record. If you see a primary care provider, you can also send messages to your care team and make appointments. If you have questions, please call your primary care clinic.  If you do not have a primary care provider, please call [phone] and they will assist you.        Care EveryWhere ID     This is your Care EveryWhere ID. This could be used by other organizations to access your [facility] medical records  [id number]         Blood Pressure from Last 3 Encounters:   11/28/18 139/74   11/15/18 122/72   11/14/18 137/84    Weight from Last 3 Encounters:   11/28/18 44.5 kg (98 lb)   11/15/18 44.5 kg (98 lb 3.2 oz)   11/14/18 43.8 kg (96 lb 9.6 oz)              Today, you had  the following     No orders found for display       Primary Care Provider Office Phone # Fax #    Neri Gipson [phone] [address] [address]        Equal Access to Services     NESSA NICHOLS : Haddiana hernandez ku salmao Soomaali, waaxda luqadaha, qaybta kaalmada adetobiyada, lisa romero laLatonyajeanne michael. So [facility] [phone].    ATENCIÓN: Si habla español, tiene a nava disposición servicios gratuitos de asistencia lingüística. Llame al [phone].    We comply with applicable federal civil rights laws and [place] laws. We do not discriminate on the basis of race, color, national origin, age, disability, sex, sexual orientation, or gender identity.            Thank you!     Thank you for choosing [facility]  for your care. Our goal is always to provide you with excellent care. Hearing back from our patients is one way we can continue to improve our services. Please take a few minutes to complete the written survey that you may receive in the mail after your visit with us. Thank you!             Your Updated Medication List - Protect others around you: Learn how to safely use, store and throw away your medicines at www.disposemymeds.org.          This list is accurate as of 11/28/18  4:58 PM.  Always use your most recent med list.                   Brand Name Dispense Instructions for use Diagnosis    albuterol 108 (90 Base) MCG/ACT inhaler    PROAIR HFA/PROVENTIL HFA/VENTOLIN HFA    1 Inhaler    Inhale 2 puffs into the lungs 4 times daily    Mild asthma, unspecified whether complicated, unspecified whether persistent       amylase-lipase-protease 48447 units Cpep    CREON    180 capsule    Take 2 capsules (72,000 Units) by mouth 3 times daily (with meals)    Malignant neoplasm of head of pancreas (H)       CALCIUM PO      Take by mouth every morning Form/strength unknown. Powder formulation. Add to water and fruits/vegetables daily to  promote bone health.        CARTIA  MG 24 hr capsule   Generic drug:  diltiazem     10 capsule    TK 1 C PO QD    Benign essential hypertension       CHLORPHENIRAMINE MALEATE PO      Take 4 mg by mouth daily as needed        folic acid 400 MCG tablet    FOLVITE     Take 1 tablet by mouth every morning        hydrOXYzine 25 MG tablet    ATARAX    60 tablet    Take 1-2 tablets (25-50 mg) by mouth every 6 hours as needed for itching (adjuvant pain)    Obstructive jaundice       levothyroxine 125 MCG tablet    SYNTHROID/LEVOTHROID    90 tablet    Take 1 tablet (125 mcg) by mouth daily    Hypothyroidism, unspecified type       loperamide 2 MG capsule    IMODIUM    60 capsule    2 caps at 1st sign of diarrhea & 1 cap every 2hrs until 12hrs diarrhea free. During night, 2 caps at bedtime & 2 caps every 4hrs until AM    Malignant neoplasm of head of pancreas (H)       * LORazepam 0.5 MG tablet    ATIVAN    30 tablet    Take 1 tablet (0.5 mg) by mouth every 4 hours as needed (Anxiety, Nausea/Vomiting or Sleep)    Malignant neoplasm of head of pancreas (H)       * LORazepam 1 MG tablet    ATIVAN          magic mouthwash suspension (diphenhydrAMINE, lidocaine, aluminum-magnesium & simethicone) compounding kit     237 mL    Swish and swallow 5-10 mLs in mouth every 6 hours as needed for mouth sores    Malignant neoplasm of head of pancreas (H)       MELATONIN PO      Take by mouth nightly as needed        omeprazole 40 MG DR capsule    priLOSEC          * ondansetron 4 MG tablet    ZOFRAN          * ondansetron 8 MG tablet    ZOFRAN    30 tablet    Take 1 tablet (8 mg) by mouth every 8 hours as needed (nausea/vomiting)    Malignant neoplasm of head of pancreas (H)       order for DME     2 Units    Equipment being ordered: Compression Stockings. Please dispense 2 pairs of knee high 20-30 mmHg    Lymphedema       * oxyCODONE 5 MG tablet    ROXICODONE    30 tablet    Take 3 tablets (15 mg) by mouth every 6 hours as needed  for pain        * oxyCODONE 5 MG capsule    OXY-IR    30 capsule    Take 2-3 capsules (10-15 mg) by mouth every 4 hours as needed for severe pain    Abdominal pain, left upper quadrant       pantoprazole 20 MG EC tablet    PROTONIX    30 tablet    Take 1 tablet (20 mg) by mouth daily    Other acute gastritis without hemorrhage       polyethylene glycol powder    MIRALAX/GLYCOLAX    500 g    Take 17 g (1 capful) by mouth daily    Constipation, unspecified constipation type       PRO-BIOTIC BLEND PO      Take 1 Tablespoonful by mouth daily as needed        * prochlorperazine 5 MG tablet    COMPAZINE    30 tablet    Take 1 tablet (5 mg) by mouth every 6 hours as needed for nausea or vomiting    Malignant neoplasm of head of pancreas (H)       * prochlorperazine 10 MG tablet    COMPAZINE    30 tablet    Take 1 tablet (10 mg) by mouth every 6 hours as needed for nausea or vomiting    Malignant neoplasm of head of pancreas (H)       rivaroxaban ANTICOAGULANT 20 MG Tabs tablet    XARELTO    30 tablet    Take 1 tablet (20 mg) by mouth daily (with dinner)    Malignant neoplasm of head of pancreas (H), History of pulmonary embolism       senna-docusate 8.6-50 MG tablet    SENOKOT-S/PERICOLACE    100 tablet    Take 1-2 tablets by mouth 2 times daily as needed for constipation    Constipation, unspecified constipation type       * timolol maleate 0.5 % opthalmic solution    ISTALOL    1 Bottle    Place 1 drop into both eyes every morning Before placing contact lenses    Cataract, unspecified cataract type, unspecified laterality       * timolol maleate 0.5 % ophthalmic solution    TIMOPTIC     INT 1 GTT OU IN THE MORNING        TYLENOL PO      Take 325 mg by mouth every 4 hours as needed for mild pain or fever        vitamin B-12 500 MCG tablet    CYANOCOBALAMIN     Take 500 mcg by mouth every morning        vitamin D3 1000 units (25 mcg) tablet    CHOLECALCIFEROL     Take 1 tablet by mouth 2 times daily        * Notice:  This  list has 10 medication(s) that are the same as other medications prescribed for you. Read the directions carefully, and ask your doctor or other care provider to review them with you.

## 2018-11-28 NOTE — PROGRESS NOTES
Emergency Social Work Services Note    Date of  Intervention: 11/28/18  Last Emergency Department Visit:  -  Care Plan:  none  Collaborated with: ED MD, ED RN, patient, patient's spouse, patient's brother, [facility] hospice liaison    Data:  Iris Lewis is a 78-year-old   female with metastatic pancreatic cancer who presents to ED due to pain.  ED SW consulted for discussion of resources including hospice.    Intervention: ED SW met several times with Iris, her spouse Neri and brother Neri also present.  She appears weakened and is tearful about her disposition.  She shares that she did meet with hospice and at that time was not quite ready to sign on but now thinks it may be the best option.  She did meet with hospice team and reviewed options including home with hospice, residential hospice.  She noted she does not feel she would be able to afford residential hospice but she would also like to be in her home setting with her family.  She and her spouse live together in [facility].  Her spouse has some mild memory impairment.  Currently, Iris's brother Neri is staying with them at least until Friday or Saturday.  Once he leaves, Iris's son will come to stay with her. Strongly encouraged Iris and her family to alert other family members, friends and neighbors that they will need physical and emotional support in upcoming days and weeks.  Reviewed that hospice does not provide overnight or extensive caregiving coverage so family/friends would be needed for support. Per collaboration with our [facility] hospice liaison, she had a hospice informational meeting only 11/19/18.  At that time, Iris was not quite ready to sign onto hospice but she is now agreeable.  Hospice intake scheduled for Friday 11/30 9:30 AM at [facility].    Assessment:  hospice    Plan:    Anticipated Disposition:  home with [facility]    Barriers to d/c plan:  none    Follow Up:  Will defer to [facility] hospice  team. Hospice intake scheduled for Friday 11/30 9:30 AM at [facility]. She is anticipating an outpatient follow-up appointment at oncology clinic tomorrow unless oncology NP able to see her today prior to discharge to formulate pain management plan.     Family contact for hospice intake - Neri Murphy (brother) (p) [phone]    Michelle Ayala, BUDDY, [facility]  Social Work Services, Emergency Dept [facility]  Pager: [phone] Mon-Sat 9 am - 9 pm, on-call/after hours pager [phone]

## 2018-11-28 NOTE — ED PROVIDER NOTES
History     Chief Complaint   Patient presents with     Abdominal Pain     HPI  Iris Lewis is a 78 year old female with a history of malignant neoplasm of thyroid gland and pancreas, osteoporosis, hypertension who presents to the emergency department for evaluation of abdominal pain.  Patient states that her current abdominal pain is the typical pain that she has secondary to pancreatic cancer, located in the lower left quadrant of her abdomen.  Patient states that she has been taking 15 mg oxycodone every 4 hours though has run out of this medication and is requesting more pain medications as she does not have an appointment with her prescribing physician.  Patient's brother presents with the patient and states she appears more yellow of her skin and eyes and states she has been confused.  Patient also complains of neck pain, mouth sores causing difficulties to eat and drink and has had low intake within the past few days.  Patient has had chemotherapy and is now coordinating for hospice care (she has had an appointment with hospice coordinator on Tuesday).  Patient last took pain medications last night.    I have reviewed the Medications, Allergies, Past Medical and Surgical History, and Social History in the [employer] system.  Past Medical History:   Diagnosis Date     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 12/18/2017     Arthritis      Colon polyp 2009,2015    no polyps - f/u in 5 yrs      Esophageal reflux      Glaucoma      Hypertension      Malignant neoplasm of head of pancreas (H) 11/6/2017     Osteoporosis      Postsurgical hypothyroidism      Scoliosis (and kyphoscoliosis), idiopathic      Thyroid cancer (H)     papillary carcinoma age 33     Uncomplicated asthma        Past Surgical History:   Procedure Laterality Date     ARTHRODESIS FOOT Right 11/9/2016    Procedure: ARTHRODESIS FOOT;  Surgeon: Janes Mcelroy MD;  [facility]     C STOMACH SURGERY PROCEDURE UNLISTED       COLONOSCOPY   2009,  3/2015    no polyps in 2015 told to return 10 yrs.      ENDOSCOPIC RETROGRADE CHOLANGIOPANCREATOGRAM N/A 11/2/2017    Procedure: COMBINED ENDOSCOPIC RETROGRADE CHOLANGIOPANCREATOGRAPHY, PLACE TUBE/STENT;  Endoscopic Retrograde Cholangiopancreatogram with billary sphincterotomy and billary stent placement;  Surgeon: Rito Mcneil MD;  [facility]: [facility] OR     ENDOSCOPIC RETROGRADE CHOLANGIOPANCREATOGRAM N/A 1/10/2018    Procedure: ENDOSCOPIC RETROGRADE CHOLANGIOPANCREATOGRAM;  Endoscopic Retrograde Cholangiopancreatogram ;  Surgeon: Felix Izaguirre MD;  Location: [facility] OR     ENDOSCOPIC RETROGRADE CHOLANGIOPANCREATOGRAPHY, EXCHANGE TUBE/STENT N/A 11/22/2017    Procedure: ENDOSCOPIC RETROGRADE CHOLANGIOPANCREATOGRAPHY, EXCHANGE TUBE/STENT;  Endoscopic Retrograde Cholangiopancreatogram with Biliary Stent Exchange and pancreatic stent placement;  Surgeon: Felix Izaguirre MD;  [facility] OR     ESOPHAGOSCOPY, GASTROSCOPY, DUODENOSCOPY (EGD), COMBINED  2/17/2012    Procedure:COMBINED ESOPHAGOSCOPY, GASTROSCOPY, DUODENOSCOPY (EGD); COMBINED ESOPHAGOSCOPY, GASTROSCOPY, DUODENOSCOPY POSSIBLE DILATION; Surgeon:NICHOLE MCCLAIN; [facility] OR     ESOPHAGOSCOPY, GASTROSCOPY, DUODENOSCOPY (EGD), COMBINED N/A 11/2/2017    Procedure: COMBINED ENDOSCOPIC ULTRASOUND, ESOPHAGOSCOPY, GASTROSCOPY, DUODENOSCOPY (EGD), FINE NEEDLE ASPIRATE/BIOPSY;  Upper Endoscopic Ultrasound with fine needle biopsy, upper endoscopy with biopsies, Endoscopic Retrograde Cholangiopancreatogram with billary sphincterotomy and billary stent placement;  Surgeon: Hadley Bailey MD;  [facility] OR     FOOT SURGERY  2008    hammertoe left     INSERT PORT VASCULAR ACCESS Right 1/24/2018    Procedure: INSERT PORT VASCULAR ACCESS;  Chest Port Placement;  Surgeon: Ventura Villarreal PA-C;  [facility]     LAPAROSCOPIC CHOLECYSTECTOMY N/A 1/5/2015    Procedure: LAPAROSCOPIC CHOLECYSTECTOMY;  Surgeon: Cruz Pearson MD;   [facility]     NISSEN FUNDOPLICATION       ORTHOPEDIC SURGERY  2009    left hammertoe      REPAIR HAMMER TOE Right 11/9/2016    Procedure: REPAIR HAMMER TOE;  Surgeon: Janes Mcelroy MD;  [facility]     SALPINGO OOPHORECTOMY,R/L/SERENA  1974    left, for tubal pregnancy     THYROIDECTOMY      for thyroid cancer       Family History   Problem Relation Age of Onset     C.A.D. Father      MI at age 65     Asthma Father      Coronary Artery Disease Father      Alzheimer Disease Mother      Osteoporosis Mother      Depression Sister 80     Asthma Sister      Prostate Cancer Brother      Depression Son      Depression Sister      Coronary Artery Disease Brother 67     Skin Cancer No family hx of      no skin cancer       Social History   Substance Use Topics     Smoking status: Former Smoker     Years: 5.00     Types: Cigarettes     Start date: 9/15/1959     Quit date: 1/28/1983     Smokeless tobacco: Never Used     Alcohol use Yes      Comment: wine 1-2 glasses/day       No current facility-administered medications for this encounter.      Current Outpatient Prescriptions   Medication     oxyCODONE (OXY-IR) 5 MG capsule     oxyCODONE (ROXICODONE) 5 MG tablet     Acetaminophen (TYLENOL PO)     albuterol (PROAIR HFA/PROVENTIL HFA/VENTOLIN HFA) 108 (90 BASE) MCG/ACT Inhaler     amylase-lipase-protease (CREON) 62113 UNITS CPEP     CALCIUM PO     CARTIA  MG 24 hr capsule     CHLORPHENIRAMINE MALEATE PO     cholecalciferol (VITAMIN D) 1000 UNIT tablet     cyanocolbalamin (VITAMIN B-12) 500 MCG tablet     folic acid (FOLVITE) 400 MCG tablet     hydrOXYzine (ATARAX) 25 MG tablet     levothyroxine (SYNTHROID/LEVOTHROID) 125 MCG tablet     loperamide (IMODIUM) 2 MG capsule     LORazepam (ATIVAN) 0.5 MG tablet     LORazepam (ATIVAN) 1 MG tablet     magic mouthwash (FIRST-MOUTHWASH BLM) suspension     MELATONIN PO     omeprazole (PRILOSEC) 40 MG capsule     ondansetron (ZOFRAN) 4 MG tablet     ondansetron  "(ZOFRAN) 8 MG tablet     order for DME     pantoprazole (PROTONIX) 20 MG EC tablet     polyethylene glycol (MIRALAX/GLYCOLAX) powder     Probiotic Product (PRO-BIOTIC BLEND PO)     prochlorperazine (COMPAZINE) 10 MG tablet     prochlorperazine (COMPAZINE) 5 MG tablet     rivaroxaban ANTICOAGULANT (XARELTO) 20 MG TABS tablet     senna-docusate (SENOKOT-S;PERICOLACE) 8.6-50 MG per tablet     timolol (TIMOPTIC) 0.5 % ophthalmic solution     timolol maleate (ISTALOL) 0.5 % opthalmic solution        Allergies   Allergen Reactions     Nkda [No Known Drug Allergies]        Review of Systems   Constitutional: Positive for appetite change (Decreased). Negative for fever.   HENT: Positive for mouth sores. Negative for congestion.    Eyes: Negative for redness.   Respiratory: Negative for shortness of breath.    Cardiovascular: Negative for chest pain.   Gastrointestinal: Positive for abdominal pain (LLQ).   Genitourinary: Negative for difficulty urinating.   Musculoskeletal: Negative for arthralgias and neck stiffness.   Skin: Negative for color change.   Neurological: Negative for headaches.   Psychiatric/Behavioral: Negative for confusion.   All other systems reviewed and are negative.      Physical Exam   BP: 91/53  Pulse: 74  Temp: 96.2  F (35.7  C)  Resp: 18  Height: 157.5 cm (5' 2\")  Weight: 44.5 kg (98 lb)  SpO2: 93 %      Physical Exam   Constitutional: No distress.   HENT:   Head: Atraumatic.   Mouth/Throat: Oropharynx is clear and moist. No oropharyngeal exudate.   Eyes: Pupils are equal, round, and reactive to light. Scleral icterus is present.   Cardiovascular: Normal heart sounds and intact distal pulses.    Pulmonary/Chest: Breath sounds normal. No respiratory distress.   Abdominal: Soft. Bowel sounds are normal. There is no tenderness.   Musculoskeletal: She exhibits no edema or tenderness.   Skin: Skin is warm. No rash noted. She is not diaphoretic.   Patient appears chronically ill    ED Course   1:00 PM  The " patient was seen and examined by Jonas Ramirez MD in [address]     ED Course     Procedures        Results for orders placed or performed during the hospital encounter of 11/28/18 (from the past 24 hour(s))   CBC with platelets differential   Result Value Ref Range    WBC 43.1 (H) 4.0 - 11.0 10e9/L    RBC Count 2.44 (L) 3.8 - 5.2 10e12/L    Hemoglobin 7.0 (L) 11.7 - 15.7 g/dL    Hematocrit 21.0 (L) 35.0 - 47.0 %    MCV 86 78 - 100 fl    MCH 28.7 26.5 - 33.0 pg    MCHC 33.3 31.5 - 36.5 g/dL    RDW 20.2 (H) 10.0 - 15.0 %    Platelet Count 164 150 - 450 10e9/L    Diff Method Manual Differential     % Neutrophils 89.5 %    % Lymphocytes 3.5 %    % Monocytes 6.1 %    % Eosinophils 0.0 %    % Basophils 0.0 %    % Metamyelocytes 0.9 %    Absolute Neutrophil 38.6 (H) 1.6 - 8.3 10e9/L    Absolute Lymphocytes 1.5 0.8 - 5.3 10e9/L    Absolute Monocytes 2.6 (H) 0.0 - 1.3 10e9/L    Absolute Eosinophils 0.0 0.0 - 0.7 10e9/L    Absolute Basophils 0.0 0.0 - 0.2 10e9/L    Absolute Metamyelocytes 0.4 (H) 0 10e9/L    Anisocytosis Moderate     Poikilocytosis Moderate     Polychromasia Slight     RBC Fragments Slight     Ovalocytes Slight     Target Cells Moderate     Macrocytes Present     Hypochromasia Present     Platelet Estimate Confirming automated cell count    Comprehensive metabolic panel   Result Value Ref Range    Sodium 133 133 - 144 mmol/L    Potassium 3.1 (L) 3.4 - 5.3 mmol/L    Chloride 98 94 - 109 mmol/L    Carbon Dioxide 26 20 - 32 mmol/L    Anion Gap 9 3 - 14 mmol/L    Glucose 75 70 - 99 mg/dL    Urea Nitrogen 12 7 - 30 mg/dL    Creatinine 0.79 0.52 - 1.04 mg/dL    GFR Estimate 71 >60 mL/min/1.7m2    GFR Estimate If Black 86 >60 mL/min/1.7m2    Calcium 7.2 (L) 8.5 - 10.1 mg/dL    Bilirubin Total 7.5 (H) 0.2 - 1.3 mg/dL    Albumin 1.8 (L) 3.4 - 5.0 g/dL    Protein Total 5.9 (L) 6.8 - 8.8 g/dL    Alkaline Phosphatase 449 (H) 40 - 150 U/L    ALT 98 (H) 0 - 50 U/L     (H) 0 - 45 U/L   Lipase   Result Value Ref Range     Lipase 371 73 - 393 U/L   Lactic acid   Result Value Ref Range    Lactic Acid 1.2 0.7 - 2.0 mmol/L            Labs Ordered and Resulted from Time of ED Arrival Up to the Time of Departure from the ED   CBC WITH PLATELETS DIFFERENTIAL - Abnormal; Notable for the following:        Result Value    WBC 43.1 (*)     RBC Count 2.44 (*)     Hemoglobin 7.0 (*)     Hematocrit 21.0 (*)     RDW 20.2 (*)     Absolute Neutrophil 38.6 (*)     Absolute Monocytes 2.6 (*)     Absolute Metamyelocytes 0.4 (*)     All other components within normal limits   COMPREHENSIVE METABOLIC PANEL - Abnormal; Notable for the following:     Potassium 3.1 (*)     Calcium 7.2 (*)     Bilirubin Total 7.5 (*)     Albumin 1.8 (*)     Protein Total 5.9 (*)     Alkaline Phosphatase 449 (*)     ALT 98 (*)      (*)     All other components within normal limits   LIPASE   LACTIC ACID WHOLE BLOOD   BLOOD CULTURE       Consults  Other (Comment): Called (HEMATOLOGY) (11/28/18 1440)  Medications   0.9% sodium chloride BOLUS (2,000 mLs Intravenous New Bag 11/28/18 1335)   oxyCODONE (ROXICODONE) tablet 15 mg (15 mg Oral Given 11/28/18 1445)       Assessments & Plan (with Medical Decision Making)   78-year-old female with Metastatic pancreatic cancer who is off of chemo therapy presents with a worsening abdominal pain in the setting of running out of her pain medications yesterday evening.  Exam revealed patient to be jaundiced and slightly hypotensive.  Differential included progression of cancer, cholangitis, biliary obstruction, sepsis, other.  Laboratories were obtained including markedly elevated white blood count of 43.1.  Comprehensive metabolic panel revealed elevated bilirubin as well has elevated alkaline phosphatase, ALT and AST.  Lipase and lactic acid were normal.  Potassium was low at 3.1.  Patient indicated that she had an informational meeting with palliative care 1 week ago and was interested in hospice.  Social work was in to see the  patient and entry into hospice was set for 2 days from today.  Patient's primary oncologist have been contacted prior to laboratory results who felt that patient should be seen in clinic tomorrow morning.  The case was also discussed with Abigail [facility] care provider who sees the patient regularly who felt that palliative procedure may be indicated.  As well, the case was discussed with the on-call oncologist who did not see a point in admitting the patient if she was to be enrolled in hospice in 2 days.  Ultimately, I presented my concerns to the patient as well as her  and brother.  She has decided to be discharged with a refill of her oxycodone, to follow-up with her oncologist tomorrow with plans of entering hospice 2 days from today.    I have reviewed the nursing notes.    I have reviewed the findings, diagnosis, plan and need for follow up with the patient.    New Prescriptions    OXYCODONE (ROXICODONE) 5 MG TABLET    Take 3 tablets (15 mg) by mouth every 6 hours as needed for pain       Final diagnoses:   Pancreatic cancer metastasized to liver (H)   Jaundice     I, Cesar Bustamante, am serving as a trained medical scribe to document services personally performed by Jonas Ramirez MD, based on the provider's statements to me.   I, Jonas Ramirez MD, was physically present and have reviewed and verified the accuracy of this note documented by Cesar Bustamante.    11/28/2018   [facility] [place], EMERGENCY DEPARTMENT     Juan Ramirez MD  11/28/18 6146

## 2018-11-28 NOTE — PROGRESS NOTES
I was called by Dr. Ramirez in the ED regarding Iris. We discussed goals of care. Hospice vs admission for w/u of cholangitis and possible stenting was discussed.  The plan reviewed with the patient was to discharge with clinic f/u tomorrow, then admit to hospice on Friday.    Reviewed the plan with various people including Dr. Yu, Dr. Ramirez, the heme/onc fellow. I met with Iris, her brother Jan and  Jan, in the ED and reviewed whether we should admit for possible stenting. Reviewed that I didn't have any certainty at this time, without imaging to know if stenting was feasible, but if feasible, might provide some short period of biliary drainage. Reviewed that her labs look consistent with cholangitis, which is life threatening. Reviewed that we did not have viable treatment options for the cancer at this time. Reviewed our prior discussions for hospice and how that transition would be appropriate. She and her family were in support with hospice care.     She was discharged from the ED with oxycodone refill per Dr. Ramirez. The heme/onc fellow and I discussed.  Dr. Correia (taking over for Dr. Ramirez) called me later in f/u to clarify the plan and reiterated the need to communicate this to the ED. I apologized, thinking this was the plan communicated I had also communicated with .

## 2018-11-28 NOTE — ED AVS SNAPSHOT
[facility] [place], Emergency Department    [address] [place] [id number]    Phone:  [phone]                                       Iris Lewis   MRN: [id number]    Department:  [facility], Emergency Department   Date of Visit:  11/28/2018           After Visit Summary Signature Page     I have received my discharge instructions, and my questions have been answered. I have discussed any challenges I see with this plan with the nurse or doctor.    ..........................................................................................................................................  Patient/Patient Representative Signature      ..........................................................................................................................................  Patient Representative Print Name and Relationship to Patient    ..................................................               ................................................  Date                                   Time    ..........................................................................................................................................  Reviewed by Signature/Title    ...................................................              ..............................................  Date                                               Time          22EPIC Rev 08/18

## 2018-11-28 NOTE — ED TRIAGE NOTES
Pt arrived in triage with concerns of L sided abdominal pain, neck pain, and a sore mouth since Thanksgiving. Pt says her symptoms have become worse over the past few days. Hx of pancreatic cancer.

## 2018-11-28 NOTE — ED AVS SNAPSHOT
[facility], Emergency Department    [address] [address]    Phone:  [phone]                                       Iris Lewis   MRN: [id number]    Department:  [facility], Emergency Department   Date of Visit:  11/28/2018           Patient Information     Date Of Birth          1940        Your diagnoses for this visit were:     Pancreatic cancer metastasized to liver (H)     Abdominal pain, left upper quadrant     Jaundice        You were seen by Juan Ramirez MD.      Follow-up Information     Follow up with Vinny Yu MD In 1 day.    Specialty:  Oncology    Contact information:    [address]  [facility] [address]  [phone]        24 Hour Appointment Hotline       To make an appointment at any [facility] clinic, call [phone] ([phone]). If you don't have a family doctor or clinic, we will help you find one. [facility] clinics are conveniently located to serve the needs of you and your family.             Review of your medicines      CONTINUE these medicines which may have CHANGED, or have new prescriptions. If we are uncertain of the size of tablets/capsules you have at home, strength may be listed as something that might have changed.        Dose / Directions Last dose taken    * oxyCODONE 5 MG tablet   Commonly known as:  ROXICODONE   Dose:  15 mg   What changed:  You were already taking a medication with the same name, and this prescription was added. Make sure you understand how and when to take each.   Quantity:  30 tablet        Take 3 tablets (15 mg) by mouth every 6 hours as needed for pain   Refills:  0        * oxyCODONE 5 MG capsule   Commonly known as:  OXY-IR   Dose:  10-15 mg   What changed:  when to take this   Quantity:  30 capsule        Take 2-3 capsules (10-15 mg) by mouth every 4 hours as needed for severe pain   Refills:  0        * Notice:  This list has 2 medication(s) that are the same as other medications  prescribed for you. Read the directions carefully, and ask your doctor or other care provider to review them with you.      Our records show that you are taking the medicines listed below. If these are incorrect, please call your family doctor or clinic.        Dose / Directions Last dose taken    albuterol 108 (90 Base) MCG/ACT inhaler   Commonly known as:  PROAIR HFA/PROVENTIL HFA/VENTOLIN HFA   Dose:  2 puff   Quantity:  1 Inhaler        Inhale 2 puffs into the lungs 4 times daily   Refills:  1        amylase-lipase-protease 01713 units Cpep   Commonly known as:  CREON   Dose:  2 capsule   Quantity:  180 capsule        Take 2 capsules (72,000 Units) by mouth 3 times daily (with meals)   Refills:  3        CALCIUM PO        Take by mouth every morning Form/strength unknown. Powder formulation. Add to water and fruits/vegetables daily to promote bone health.   Refills:  0        CARTIA  MG 24 hr capsule   Quantity:  10 capsule   Generic drug:  diltiazem        TK 1 C PO QD   Refills:  0        CHLORPHENIRAMINE MALEATE PO   Dose:  4 mg        Take 4 mg by mouth daily as needed   Refills:  0        folic acid 400 MCG tablet   Commonly known as:  FOLVITE   Dose:  1 tablet        Take 1 tablet by mouth every morning   Refills:  0        hydrOXYzine 25 MG tablet   Commonly known as:  ATARAX   Dose:  25-50 mg   Quantity:  60 tablet        Take 1-2 tablets (25-50 mg) by mouth every 6 hours as needed for itching (adjuvant pain)   Refills:  3        levothyroxine 125 MCG tablet   Commonly known as:  SYNTHROID/LEVOTHROID   Dose:  125 mcg   Quantity:  90 tablet        Take 1 tablet (125 mcg) by mouth daily   Refills:  1        loperamide 2 MG capsule   Commonly known as:  IMODIUM   Quantity:  60 capsule        2 caps at 1st sign of diarrhea & 1 cap every 2hrs until 12hrs diarrhea free. During night, 2 caps at bedtime & 2 caps every 4hrs until AM   Refills:  3        * LORazepam 0.5 MG tablet   Commonly known as:  ATIVAN    Dose:  0.5 mg   Quantity:  30 tablet        Take 1 tablet (0.5 mg) by mouth every 4 hours as needed (Anxiety, Nausea/Vomiting or Sleep)   Refills:  3        * LORazepam 1 MG tablet   Commonly known as:  ATIVAN        Refills:  0        magic mouthwash suspension (diphenhydrAMINE, lidocaine, aluminum-magnesium & simethicone) compounding kit   Dose:  5-10 mL   Quantity:  237 mL        Swish and swallow 5-10 mLs in mouth every 6 hours as needed for mouth sores   Refills:  3        MELATONIN PO        Take by mouth nightly as needed   Refills:  0        omeprazole 40 MG DR capsule   Commonly known as:  priLOSEC        Refills:  0        * ondansetron 4 MG tablet   Commonly known as:  ZOFRAN        Refills:  0        * ondansetron 8 MG tablet   Commonly known as:  ZOFRAN   Dose:  8 mg   Quantity:  30 tablet        Take 1 tablet (8 mg) by mouth every 8 hours as needed (nausea/vomiting)   Refills:  2        order for DME   Quantity:  2 Units        Equipment being ordered: Compression Stockings. Please dispense 2 pairs of knee high 20-30 mmHg   Refills:  3        pantoprazole 20 MG EC tablet   Commonly known as:  PROTONIX   Dose:  20 mg   Quantity:  30 tablet        Take 1 tablet (20 mg) by mouth daily   Refills:  2        polyethylene glycol powder   Commonly known as:  MIRALAX/GLYCOLAX   Dose:  1 capful   Quantity:  500 g        Take 17 g (1 capful) by mouth daily   Refills:  3        PRO-BIOTIC BLEND PO   Dose:  1 Tablespoonful        Take 1 Tablespoonful by mouth daily as needed   Refills:  0        * prochlorperazine 5 MG tablet   Commonly known as:  COMPAZINE   Dose:  5 mg   Quantity:  30 tablet        Take 1 tablet (5 mg) by mouth every 6 hours as needed for nausea or vomiting   Refills:  0        * prochlorperazine 10 MG tablet   Commonly known as:  COMPAZINE   Dose:  10 mg   Quantity:  30 tablet        Take 1 tablet (10 mg) by mouth every 6 hours as needed for nausea or vomiting   Refills:  3        rivaroxaban  ANTICOAGULANT 20 MG Tabs tablet   Commonly known as:  XARELTO   Dose:  20 mg   Quantity:  30 tablet        Take 1 tablet (20 mg) by mouth daily (with dinner)   Refills:  3        senna-docusate 8.6-50 MG tablet   Commonly known as:  SENOKOT-S/PERICOLACE   Dose:  1-2 tablet   Quantity:  100 tablet        Take 1-2 tablets by mouth 2 times daily as needed for constipation   Refills:  0        * timolol maleate 0.5 % opthalmic solution   Commonly known as:  ISTALOL   Dose:  1 drop   Quantity:  1 Bottle        Place 1 drop into both eyes every morning Before placing contact lenses   Refills:  0        * timolol maleate 0.5 % ophthalmic solution   Commonly known as:  TIMOPTIC        INT 1 GTT OU IN THE MORNING   Refills:  3        TYLENOL PO   Dose:  325 mg        Take 325 mg by mouth every 4 hours as needed for mild pain or fever   Refills:  0        vitamin B-12 500 MCG tablet   Commonly known as:  CYANOCOBALAMIN   Dose:  500 mcg        Take 500 mcg by mouth every morning   Refills:  0        vitamin D3 1000 units (25 mcg) tablet   Commonly known as:  CHOLECALCIFEROL   Dose:  1 tablet        Take 1 tablet by mouth 2 times daily   Refills:  0        * Notice:  This list has 8 medication(s) that are the same as other medications prescribed for you. Read the directions carefully, and ask your doctor or other care provider to review them with you.            Information about OPIOIDS     PRESCRIPTION OPIOIDS: WHAT YOU NEED TO KNOW   We gave you an opioid (narcotic) pain medicine. It is important to manage your pain, but opioids are not always the best choice. You should first try all the other options your care team gave you. Take this medicine for as short a time (and as few doses) as possible.    Some activities can increase your pain, such as bandage changes or therapy sessions. It may help to take your pain medicine 30 to 60 minutes before these activities. Reduce your stress by getting enough sleep, working on hobbies  you enjoy and practicing relaxation or meditation. Talk to your care team about ways to manage your pain beyond prescription opioids.    These medicines have risks:    DO NOT drive when on new or higher doses of pain medicine. These medicines can affect your alertness and reaction times, and you could be arrested for driving under the influence (DUI). If you need to use opioids long-term, talk to your care team about driving.    DO NOT operate heavy machinery    DO NOT do any other dangerous activities while taking these medicines.    DO NOT drink any alcohol while taking these medicines.     If the opioid prescribed includes acetaminophen, DO NOT take with any other medicines that contain acetaminophen. Read all labels carefully. Look for the word  acetaminophen  or  Tylenol.  Ask your pharmacist if you have questions or are unsure.    You can get addicted to pain medicines, especially if you have a history of addiction (chemical, alcohol or substance dependence). Talk to your care team about ways to reduce this risk.    All opioids tend to cause constipation. Drink plenty of water and eat foods that have a lot of fiber, such as fruits, vegetables, prune juice, apple juice and high-fiber cereal. Take a laxative (Miralax, milk of magnesia, Colace, Senna) if you don t move your bowels at least every other day. Other side effects include upset stomach, sleepiness, dizziness, throwing up, tolerance (needing more of the medicine to have the same effect), physical dependence and slowed breathing.    Store your pills in a secure place, locked if possible. We will not replace any lost or stolen medicine. If you don t finish your medicine, please throw away (dispose) as directed by your pharmacist. The [place] Pollution [employer] has more information about safe disposal: https://www.pca.[facility].[place].us/living-green/managing-unwanted-medications        Prescriptions were sent or printed at these locations (2  Prescriptions)                   Other Prescriptions                Printed at Department/Unit printer (2 of 2)         oxyCODONE (OXY-IR) 5 MG capsule               oxyCODONE (ROXICODONE) 5 MG tablet                Procedures and tests performed during your visit     Blood Culture ONE site    CBC with platelets differential    Comprehensive metabolic panel    Lactic acid    Lipase      Orders Needing Specimen Collection     None      Pending Results     No orders found from 11/26/2018 to 11/29/2018.            Pending Culture Results     No orders found from 11/26/2018 to 11/29/2018.            Pending Results Instructions     If you had any lab results that were not finalized at the time of your Discharge, you can call the ED Lab Result RN at [phone]. You will be contacted by this team for any positive Lab results or changes in treatment. The nurses are available 7 days a week from 10A to 6:30P.  You can leave a message 24 hours per day and they will return your call.        Thank you for choosing [facility]       Thank you for choosing [facility] for your care. Our goal is always to provide you with excellent care. Hearing back from our patients is one way we can continue to improve our services. Please take a few minutes to complete the written survey that you may receive in the mail after you visit with us. Thank you!        ICONIChart Information     [facility] gives you secure access to your electronic health record. If you see a primary care provider, you can also send messages to your care team and make appointments. If you have questions, please call your primary care clinic.  If you do not have a primary care provider, please call [phone] and they will assist you.        Care EveryWhere ID     This is your Care EveryWhere ID. This could be used by other organizations to access your [facility] medical records  [id number]        Equal Access to Services     NESSA NICHOLS AH: Shaun Martinez  merle gar, lisa bradford. So [facility] [phone].    ATENCIÓN: Si habla español, tiene a nava disposición servicios gratuitos de asistencia lingüística. Llame al [phone].    We comply with applicable federal civil rights laws and [place] laws. We do not discriminate on the basis of race, color, national origin, age, disability, sex, sexual orientation, or gender identity.            After Visit Summary       This is your record. Keep this with you and show to your community pharmacist(s) and doctor(s) at your next visit.

## 2018-11-28 NOTE — PROGRESS NOTES
Brief oncology note.    Ms. Lewis is a 78F w/ metastatic pancreatic ca s/p liposomal irinotecan/5FU till 7/2018, currently off treatment who came to ER for worsening abdominal pain and found possible biliary obstruction (TB 7.5 and WBC 43K). Initially she was advised to be admitted for possible biliary stent or further evaluation of the obstruction.     Because she ran out of treatment options, outpatient team (Dr. Yu) has encouraged her to enroll hospice which is actually planned this Friday. Quyen Mazariegos from Dr. Yu team stopped by ER to discuss her goal of treatment today. Although Dr. Yu team still advocated inpatient management of biliary obstruction, the patient and her family favored outpatient hospice.    Thus, Ms. Lewis will be released from ER with pain meds and  Follow up with Dr. Yu team tomorrow with plans of entering hospice.     I did contact Quyen Mazariegos to confirm the plan. I also conveyed this message to ER provider (Dr. Correia).    Se julián Varma MD  [facility]  Hematology oncology and transplantation fellow  Pager [phone]

## 2018-11-28 NOTE — LETTER
11/28/2018       RE: Iris Lewis  [address]  [place] [address]     Dear Colleague,    Thank you for referring your patient, Iris Lewis, to the [facility]. Please see a copy of my visit note below.    I was called by Dr. Ramirez in the ED regarding Iris. We discussed goals of care. Hospice vs admission for w/u of cholangitis and possible stenting was discussed.  The plan reviewed with the patient was to discharge with clinic f/u tomorrow, then admit to hospice on Friday.    Reviewed the plan with various people including Dr. Yu, Dr. Ramirez, the heme/onc fellow. I met with Iris, her brother Jan and  Jan, in the ED and reviewed whether we should admit for possible stenting. Reviewed that I didn't have any certainty at this time, without imaging to know if stenting was feasible, but if feasible, might provide some short period of biliary drainage. Reviewed that her labs look consistent with cholangitis, which is life threatening. Reviewed that we did not have viable treatment options for the cancer at this time. Reviewed our prior discussions for hospice and how that transition would be appropriate. She and her family were in support with hospice care.     She was discharged from the ED with oxycodone refill per Dr. Ramirez. The heme/onc fellow and I discussed.  Dr. Correia (taking over for Dr. Ramirez) called me later in f/u to clarify the plan and reiterated the need to communicate this to the ED. I apologized, thinking this was the plan communicated I had also communicated with .     Again, thank you for allowing me to participate in the care of your patient.      Sincerely,    CAT Block CNP

## 2018-11-28 NOTE — TELEPHONE ENCOUNTER
"[facility] Telephone Triage Note    Assessment: Patient called in to triage reporting the following symptoms: Follow up with patient regarding more info on abdominal pain. Per Iris: dull/aching abdominal pain x 1 week to LUQ. Says this feels similiar to previous pancreatitis. She also reports being very weak. Said she could not lift her head up yesterday evening when trying to turn over in bed. She continues to c/o dry mouth, has been using Biotene and pushing fluids today. When I asked about the Oxycodone and the type of pain she was needing to take this medication for she had difficulty describing her pain. She could only say it was in her abdomen. When I asked about her characteristics of pain other than the LUQ pain she said \"I'm getting confused\". She then told me she's had trouble communicating and finding words. She went on to say she's been in bed mostly for the past 3 days because it's difficult to walk. She in addition mentioned new onset of chills last night and sore neck x 3-4 days. Says she has had no injury to her neck. Her brother was in the background and since I was having trouble communicating with Iris I asked to speak to him. He reported when he stopped over to visit her last night he noticed she was having impaired speech - trouble word finding. . He has not noticed any unilateral weakness. He also said Iris has actually been taking 3 oxycodone Q 4 hours - not the 4 tabs in 24hrs that she told me yesterday.     Recommendations: .  Go to [facility] ED now. Family will transport. .    Follow-Up: Patient voiced understanding of advice and/or instructions given.     Routed to Dr. Yu, AALIYAH Mazariegos and RNCC Rand King.       "

## 2018-11-28 NOTE — ED NOTES
Patient signed out to me at change of shift by Dr. Ramirez, please see his note for details.  Briefly, this is a metastatic cancer patient who came in and was found to have significant hyperbilirubinemia.  She had previously been scheduled to enter hospice on Tuesday, 6 days from now.  Our  was able to arrange for admission into hospice on Friday, 2 days from now.  After multiple discussions between Dr. Ramirez and oncology, the plan was for their NP to come and speak with the patient.  Plan was to be per this discussion - if ultimate plan was hospice, then plan to discharge patient, if patient decided to undergo possible stenting/intervention to alleviate symptoms, then admit to oncology.  Awaiting Quyen Mazariegos NP from oncology.    Evidently Quyen Mazariegos did come to speak with the patient, unfortunately the discussion and plan was not communicated to the ED staff at all.  She had left prior talking with the physicians.  The patient was gone when I went to talk to her to find out what the outcome of the discussion was.     Received a phone call from Dr. Varma, oncology fellow who reports that they are OK discharging her after Quyen Mazariegos spoke to the patient.  Evidently the NP spoke to the oncology fellow about the plan - they were OK with continuing with the hospice plan.  By the time this phone call occurred, the patient had left the ED.      Belem Correia MD  11/28/18 0513       Belem Correia MD  11/28/18 6334

## 2018-11-28 NOTE — ED NOTES
Bed: IN01  Expected date: 11/28/18  Expected time: 12:19 PM  Means of arrival:   Comments:  Gloria Trinidad  New onset confusion, difficulty communicating, weak, lethargic since at least last night.  Left upper quad pain x 1 week.   On oxycodone for pain.  Hx Pancreatic cancer. Not on active treatment. Possibly transitioning to hospice.

## 2018-11-29 PROBLEM — K83.09 CHOLANGITIS (H): Status: ACTIVE | Noted: 2018-01-01

## 2018-11-29 NOTE — ED NOTES
11/29/18 - 0859    Received call from lab indicating a blood culture drawn yesterday revealed gram-negative rods.  Chart was reviewed and it was noted the patient has metastatic cancer and is becoming a hospice patient with palliative care.  The case was discussed this morning with the primary care, Dr. Gipson, and he will follow-up with the patient and her wishes regarding her positive blood culture.    MD Neri Small Ford Christian, MD  11/29/18 0900

## 2018-11-29 NOTE — PROGRESS NOTES
Placed call to patient to patient and brother. Per CAT Cross, CNP patient needs to be directly admitted for Cholangitis. Patient will need GI consult and IV antibiotics. Patient voiced understanding of plan and need for admission. She knows to arrive at 7D after 1530. Patient had no further questions at this time.

## 2018-11-29 NOTE — LETTER
Transition Communication Hand-off for Care Transitions to Next Level of Care Provider    Name: Iris Lewis  : 1940  MRN #: [id number]  Primary Care Provider: Neri Gipson     Primary Clinic: [address] [place] [id number]     Reason for Hospitalization:  colangitis  Cholangitis  Cholangitis sepsis  Hypotension  Admit Date/Time: 2018  3:37 PM  Discharge Date: 18  Payor Source: Payor: BCBS / Plan: BCBS PLATINUM BLUE / Product Type: PPO /     Reason for Communication Hand-off Referral: Fragility    Discharge Plan:  Pt discharging to her home with hospice cares.  Hospice provider is [place]: [phone]     Nitza Jones [address] Hematology/Oncology Social Worker  Phone: [phone]  Pager: [phone]    AVS/Discharge Summary is the source of truth; this is a helpful guide for improved communication of patient story

## 2018-11-29 NOTE — TELEPHONE ENCOUNTER
Talked with Dr. Gipson today. He was called with blood culture growing gram negative rods. He called and talked with Iris and her brother. She is more lucid today. She has changed her mind and would like to be admitted for treatment of sepsis/cholangitis. It is reasonable to think that biliary stenting and antibiotics may improve her quality of life. She has been fairly active at home, sewing and doing end of life planning with her family. We had a long discussion about hospice yesterday. She was agreeable to this in the future and was set up for a home consultation on Friday. However, now has changed her mind and would like to pursue more aggressive measures to see if she can feel better for a period of time.    Will plan to admit for ABX and GI consult to see if we can alleviate obstruction/infection and palliate her symptoms. TW

## 2018-11-29 NOTE — H&P
History and Physical  Hematology / Oncology     Date of Admission:  (Not on file)  Date of Service (when I saw the patient): 11/29/18    Assessment & Plan   Iris Lewis is a 78 year old female with PMHx significant for osteoporosis, hypertension, thyroid cancer s/p thyroidectomy (1980s), GERD s/p Nissen (2006) and metastatic pancreatic cancer who presents with abdominal pain and labs suggestive of cholangitis, obstructive jaundice and gram negative ilya bacteremia.    #Suspected cholangitis.  #Acute on chronic abdominal pain.  #Gram negative bacteremia.  #Obstructive jaundice.   Patient presents with abdominal pain not well controlled on oral analgesic regimen. Has been taking approximately 15 mg oxycodone every 4 hours and ran out of her supply at home prompting presentation to the ED. At that time, the patient's brother notes that she has become more yellow in color (especially of skin and eyes) and has been more confused over the past few days. Elected to discharge from the ED 11/28 with pain medication with plan to enroll in hospice. However with positive blood cultures, patient called to return to the hospital for intervention.   -Labs 11/28 notable for WBC count of 43.1 with a left shift, total bilirubin of 7.5, Alk phos 449, ALT/AST 98/119. Admission labs pending.  -Blood cultures 11/28 from port positive for GNR after 1 day, ID and cultures pending.   -Daily blood cultures.  -Initiate on IV Zosyn to cover for cholangitis and bacteremia.  -GI consult to evaluate for stent placement, appreciate recs. Will take for emergent ERCP this evening.  -Pain control: IV dilaudid 0.2-0.3 mg q3hrs prn     #Mouth sores/mucositis.   Has caused difficulty taking PO intake, thus has had poor PO intake over the course of the past few days.   -IV hydration.   -MMW prn, baking soda and salt swishes, and pain control as above.     #Metastatic pancreatic cancer.   #H/o thyroid cancer s/p thyroidectomy (1980s).  Diagnosed  Oct 2017 with mets to the liver, lung, and lymph nodes. Progressed on multiple therapies. No therapy since middle of October 2018 as patient was not interested in pursuing a phase 1 trial and was considering transition to hospice. Follows with Dr. Yu.   -Per Dr. Yu, would prefer interventions until plan discussed regarding further therapy vs hospice.   -Palliative care consult to help coordinate GOC discussions and possible hospice transition.  -Continue PTA creon capsules when diet resumed.  -Continue PTA Synthroid.    #Hypotension. 80s/40s on admission.  #H/o Hypertension.   -HOLD PTA anti-hypertensives with bacteremia and risk for developing hypotension.   -1L IVF bolus f/b MIVF.    #Reactive airway disease.   -Continue PTA albuterol inhaler     #Glaucoma.  -Continue PTA eye drops.    #H/o pulmonary embolism.  -On Xarelto--HOLD for GI procedure.    FEN  -IVF @ 125 ml/hr following 1L NS bolus  -replace lytes per protocol prn   -NPO     Prophys  -VTE: defer d/t possible procedure with GI  -PUD: PTA Protonix  -Bowels: prn     Dispo: Pending therapy decisions, GOC discussions, and improvement in infection, discharge unknown. Will update as able.    Patient and plan discussed with Dr. Masters.     Val Duarte PASTEVEN  Hematology/Oncology  Pager #[phone]    Code Status   DNR / DNI    Primary Care Physician   Neri Gipson    Chief Complaint   Patient admitted with uncontrolled acute on chronic abdominal pain, increased jaundice, and abnormal laboratory findings including hyperbilirubinemia, GNR bacteremia, and leukocytosis to 43K.     History is obtained from the patient and electronic health record    History of Present Illness   Iris Lewis is a 78 year old female with PMHx significant for osteoporosis, hypertension, thyroid cancer s/p thyroidectomy (1980s), GERD s/p Nissen (2006) and metastatic pancreatic cancer who presents with abdominal pain and labs suggestive of cholangitis, obstructive jaundice and  gram negative ilya bacteremia. States she has been having increasing abdominal pain over the past few days where she has required higher doses of PO pain medications; ran out of home supply. Presented to the ED feeling poorly but elected to discharge with plan to enroll on hospice 11/30. However after being called at home regarding a positive blood culture, patient elected to come in for IV antibiotics and desires to have interventional procedure. Discussed palliation of procedure with no further active chemotherapy to offer afterward, patient and family voice understanding. On admission, Iris states she is feeling fairly comfortable. Endorses intermittent nausea. Abdominal pain comes and goes but has been mildly improved today. No vomiting. No diarrhea, chest pain or shortness of breath. No dysuria, frequency or urgency. Denies fevers, chills or sweats at home. Notes feeling a bit confused here and there over the last few days especially which fluctuates. Family with patient during discussion.     Past Medical History    I have reviewed this patient's medical history and updated it with pertinent information if needed.   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  Past Surgical History:   Procedure Laterality Date     ARTHRODESIS FOOT Right 11/9/2016    Procedure: ARTHRODESIS FOOT;  Surgeon: Janes Mcelroy MD;  Location: [facility]     C STOMACH SURGERY PROCEDURE UNLISTED        COLONOSCOPY   2009, 3/2015    no polyps in 2015 told to return 10 yrs.      ENDOSCOPIC RETROGRADE CHOLANGIOPANCREATOGRAM N/A 11/2/2017    Procedure: COMBINED ENDOSCOPIC RETROGRADE CHOLANGIOPANCREATOGRAPHY, PLACE TUBE/STENT;  Endoscopic Retrograde Cholangiopancreatogram with billary sphincterotomy and billary stent placement;  Surgeon: Rito Mcneil MD;  Location: [facility]     ENDOSCOPIC RETROGRADE CHOLANGIOPANCREATOGRAM N/A 1/10/2018    Procedure: ENDOSCOPIC RETROGRADE CHOLANGIOPANCREATOGRAM;  Endoscopic Retrograde Cholangiopancreatogram ;  Surgeon: Felix Izaguirre MD;  Location: [facility]     ENDOSCOPIC RETROGRADE CHOLANGIOPANCREATOGRAPHY, EXCHANGE TUBE/STENT N/A 11/22/2017    Procedure: ENDOSCOPIC RETROGRADE CHOLANGIOPANCREATOGRAPHY, EXCHANGE TUBE/STENT;  Endoscopic Retrograde Cholangiopancreatogram with Biliary Stent Exchange and pancreatic stent placement;  Surgeon: Felix Izaguirre MD;  Location: [facility]     ESOPHAGOSCOPY, GASTROSCOPY, DUODENOSCOPY (EGD), COMBINED  2/17/2012    Procedure:COMBINED ESOPHAGOSCOPY, GASTROSCOPY, DUODENOSCOPY (EGD); COMBINED ESOPHAGOSCOPY, GASTROSCOPY, DUODENOSCOPY POSSIBLE DILATION; Surgeon:NICHOLE MCCLAIN; Location:[facility]     ESOPHAGOSCOPY, GASTROSCOPY, DUODENOSCOPY (EGD), COMBINED N/A 11/2/2017    Procedure: COMBINED ENDOSCOPIC ULTRASOUND, ESOPHAGOSCOPY, GASTROSCOPY, DUODENOSCOPY (EGD), FINE NEEDLE ASPIRATE/BIOPSY;  Upper Endoscopic Ultrasound with fine needle biopsy, upper endoscopy with biopsies, Endoscopic Retrograde Cholangiopancreatogram with billary sphincterotomy and billary stent placement;  Surgeon: Hadley Bailey MD;  Location: [facility] OR     FOOT SURGERY  2008    hammertoe left     INSERT PORT VASCULAR ACCESS Right 1/24/2018    Procedure: INSERT PORT VASCULAR ACCESS;  Chest Port Placement;  Surgeon: Ventura Villarreal PA-C;  [facility]: [facility]     LAPAROSCOPIC CHOLECYSTECTOMY N/A 1/5/2015    Procedure: LAPAROSCOPIC CHOLECYSTECTOMY;  Surgeon:  Cruz Pearson MD;  Location:  OR     NISSEN FUNDOPLICATION       ORTHOPEDIC SURGERY  2009    left hammertoe      REPAIR HAMMER TOE Right 11/9/2016    Procedure: REPAIR HAMMER TOE;  Surgeon: Janes Mcelroy MD;  Location:  OR     SALPINGO OOPHORECTOMY,R/L/SERENA  1974    left, for tubal pregnancy     THYROIDECTOMY      for thyroid cancer       Prior to Admission Medications   Cannot display prior to admission medications because the patient has not been admitted in this contact.     Allergies   Allergies   Allergen Reactions     Nkda [No Known Drug Allergies]        Social History   I have reviewed this patient's social history and updated it with pertinent information if needed. Iris Lewis  reports that she quit smoking about 35 years ago. Her smoking use included Cigarettes. She started smoking about 59 years ago. She quit after 5.00 years of use. She has never used smokeless tobacco. She reports that she drinks alcohol. She reports that she does not use illicit drugs.    Family History   I have reviewed this patient's family history and updated it with pertinent information if needed.   Family History   Problem Relation Age of Onset     C.A.D. Father      MI at age 65     Asthma Father      Coronary Artery Disease Father      Alzheimer Disease Mother      Osteoporosis Mother      Depression Sister 80     Asthma Sister      Prostate Cancer Brother      Depression Son      Depression Sister      Coronary Artery Disease Brother 67     Skin Cancer No family hx of      no skin cancer       Review of Systems   The 10 point Review of Systems is negative other than noted in the HPI or here.     Physical Exam   Temp: 98.7  F (37.1  C) Temp src: Oral BP: (!) 84/44 Pulse: 89   Resp: 16 SpO2: 99 % O2 Device: None (Room air)    Vital Signs with Ranges  Temp:  [98.7  F (37.1  C)] 98.7  F (37.1  C)  Pulse:  [89] 89  Resp:  [16] 16  BP: (84)/(44) 84/44  SpO2:  [99 %] 99 %  0 lbs 0 oz    Constitutional:  Pleasant cachectic female seen sitting up on the EOB, in NAD. Alert and interactive.   HEENT: NCAT, icteric sclerae, conjunctiva clear. Mucous membranes dry without lesions.  Respiratory: Non-labored breathing, good air exchange. Lungs are clear to auscultation bilaterally, without wheezing, crackles or rhonchi. No cough noted.   Cardiovascular: Regular rate and rhythm with no murmur, rub or gallop.  GI: Normoactive BS. Abdomen is soft, non-distended, and non-tender to palpation. No rigidity or guarding.  Skin: Jaundiced. Warm and dry. No rashes or lesions on exposed surfaces.  Musculoskeletal: Extremities grossly normal. No tenderness or edema present.   Neurologic: A &O x3, speech normal, answering questions appropriately. Moves all extremities spontaneously. Grossly non-focal.  Neuropsychiatric: Mentation and affect normal/appropriate.  VAD: Port is c/d/i with no erythema, drainage, or tenderness.      Data   No results found for this or any previous visit (from the past 24 hour(s)).

## 2018-11-30 PROBLEM — I95.9 HYPOTENSION: Status: ACTIVE | Noted: 2018-01-01

## 2018-11-30 NOTE — ANESTHESIA PREPROCEDURE EVALUATION
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 2017     Arthritis      Colon polyp ,    no polyps - f/u in 5 yrs      Esophageal reflux      Glaucoma      Hypertension      Malignant neoplasm of head of pancreas (H) 2017     Osteoporosis      Postsurgical hypothyroidism      Scoliosis (and kyphoscoliosis), idiopathic      Thyroid cancer (H)     papillary 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      ENDOSCOPIC RETROGRADE CHOLANGIOPANCREATOGRAM N/A 2017    Procedure: COMBINED ENDOSCOPIC RETROGRADE CHOLANGIOPANCREATOGRAPHY, PLACE TUBE/STENT;  Endoscopic Retrograde Cholangiopancreatogram with billary sphincterotomy and billary stent placement;  Surgeon: Rito Mcneil MD;  [facility]: [facility]     ENDOSCOPIC RETROGRADE CHOLANGIOPANCREATOGRAM N/A 1/10/2018    Procedure: ENDOSCOPIC RETROGRADE CHOLANGIOPANCREATOGRAM;  Endoscopic Retrograde Cholangiopancreatogram ;  Surgeon: Felix Izaguirre MD;  [facility]: [facility]     ENDOSCOPIC RETROGRADE CHOLANGIOPANCREATOGRAPHY, EXCHANGE TUBE/STENT N/A 2017    Procedure: ENDOSCOPIC RETROGRADE CHOLANGIOPANCREATOGRAPHY, EXCHANGE TUBE/STENT;  Endoscopic Retrograde Cholangiopancreatogram with Biliary Stent Exchange and pancreatic stent placement;  Surgeon: Felix Izaguirre MD;  [facility]: [facility]     ESOPHAGOSCOPY, GASTROSCOPY, DUODENOSCOPY (EGD), COMBINED  2012     Procedure:COMBINED ESOPHAGOSCOPY, GASTROSCOPY, DUODENOSCOPY (EGD); COMBINED ESOPHAGOSCOPY, GASTROSCOPY, DUODENOSCOPY POSSIBLE DILATION; Surgeon:NICHOLE MCCLAIN; [facility]     ESOPHAGOSCOPY, GASTROSCOPY, DUODENOSCOPY (EGD), COMBINED N/A 11/2/2017    Procedure: COMBINED ENDOSCOPIC ULTRASOUND, ESOPHAGOSCOPY, GASTROSCOPY, DUODENOSCOPY (EGD), FINE NEEDLE ASPIRATE/BIOPSY;  Upper Endoscopic Ultrasound with fine needle biopsy, upper endoscopy with biopsies, Endoscopic Retrograde Cholangiopancreatogram with billary sphincterotomy and billary stent placement;  Surgeon: Hadley Bailey MD;  Location: [facility]     FOOT SURGERY  2008    hammertoe left     INSERT PORT VASCULAR ACCESS Right 1/24/2018    Procedure: INSERT PORT VASCULAR ACCESS;  Chest Port Placement;  Surgeon: Ventura Villarreal PA-C;  [facility]     LAPAROSCOPIC CHOLECYSTECTOMY N/A 1/5/2015    Procedure: LAPAROSCOPIC CHOLECYSTECTOMY;  Surgeon: Cruz Pearson MD;  [facility]: [facility]     NISSEN FUNDOPLICATION       ORTHOPEDIC SURGERY  2009    left hammertoe      REPAIR HAMMER TOE Right 11/9/2016    Procedure: REPAIR HAMMER TOE;  Surgeon: Janes Mcelroy MD;  [facility]     SALPINGO OOPHORECTOMY,R/L/SERENA  1974    left, for tubal pregnancy     THYROIDECTOMY      for thyroid cancer          Anesthesia Evaluation     . Pt has had prior anesthetic. Type: General    No history of anesthetic complications          ROS/MED HX    ENT/Pulmonary:     (+)Mild Persistent asthma Treatment: Inhaler daily and Inhaler prn,  , . .    Neurologic:       Cardiovascular:     (+) hypertension----. : . . . :. .       METS/Exercise Tolerance:  >4 METS   Hematologic:     (+) History of blood clots pt is anticoagulated, -      Musculoskeletal: Comment: scoliosis  (+) arthritis, , , -       GI/Hepatic:     (+) GERD (s/p nissen)       Renal/Genitourinary:         Endo:     (+) thyroid problem hypothyroidism, .      Psychiatric:         Infectious Disease:     "     Malignancy:   (+) Malignancy History of GI and Other  Pancreatic CA receiving chemo. Thyroid CA s/p surgery        Other:                         PHYSICAL EXAM:   Mental Status/Neuro: A/A/O   Airway: Facies: Feasible  Mallampati: III  Mouth/Opening: Limited  TM distance: < 6 cm  Neck ROM: Limited  Device in place: ETT   Respiratory: Auscultation: CTAB     Resp. Rate: Normal     Resp. Effort: Normal      CV: Rhythm: Regular  Rate: Age appropriate  Heart: Normal Sounds   Comments:      Dental: Normal                  Lab Results   Component Value Date    WBC 28.9 (H) 11/29/2018    HGB 7.1 (L) 11/29/2018    HCT 21.2 (L) 11/29/2018     11/29/2018    CRP 25.1 (H) 11/15/2017    SED 8 10/22/2007     11/28/2018    POTASSIUM 3.1 (L) 11/28/2018    CHLORIDE 98 11/28/2018    CO2 26 11/28/2018    BUN 12 11/28/2018    CR 0.79 11/28/2018    GLC 75 11/28/2018    EMMY 7.2 (L) 11/28/2018    PHOS 3.0 11/02/2017    MAG 1.9 11/15/2017    ALBUMIN 1.8 (L) 11/28/2018    PROTTOTAL 5.9 (L) 11/28/2018    ALT 98 (H) 11/28/2018     (H) 11/28/2018    ALKPHOS 449 (H) 11/28/2018    BILITOTAL 7.5 (H) 11/28/2018    LIPASE 371 11/28/2018    AMYLASE 29 (L) 01/10/2018    KITA <10 (L) 11/29/2018    PTT 29 11/15/2017    INR 3.43 (H) 11/29/2018    TSH 15.49 (H) 07/24/2018    T4 1.20 07/24/2018       Preop Vitals  BP Readings from Last 3 Encounters:   11/29/18 (!) 84/44   11/28/18 139/74   11/15/18 122/72    Pulse Readings from Last 3 Encounters:   11/29/18 89   11/28/18 88   11/15/18 98      Resp Readings from Last 3 Encounters:   11/29/18 16   11/28/18 18   11/15/18 16    SpO2 Readings from Last 3 Encounters:   11/29/18 99%   11/28/18 95%   11/15/18 96%      Temp Readings from Last 1 Encounters:   11/29/18 37.1  C (98.7  F) (Oral)    Ht Readings from Last 1 Encounters:   11/28/18 1.575 m (5' 2\")      Wt Readings from Last 1 Encounters:   11/28/18 44.5 kg (98 lb)    Estimated body mass index is 17.92 kg/(m^2) as calculated from " "the following:    Height as of 11/28/18: 1.575 m (5' 2\").    Weight as of 11/28/18: 44.5 kg (98 lb).     LDA:  Port A Cath Single 01/24/18 Right Chest wall (Active)   Access Date 11/29/18 11/29/2018  4:54 PM   Access Attempts 1 11/29/2018  4:54 PM   Gauge/Length 22 gauge;3/4 inch 11/29/2018  4:54 PM   Site Assessment WDL 11/29/2018  4:54 PM   Line Status Heparin locked 11/28/2018  3:53 PM   Extravasation? No 11/28/2018  1:28 PM   Dressing Intervention Transparent 11/29/2018  4:54 PM   Dressing change due 12/06/18 11/29/2018  4:54 PM   Needle Change Due 12/06/18 11/29/2018  4:54 PM   De-Access Date 11/28/18 11/28/2018  3:53 PM   Date to be Reflushed 11/30/18 10/31/2018 12:28 PM   Number of days:309            Assessment:   ASA SCORE: 3    NPO Status: > 6 hours since completed Solid Foods   Documentation: H&P complete; Preop Testing complete; Consents complete   Proceeding: Proceed without further delay  Tobacco Use:  NO Active use of Tobacco/UNKNOWN Tobacco use status     Plan:   Anes. Type:  General      Induction:  IV (Standard)   Airway: Oral ETT   Access/Monitoring: PIV   Maintenance: Balanced   Emergence: Procedure Site   Logistics: Same Day Surgery     Postop Pain/Sedation Strategy:  Standard-Options: Opioids PRN     PONV Management:  Adult Risk Factors: Female, Non-Smoker, Postop Opioids  Prevention: Ondansetron     CONSENT: Direct conversation   Plan and risks discussed with: Patient   Blood Products: Consent Deferred (Minimal Blood Loss)                         Tahir Dewey MD  "

## 2018-11-30 NOTE — BRIEF OP NOTE
[facility] Brief Operative Note    Pre-operative diagnosis: Cholangitis sepsis   Post-operative diagnosis Same   Procedure: Procedure(s):  ENDOSCOPIC RETROGRADE CHOLANGIOPANCREATOGRAM with Bile duct placement   Surgeon: Felix Izaguirre MD   Assistants(s): None   Estimated blood loss: None    Specimens: None   Findings: We seated fully expanded metal biliary stents occluded by tumor ingroth managed by placement of a third UCSEMS with excellent drainage of pus and bile    Recommendations: Continue broad spectrum IV antibiotics for at least another 24h followed by transition to an oral course of ciprofloxacin  No follow up ERCP planned

## 2018-11-30 NOTE — PROGRESS NOTES
[facility], [place]    Hematology / Oncology Progress Note    Date of Admission: 11/29/2018  Hospital Day #: 1   Date of Service (when I saw the patient): 11/30/2018     Assessment & Plan   Iris Lweis is a 78 year old female with PMHx significant for osteoporosis, hypertension, thyroid cancer s/p thyroidectomy (1980s), GERD s/p Nissen (2006) and metastatic pancreatic cancer who presents with abdominal pain and labs suggestive of cholangitis, obstructive jaundice and gram negative ilya bacteremia.     Today  Patient persistently hypotensive overnight with only minimal improvement after IVF bolusing. IV antibioitics ordered on admission. Hgb critically low around 5.3 this morning with a coagulopathy concering for possible bleeding. Discussed with MICU team and transferred patient down for further blood pressure support (possible pressors). Patient DNR/DNI. Discussed with patient and son that she may improve with interventions vs continue to decline and they voiced understanding and expressed willingness to try pressors, IVF and blood product support. MICU to take over as primary.     #Cholangitis.  #Acute on chronic abdominal pain.  #Gram negative bacteremia-Pseudomonas per verigene.  #Obstructive jaundice.   Patient presents with abdominal pain not well controlled on oral analgesic regimen. Has been taking approximately 15 mg oxycodone every 4 hours and ran out of her supply at home prompting presentation to the ED. At that time, the patient's brother notes that she has become more yellow in color (especially of skin and eyes) and has been more confused over the past few days. Elected to discharge from the ED 11/28 with pain medication with plan to enroll in hospice. However with positive blood cultures, patient called to return to the hospital for intervention.   -Labs 11/28 notable for WBC count of 43.1 with a left shift, total bilirubin of 7.5, Alk phos 449, ALT/AST 98/119.  Admission labs pending: Tbili 7.8, elevated transaminases and WBC count of 28.9.  -Blood cultures 11/28 from port positive for GNR after 1 day, Pseudomonas aeruginosa per Verigene, complete ID and senses pending.  -Daily blood cultures, NGTD.  -Initiated on IV Zosyn to cover for cholangitis and bacteremia.  -GI consult to evaluate for stent placement, appreciate recs. S/p emergent ERCP with findings of existing two stents occluded with tumor ingrowth, stented open with pus and bile drainage. LFTs and Tbili mildly improved today.  -Pain control: IV dilaudid 0.2-0.3 mg q3hrs prn      #Mouth sores/mucositis.   Has caused difficulty taking PO intake, thus has had poor PO intake over the course of the past few days.   -IV hydration.   -MMW prn, baking soda and salt swishes, and pain control as above.      #Metastatic pancreatic cancer.   #H/o thyroid cancer s/p thyroidectomy (1980s).  Diagnosed Oct 2017 with mets to the liver, lung, and lymph nodes. Progressed on multiple therapies. No therapy since middle of October 2018 as patient was not interested in pursuing a phase 1 trial and was considering transition to hospice. Follows with Dr. Yu.   -Per Dr. Yu, would prefer interventions until plan discussed regarding further therapy vs hospice.   -Palliative care consult to help coordinate GOC discussions and possible hospice transition.  -Hospice liaison to meet with family today.  -Continue PTA creon capsules when diet resumed.  -Continue PTA Synthroid.     #Hypotension. 80s/40s on admission. Persistent hypotension since admission 60s-90s/30s-50s with minimal improvement with IVF, albumin, and PRBCs.   #H/o Hypertension.   -HOLD PTA anti-hypertensives with bacteremia and risk for developing hypotension.   -1L IVF bolus f/b MIVF. Additional 2.5L IVF bolused overnight with only mild transient improvement in blood pressures.      #Reactive airway disease.   -Continue PTA albuterol inhaler      #Glaucoma.  -Continue  PTA eye drops.     #H/o pulmonary embolism.  -On Xarelto--HOLD for GI procedure. Consider resumption pending bleeding evaluation and post-procedural break.     FEN  -IVF @ 150 ml/hr   -replace lytes per protocol prn   -RDAT    #Severe malnutrition in the context of acute on chronic illness.   % Intake: </= 50% for >/= 5 days (severe), % Weight Loss: > 5% in 1 month (severe), Subcutaneous Fat Loss: Facial region, Upper arm and Lower arm:  Severe, Muscle Loss: Temporal, Facial & jaw region, Scapular bone, Thoracic region (clavicle, acromium bone, deltoid, trapezius, pectoral), Upper arm (bicep, tricep), Lower arm  (forearm) and Dorsal hand:  Severe.  -Supplements offered.   -Continue to monitor.    Prophys  -VTE: defer d/t possible procedure with GI  -PUD: PTA Protonix  -Bowels: prn      Dispo: Pending therapy decisions, GOC discussions, and improvement in infection, discharge unknown. Will update as able.     Patient and plan discussed with Dr. Masters.      Val Duarte PA-C  Hematology/Oncology  Pager #[phone]    Interval History   Iris states she is feeling better this morning. Up eating in bed on assessment. Denies dizziness or SOB. No headache, vision changes, nausea/vomiting. Improved abdominal pain post procedure, although notes some fullness which she thinks has been stable. Offers no new complaints. Discussed with her the low BPs and hemoglobin as well as bloodstream infection. She would be willing to have supportive measures and pressors, but would like to remain DNR/DNI. Open to meeting with hospice and palliative care today.     Physical Exam   Temp: 98.6  F (37  C) (end of RBCs) Temp src: Oral BP: 100/62 Pulse: 77 Heart Rate: 69 Resp: 13 SpO2: 99 % O2 Device: None (Room air) Oxygen Delivery: 2 LPM  Vitals:    11/30/18 0850   Weight: 47.7 kg (105 lb 1.6 oz)     Vital Signs with Ranges  Temp:  [96.5  F (35.8  C)-99.5  F (37.5  C)] 98.6  F (37  C)  Pulse:  [77-95] 77  Heart Rate:  [] 69  Resp:  [11-22]  13  BP: ()/(30-70) 100/62  SpO2:  [96 %-100 %] 99 %  I/O last 3 completed shifts:  In: 1945 [I.V.:1025; IV Piggyback:500]  Out: -     Constitutional: Pleasant cachectic female seen sitting up in bed eating breakfast, in NAD. Alert and interactive.   HEENT: NCAT, icteric sclerae, conjunctiva clear. Mucous membranes more moist.  Respiratory: Non-labored breathing, good air exchange. Lungs are clear to anterior auscultation bilaterally, without wheezing, crackles or rhonchi. No cough noted.   Cardiovascular: Regular rate and rhythm with no murmur, rub or gallop.  GI: Normoactive BS. Abdomen is soft, mildly distended, and non-tender to palpation. No rigidity or guarding.  Skin: Jaundiced. Warm and dry. No rashes or lesions on exposed surfaces. Scattered ecchymoses.  Musculoskeletal: Extremities grossly normal. No tenderness or edema present.   Neurologic: A &O x3, speech normal, answering questions appropriately. Moves all extremities spontaneously. Grossly non-focal.  Neuropsychiatric: Mentation and affect normal/appropriate.  VAD: Port is c/d/i with no erythema, drainage, or tenderness.       Medications   Current Facility-Administered Medications   Medication     acetaminophen (TYLENOL) tablet 650 mg     albuterol (PROAIR HFA/PROVENTIL HFA/VENTOLIN HFA) 108 (90 Base) MCG/ACT inhaler 2 puff     glucose gel 15-30 g    Or     dextrose 50 % injection 25-50 mL    Or     glucagon injection 1 mg     folic acid (FOLVITE) tablet 400 mcg     HYDROmorphone (PF) (DILAUDID) injection 0.3-0.5 mg     hydrOXYzine (ATARAX) tablet 25 mg     levothyroxine (SYNTHROID/LEVOTHROID) tablet 125 mcg     lidocaine (LMX4) cream     lidocaine 1 % 1 mL     LORazepam (ATIVAN) tablet 0.5 mg    Or     LORazepam (ATIVAN) injection 0.5 mg     magnesium sulfate 4 g in 100 mL sterile water (premade)     May continue current IV fluid if patient has IV fluids infusing until discharge.     May continue current IV fluids if patient has IV fluids  infusing.     Medication Instruction     melatonin tablet 1 mg     naloxone (NARCAN) injection 0.1-0.4 mg     naloxone (NARCAN) injection 0.1-0.4 mg     ondansetron (ZOFRAN) injection 8 mg    Or     ondansetron (ZOFRAN-ODT) ODT tab 8 mg    Or     ondansetron (ZOFRAN) tablet 8 mg     pantoprazole (PROTONIX) EC tablet 20 mg     piperacillin-tazobactam (ZOSYN) 4.5 g vial to attach to  mL bag     polyethylene glycol (MIRALAX/GLYCOLAX) Packet 17 g     potassium chloride (KLOR-CON) Packet 20-40 mEq     potassium chloride 10 mEq in 100 mL intermittent infusion with 10 mg lidocaine     potassium chloride 10 mEq in 100 mL sterile water intermittent infusion (premix)     potassium chloride 20 mEq in 50 mL intermittent infusion     potassium chloride ER (K-DUR/KLOR-CON M) CR tablet 20-40 mEq     potassium phosphate 15 mmol in D5W 250 mL intermittent infusion     potassium phosphate 20 mmol in D5W 250 mL intermittent infusion     potassium phosphate 20 mmol in D5W 500 mL intermittent infusion     potassium phosphate 25 mmol in D5W 500 mL intermittent infusion     prochlorperazine (COMPAZINE) tablet 5 mg    Or     prochlorperazine (COMPAZINE) injection 5 mg     senna-docusate (SENOKOT-S/PERICOLACE) 8.6-50 MG per tablet 1 tablet    Or     senna-docusate (SENOKOT-S/PERICOLACE) 8.6-50 MG per tablet 2 tablet     sodium chloride (PF) 0.9% PF flush 3 mL     sodium chloride (PF) 0.9% PF flush 3 mL     sodium chloride (PF) 0.9% PF flush 3 mL     sodium chloride (PF) 0.9% PF flush 3 mL     sodium chloride 0.9% infusion     timolol maleate (TIMOPTIC) 0.5 % ophthalmic solution 1 drop       Data   CBC  Recent Labs  Lab 11/30/18  0804 11/30/18  0615 11/29/18  1718 11/28/18  1329   WBC  --  34.7* 28.9* 43.1*   RBC  --  1.72* 2.49* 2.44*   HGB 5.3* 4.8* 7.1* 7.0*   HCT  --  14.6* 21.2* 21.0*   MCV  --  85 85 86   MCH  --  27.9 28.5 28.7   MCHC  --  32.9 33.5 33.3   RDW  --  21.0* 20.8* 20.2*   PLT  --  102* 158 164     CMP  Recent  Labs  Lab 11/30/18  0615 11/29/18  1729 11/29/18  1718 11/28/18  1329    131* 133 133   POTASSIUM 3.5 3.2* 3.1* 3.1*   CHLORIDE 107 99 100 98   CO2 21 21 25 26   ANIONGAP 9 11 9 9   * 73 74 75   BUN 19 20 19 12   CR 0.98 1.02 1.04 0.79   GFRESTIMATED 55* 52* 51* 71   GFRESTBLACK 66 63 62 86   EMMY 6.5* 7.1* 7.2* 7.2*   MAG 3.3* 1.5* 1.5*  --    PHOS  --  2.5 2.4*  --    PROTTOTAL 5.2* 6.0* 6.0* 5.9*   ALBUMIN 2.3* 1.7* 1.7* 1.8*   BILITOTAL 5.7* 7.8* 7.8* 7.5*   ALKPHOS 238* 405* 401* 449*   AST 47* 81* 79* 119*   ALT 52* 87* 85* 98*     INR  Recent Labs  Lab 11/30/18  0804 11/29/18  1718   INR 3.73* 3.43*       Results for orders placed or performed during the hospital encounter of 11/29/18   XR Surgery [place] G/T 5 Min Fluoro w Stills    Narrative    This exam was marked as non-reportable because it will not be read by a   radiologist or a [place] non-radiologist provider.

## 2018-11-30 NOTE — ANESTHESIA POSTPROCEDURE EVALUATION
Anesthesia POST Procedure Evaluation    Patient: Iris Lewis   MRN:     [id number] Gender:   female   Age:    78 year old :      1940        Preoperative Diagnosis: Cholangitis sepsis   Procedure(s):  ENDOSCOPIC RETROGRADE CHOLANGIOPANCREATOGRAM with Bile duct placement   Postop Comments: No value filed.       Anesthesia Type:  General    Reportable Event: NO     PAIN: Uncomplicated   Sign Out status: Comfortable, Well controlled pain     PONV: No PONV   Sign Out status:  No Nausea or Vomiting     Neuro/Psych: Uneventful perioperative course   Sign Out Status: Preoperative baseline; Age appropriate mentation     Airway/Resp.: Uneventful perioperative course   Sign Out Status: Non labored breathing, age appropriate RR; Resp. Status within EXPECTED Parameters     CV: Uneventful perioperative course   Sign Out status: Appropriate BP and perfusion indices; Appropriate HR/Rhythm     Disposition:   Sign Out in:  PACU  Disposition:  Phase II  Recovery Course: Uneventful  Follow-Up: Not required           Last Anesthesia Record Vitals:  CRNA VITALS  2018 1950 - 2018             Resp Rate (observed): (!)  1          Last PACU/Preop Vitals:  Vitals:    18   BP: (!) 82/51 (!) 78/43 (!) 80/43   Pulse:      Resp: 16 16 14   Temp: 37  C (98.6  F)  37.3  C (99.2  F)   SpO2: 100% 100% 98%         Electronically Signed By: Tahir Dewey MD, 2018, 9:09 PM

## 2018-11-30 NOTE — CONSULTS
[facility], [place]  Palliative Care Consultation Note    Patient: Iris Lewis  Date of Admission:  11/29/2018    Requesting provider/team: Val Duarte PA-C; Heme/Onc  Reason for consult: Goals of care    Recommendations:  Anxiety:   - Restart PTA Lorazepam prn anxiety     Goals of care:  - Understands cancer will end her life, and wants to try to live as long as possible. Continue to treat reversible conditions, such as infections.   - Hospice is not in line with Iris goals - she is okay with re-hospitalization   - DNR/DNI      These recommendations have been discussed with MICU.    Thank you for the opportunity to participate in the care of this patient and family. Our team will follow. Please feel free to contact the on-call Palliative provider with any urgent needs.     CAT Baker CNP  Palliative Care Consult Team  Pager: [phone]    [facility] Inpatient Team Consult pager [phone] (M-F 8-4:30)  After-hours Answering Service [phone]   Palliative Clinic: [phone]     80 minutes spent with patient, with >50% counseling and in care coordination.    Assessment:  Iris Lewis is a 78 year old female with metastatic pancreatic cancer, on supportive treatments only, who presented with bacteremia and cholangitis. Now s/p initiation of IV antibiotics and ERCP.    I met with Iris and her family (son, , brother) today to introduce the palliative care team and services we provide; such as symptom management, assistance navigating chronic illness and goals for treatment, counseling, psychosocial and spiritual support. She follows with Dr. José Antonio Ann in outpatient palliative clinic.     Iris and her family acknowledged she has cancer she will die from. They shared that she came back to the hospital because they weren't comfortable with the idea of knowing she had an infection and not treating it. We had an extensive discussion about Iris's wishes for the  "time she has left. She has lived life \"getting things done\" and wants to continue that. If there are reversible conditions, she wants to treat them, while understanding that she has underlying terminal cancer. She was tearful during most of our conversation and said multiple times that she is not ready to die and she has things left she wants to do. We discussed the balance of burden and benefit of medical treatment and she feels her current treatment is beneficial with little burden, so wants to continue current regimen. She is willing to go to a nursing facility for rehab or IV antibiotics, if needed on discharge. She wants to come back to the hospital if she gets worse on discharge. Her family was very engaged in the discussion and supportive of Iris's wishes for her medical treatment. Her son said family is arranging a plan to make sure she has someone with her 24 hours a day, when she does go home.      Symptoms:   Pain -- No pain currently. PTA on Oxycodone 15 mg q4h.     Social:         Living situation: with , whom she is the primary caregiver, due to his dementia        Support system: 3 living adult children (oldest child ), 3 brothers, 1 sister        Functional status: independent       Hobbies: sewing, \"taking care of people\"     Mental Health: says she has had many reasons to see counselors in the past, but never found it helpful.     Coping: very tearful; endorsing \"not being ready\" to die.     Spiritual/Jew:    Spiritual background: Mosque - does not find Sikhism comforting anymore   Spiritual needs: strongly declined     Advance Care Planning:               Decision making capacity: intact       Disease understanding: understands her chemo was stopped because it was not helping and that her cancer is terminal.       Goals of Care: to live as long as possible; does not want to suffer         Health care directive: on file        Health care agent: Neri Lewis (spouse)     "   Code Status:  DNR / DNI       POLST on file indicating comfort focused care     History of Present Illness   Sources of History: patient, electronic health record, and patient's son    Iris Lewis is a 78 year old female with a past medical history of HTN, thyroid cancer in the 1980's s/p thyroidectomy, GERD s/p Nissen in 2006, osteoporosis, and metastatic pancreatic cancer (diagnosed 11/2017). Iris presented to the ED with LLQ pain on 11/28, and was discharged home with plans to enroll in hospice. She returned with cholangitis and bacteremia. She was started on IV antibiotics and underwent an ERCP on 11/29. Palliative Care was consulted to explore goals of care.     ROS:  A comprehensive ROS has been negative other than stated in the HPI and below:   Palliative Symptom Review (0=no symptom/no concern, 1=mild, 2=moderate, 3=severe):  Pain: 0  Fatigue: 2  Nausea: 1  Constipation: 0  Diarrhea: 0  Depressive Symptoms: 2  Anxiety: 2  Drowsiness: 0  Poor Appetite: 1  Shortness of Breath: 0  Insomnia: 0  Delirium: 0       Past Medical History:   Past Medical History:   Diagnosis Date     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 12/18/2017     Arthritis      Colon polyp 2009,2015    no polyps - f/u in 5 yrs      Esophageal reflux      Glaucoma      Hypertension      Malignant neoplasm of head of pancreas (H) 11/6/2017     Osteoporosis      Postsurgical hypothyroidism      Scoliosis (and kyphoscoliosis), idiopathic      Thyroid cancer (H)     papillary carcinoma age 33     Uncomplicated asthma           Past Surgical History:   Past Surgical History:   Procedure Laterality Date     ARTHRODESIS FOOT Right 11/9/2016    Procedure: ARTHRODESIS FOOT;  Surgeon: Janes Mcelroy MD;  Location: [facility] OR     C STOMACH SURGERY PROCEDURE UNLISTED       COLONOSCOPY   2009, 3/2015    no polyps in 2015 told to return 10 yrs.      ENDOSCOPIC RETROGRADE CHOLANGIOPANCREATOGRAM N/A 11/2/2017    Procedure: COMBINED ENDOSCOPIC RETROGRADE  CHOLANGIOPANCREATOGRAPHY, PLACE TUBE/STENT;  Endoscopic Retrograde Cholangiopancreatogram with billary sphincterotomy and billary stent placement;  Surgeon: Rito Mcneil MD;  Location: [facility] OR     ENDOSCOPIC RETROGRADE CHOLANGIOPANCREATOGRAM N/A 1/10/2018    Procedure: ENDOSCOPIC RETROGRADE CHOLANGIOPANCREATOGRAM;  Endoscopic Retrograde Cholangiopancreatogram ;  Surgeon: Felix Izaguirre MD;  Location: [facility] OR     ENDOSCOPIC RETROGRADE CHOLANGIOPANCREATOGRAPHY, EXCHANGE TUBE/STENT N/A 11/22/2017    Procedure: ENDOSCOPIC RETROGRADE CHOLANGIOPANCREATOGRAPHY, EXCHANGE TUBE/STENT;  Endoscopic Retrograde Cholangiopancreatogram with Biliary Stent Exchange and pancreatic stent placement;  Surgeon: Felix Izaguirre MD;  Location: [facility] OR     ESOPHAGOSCOPY, GASTROSCOPY, DUODENOSCOPY (EGD), COMBINED  2/17/2012    Procedure:COMBINED ESOPHAGOSCOPY, GASTROSCOPY, DUODENOSCOPY (EGD); COMBINED ESOPHAGOSCOPY, GASTROSCOPY, DUODENOSCOPY POSSIBLE DILATION; Surgeon:NICHOLE MCCLAIN; Location:[facility] OR     ESOPHAGOSCOPY, GASTROSCOPY, DUODENOSCOPY (EGD), COMBINED N/A 11/2/2017    Procedure: COMBINED ENDOSCOPIC ULTRASOUND, ESOPHAGOSCOPY, GASTROSCOPY, DUODENOSCOPY (EGD), FINE NEEDLE ASPIRATE/BIOPSY;  Upper Endoscopic Ultrasound with fine needle biopsy, upper endoscopy with biopsies, Endoscopic Retrograde Cholangiopancreatogram with billary sphincterotomy and billary stent placement;  Surgeon: Hadley Bailey MD;  Location: [facility] OR     FOOT SURGERY  2008    hammertoe left     INSERT PORT VASCULAR ACCESS Right 1/24/2018    Procedure: INSERT PORT VASCULAR ACCESS;  Chest Port Placement;  Surgeon: Ventura Villarreal PA-C;  [facility]     LAPAROSCOPIC CHOLECYSTECTOMY N/A 1/5/2015    Procedure: LAPAROSCOPIC CHOLECYSTECTOMY;  Surgeon: Cruz Pearson MD;  Location: [facility]     NISSEN FUNDOPLICATION       ORTHOPEDIC SURGERY  2009    left hammertoe      REPAIR HAMMER TOE Right 11/9/2016    Procedure: REPAIR HAMMER  TOE;  Surgeon: Janes Mcelroy MD;  Location: [facility] OR     SALPINGO OOPHORECTOMY,R/L/SERENA  1974    left, for tubal pregnancy     THYROIDECTOMY      for thyroid cancer             Family History:   Family History   Problem Relation Age of Onset     C.A.D. Father      MI at age 65     Asthma Father      Coronary Artery Disease Father      Alzheimer Disease Mother      Osteoporosis Mother      Depression Sister 80     Asthma Sister      Prostate Cancer Brother      Depression Son      Depression Sister      Coronary Artery Disease Brother 67     Skin Cancer No family hx of      no skin cancer            Allergies:   Allergies   Allergen Reactions     Nkda [No Known Drug Allergies]             Medications:   I have reviewed this patient's medication profile and medications given in the past 24 hours.             Physical Exam:   Vital Signs: Temp: 97.9  F (36.6  C) Temp src: Oral BP: 90/46 Pulse: 95 Heart Rate: 82 Resp: 16 SpO2: 97 % O2 Device: None (Room air) Oxygen Delivery: 2 LPM  Weight: 105 lbs 1.6 oz    Constitutional: Pleasant female, seen lying in hospital bed. Frail appearing, but in no acute distress.  Head: Normocephalic. Atraumatic. MMM.   Extremities: Warm and well perfused. No edema.  Pulm: Non-labored breathing, on room air. Speaking in complete sentences.    Musculoskeletal: CYR. No obvious malformations.   Neuro: A/O x 4. Face symmetrical. PERRL. EOMI.   Psych: Speech clear. Tearful. Memory and insight intact.       Data reviewed:     CMP  Recent Labs  Lab 11/30/18  0615 11/29/18  1729 11/29/18  1718 11/28/18  1329    131* 133 133   POTASSIUM 3.5 3.2* 3.1* 3.1*   CHLORIDE 107 99 100 98   CO2 21 21 25 26   ANIONGAP 9 11 9 9   * 73 74 75   BUN 19 20 19 12   CR 0.98 1.02 1.04 0.79   GFRESTIMATED 55* 52* 51* 71   GFRESTBLACK 66 63 62 86   EMMY 6.5* 7.1* 7.2* 7.2*   MAG 3.3* 1.5* 1.5*  --    PHOS  --  2.5 2.4*  --    PROTTOTAL 5.2* 6.0* 6.0* 5.9*   ALBUMIN 2.3* 1.7* 1.7* 1.8*   BILITOTAL 5.7*  7.8* 7.8* 7.5*   ALKPHOS 238* 405* 401* 449*   AST 47* 81* 79* 119*   ALT 52* 87* 85* 98*     CBC  Recent Labs  Lab 11/30/18  0804 11/30/18  0615 11/29/18 1718 11/28/18  1329   WBC  --  34.7* 28.9* 43.1*   RBC  --  1.72* 2.49* 2.44*   HGB 5.3* 4.8* 7.1* 7.0*   HCT  --  14.6* 21.2* 21.0*   MCV  --  85 85 86   MCH  --  27.9 28.5 28.7   MCHC  --  32.9 33.5 33.3   RDW  --  21.0* 20.8* 20.2*   PLT  --  102* 158 164     INR  Recent Labs  Lab 11/30/18  0804 11/29/18 1718   INR 3.73* 3.43*

## 2018-11-30 NOTE — CONSULTS
GASTROENTEROLOGY CONSULTATION      Date of Admission:  11/29/2018          ASSESSMENT AND RECOMMENDATIONS:   Assessment:  78 year old female with a history of metastatic pancreatic adenocarcinoma who presents from home with obstructive jaundice, gram negative bacteremia and likely cholangitis.    #. Obstructing pancreatic adenocarcinoma complicated with probable cholangitis:  Patient with upper abdominal pain, gram negative bacteremia (11/28 cx), jaundice, cholestatic transaminitis (T Bili 7.7, ), WBC 43 (11/28). Plan for antibiotics, IV fluids and ERCP to decompress biliary obstruction as able. As far as the patient's cancer, unfortuantely, it appears that she may be out of treatment options per heme/onc.    #. GNR bacteremia:  2/2 sets blood cultures from 11/28 drawn for abdominal pain and obstructive jaundice positive for gram negative rods. Patient initially wanting to pursue home hospice, now agreeable to ERCP for biliary decompression. On IV zosyn per primary team.     Recommendations  -- Patient to remain NPO  -- Hold anticoagulation  -- IV antibiotics per primary   -- ERCP today, patient, family and primary team in agreement with this plan  -- Additional recommendations to follow    Thank you for involving us in this patient's care. Please do not hesitate to contact the GI service with any questions or concerns.     Pt care plan discussed with Dr. Izaguirre, GI staff physician.    Carola Jc MD  GI Fellow  [phone]  -------------------------------------------------------------------------------------------------------------------          Chief Complaint:   We were asked by Val Duarte of heme/onc to evaluate this patient with obstructive jaundice and probable cholangitis.    History is obtained from the patient and the medical record.          History of Present Illness:   Iris Lewis is a 78 year old female with a history of metastatic pancreatic adenocarcinoma who presents from  home with obstructive jaundice, gram negative bacteremia and likely cholangitis.    Patient noted to have jaundice per family this past Saturday. In the past few days she has had intermittent upper abdominal pain. No known fevers or rigors at home. She presented to the ED 11/28 for evaluation of abdominal pain. Found to have cholestatic transaminitis and concern for cholangitis. However, she wished to pursue home hospice at that time so she was discharged.    11/29 blood cultures drawn from 11/28 positive for GNR. These results were discussed with the patient who wishes to now return for evaluation and consideration for ERCP.    Denies any nausea, vomiting, voiding minimally, normal bowel movement 2 days ago. Family at bedside updated on plan.            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 12/18/2017     Arthritis      Colon polyp 2009,2015    no polyps - f/u in 5 yrs      Esophageal reflux      Glaucoma      Hypertension      Malignant neoplasm of head of pancreas (H) 11/6/2017     Osteoporosis      Postsurgical hypothyroidism      Scoliosis (and kyphoscoliosis), idiopathic      Thyroid cancer (H)     papillary carcinoma age 33     Uncomplicated asthma             Past Surgical History:   Reviewed and edited as appropriate   Past Surgical History:   Procedure Laterality Date     ARTHRODESIS FOOT Right 11/9/2016    Procedure: ARTHRODESIS FOOT;  Surgeon: Janes Mcelroy MD;  Location: [facility] OR     C STOMACH SURGERY PROCEDURE UNLISTED       COLONOSCOPY   2009, 3/2015    no polyps in 2015 told to return 10 yrs.      ENDOSCOPIC RETROGRADE CHOLANGIOPANCREATOGRAM N/A 11/2/2017    Procedure: COMBINED ENDOSCOPIC RETROGRADE CHOLANGIOPANCREATOGRAPHY, PLACE TUBE/STENT;  Endoscopic Retrograde Cholangiopancreatogram with billary sphincterotomy and billary stent placement;  Surgeon: Rito Mcneil MD;  Location:  OR      ENDOSCOPIC RETROGRADE CHOLANGIOPANCREATOGRAM N/A 1/10/2018    Procedure: ENDOSCOPIC RETROGRADE CHOLANGIOPANCREATOGRAM;  Endoscopic Retrograde Cholangiopancreatogram ;  Surgeon: Felix Izaguirre MD;  Location: [facility] OR     ENDOSCOPIC RETROGRADE CHOLANGIOPANCREATOGRAPHY, EXCHANGE TUBE/STENT N/A 11/22/2017    Procedure: ENDOSCOPIC RETROGRADE CHOLANGIOPANCREATOGRAPHY, EXCHANGE TUBE/STENT;  Endoscopic Retrograde Cholangiopancreatogram with Biliary Stent Exchange and pancreatic stent placement;  Surgeon: Felix Izaguirre MD;  Location: [facility] OR     ESOPHAGOSCOPY, GASTROSCOPY, DUODENOSCOPY (EGD), COMBINED  2/17/2012    Procedure:COMBINED ESOPHAGOSCOPY, GASTROSCOPY, DUODENOSCOPY (EGD); COMBINED ESOPHAGOSCOPY, GASTROSCOPY, DUODENOSCOPY POSSIBLE DILATION; Surgeon:NICHOLE MCCLAIN; Location:[facility] OR     ESOPHAGOSCOPY, GASTROSCOPY, DUODENOSCOPY (EGD), COMBINED N/A 11/2/2017    Procedure: COMBINED ENDOSCOPIC ULTRASOUND, ESOPHAGOSCOPY, GASTROSCOPY, DUODENOSCOPY (EGD), FINE NEEDLE ASPIRATE/BIOPSY;  Upper Endoscopic Ultrasound with fine needle biopsy, upper endoscopy with biopsies, Endoscopic Retrograde Cholangiopancreatogram with billary sphincterotomy and billary stent placement;  Surgeon: Hadley Bailey MD;  Location: [facility] OR     FOOT SURGERY  2008    hammertoe left     INSERT PORT VASCULAR ACCESS Right 1/24/2018    Procedure: INSERT PORT VASCULAR ACCESS;  Chest Port Placement;  Surgeon: Ventura Villarreal PA-C;  [facility]: [facility]     LAPAROSCOPIC CHOLECYSTECTOMY N/A 1/5/2015    Procedure: LAPAROSCOPIC CHOLECYSTECTOMY;  Surgeon: Cruz Pearson MD;  Location: [facility] OR     NISSEN FUNDOPLICATION       ORTHOPEDIC SURGERY  2009    left hammertoe      REPAIR HAMMER TOE Right 11/9/2016    Procedure: REPAIR HAMMER TOE;  Surgeon: Janes Mcelroy MD;  Location: [facility] OR     SALPINGO OOPHORECTOMY,R/L/SERENA  1974    left, for tubal pregnancy     THYROIDECTOMY      for thyroid cancer            Previous Endoscopy:    No results found for this or any previous visit.         Social History:   Reviewed and edited as appropriate  Social History     Social History     Marital status:      Spouse name: N/A     Number of children: N/A     Years of education: N/A     Occupational History     Not on file.     Social History Main Topics     Smoking status: Former Smoker     Years: 5.00     Types: Cigarettes     Start date: 9/15/1959     Quit date: 1/28/1983     Smokeless tobacco: Never Used     Alcohol use Yes      Comment: wine 1-2 glasses/day     Drug use: No     Sexual activity: Yes     Partners: Male     Birth control/ protection: Post-menopausal     Other Topics Concern     Caffeine Concern Yes     1-2 cups/day     Sleep Concern No     Stress Concern No     Weight Concern No     Special Diet No     avoids gluten     Exercise Yes     few times/wk - treadmill and weights - 1 hour     Seat Belt Yes     Social History Narrative    How much exercise per week? Walking 2x/wk and weight 2-3x/wk    How much calcium per day? Supplement + dairy       How much caffeine per day? 1 cup coffee    How much vitamin D per day? Supplement 2000IU/d    Do you/your family wear seatbelts?  Yes    Do you/your family use safety helmets? n/a    Do you/your family use sunscreen? Yes    Do you/your family keep firearms in the home? No    Do you/your family have a smoke detector(s)? Yes        Do you feel safe in your home? Yes    Has anyone ever touched you in an unwanted manner? No     Explain     March 2, 2015 Lyle Tran LPN    Reviewed Neha Orourke MD, PhD    3/2/15    Neha Orourke MD, PhD     4/4/16                        Family History:   Reviewed and edited as appropriate  No known history of gastrointestinal/liver disease or  gastrointestinal malignancies       Allergies:   Reviewed and edited as appropriate     Allergies   Allergen Reactions     Nkda [No Known Drug Allergies]           Medications:     Current Facility-Administered  Medications   Medication     [Auto Hold] acetaminophen (TYLENOL) tablet 650 mg     [Auto Hold] albuterol (PROAIR HFA/PROVENTIL HFA/VENTOLIN HFA) 108 (90 Base) MCG/ACT inhaler 2 puff     [Auto Hold] folic acid (FOLVITE) tablet 400 mcg     [Auto Hold] HYDROmorphone (PF) (DILAUDID) injection 0.3-0.5 mg     [Auto Hold] hydrOXYzine (ATARAX) tablet 25 mg     indomethacin (INDOCIN) 50 MG Suppository 100 mg     lactated ringers infusion     [Auto Hold] levothyroxine (SYNTHROID/LEVOTHROID) tablet 125 mcg     lidocaine (LMX4) cream     lidocaine 1 % 1 mL     [Auto Hold] LORazepam (ATIVAN) tablet 0.5 mg    Or     [Auto Hold] LORazepam (ATIVAN) injection 0.5 mg     May continue current IV fluids if patient has IV fluids infusing.     Medication Instruction     [Auto Hold] melatonin tablet 1 mg     [Auto Hold] naloxone (NARCAN) injection 0.1-0.4 mg     [Auto Hold] ondansetron (ZOFRAN) injection 8 mg    Or     [Auto Hold] ondansetron (ZOFRAN-ODT) ODT tab 8 mg    Or     [Auto Hold] ondansetron (ZOFRAN) tablet 8 mg     [Auto Hold] pantoprazole (PROTONIX) EC tablet 20 mg     [Auto Hold] piperacillin-tazobactam (ZOSYN) 4.5 g vial to attach to  mL bag     [Auto Hold] polyethylene glycol (MIRALAX/GLYCOLAX) Packet 17 g     [Auto Hold] prochlorperazine (COMPAZINE) tablet 5 mg    Or     [Auto Hold] prochlorperazine (COMPAZINE) injection 5 mg     [Auto Hold] senna-docusate (SENOKOT-S/PERICOLACE) 8.6-50 MG per tablet 1 tablet    Or     [Auto Hold] senna-docusate (SENOKOT-S/PERICOLACE) 8.6-50 MG per tablet 2 tablet     sodium chloride (PF) 0.9% PF flush 3 mL     sodium chloride (PF) 0.9% PF flush 3 mL     sodium chloride (PF) 0.9% PF flush 3 mL     sodium chloride 0.9% infusion     [Auto Hold] timolol maleate (TIMOPTIC) 0.5 % ophthalmic solution 1 drop             Review of Systems:   A complete review of systems was performed and is negative except as noted in the HPI           Physical Exam:   BP (!) 84/44 (BP Location: Left arm)   Pulse 89  Temp 98.7  F (37.1  C) (Oral)  Resp 16  SpO2 99%  Wt:   Wt Readings from Last 2 Encounters:   11/28/18 44.5 kg (98 lb)   11/15/18 44.5 kg (98 lb 3.2 oz)      Constitutional: Ms. Lewis is a cachectic appearing woman, she is cooperative, pleasant, not dyspneic/diaphoretic, no acute distress  Eyes: Scleral icterus  Ears/nose/mouth/throat: Dry MM  CV: RRR w/ no M/R/G. 1+ pitting edema at the ankles  Respiratory: CTAB with Unlabored breathing  Abd:  Nondistended, +bs, no hepatosplenomegaly, nontender, no peritoneal signs  Skin: warm, perfused, diffuse jaundice  Neuro: AAO x 3  Psych: Normal affect  MSK: No gross deformities         Data:   Labs and imaging below were independently reviewed and interpreted    BMP  Recent Labs  Lab 11/29/18  1718 11/28/18  1329    133   POTASSIUM 3.1* 3.1*   CHLORIDE 100 98   EMMY 7.2* 7.2*   CO2 25 26   BUN 19 12   CR 1.04 0.79   GLC 74 75     CBC  Recent Labs  Lab 11/29/18  1718 11/28/18  1329   WBC 28.9* 43.1*   RBC 2.49* 2.44*   HGB 7.1* 7.0*   HCT 21.2* 21.0*   MCV 85 86   MCH 28.5 28.7   MCHC 33.5 33.3   RDW 20.8* 20.2*    164     INR  Recent Labs  Lab 11/29/18  1718   INR 3.43*     LFTs  Recent Labs  Lab 11/29/18  1718 11/28/18  1329   ALKPHOS 401* 449*   AST 79* 119*   ALT 85* 98*   BILITOTAL 7.8* 7.5*   PROTTOTAL 6.0* 5.9*   ALBUMIN 1.7* 1.8*      PANC  Recent Labs  Lab 11/28/18  1329   LIPASE 371       Imaging:  CT Abd/Pelvis (10/2018):  IMPRESSION: In this patient with history of metastatic pancreatic  cancer:  1. Ill-defined pancreatic head/uncinate process mass increasing size  from the prior study with progression of invasion off the third  portion of the duodenal with associated soft tissue mass.  2. Enlarged peripancreatic lymph nodes, stable. Concern for invasion  about the pylorus and duodenal bulb.  3. Stable pancreatic mass vascular relations with encasement/invasion  of the SMV with associated deformity.  4. Slightly increased peritoneal  "nodularity concerning for peritoneal  carcinomatosis. No ascites.  5. Patchy enhancement of the liver parenchyma likely due to  perfusional changes. No convincing evidence for liver lesions in the  present study.    ERCP  Findings:        A  film of the right upper quadrant demonstrated the biliary stent        fully expanded in its expected location, though the pancreatic stent was        not found. Spotaneous ejection was verified by limited white light        endoscopy as was a patent pylorus and proximal duodenum. There was a        malignant appearing nonbleeding ulceration without stigmata of impending        bleed just inferior to the biliary orifice. The pancreatic orifice was        not readily identified and extensive attempts at pancreatic cannulation        were deferred. The biliary stent demonstrated minimal stone concretion        and this cleared by suction and irrigation. The bile duct was deeply        cannulated with the short-nosed traction sphincterotome in concert with        an 0.025\" Visiglide wire. Contrast was injected and I personally        interpreted the bile duct images. Ductal flow of contrast was adequate,        image quality was adequate and contrast extended throughout the main        bile duct which appeared grossly patent without obstructing tumor        ingrowth following clearance of the distal stone concretion.                                                                                     Impression:          - No evidence of gastric outlet obstruction of duodenal                        stenosis                        - Malignant, nonbleeding, ulceration just inferior to                        the biliary orifice without indication for endoscopic                        therapy at this juncture                        - Spotaneous ejection of the pancreatic stent; in light                        of symptom and laboratory improvement, extensive                     "    attempts at pancreatic cannulation were deferred                        - Small amount of biliary stone concreation cleared from                        a well postioned full expanded biliary stent   Recommendation:      - Standard outpatient general anesthesia recovery with                        probable discharge this morning/early afternoon                        - Follow up with oncology with consideration for                        placement of a port and repeat nutrition consult                        - Continue high dose twice daily PPI as prescribed                        - No plans for interval endoscopy at this juncture                        - The findings and recommendations were discussed with                        the patient and their family

## 2018-11-30 NOTE — PROGRESS NOTES
CLINICAL NUTRITION SERVICES - ASSESSMENT NOTE     Nutrition Prescription    RECOMMENDATIONS FOR MDs/PROVIDERS TO ORDER:  Order PO Creon 36 - 2 capsules 3x per day with meals. This provides 1500 units of lipase/kg/meal.     Malnutrition Status:    Severe malnutrition in the context of acute on chronic illness    Recommendations already ordered by Registered Dietitian (RD):  1. Chocolate Boost Plus at 10 am and 8 pm  2. Magic cup at 2 pm  3. Thera-vit-m    Future/Additional Recommendations:  1. Monitor wt, if declining, recommend ordering kcal counts to further assess intake  2. Monitor lytes and need for scheduled replacements      REASON FOR ASSESSMENT  Iris Lewis is a/an 78 year old female assessed by the dietitian for Admission Nutrition Risk Screen for unintentional loss of 10# or more in the past two months    NUTRITION HISTORY  Per chart review, pt has a h/o osteoporosis, GERD s/p Nissen (2006), s/p thyroidectomy (1980) and currently dx with metastatic pancreatic cancer with mets to liver, lung and lymph nodes. No treatment for cancer since 10/2018. Pt taking PO creon capsules PTA - Creon 36, 2 capsules TID with meals. Pt admitted with mucositis, and has difficulty with adequate PO intake 2/2 painful mouth sores. Using baking soda and salt rinse to cope.     Per pt interview, pt and family report pt was only able to consume soup and Boost Plus over the past week while she was feeling ill. Prior to feeling ill her nutritional intake was inhibited by mouth sores/pain and dry mouth making it difficult to swallow at times, early satiety and taste changes.     CURRENT NUTRITION ORDERS  Diet: Regular  Intake/Tolerance: Pt report tolerating regular diet well this morning for breakfast. She consumed 25% of a thomas, egg and cheese breakfast sandwich, 100% of a banana, OJ and milk.     LABS  Labs reviewed  Mg++ 3.3 (H) on 11/30    MEDICATIONS  Medications reviewed  Folic acid    ANTHROPOMETRICS  Height: 157.5 cm  "(5'2\")  Most Recent Weight: 47.7 kg      IBW: 50 kg  BMI: Normal BMI  Weight History: Appears wt fluctuates between #, with repletion in wt likely r/t fluids during hospital admissions. Pt reports wt dropped to 85# this week, this is a 16.5% decrease in wt over the past month.   Wt Readings from Last 20 Encounters:   11/30/18 47.7 kg (105 lb 1.6 oz)   11/28/18 44.5 kg (98 lb)   11/15/18 44.5 kg (98 lb 3.2 oz)   11/14/18 43.8 kg (96 lb 9.6 oz)   11/14/18 43.8 kg (96 lb 9.6 oz)   11/13/18 43 kg (94 lb 11.2 oz)   11/12/18 42.7 kg (94 lb 2.2 oz)   10/29/18 46.2 kg (101 lb 14.4 oz)   10/15/18 44.5 kg (98 lb)   10/01/18 46.5 kg (102 lb 9.6 oz)   09/18/18 46.4 kg (102 lb 3.2 oz)   09/05/18 44.9 kg (98 lb 14.4 oz)   08/27/18 43.3 kg (95 lb 6.4 oz)   08/20/18 44.1 kg (97 lb 4.8 oz)   08/08/18 45.9 kg (101 lb 3 oz)   08/06/18 44.3 kg (97 lb 9.6 oz)   07/24/18 43.6 kg (96 lb 1.6 oz)   07/09/18 44.2 kg (97 lb 6.4 oz)   07/05/18 44 kg (97 lb)   07/02/18 42.4 kg (93 lb 6.4 oz)     Dosing Weight: 48 kg    ASSESSED NUTRITION NEEDS  Estimated Energy Needs: 8533-6657+ kcals/day (30 - 35+ kcals/kg )  Justification: Repletion  Estimated Protein Needs: 58-72 grams protein/day (1.2 - 1.5 grams of pro/kg)  Justification: Repletion  Estimated Fluid Needs: 5928-2122 mL/day (25 - 30 mL/kg)   Justification: Maintenance    PHYSICAL FINDINGS  See malnutrition section below.    MALNUTRITION  % Intake: </= 50% for >/= 5 days (severe)  % Weight Loss: > 5% in 1 month (severe)  Subcutaneous Fat Loss: Facial region, Upper arm and Lower arm:  Severe  Muscle Loss: Temporal, Facial & jaw region, Scapular bone, Thoracic region (clavicle, acromium bone, deltoid, trapezius, pectoral), Upper arm (bicep, tricep), Lower arm  (forearm) and Dorsal hand:  Severe  Fluid Accumulation/Edema: None noted  Malnutrition Diagnosis: Severe malnutrition in the context of acute on chronic illness    NUTRITION DIAGNOSIS  Inadequate oral intake related to increased " needs, early satiety, taste changes, mucositis and xerostomia as evidenced by intake less than 50% of needs for past week, loss of over 5% of wt in past 1 month and severe muscle and fat depletion.       INTERVENTIONS  Implementation  1. Nutrition education for nutrition relationship to health/disease - Discussed small, frequent meals, ways to fortify foods with calories and protein, how to cope with mouth sores and dry mouth and oral nutrition supplement options. Provided pt with handouts: Coping with mouth sores/pain and Coping with Anorexia and Weight Loss.  2. Medical food supplement therapy - As ordered above  3. Multivitamin/mineral supplement therapy - To help meet micronutrient needs    Goals  1. Maintain wt >/= normal BMI range (18.5 kg/m2+)  2. Patient to consume % of nutritionally adequate meal trays TID, or the equivalent with supplements/snacks.     Monitoring/Evaluation  Progress toward goals will be monitored and evaluated per protocol.

## 2018-11-30 NOTE — PROVIDER NOTIFICATION
11/30/18 0600   Post RRT Intervention Assessment   Post RRT Assessment Transferred to ICU  (Unit 4C)   Date Follow Up Done 11/30/18   Time Follow Up Done 1030

## 2018-11-30 NOTE — PROGRESS NOTES
Antimicrobial Stewardship Team Note    Antimicrobial Stewardship Program - A joint venture between [facility] and  Physicians to optimize antibiotic management.  NOT a formal consult - Restricted Antimicrobial Review     Patient: Iris Lewis  MRN: [id number]  Allergies: Nkda [no known drug allergies]         Active Anti-infective Medications                Start     Stop      11/29/18 1630  piperacillin-tazobactam  4.5 g,   Intravenous,   EVERY 6 HOURS     Bacteremia    cholangitis        --          Brief Summary:   Iris Lewis is a 78 year-old female with a PMH of metastatic pancreatic cancer (no longer on active chemotherapy), thyroid cancer s/p thyroidectomy, GERD s/p Nissen, osteoporosis and HTN who presented to the ED on 11/28 for evaluation of worsening LLQ abdominal pain poorly controlled with oral pain medications, mouth sores, mild jaundice, and slight confusion. Labs were notable for elevated total bilirubin (7.5), Alk Phos (449), ALT/AST (98/119), and WBC (43) with a left shift. Plans to enroll in hospice elected discharge on more pain medication, but she returned for admission yesterday when her BCx from Port-A-Cath grew out gram negative Pseudomonas aeruginosa. Zosyn was started empirically to treat suspected cholangitis and bacteremia. An emergent ERCP was completed yesterday, where they seated biliary stents occluded by tumor ingrowth and drained pus/bile. GI has recommended to continue broad spectrum IV Zosyn for at least another 24 hours then transition to PO ciprofloxacin. No follow up ERCP has been planned.    Assessment:    Pseudomonas Bacteremia likely 2/2 cholangitis vs CLABSI   Post ERCP, her liver function tests have markedly improved. She remains afebrile and but mildly hypotensive. Her WBC and ANC have somewhat improved from her initial presentation but remains elevated. Current therapy with Zosyn is appropriate based on [facility] antibiogram as well as no resistance  markers detected on verigene for the isolated pseudomonas. Agree with transitioning to oral ciprofloxacin once culture sensitivities are back and repeat BCx is negative.    Recommendations:  1. Recommend continuing Zosyn for another 48 hours (total of 3 days).  2. Agree with transition to oral ciprofloxacin 500 mg BID. Will follow up with sensitivities and repeat blood culture. Anticipate 7-10 day course if no complications.    Discussed with ID Staff: Leonidas Shabazz MD and Blair Alicea PharmD IV Student  [phone]

## 2018-11-30 NOTE — H&P
[facility] MICU History and Physical    Iris Lewis MRN# [id number]   Age: 78 year old YOB: 1940     Date of Admission:  11/29/2018    Primary care provider: Neri Gipson           Chief Complaint:   hypotension         History of present illness:   Iris Lewis is a 78 year old female with a PMHx significant for osteoporosis, hypertension, papillary thyroid cancer s/p thyroidectomy (1980s), GERD s/p Nissen (2006) and metastatic pancreatic cancer who presented on 11/28 with subacute abdominal pain and jaundice and labs suggestive of cholangitis and gram negative ilya bacteremia.     Patient noted to have jaundice per family on 11/24. In the past few days she has had intermittent upper abdominal pain. Denies fevers or rigors at home. She presented to the ED 11/28 for evaluation of abdominal pain. Found to have cholestatic transaminitis and concern for cholangitis. However, she wished to pursue home hospice at that time so she was discharged. She was then directly admitted on 11/29, after changing her mind about wanting to treat the cholangitis. She was started on piperacillin-tazobactam and stents were placed via ERCP on the evening of 11/29. She was also hypotensive around 80/50s on admission. Her BP transiently improved to 100/50s after 2L IVF and 50g albumin, but then dropped again to 80/40s. This morning her hemoglobin was 4.8 from 7.0, without an obvious source of bleeding. She was transferred to the ICU for possible pressors.          Past Medical History:     Past Medical History:   Diagnosis Date     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 12/18/2017     Arthritis      Colon polyp 2009,2015    no polyps - f/u in 5 yrs      Esophageal reflux      Glaucoma      Hypertension      Malignant neoplasm of head of pancreas (H) 11/6/2017     Osteoporosis      Postsurgical hypothyroidism      Scoliosis (and kyphoscoliosis), idiopathic      Thyroid cancer (H)     papillary carcinoma age 33      Uncomplicated asthma              Past Surgical History:      Past Surgical History:   Procedure Laterality Date     ARTHRODESIS FOOT Right 11/9/2016    Procedure: ARTHRODESIS FOOT;  Surgeon: Janes Mcelroy MD;  [facility]: [facility] OR     C STOMACH SURGERY PROCEDURE UNLISTED       COLONOSCOPY   2009, 3/2015    no polyps in 2015 told to return 10 yrs.      ENDOSCOPIC RETROGRADE CHOLANGIOPANCREATOGRAM N/A 11/2/2017    Procedure: COMBINED ENDOSCOPIC RETROGRADE CHOLANGIOPANCREATOGRAPHY, PLACE TUBE/STENT;  Endoscopic Retrograde Cholangiopancreatogram with billary sphincterotomy and billary stent placement;  Surgeon: Rito Mcneil MD;  Location: [facility] OR     ENDOSCOPIC RETROGRADE CHOLANGIOPANCREATOGRAM N/A 1/10/2018    Procedure: ENDOSCOPIC RETROGRADE CHOLANGIOPANCREATOGRAM;  Endoscopic Retrograde Cholangiopancreatogram ;  Surgeon: Felix Izaguirre MD;  Location: [facility] OR     ENDOSCOPIC RETROGRADE CHOLANGIOPANCREATOGRAPHY, EXCHANGE TUBE/STENT N/A 11/22/2017    Procedure: ENDOSCOPIC RETROGRADE CHOLANGIOPANCREATOGRAPHY, EXCHANGE TUBE/STENT;  Endoscopic Retrograde Cholangiopancreatogram with Biliary Stent Exchange and pancreatic stent placement;  Surgeon: Felix Izaguirre MD;  Location: [facility] OR     ESOPHAGOSCOPY, GASTROSCOPY, DUODENOSCOPY (EGD), COMBINED  2/17/2012    Procedure:COMBINED ESOPHAGOSCOPY, GASTROSCOPY, DUODENOSCOPY (EGD); COMBINED ESOPHAGOSCOPY, GASTROSCOPY, DUODENOSCOPY POSSIBLE DILATION; Surgeon:NICHOLE MCCLAIN; Location:[facility] OR     ESOPHAGOSCOPY, GASTROSCOPY, DUODENOSCOPY (EGD), COMBINED N/A 11/2/2017    Procedure: COMBINED ENDOSCOPIC ULTRASOUND, ESOPHAGOSCOPY, GASTROSCOPY, DUODENOSCOPY (EGD), FINE NEEDLE ASPIRATE/BIOPSY;  Upper Endoscopic Ultrasound with fine needle biopsy, upper endoscopy with biopsies, Endoscopic Retrograde Cholangiopancreatogram with billary sphincterotomy and billary stent placement;  Surgeon: Hadley Bailey MD;  Location: [facility] OR     FOOT SURGERY  2008     hammertoe left     INSERT PORT VASCULAR ACCESS Right 1/24/2018    Procedure: INSERT PORT VASCULAR ACCESS;  Chest Port Placement;  Surgeon: Ventura Villarreal PA-C;  [facility]     LAPAROSCOPIC CHOLECYSTECTOMY N/A 1/5/2015    Procedure: LAPAROSCOPIC CHOLECYSTECTOMY;  Surgeon: Cruz Pearson MD;  [facility]: [facility]     NISSEN FUNDOPLICATION       ORTHOPEDIC SURGERY  2009    left hammertoe      REPAIR HAMMER TOE Right 11/9/2016    Procedure: REPAIR HAMMER TOE;  Surgeon: Janes Mcelroy MD;  [facility]: [facility]     SALPINGO OOPHORECTOMY,R/L/SERENA  1974    left, for tubal pregnancy     THYROIDECTOMY      for thyroid cancer             Social History:     Social History   Substance Use Topics     Smoking status: Former Smoker     Years: 5.00     Types: Cigarettes     Start date: 9/15/1959     Quit date: 1/28/1983     Smokeless tobacco: Never Used     Alcohol use Yes      Comment: wine 1-2 glasses/day             Family History:     Family History   Problem Relation Age of Onset     C.A.D. Father      MI at age 65     Asthma Father      Coronary Artery Disease Father      Alzheimer Disease Mother      Osteoporosis Mother      Depression Sister 80     Asthma Sister      Prostate Cancer Brother      Depression Son      Depression Sister      Coronary Artery Disease Brother 67     Skin Cancer No family hx of      no skin cancer             Allergies:     Allergies   Allergen Reactions     Nkda [No Known Drug Allergies]              Medications:     Current Facility-Administered Medications   Medication     acetaminophen (TYLENOL) tablet 650 mg     albuterol (PROAIR HFA/PROVENTIL HFA/VENTOLIN HFA) 108 (90 Base) MCG/ACT inhaler 2 puff     glucose gel 15-30 g    Or     dextrose 50 % injection 25-50 mL    Or     glucagon injection 1 mg     flumazenil (ROMAZICON) injection 0.2 mg     folic acid (FOLVITE) tablet 400 mcg     HYDROmorphone (PF) (DILAUDID) injection 0.3-0.5 mg     hydrOXYzine (ATARAX) tablet 25 mg      levothyroxine (SYNTHROID/LEVOTHROID) tablet 125 mcg     lidocaine (LMX4) cream     lidocaine 1 % 1 mL     LORazepam (ATIVAN) tablet 0.5 mg    Or     LORazepam (ATIVAN) injection 0.5 mg     magnesium sulfate 4 g in 100 mL sterile water (premade)     May continue current IV fluid if patient has IV fluids infusing until discharge.     May continue current IV fluids if patient has IV fluids infusing.     Medication Instruction     melatonin tablet 1 mg     naloxone (NARCAN) injection 0.1-0.4 mg     naloxone (NARCAN) injection 0.1-0.4 mg     ondansetron (ZOFRAN) injection 8 mg    Or     ondansetron (ZOFRAN-ODT) ODT tab 8 mg    Or     ondansetron (ZOFRAN) tablet 8 mg     pantoprazole (PROTONIX) EC tablet 20 mg     piperacillin-tazobactam (ZOSYN) 4.5 g vial to attach to  mL bag     polyethylene glycol (MIRALAX/GLYCOLAX) Packet 17 g     potassium chloride (KLOR-CON) Packet 20-40 mEq     potassium chloride 10 mEq in 100 mL intermittent infusion with 10 mg lidocaine     potassium chloride 10 mEq in 100 mL sterile water intermittent infusion (premix)     potassium chloride 20 mEq in 50 mL intermittent infusion     potassium chloride ER (K-DUR/KLOR-CON M) CR tablet 20-40 mEq     potassium phosphate 15 mmol in D5W 250 mL intermittent infusion     potassium phosphate 20 mmol in D5W 250 mL intermittent infusion     potassium phosphate 20 mmol in D5W 500 mL intermittent infusion     potassium phosphate 25 mmol in D5W 500 mL intermittent infusion     prochlorperazine (COMPAZINE) tablet 5 mg    Or     prochlorperazine (COMPAZINE) injection 5 mg     senna-docusate (SENOKOT-S/PERICOLACE) 8.6-50 MG per tablet 1 tablet    Or     senna-docusate (SENOKOT-S/PERICOLACE) 8.6-50 MG per tablet 2 tablet     sodium chloride (PF) 0.9% PF flush 3 mL     sodium chloride (PF) 0.9% PF flush 3 mL     sodium chloride (PF) 0.9% PF flush 3 mL     sodium chloride (PF) 0.9% PF flush 3 mL     sodium chloride 0.9% infusion     timolol maleate (TIMOPTIC)  0.5 % ophthalmic solution 1 drop             Physical Exam:   Vitals were reviewed    General: AAOx3, NAD, cachectic, lying in bed  HEENT: Oral mucosa moist and non-erythematous, PERRLA, +scleral icterus  CV: RRR, normal S1S2, no murmur, clicks, rubs  Resp: Clear to auscultation bilaterally, no wheezes, rhonchi  Abd: Soft, mildly tender diffusely, mildly distended, BS+, no masses appreciated  Extremities: WWP, no pedal edema  Neuro: No lateralizing symptoms or focal neurologic deficits           Data:   I/O last 3 completed shifts:  In: 1945 [I.V.:1025; IV Piggyback:500]  Out: -     ROUTINE LABS (Last four results)  CMP  Recent Labs  Lab 11/30/18  0615 11/29/18  1729 11/29/18  1718 11/28/18  1329    131* 133 133   POTASSIUM 3.5 3.2* 3.1* 3.1*   CHLORIDE 107 99 100 98   CO2 21 21 25 26   ANIONGAP 9 11 9 9   * 73 74 75   BUN 19 20 19 12   CR 0.98 1.02 1.04 0.79   GFRESTIMATED 55* 52* 51* 71   GFRESTBLACK 66 63 62 86   EMMY 6.5* 7.1* 7.2* 7.2*   MAG 3.3* 1.5* 1.5*  --    PHOS  --  2.5 2.4*  --    PROTTOTAL 5.2* 6.0* 6.0* 5.9*   ALBUMIN 2.3* 1.7* 1.7* 1.8*   BILITOTAL 5.7* 7.8* 7.8* 7.5*   ALKPHOS 238* 405* 401* 449*   AST 47* 81* 79* 119*   ALT 52* 87* 85* 98*     CBC  Recent Labs  Lab 11/30/18  0804 11/30/18  0615 11/29/18  1718 11/28/18  1329   WBC  --  34.7* 28.9* 43.1*   RBC  --  1.72* 2.49* 2.44*   HGB 5.3* 4.8* 7.1* 7.0*   HCT  --  14.6* 21.2* 21.0*   MCV  --  85 85 86   MCH  --  27.9 28.5 28.7   MCHC  --  32.9 33.5 33.3   RDW  --  21.0* 20.8* 20.2*   PLT  --  102* 158 164     INR  Recent Labs  Lab 11/30/18  0804 11/29/18  1718   INR 3.73* 3.43*            Assessment and Plan:   Assessment:  Iris Lewis is a 78 year old female with a PMHx significant for hypertension and metastatic pancreatic cancer who presented with subacute abdominal pain and jaundice, found to have septic shock from Pseudomonas bacteremia due to obstructive cholangitis s/p ERCP, transferred to the ICU this morning for  possible pressors.    NEURO:  No active issues.    PULMONARY:  No active issues.    CV:  #septic shock secondary to Pseudomonas bacteremia from cholangitis  #H/o Hypertension.   80s/40s on admission with slow improvement in MAPs to 70s after 5.5L IVF in the OR, 2L NS am 11/30, 50g albumin and 2u pRBCs. No pressors to date.  -HOLD PTA anti-hypertensives with bacteremia and risk for developing hypotension.     #H/o pulmonary embolism.  -restart Xarelto tomrrow    RENAL:  #SUNIL  Creatinine 1.04>0.98 since admission, with baseline creatinine ~0.6-0.7. Likely prerenal vs ATN from sepsis.  - avoid nephrotoxins  - CTM    ID:  #Pseudomonas bacteremia likely 2/2 cholangitis  2/2 sets blood cultures from 11/28 drawn for abdominal pain and obstructive jaundice positive for gram negative rods. Will need 2 weeks of IV antibiotics. Has a port, so should not need a line to go home.  - continue pip-tazo  - one time dose of tobramycin if needing to start pressors  - f/u susceptilities    GI:  # Obstructing pancreatic adenocarcinoma complicated with probable cholangitis s/p 11/29 ERCP/stents  # acute on chronic abdominal pain  Patient with upper abdominal pain, gram negative bacteremia (11/28 cx), jaundice, cholestatic transaminitis (T Bili 7.7, ), WBC 43 (11/28). S/p ERCP 11/29 with stent placement decompress biliary obstruction as able.  - continue pip-tazo  - PRN anti-emetics (also have lorazepam for nausea)  - PRN hydroxyzine for pruritus  - PRN hydromorphone for pain    #Mouth sores/mucositis.   Has caused difficulty taking PO intake, thus has had poor PO intake over the course of the past few days.   -MMW prn, baking soda and salt swishes, and pain control as above.     #GERD s/p Nissen 2006  - PO pantoprazole 20mg qday    ENDO:  No active issues.    HEME/ONC:  #Metastatic pancreatic cancer.  Diagnosed Oct 2017 with mets to the liver, lung, and lymph nodes. Progressed on multiple therapies, including liposomal  irinotecan/5FU through 7/2018. No therapy since middle of October 2018 as patient was not interested in pursuing a phase 1 trial and was considering transition to hospice. Had planned to enroll on hospice 11/30, but elected to come in for IV antibiotics and ERCP.   -Palliative care consult to help coordinate GOC discussions and possible hospice transition.  - Continue PTA creon capsules   - continue folic acid 400mcg qday    #acute on chronic anemia  Hgb 4.8 on 11/30 am from 7.0 on admission. Received 6L IVF during ERCP, so very likely dilutional, with additional derangement from sepsis. No sign of bleeding. Baseline Hgb 9-10g/dl. S/p 2u pRBCs with Hgb 8.5  - transfusion goal >7g/dl  - CTM    #elevated INR  3.43>3.73 since admission, after 1u FFP. Likely due to sepsis. No further reversal for now, unless signs of bleeding. Was 0.94 in July. As she has liver mets, it is possible she has synthetic dysfunction.  - CTM    #H/o papillary thyroid cancer s/p thyroidectomy (1980s).  -Continue PTA Synthroid.    FEN: regular diet, lytes checked and repleted, IVF bolus PRN  PPx: pantoprazole,   LINES: port, PIV  CODE STATUS: DNR/DNI  DISPOSITION: ICU for BP monitoring; likely back to heme/onc in AM    Patient seen and discussed with attending physician, Dr. Castro.    Arabella Sullivan MD  Internal Medicine PGY1  Pager: [phone]

## 2018-11-30 NOTE — PROGRESS NOTES
Dr. Izaguirre came to bedside- spoke with pt. Dr. Dewey called about pt's low BP. MD did not want it treated at this time.

## 2018-11-30 NOTE — PLAN OF CARE
Problem: Patient Care Overview  Goal: Plan of Care/Patient Progress Review  Outcome: No Change    [id number]: BG 63 at 2338, drank 1 cup applejuice with no result, received 30 g glucose gel with minimal result. MD paged, advised to give IV dextrose 50%. BG to 162. Received plasma (220 ml) for INR of 3.43. Hypotensive. Received 500 mL bolus. BP dropped. MD notified. Ordered albumin. Pt received 200 mL albumin. Potassium (4 100ml bags) replaced. Magnesium replaced. BP 73/41 at 0530. Rapid response called. Received 1L bolus x 2. BP remaining low. Pt asymptotic. Lactic 0.8. Continue with POC.

## 2018-11-30 NOTE — PROVIDER NOTIFICATION
11/30/18 0600   Call Information   Date of Call 11/30/18   Time of Call 0532   Name of person requesting the team Sahra   Title of person requesting team RN   RRT Arrival time 0534   Time RRT ended 0815   Reason for call   Type of RRT Adult   Primary reason for call Cardiovascular   Cardiovascular SBP less than 90   Was patient transferred from the ED, ICU, or PACU within last 24 hours prior to RRT call? Yes  (from PAA at approx 2200)   SBAR   Situation Pt weith mets pancreatic cancer s/p ERCP from yesterday lesli for cholangitis now hypotensive 70s/30s, has been hypotensive all night but progressively more hypotensive   Background Mets pacreatic cancer, h/o thyroid cancer  (cholangitis, obstructive jaundice and gram negative ilya bact)   Notable History/Conditions Cancer;End-Stage disease;Hypertension;Recent surgery   Assessment Pt alert and oriented, asymptomatic, voided last at approx 0400 (not saved), feels much improved from prior day- pt had been NPO for ERCP, was having abd pain and nausea before procedure, now feeling better   Interventions Fluid bolus;Labs   Adjustments to Recommend Transfuse for low Hgb, CT scan of abdomen,continue with goals of care discussion   Patient Outcome   Patient Outcome Stabilized on unit   RRT Team   Attending/Primary/Covering Physician Kristie Mazariegos   Date Attending Physician notified 11/30/18   Time Attending Physician notified 0533   Lead RN Lindy TITUS   Other staff Alannah, Charge RN

## 2018-11-30 NOTE — PLAN OF CARE
Problem: Patient Care Overview  Goal: Plan of Care/Patient Progress Review  Outcome: Improving    BP now 90s-100s/60s after total 2 untis RBCs (hemoglobin recheck 8.5). No further blood product to be given per MICU MD. Remains on room air with no complaints of SOB or trouble breathing. Alert, occassionally slightly confused and forgetful; mental status significantly improved compared to yesterday per family. Abdomen distended, denies pain and N/V; improving appetite so IV maintenance fluid off. Stable on feet with standby assist.     Will monitor hemodynamics in ICU overnight, may consider transfer back to floor tomorrow 12/1. Notify MICU with concerns/changes.

## 2018-11-30 NOTE — PLAN OF CARE
Problem: Patient Care Overview  Goal: Plan of Care/Patient Progress Review  Pt admitted from home. Hx thyroid and pancreatic ca. Came to hospital with uncontrolled abd pain and cholangitis. Blood cultures positive. Hypotensive 80s/40s. 1L NS bolus given. Zosyn given per mar. Pt went down for ERCP at 7pm.    Labs resulted- INR 3.43. Needs plasma. Electrolytes need replacement, not ordered, will need follow up when pt back from ERCP.

## 2018-11-30 NOTE — PLAN OF CARE
Problem: Patient Care Overview  Goal: Plan of Care/Patient Progress Review  Outcome: No Change    Pt returned to 7D from PACU at 2200. AVSS, afebrile. Denies pain, n/v, SOB. Per PACU RN, anesthesiologist was made aware of the pt's soft BPs and did not want to treat. Will need plasma for INR of 3.43. Continue to monitor.

## 2018-11-30 NOTE — ANESTHESIA CARE TRANSFER NOTE
Patient: Iris Lewis    Procedure(s):  ENDOSCOPIC RETROGRADE CHOLANGIOPANCREATOGRAM with Bile duct placement    Diagnosis: Cholangitis sepsis  Diagnosis Additional Information: No value filed.    Anesthesia Type:   General     Note:  Airway :Face Mask  Patient transferred to:PACU  Comments: VSS. Breathing spont. Denies pain. Report to RN at bedside.Handoff Report: Identifed the Patient, Identified the Reponsible Provider, Reviewed the pertinent medical history, Discussed the surgical course, Reviewed Intra-OP anesthesia mangement and issues during anesthesia, Set expectations for post-procedure period and Allowed opportunity for questions and acknowledgement of understanding      Vitals: (Last set prior to Anesthesia Care Transfer)    CRNA VITALS  11/29/2018 1950 - 11/29/2018 2023 11/29/2018             Resp Rate (observed): (!)  1                Electronically Signed By: CAT Reynolds CRNA  November 29, 2018  8:23 PM

## 2018-11-30 NOTE — PROGRESS NOTES
Admitted/transferred from: 7D at 1015  Reason for admission/transfer: hypotension  Patient status upon admission/transfer: alert and oriented, hypotensive 80s/50s  Interventions: given another unit RBCs  Plan: close monitoring of blood pressure and hemodynamics, possible abdominal CT scan  2 RN skin assessment: completed by Maninder Leos and Arabella Man; noted blanchable redness on coccyx and back of head, and pressure injury on back which is from not moving around at home per patient  Result of skin assessment and interventions/actions: WOC consult for back  Height, weight, drug calc weight: done  Patient belongings: cellphone, headphones, cane, suitcase kept in room  MDRO education (if applicable): N/A

## 2018-11-30 NOTE — PROGRESS NOTES
[facility] Nurse Inpatient Pressure Injury Assessment   Reason for consultation: Evaluate and treat right thoracic spine pressure injury      ASSESSMENT  Pressure Injury: on right thoracic spine , present on admission    Pressure Injury is Stage 2   Contributing factor of the pressure injury: pressure and immobility  Status: initial assessment     TREATMENT PLAN  Right thoracic spine wound: Every other day: wash with saline and gauze. Apply Cavalon No Sting to periwound skin and allow to dry. Cover with Mepilex 4x4.  Orders Written  WO Nurse follow-up plan:weekly  Nursing to notify the Provider(s) and re-consult the [facility] Nurse if wound(s) deteriorates or new skin concern.    Patient History  According to provider note(s):  78 year old female with a history of metastatic pancreatic adenocarcinoma who presents from home with obstructive jaundice, gram negative bacteremia and likely cholangitis.    Objective Data  Containment of urine/stool: Continent of bowel and Continent of bladder    Current Diet/ Nutrition:    Active Diet Order      Regular Diet Adult    Output:   I/O last 3 completed shifts:  In: 1945 [I.V.:1025; IV Piggyback:500]  Out: -     Risk Assessment:   Sensory Perception: 3-->slightly limited  Moisture: 4-->rarely moist  Activity: 3-->walks occasionally  Mobility: 3-->slightly limited  Nutrition: 2-->probably inadequate  Friction and Shear: 2-->potential problem  Jcarlos Score: 17      Labs:   Recent Labs  Lab 11/30/18  0804 11/30/18  0615   ALBUMIN  --  2.3*   HGB 5.3* 4.8*   INR 3.73*  --    WBC  --  34.7*       Physical Exam  Skin inspection: focused posterior back    Wound Location:  Right thoracic spice  Date of last Photo: not available today  Wound History: Per pt, wound developed when patient in bed for last week with minimal movement  Measurements (length x width x depth, in cm) 2 cm x 2 cm  x  0.1 cm   Wound Base: dry, pink dermis  Palpation of the wound bed: normal   Periwound skin: intact  Color:  normal and consistent with surrounding tissue  Temperature: normal   Drainage:, none  Description of drainage: none  Odor: none  Pain: denies     Interventions  Current support surface: Standard  Low air loss mattress  Current off-loading measures: Pillows under calves  Repositioning aid: Pillows  Visual inspection of wound(s) completed   Tube Securement: n/a  Wound Care: was done per plan of care.  Supplies: floor stock  Educated provided: importance of repositioning, plan of care and wound progress  Education provided to: patient  and spouse  Discussed importance of:repositioning every 2 hours, off-loading pressure to wound and their role in pressure injury prevention  Discussed plan of care with Patient, Family and Nurse    Jammie Navarro RN, CWOCN

## 2018-11-30 NOTE — PROGRESS NOTES
BP remains low despite IVF. HGB 4.8 this am and re-check was 5.3. Pt is asymptomatic, A&Ox4, ate 50% of a regular diet this am and tolerated well. 1 unit of PRBCs started and needs another unit. NA got her up to the bathroom with SBA and pt was asymptomatic. Reports she had a small brown BM. Orders to transfer pt to ICU for pressors and closer monitoring. Family notified by PA.

## 2018-11-30 NOTE — PROGRESS NOTES
BRIEF GI PROGRESS NOTE    DOS: 11/30/2018    Assessment:  78 year old female with a history of metastatic pancreatic adenocarcinoma who presents from home with obstructive jaundice, gram negative bacteremia 2/2 cholangitis. ERCP 11/29 with luan cholangitis due to tumor ingrowth within existing metal stents, third uncovered metal stent was placed. 11/30 patient with soft blood pressure overnight, acute on chronic anemia 11/30 morning (Hgb 5.3) with likely component of hemodilution after 5.5L of fluid resuscitation.     #. Obstructing pancreatic adenocarcinoma complicated with cholangitis:  Patient with upper abdominal pain, gram negative bacteremia (11/28 cx), jaundice, cholestatic transaminitis (T Bili 7.7, ), WBC 43 (11/28). ERCP completed 11/30 noted luan cholangitis due to tumor ingrowth, third metal stent was placed.      #. Severe sepsis due to pseudomonas bacteremia:  2/2 sets blood cultures from 11/28 drawn for abdominal pain and obstructive jaundice positive for gram negative rods. Patient initially wanting to pursue home hospice, now agreeable to ERCP for biliary decompression. On IV zosyn per primary team. Hypotensive overnight, s/p 5.5L of IV fluids. 11/30 morning transferred to MICU for MAPs <65. However, patient reports that she continues to feel relatively well.    #. Acute on chronic anemia:  Likely multifactorial in setting of severe sepsis with GNR bacteremia from cholangitis and hemodilution from fluid resuscitation on admission (5.5L NS given).      Recommendations  - Continue broad spectrum IV antibiotics for at least  another 24-48h followed by transition to a course of oral ciprofloxacin   - No plans for interval ERCP at this juncture and no specific follow up with GI necessary after discharge  - Ongoing supportive cares per primary team  - No follow-up with GI after discharge at this time  - Inpatient GI service will sign off at this time, please do not hesitate to call with any new  questions or concerns    Discussed with Dr. Izaguirre.    Carola Jc MD  GI Fellow  [id number]    ====================================================  S: Soft blood pressure overnight, patient given 2L IV fluids. She continues to feel much improved following ERCP. Denies any abdominal pain, nausea, vomiting. Specifically no epistaxis, hematemesis, hematochezia or melena.    O:   Physical Exam:  BP 99/55  Pulse 95  Temp 96.6  F (35.9  C) (Oral)  Resp 16  SpO2 99%    CONSTITUTIONAL: Ms. Lewis is lying down in bed in NAD on exam.  HEENT:  NC/AT.  OP Clear.  MMM. Scleral icterus.  LUNGS: No increased work of breathing.  CARDIOVASCULAR: RRR w/ no M/R/G.   ABDOMEN: Soft, ND, NT, BS +ve.   MUSCULOSKELETAL:  Moves all four extremities without difficulty.    NEURO: A&Ox3  PSYCHIATRIC:  Normal affect.  SKIN: Warm, Dry, No rashes.     BMP  Recent Labs  Lab 11/30/18  0615 11/29/18  1729 11/29/18  1718 11/28/18  1329    131* 133 133   POTASSIUM 3.5 3.2* 3.1* 3.1*   CHLORIDE 107 99 100 98   EMMY 6.5* 7.1* 7.2* 7.2*   CO2 21 21 25 26   BUN 19 20 19 12   CR 0.98 1.02 1.04 0.79   * 73 74 75     CBC  Recent Labs  Lab 11/30/18  0804 11/30/18  0615 11/29/18  1718 11/28/18  1329   WBC  --  34.7* 28.9* 43.1*   RBC  --  1.72* 2.49* 2.44*   HGB 5.3* 4.8* 7.1* 7.0*   HCT  --  14.6* 21.2* 21.0*   MCV  --  85 85 86   MCH  --  27.9 28.5 28.7   MCHC  --  32.9 33.5 33.3   RDW  --  21.0* 20.8* 20.2*   PLT  --  102* 158 164     INR  Recent Labs  Lab 11/29/18  1718   INR 3.43*     LFTs  Recent Labs  Lab 11/30/18  0615 11/29/18  1729 11/29/18  1718 11/28/18  1329   ALKPHOS 238* 405* 401* 449*   AST 47* 81* 79* 119*   ALT 52* 87* 85* 98*   BILITOTAL 5.7* 7.8* 7.8* 7.5*   PROTTOTAL 5.2* 6.0* 6.0* 5.9*   ALBUMIN 2.3* 1.7* 1.7* 1.8*      PANC  Recent Labs  Lab 11/28/18  1329   LIPASE 371     ERCP (11/29/18):  Findings:        A  film demonstrated the stent in stent biliary stents. Limited        white light views of the foregut  "were notable for the two stents        appropriately internalized and overt tumor ingrowth within the stents.        The bile duct was deeply cannulated with the 12 mm balloon in concert        with an 0.025\" Visiglide wire. Contrast was injected and I personally        interpreted the bile duct images. Ductal flow of contrast was adequate,        image quality was adequate and contrast extended to the intrahepatic        ducts. There was a diffuse central dilation with recurrent stenosis        within the stents consistent with the tumor ingrowth seen by white        light. Another 10 mm by 4 cm [facility] stent was placed within the two        existing stents across the recurrent stenosis. Bile, pus and sludge        flowed through the stent and the stent was in good position. The ventral        pancreatic duct was selectively not interrogated.                                                                                     Impression:          - Dieter cholangitis secondary to tumor ingrowth within                        existing metal stents and managed by placement of a                        third uncovered metal stent   Recommendation:      - Standard inpatient general anesthesia recovery with                        return to the floor when appropriate                        - Continue broad spectrum IV antibiotics for at least                        another 24-48h followed by transition to a course of                        oral ciprofloxacin                        - No plans for interval ERCP at this juncture                        - The findings and recommendations were discussed with                        the patient and their family       "

## 2018-12-01 NOTE — PROGRESS NOTES
[facility], [place]    Hematology / Oncology Progress Note    Date of Admission: 11/29/2018  Hospital Day #: 2   Date of Service (when I saw the patient): 12/01/2018     Assessment & Plan   Iris Lewis is a 78 year old female with PMHx significant for osteoporosis, hypertension, thyroid cancer s/p thyroidectomy (1980s), GERD s/p Nissen (2006) and metastatic pancreatic cancer who presents with abdominal pain and labs suggestive of cholangitis, obstructive jaundice and gram negative bacteremia (11/28).     Updates 12/1/18  - Responsive to IV fluid resuscitation (6L) and transfusion (2 PRBC) 11/30 without pressor support  - Transfer out to 7D whenever bed is available  - Worsening anemia felt hemodilution (CT was not performed), stable Hgb after transfusion  - Pt and her son stated they wanted to complete necessary medical treatment for current acute event but eventually wanted to transition to home hospice    #Cholangitis  #Pseudomonas/Citrobacter/Klesiella bacteremia  #Obstructive jaundice 2/2 pancreatic cancer  Patient presents with abdominal pain not well controlled on oral analgesic regimen. Has been taking approximately 15 mg oxycodone every 4 hours and ran out of her supply at home prompting presentation to the ED. At that time, the patient's brother notes that she has become more yellow in color (especially of skin and eyes) and has been more confused over the past few days. Elected to discharge from the ED 11/28 with pain medication with plan to enroll in hospice. However with positive blood cultures, patient called to return to the hospital for intervention.   -Labs 11/28 notable for WBC count of 43.1 with a left shift, total bilirubin of 7.5, Alk phos 449, ALT/AST 98/119. Admission labs pending: Tbili 7.8, elevated transaminases and WBC count of 28.9.  -Blood cultures 11/28 from port positive for 3 different gram negatives (pseudomonas, klebsiella, and citrobacter)   Blood  culture 11/29 started growing pseudomonas   Blood cultures 11/30-12/1 NGTD, on daily cultures  -Initiated on IV Zosyn to cover for cholangitis and bacteremia.  -GI consulted. s/p emergent ERCP 11/29 with findings of existing two stents occluded with tumor ingrowth, stented open with pus and bile drainage. LFTs and Tbili mildly improved today.  -Pain control: IV dilaudid 0.2-0.3 mg q3hrs prn      #Mouth sores/mucositis.   Has caused difficulty taking PO intake, thus has had poor PO intake over the course of the past few days.   -IV hydration.   -MMW prn, baking soda and salt swishes, and pain control as above.      #Metastatic pancreatic cancer.   #H/o thyroid cancer s/p thyroidectomy (1980s).  Diagnosed Oct 2017 with mets to the liver, lung, and lymph nodes. Progressed on multiple therapies. No therapy since middle of October 2018 as patient was not interested in pursuing a phase 1 trial and was considering transition to hospice. Follows with Dr. Yu.   -Per Dr. Yu, would prefer interventions until plan discussed regarding further therapy vs hospice.   -Palliative care consult to help coordinate GOC discussions and possible hospice transition.   12/1 Pt and son do not want any more cancer directed treatments. They understands she will not be a candidate for a trials. They do want to complete infection treatment (if required, IV antibiotics) before proceeding with home hospice. They do not want to discontinue necessary antibiotics treatment to enroll home hospice.  -Continue PTA creon capsules when diet resumed.  -Continue PTA Synthroid.     #Hypotension.   2/2 sepsis, resuscitated with IVF, albumin, and PRBCs.   -Held PTA anti-hypertensives  -resolved      #Reactive airway disease.   -Continue PTA albuterol inhaler      #Glaucoma.  -Continue PTA eye drops.     #H/o pulmonary embolism.  -Xarelto at home, had been on hold for GI procedure. May consider to restart 3 days after stent placement as GI recommended  if there is no evidence of bleeding.     FEN  -Off IV fluid  -replace lytes per protocol prn   -RDAT    #Severe malnutrition in the context of acute on chronic illness.   % Intake: </= 50% for >/= 5 days (severe), % Weight Loss: > 5% in 1 month (severe), Subcutaneous Fat Loss: Facial region, Upper arm and Lower arm:  Severe, Muscle Loss: Temporal, Facial & jaw region, Scapular bone, Thoracic region (clavicle, acromium bone, deltoid, trapezius, pectoral), Upper arm (bicep, tricep), Lower arm  (forearm) and Dorsal hand:  Severe.  -Supplements offered.   -Continue to monitor.    Prophys  -VTE: on hold as above  -PUD: PTA Protonix  -Bowels: prn      Dispo: Pending therapy decisions, GOC discussions, and improvement in infection, discharge unknown. Will update as able.     The patient was seen and care plans discussed with Dr. Masters.    Se julián Varma MD  [facility]  Hematology oncology and transplantation fellow  Pager [phone]      Interval History   Iris states she is feeling better this morning. Was transferred to ICU yesterday due to low BP. BP responded to IV fluid treatment. Denies any bleeding. Appetite improved and able to eat some this AM. Her son was at the bedside. They felt going hospice at this point would prevent her from continuing IV antibiotics. They like to complete necessary antibiotics for cholangitis before going for home hospice. Open to continue to follow up hospice team. They understands poor prognosis of her pancreatic cancer and do not want any cancer directed therapy.     Physical Exam   Temp: 97.7  F (36.5  C) Temp src: Oral BP: 113/69   Heart Rate: 72 Resp: 15 SpO2: 96 % O2 Device: None (Room air)    Vitals:    11/30/18 0850 11/30/18 1600   Weight: 47.7 kg (105 lb 1.6 oz) 48.9 kg (107 lb 12.9 oz)     Vital Signs with Ranges  Temp:  [97.7  F (36.5  C)-98.2  F (36.8  C)] 97.7  F (36.5  C)  Heart Rate:  [61-86] 72  Resp:  [11-28] 15  BP: ()/(53-83) 113/69  SpO2:  [91  %-100 %] 96 %  I/O last 3 completed shifts:  In: 2771 [P.O.:745; I.V.:1482.5]  Out: 200 [Urine:200]    Constitutional: Pleasant cachectic female seen sitting up in bed, in NAD. Alert and interactive.   HEENT: NCAT, icteric sclerae, conjunctiva clear. Mucous membranes more moist.  Respiratory: Non-labored breathing, good air exchange. Lungs are clear to anterior auscultation bilaterally, without wheezing, crackles or rhonchi.   Cardiovascular: Regular rate and rhythm with no murmur, rub or gallop.  GI: Normoactive BS. Abdomen is soft, mildly distended, and non-tender to palpation. No rigidity or guarding.  Skin: Jaundiced. Warm and dry. No rashes or lesions on exposed surfaces.   Musculoskeletal: Extremities grossly normal. No tenderness or edema present.   Neurologic: A &O x3, speech normal, answering questions appropriately. Moves all extremities spontaneously. Grossly non-focal.  Neuropsychiatric: Mentation and affect normal/appropriate.  VAD: Port is c/d/i with no erythema, drainage, or tenderness.       Medications   Current Facility-Administered Medications   Medication     acetaminophen (TYLENOL) tablet 650 mg     albuterol (PROAIR HFA/PROVENTIL HFA/VENTOLIN HFA) 108 (90 Base) MCG/ACT inhaler 2 puff     albuterol (PROAIR HFA/PROVENTIL HFA/VENTOLIN HFA) 108 (90 Base) MCG/ACT inhaler 2 puff     amylase-lipase-protease (CREON 36) 45706 units per capsule 72,000 Units     glucose gel 15-30 g    Or     dextrose 50 % injection 25-50 mL    Or     glucagon injection 1 mg     folic acid (FOLVITE) tablet 400 mcg     HYDROmorphone (PF) (DILAUDID) injection 0.3-0.5 mg     hydrOXYzine (ATARAX) tablet 25 mg     levothyroxine (SYNTHROID/LEVOTHROID) tablet 125 mcg     lidocaine (LMX4) cream     lidocaine 1 % 1 mL     LORazepam (ATIVAN) tablet 0.5 mg    Or     LORazepam (ATIVAN) injection 0.5 mg     magnesium sulfate 4 g in 100 mL sterile water (premade)     May continue current IV fluid if patient has IV fluids infusing until  discharge.     May continue current IV fluids if patient has IV fluids infusing.     Medication Instruction     melatonin tablet 1 mg     multivitamin w/minerals (THERA-VIT-M) tablet 1 tablet     naloxone (NARCAN) injection 0.1-0.4 mg     naloxone (NARCAN) injection 0.1-0.4 mg     ondansetron (ZOFRAN) injection 8 mg    Or     ondansetron (ZOFRAN-ODT) ODT tab 8 mg    Or     ondansetron (ZOFRAN) tablet 8 mg     oxyCODONE (ROXICODONE) tablet 5-10 mg     pantoprazole (PROTONIX) EC tablet 20 mg     piperacillin-tazobactam (ZOSYN) 4.5 g vial to attach to  mL bag     polyethylene glycol (MIRALAX/GLYCOLAX) Packet 17 g     potassium chloride (KLOR-CON) Packet 20-40 mEq     potassium chloride 10 mEq in 100 mL intermittent infusion with 10 mg lidocaine     potassium chloride 10 mEq in 100 mL sterile water intermittent infusion (premix)     potassium chloride 20 mEq in 50 mL intermittent infusion     potassium chloride ER (K-DUR/KLOR-CON M) CR tablet 20-40 mEq     potassium phosphate 15 mmol in D5W 250 mL intermittent infusion     potassium phosphate 20 mmol in D5W 250 mL intermittent infusion     potassium phosphate 20 mmol in D5W 500 mL intermittent infusion     potassium phosphate 25 mmol in D5W 500 mL intermittent infusion     prochlorperazine (COMPAZINE) tablet 5 mg    Or     prochlorperazine (COMPAZINE) injection 5 mg     senna-docusate (SENOKOT-S/PERICOLACE) 8.6-50 MG per tablet 1 tablet    Or     senna-docusate (SENOKOT-S/PERICOLACE) 8.6-50 MG per tablet 2 tablet     sodium chloride (PF) 0.9% PF flush 3 mL     sodium chloride (PF) 0.9% PF flush 3 mL     sodium chloride (PF) 0.9% PF flush 3 mL     sodium chloride (PF) 0.9% PF flush 3 mL     timolol maleate (TIMOPTIC) 0.5 % ophthalmic solution 1 drop       Data   CBC    Recent Labs  Lab 12/01/18  0334 11/30/18  1246 11/30/18  0804 11/30/18  0615 11/29/18  1718 11/28/18  1329   WBC 39.0*  --   --  34.7* 28.9* 43.1*   RBC 3.36*  --   --  1.72* 2.49* 2.44*   HGB 9.7*  8.5* 5.3* 4.8* 7.1* 7.0*   HCT 28.8*  --   --  14.6* 21.2* 21.0*   MCV 86  --   --  85 85 86   MCH 28.9  --   --  27.9 28.5 28.7   MCHC 33.7  --   --  32.9 33.5 33.3   RDW 18.8*  --   --  21.0* 20.8* 20.2*   PLT 65*  --   --  102* 158 164     CMP    Recent Labs  Lab 12/01/18  0334 11/30/18  0615 11/29/18  1729 11/29/18  1718    136 131* 133   POTASSIUM 3.3* 3.5 3.2* 3.1*   CHLORIDE 113* 107 99 100   CO2 19* 21 21 25   ANIONGAP 9 9 11 9   * 154* 73 74   BUN 13 19 20 19   CR 0.92 0.98 1.02 1.04   GFRESTIMATED 59* 55* 52* 51*   GFRESTBLACK 72 66 63 62   EMMY 6.6* 6.5* 7.1* 7.2*   MAG  --  3.3* 1.5* 1.5*   PHOS  --   --  2.5 2.4*   PROTTOTAL 5.2* 5.2* 6.0* 6.0*   ALBUMIN 1.9* 2.3* 1.7* 1.7*   BILITOTAL 5.0* 5.7* 7.8* 7.8*   ALKPHOS 269* 238* 405* 401*   AST 39 47* 81* 79*   ALT 52* 52* 87* 85*     INR    Recent Labs  Lab 12/01/18  0334 11/30/18  0804 11/29/18  1718   INR 2.71* 3.73* 3.43*       Results for orders placed or performed during the hospital encounter of 11/29/18   XR Surgery [place] G/T 5 Min Fluoro w Stills    Narrative    This exam was marked as non-reportable because it will not be read by a   radiologist or a [place] non-radiologist provider.

## 2018-12-01 NOTE — PLAN OF CARE
Problem: Patient Care Overview  Goal: Plan of Care/Patient Progress Review  Outcome: Improving  D: Admitted 11/29 for abdominal pain, found to be cholangitis and had ERCP done.  I/A: AOx4 overnight but forgetful. Able to move self in bed, but unsteady on feet. Does not use call light despite frequent direction; bed alarm on. No temps. C/o some abdominal and back pain overnight. Initially received IV dilaudid, but PO oxy requested from MD and given with relief. NSR 60-80s. BPs stable. 2+ edema still present in feet. On RA maintaining sats >97%. Bowel sounds active, no BM overnight. Voided once 200cc. Fluids encouraged. AM Hgb 9.7, pltls 62, and INR trending down. K and calcium to be replaced.  P: Continue to trend labs and monitor status. Possible transfer to floor today if pt remains stable. Continue plan of care.

## 2018-12-01 NOTE — PROGRESS NOTES
[facility]   MICU Transfer Note  Iris Lewis MRN: [id number]  1940  Date of Admission:11/29/2018    Iris Lewis MRN# [id number]   Age: 78 year old YOB: 1940     Date of Admission: 11/29/2018  Date of Service: 12/01/2018              ~~Changes Today~~    December 1, 2018     - Continue ATB  - Transfer to Hem/onc         Assessment & Plan     Iris Lewis is a 78 year old female with a PMHx significant for hypertension and metastatic pancreatic cancer who presented with subacute abdominal pain and jaundice, found to have septic shock from Pseudomonas bacteremia due to obstructive cholangitis s/p ERCP, transferred to the ICU this morning for possible initiation of pressors.  ------------------------------------------------------------------------------------------------------------------  NEURO/PSYCH:   No active issues  ------------------------------------------------------------------------------------------------------------------  PULMONARY:  No active issues  ------------------------------------------------------------------------------------------------------------------  CARDIOLOGY:   #Sepsis-induced hypotension secondary to Pseudomonas bacteremia  #h/o HTN  Patient admitted with 80s/40s with slow improvement in MAPs to 70s after 5.5L IVF in the OR, 2L NS am 11/30, 50g albumin and 2u pRBCs. SBP improved to 90-100s, so it was no need to start pressors.  -Continue IVF as needed  ------------------------------------------------------------------------------------------------------------------  RENAL:  #SUNIL  Creatinine 1.04>0.98 since admission, with baseline creatinine ~0.6-0.7. Likely prerenal vs ATN from sepsis, stable at 0.92  - Avoid nephrotoxins  - Monitor closely  ------------------------------------------------------------------------------------------------------------------  INFECTIOUS DISEASE:  #Pseudomonas bacteremia likely 2/2 cholangitis vs line ifx  2/2  sets blood cultures from 11/28 drawn for abdominal pain and obstructive jaundice positive for gram negative rods. In addition, BC from pot grew PSA, C. Freundii and K. Pneumoniae mostly segovia S. Will need 2 weeks of IV antibiotics and consider to remove the line according to clinical course or persistance of positive cultures.  - Continue pip-tazo for now  - Consider one dose of tobramycin if needing to start pressors  - f/u susceptilities    **Antibiotics: Zosyn   **Cultures: 11/28, 11/29 from [facility] positive. PEripheral BC from 11/30 still pending  ------------------------------------------------------------------------------------------------------------------  GASTROINTESTINAL/GENITOURINARY:  # Obstructing pancreatic adenocarcinoma complicated with cholangitis s/p 11/29 ERCP/stents  # Acute on chronic abdominal pain  Patient with upper abdominal pain, gram negative bacteremia (11/28 cx), jaundice, cholestatic transaminitis (T Bili 7.7, ), WBC 43 (11/28). S/p ERCP 11/29 with stent placement to decompress biliary obstruction as able. This morning, WBC are trending down (39) as well as LFTs.  - Continue pip-tazo  - PRN anti-emetics (also have lorazepam for nausea)  - PRN hydroxyzine for pruritus  - PRN hydromorphone for pain  ------------------------------------------------------------------------------------------------------------------  ENDOCRINE:  No active issues  ------------------------------------------------------------------------------------------------------------------  HEMATOLOGIC:  #Metastatic pancreatic cancer.  Diagnosed Oct 2017 with mets to the liver, lung, and lymph nodes. Progressed on multiple therapies, including liposomal irinotecan/5FU through 7/2018. No therapy since middle of October 2018 as patient was not interested in pursuing a phase 1 trial and was considering transition to hospice. Had planned to enroll on hospice 11/30, but elected to come in for IV antibiotics and ERCP.  Evaluated by palliative team that mentions that patient's goals are not lined with hospice enrolment. Will need to keep discussing with patient to re-define goals of care.      - Palliative care following  - Continue PTA creon capsules   - continue folic acid 400mcg qday     #Acute on chronic anemia  Hgb 4.8 on 11/30 am from 7.0 on admission. Received 6L IVF during ERCP, so very likely dilutional, with additional derangement from sepsis. No sign of bleeding. Baseline Hgb 9-10g/dl. S/p 2u pRBCs with Hgb 8.5. Hgb 9.7 this morning  - Transfusion goal >7g/dl  - Monitor closely     #Elevated INR  3.43>3.73 since admission, after 1u FFP. Likely due to sepsis. No further reversal for now, unless signs of bleeding. Was 0.94 in July. As she has liver mets, it is possible she has synthetic dysfunction and sepsis. This morning INR 2.71  - Monitor     #H/o papillary thyroid cancer s/p thyroidectomy (1980s).  -Continue PTA Synthroid.  ------------------------------------------------------------------------------------------------------------------  ------------------------------------------------------------------------------------------------------------------  Prophylaxis:     -GI: PPI    -DVT: Holding off rivaroxaban, consider to re-start given h/o PE    FEN: Regular diet, correcting K (on replacement protocol), no IVF    Consults: Hem/onc, GI, palliative care  Code status: DNI/DNR  Disposition: Transfer to Hem/Onc  ===================================================  Patient was discussed and evaluated with Dr. Perlman Eloy E. Ordaya, MD  Internal Medicine Resident, PGY 2  Pager [phone]           Interval History   Nursing notes reviewed. Patient refers one episode of SOB that resolved by itself and was not associated with desaturation. BP mildly soft but no reports of fever. Patient referring to feel much better this morning.         Medications     Current Facility-Administered Medications   Medication Dose Route  Frequency     albuterol  2 puff Inhalation 4x Daily     amylase-lipase-protease  2 capsule Oral TID w/meals     folic acid  400 mcg Oral QAM     levothyroxine  125 mcg Oral Daily     multivitamin w/minerals  1 tablet Oral Daily     pantoprazole  20 mg Oral Daily     piperacillin-tazobactam  4.5 g Intravenous Q6H     sodium chloride (PF)  3 mL Intracatheter Q8H     timolol maleate  1 drop Both Eyes QAM     Current Facility-Administered Medications   Medication Last Rate     - MEDICATION INSTRUCTIONS -       - MEDICATION INSTRUCTIONS -       - MEDICATION INSTRUCTIONS -       Current Facility-Administered Medications   Medication Dose Route Frequency     acetaminophen  650 mg Oral Q6H PRN     albuterol  2 puff Inhalation 4x Daily PRN     glucose  15-30 g Oral Q15 Min PRN    Or     dextrose  25-50 mL Intravenous Q15 Min PRN    Or     glucagon  1 mg Subcutaneous Q15 Min PRN     HYDROmorphone  0.3-0.5 mg Intravenous Q4H PRN     hydrOXYzine  25 mg Oral Q6H PRN     lidocaine 4%   Topical Q1H PRN     lidocaine (buffered or not buffered)  1 mL Other Q1H PRN     LORazepam  0.5 mg Oral Q4H PRN    Or     LORazepam  0.5 mg Intravenous Q6H PRN     magnesium sulfate  4 g Intravenous Q4H PRN     - MEDICATION INSTRUCTIONS -   Does not apply Continuous PRN     - MEDICATION INSTRUCTIONS -   Does not apply Continuous PRN     - MEDICATION INSTRUCTIONS -   Does not apply Continuous PRN     melatonin tablet 1 mg  1 mg Oral At Bedtime PRN     naloxone  0.1-0.4 mg Intravenous Q2 Min PRN     naloxone  0.1-0.4 mg Intravenous Q2 Min PRN     ondansetron  8 mg Intravenous Q8H PRN    Or     ondansetron  8 mg Oral Q8H PRN    Or     ondansetron  8 mg Oral Q8H PRN     oxyCODONE  5-10 mg Oral Q6H PRN     polyethylene glycol  17 g Oral Daily PRN     potassium chloride  20-40 mEq Oral or Feeding Tube Q2H PRN     potassium chloride with lidocaine  10 mEq Intravenous Q1H PRN     potassium chloride  10 mEq Intravenous Q1H PRN     potassium chloride  20 mEq  Intravenous Q1H PRN     potassium chloride  20-40 mEq Oral Q2H PRN     potassium phosphate (KPHOS) in D5W IV  15 mmol Intravenous Daily PRN     potassium phosphate (KPHOS) in D5W IV  20 mmol Intravenous Q6H PRN     potassium phosphate (KPHOS) in D5W IV  20 mmol Intravenous Q6H PRN     potassium phosphate (KPHOS) in D5W IV  25 mmol Intravenous Q8H PRN     prochlorperazine  5 mg Oral Q6H PRN    Or     prochlorperazine  5 mg Intravenous Q6H PRN     senna-docusate  1 tablet Oral BID PRN    Or     senna-docusate  2 tablet Oral BID PRN     sodium chloride (PF)  3 mL Intracatheter Q1H PRN     sodium chloride (PF)  3 mL Intravenous q1 min prn     sodium chloride (PF)  3 mL Intravenous q1 min prn             Objective      Physical Exam  Temp:  [97.7  F (36.5  C)-98.2  F (36.8  C)] 97.7  F (36.5  C)  Heart Rate:  [61-86] 72  Resp:  [11-28] 15  BP: ()/(53-83) 113/69  SpO2:  [91 %-100 %] 96 %    Resp: 15    General: AAOx3, NAD, cachectic, lying in bed  HEENT: Oral mucosa moist and non-erythematous, PERRLA, +scleral icterus  CV: RRR, normal S1S2, no murmur, clicks, rubs  Resp: Clear to auscultation bilaterally, no wheezes, rhonchi  Abd: Soft, mildly tender diffusely, mildly distended, BS+, no masses appreciated  Extremities: WWP, no pedal edema  Neuro: No lateralizing symptoms or focal neurologic deficits           Data:      CMP    Recent Labs  Lab 12/01/18  0334 11/30/18  0615 11/29/18  1729 11/29/18  1718    136 131* 133   POTASSIUM 3.3* 3.5 3.2* 3.1*   CHLORIDE 113* 107 99 100   CO2 19* 21 21 25   ANIONGAP 9 9 11 9   * 154* 73 74   BUN 13 19 20 19   CR 0.92 0.98 1.02 1.04   GFRESTIMATED 59* 55* 52* 51*   GFRESTBLACK 72 66 63 62   EMMY 6.6* 6.5* 7.1* 7.2*   MAG  --  3.3* 1.5* 1.5*   PHOS  --   --  2.5 2.4*   PROTTOTAL 5.2* 5.2* 6.0* 6.0*   ALBUMIN 1.9* 2.3* 1.7* 1.7*   BILITOTAL 5.0* 5.7* 7.8* 7.8*   ALKPHOS 269* 238* 405* 401*   AST 39 47* 81* 79*   ALT 52* 52* 87* 85*       CBC    Recent Labs  Lab  12/01/18  0334 11/30/18  1246 11/30/18  0804 11/30/18  0615 11/29/18  1718 11/28/18  1329   WBC 39.0*  --   --  34.7* 28.9* 43.1*   RBC 3.36*  --   --  1.72* 2.49* 2.44*   HGB 9.7* 8.5* 5.3* 4.8* 7.1* 7.0*   HCT 28.8*  --   --  14.6* 21.2* 21.0*   MCV 86  --   --  85 85 86   MCH 28.9  --   --  27.9 28.5 28.7   MCHC 33.7  --   --  32.9 33.5 33.3   RDW 18.8*  --   --  21.0* 20.8* 20.2*   PLT 65*  --   --  102* 158 164       INR    Recent Labs  Lab 12/01/18  0334 11/30/18  0804 11/29/18  1718   INR 2.71* 3.73* 3.43*       Lactic Acid    Recent Labs  Lab 11/30/18  0615 11/28/18  1329   LACT 0.8 1.2       Other labs:    Microbiology:  All cultures:    Recent Labs  Lab 12/01/18  0543 12/01/18  0538 11/30/18  0621 11/30/18  0615 11/29/18  1728 11/29/18  1718 11/28/18  1521   CULT No growth after 1 hour No growth after 1 hour No growth after 1 day No growth after 1 day Canceled, Test creditedSpecimen not collectedTANJA LARES AT [facility] 11.30.18 0915 Cultured on the 1st day of incubation:Pseudomonas aeruginosa*  Critical Value/Significant Value, preliminary result only, called to and read back byManinder Granger RN [facility] 1341 11/30/2018 AA Cultured on the 1st day of incubation:Pseudomonas aeruginosa*  Critical Value/Significant Value, preliminary result only, called to and read back byLos He MD 0848 11/29/2018 AA  Cultured on the 1st day of incubation:Citrobacter freundii complex*  Cultured on the 1st day of incubation:Klebsiella pneumoniae*  Critical Value/Significant Value called to and read back byManinder Leos RN on 11.30.2018 at 1455. KVO  (Note)POSITIVE for PSEUDOMONAS AERUGINOSA by [employer] multiplex nucleicacid test. Final identification and antimicrobial susceptibilitytesting will be verified by standard methods.Specimen tested with [employer] multiplex, gram-negative blood culturenucleic acid test for the following targets: Acinetobacter sp.,Citrobacter sp., Enterobacter sp., Proteus sp., E.  coli, K.pneumoniae/oxytoca, P. aeruginosa, and the following resistancemarkers: CTXM, KPC, NDM, VIM, IMP and OXA.Critical Value/Significant Value called to and read back by Dr. Ann in [facility] at 1104 on 11/29/18 .       Urine Studies:    Recent Labs   Lab Test  10/31/17   2150  10/31/17   1225  02/17/14   0930   LEUKEST  Negative  Trace*  Moderate*   NITRITE  Negative  Negative  Negative   WBCU  <1  10*  4*   RBCU  <1  2  <1       Imaging:  No results found for this or any previous visit (from the past 24 hour(s)).

## 2018-12-02 NOTE — PLAN OF CARE
Problem: Patient Care Overview  Goal: Plan of Care/Patient Progress Review  Outcome: Improving    Alert and oriented, although occasionally forgetful. Given PRN 10mg oxycodone for generalized pain. Blood pressure stable with no signs of bleeding, labs stable. Comfortable on room air, denies shortness of breath (reported an occurrence of breathlessness after waking up overnight, MD aware); did request albuterol inhaler this morning after reporting some wheezing, O2 sats 96% or higher. Tolerating regular diet, needs encouragement to eat and drink. Abdomen slightly distended, denies N/V but does endorse occasional discomfort. Voided x3 today, no BM. K of 3.3 replaced with 60mEq, recheck 3.9. Also given a total 3g calcium gluconate.     Transferred to  at 1845, report given to Kimberly GRIDER. Will notify heme/onc with concerns/changes.

## 2018-12-02 NOTE — PLAN OF CARE
Problem: Patient Care Overview  Goal: Plan of Care/Patient Progress Review  Outcome: No Change  [id number]: VSS, Afebrile. C/o abd pain overnight. Gave 10mg Oxycodone. Pt has port & dressing change due today. Pt seen by WOC, has Mepilex covering stage 2 pressure injury on spine and preventative on coccyx. Encouraging turns when in bed. On zosyn. Forgetful; bed alarm on. SBA and cane. Continue with plan of care.

## 2018-12-02 NOTE — CONSULTS
Reason for consult: Thrombocytopenia    78-year-old with metastatic pancreatic cancer, no longer receiving active treatment for cancer, admitted a few days ago for for cholangitis and positive blood cultures.  She has had a dropping platelet count while in the hospital.  She received 1 heparin flush while in hospital.  She is on Zosyn.    Peripheral blood smear, Reviewed by me-Decreased number of platelets, those present are large and well granulated. Red cells with scattered targets and rare schistocytes. Dramatic number of neutrophils with left shift and toxic granulation.     This thrombocyte owens is likely a combination of DIC from infection and metastatic pancreatic cancer.  She is unlikely to have heparin-induced thrombocytopenia, given just single dose of heparin flush and timing..  Care would be supportive, transfuse for platelets less than 10, bleeding, or if needs an intervention such as a stent.  Discussed with Dr. Masters.      Holly Sheehan MD  Hematology

## 2018-12-02 NOTE — PROGRESS NOTES
[facility], [place]    Hematology / Oncology Progress Note    Date of Admission: 11/29/2018  Hospital Day #: 3   Date of Service (when I saw the patient): 12/02/2018     Assessment & Plan   Iris Lewis is a 78 year old female with PMHx significant for osteoporosis, hypertension, thyroid cancer s/p thyroidectomy (1980s), GERD s/p Nissen (2006) and metastatic pancreatic cancer who presents with abdominal pain and labs suggestive of cholangitis, obstructive jaundice and gram negative bacteremia (11/28).     New 12/2/18  - Hematology consult for new thrombocytopenia. Labs ordered: HIT screen, Coag panel, and peripheral smear.  - Infectious disease consult to advise regarding port removal/salvage.   - Discontinued Zosyn due to possible contribution to thrombocytopenia, initiated oral ciprofloxacin pending ID recs.  - Attending/fellow, patient and family discussed GOC today and patient electing to discharge to facility w/ hospice. Notified SW to start process of finding a facility near [facility].    #Cholangitis.  #Pseudomonas/Citrobacter/Klesiella bacteremia.  #Obstructive jaundice 2/2 pancreatic cancer.  Patient presents with abdominal pain not well controlled on oral analgesic regimen. Has been taking approximately 15 mg oxycodone every 4 hours and ran out of her supply at home prompting presentation to the ED. At that time, the patient's brother notes that she has become more yellow in color (especially of skin and eyes) and has been more confused over the past few days. Elected to discharge from the ED 11/28 with pain medication with plan to enroll in hospice. However with positive blood cultures, patient called to return to the hospital for intervention.   -Labs 11/28 notable for WBC count of 43.1 with a left shift, total bilirubin of 7.5, Alk phos 449, ALT/AST 98/119. Admission labs pending: Tbili 7.8, elevated transaminases and WBC count of 28.9.  -Unfortunately, BCx 11/28 drawn  in ER with no peripheral draw, and BCx 11/29 peripheral cancelled by unknown personal/for unknown reason as it was ordered.  -Blood cultures 11/28 from port positive for 3 different gram negatives (pseudomonas, klebsiella, and citrobacter)-all relatively pan-sensitive.   Blood culture 11/29 started positive for pseudomonas.   Blood cultures 11/30-12/2 NGTD, on daily cultures.  -Initiated on IV Zosyn to cover for cholangitis and bacteremia-->transition to PO Cipro due to good bioavailability and possibility that Zosyn contributing to TCP.  -GI consulted. s/p emergent ERCP 11/29 with findings of existing two stents occluded with tumor ingrowth, stented open with pus and bile drainage. LFTs and Tbili mildly improved since intervention, stable around 5.  -Pain control: IV dilaudid 0.2-0.3 mg q3hrs prn discontinued, transition to PTA PO oxycodone.     #Mouth sores/mucositis.   Has caused difficulty taking PO intake, thus has had poor PO intake over the course of the past few days.   -IV hydration.   -MMW prn, baking soda and salt swishes, and pain control as above.      #Metastatic pancreatic cancer.   #H/o thyroid cancer s/p thyroidectomy (1980s).  Diagnosed Oct 2017 with mets to the liver, lung, and lymph nodes. Progressed on multiple therapies. No therapy since middle of October 2018 as patient was not interested in pursuing a phase 1 trial and was considering transition to hospice. Follows with Dr. Yu.   -Per Dr. uY, would prefer interventions until plan discussed regarding further therapy vs hospice.   -Palliative care consult to help coordinate GOC discussions and possible hospice transition. 12/1 Pt and son do not want any more cancer directed treatments. They understands she will not be a candidate for a trials. They do want to complete infection treatment (if required, IV antibiotics) before proceeding with home hospice. They do not want to discontinue necessary antibiotics treatment to enroll home  "hospice. Per palliative note 11/30, patient \"not ready to die\" and would be willing to be readmitted for hospitalization if acute issue arose after discharge. Not consistent with hospice enrollment. Although it appears patient changes mind on what she is hoping for (12/2).  -Continue PTA creon capsules when diet resumed.  -Continue PTA Synthroid.     #Hypotension-.2/2 sepsis, resuscitated with 6L IVF, albumin, and 2 units PRBCs. Now resolved.  -Held PTA anti-hypertensives     #Reactive airway disease.   -Continue PTA albuterol inhaler      #Glaucoma.  -Continue PTA eye drops.     #H/o pulmonary embolism.  -Xarelto at home, had been on hold for GI procedure. May consider to restart 3 days after stent placement as GI recommended if there is no evidence of bleeding. Currently also with new thrombocytopenia which will need to be taken into consideration.     FEN  -Off IV fluid  -replace lytes per protocol prn   -RDAT    #Severe malnutrition in the context of acute on chronic illness.   % Intake: </= 50% for >/= 5 days (severe), % Weight Loss: > 5% in 1 month (severe), Subcutaneous Fat Loss: Facial region, Upper arm and Lower arm:  Severe, Muscle Loss: Temporal, Facial & jaw region, Scapular bone, Thoracic region (clavicle, acromium bone, deltoid, trapezius, pectoral), Upper arm (bicep, tricep), Lower arm  (forearm) and Dorsal hand:  Severe.  -Supplements offered.   -Continue to monitor.    Prophys  -VTE: on hold as above  -PUD: PTA Protonix  -Bowels: prn      Dispo: Pending therapy decisions, GOC discussions, and improvement in infection, discharge unknown. Possibly in next 1-2 days.  -Requested hospital follow up for mid-week in clinic, this may need to be rescheduled or discontinued pending discharge or hospice decision.     The patient was seen and care plans discussed with Dr. Masters.    Val Duarte PA-C  Hematology/Oncology  Pager #[phone]    Interval History   Iris states she is having abdominal pain this morning " and feels quite fatigued. Ordered some food for breakfast and was awaiting it's arrival. No further complaints noted. Expressed that she would like to discharge home but after discussion of some further acute issues to address, she understood the need to remain hospitalized.     Physical Exam   Temp: 95.7  F (35.4  C) Temp src: Oral BP: 123/70 Pulse: 83 Heart Rate: 78 Resp: 16 SpO2: 95 % O2 Device: None (Room air)    Vitals:    11/30/18 0850 11/30/18 1600 12/02/18 0900   Weight: 47.7 kg (105 lb 1.6 oz) 48.9 kg (107 lb 12.9 oz) 49.3 kg (108 lb 11.2 oz)     Vital Signs with Ranges  Temp:  [95.7  F (35.4  C)-97.9  F (36.6  C)] 95.7  F (35.4  C)  Pulse:  [83] 83  Heart Rate:  [71-88] 78  Resp:  [14-16] 16  BP: (110-123)/(62-77) 123/70  SpO2:  [95 %-98 %] 95 %  I/O last 3 completed shifts:  In: 1425 [P.O.:625; I.V.:800]  Out: 225 [Urine:225]    Constitutional: Pleasant cachectic female seen sitting up in bed, in NAD. Alert and interactive.   HEENT: NCAT, icteric sclerae, conjunctiva clear. Mucous membranes more moist, lips mildly dry.  Respiratory: Non-labored breathing, good air exchange. Lungs are clear to auscultation bilaterally, without wheezing, crackles or rhonchi.   Cardiovascular: Regular rate and rhythm with no murmur, rub or gallop.  GI: Normoactive BS. Abdomen is soft, mildly distended, and minimally tender. No rigidity or guarding.  Skin: Jaundiced. Warm and dry. No rashes or lesions on exposed surfaces.   Musculoskeletal: Extremities grossly normal. No tenderness or edema present.   Neurologic: A &O x3, speech normal, answering questions appropriately. Moves all extremities spontaneously. Grossly non-focal.  Neuropsychiatric: Mentation and affect normal/appropriate.  VAD: Port is c/d/i with no erythema, drainage, or tenderness.       Medications   Current Facility-Administered Medications   Medication     acetaminophen (TYLENOL) tablet 650 mg     albuterol (PROAIR HFA/PROVENTIL HFA/VENTOLIN HFA) 108 (90  Base) MCG/ACT inhaler 2 puff     albuterol (PROAIR HFA/PROVENTIL HFA/VENTOLIN HFA) 108 (90 Base) MCG/ACT inhaler 2 puff     amylase-lipase-protease (CREON 36) 40846 units per capsule 72,000 Units     ciprofloxacin (CIPRO) tablet 500 mg     glucose gel 15-30 g    Or     dextrose 50 % injection 25-50 mL    Or     glucagon injection 1 mg     folic acid (FOLVITE) tablet 400 mcg     HYDROmorphone (PF) (DILAUDID) injection 0.3-0.5 mg     hydrOXYzine (ATARAX) tablet 25 mg     levothyroxine (SYNTHROID/LEVOTHROID) tablet 125 mcg     lidocaine (LMX4) cream     lidocaine 1 % 1 mL     LORazepam (ATIVAN) tablet 0.5 mg    Or     LORazepam (ATIVAN) injection 0.5 mg     magnesium sulfate 4 g in 100 mL sterile water (premade)     May continue current IV fluid if patient has IV fluids infusing until discharge.     May continue current IV fluids if patient has IV fluids infusing.     Medication Instruction     melatonin tablet 1 mg     multivitamin w/minerals (THERA-VIT-M) tablet 1 tablet     naloxone (NARCAN) injection 0.1-0.4 mg     naloxone (NARCAN) injection 0.1-0.4 mg     ondansetron (ZOFRAN) injection 8 mg    Or     ondansetron (ZOFRAN-ODT) ODT tab 8 mg    Or     ondansetron (ZOFRAN) tablet 8 mg     oxyCODONE (ROXICODONE) tablet 5-10 mg     pantoprazole (PROTONIX) EC tablet 20 mg     polyethylene glycol (MIRALAX/GLYCOLAX) Packet 17 g     potassium chloride (KLOR-CON) Packet 20-40 mEq     potassium chloride 10 mEq in 100 mL intermittent infusion with 10 mg lidocaine     potassium chloride 10 mEq in 100 mL sterile water intermittent infusion (premix)     potassium chloride 20 mEq in 50 mL intermittent infusion     potassium chloride ER (K-DUR/KLOR-CON M) CR tablet 20-40 mEq     potassium phosphate 15 mmol in D5W 250 mL intermittent infusion     potassium phosphate 20 mmol in D5W 250 mL intermittent infusion     potassium phosphate 20 mmol in D5W 500 mL intermittent infusion     potassium phosphate 25 mmol in D5W 500 mL  intermittent infusion     prochlorperazine (COMPAZINE) tablet 5 mg    Or     prochlorperazine (COMPAZINE) injection 5 mg     senna-docusate (SENOKOT-S/PERICOLACE) 8.6-50 MG per tablet 1 tablet    Or     senna-docusate (SENOKOT-S/PERICOLACE) 8.6-50 MG per tablet 2 tablet     sodium chloride (PF) 0.9% PF flush 3 mL     sodium chloride (PF) 0.9% PF flush 3 mL     sodium chloride (PF) 0.9% PF flush 3 mL     sodium chloride (PF) 0.9% PF flush 3 mL     timolol maleate (TIMOPTIC) 0.5 % ophthalmic solution 1 drop       Data   CBC    Recent Labs  Lab 12/02/18 0618 12/01/18  0334 11/30/18  1246 11/30/18  0804 11/30/18  0615 11/29/18  1718   WBC 27.9* 39.0*  --   --  34.7* 28.9*   RBC 3.20* 3.36*  --   --  1.72* 2.49*   HGB 9.1* 9.7* 8.5* 5.3* 4.8* 7.1*   HCT 27.7* 28.8*  --   --  14.6* 21.2*   MCV 87 86  --   --  85 85   MCH 28.4 28.9  --   --  27.9 28.5   MCHC 32.9 33.7  --   --  32.9 33.5   RDW 19.1* 18.8*  --   --  21.0* 20.8*   PLT 24* 65*  --   --  102* 158     CMP    Recent Labs  Lab 12/02/18  0618 12/01/18  1345 12/01/18  0334 11/30/18  0615 11/29/18  1729 11/29/18  1718     --  142 136 131* 133   POTASSIUM 4.5 3.9 3.3* 3.5 3.2* 3.1*   CHLORIDE 114*  --  113* 107 99 100   CO2 20  --  19* 21 21 25   ANIONGAP 7  --  9 9 11 9   GLC 69*  --  108* 154* 73 74   BUN 13  --  13 19 20 19   CR 0.88  --  0.92 0.98 1.02 1.04   GFRESTIMATED 63  --  59* 55* 52* 51*   GFRESTBLACK 76  --  72 66 63 62   EMMY 7.2*  --  6.6* 6.5* 7.1* 7.2*   MAG  --   --   --  3.3* 1.5* 1.5*   PHOS  --   --   --   --  2.5 2.4*   PROTTOTAL 5.1*  --  5.2* 5.2* 6.0* 6.0*   ALBUMIN 1.5*  --  1.9* 2.3* 1.7* 1.7*   BILITOTAL 5.0*  --  5.0* 5.7* 7.8* 7.8*   ALKPHOS 237*  --  269* 238* 405* 401*   AST 34  --  39 47* 81* 79*   ALT 43  --  52* 52* 87* 85*     INR    Recent Labs  Lab 12/02/18  0905 12/01/18  0334 11/30/18  0804 11/29/18  1718   INR 2.64* 2.71* 3.73* 3.43*       Results for orders placed or performed during the hospital encounter of 11/29/18    XR Surgery [place] G/T 5 Min Fluoro w Stills    Narrative    This exam was marked as non-reportable because it will not be read by a   radiologist or a [place] non-radiologist provider.

## 2018-12-02 NOTE — PROGRESS NOTES
[facility]  Palliative Care Daily Progress Note       Recommendations & Counseling     Proceed with arranging facility-based hospice care for her at time of discharge, which I think can be as soon as a safe discharge plan is developed.    Symptoms well controlled    Discussion:  I know her really well from clinic, and we've had extensive discussions with her about goals and hospice care. I think what's different now is that her global physical condition has declined markedly in the last couple weeks, and her children are here now and seem very much supportive of facility-based hospice care for her. I answered their Qs.     We will follow    Thank you for involving us in the patient's care.   José Antonio Ann MD / Palliative Medicine / Pager [phone] / [facility] Inpatient Team Consult Pager [phone] (answered 8am-430pm M-F) - ok to text page via [employer] / After-Hours Answering Service [phone] / Palliative Clinic in the [facility] at the [facility] - [phone] (scheduling); [phone] (triage).            Assessment      Diagnoses & symptoms:          She has metastatic pancreatic cancer, here with obstructive jaundice and cholangitis due to cancer ingrowth of her stents, s/p another bare metal stent here    Mood/coping/meaning: Family present which is a ele, grandson is here. She is grieving needing hospice care                  Interval History:   Transferred back to .  Pain is well controlled  Per family who are at bedside she's intermittently confused but better than yesterday    We discussed hospice including goals of hospice care to keep her safe and comfortable in her final weeks as above           Review of Systems:   Palliative Symptom Review (0=no symptom/no concern, 1=mild, 2=moderate, 3=severe):      Pain: 1      Fatigue: 3      Nausea: 0      Constipation: 0      Diarrhea: 1-2      Depressive Symptoms: 0      Anxiety: 1      Drowsiness: 0                Medications:   I have reviewed this patient's medication profile and medications during this hospitalization  Noted meds:  IV dilaudid transitioned to oral oxycodone 10mg po q4h prn           Physical Exam:   Vitals were reviewed  Temp: 95.7  F (35.4  C) Temp src: Oral BP: 123/70 Pulse: 83 Heart Rate: 78 Resp: 16 SpO2: 95 % O2 Device: None (Room air)    Alert but somewhat confused NAD  Sitting in bed with her daughter  Mouth dry no lesions  Resps unlabored, ctab  CV rrr s1s2  Abd sl distended soft  Marked R>L pitting LE edema             Data Reviewed:   Reviewed recent pertinent imaging, comments:  CT  cancer:  1. Ill-defined pancreatic head/uncinate process mass increasing size  from the prior study with progression of invasion off the third  portion of the duodenal with associated soft tissue mass.  2. Enlarged peripancreatic lymph nodes, stable. Concern for invasion  about the pylorus and duodenal bulb.  3. Stable pancreatic mass vascular relations with encasement/invasion  of the SMV with associated deformity.  4. Slightly increased peritoneal nodularity concerning for peritoneal  carcinomatosis. No ascites.  5. Patchy enhancement of the liver parenchyma likely due to  perfusional changes. No convincing evidence for liver lesions in the  present study.  Reviewed recent labs, comments: Cr 0.88

## 2018-12-02 NOTE — PLAN OF CARE
Problem: Patient Care Overview  Goal: Plan of Care/Patient Progress Review  Outcome: No Change    [id number]: VSS. Afebrile. Continues with abdominal pain. PRN IV Dilaudid 0.3 mg (now discontinued) and PRN PO Oxycodone 10 mg given x2. Denied nausea and vomiting. BG this AM was 69. Patient drank 1/2 an apple juice and received 15 g glucose gel. BG came up to 95. IV Zosyn now switched to PO Cipro. UOP adequate. Appetite fair to poor. Wound care done on right thoracic spine. Dressing to be changed every other day so next time would be Tuesday (12/4). UOP adequate. SW Consult placed. Patient and family decided on Inpatient Hospice and would like a facility close to patient's home, which is [place]. Bed alarm on for safety. Possible discharge in the next 1-2 days. Continue with POC.

## 2018-12-02 NOTE — PLAN OF CARE
Problem: Patient Care Overview  Goal: Plan of Care/Patient Progress Review  Outcome: No Change    [id number]: VSS. Pain in left ankle and posterior neck; IV dilaudid given x1; would like oxycodone when it is next available. Mepilex on stage 2 pressure injury on spine and preventative on coccyx; seen by WOC. Encouraging turns when in bed. On zosyn. Forgetful; bed alarm on. Up with SBA and cane. Continue with plan of care.

## 2018-12-02 NOTE — CONSULTS
GENERAL ID SERVICE: NEW CONSULTATION  Patient:  Iris Lewis, Date of birth 1940, Medical record number [id number]  Date of Admission: 11/29/2018  Date of Visit:  12/2/2018  Requesting Provider: Efraín Castro         Assessment and Recommendations:   Problem List:  1. Polymicrobial bacteremia: Blood culture from port on 11/28 with Pseudomonas, Citrobacter, and Klebsiella, all widely sensitive. Repeat blood culture from port on 11/29 with Pseudomonas. Culture from 11/30 port and peripheral with NGTD.   2. Metastatic pancreatic cancer with planned discharge to hospice.     Discussion:  Plan is to keep port in place as removal would not be consistent with current goals of care. The only risk to this plan is that the port may cause recurrent Pseudomonas bacteremia. Discussed options of antibiotic locks (gentamicin would work) or ongoing oral ciprofloxacin. Patient and family would like least invasive option. Therefore would recommend ongoing ciprofloxacin as long as it is well tolerated.     Recommendations:  Transition to oral ciprofloxacin 500 mg PO BID for as long as it is well tolerated. If confusion, tendon pain, or peripheral neuropathy occur, consider stopping.     It has been a pleasure to be involved with this patient's care. We will sign off for now, but please feel free to call with additional questions.     Emmy Levin MD  Infectious Diseases  [phone]        History of Present Illness:   Iris Lewis is a 78 year old woman with history of metastatic pancreatic cancer who presented on 11/28/18 with subacute abdominal pain and jaundice (noted since 11/24) concerning for cholangitis. She was subsequently found to have polymicrobial bacteremia with GNRs. She initially planned to pursue hospice, but then decided to undergo treatment for this episode of cholangitis and then consider hospice. She was admitted and had an ERCP on 11/29 after which she had anemia and hypotension so was  transferred to the MICU. She has been treated with pip/tazo since admission. Plan for discharge to inpatient hospice. She is now afebrile with improving leukocytosis.        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 Father      MI at age 65     Asthma Father      Coronary Artery Disease Father      Alzheimer Disease Mother      Osteoporosis Mother      Depression Sister 80     Asthma Sister      Prostate Cancer Brother      Depression Son      Depression Sister      Coronary Artery Disease Brother 67     Skin Cancer No family hx of      no skin cancer        Social History:     Social History     Social History     Marital status:      Spouse name: N/A     Number of children: N/A     Years of education: N/A     Occupational History     Not on file.     Social History Main Topics     Smoking status: Former Smoker     Years: 5.00     Types: Cigarettes     Start date: 9/15/1959     Quit date: 1/28/1983     Smokeless tobacco: Never Used     Alcohol use Yes      Comment: wine 1-2 glasses/day     Drug use: No     Sexual activity: Yes     Partners: Male     Birth control/ protection: Post-menopausal     Other Topics Concern     Caffeine Concern Yes     1-2 cups/day     Sleep Concern No     Stress Concern No     Weight Concern No     Special Diet No     avoids gluten     Exercise Yes     few times/wk - treadmill and  weights - 1 hour     Seat Belt Yes     Social History Narrative    How much exercise per week? Walking 2x/wk and weight 2-3x/wk    How much calcium per day? Supplement + dairy       How much caffeine per day? 1 cup coffee    How much vitamin D per day? Supplement 2000IU/d    Do you/your family wear seatbelts?  Yes    Do you/your family use safety helmets? n/a    Do you/your family use sunscreen? Yes    Do you/your family keep firearms in the home? No    Do you/your family have a smoke detector(s)? Yes        Do you feel safe in your home? Yes    Has anyone ever touched you in an unwanted manner? No     Explain     March 2, 2015 Lyle Tran LPN    Reviewed Neha Orourke MD, PhD    3/2/15    Neha Orourke MD, PhD     4/4/16                      Physical Exam:   /70 (BP Location: Right arm)  Pulse 83  Temp 95.7  F (35.4  C) (Oral)  Resp 16  Wt 49.3 kg (108 lb 11.2 oz)  SpO2 95%  BMI 19.88 kg/m2   Exam:  GENERAL:  Frail appearing. Sitting in chair.   ENT:  Head is normocephalic, atraumatic.   NECK:  Supple.  LUNGS:  Normal respiratory effort.   EXT: Extremities without edema.  SKIN:  No acute rashes.  Port in place. No erythema.   NEUROLOGIC:  Grossly nonfocal.         Laboratory Data:     Creatinine   Date Value Ref Range Status   12/02/2018 0.88 0.52 - 1.04 mg/dL Final   12/01/2018 0.92 0.52 - 1.04 mg/dL Final   11/30/2018 0.98 0.52 - 1.04 mg/dL Final   11/29/2018 1.02 0.52 - 1.04 mg/dL Final   11/29/2018 1.04 0.52 - 1.04 mg/dL Final     WBC   Date Value Ref Range Status   12/02/2018 27.9 (H) 4.0 - 11.0 10e9/L Final   12/01/2018 39.0 (H) 4.0 - 11.0 10e9/L Final   11/30/2018 34.7 (H) 4.0 - 11.0 10e9/L Final   11/29/2018 28.9 (H) 4.0 - 11.0 10e9/L Final   11/28/2018 43.1 (H) 4.0 - 11.0 10e9/L Final     Hemoglobin   Date Value Ref Range Status   12/02/2018 9.1 (L) 11.7 - 15.7 g/dL Final     Platelet Count   Date Value Ref Range Status   12/02/2018 24 (LL) 150 - 450 10e9/L Final     Comment:     This  result has been called to SOFIE PERKINS RN,7D by Funmi Goldberg on 12 02 2018 at 0756, and has been read back.        Lab Results   Component Value Date     12/02/2018    BUN 13 12/02/2018    CO2 20 12/02/2018     CRP Inflammation   Date Value Ref Range Status   11/15/2017 25.1 (H) 0.0 - 8.0 mg/L Final   10/22/2007 4.5 0.0 - 8.0 mg/L Final           Pertinent Recent Microbiology Data:     Recent Labs  Lab 12/02/18  0619 12/02/18  0616 12/01/18  1345 12/01/18  0543 12/01/18  0538 11/30/18  1445 11/30/18  0621 11/30/18  0615 11/29/18  1728 11/29/18  1718 11/29/18  1715 11/29/18  1630 11/28/18  1521   CULT PENDING PENDING No growth after 14 hours No growth after 1 day No growth after 1 day  --  No growth after 2 days No growth after 2 days Canceled, Test creditedSpecimen not collectedTANJA ARNAUD [facility] 11.30.18 0915 Cultured on the 1st day of incubation:Pseudomonas aeruginosa*  Critical Value/Significant Value, preliminary result only, called to and read back byManinder Granger RN [id number] 1341 11/30/2018 AA Canceled, Test creditedSpecimen not received Canceled, Test creditedSpecimen not received Cultured on the 1st day of incubation:Pseudomonas aeruginosa*  Critical Value/Significant Value, preliminary result only, called to and read back byoLs He MD 0848 11/29/2018 AA  Cultured on the 1st day of incubation:Citrobacter freundii complex*  Cultured on the 1st day of incubation:Klebsiella pneumoniae*  Critical Value/Significant Value called to and read back byManinder Leos RN on 11.30.2018 at 1455. KVO  (Note)POSITIVE for PSEUDOMONAS AERUGINOSA by [employer] multiplex nucleicacid test. Final identification and antimicrobial susceptibilitytesting will be verified by standard methods.Specimen tested with Verigene multiplex, gram-negative blood culturenucleic acid test for the following targets: Acinetobacter sp.,Citrobacter sp., Enterobacter sp., Proteus sp., E. coli, K.pneumoniae/oxytoca, P.  aeruginosa, and the following resistancemarkers: CTXM, KPC, NDM, VIM, IMP and OXA.Critical Value/Significant Value called to and read back by Dr. Ann in [facility] at 1104 on 11/29/18 ac.   SDES Blood Right Hand Blood GRAY PORT Blood Portacath Blood Left Hand Blood Right Hand Nares Blood Right Arm Blood Portacath Blood Blood Portacath Blood Blood Blood VAD Collection Portacath            Imaging:   No results found for this or any previous visit (from the past 48 hour(s)).

## 2018-12-03 NOTE — PROGRESS NOTES
[facility], [place]  Palliative Care Progress Note    Patient: Iris Lewis  Date of Admission:  11/29/2018    Recommendations:  - Aquaphor q1h prn for dry mouth   - Discontinue IV Hydromorphone and Lorazepam, to assure patient is comfortable with a regimen that can be continued on discharge   - Continue Oxycodone 10-15 mg q3h prn for pain   - Appreciate SW's assistance with facility placement     Assessment  Diagnoses & symptoms:          She has metastatic pancreatic cancer, here with obstructive jaundice and cholangitis due to cancer ingrowth of her stents, s/p another bare metal stent here    Mood/coping/meaning: Family present which is a ele, grandson is here which everyone agrees helps the mood in the room. More at peace with needing hospice care today.        CAT Baker CNP  Palliative Care Consult Team  Pager: [phone]    [facility] Inpatient Team Consult pager [phone] (M-F 8-4:30)  After-hours Answering Service [phone]   Palliative Clinic: [phone]     25 minutes spent with patient, with >50% counseling and in care coordination.       Interval History:   Dry mouth is only complaint. Pain well controlled. Family at bedside and supportive. Endorses being at peace with transition in goals to comfort measures only.         Medications:   I have reviewed this patient's medication profile and medications during this hospitalization.    Oxycodone 15 mg q3h prn -- 40 mg         Review of Systems:   A comprehensive ROS has been negative other than stated in the HPI and below:   Palliative Symptom Review (0=no symptom/no concern, 1=mild, 2=moderate, 3=severe):      Pain: 0      Fatigue: 2      Nausea: 0      Constipation: 0      Diarrhea: 0      Depressive Symptoms: 0      Anxiety: 0      Drowsiness: 1      Poor Appetite: 0      Shortness of Breath: 0      Insomnia: 0     Other: Dry mouth: 3         Physical Exam:   Vital Signs: Temp: 97.8  F (36.6  C) Temp src:  Oral BP: 127/71   Heart Rate: 78 Resp: 18 SpO2: 96 % O2 Device: None (Room air)    Weight: 108 lbs 3.2 oz    Constitutional: Pleasant female, seen lying in hospital bed. Frail appearing, but in no acute distress.  Head: Normocephalic. Atraumatic. Mucous membranes dry.   Pulm: Non-labored breathing, on room air. Speaking in complete sentences.    Musculoskeletal: CYR. No obvious malformations.   Neuro: Opens eyes to voice.   Psych: Speech clear. Memory intact. Calm and cooperative.       Data Reviewed:     CMP  Recent Labs  Lab 12/03/18  0723 12/02/18  0618 12/01/18  1345 12/01/18  0334 11/30/18  0615 11/29/18  1729 11/29/18  1718    142  --  142 136 131* 133   POTASSIUM 4.4 4.5 3.9 3.3* 3.5 3.2* 3.1*   CHLORIDE 111* 114*  --  113* 107 99 100   CO2 22 20  --  19* 21 21 25   ANIONGAP 5 7  --  9 9 11 9   GLC 78 69*  --  108* 154* 73 74   BUN 12 13  --  13 19 20 19   CR 0.84 0.88  --  0.92 0.98 1.02 1.04   GFRESTIMATED 66 63  --  59* 55* 52* 51*   GFRESTBLACK 80 76  --  72 66 63 62   EMMY 7.2* 7.2*  --  6.6* 6.5* 7.1* 7.2*   MAG  --   --   --   --  3.3* 1.5* 1.5*   PHOS  --   --   --   --   --  2.5 2.4*   PROTTOTAL 5.3* 5.1*  --  5.2* 5.2* 6.0* 6.0*   ALBUMIN 1.6* 1.5*  --  1.9* 2.3* 1.7* 1.7*   BILITOTAL 4.4* 5.0*  --  5.0* 5.7* 7.8* 7.8*   ALKPHOS 241* 237*  --  269* 238* 405* 401*   AST 30 34  --  39 47* 81* 79*   ALT 39 43  --  52* 52* 87* 85*     CBC  Recent Labs  Lab 12/03/18  0723 12/02/18  0618 12/01/18  0334 11/30/18  1246  11/30/18  0615   WBC 24.1* 27.9* 39.0*  --   --  34.7*   RBC 2.88* 3.20* 3.36*  --   --  1.72*   HGB 8.3* 9.1* 9.7* 8.5*  < > 4.8*   HCT 25.1* 27.7* 28.8*  --   --  14.6*   MCV 87 87 86  --   --  85   MCH 28.8 28.4 28.9  --   --  27.9   MCHC 33.1 32.9 33.7  --   --  32.9   RDW 19.4* 19.1* 18.8*  --   --  21.0*   PLT 25* 24* 65*  --   --  102*   < > = values in this interval not displayed.    INR  Recent Labs  Lab 12/02/18  0905 12/01/18  0334 11/30/18  0804 11/29/18  1718   INR 2.64*  2.71* 3.73* 3.43*

## 2018-12-03 NOTE — CONSULTS
Social Work: Assessment with Discharge Plan    Patient Name:  Iris Lewis  :  1940  Age:  78 year old  MRN:  [id number]  Risk/Complexity Score:     Completed assessment with:  Patient, and pt's adult children (Debra, Jose, and Joanne)    Presenting Information   Reason for Referral:  Discharge plan - Hospice  Date of Intake:  December 3, 2018  Referral Source:  Physician  Decision Maker:  Pt is her own decision maker   Alternate Decision Maker:  Per HCD, pt's spouse, Neri is first health care agent, however he does have dementia.  First alternative is pt's daughter, Debra  Health Care Directive:  Copy in Chart  Living Situation:  House - pt resides with her spouse  Previous Functional Status:  Independent  Patient and family understanding of hospitalization:  Appropriately understands; pt has had multiple conversations with providers, and appears very clear on her wishes to switch to hospice care  Cultural/Language/Spiritual Considerations:  English speaking; pt's son described pt as not wanting to be affiliated with any sort of Rastafari group  Adjustment to Illness:  Appropriate to situation; pt clearly voicing her preferences    Physical Health  Reason for Admission:  No diagnosis found.  Services Needed/Recommended:  Hospice    Mental Health/Chemical Dependency  Diagnosis:  None identified  Support/Services in Place:  None identified  Services Needed/Recommended:  Per hospice, pt will have emotional support available as needed/wanted    Support System  Significant relationship at present time:  Pt's spouse, Neri ([phone])  Family of origin is available for support:  Yes; pt has three adult children who were all present and involved in discharge planning (Debra - [phone], Joanne - [phone], and Jose - [phone]).  Family also discussed other family members that can be available for support at home as needed  Other support available:  Pt appears to have several friends that live  locally  Gaps in support system:  None  Patient is caregiver to:  None     Provider Information   Primary Care Physician:  Neri Gipson   [phone]   Clinic:  [address] [place] [address] / [facility] [address]      :  N/A    Financial   Income Source:  halfway  Financial Concerns:  Pt's children described her income has being limited, and SW discussed the option of applying for MA as a back up if needed.  No concerns with hospice coverage indicated.  Insurance:    Payor/Plan Subscriber Name Rel Member # Group #   MEDICARE - MEDICARE F* JONA SHETTY  [id number]       ATTN CLAIMS, [address]   BCBS - BCBS PLATINUM * JONA SHETTY  [id number]      [address] [address]       Discharge Plan   Patient and family discharge goal:  Home with hospice care  Provided education on discharge plan:  YES  Patient agreeable to discharge plan:  YES  A list of Medicare Certified Facilities was provided to the patient and/or family to encourage patient choice. Patient's choices for facility are:  N/A  Will NH provide Skilled rehabilitation or complex medical:  N/A  General information regarding anticipated insurance coverage and possible out of pocket cost was discussed. Patient and patient's family are aware patient may incur the cost of transportation to the facility, pending insurance payment: YES  Barriers to discharge:  Medical stability and finalizing home needs for hospice    Discharge Recommendations   Anticipated Disposition:  Home with [facility] support  Transportation Needs:  Family:  Joanne, most likely to transport  Name of Transportation Company and Phone:  [employer] ([phone]) if needed    Additional comments   SW met with pt, and pt's three children multiple times throughout day to discuss discharge planning.  Hospice care appeared to be a new experience for pt's children, and SW offered education of what hospice care is, isn't, and settings that this care is offered in.   Discussed options for hospice residential placement, hospice in LTC setting, and hospice at home.  Discussed hospice agency choices, and pt choice remains [facility].  Ultimately, pt voiced her preference to return home and family in agreement.  Family is making arrangements for someone to be home with pt at all times as pt's spouse has dementia and is not an appropriate 24/7 caretaker.  Pt and family appeared to understand that hospice does not entail 24/7 care.      SW spoke with clinical liaison, Alicia ([phone]), who was previously referred to pt, and coordinated discharge planning.  Joanne is point of contact for delivery of home DME.  D/t need for family to make space for hospital bed and to allow time for DME delivery, pt, family, and liaison voiced agreement for discharge to home on 12/4 in early afternoon.  Alicia to meet with pt and family at bedside morning of 12/4, and plan will be for pt to return home following this.  Discussed transportation options, and there is a good chance that pt's sons prefer to transport pt, but in case they are unable to or this is no longer appropriate, Debra agreed to schedule [employer] w/c ride as backup.  The cost of this was discussed.  [employer] ([phone]) scheduled for pickup at 1345 on 12/4 if needed.    CARMELA will continue to follow for support and discharge planning.    Nitza Jones, [address] Hematology/Oncology Social Worker  Phone: [phone]  Pager: [phone]

## 2018-12-03 NOTE — PROGRESS NOTES
[facility], [place]    Hematology / Oncology Progress Note    Date of Admission: 11/29/2018  Hospital Day #: 4   Date of Service (when I saw the patient): 12/03/2018     Assessment & Plan   Iris Lewis is a 78 year old female with PMHx significant for osteoporosis, hypertension, thyroid cancer s/p thyroidectomy (1980s), GERD s/p Nissen (2006) and metastatic pancreatic cancer who presents with abdominal pain and labs suggestive of cholangitis, obstructive jaundice and gram negative bacteremia (BCx 11/28). During admission, goals of care were addressed and pt/family ultimately decided to discharge with hospice, inpatient facility initially requested     Today  - continue Cipro, appreciate ID recs  - pain control  - SW following for inpatient hospice placement. ADDENDUM: after discussions today with SW, pt/family comfortable with going home with home hospice. In process of arranging equipment and intake for home, but plan for this tomorrow (12/4). Will not need labs tomorrow AM (cancelled).     Issues Addressed during hospitalization:  #Cholangitis.  #Pseudomonas/Citrobacter/Klesiella bacteremia.  #Obstructive jaundice 2/2 pancreatic cancer.  Patient presents with abdominal pain not well controlled on oral analgesic regimen. Has been taking approximately 15 mg oxycodone every 4 hours and ran out of her supply at home prompting presentation to the ED. At that time, the patient's brother notes that she has become more yellow in color (especially of skin and eyes) and has been more confused over the past few days. Elected to discharge from the ED 11/28 with pain medication with plan to enroll in hospice. However with positive blood cultures, patient called to return to the hospital for intervention.   -Labs 11/28 notable for WBC count of 43.1 with a left shift, total bilirubin of 7.5, Alk phos 449, ALT/AST 98/119. Admission labs pending: Tbili 7.8, elevated transaminases and WBC count of  28.9.  -Unfortunately, BCx 11/28 drawn in ER with no peripheral draw, and BCx 11/29 peripheral cancelled by unknown personal/for unknown reason as it was ordered.  -Blood cultures 11/28 from port positive for 3 different gram negatives (pseudomonas, klebsiella, and citrobacter) - all relatively pan-sensitive.  -Blood culture 11/29 positive for pseudomonas.  -Blood cultures 11/30-12/3 NGTD on daily cultures.  -Initiated on IV Zosyn to cover for cholangitis and bacteremia-->transition to PO Cipro due to good bioavailability and possibility that Zosyn contributing to TCP. Continue oral Cipro as long as pt tolerated, as per ID.   -GI consulted. S/p emergent ERCP 11/29 with findings of existing two stents occluded with tumor ingrowth, stented open with pus and bile drainage. LFTs and Tbili mildly improved since intervention, now stable around 5 --> 4.4 today.  -Pain control: transitioned from IV dilaudid to PTA PO oxycodone. This morning she reports abdominal pain within 2 hrs after oral oxycodone 10mg was given this AM. We discussed increasing frequency and adding range to oral oxycodone vs. Using IV dilaudid again. She would like to try the oral if possible first. Discussed that we will continue to work on pain control with goal of comfort at this time.       #Mouth sores/mucositis.   Has caused difficulty taking PO intake, thus had poor PO intake over the course of the past few days PTA  -s/p IV hydration.      #Metastatic pancreatic cancer.   #H/o thyroid cancer s/p thyroidectomy (1980s).  Diagnosed Oct 2017 with mets to the liver, lung, and lymph nodes. Progressed on multiple therapies. No therapy since middle of October 2018 as patient was not interested in pursuing a phase 1 trial and was considering transition to hospice. Follows with Dr. Yu.   - on 12/1, the patient and her son expressed that they did not want any more cancer directed treatments. They understand she will not be a candidate for a trials. They  expressed wanting to complete infection treatment (if required, IV antibiotics) before proceeding with home hospice. They do not want to discontinue necessary antibiotics treatment to enroll home hospice. However, on further discussion with the primary team over the weekend, ultimately pt/family decided to pursue hospice.   -Continue PTA creon as tolerating diet  -Continue PTA Synthroid.     #Hypotension-.2/2 sepsis, resuscitated with 6L IVF, albumin, and 2 units PRBCs. Now resolved.  -Held PTA anti-hypertensives     #Reactive airway disease.   -Continue PTA albuterol inhaler      #Glaucoma.  -Continue PTA eye drops.     #H/o pulmonary embolism.  -Xarelto at home, had been on hold for GI procedure (ERCP 11/29). May consider to restart 3 days after stent placement as GI recommended if there is no evidence of bleeding. However, continue to hold at this time given thrombocytopenia.      #Thrombocytopenia.   Acute drop in Plt count during admission. Appreciate Hematology team consult. Thrombocytopenia thought to be combination of DIC from infection and cancer. HIT screen negative. Will stop checking labs at this time given plan for hospice.     FEN  -now off IV fluid  -replace lytes per protocol prn   -RDAT    #Severe malnutrition in the context of acute on chronic illness.   % Intake: </= 50% for >/= 5 days (severe), % Weight Loss: > 5% in 1 month (severe), Subcutaneous Fat Loss: Facial region, Upper arm and Lower arm:  Severe, Muscle Loss: Temporal, Facial & jaw region, Scapular bone, Thoracic region (clavicle, acromium bone, deltoid, trapezius, pectoral), Upper arm (bicep, tricep), Lower arm  (forearm) and Dorsal hand:  Severe.  -Supplements offered.   -Continue to monitor.    Prophys  -VTE: on hold as above  -PUD: PTA Protonix  -Bowels: prn      Dispo: Pending hospice facility placement.      The patient was discussed with Dr. Masters.    Jeannie Worrell PA-C  Hematology/Oncology  [phone]    Interval History   No  acute issues overnight, afebrile. Sitting up eating some breakfast this AM. She reports ongoing pain, pointing to her LUQ. Would like to try additional dose of oral oxycodone before any IV pain medicine at this time. No other issues to report this AM.     Physical Exam   Temp: 96.9  F (36.1  C) Temp src: Oral BP: 108/65   Heart Rate: 77 Resp: 18 SpO2: 95 % O2 Device: None (Room air)    Vitals:    11/30/18 1600 12/02/18 0900 12/03/18 0830   Weight: 48.9 kg (107 lb 12.9 oz) 49.3 kg (108 lb 11.2 oz) 49.1 kg (108 lb 3.2 oz)     Vital Signs with Ranges  Temp:  [96.2  F (35.7  C)-98.3  F (36.8  C)] 96.9  F (36.1  C)  Heart Rate:  [73-87] 77  Resp:  [16-18] 18  BP: (108-127)/(65-75) 108/65  SpO2:  [95 %-97 %] 95 %  I/O last 3 completed shifts:  In: 700 [P.O.:600; I.V.:100]  Out: -     Constitutional: Pleasant cachectic female seen sitting up in bed, in NAD. Alert and interactive.   HEENT: NCAT.   Respiratory: Non-labored breathing, good air exchange, on RA.   GI: Abdomen is soft, mildly distended, and minimally tender.   Skin: Jaundiced. Warm and dry. No rashes or lesions on limited exam.  Musculoskeletal: No edema.   Neurologic: Alert, speech normal, answering questions appropriately. Moves all extremities spontaneously. Grossly non-focal.  VAD: Port is c/d/i with no erythema, drainage, or tenderness.       Medications   Current Facility-Administered Medications   Medication     acetaminophen (TYLENOL) tablet 650 mg     albuterol (PROAIR HFA/PROVENTIL HFA/VENTOLIN HFA) 108 (90 Base) MCG/ACT inhaler 2 puff     albuterol (PROAIR HFA/PROVENTIL HFA/VENTOLIN HFA) 108 (90 Base) MCG/ACT inhaler 2 puff     amylase-lipase-protease (CREON 36) 94803 units per capsule 72,000 Units     ciprofloxacin (CIPRO) tablet 500 mg     glucose gel 15-30 g    Or     dextrose 50 % injection 25-50 mL    Or     glucagon injection 1 mg     folic acid (FOLVITE) tablet 400 mcg     HYDROmorphone (PF) (DILAUDID) injection 0.3-0.5 mg     hydrOXYzine  (ATARAX) tablet 25 mg     levothyroxine (SYNTHROID/LEVOTHROID) tablet 125 mcg     lidocaine (LMX4) cream     lidocaine 1 % 1 mL     LORazepam (ATIVAN) tablet 0.5 mg    Or     LORazepam (ATIVAN) injection 0.5 mg     magnesium sulfate 4 g in 100 mL sterile water (premade)     May continue current IV fluid if patient has IV fluids infusing until discharge.     May continue current IV fluids if patient has IV fluids infusing.     Medication Instruction     melatonin tablet 1 mg     mineral oil-hydrophilic petrolatum (AQUAPHOR)     multivitamin w/minerals (THERA-VIT-M) tablet 1 tablet     naloxone (NARCAN) injection 0.1-0.4 mg     naloxone (NARCAN) injection 0.1-0.4 mg     ondansetron (ZOFRAN) injection 8 mg    Or     ondansetron (ZOFRAN-ODT) ODT tab 8 mg    Or     ondansetron (ZOFRAN) tablet 8 mg     oxyCODONE IR (ROXICODONE) tablet 10 mg    Or     oxyCODONE (ROXICODONE) tablet 15 mg     pantoprazole (PROTONIX) EC tablet 20 mg     polyethylene glycol (MIRALAX/GLYCOLAX) Packet 17 g     potassium chloride (KLOR-CON) Packet 20-40 mEq     potassium chloride 10 mEq in 100 mL intermittent infusion with 10 mg lidocaine     potassium chloride 10 mEq in 100 mL sterile water intermittent infusion (premix)     potassium chloride 20 mEq in 50 mL intermittent infusion     potassium chloride ER (K-DUR/KLOR-CON M) CR tablet 20-40 mEq     potassium phosphate 15 mmol in D5W 250 mL intermittent infusion     potassium phosphate 20 mmol in D5W 250 mL intermittent infusion     potassium phosphate 20 mmol in D5W 500 mL intermittent infusion     potassium phosphate 25 mmol in D5W 500 mL intermittent infusion     prochlorperazine (COMPAZINE) tablet 5 mg    Or     prochlorperazine (COMPAZINE) injection 5 mg     senna-docusate (SENOKOT-S/PERICOLACE) 8.6-50 MG per tablet 1 tablet    Or     senna-docusate (SENOKOT-S/PERICOLACE) 8.6-50 MG per tablet 2 tablet     sodium chloride (PF) 0.9% PF flush 3 mL     sodium chloride (PF) 0.9% PF flush 3 mL      sodium chloride (PF) 0.9% PF flush 3 mL     sodium chloride (PF) 0.9% PF flush 3 mL     timolol maleate (TIMOPTIC) 0.5 % ophthalmic solution 1 drop       Data   CBC    Recent Labs  Lab 12/03/18  0723 12/02/18  0618 12/01/18  0334 11/30/18  1246  11/30/18  0615   WBC 24.1* 27.9* 39.0*  --   --  34.7*   RBC 2.88* 3.20* 3.36*  --   --  1.72*   HGB 8.3* 9.1* 9.7* 8.5*  < > 4.8*   HCT 25.1* 27.7* 28.8*  --   --  14.6*   MCV 87 87 86  --   --  85   MCH 28.8 28.4 28.9  --   --  27.9   MCHC 33.1 32.9 33.7  --   --  32.9   RDW 19.4* 19.1* 18.8*  --   --  21.0*   PLT 25* 24* 65*  --   --  102*   < > = values in this interval not displayed.  CMP    Recent Labs  Lab 12/03/18  0723 12/02/18  0618 12/01/18  1345 12/01/18  0334 11/30/18  0615 11/29/18  1729 11/29/18  1718    142  --  142 136 131* 133   POTASSIUM 4.4 4.5 3.9 3.3* 3.5 3.2* 3.1*   CHLORIDE 111* 114*  --  113* 107 99 100   CO2 22 20  --  19* 21 21 25   ANIONGAP 5 7  --  9 9 11 9   GLC 78 69*  --  108* 154* 73 74   BUN 12 13  --  13 19 20 19   CR 0.84 0.88  --  0.92 0.98 1.02 1.04   GFRESTIMATED 66 63  --  59* 55* 52* 51*   GFRESTBLACK 80 76  --  72 66 63 62   EMMY 7.2* 7.2*  --  6.6* 6.5* 7.1* 7.2*   MAG  --   --   --   --  3.3* 1.5* 1.5*   PHOS  --   --   --   --   --  2.5 2.4*   PROTTOTAL 5.3* 5.1*  --  5.2* 5.2* 6.0* 6.0*   ALBUMIN 1.6* 1.5*  --  1.9* 2.3* 1.7* 1.7*   BILITOTAL 4.4* 5.0*  --  5.0* 5.7* 7.8* 7.8*   ALKPHOS 241* 237*  --  269* 238* 405* 401*   AST 30 34  --  39 47* 81* 79*   ALT 39 43  --  52* 52* 87* 85*     INR    Recent Labs  Lab 12/02/18  0905 12/01/18  0334 11/30/18  0804 11/29/18  1718   INR 2.64* 2.71* 3.73* 3.43*       Results for orders placed or performed during the hospital encounter of 11/29/18   XR Surgery [place] G/T 5 Min Fluoro w Stills    Narrative    This exam was marked as non-reportable because it will not be read by a   radiologist or a [facility] non-radiologist provider.

## 2018-12-03 NOTE — PLAN OF CARE
Problem: Patient Care Overview  Goal: Plan of Care/Patient Progress Review  Outcome: No Change  Family and pt and  met,discussed hospice option.given extra oxy for pain control.pt apears to be comfortable with pain.bed alarm on for safety.

## 2018-12-03 NOTE — PLAN OF CARE
Problem: Patient Care Overview  Goal: Plan of Care/Patient Progress Review  7D PT - Evaluation acknowledged and appreciated. Per chart review and discussion with RN, pt is anticipating disch to home on hospice tomorrow. From a periphery, pt does not appear to be appropriate for IP PT. Will HOLD until disch from IP to be sure that no needs arise. Pt has has good family support and ambulates to/from bathroom with assistive device and supervision from nursing.

## 2018-12-03 NOTE — PLAN OF CARE
Problem: Patient Care Overview  Goal: Plan of Care/Patient Progress Review  Outcome: No Change  5218-7838: AVSS, Afebrile. BP stable overnight. Pt forgetful; bed alarm on. Oxycodone given 1x for c/o abd pain. Pt overdue for port dressing change. However, she may be discharging to  hospice today (12/3). Continue with plan of care.

## 2018-12-03 NOTE — PLAN OF CARE
Problem: Patient Care Overview  Goal: Plan of Care/Patient Progress Review  Outcome: No Change    5068-8178: VSS, BP remains stable. Forgetful; bed alarm on. Blood sugar 86. Oxycodone given for abdominal pain. On PO cipro. SW working on finding inpatient hospice facility. Continue with plan of care.

## 2018-12-04 NOTE — DISCHARGE SUMMARY
"[facility], [place]    Discharge Summary  Hematology / Oncology    Date of Admission:  11/29/2018  Date of Discharge:  12/4/2018  Discharging Provider: Jeannie GALVEZ / Dr. Masters  Date of Service (when I saw the patient): 12/04/18    Discharge Diagnoses   Active Problems:    Cholangitis    Hypotension    Gram negative blood stream infection    History of Present Illness   Iris Lewis is a 78 year old female with PMHx significant for osteoporosis, hypertension, thyroid cancer s/p thyroidectomy (1980s), GERD s/p Nissen (2006) and metastatic pancreatic cancer who presents with abdominal pain and labs suggestive of cholangitis, obstructive jaundice and gram negative bacteremia (BCx 11/28). During admission, goals of care were addressed and pt/family ultimately decided to discharge with hospice. Will discharge to home with [facility] support today.      This morning, Gloria is sitting in bed. She is able to tell me her pain is \"good\" and that she is \"good to go\", but much of her speech is unclear/garbled this morning. Was not able to take oral medications this AM with RN and hospice RN present. Will replace her order for oral oxycodone with high conc morphine for hospice discharge. Discussed with Alicia Granger, MARNI and Discharge Pharmacy. Continue oral meds as listed below as long as she is able/willing to take them. Hospice team will assist with       Hospital Course   Iris Lewis was admitted on 11/29/2018.  The following problems were addressed during her hospitalization:    #Cholangitis.  #Pseudomonas/Citrobacter/Klesiella bacteremia.  #Obstructive jaundice 2/2 pancreatic cancer.  Patient presented with abdominal pain not well controlled on oral analgesic regimen. Had been taking approximately 15 mg oxycodone every 4 hours and ran out of her supply at home prompting presentation to the ED. At that time, the patient's brother noted that she had become more yellow " in color (especially of skin and eyes) and had been more confused over the past few days. Elected to discharge from the ED 11/28 with pain medication with plan to enroll in hospice. However with return of positive blood cultures from those taken on 11/28, the patient was called to return to the hospital for intervention.   - Blood cultures 11/28 from port positive for 3 different gram negatives (pseudomonas, klebsiella, and citrobacter) - all relatively pan-sensitive.  - Blood culture 11/29 positive for pseudomonas.  - GI consulted. S/p emergent ERCP 11/29 with findings of existing two stents occluded with tumor ingrowth, stented open with pus and bile drainage. LFTs and Tbili mildly improved since intervention.  - Blood cultures 11/30-12/3 remain NG/NGTD.  **Antibiotics: Initiated on IV Zosyn to cover for cholangitis and bacteremia--> transition to PO Cipro due to good bioavailability and possibility that Zosyn was contributing to worsening TCP. Continue oral Cipro through 12/13 (to complete 14 day course from first negative culture on 11/30) and then can continue as long as pt tolerates as per ID (to prevent recurrent bacteremia from the port given that removal of port would not be consistent with current goals of care). Given change in ability to take PO meds this AM, continue as long as pt able/wishes to.  **Pain control: transitioned from IV dilaudid to PTA PO oxycodone. Adjusted oxycodone to high concentration morphine as above.       #Mouth sores/mucositis.   Has caused difficulty taking PO intake, thus had poor PO intake over the course of the past few days PTA. S/p IV hydration. MMW PRN      #Metastatic pancreatic cancer.   #H/o thyroid cancer s/p thyroidectomy (1980s).  Diagnosed Oct 2017 with mets to the liver, lung, and lymph nodes. Progressed on multiple therapies. No therapy since middle of October 2018 as patient was not interested in pursuing a phase 1 trial and was considering transition to hospice.  Follows with Dr. uY.   - See prior progress notes for GOC discussions. Ultimately pt/family decided to pursue hospice.   - Continue PTA creon as tolerating diet. Continue as long as pt wishes to/is able to.  - Continue PTA Synthroid. Continue as long as pt able/wishes to.      #Hypotension. Resolved.   2/2 sepsis, resuscitated with 6L IVF, albumin, and 2 units PRBCs.       #Reactive airway disease.   - Continue PTA albuterol inhaler PRN      #Glaucoma.  - Continue PTA eye drops.      #H/o pulmonary embolism.  - on Xarelto at home, had been on hold for GI procedure (ERCP 11/29). May consider to restart 3 days after stent placement as GI recommended if there is no evidence of bleeding. However, continue to hold at this time given significant thrombocytopenia and with plan for hospice cares.       #Thrombocytopenia.   Acute drop in Plt count during admission. Appreciate Hematology team consult. Thrombocytopenia thought to be combination of DIC from infection and cancer. HIT screen negative. Have stopped checking labs at this time given plan for hospice.      #FEN  #Severe malnutrition in the context of acute on chronic illness.   - now off IV fluids  - regular diet as tolerated for pleasure     Prophys  -VTE: therapeutic anticoagulation on hold as above  -PUD: PTA Protonix, continue as long as pt able/wishes to  -Bowels: prn       Dispo: Discharge to home with  Home Hospice support and family support today.       The patient was discussed with Dr. Masters.     Jeannie Worrell PA-C  Hematology/Oncology  [phone]    Code Status   DNR / DNI    Primary Care Physician   Neri Gispon    Discharge Disposition   Discharged to home  Condition at discharge: Stable    Consultations This Hospital Stay   GI PANCREATICOBILIARY ADULT IP CONSULT  VASCULAR ACCESS CARE ADULT IP CONSULT  PALLIATIVE CARE ADULT IP CONSULT  WOUND OSTOMY CONTINENCE NURSE  IP CONSULT  INFECTIOUS DISEASE GENERAL ADULT IP CONSULT  HEMATOLOGY IP  CONSULT  PHYSICAL THERAPY ADULT IP CONSULT  SOCIAL WORK IP CONSULT    Discharge Orders     Follow Up and recommended labs and tests   Please contact the [facility] team with any questions or concerns.     Activity   Your activity upon discharge: activity as tolerated     DNR/DNI     Diet   Follow this diet upon discharge: regular as tolerated       Discharge Medications   Current Discharge Medication List      START taking these medications    Details   acetaminophen (ACEPHEN) 650 MG suppository Place 1 suppository (650 mg) rectally every 4 hours as needed for fever  Qty: 2 suppository, Refills: PRN    Associated Diagnoses: Hospice care patient; Malignant neoplasm of head of pancreas (H)      bisacodyl (DULCOLAX) 10 MG suppository Place 1 suppository (10 mg) rectally daily as needed for constipation  Qty: 2 suppository, Refills: PRN    Associated Diagnoses: Hospice care patient; Malignant neoplasm of head of pancreas (H)      ciprofloxacin (CIPRO) 500 MG tablet Take 1 tablet (500 mg) by mouth every 12 hours . Continue through 12/13 to complete a 14-day course, then can continue indefinitely to prevent infection as long as pt is able to/wishes. If confusion, tendon pain, or peripheral neuropathy occur, then discontinue.  Qty: 30 tablet, Refills: 1    Associated Diagnoses: Abdominal pain, left upper quadrant; Malignant neoplasm of head of pancreas (H); Bacteremia due to Gram-negative bacteria      haloperidol (HALDOL) 0.5 MG tablet Take 1-2 tablets (0.5-1 mg) by mouth, place under tongue or insert rectally every 6 hours as needed for agitation or other (nausea)  Qty: 15 tablet, Refills: PRN    Associated Diagnoses: Hospice care patient; Malignant neoplasm of head of pancreas (H)      morphine sulfate HIGH CONCENTRATE (ROXANOL) 20 mg/mL (HIGH CONC) solution Take 0.25-0.5 mLs (5-10 mg) by mouth every 2 hours as needed for shortness of breath / dyspnea or breakthrough pain  Qty: 30 mL, Refills: 0    Associated  Diagnoses: Hospice care patient; Malignant neoplasm of head of pancreas (H)         CONTINUE these medications which have CHANGED    Details   albuterol (PROAIR HFA/PROVENTIL HFA/VENTOLIN HFA) 108 (90 Base) MCG/ACT inhaler Inhale 2 puffs into the lungs every 4 hours as needed for shortness of breath / dyspnea or wheezing  Qty: 1 Inhaler, Refills: 1    Associated Diagnoses: Mild asthma, unspecified whether complicated, unspecified whether persistent      amylase-lipase-protease (CREON) 98193 units CPEP Take 2 capsules (72,000 Units) by mouth 3 times daily (with meals) . Continue as long as patient wishes to or is able to.  Qty: 180 capsule, Refills: 3    Associated Diagnoses: Malignant neoplasm of head of pancreas (H)      LORazepam (ATIVAN) 0.5 MG tablet Take 0.5-1 tablets (0.25-0.5 mg) by mouth every 4 hours as needed (Anxiety/restlessness, Nausea/Vomiting or Sleep)  Qty: 30 tablet, Refills: 5    Associated Diagnoses: Malignant neoplasm of head of pancreas (H); Hospice care patient      pantoprazole (PROTONIX) 20 MG EC tablet Take 1 tablet (20 mg) by mouth daily . Continue as long as patient wishes to or is able to.  Qty: 30 tablet, Refills: 2    Associated Diagnoses: Other acute gastritis without hemorrhage      polyethylene glycol (MIRALAX/GLYCOLAX) powder Take 17 g (1 capful) by mouth daily as needed for constipation  Qty: 500 g, Refills: 3    Associated Diagnoses: Constipation, unspecified constipation type      prochlorperazine (COMPAZINE) 10 MG tablet Take 0.5-1 tablets (5-10 mg) by mouth every 6 hours as needed for nausea or vomiting  Qty: 30 tablet, Refills: 3    Associated Diagnoses: Malignant neoplasm of head of pancreas (H)         CONTINUE these medications which have NOT CHANGED    Details   Acetaminophen (TYLENOL PO) Take 325 mg by mouth every 4 hours as needed for mild pain or fever      hydrOXYzine (ATARAX) 25 MG tablet Take 1-2 tablets (25-50 mg) by mouth every 6 hours as needed for itching  (adjuvant pain)  Qty: 60 tablet, Refills: 3    Associated Diagnoses: Obstructive jaundice      levothyroxine (SYNTHROID/LEVOTHROID) 125 MCG tablet Take 1 tablet (125 mcg) by mouth daily  Qty: 90 tablet, Refills: 1    Associated Diagnoses: Hypothyroidism, unspecified type      magic mouthwash (FIRST-MOUTHWASH BLM) suspension Swish and swallow 5-10 mLs in mouth every 6 hours as needed for mouth sores  Qty: 237 mL, Refills: 3    Associated Diagnoses: Malignant neoplasm of head of pancreas (H)      MELATONIN PO Take by mouth nightly as needed      ondansetron (ZOFRAN) 8 MG tablet Take 1 tablet (8 mg) by mouth every 8 hours as needed (nausea/vomiting)  Qty: 30 tablet, Refills: 2    Associated Diagnoses: Malignant neoplasm of head of pancreas (H)      order for DME Equipment being ordered: Compression Stockings. Please dispense 2 pairs of knee high 20-30 mmHg  Qty: 2 Units, Refills: 3    Associated Diagnoses: Lymphedema      senna-docusate (SENOKOT-S;PERICOLACE) 8.6-50 MG per tablet Take 1-2 tablets by mouth 2 times daily as needed for constipation  Qty: 100 tablet, Refills: 0    Associated Diagnoses: Constipation, unspecified constipation type      timolol (TIMOPTIC) 0.5 % ophthalmic solution INT 1 GTT OU IN THE MORNING  Refills: 3      timolol maleate (ISTALOL) 0.5 % opthalmic solution Place 1 drop into both eyes every morning Before placing contact lenses  Qty: 1 Bottle, Refills: 0    Associated Diagnoses: Cataract, unspecified cataract type, unspecified laterality         STOP taking these medications       CALCIUM PO Comments:   Reason for Stopping:         CARTIA  MG 24 hr capsule Comments:   Reason for Stopping:         CHLORPHENIRAMINE MALEATE PO Comments:   Reason for Stopping:         cholecalciferol (VITAMIN D) 1000 UNIT tablet Comments:   Reason for Stopping:         cyanocolbalamin (VITAMIN B-12) 500 MCG tablet Comments:   Reason for Stopping:         folic acid (FOLVITE) 400 MCG tablet Comments:    Reason for Stopping:         loperamide (IMODIUM) 2 MG capsule Comments:   Reason for Stopping:         omeprazole (PRILOSEC) 40 MG capsule Comments:   Reason for Stopping:         oxyCODONE (OXY-IR) 5 MG capsule Comments:   Reason for Stopping:         oxyCODONE (ROXICODONE) 5 MG tablet Comments:   Reason for Stopping:         Probiotic Product (PRO-BIOTIC BLEND PO) Comments:   Reason for Stopping:         rivaroxaban ANTICOAGULANT (XARELTO) 20 MG TABS tablet Comments:   Reason for Stopping:             Allergies   Allergies   Allergen Reactions     Nkda [No Known Drug Allergies]      Data    Did not obtain labs on 12/4.     BMP  Recent Labs  Lab 12/03/18  0723 12/02/18 0618 12/01/18  1345 12/01/18  0334 11/30/18  0615    142  --  142 136   POTASSIUM 4.4 4.5 3.9 3.3* 3.5   CHLORIDE 111* 114*  --  113* 107   EMMY 7.2* 7.2*  --  6.6* 6.5*   CO2 22 20  --  19* 21   BUN 12 13  --  13 19   CR 0.84 0.88  --  0.92 0.98   GLC 78 69*  --  108* 154*     CBC  Recent Labs  Lab 12/03/18  0723 12/02/18 0618 12/01/18  0334 11/30/18  1246  11/30/18  0615   WBC 24.1* 27.9* 39.0*  --   --  34.7*   RBC 2.88* 3.20* 3.36*  --   --  1.72*   HGB 8.3* 9.1* 9.7* 8.5*  < > 4.8*   HCT 25.1* 27.7* 28.8*  --   --  14.6*   MCV 87 87 86  --   --  85   MCH 28.8 28.4 28.9  --   --  27.9   MCHC 33.1 32.9 33.7  --   --  32.9   RDW 19.4* 19.1* 18.8*  --   --  21.0*   PLT 25* 24* 65*  --   --  102*   < > = values in this interval not displayed.  INR  Recent Labs  Lab 12/02/18  0905 12/01/18  0334 11/30/18  0804 11/29/18  1718   INR 2.64* 2.71* 3.73* 3.43*       Results for orders placed or performed during the hospital encounter of 11/29/18   XR Surgery [place] G/T 5 Min Fluoro w Stills    Narrative    This exam was marked as non-reportable because it will not be read by a   radiologist or a [place] non-radiologist provider.

## 2018-12-04 NOTE — PLAN OF CARE
Problem: Patient Care Overview  Goal: Plan of Care/Patient Progress Review  Outcome: No Change  Afebrile, VSS. Denies pain/nausea. No appetite tonight, pt ate popsicle. Pt seemed confused late this evening, helped reorient patient and pt seemed to return to normal mental status. Adequate UOP. No acute events. Plan to discharge to home hospice tomorrow. Continue to monitor.

## 2018-12-04 NOTE — PROGRESS NOTES
Pt transferred home via wheelchair for hospice care. Son was here and collected her belongings, and met with hospice. He took her medications home. Port deaccessed. Pt speaking illogically this am yet following commands. Walked to BR and had a large BM with SBA.

## 2018-12-04 NOTE — PLAN OF CARE
Problem: Patient Care Overview  Goal: Plan of Care/Patient Progress Review  Outcome: Declining  00:00-07:00 am  Pt is alert but remains pleasantly confused and oriented to self.  Bed alarm on for safety  Denied pain  Up with assist of 1 using cane  Voiding without difficulty, good UOP   Plan to discharge to home hospice today --see SW note  Continue W/POC and offer support

## 2018-12-04 NOTE — PROGRESS NOTES
[facility]  Met with pt and family to discuss plans for hospice.  Pt to be discharged home today at 10 am and has agreed to have [facility] follow with hospice services. Equipment of a hospital bed with 1/2 raiis OBT commode and a shower chair was ordered and being delivered this am.  Medications are being filled at the discharge pharmacy and sent home with the pt.  Pt and family verbalized understanding that completion of the  admission visit is scheduled for today at 1 pm at the pt home.   The POLST documentation is completed.      The  Hospice consents are signed by the pt son.   Pt has the 24 hour phone number for [facility] for any questions or concerns.    Alicia Granger RN Liaison   [facility]

## 2018-12-04 NOTE — PROGRESS NOTES
Social Work Services Discharge Note      Patient Name:  Iris Lewis     Anticipated Discharge Date:  12/4/18    Discharge Disposition:   Hospice:   Hospice ([phone]) at home    Following MD:  As assigned by hospice     Pre-Admission Screening (PAS) online form has been completed.  The Level of Care (LOC) is:  Determined  Confirmation Code is:  N/A  Patient/caregiver informed of referral to [facility] for Pre-Admission Screening for skilled nursing facility (SNF) placement and to expect a phone call post discharge from SNF.     Additional Services/Equipment Arranged:    - Transportation: [facility] (PH: [phone]) w/c scheduled for 1000.   - Hospice Equipment: Being ordered by [facility].  Pt's son, Joanne ([phone]) will be at home to receive equipment.  - POLST: Reviewed with pt by  liaison.  Original given to pt and copies given to transport team and family.  - Hospice Medications: Scripts sent to discharge pharmacy to be filled by  Hospice.  Medications will be sent with transport team.     Patient / Family response to discharge plan:  Pt and family all voiced agreement to discharge plan.     Persons notified of above discharge plan:  Charge RN (Sharmila), RN CC (Janes), TARA (Jeannie), Bedside RN (Kimberly),  Clinical Liaison (Alicia)    Staff Discharge Instructions:  Please ensure pt leaves with medications from discharge pharmacy.  Please print a packet and send with patient.     CTS Handoff completed:  YES    Medicare Notice of Rights provided to the patient/family:  YES    Nitza Jones [facility]  7D Hematology/Oncology Social Worker  Phone: [phone]  Pager: [phone]

## 2018-12-08 LAB
BACTERIA SPEC CULT: NO GROWTH
BACTERIA SPEC CULT: NO GROWTH
Lab: NORMAL
Lab: NORMAL
SPECIMEN SOURCE: NORMAL
SPECIMEN SOURCE: NORMAL

## 2019-01-07 NOTE — MR AVS SNAPSHOT
After Visit Summary   6/26/2017    Iris Lewis    MRN: [id number]           Patient Information     Date Of Birth          1940        Visit Information        Provider Department      6/26/2017 3:30 PM Neha Orourke MD PhD [facility]        Today's Diagnoses     Senile osteoporosis    -  1      Care Instructions    Dexa next summer  Prolia today and in 6 months  Get blood work 2 wks AFTER the prolia shot  Continue to exercise  Patient should take 1200mg of calcium/day in divided doses supplements and diet  and vitamin D3 2000IU/day.           Follow-ups after your visit        Follow-up notes from your care team     Return in about 1 year (around 6/26/2018).      Your next 10 appointments already scheduled     Jul 19, 2017 10:30 AM CDT   XR UPPER GI with UCXR2, UC GIGU RAD   [facility] ([facility])    [address] [place] [id number] [phone]           Please bring a list of your current medicines to your exam. (Include vitamins, minerals and over-the-counter medicines.) Leave your valuables at home.  Tell the doctor if there is a chance you could be pregnant.  The day before the exam:   If you had a barium enema within two days of the exam, you will need to take 3 bisacodyl (Dulcolax) tablets.   Do not eat, drink, chew gum, or smoke for 8 hours before your exam.  The day of your exam:   Keep drinking clear liquids until 2 hours before your exam. Clear liquids include water, clear juice, black coffee or clear tea without milk, Gatorade, clear soda.   Take your usual medicines unless your doctor tells you not to. Take them with small sips of water.  Please call the Imaging Department at your exam site with any questions.            Jul 19, 2017 12:00 PM CDT   (Arrive by 11:45 AM)   New Patient Visit with Trent Washburn MD   [facility] ([facility])    [address]  2nd Floor  [facility] [address]   [phone]            Aug 08, 2017 10:35 AM CDT   (Arrive by 10:20 AM)   Return Visit with Neri Gipson MD   [facility] ([facility])    [address]  [address]th [facility] [address] [phone]              Future tests that were ordered for you today     Open Future Orders        Priority Expected Expires Ordered    Dexa Hip, Pelvis, Spine Routine  6/26/2018 6/26/2017    Magnesium Routine  6/26/2018 6/26/2017    Phosphorus Routine  6/26/2018 6/26/2017    Calcium Routine  6/26/2018 6/26/2017            Who to contact     Please call your clinic at [phone] to:    Ask questions about your health    Make or cancel appointments    Discuss your medicines    Learn about your test results    Speak to your doctor   If you have compliments or concerns about an experience at your clinic, or if you wish to file a complaint, please contact [facility] Patient Relations at [phone] or email us at [email or web address]         Additional Information About Your Visit        [employer] Information     [employer] gives you secure access to your electronic health record. If you see a primary care provider, you can also send messages to your care team and make appointments. If you have questions, please call your primary care clinic.  If you do not have a primary care provider, please call [phone] and they will assist you.      Dtime is an electronic gateway that provides easy, online access to your medical records. With [employer], you can request a clinic appointment, read your test results, renew a prescription or communicate with your care team.     To access your existing account, please contact your [facility] or call [phone] for assistance.        Care EveryWhere ID     This is your Care EveryWhere ID. This  "could be used by other organizations to access your [facility] medical records  [id number]        Your Vitals Were     Pulse Height BMI (Body Mass Index)             76 1.575 m (5' 2\") 21.75 kg/m2          Blood Pressure from Last 3 Encounters:   06/26/17 131/80   06/19/17 120/81   11/09/16 131/68    Weight from Last 3 Encounters:   06/26/17 53.9 kg (118 lb 14.4 oz)   06/19/17 54.7 kg (120 lb 8 oz)   12/20/16 52.4 kg (115 lb 8 oz)                 Where to get your medicines      [id number] [place] [address] [facility]  [address] [facility] [address]     Phone:  [phone]     levothyroxine 125 MCG tablet          Primary Care Provider Office Phone # Fax #    Neri Gipson [phone]       [address] [address]        Equal Access to Services     NATASHA [facility] AH: Hadii mary simms hadasho Soomaali, waaxda luqadaha, qaybta kaalmada adesusana, lisa adams . So [facility] [phone].    ATENCIÓN: Si habla español, tiene a nava disposición servicios gratuitos de asistencia lingüística. [facility] [phone].    We comply with applicable federal civil rights laws and [place] laws. We do not discriminate on the basis of race, color, national origin, age, disability sex, sexual orientation or gender identity.            Thank you!     Thank you for choosing [facility]  for your care. Our goal is always to provide you with excellent care. Hearing back from our patients is one way we can continue to improve our services. Please take a few minutes to complete the written survey that you may receive in the mail after your visit with us. Thank you!             Your Updated Medication List - Protect others around you: Learn how to safely use, store and throw away your medicines at www.disposemymeds.org.          This list is accurate " as of: 6/26/17  3:59 PM.  Always use your most recent med list.                   Brand Name Dispense Instructions for use Diagnosis    BIFIDO-GENIC GROWTH FACTORS PO           cholecalciferol 1000 UNIT tablet    vitamin D     Take 1 tablet by mouth daily        cyanocolbalamin 500 MCG tablet    vitamin  B-12     Take 500 mcg by mouth daily.        denosumab 60 MG/ML Soln injection    PROLIA    1 mL    Inject 1 mL (60 mg) Subcutaneous every 6 months Inject subcutaneously in upper arm, upper thigh or abdomen    Senile osteoporosis, Personal history of other drug therapy       folic acid 400 MCG tablet    FOLVITE     Take 1 tablet by mouth daily.        HAIR/SKIN/NAILS/BIOTIN PO           lactobacillus acidophilus Tabs      Take 1 tablet by mouth 3 times daily (before meals)        levothyroxine 125 MCG tablet    SYNTHROID/LEVOTHROID    30 tablet    Take 1 tablet (125 mcg) by mouth daily    Abnormal finding on thyroid function test       magnesium 250 MG tablet      Take 1 tablet by mouth daily.        potassium 99 MG Tabs           timolol maleate 0.5 % opthalmic solution    ISTALOL          triamterene-hydrochlorothiazide 37.5-25 MG per capsule    DYAZIDE    30 capsule    Take one tablet a day.    Essential hypertension, benign       zinc 50 MG Tabs              present

## 2021-11-15 NOTE — PLAN OF CARE
"Problem: Patient Care Overview  Goal: Plan of Care/Patient Progress Review  Outcome: No Change  /71  Pulse 90  Temp 96.8  F (36  C) (Oral)  Resp 21  Ht 1.6 m (5' 3\")  Wt 52 kg (114 lb 11.2 oz)  SpO2 96%  Breastfeeding? No  BMI 20.32 kg/m2     Pt had EUS/ERCP this morning. Arrived back to unit around 1245. Denies pain. Denies nausea. RLL of lung with crackles encourage pt to use IS and cough/deep breath. Jaundice skin, itching. Gave pt lotion and MD will order atrax. Passing gas. No stool today. Voiding spont. PIV running MIVF. Notes from MD state to start clear liquids this afternoon then to advance later.     Addendum: Pt refused capno.   " Melolabial Transposition Flap Text: The defect edges were debeveled with a #15 scalpel blade.  Given the location of the defect and the proximity to free margins a melolabial flap was deemed most appropriate.  Using a sterile surgical marker, an appropriate melolabial transposition flap was drawn incorporating the defect.    The area thus outlined was incised deep to adipose tissue with a #15 scalpel blade.  The skin margins were undermined to an appropriate distance in all directions utilizing iris scissors.

## 2022-02-17 PROBLEM — Z71.89 ADVANCE CARE PLANNING: Chronic | Status: RESOLVED | Noted: 2017-01-05 | Resolved: 2018-01-01

## 2022-11-20 NOTE — TELEPHONE ENCOUNTER
Problem: Eating Disorder  Goal: Progressing toward weight set point established by Treatment Team  Outcome: Outcome Not Met, Continue to Monitor  Goal: Will be able to eat 100% of meal plan without physical discomfort  Outcome: Outcome Not Met, Continue to Monitor  Goal: Demonstrates willingness to comply with meal plan  Outcome: Outcome Not Met, Continue to Monitor  Goal: Reduce or eliminate restricting  Outcome: Outcome Not Met, Continue to Monitor  Goal: Reduce or eliminate obsessive exercise  Outcome: Outcome Not Met, Continue to Monitor  Goal: Reduce or eliminate use of diet pills/diuretics/laxatives  Outcome: Outcome Not Met, Continue to Monitor  Goal: Identifies coping skills  Outcome: Outcome Not Met, Continue to Monitor  Goal: Reports desire to decrease eating disorder behaviors  Outcome: Outcome Not Met, Continue to Monitor  Goal: Verbalizes consequences of diet pill/diuretic/laxative abuse  Outcome: Outcome Not Met, Continue to Monitor  Goal: Verbalizes understanding of physical and psychological consequences of engaging in eating disorder behaviors  Outcome: Outcome Not Met, Continue to Monitor     Problem: VTE, Risk for  Goal: # No s/s of VTE  Outcome: Outcome Not Met, Continue to Monitor  Goal: # Verbalizes understanding of VTE risk factors and prevention  Description: Document education using the patient education activity.   Outcome: Outcome Not Met, Continue to Monitor  Goal: Demonstrates ability to administer injectable anticoagulants if ordered for d/c  Description: Document education using the patient education activity.  Outcome: Outcome Not Met, Continue to Monitor      ----- Message from Dai Lazo sent at 6/26/2017  8:21 AM CDT -----  Regarding: Medicaiton questions  Contact: [phone]  Pt of Dr Gipson is requesting a call back from you discuss her dosage of Synthroid. Please call back at [phone]. Thanks      Pt going out of town for a month and needs a 1 month supply of new thyroid dose. RX for 30 days [facility] to [facility].    Lexi Lea RN 10:14 AM on 6/26/2017.

## 2023-04-05 NOTE — PROGRESS NOTES
Gloria Lewis is here today in follow-up of newly diagnosed metastatic pancreatic cancer.      Since I saw her last week she says her urine has returned to normal color, her stools have normalized, and she is having only minor abdominal pain for which she uses 2-3 tramadol per day. She is having no fevers chills or sweats. She still feeling well enough that she went back to work yesterday for about 3 hours and didn't have any difficulty with that. She tells me today that discussing things more with her family, she believes her grandmother may have also had pancreatic cancer.    On exam today she appears quite well and no longer jaundiced. I did not otherwise examine her today.    Reviewed with her her lab work from today which shows her bilirubin is almost normal, now down to 2.2. Her albumin is improved now up to 3 and the rest of her lab work is unremarkable.    Assessment/plan:     Metastatic pancreatic cancer with a normal performance status. The patient's  was with her today and her daughter was with her last week participate at today's visit by phone. I reviewed our discussion last week about the palliative nature of her treatment, and the various options and expectations around. She would very much like to pursue our halo trial and I went over more of the details of that with her today in more detail. To qualify she must overexpress HA in her tumor, and I reviewed with gastroenterology that the biopsy obtained endoscopically of her liver lesion was indeed a core needle biopsy, so that should be adequate for HA testing. She will be meeting with the research nurse to go through the study in more detail, and we'll arrange for her tumor testing to get done this week. I don't anticipate we'll be ready to start treatment until just after the Thanksgiving holiday.    We went over her symptoms again today. She has relatively little pain and is happy with her current pain regimen. She says psychologically she  is doing great and doesn't require any additional support in that regard. She is back to eating well and feels like her bowel function is returning to normal now that she is on enzyme replacement.    Her bilirubin is nearly back to normal, and we're waiting for placement of her temporary biliary stent with a permanent metal stent.    I reviewed her family history again a little bit, and I think there is enough there that it would be worth pursuing a discussion with genetic counselor and we'll arrange that one of our future visits.    Total visit time today approximately 40 minutes spent on the above discussion.    Addendum: her tumor does not have sufficient HA expression to be eligible for the halozyme study, so we will proceed with standard gem/abraxane.   Cantharidin Pregnancy And Lactation Text: The use of this medication during pregnancy or lactation is not recommended as there is insufficient data.

## 2023-04-07 NOTE — PROGRESS NOTES
Oncology/Hematology Visit Note  Nov 14, 2018    Reason for Visit: follow up of pancreatitis.    History of Present Illness: Iris Lewis is a 78 year old female with a hx significant for thyroid cancer s/p thyroidectomy in the 1980s, cholelithiasis s/p CCY 2015, GERD s/p Nissen fundoplication in 2006 and HTN. She was dx with metastatic pancreatic cancer involving the liver, lung and lymph nodes in October 2017 after presenting with obstructive jaundice. She met with  an did not qualify for the HALOZYME study. She was initiated on Dixon/Abraxane on 11/28/17. Restaging showed a positive response to treatment after 2 cycles. Prior to cycle 4, she developed acute hypoxia and weakness. She was found to have influenza and pneumonia. 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 At f/u with Dr. Yu on 3/26/18, she was found to have progression and recommended to start 5FU plus liposomal irinotecan. She initiated therapy on 3/28/18. Imaging after 5 cycles showed a positive response. Treatment on 7/2/18 was delayed due to acute LFT elevation, abdominal pain and low grade fevers. LFT elevation was thought to be related to tylenol and alcohol use. CT CAP on 10/26/18 after cycle 15 with progressive disease. Dr. Yu discussed phase 1 trials at visit on 10/29 per chart but Iris was not interested. Decision to observe disease status for now and make a plan to transition to hospice. Please see Dr. Yu's previous notes for further details on the patient's history. She was seen 11/12 for worsening LUQ pain and found to have acute pancreatitis.    Interval History:  Iris states she started CLD last night. She had some chicken broth and vegetable broth. She also had at least 8oz of water and a glass of orange juice. She denied any increased pain with this. She is still taking about 10 mg of oxycodone every 6 hours. She took last night around 10 and then at 5AM this morning. She did have some  return of pain so took another 10 mg at 11:40 today. She did have 1/2 a sandwich this morning though and stated no increased pain or nausea after eating. She also had some blueberry juice. She has been taking her other medications, including levothyroxine, xarelto, creon. She also started Senna-S    She thinks perhaps her ankles are slightly swollen. Denies any dizziness, dyspnea, cough, chest pain. She met with Dr. Ann this morning and has agreed to a hospice consult next week to acquire more information about hospice.     Review of Systems:  Patient denies any of the following except if noted above: fever, chills.    Current Outpatient Prescriptions   Medication Sig Dispense Refill     Acetaminophen (TYLENOL PO) Take 325 mg by mouth every 4 hours as needed for mild pain or fever       albuterol (PROAIR HFA/PROVENTIL HFA/VENTOLIN HFA) 108 (90 BASE) MCG/ACT Inhaler Inhale 2 puffs into the lungs 4 times daily 1 Inhaler 1     amylase-lipase-protease (CREON) 16227 UNITS CPEP Take 2 capsules (72,000 Units) by mouth 3 times daily (with meals) 180 capsule 3     CALCIUM PO Take by mouth every morning Form/strength unknown. Powder formulation. Add to water and fruits/vegetables daily to promote bone health.        CARTIA  MG 24 hr capsule TK 1 C PO QD 10 capsule 0     CHLORPHENIRAMINE MALEATE PO Take 4 mg by mouth daily as needed       cholecalciferol (VITAMIN D) 1000 UNIT tablet Take 1 tablet by mouth 2 times daily        cyanocolbalamin (VITAMIN B-12) 500 MCG tablet Take 500 mcg by mouth every morning        folic acid (FOLVITE) 400 MCG tablet Take 1 tablet by mouth every morning        hydrOXYzine (ATARAX) 25 MG tablet Take 1-2 tablets (25-50 mg) by mouth every 6 hours as needed for itching (adjuvant pain) 60 tablet 3     levothyroxine (SYNTHROID/LEVOTHROID) 125 MCG tablet Take 1 tablet (125 mcg) by mouth daily 90 tablet 1     loperamide (IMODIUM) 2 MG capsule 2 caps at 1st sign of diarrhea & 1 cap every 2hrs  until 12hrs diarrhea free. During night, 2 caps at bedtime & 2 caps every 4hrs until AM 60 capsule 3     LORazepam (ATIVAN) 0.5 MG tablet Take 1 tablet (0.5 mg) by mouth every 4 hours as needed (Anxiety, Nausea/Vomiting or Sleep) 30 tablet 3     LORazepam (ATIVAN) 1 MG tablet   0     magic mouthwash (FIRST-MOUTHWASH BLM) suspension Swish and swallow 5-10 mLs in mouth every 6 hours as needed for mouth sores 237 mL 3     MELATONIN PO Take by mouth nightly as needed       omeprazole (PRILOSEC) 40 MG capsule        ondansetron (ZOFRAN) 4 MG tablet        ondansetron (ZOFRAN) 8 MG tablet Take 1 tablet (8 mg) by mouth every 8 hours as needed (nausea/vomiting) 30 tablet 2     order for DME Equipment being ordered: Compression Stockings. Please dispense 2 pairs of knee high 20-30 mmHg 2 Units 3     oxyCODONE (OXY-IR) 5 MG capsule Take 1-2 capsules (5-10 mg) by mouth every 4 hours as needed for severe pain 45 capsule 0     pantoprazole (PROTONIX) 20 MG EC tablet Take 1 tablet (20 mg) by mouth daily 30 tablet 2     polyethylene glycol (MIRALAX/GLYCOLAX) powder Take 17 g (1 capful) by mouth daily 500 g 3     Probiotic Product (PRO-BIOTIC BLEND PO) Take 1 Tablespoonful by mouth daily as needed       prochlorperazine (COMPAZINE) 10 MG tablet Take 1 tablet (10 mg) by mouth every 6 hours as needed for nausea or vomiting 30 tablet 3     prochlorperazine (COMPAZINE) 5 MG tablet Take 1 tablet (5 mg) by mouth every 6 hours as needed for nausea or vomiting 30 tablet 0     rivaroxaban ANTICOAGULANT (XARELTO) 20 MG TABS tablet Take 1 tablet (20 mg) by mouth daily (with dinner) 30 tablet 3     senna-docusate (SENOKOT-S;PERICOLACE) 8.6-50 MG per tablet Take 1-2 tablets by mouth 2 times daily as needed for constipation 100 tablet 0     timolol (TIMOPTIC) 0.5 % ophthalmic solution INT 1 GTT OU IN THE MORNING  3     timolol maleate (ISTALOL) 0.5 % opthalmic solution Place 1 drop into both eyes every morning Before placing contact lenses 1  Bottle 0       Physical Examination:  General: The patient is a pleasant female in no acute distress.  /84 (BP Location: Right arm, Cuff Size: Adult Small)  Pulse 109  Temp 98  F (36.7  C) (Oral)  Resp 16  Wt 43.8 kg (96 lb 9.6 oz)  SpO2 96%  BMI 17.66 kg/m2  Wt Readings from Last 10 Encounters:   11/14/18 43.8 kg (96 lb 9.6 oz)   11/14/18 43.8 kg (96 lb 9.6 oz)   11/13/18 43 kg (94 lb 11.2 oz)   11/12/18 42.7 kg (94 lb 2.2 oz)   10/29/18 46.2 kg (101 lb 14.4 oz)   10/15/18 44.5 kg (98 lb)   10/01/18 46.5 kg (102 lb 9.6 oz)   09/18/18 46.4 kg (102 lb 3.2 oz)   09/05/18 44.9 kg (98 lb 14.4 oz)   08/27/18 43.3 kg (95 lb 6.4 oz)     HEENT: EOMI, PERRL. Sclerae are anicteric. Oral mucosa is pink and moist with no lesions or thrush.   Heart: Regular rate and rhythm.   Lungs: Clear to auscultation bilaterally.   Abdomen: Bowel sounds present, soft. Mild tenderness in upper quadrants, nondistended, with no palpable hepatosplenomegaly or masses.   Extremities: No pitting lower extremity edema noted bilaterally. Ankles did not appear swollen to me.  Neuro: Cranial nerves II through XII are grossly intact.  Skin: No rashes, petechiae, or bruising noted on exposed skin.    Laboratory Data:  Results for orders placed or performed in visit on 11/14/18 (from the past 24 hour(s))   *CBC with platelets differential   Result Value Ref Range    WBC 11.9 (H) 4.0 - 11.0 10e9/L    RBC Count 2.97 (L) 3.8 - 5.2 10e12/L    Hemoglobin 8.7 (L) 11.7 - 15.7 g/dL    Hematocrit 28.1 (L) 35.0 - 47.0 %    MCV 95 78 - 100 fl    MCH 29.3 26.5 - 33.0 pg    MCHC 31.0 (L) 31.5 - 36.5 g/dL    RDW 17.9 (H) 10.0 - 15.0 %    Platelet Count 326 150 - 450 10e9/L    Diff Method Automated Method     % Neutrophils 68.0 %    % Lymphocytes 12.3 %    % Monocytes 13.7 %    % Eosinophils 4.9 %    % Basophils 0.7 %    % Immature Granulocytes 0.4 %    Nucleated RBCs 0 0 /100    Absolute Neutrophil 8.1 1.6 - 8.3 10e9/L    Absolute Lymphocytes 1.5 0.8 - 5.3  10e9/L    Absolute Monocytes 1.6 (H) 0.0 - 1.3 10e9/L    Absolute Eosinophils 0.6 0.0 - 0.7 10e9/L    Absolute Basophils 0.1 0.0 - 0.2 10e9/L    Abs Immature Granulocytes 0.1 0 - 0.4 10e9/L    Absolute Nucleated RBC 0.0    Comprehensive metabolic panel   Result Value Ref Range    Sodium 137 133 - 144 mmol/L    Potassium 3.8 3.4 - 5.3 mmol/L    Chloride 104 94 - 109 mmol/L    Carbon Dioxide 24 20 - 32 mmol/L    Anion Gap 8 3 - 14 mmol/L    Glucose 160 (H) 70 - 99 mg/dL    Urea Nitrogen 5 (L) 7 - 30 mg/dL    Creatinine 0.69 0.52 - 1.04 mg/dL    GFR Estimate 83 >60 mL/min/1.7m2    GFR Estimate If Black >90 >60 mL/min/1.7m2    Calcium 8.1 (L) 8.5 - 10.1 mg/dL    Bilirubin Total 0.5 0.2 - 1.3 mg/dL    Albumin 2.5 (L) 3.4 - 5.0 g/dL    Protein Total 6.6 (L) 6.8 - 8.8 g/dL    Alkaline Phosphatase 340 (H) 40 - 150 U/L    ALT 23 0 - 50 U/L    AST 24 0 - 45 U/L   Lipase   Result Value Ref Range    Lipase 330 73 - 393 U/L         Assessment and Plan:  79 yo female with metastatic pancreatic cancer involving the liver, lung, lymph nodes: most recently on 5FU and liposomal irinotecan, last dose 10/15/18. CT CAP 10/26/18 with progressive disease. Seen in clinic on 11/12 for increased abdominal pain and found to have acute pancreatitis with lipase 1022    Acute pancreatitis.   --Lipase was down to 390 yesterday. Started CLD last night and has tolerated. She advanced dieet on her own this morning and ate 1/2 sandwich without any increased pain. Cr at baseline. Calcium corrects to normal given low albumin. WBC remains slightly elevated but stable.  --Will defer further IVF as she is tolerating diet. She will continue regular diet but with caution and will avoid greasy, fatty foods for now.   --Continue oxycodone IR 10mg q4-6h prn for pain. She is on Senna-S for bowel regimen  --Will have her return for labs, and Quyen Mazariegos tomorrow. Will keep IVF appointment in case she has worsening symptoms on regular diet. Will recheck CBC, CMP  "and lipase tomorrow    Metastatic pancreatic cancer: Per Dr. Yu's last note, she has no further standard of care treatments available. They discussed phase 1 trials last visit and she was not interested in pursuing those. Decision to \"observe her disease status for now and make a plan to transition to hospice as we work through her social issues\".  --She met with Dr. Ann today and has agreed to hospice consult, likely next week.    Tracy Padron PA-C  [facility]  [address] [place] [id number] [phone]    " no

## (undated) DEVICE — ENDO DEVICE LOCKING AND BIOPSY CAP M00545261

## (undated) DEVICE — ACQUIRE ENDOSCOPIC ULTRASOUND FINE NEEDLE BIOPSY DEVICE

## (undated) DEVICE — WIRE GUIDE 0.025"X270CM STR VISIGLIDE G-240-2527S

## (undated) DEVICE — PACK ENDOSCOPY GI CUSTOM UMMC

## (undated) DEVICE — TAPE CLOTH 3" CARDINAL 3TRCL03

## (undated) DEVICE — BIOPSY VALVE BIOSHIELD 00711135

## (undated) DEVICE — ENDO TUBING CO2 SMARTCAP STERILE DISP 100145CO2EXT

## (undated) DEVICE — GLOVE PROTEXIS POWDER FREE SMT 7.5  2D72PT75X

## (undated) DEVICE — ENDO FORCEP BX CAPTURA LG SPIKE 2.4MMX230CM G53641

## (undated) DEVICE — ENDO BITE BLOCK ADULT OMNI-BLOC

## (undated) DEVICE — ESU GROUND PAD ADULT W/CORD E7507

## (undated) DEVICE — LINEN TOWEL PACK X5 5464

## (undated) DEVICE — INTR ENDOSCOPIC STENT FUSION OASIS 09.0FRX200CM

## (undated) DEVICE — CATH BALLOON ELATION ESOPH/PYLORIC 12-13.5-15MMX180CM EPB12

## (undated) DEVICE — ENDO CAP AND TUBING STERILE FOR ENDOGATOR  100130

## (undated) DEVICE — SOL WATER IRRIG 1000ML BOTTLE 2F7114

## (undated) DEVICE — GUIDEWIRE NOVAGOLD .018X260CM STR TIP M00552000

## (undated) DEVICE — KIT ENDO FIRST STEP DISINFECTANT 200ML W/POUCH EP-4

## (undated) DEVICE — KIT CONNECTOR FOR OLYMPUS ENDOSCOPES DEFENDO 100310

## (undated) DEVICE — GOWN XLG DISP 9545

## (undated) DEVICE — ENDO EXTRACTOR BALLOON 09-12MM 4690

## (undated) DEVICE — DECANTER BAG 2002S

## (undated) DEVICE — ENDO FUSION OMNI-TOME G31903

## (undated) DEVICE — SUCTION MANIFOLD DORNOCH ULTRA CART UL-CL500

## (undated) DEVICE — ENDO CONNECTOR ENDOGATOR AUX WATER JET FOR OLYMPUS SCOPE

## (undated) DEVICE — GLOVE PROTEXIS POWDER FREE SMT 7.0  2D72PT70X

## (undated) DEVICE — PACK CENTRAL LINE INSERTION SAN32CLFCG

## (undated) DEVICE — ENDO CATH BALLOON EXTRACTOR PRO RX 09-12MM 4700

## (undated) DEVICE — ENDO FUSION OMNI-TOME 21 FS-OMNI-21 G48675

## (undated) DEVICE — WIRE GUIDE 0.025"X450CM ANG VISIGLIDE G-240-2545A

## (undated) DEVICE — ENDO SNARE POLYPECTOMY OVAL 15MM LOOP SD-240U-15

## (undated) DEVICE — INFLATION DEVICE BIG 60 ENDO-AN6012

## (undated) RX ORDER — PHENYLEPHRINE HCL IN 0.9% NACL 1 MG/10 ML
SYRINGE (ML) INTRAVENOUS
Status: DISPENSED
Start: 2017-11-22

## (undated) RX ORDER — IOPAMIDOL 510 MG/ML
INJECTION, SOLUTION INTRAVASCULAR
Status: DISPENSED
Start: 2017-11-02

## (undated) RX ORDER — HEPARIN SODIUM,PORCINE 10 UNIT/ML
VIAL (ML) INTRAVENOUS
Status: DISPENSED
Start: 2018-01-01

## (undated) RX ORDER — HEPARIN SODIUM (PORCINE) LOCK FLUSH IV SOLN 100 UNIT/ML 100 UNIT/ML
SOLUTION INTRAVENOUS
Status: DISPENSED
Start: 2018-01-01

## (undated) RX ORDER — PROPOFOL 10 MG/ML
INJECTION, EMULSION INTRAVENOUS
Status: DISPENSED
Start: 2018-01-01

## (undated) RX ORDER — ROCURONIUM BROMIDE 50 MG/5 ML
SYRINGE (ML) INTRAVENOUS
Status: DISPENSED
Start: 2017-11-22

## (undated) RX ORDER — FENTANYL CITRATE 50 UG/ML
INJECTION, SOLUTION INTRAMUSCULAR; INTRAVENOUS
Status: DISPENSED
Start: 2017-11-22

## (undated) RX ORDER — ACETAMINOPHEN 325 MG/1
TABLET ORAL
Status: DISPENSED
Start: 2018-01-01

## (undated) RX ORDER — ACETAMINOPHEN 325 MG/1
TABLET ORAL
Status: DISPENSED
Start: 2017-11-22

## (undated) RX ORDER — FENTANYL CITRATE 50 UG/ML
INJECTION, SOLUTION INTRAMUSCULAR; INTRAVENOUS
Status: DISPENSED
Start: 2018-01-01

## (undated) RX ORDER — SIMETHICONE 40MG/0.6ML
SUSPENSION, DROPS(FINAL DOSAGE FORM)(ML) ORAL
Status: DISPENSED
Start: 2017-11-02

## (undated) RX ORDER — ONDANSETRON 2 MG/ML
INJECTION INTRAMUSCULAR; INTRAVENOUS
Status: DISPENSED
Start: 2018-01-01

## (undated) RX ORDER — FENTANYL CITRATE 50 UG/ML
INJECTION, SOLUTION INTRAMUSCULAR; INTRAVENOUS
Status: DISPENSED
Start: 2017-11-02

## (undated) RX ORDER — IOPAMIDOL 510 MG/ML
INJECTION, SOLUTION INTRAVASCULAR
Status: DISPENSED
Start: 2017-11-22

## (undated) RX ORDER — PROPOFOL 10 MG/ML
INJECTION, EMULSION INTRAVENOUS
Status: DISPENSED
Start: 2017-11-22

## (undated) RX ORDER — ONDANSETRON 2 MG/ML
INJECTION INTRAMUSCULAR; INTRAVENOUS
Status: DISPENSED
Start: 2017-11-22

## (undated) RX ORDER — DEXAMETHASONE SODIUM PHOSPHATE 4 MG/ML
INJECTION, SOLUTION INTRA-ARTICULAR; INTRALESIONAL; INTRAMUSCULAR; INTRAVENOUS; SOFT TISSUE
Status: DISPENSED
Start: 2018-01-01

## (undated) RX ORDER — GLYCOPYRROLATE 0.2 MG/ML
INJECTION, SOLUTION INTRAMUSCULAR; INTRAVENOUS
Status: DISPENSED
Start: 2018-01-01

## (undated) RX ORDER — PHENYLEPHRINE HCL IN 0.9% NACL 1 MG/10 ML
SYRINGE (ML) INTRAVENOUS
Status: DISPENSED
Start: 2018-01-01

## (undated) RX ORDER — LIDOCAINE HYDROCHLORIDE 20 MG/ML
INJECTION, SOLUTION EPIDURAL; INFILTRATION; INTRACAUDAL; PERINEURAL
Status: DISPENSED
Start: 2018-01-01

## (undated) RX ORDER — SODIUM CHLORIDE, SODIUM LACTATE, POTASSIUM CHLORIDE, CALCIUM CHLORIDE 600; 310; 30; 20 MG/100ML; MG/100ML; MG/100ML; MG/100ML
INJECTION, SOLUTION INTRAVENOUS
Status: DISPENSED
Start: 2018-01-01

## (undated) RX ORDER — LIDOCAINE HYDROCHLORIDE 20 MG/ML
INJECTION, SOLUTION EPIDURAL; INFILTRATION; INTRACAUDAL; PERINEURAL
Status: DISPENSED
Start: 2017-11-22

## (undated) RX ORDER — INDOMETHACIN 50 MG/1
SUPPOSITORY RECTAL
Status: DISPENSED
Start: 2018-01-01

## (undated) RX ORDER — DEXAMETHASONE SODIUM PHOSPHATE 4 MG/ML
INJECTION, SOLUTION INTRA-ARTICULAR; INTRALESIONAL; INTRAMUSCULAR; INTRAVENOUS; SOFT TISSUE
Status: DISPENSED
Start: 2017-11-22